# Patient Record
Sex: MALE | Race: WHITE | NOT HISPANIC OR LATINO | Employment: OTHER | URBAN - METROPOLITAN AREA
[De-identification: names, ages, dates, MRNs, and addresses within clinical notes are randomized per-mention and may not be internally consistent; named-entity substitution may affect disease eponyms.]

---

## 2017-02-20 ENCOUNTER — COMMUNICATION - HEALTHEAST (OUTPATIENT)
Dept: INTERNAL MEDICINE | Facility: CLINIC | Age: 67
End: 2017-02-20

## 2017-02-20 DIAGNOSIS — I10 HTN (HYPERTENSION): ICD-10-CM

## 2017-02-22 ENCOUNTER — COMMUNICATION - HEALTHEAST (OUTPATIENT)
Dept: INTERNAL MEDICINE | Facility: CLINIC | Age: 67
End: 2017-02-22

## 2017-05-15 ENCOUNTER — COMMUNICATION - HEALTHEAST (OUTPATIENT)
Dept: INTERNAL MEDICINE | Facility: CLINIC | Age: 67
End: 2017-05-15

## 2017-05-18 ENCOUNTER — OFFICE VISIT - HEALTHEAST (OUTPATIENT)
Dept: INTERNAL MEDICINE | Facility: CLINIC | Age: 67
End: 2017-05-18

## 2017-05-18 DIAGNOSIS — Z78.9 FULL CODE STATUS: ICD-10-CM

## 2017-05-18 DIAGNOSIS — M25.572 LEFT ANKLE PAIN: ICD-10-CM

## 2017-05-18 DIAGNOSIS — K21.9 GERD (GASTROESOPHAGEAL REFLUX DISEASE): ICD-10-CM

## 2017-05-18 DIAGNOSIS — J32.9 RECURRENT RHINOSINUSITIS: ICD-10-CM

## 2017-06-23 ENCOUNTER — RECORDS - HEALTHEAST (OUTPATIENT)
Dept: ADMINISTRATIVE | Facility: OTHER | Age: 67
End: 2017-06-23

## 2017-06-23 ENCOUNTER — COMMUNICATION - HEALTHEAST (OUTPATIENT)
Dept: INTERNAL MEDICINE | Facility: CLINIC | Age: 67
End: 2017-06-23

## 2017-06-25 ENCOUNTER — COMMUNICATION - HEALTHEAST (OUTPATIENT)
Dept: INTERNAL MEDICINE | Facility: CLINIC | Age: 67
End: 2017-06-25

## 2017-06-27 ENCOUNTER — OFFICE VISIT - HEALTHEAST (OUTPATIENT)
Dept: INTERNAL MEDICINE | Facility: CLINIC | Age: 67
End: 2017-06-27

## 2017-06-27 ENCOUNTER — COMMUNICATION - HEALTHEAST (OUTPATIENT)
Dept: INTERNAL MEDICINE | Facility: CLINIC | Age: 67
End: 2017-06-27

## 2017-06-29 ENCOUNTER — OFFICE VISIT - HEALTHEAST (OUTPATIENT)
Dept: INTERNAL MEDICINE | Facility: CLINIC | Age: 67
End: 2017-06-29

## 2017-06-29 DIAGNOSIS — H34.8322 BRANCH RETINAL VEIN OCCLUSION OF LEFT EYE (H): ICD-10-CM

## 2017-06-29 DIAGNOSIS — I10 HTN (HYPERTENSION): ICD-10-CM

## 2017-06-29 DIAGNOSIS — Z12.5 SCREENING FOR PROSTATE CANCER: ICD-10-CM

## 2017-06-29 LAB
CHOLEST SERPL-MCNC: 164 MG/DL
FASTING STATUS PATIENT QL REPORTED: YES
HBA1C MFR BLD: 5.3 % (ref 3.5–6)
HDLC SERPL-MCNC: 41 MG/DL
LDLC SERPL CALC-MCNC: 103 MG/DL
PSA SERPL-MCNC: 3.4 NG/ML (ref 0–4.5)
TRIGL SERPL-MCNC: 102 MG/DL

## 2017-09-29 ENCOUNTER — AMBULATORY - HEALTHEAST (OUTPATIENT)
Dept: NURSING | Facility: CLINIC | Age: 67
End: 2017-09-29

## 2017-09-29 DIAGNOSIS — Z23 NEED FOR IMMUNIZATION AGAINST INFLUENZA: ICD-10-CM

## 2017-10-10 ENCOUNTER — COMMUNICATION - HEALTHEAST (OUTPATIENT)
Dept: INTERNAL MEDICINE | Facility: CLINIC | Age: 67
End: 2017-10-10

## 2017-10-10 DIAGNOSIS — I10 HTN (HYPERTENSION): ICD-10-CM

## 2018-01-26 ENCOUNTER — COMMUNICATION - HEALTHEAST (OUTPATIENT)
Dept: INTERNAL MEDICINE | Facility: CLINIC | Age: 68
End: 2018-01-26

## 2018-02-16 ENCOUNTER — COMMUNICATION - HEALTHEAST (OUTPATIENT)
Dept: INTERNAL MEDICINE | Facility: CLINIC | Age: 68
End: 2018-02-16

## 2018-02-20 ENCOUNTER — OFFICE VISIT - HEALTHEAST (OUTPATIENT)
Dept: INTERNAL MEDICINE | Facility: CLINIC | Age: 68
End: 2018-02-20

## 2018-02-20 DIAGNOSIS — J98.8 RESPIRATORY INFECTION: ICD-10-CM

## 2018-02-20 DIAGNOSIS — L82.1 SK (SEBORRHEIC KERATOSIS): ICD-10-CM

## 2018-02-20 DIAGNOSIS — I83.813 VARICOSE VEINS OF LEG WITH PAIN, BILATERAL: ICD-10-CM

## 2018-02-20 DIAGNOSIS — L57.0 AK (ACTINIC KERATOSIS): ICD-10-CM

## 2018-02-20 DIAGNOSIS — I10 HTN (HYPERTENSION): ICD-10-CM

## 2018-05-31 ENCOUNTER — RECORDS - HEALTHEAST (OUTPATIENT)
Dept: ADMINISTRATIVE | Facility: OTHER | Age: 68
End: 2018-05-31

## 2018-06-04 ENCOUNTER — COMMUNICATION - HEALTHEAST (OUTPATIENT)
Dept: INTERNAL MEDICINE | Facility: CLINIC | Age: 68
End: 2018-06-04

## 2018-06-04 ENCOUNTER — OFFICE VISIT - HEALTHEAST (OUTPATIENT)
Dept: INTERNAL MEDICINE | Facility: CLINIC | Age: 68
End: 2018-06-04

## 2018-06-04 DIAGNOSIS — M25.551 RIGHT HIP PAIN: ICD-10-CM

## 2018-06-04 DIAGNOSIS — M25.50 JOINT PAIN: ICD-10-CM

## 2018-06-04 DIAGNOSIS — M54.2 NECK PAIN: ICD-10-CM
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
## 2018-06-04 LAB
ALBUMIN SERPL-MCNC: 4.1 G/DL (ref 3.5–5)
ALP SERPL-CCNC: 94 U/L (ref 45–120)
ALT SERPL W P-5'-P-CCNC: 18 U/L (ref 0–45)
ANION GAP SERPL CALCULATED.3IONS-SCNC: 13 MMOL/L (ref 5–18)
AST SERPL W P-5'-P-CCNC: 13 U/L (ref 0–40)
BASOPHILS # BLD AUTO: 0.1 THOU/UL (ref 0–0.2)
BASOPHILS NFR BLD AUTO: 1 % (ref 0–2)
BILIRUB SERPL-MCNC: 0.5 MG/DL (ref 0–1)
BUN SERPL-MCNC: 29 MG/DL (ref 8–22)
C REACTIVE PROTEIN LHE: <0.1 MG/DL (ref 0–0.8)
CALCIUM SERPL-MCNC: 9.6 MG/DL (ref 8.5–10.5)
CHLORIDE BLD-SCNC: 104 MMOL/L (ref 98–107)
CO2 SERPL-SCNC: 25 MMOL/L (ref 22–31)
CREAT SERPL-MCNC: 1 MG/DL (ref 0.7–1.3)
EOSINOPHIL # BLD AUTO: 0.1 THOU/UL (ref 0–0.4)
EOSINOPHIL NFR BLD AUTO: 1 % (ref 0–6)
ERYTHROCYTE [DISTWIDTH] IN BLOOD BY AUTOMATED COUNT: 11.3 % (ref 11–14.5)
ERYTHROCYTE [SEDIMENTATION RATE] IN BLOOD BY WESTERGREN METHOD: 3 MM/HR (ref 0–15)
GFR SERPL CREATININE-BSD FRML MDRD: >60 ML/MIN/1.73M2
GLUCOSE BLD-MCNC: 84 MG/DL (ref 70–125)
HCT VFR BLD AUTO: 48.5 % (ref 40–54)
HGB BLD-MCNC: 16.2 G/DL (ref 14–18)
LYMPHOCYTES # BLD AUTO: 2.1 THOU/UL (ref 0.8–4.4)
LYMPHOCYTES NFR BLD AUTO: 18 % (ref 20–40)
MCH RBC QN AUTO: 31.8 PG (ref 27–34)
MCHC RBC AUTO-ENTMCNC: 33.5 G/DL (ref 32–36)
MCV RBC AUTO: 95 FL (ref 80–100)
MONOCYTES # BLD AUTO: 1.2 THOU/UL (ref 0–0.9)
MONOCYTES NFR BLD AUTO: 11 % (ref 2–10)
NEUTROPHILS # BLD AUTO: 8.1 THOU/UL (ref 2–7.7)
NEUTROPHILS NFR BLD AUTO: 70 % (ref 50–70)
PLATELET # BLD AUTO: 298 THOU/UL (ref 140–440)
PMV BLD AUTO: 7.4 FL (ref 7–10)
POTASSIUM BLD-SCNC: 4.3 MMOL/L (ref 3.5–5)
PROT SERPL-MCNC: 6.7 G/DL (ref 6–8)
RBC # BLD AUTO: 5.1 MILL/UL (ref 4.4–6.2)
RHEUMATOID FACT SERPL-ACNC: <15 IU/ML (ref 0–30)
SODIUM SERPL-SCNC: 142 MMOL/L (ref 136–145)
WBC: 11.7 THOU/UL (ref 4–11)

## 2018-06-05 LAB
B BURGDOR IGG+IGM SER QL: <0.01 INDEX VALUE
CCP AB SER IA-ACNC: <0.5 U/ML

## 2018-06-06 ENCOUNTER — COMMUNICATION - HEALTHEAST (OUTPATIENT)
Dept: INTERNAL MEDICINE | Facility: CLINIC | Age: 68
End: 2018-06-06

## 2018-06-06 LAB
A PHAGOCYTOPH DNA BLD QL NAA+PROBE: NEGATIVE
B MICROTI DNA BLD QL NAA+PROBE: NOT DETECTED
BABESIA DNA BLD QL NAA+PROBE: NOT DETECTED
E CHAFFEENSIS DNA BLD QL NAA+PROBE: NEGATIVE
E EWINGII DNA SPEC QL NAA+PROBE: NEGATIVE
EHRLICHIA DNA SPEC QL NAA+PROBE: NEGATIVE

## 2018-06-07 ENCOUNTER — COMMUNICATION - HEALTHEAST (OUTPATIENT)
Dept: INTERNAL MEDICINE | Facility: CLINIC | Age: 68
End: 2018-06-07

## 2018-06-07 ENCOUNTER — COMMUNICATION - HEALTHEAST (OUTPATIENT)
Dept: SCHEDULING | Facility: CLINIC | Age: 68
End: 2018-06-07

## 2018-06-07 DIAGNOSIS — R14.0 ABDOMINAL BLOATING: ICD-10-CM

## 2018-06-08 ENCOUNTER — COMMUNICATION - HEALTHEAST (OUTPATIENT)
Dept: INTERNAL MEDICINE | Facility: CLINIC | Age: 68
End: 2018-06-08

## 2018-06-25 ENCOUNTER — OFFICE VISIT - HEALTHEAST (OUTPATIENT)
Dept: INTERNAL MEDICINE | Facility: CLINIC | Age: 68
End: 2018-06-25

## 2018-06-25 DIAGNOSIS — M79.604 LOW BACK PAIN RADIATING TO RIGHT LEG: ICD-10-CM

## 2018-06-25 DIAGNOSIS — R10.9 ABDOMINAL DISCOMFORT: ICD-10-CM

## 2018-06-25 DIAGNOSIS — M54.50 LOW BACK PAIN RADIATING TO RIGHT LEG: ICD-10-CM

## 2018-06-25 DIAGNOSIS — R10.31 RLQ DISCOMFORT: ICD-10-CM

## 2018-06-25 DIAGNOSIS — R14.0 ABDOMINAL BLOATING: ICD-10-CM

## 2018-06-25 DIAGNOSIS — M25.551 RIGHT HIP PAIN: ICD-10-CM

## 2018-06-25 DIAGNOSIS — R20.0 RIGHT LEG NUMBNESS: ICD-10-CM

## 2018-06-26 ENCOUNTER — AMBULATORY - HEALTHEAST (OUTPATIENT)
Dept: SCHEDULING | Facility: CLINIC | Age: 68
End: 2018-06-26

## 2018-06-26 DIAGNOSIS — M79.604 LOW BACK PAIN RADIATING TO RIGHT LEG: ICD-10-CM

## 2018-06-26 DIAGNOSIS — R10.9 ABDOMINAL DISCOMFORT: ICD-10-CM

## 2018-06-26 DIAGNOSIS — M54.50 LOW BACK PAIN RADIATING TO RIGHT LEG: ICD-10-CM

## 2018-06-26 DIAGNOSIS — R14.0 ABDOMINAL BLOATING: ICD-10-CM

## 2018-06-26 DIAGNOSIS — R20.0 RIGHT LEG NUMBNESS: ICD-10-CM

## 2018-06-26 DIAGNOSIS — R10.31 RLQ DISCOMFORT: ICD-10-CM

## 2018-07-04 ENCOUNTER — COMMUNICATION - HEALTHEAST (OUTPATIENT)
Dept: INTERNAL MEDICINE | Facility: CLINIC | Age: 68
End: 2018-07-04

## 2018-07-08 ENCOUNTER — COMMUNICATION - HEALTHEAST (OUTPATIENT)
Dept: INTERNAL MEDICINE | Facility: CLINIC | Age: 68
End: 2018-07-08

## 2018-10-09 ENCOUNTER — AMBULATORY - HEALTHEAST (OUTPATIENT)
Dept: NURSING | Facility: CLINIC | Age: 68
End: 2018-10-09

## 2018-10-09 DIAGNOSIS — Z23 FLU VACCINE NEED: ICD-10-CM

## 2018-12-20 ENCOUNTER — RECORDS - HEALTHEAST (OUTPATIENT)
Dept: ADMINISTRATIVE | Facility: OTHER | Age: 68
End: 2018-12-20

## 2019-01-15 ENCOUNTER — OFFICE VISIT - HEALTHEAST (OUTPATIENT)
Dept: INTERNAL MEDICINE | Facility: CLINIC | Age: 69
End: 2019-01-15

## 2019-01-15 DIAGNOSIS — S09.90XA TRAUMATIC INJURY OF HEAD, INITIAL ENCOUNTER: ICD-10-CM
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
## 2019-01-15 DIAGNOSIS — I10 ESSENTIAL HYPERTENSION: ICD-10-CM

## 2019-01-15 DIAGNOSIS — M25.512 ACUTE PAIN OF LEFT SHOULDER: ICD-10-CM

## 2019-02-04 ENCOUNTER — COMMUNICATION - HEALTHEAST (OUTPATIENT)
Dept: INTERNAL MEDICINE | Facility: CLINIC | Age: 69
End: 2019-02-04

## 2019-02-05 ENCOUNTER — OFFICE VISIT - HEALTHEAST (OUTPATIENT)
Dept: INTERNAL MEDICINE | Facility: CLINIC | Age: 69
End: 2019-02-05

## 2019-02-05 DIAGNOSIS — S06.0X0D CONCUSSION WITHOUT LOSS OF CONSCIOUSNESS, SUBSEQUENT ENCOUNTER: ICD-10-CM

## 2019-02-05 DIAGNOSIS — S00.03XD HEMATOMA OF SCALP, SUBSEQUENT ENCOUNTER: ICD-10-CM

## 2019-02-05 DIAGNOSIS — M25.612 DECREASED RANGE OF MOTION OF LEFT SHOULDER: ICD-10-CM
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
## 2019-02-05 DIAGNOSIS — M25.512 ACUTE PAIN OF LEFT SHOULDER: ICD-10-CM

## 2019-02-08 ENCOUNTER — RECORDS - HEALTHEAST (OUTPATIENT)
Dept: ADMINISTRATIVE | Facility: OTHER | Age: 69
End: 2019-02-08

## 2019-02-12 ENCOUNTER — RECORDS - HEALTHEAST (OUTPATIENT)
Dept: ADMINISTRATIVE | Facility: OTHER | Age: 69
End: 2019-02-12

## 2019-02-12 ENCOUNTER — COMMUNICATION - HEALTHEAST (OUTPATIENT)
Dept: INTERNAL MEDICINE | Facility: CLINIC | Age: 69
End: 2019-02-12

## 2019-02-12 ENCOUNTER — AMBULATORY - HEALTHEAST (OUTPATIENT)
Dept: INTERNAL MEDICINE | Facility: CLINIC | Age: 69
End: 2019-02-12

## 2019-02-12 DIAGNOSIS — M75.122 COMPLETE TEAR OF LEFT ROTATOR CUFF: ICD-10-CM

## 2019-02-14 ENCOUNTER — COMMUNICATION - HEALTHEAST (OUTPATIENT)
Dept: INTERNAL MEDICINE | Facility: CLINIC | Age: 69
End: 2019-02-14

## 2019-02-19 ENCOUNTER — COMMUNICATION - HEALTHEAST (OUTPATIENT)
Dept: INTERNAL MEDICINE | Facility: CLINIC | Age: 69
End: 2019-02-19

## 2019-02-22 ENCOUNTER — COMMUNICATION - HEALTHEAST (OUTPATIENT)
Dept: INTERNAL MEDICINE | Facility: CLINIC | Age: 69
End: 2019-02-22

## 2019-03-30 ENCOUNTER — RECORDS - HEALTHEAST (OUTPATIENT)
Dept: ADMINISTRATIVE | Facility: OTHER | Age: 69
End: 2019-03-30

## 2019-03-31 ENCOUNTER — COMMUNICATION - HEALTHEAST (OUTPATIENT)
Dept: INTERNAL MEDICINE | Facility: CLINIC | Age: 69
End: 2019-03-31

## 2019-04-05 ENCOUNTER — COMMUNICATION - HEALTHEAST (OUTPATIENT)
Dept: INTERNAL MEDICINE | Facility: CLINIC | Age: 69
End: 2019-04-05

## 2019-04-15 ENCOUNTER — COMMUNICATION - HEALTHEAST (OUTPATIENT)
Dept: INTERNAL MEDICINE | Facility: CLINIC | Age: 69
End: 2019-04-15

## 2019-04-15 ENCOUNTER — OFFICE VISIT - HEALTHEAST (OUTPATIENT)
Dept: INTERNAL MEDICINE | Facility: CLINIC | Age: 69
End: 2019-04-15

## 2019-04-15 DIAGNOSIS — E66.01 MORBID OBESITY (H): ICD-10-CM

## 2019-04-15 DIAGNOSIS — M25.512 CHRONIC LEFT SHOULDER PAIN: ICD-10-CM

## 2019-04-15 DIAGNOSIS — S06.0X0D CONCUSSION WITHOUT LOSS OF CONSCIOUSNESS, SUBSEQUENT ENCOUNTER: ICD-10-CM

## 2019-04-15 DIAGNOSIS — M75.122 COMPLETE TEAR OF LEFT ROTATOR CUFF: ICD-10-CM

## 2019-04-15 DIAGNOSIS — G89.29 CHRONIC LEFT SHOULDER PAIN: ICD-10-CM

## 2019-04-15 DIAGNOSIS — G89.29 CHRONIC NECK PAIN: ICD-10-CM

## 2019-04-15 DIAGNOSIS — Z01.810 PREOPERATIVE CARDIOVASCULAR EXAMINATION: ICD-10-CM

## 2019-04-15 DIAGNOSIS — M54.2 CHRONIC NECK PAIN: ICD-10-CM

## 2019-04-15 DIAGNOSIS — I10 ESSENTIAL HYPERTENSION: ICD-10-CM

## 2019-04-15 DIAGNOSIS — Z98.890 S/P COLONOSCOPY: ICD-10-CM

## 2019-04-15 DIAGNOSIS — I48.92 ATRIAL FLUTTER, UNSPECIFIED TYPE (H): ICD-10-CM

## 2019-04-15 LAB
ANION GAP SERPL CALCULATED.3IONS-SCNC: 8 MMOL/L (ref 5–18)
BUN SERPL-MCNC: 20 MG/DL (ref 8–22)
CALCIUM SERPL-MCNC: 9.7 MG/DL (ref 8.5–10.5)
CHLORIDE BLD-SCNC: 104 MMOL/L (ref 98–107)
CO2 SERPL-SCNC: 30 MMOL/L (ref 22–31)
CREAT SERPL-MCNC: 0.85 MG/DL (ref 0.7–1.3)
ERYTHROCYTE [DISTWIDTH] IN BLOOD BY AUTOMATED COUNT: 11.9 % (ref 11–14.5)
GFR SERPL CREATININE-BSD FRML MDRD: >60 ML/MIN/1.73M2
GLUCOSE BLD-MCNC: 91 MG/DL (ref 70–125)
HCT VFR BLD AUTO: 47.3 % (ref 40–54)
HGB BLD-MCNC: 16.4 G/DL (ref 14–18)
MAGNESIUM SERPL-MCNC: 2.1 MG/DL (ref 1.8–2.6)
MCH RBC QN AUTO: 32.2 PG (ref 27–34)
MCHC RBC AUTO-ENTMCNC: 34.8 G/DL (ref 32–36)
MCV RBC AUTO: 93 FL (ref 80–100)
PLATELET # BLD AUTO: 275 THOU/UL (ref 140–440)
PMV BLD AUTO: 7.8 FL (ref 7–10)
POTASSIUM BLD-SCNC: 4.6 MMOL/L (ref 3.5–5)
RBC # BLD AUTO: 5.1 MILL/UL (ref 4.4–6.2)
SODIUM SERPL-SCNC: 142 MMOL/L (ref 136–145)
WBC: 7.7 THOU/UL (ref 4–11)

## 2019-04-15 ASSESSMENT — MIFFLIN-ST. JEOR: SCORE: 1956.48

## 2019-04-16 LAB
ATRIAL RATE - MUSE: 83 BPM
DIASTOLIC BLOOD PRESSURE - MUSE: NORMAL MMHG
INTERPRETATION ECG - MUSE: NORMAL
P AXIS - MUSE: NORMAL DEGREES
PR INTERVAL - MUSE: NORMAL MS
QRS DURATION - MUSE: 108 MS
QT - MUSE: 394 MS
QTC - MUSE: 451 MS
R AXIS - MUSE: -86 DEGREES
SYSTOLIC BLOOD PRESSURE - MUSE: NORMAL MMHG
T AXIS - MUSE: 49 DEGREES
VENTRICULAR RATE- MUSE: 79 BPM

## 2019-05-02 ENCOUNTER — RECORDS - HEALTHEAST (OUTPATIENT)
Dept: ADMINISTRATIVE | Facility: OTHER | Age: 69
End: 2019-05-02

## 2019-05-07 ENCOUNTER — COMMUNICATION - HEALTHEAST (OUTPATIENT)
Dept: INTERNAL MEDICINE | Facility: CLINIC | Age: 69
End: 2019-05-07

## 2019-06-03 ENCOUNTER — OFFICE VISIT - HEALTHEAST (OUTPATIENT)
Dept: INTERNAL MEDICINE | Facility: CLINIC | Age: 69
End: 2019-06-03

## 2019-06-03 DIAGNOSIS — J31.0 RHINITIS, UNSPECIFIED TYPE: ICD-10-CM

## 2019-06-03 DIAGNOSIS — S06.0X0D CONCUSSION WITHOUT LOSS OF CONSCIOUSNESS, SUBSEQUENT ENCOUNTER: ICD-10-CM

## 2019-06-03 DIAGNOSIS — I10 ESSENTIAL HYPERTENSION: ICD-10-CM

## 2019-06-03 DIAGNOSIS — Z53.20 ANTICOAGULANT DRUG DECLINED: ICD-10-CM

## 2019-06-03 DIAGNOSIS — Z96.612 S/P REVERSE TOTAL SHOULDER ARTHROPLASTY, LEFT: ICD-10-CM

## 2019-06-03 DIAGNOSIS — I48.92 ATRIAL FLUTTER, CHRONIC (H): ICD-10-CM

## 2019-09-27 ENCOUNTER — COMMUNICATION - HEALTHEAST (OUTPATIENT)
Dept: INTERNAL MEDICINE | Facility: CLINIC | Age: 69
End: 2019-09-27

## 2019-09-27 DIAGNOSIS — J31.0 CHRONIC RHINITIS: ICD-10-CM

## 2019-10-29 ENCOUNTER — AMBULATORY - HEALTHEAST (OUTPATIENT)
Dept: NURSING | Facility: CLINIC | Age: 69
End: 2019-10-29

## 2019-10-29 DIAGNOSIS — Z23 FLU VACCINE NEED: ICD-10-CM

## 2019-11-12 ENCOUNTER — COMMUNICATION - HEALTHEAST (OUTPATIENT)
Dept: INTERNAL MEDICINE | Facility: CLINIC | Age: 69
End: 2019-11-12

## 2019-11-12 DIAGNOSIS — Z96.612 S/P REVERSE TOTAL SHOULDER ARTHROPLASTY, LEFT: ICD-10-CM

## 2019-11-12 DIAGNOSIS — M89.9 DISORDER OF BONE, UNSPECIFIED: ICD-10-CM

## 2019-11-13 ENCOUNTER — AMBULATORY - HEALTHEAST (OUTPATIENT)
Dept: LAB | Facility: CLINIC | Age: 69
End: 2019-11-13

## 2019-11-13 DIAGNOSIS — M89.9 DISORDER OF BONE, UNSPECIFIED: ICD-10-CM

## 2019-11-13 DIAGNOSIS — Z96.612 S/P REVERSE TOTAL SHOULDER ARTHROPLASTY, LEFT: ICD-10-CM

## 2019-11-14 LAB
25(OH)D3 SERPL-MCNC: 34.3 NG/ML (ref 30–80)
25(OH)D3 SERPL-MCNC: 34.3 NG/ML (ref 30–80)

## 2020-01-16 ENCOUNTER — RECORDS - HEALTHEAST (OUTPATIENT)
Dept: ADMINISTRATIVE | Facility: OTHER | Age: 70
End: 2020-01-16

## 2020-01-24 ENCOUNTER — RECORDS - HEALTHEAST (OUTPATIENT)
Dept: ADMINISTRATIVE | Facility: OTHER | Age: 70
End: 2020-01-24

## 2020-02-25 ENCOUNTER — COMMUNICATION - HEALTHEAST (OUTPATIENT)
Dept: INTERNAL MEDICINE | Facility: CLINIC | Age: 70
End: 2020-02-25

## 2020-05-01 ENCOUNTER — COMMUNICATION - HEALTHEAST (OUTPATIENT)
Dept: INTERNAL MEDICINE | Facility: CLINIC | Age: 70
End: 2020-05-01

## 2020-05-04 ENCOUNTER — OFFICE VISIT - HEALTHEAST (OUTPATIENT)
Dept: INTERNAL MEDICINE | Facility: CLINIC | Age: 70
End: 2020-05-04

## 2020-05-04 DIAGNOSIS — G89.29 CHRONIC LEFT SHOULDER PAIN: ICD-10-CM

## 2020-05-04 DIAGNOSIS — I48.92 ATRIAL FLUTTER, CHRONIC (H): ICD-10-CM

## 2020-05-04 DIAGNOSIS — J31.0 CHRONIC RHINITIS: ICD-10-CM

## 2020-05-04 DIAGNOSIS — M25.552 HIP PAIN, LEFT: ICD-10-CM

## 2020-05-04 DIAGNOSIS — E66.01 MORBID OBESITY (H): ICD-10-CM

## 2020-05-04 DIAGNOSIS — C44.311 BASAL CELL CARCINOMA OF SKIN OF NOSE: ICD-10-CM

## 2020-05-04 DIAGNOSIS — M25.512 CHRONIC LEFT SHOULDER PAIN: ICD-10-CM

## 2020-05-04 DIAGNOSIS — M54.50 ACUTE LEFT-SIDED LOW BACK PAIN WITHOUT SCIATICA: ICD-10-CM

## 2020-05-04 DIAGNOSIS — I10 ESSENTIAL HYPERTENSION: ICD-10-CM

## 2020-05-05 ENCOUNTER — COMMUNICATION - HEALTHEAST (OUTPATIENT)
Dept: INTERNAL MEDICINE | Facility: CLINIC | Age: 70
End: 2020-05-05

## 2020-06-16 ENCOUNTER — COMMUNICATION - HEALTHEAST (OUTPATIENT)
Dept: INTERNAL MEDICINE | Facility: CLINIC | Age: 70
End: 2020-06-16

## 2020-06-18 ENCOUNTER — OFFICE VISIT - HEALTHEAST (OUTPATIENT)
Dept: INTERNAL MEDICINE | Facility: CLINIC | Age: 70
End: 2020-06-18

## 2020-06-18 DIAGNOSIS — R06.02 SOB (SHORTNESS OF BREATH): ICD-10-CM

## 2020-06-18 DIAGNOSIS — L03.032 CELLULITIS OF LEFT TOE: ICD-10-CM

## 2020-06-18 DIAGNOSIS — M25.512 CHRONIC LEFT SHOULDER PAIN: ICD-10-CM

## 2020-06-18 DIAGNOSIS — G89.29 CHRONIC LEFT SHOULDER PAIN: ICD-10-CM

## 2020-06-18 DIAGNOSIS — M25.551 BILATERAL HIP PAIN: ICD-10-CM

## 2020-06-18 DIAGNOSIS — M25.552 BILATERAL HIP PAIN: ICD-10-CM

## 2020-06-18 LAB
ALBUMIN SERPL-MCNC: 4.2 G/DL (ref 3.5–5)
ALP SERPL-CCNC: 90 U/L (ref 45–120)
ALT SERPL W P-5'-P-CCNC: 15 U/L (ref 0–45)
ANION GAP SERPL CALCULATED.3IONS-SCNC: 10 MMOL/L (ref 5–18)
AST SERPL W P-5'-P-CCNC: 16 U/L (ref 0–40)
BASOPHILS # BLD AUTO: 0.1 THOU/UL (ref 0–0.2)
BASOPHILS NFR BLD AUTO: 1 % (ref 0–2)
BILIRUB SERPL-MCNC: 0.7 MG/DL (ref 0–1)
BNP SERPL-MCNC: 133 PG/ML (ref 0–67)
BUN SERPL-MCNC: 15 MG/DL (ref 8–28)
C REACTIVE PROTEIN LHE: 0.4 MG/DL (ref 0–0.8)
CALCIUM SERPL-MCNC: 9.6 MG/DL (ref 8.5–10.5)
CHLORIDE BLD-SCNC: 104 MMOL/L (ref 98–107)
CO2 SERPL-SCNC: 27 MMOL/L (ref 22–31)
CREAT SERPL-MCNC: 0.88 MG/DL (ref 0.7–1.3)
EOSINOPHIL # BLD AUTO: 0.2 THOU/UL (ref 0–0.4)
EOSINOPHIL NFR BLD AUTO: 3 % (ref 0–6)
ERYTHROCYTE [DISTWIDTH] IN BLOOD BY AUTOMATED COUNT: 11.5 % (ref 11–14.5)
ERYTHROCYTE [SEDIMENTATION RATE] IN BLOOD BY WESTERGREN METHOD: 7 MM/HR (ref 0–15)
GFR SERPL CREATININE-BSD FRML MDRD: >60 ML/MIN/1.73M2
GLUCOSE BLD-MCNC: 81 MG/DL (ref 70–125)
HCT VFR BLD AUTO: 45.9 % (ref 40–54)
HGB BLD-MCNC: 15.8 G/DL (ref 14–18)
LYMPHOCYTES # BLD AUTO: 2.1 THOU/UL (ref 0.8–4.4)
LYMPHOCYTES NFR BLD AUTO: 28 % (ref 20–40)
MCH RBC QN AUTO: 31.5 PG (ref 27–34)
MCHC RBC AUTO-ENTMCNC: 34.4 G/DL (ref 32–36)
MCV RBC AUTO: 92 FL (ref 80–100)
MONOCYTES # BLD AUTO: 0.6 THOU/UL (ref 0–0.9)
MONOCYTES NFR BLD AUTO: 9 % (ref 2–10)
NEUTROPHILS # BLD AUTO: 4.5 THOU/UL (ref 2–7.7)
NEUTROPHILS NFR BLD AUTO: 61 % (ref 50–70)
PLATELET # BLD AUTO: 265 THOU/UL (ref 140–440)
PMV BLD AUTO: 8.5 FL (ref 7–10)
POTASSIUM BLD-SCNC: 4.4 MMOL/L (ref 3.5–5)
PROT SERPL-MCNC: 7 G/DL (ref 6–8)
RBC # BLD AUTO: 5 MILL/UL (ref 4.4–6.2)
SODIUM SERPL-SCNC: 141 MMOL/L (ref 136–145)
TROPONIN I SERPL-MCNC: 0.02 NG/ML (ref 0–0.29)
WBC: 7.5 THOU/UL (ref 4–11)

## 2020-08-06 ENCOUNTER — COMMUNICATION - HEALTHEAST (OUTPATIENT)
Dept: INTERNAL MEDICINE | Facility: CLINIC | Age: 70
End: 2020-08-06

## 2020-08-06 DIAGNOSIS — J31.0 CHRONIC RHINITIS: ICD-10-CM

## 2020-09-12 ENCOUNTER — AMBULATORY - HEALTHEAST (OUTPATIENT)
Dept: NURSING | Facility: CLINIC | Age: 70
End: 2020-09-12

## 2020-11-30 ENCOUNTER — AMBULATORY - HEALTHEAST (OUTPATIENT)
Dept: LAB | Facility: CLINIC | Age: 70
End: 2020-11-30

## 2020-11-30 ENCOUNTER — OFFICE VISIT - HEALTHEAST (OUTPATIENT)
Dept: INTERNAL MEDICINE | Facility: CLINIC | Age: 70
End: 2020-11-30

## 2020-11-30 DIAGNOSIS — R06.02 SOB (SHORTNESS OF BREATH): ICD-10-CM

## 2020-11-30 DIAGNOSIS — M25.552 LEFT HIP PAIN: ICD-10-CM

## 2020-11-30 DIAGNOSIS — J31.0 CHRONIC RHINITIS: ICD-10-CM

## 2020-11-30 DIAGNOSIS — I10 ESSENTIAL HYPERTENSION: ICD-10-CM

## 2020-11-30 DIAGNOSIS — Z12.5 SCREENING FOR PROSTATE CANCER: ICD-10-CM

## 2020-11-30 DIAGNOSIS — S43.005A SHOULDER DISLOCATION, LEFT, INITIAL ENCOUNTER: ICD-10-CM

## 2020-11-30 DIAGNOSIS — Z00.00 MEDICARE ANNUAL WELLNESS VISIT, SUBSEQUENT: ICD-10-CM

## 2020-11-30 DIAGNOSIS — I48.92 ATRIAL FLUTTER, CHRONIC (H): ICD-10-CM

## 2020-11-30 LAB
ANION GAP SERPL CALCULATED.3IONS-SCNC: 10 MMOL/L (ref 5–18)
BNP SERPL-MCNC: 178 PG/ML (ref 0–69)
BUN SERPL-MCNC: 18 MG/DL (ref 8–28)
CALCIUM SERPL-MCNC: 9.1 MG/DL (ref 8.5–10.5)
CHLORIDE BLD-SCNC: 106 MMOL/L (ref 98–107)
CO2 SERPL-SCNC: 27 MMOL/L (ref 22–31)
CREAT SERPL-MCNC: 0.99 MG/DL (ref 0.7–1.3)
ERYTHROCYTE [DISTWIDTH] IN BLOOD BY AUTOMATED COUNT: 11.5 % (ref 11–14.5)
GFR SERPL CREATININE-BSD FRML MDRD: >60 ML/MIN/1.73M2
GLUCOSE BLD-MCNC: 108 MG/DL (ref 70–125)
HCT VFR BLD AUTO: 46.2 % (ref 40–54)
HGB BLD-MCNC: 15.6 G/DL (ref 14–18)
MCH RBC QN AUTO: 31.4 PG (ref 27–34)
MCHC RBC AUTO-ENTMCNC: 33.8 G/DL (ref 32–36)
MCV RBC AUTO: 93 FL (ref 80–100)
PLATELET # BLD AUTO: 265 THOU/UL (ref 140–440)
PMV BLD AUTO: 8.2 FL (ref 7–10)
POTASSIUM BLD-SCNC: 4.4 MMOL/L (ref 3.5–5)
PSA SERPL-MCNC: 2 NG/ML (ref 0–6.5)
RBC # BLD AUTO: 4.97 MILL/UL (ref 4.4–6.2)
SODIUM SERPL-SCNC: 143 MMOL/L (ref 136–145)
WBC: 7.5 THOU/UL (ref 4–11)

## 2020-12-04 ENCOUNTER — AMBULATORY - HEALTHEAST (OUTPATIENT)
Dept: INTERNAL MEDICINE | Facility: CLINIC | Age: 70
End: 2020-12-04

## 2020-12-04 DIAGNOSIS — I10 ESSENTIAL HYPERTENSION: ICD-10-CM

## 2020-12-04 DIAGNOSIS — R06.02 SOB (SHORTNESS OF BREATH): ICD-10-CM

## 2020-12-04 DIAGNOSIS — I48.92 ATRIAL FLUTTER, CHRONIC (H): ICD-10-CM

## 2020-12-30 ENCOUNTER — HOSPITAL ENCOUNTER (OUTPATIENT)
Dept: CARDIOLOGY | Facility: HOSPITAL | Age: 70
Discharge: HOME | End: 2020-12-30
Attending: INTERNAL MEDICINE

## 2020-12-30 DIAGNOSIS — I48.92 ATRIAL FLUTTER, CHRONIC (H): ICD-10-CM

## 2020-12-30 DIAGNOSIS — I10 ESSENTIAL HYPERTENSION: ICD-10-CM

## 2020-12-30 DIAGNOSIS — R06.02 SOB (SHORTNESS OF BREATH): ICD-10-CM

## 2020-12-30 LAB
AORTIC ROOT: 3.1 CM
AORTIC VALVE MEAN VELOCITY: 79.9 CM/S
ASCENDING AORTA: 3.4 CM
AV DIMENSIONLESS INDEX VTI: 0.6
AV MEAN GRADIENT: 3 MMHG
AV PEAK GRADIENT: 5.1 MMHG
AV VALVE AREA: 2.2 CM2
BSA FOR ECHO PROCEDURE: 2.4 M2
CV BLOOD PRESSURE: ABNORMAL MMHG
CV ECHO HEIGHT: 71 IN
CV ECHO WEIGHT: 253 LBS
DOP CALC AO PEAK VEL: 113 CM/S
DOP CALC AO VTI: 19.7 CM
DOP CALC LVOT AREA: 3.46 CM2
DOP CALC LVOT DIAMETER: 2.1 CM
DOP CALC LVOT STROKE VOLUME: 42.6 CM3
DOP CALCLVOT PEAK VEL VTI: 12.3 CM
EJECTION FRACTION: 42 % (ref 55–75)
FRACTIONAL SHORTENING: 17.5 % (ref 28–44)
INTERVENTRICULAR SEPTUM IN END DIASTOLE: 1.4 CM (ref 0.6–1)
IVS/PW RATIO: 1
LA AREA 1: 37.2 CM2
LA AREA 2: 34.7 CM2
LEFT ATRIUM LENGTH: 7.3 CM
LEFT ATRIUM SIZE: 5.1 CM
LEFT ATRIUM TO AORTIC ROOT RATIO: 1.5 NO UNITS
LEFT ATRIUM VOLUME INDEX: 62.6 ML/M2
LEFT ATRIUM VOLUME: 150.3 ML
LEFT VENTRICLE CARDIAC INDEX: 1.5 L/MIN/M2
LEFT VENTRICLE CARDIAC OUTPUT: 3.6 L/MIN
LEFT VENTRICLE DIASTOLIC VOLUME INDEX: 71.7 CM3/M2 (ref 34–74)
LEFT VENTRICLE DIASTOLIC VOLUME: 172 CM3 (ref 62–150)
LEFT VENTRICLE HEART RATE: 85 BPM
LEFT VENTRICLE MASS INDEX: 148.9 G/M2
LEFT VENTRICLE SYSTOLIC VOLUME INDEX: 41.3 CM3/M2 (ref 11–31)
LEFT VENTRICLE SYSTOLIC VOLUME: 99 CM3 (ref 21–61)
LEFT VENTRICULAR INTERNAL DIMENSION IN DIASTOLE: 5.7 CM (ref 4.2–5.8)
LEFT VENTRICULAR INTERNAL DIMENSION IN SYSTOLE: 4.7 CM (ref 2.5–4)
LEFT VENTRICULAR MASS: 357.5 G
LEFT VENTRICULAR OUTFLOW TRACT MEAN GRADIENT: 1 MMHG
LEFT VENTRICULAR OUTFLOW TRACT MEAN VELOCITY: 44.6 CM/S
LEFT VENTRICULAR POSTERIOR WALL IN END DIASTOLE: 1.4 CM (ref 0.6–1)
LV STROKE VOLUME INDEX: 17.7 ML/M2
MV AVERAGE E/E' RATIO: 12.3 CM/S
MV DECELERATION TIME: 229 MS
MV E'TISSUE VEL-LAT: 12.8 CM/S
MV E'TISSUE VEL-MED: 6.82 CM/S
MV LATERAL E/E' RATIO: 9.5
MV MEDIAL E/E' RATIO: 17.7
MV PEAK E VELOCITY: 121 CM/S
NUC REST DIASTOLIC VOLUME INDEX: 4048 LBS
NUC REST SYSTOLIC VOLUME INDEX: 71 IN
TRICUSPID VALVE ANULAR PLANE SYSTOLIC EXCURSION: 1.4 CM

## 2020-12-30 ASSESSMENT — MIFFLIN-ST. JEOR: SCORE: 1924.73

## 2020-12-31 ENCOUNTER — COMMUNICATION - HEALTHEAST (OUTPATIENT)
Dept: INTERNAL MEDICINE | Facility: CLINIC | Age: 70
End: 2020-12-31

## 2021-01-02 ENCOUNTER — COMMUNICATION - HEALTHEAST (OUTPATIENT)
Dept: INTERNAL MEDICINE | Facility: CLINIC | Age: 71
End: 2021-01-02

## 2021-01-03 ENCOUNTER — AMBULATORY - HEALTHEAST (OUTPATIENT)
Dept: INTERNAL MEDICINE | Facility: CLINIC | Age: 71
End: 2021-01-03

## 2021-01-03 DIAGNOSIS — R93.1 DECREASED CARDIAC EJECTION FRACTION: ICD-10-CM

## 2021-01-03 DIAGNOSIS — I10 ESSENTIAL HYPERTENSION: ICD-10-CM

## 2021-01-03 DIAGNOSIS — R06.02 SOB (SHORTNESS OF BREATH): ICD-10-CM

## 2021-01-03 DIAGNOSIS — M25.552 HIP PAIN, LEFT: ICD-10-CM

## 2021-01-03 DIAGNOSIS — I48.92 ATRIAL FLUTTER, CHRONIC (H): ICD-10-CM

## 2021-01-04 ENCOUNTER — COMMUNICATION - HEALTHEAST (OUTPATIENT)
Dept: INTERNAL MEDICINE | Facility: CLINIC | Age: 71
End: 2021-01-04

## 2021-01-04 DIAGNOSIS — R93.1 DECREASED CARDIAC EJECTION FRACTION: ICD-10-CM

## 2021-01-04 DIAGNOSIS — I48.92 ATRIAL FLUTTER, CHRONIC (H): ICD-10-CM

## 2021-01-04 DIAGNOSIS — R06.02 SOB (SHORTNESS OF BREATH): ICD-10-CM

## 2021-01-04 DIAGNOSIS — I10 ESSENTIAL HYPERTENSION: ICD-10-CM

## 2021-01-04 RX ORDER — METOPROLOL SUCCINATE 25 MG/1
25 TABLET, EXTENDED RELEASE ORAL DAILY
Qty: 90 TABLET | Refills: 3 | Status: SHIPPED | OUTPATIENT
Start: 2021-01-04 | End: 2021-12-27

## 2021-01-05 ENCOUNTER — HOSPITAL ENCOUNTER (OUTPATIENT)
Dept: NUCLEAR MEDICINE | Facility: CLINIC | Age: 71
Discharge: HOME | End: 2021-01-05
Attending: INTERNAL MEDICINE

## 2021-01-05 ENCOUNTER — HOSPITAL ENCOUNTER (OUTPATIENT)
Dept: CARDIOLOGY | Facility: CLINIC | Age: 71
Discharge: HOME | End: 2021-01-05
Attending: INTERNAL MEDICINE

## 2021-01-05 DIAGNOSIS — R06.02 SOB (SHORTNESS OF BREATH): ICD-10-CM

## 2021-01-05 DIAGNOSIS — R93.1 DECREASED CARDIAC EJECTION FRACTION: ICD-10-CM

## 2021-01-05 LAB
CV STRESS CURRENT BP HE: NORMAL
CV STRESS CURRENT HR HE: 103
CV STRESS CURRENT HR HE: 104
CV STRESS CURRENT HR HE: 73
CV STRESS CURRENT HR HE: 79
CV STRESS CURRENT HR HE: 79
CV STRESS CURRENT HR HE: 82
CV STRESS CURRENT HR HE: 82
CV STRESS CURRENT HR HE: 83
CV STRESS CURRENT HR HE: 84
CV STRESS CURRENT HR HE: 87
CV STRESS CURRENT HR HE: 88
CV STRESS CURRENT HR HE: 89
CV STRESS CURRENT HR HE: 93
CV STRESS CURRENT HR HE: 97
CV STRESS CURRENT HR HE: 98
CV STRESS CURRENT HR HE: 98
CV STRESS DEVIATION TIME HE: NORMAL
CV STRESS ECHO PERCENT HR HE: NORMAL
CV STRESS EXERCISE STAGE HE: NORMAL
CV STRESS FINAL RESTING BP HE: NORMAL
CV STRESS FINAL RESTING HR HE: 82
CV STRESS MAX HR HE: 113
CV STRESS MAX TREADMILL GRADE HE: 0
CV STRESS MAX TREADMILL SPEED HE: 0
CV STRESS PEAK DIA BP HE: NORMAL
CV STRESS PEAK SYS BP HE: NORMAL
CV STRESS PHASE HE: NORMAL
CV STRESS PROTOCOL HE: NORMAL
CV STRESS RESTING PT POSITION HE: NORMAL
CV STRESS ST DEVIATION AMOUNT HE: NORMAL
CV STRESS ST DEVIATION ELEVATION HE: NORMAL
CV STRESS ST EVELATION AMOUNT HE: NORMAL
CV STRESS TEST TYPE HE: NORMAL
CV STRESS TOTAL STAGE TIME MIN 1 HE: NORMAL
NUC STRESS EJECTION FRACTION: 44 %
RATE PRESSURE PRODUCT: NORMAL
STRESS ECHO BASELINE DIASTOLIC HE: 122
STRESS ECHO BASELINE HR: 78
STRESS ECHO BASELINE SYSTOLIC BP: 163
STRESS ECHO CALCULATED PERCENT HR: 76 %
STRESS ECHO LAST STRESS DIASTOLIC BP: 91
STRESS ECHO LAST STRESS HR: 103
STRESS ECHO LAST STRESS SYSTOLIC BP: 158
STRESS ECHO TARGET HR: 149

## 2021-01-07 ENCOUNTER — AMBULATORY - HEALTHEAST (OUTPATIENT)
Dept: CARDIOLOGY | Facility: CLINIC | Age: 71
End: 2021-01-07

## 2021-01-07 ENCOUNTER — AMBULATORY - HEALTHEAST (OUTPATIENT)
Dept: CARDIOLOGY | Facility: HOSPITAL | Age: 71
End: 2021-01-07

## 2021-01-07 ENCOUNTER — OFFICE VISIT - HEALTHEAST (OUTPATIENT)
Dept: CARDIOLOGY | Facility: CLINIC | Age: 71
End: 2021-01-07

## 2021-01-07 DIAGNOSIS — Z11.59 ENCOUNTER FOR SCREENING FOR OTHER VIRAL DISEASES: ICD-10-CM

## 2021-01-07 DIAGNOSIS — I25.5 ISCHEMIC CARDIOMYOPATHY: ICD-10-CM

## 2021-01-07 DIAGNOSIS — I10 ESSENTIAL HYPERTENSION: ICD-10-CM

## 2021-01-07 DIAGNOSIS — I48.92 ATRIAL FLUTTER, CHRONIC (H): ICD-10-CM

## 2021-01-07 ASSESSMENT — MIFFLIN-ST. JEOR: SCORE: 1937.62

## 2021-01-08 ENCOUNTER — COMMUNICATION - HEALTHEAST (OUTPATIENT)
Dept: INTERNAL MEDICINE | Facility: CLINIC | Age: 71
End: 2021-01-08

## 2021-01-09 ENCOUNTER — HOSPITAL ENCOUNTER (OUTPATIENT)
Dept: MRI IMAGING | Facility: CLINIC | Age: 71
Discharge: HOME | End: 2021-01-09
Attending: INTERNAL MEDICINE

## 2021-01-09 DIAGNOSIS — M25.552 HIP PAIN, LEFT: ICD-10-CM

## 2021-01-12 ENCOUNTER — COMMUNICATION - HEALTHEAST (OUTPATIENT)
Dept: CARDIOLOGY | Facility: CLINIC | Age: 71
End: 2021-01-12

## 2021-01-16 ENCOUNTER — AMBULATORY - HEALTHEAST (OUTPATIENT)
Dept: FAMILY MEDICINE | Facility: CLINIC | Age: 71
End: 2021-01-16

## 2021-01-16 DIAGNOSIS — Z11.59 ENCOUNTER FOR SCREENING FOR OTHER VIRAL DISEASES: ICD-10-CM

## 2021-01-17 LAB
SARS-COV-2 PCR COMMENT: NORMAL
SARS-COV-2 RNA SPEC QL NAA+PROBE: NEGATIVE
SARS-COV-2 VIRUS SPECIMEN SOURCE: NORMAL

## 2021-01-18 ENCOUNTER — RECORDS - HEALTHEAST (OUTPATIENT)
Dept: ADMINISTRATIVE | Facility: OTHER | Age: 71
End: 2021-01-18

## 2021-01-18 ENCOUNTER — COMMUNICATION - HEALTHEAST (OUTPATIENT)
Dept: CARDIOLOGY | Facility: CLINIC | Age: 71
End: 2021-01-18

## 2021-01-18 ENCOUNTER — COMMUNICATION - HEALTHEAST (OUTPATIENT)
Dept: SCHEDULING | Facility: CLINIC | Age: 71
End: 2021-01-18

## 2021-01-19 ENCOUNTER — SURGERY - HEALTHEAST (OUTPATIENT)
Dept: CARDIOLOGY | Facility: CLINIC | Age: 71
End: 2021-01-19

## 2021-01-19 DIAGNOSIS — I25.5 ISCHEMIC CARDIOMYOPATHY: ICD-10-CM

## 2021-01-20 ENCOUNTER — COMMUNICATION - HEALTHEAST (OUTPATIENT)
Dept: INTERNAL MEDICINE | Facility: CLINIC | Age: 71
End: 2021-01-20

## 2021-01-20 ENCOUNTER — SURGERY - HEALTHEAST (OUTPATIENT)
Dept: CARDIOLOGY | Facility: HOSPITAL | Age: 71
End: 2021-01-20

## 2021-01-22 ENCOUNTER — RECORDS - HEALTHEAST (OUTPATIENT)
Dept: ADMINISTRATIVE | Facility: OTHER | Age: 71
End: 2021-01-22

## 2021-01-28 ENCOUNTER — RECORDS - HEALTHEAST (OUTPATIENT)
Dept: ADMINISTRATIVE | Facility: OTHER | Age: 71
End: 2021-01-28

## 2021-02-01 ENCOUNTER — OFFICE VISIT - HEALTHEAST (OUTPATIENT)
Dept: INTERNAL MEDICINE | Facility: CLINIC | Age: 71
End: 2021-02-01

## 2021-02-01 DIAGNOSIS — L27.0: ICD-10-CM

## 2021-02-01 DIAGNOSIS — R93.1 DECREASED CARDIAC EJECTION FRACTION: ICD-10-CM

## 2021-02-01 DIAGNOSIS — I48.92 ATRIAL FLUTTER, CHRONIC (H): ICD-10-CM

## 2021-02-01 DIAGNOSIS — M25.552 HIP PAIN, LEFT: ICD-10-CM

## 2021-02-01 DIAGNOSIS — T39.95XA: ICD-10-CM

## 2021-02-01 DIAGNOSIS — M25.561 CHRONIC PAIN OF RIGHT KNEE: ICD-10-CM

## 2021-02-01 DIAGNOSIS — E66.01 MORBID OBESITY (H): ICD-10-CM

## 2021-02-01 DIAGNOSIS — S43.005A SHOULDER DISLOCATION, LEFT, INITIAL ENCOUNTER: ICD-10-CM

## 2021-02-01 DIAGNOSIS — G89.29 CHRONIC PAIN OF RIGHT KNEE: ICD-10-CM

## 2021-02-01 DIAGNOSIS — R10.13 EPIGASTRIC PAIN: ICD-10-CM

## 2021-02-03 ENCOUNTER — COMMUNICATION - HEALTHEAST (OUTPATIENT)
Dept: CARDIOLOGY | Facility: CLINIC | Age: 71
End: 2021-02-03

## 2021-02-04 ENCOUNTER — OFFICE VISIT - HEALTHEAST (OUTPATIENT)
Dept: CARDIOLOGY | Facility: CLINIC | Age: 71
End: 2021-02-04

## 2021-02-04 ENCOUNTER — COMMUNICATION - HEALTHEAST (OUTPATIENT)
Dept: CARDIOLOGY | Facility: CLINIC | Age: 71
End: 2021-02-04

## 2021-02-04 DIAGNOSIS — I48.91 ATRIAL FIBRILLATION, UNSPECIFIED TYPE (H): ICD-10-CM

## 2021-02-04 ASSESSMENT — MIFFLIN-ST. JEOR: SCORE: 1898.82

## 2021-02-05 LAB
ATRIAL RATE - MUSE: NORMAL
DIASTOLIC BLOOD PRESSURE - MUSE: NORMAL
INTERPRETATION ECG - MUSE: NORMAL
P AXIS - MUSE: NORMAL
PR INTERVAL - MUSE: NORMAL
QRS DURATION - MUSE: 118 MS
QT - MUSE: 408 MS
QTC - MUSE: 433 MS
R AXIS - MUSE: -83 DEGREES
SYSTOLIC BLOOD PRESSURE - MUSE: NORMAL
T AXIS - MUSE: 54 DEGREES
VENTRICULAR RATE- MUSE: 68 BPM

## 2021-02-08 ENCOUNTER — COMMUNICATION - HEALTHEAST (OUTPATIENT)
Dept: INTERNAL MEDICINE | Facility: CLINIC | Age: 71
End: 2021-02-08

## 2021-02-18 ENCOUNTER — OFFICE VISIT - HEALTHEAST (OUTPATIENT)
Dept: CARDIOLOGY | Facility: CLINIC | Age: 71
End: 2021-02-18

## 2021-02-18 DIAGNOSIS — I42.0 DILATED CARDIOMYOPATHY (H): ICD-10-CM

## 2021-02-18 DIAGNOSIS — I48.92 ATRIAL FLUTTER, CHRONIC (H): ICD-10-CM

## 2021-02-18 RX ORDER — CHLORAL HYDRATE 500 MG
2 CAPSULE ORAL DAILY
Status: SHIPPED | COMMUNITY
Start: 2021-02-18

## 2021-02-18 RX ORDER — OXYMETAZOLINE HYDROCHLORIDE 0.05 G/100ML
1 SPRAY NASAL 2 TIMES DAILY
Status: SHIPPED | COMMUNITY
Start: 2021-02-18 | End: 2023-05-08

## 2021-02-26 ENCOUNTER — COMMUNICATION - HEALTHEAST (OUTPATIENT)
Dept: INTERNAL MEDICINE | Facility: CLINIC | Age: 71
End: 2021-02-26

## 2021-03-01 ENCOUNTER — COMMUNICATION - HEALTHEAST (OUTPATIENT)
Dept: INTERNAL MEDICINE | Facility: CLINIC | Age: 71
End: 2021-03-01

## 2021-03-01 ENCOUNTER — COMMUNICATION - HEALTHEAST (OUTPATIENT)
Dept: CARDIOLOGY | Facility: CLINIC | Age: 71
End: 2021-03-01

## 2021-03-04 ENCOUNTER — HOSPITAL ENCOUNTER (OUTPATIENT)
Dept: CARDIOLOGY | Facility: CLINIC | Age: 71
Discharge: HOME | End: 2021-03-04
Attending: INTERNAL MEDICINE

## 2021-03-04 DIAGNOSIS — I48.92 ATRIAL FLUTTER, CHRONIC (H): ICD-10-CM

## 2021-03-04 DIAGNOSIS — I42.0 DILATED CARDIOMYOPATHY (H): ICD-10-CM

## 2021-03-11 ENCOUNTER — COMMUNICATION - HEALTHEAST (OUTPATIENT)
Dept: CARDIOLOGY | Facility: CLINIC | Age: 71
End: 2021-03-11

## 2021-03-30 ENCOUNTER — COMMUNICATION - HEALTHEAST (OUTPATIENT)
Dept: INTERNAL MEDICINE | Facility: CLINIC | Age: 71
End: 2021-03-30

## 2021-03-30 ENCOUNTER — OFFICE VISIT - HEALTHEAST (OUTPATIENT)
Dept: INTERNAL MEDICINE | Facility: CLINIC | Age: 71
End: 2021-03-30

## 2021-03-30 ENCOUNTER — COMMUNICATION - HEALTHEAST (OUTPATIENT)
Dept: ADMINISTRATIVE | Facility: CLINIC | Age: 71
End: 2021-03-30

## 2021-03-30 DIAGNOSIS — M54.16 LUMBAR BACK PAIN WITH RADICULOPATHY AFFECTING LEFT LOWER EXTREMITY: ICD-10-CM

## 2021-03-30 DIAGNOSIS — M54.16 LEFT LUMBAR RADICULOPATHY: ICD-10-CM

## 2021-03-30 DIAGNOSIS — I25.10 CORONARY ARTERY DISEASE INVOLVING NATIVE CORONARY ARTERY OF NATIVE HEART WITHOUT ANGINA PECTORIS: ICD-10-CM

## 2021-04-03 ENCOUNTER — COMMUNICATION - HEALTHEAST (OUTPATIENT)
Dept: INTERNAL MEDICINE | Facility: CLINIC | Age: 71
End: 2021-04-03

## 2021-04-03 DIAGNOSIS — I25.5 ISCHEMIC CARDIOMYOPATHY: ICD-10-CM

## 2021-04-04 ENCOUNTER — AMBULATORY - HEALTHEAST (OUTPATIENT)
Dept: INTERNAL MEDICINE | Facility: CLINIC | Age: 71
End: 2021-04-04

## 2021-04-04 DIAGNOSIS — I25.5 ISCHEMIC CARDIOMYOPATHY: ICD-10-CM

## 2021-04-05 RX ORDER — ATORVASTATIN CALCIUM 40 MG/1
40 TABLET, FILM COATED ORAL AT BEDTIME
Qty: 90 TABLET | Refills: 3 | Status: SHIPPED | OUTPATIENT
Start: 2021-04-05 | End: 2021-07-12

## 2021-04-05 RX ORDER — LISINOPRIL 5 MG/1
5 TABLET ORAL DAILY
Qty: 90 TABLET | Refills: 3 | Status: SHIPPED | OUTPATIENT
Start: 2021-04-05 | End: 2022-01-03

## 2021-05-04 ENCOUNTER — OFFICE VISIT - HEALTHEAST (OUTPATIENT)
Dept: CARDIOLOGY | Facility: CLINIC | Age: 71
End: 2021-05-04

## 2021-05-04 DIAGNOSIS — I50.22 CHRONIC SYSTOLIC HEART FAILURE (H): ICD-10-CM

## 2021-05-04 DIAGNOSIS — I48.91 ATRIAL FIBRILLATION, UNSPECIFIED TYPE (H): ICD-10-CM

## 2021-05-04 ASSESSMENT — MIFFLIN-ST. JEOR: SCORE: 1898.75

## 2021-05-23 ENCOUNTER — HEALTH MAINTENANCE LETTER (OUTPATIENT)
Age: 71
End: 2021-05-23

## 2021-05-27 VITALS
BODY MASS INDEX: 33.18 KG/M2 | DIASTOLIC BLOOD PRESSURE: 62 MMHG | RESPIRATION RATE: 14 BRPM | SYSTOLIC BLOOD PRESSURE: 112 MMHG | WEIGHT: 245 LBS | HEIGHT: 72 IN | HEART RATE: 69 BPM

## 2021-05-27 VITALS — DIASTOLIC BLOOD PRESSURE: 86 MMHG | SYSTOLIC BLOOD PRESSURE: 132 MMHG

## 2021-05-27 NOTE — PATIENT INSTRUCTIONS - HE
The new shingles shot (Shingrix) is 2 separate shots  by 2-6 months.    The cost out of pocket is around $390 for the 2 shots, so you might want to see if your insurance covers it or a portion of it prior to having it done.  Often by having it done at a pharmacy rather than a clinic, it can be cheaper for you.    It is estimated that any person's risk of shingles over their lifetime is around 10-20%.    The old shingles vaccine (Zostavax) is about 50% effective, reducing your risk of shingles to about 5-10% over your lifetime.    The new shot is 90% effective, reducing your risk of shingles to about 1-2% over your lifetime.    Because the shot is strong, it is very common to have flu like symptoms for 2-3 days after the shot.  25-50% of patients will have fever, muscle aches or headache.    I believe that whether or not you have this vaccine is your own decision depending on your values and preferences.  The information above I give you to make an informed decision.    Onur Rod MD  [facility]

## 2021-05-27 NOTE — TELEPHONE ENCOUNTER
I did not call.  I am unsure if someone else did or otherwise.  He is also going to orthopedics.    Onur Rod MD  [facility]  4/5/2019, 11:48 AM

## 2021-05-27 NOTE — TELEPHONE ENCOUNTER
Patient Returning Call  Reason for call:  Patient calling back  Information relayed to patient:  NONE- no message to relay- Pt received VM to call clinic.  Patient has additional questions:  Yes  If YES, what are your questions/concerns:    Okay to leave a detailed message?:  Yes

## 2021-05-27 NOTE — TELEPHONE ENCOUNTER
Please schedule this patient for an appointment with me on:    ______________________________________________________________________     Friday, May 31st  Time of appointment:  1:00 pm    Reason for appointment:  Follow up shoulder surgery    ______________________________________________________________________     Patient already notified.  No need to notify the patient.    Thank you.    Onur Rod MD  [facility]  4/15/2019, 10:13 PM

## 2021-05-28 NOTE — TELEPHONE ENCOUNTER
Please call patient -    ______________________________________________________________________     Home phone:  [phone] (home)     Cell phone:   Telephone Information:   Mobile [phone]       Other contacts:  Name Home Phone Work Phone Mobile Phone Relationship Lgl Grd   KYLER GONZALEZ   [phone] Spouse    MATILDE SILVER   [phone] Child      ______________________________________________________________________     Please help the patient reschedule his appointment to a reserved slot somewhere near this same time frame.  Thank you.    Onur Rod MD  [facility]  5/7/2019, 7:27 AM        ______________________________________________________________________     EMAIL related to this:    I will have someone call to reschedule.    Jose Rod      From: Keyanna Gonzalez [[email or web address]]   Sent: Monday, May 06, 2019 4:27 PM  To: Onur Rod  Subject: Scheduled Appointment    Dr Viola Lorenzo has a follow up appointment with you on Friday May 31 at 1pm.  Do you have another opening that would work for him to come in?   Our grandson has a graduation ceremony from grade school that our daughter asked if we could attend that afternoon.      He had his shoulder surgery Wed May 1st and is doing well so far.  With an ice pump and Alieve the discomfort is staying under control.  He will start PT on May 30th.    Thank you.    Bj/Melita Gonzalez

## 2021-05-29 NOTE — PATIENT INSTRUCTIONS - HE
Please follow up if you have any further issues.    You may contact me by phone or [employer] if you are worsening or if things are not improving.    Thank you for coming in today.

## 2021-05-31 VITALS — WEIGHT: 249 LBS | BODY MASS INDEX: 33.77 KG/M2

## 2021-05-31 VITALS — BODY MASS INDEX: 34.88 KG/M2 | WEIGHT: 257.2 LBS

## 2021-06-01 VITALS — WEIGHT: 260 LBS | BODY MASS INDEX: 35.26 KG/M2

## 2021-06-01 VITALS — BODY MASS INDEX: 35.13 KG/M2 | WEIGHT: 259 LBS

## 2021-06-01 VITALS — WEIGHT: 258 LBS | BODY MASS INDEX: 34.99 KG/M2

## 2021-06-01 NOTE — TELEPHONE ENCOUNTER
Refill Request  Did you contact pharmacy: Yes [facility] is calling.  Medication name:   Requested Prescriptions     Pending Prescriptions Disp Refills     fluticasone propionate (FLONASE) 50 mcg/actuation nasal spray 52 g 2     Si sprays into each nostril daily.     Who prescribed the medication: Dr. Rod  Pharmacy Name and Location: [facility]  Is patient out of medication: Yes  Patient notified refills processed in 72 hours:  yes  Okay to leave a detailed message: yes, [phone]

## 2021-06-01 NOTE — TELEPHONE ENCOUNTER
RN cannot approve Refill Request    RN can NOT refill this medication there is no associated diagnosis. Last office visit: 6/3/2019 Onur Rod MD Last Physical: Visit date not found Last MTM visit: Visit date not found Last visit same specialty: 6/3/2019 Onur Rod MD.  Next visit within 3 mo: Visit date not found  Next physical within 3 mo: Visit date not found  Last refill 1/15/2019 for 52 g/2 refills  Last OV 6/3/2019    Zeo Herrera, [facility] Triage/Med Refill 2019    Requested Prescriptions   Pending Prescriptions Disp Refills     fluticasone propionate (FLONASE) 50 mcg/actuation nasal spray 52 g 2     Si sprays into each nostril daily.       Nasal Steroid Refill Protocol Passed - 2019 10:43 AM        Passed - Patient has had office visit/physical in last 2 years     Last office visit with prescriber/PCP: 6/3/2019 OR same dept: 6/3/2019 Onur Rod MD OR same specialty: 6/3/2019 Onur Rod MD Last physical: Visit date not found Last MTM visit: Visit date not found    Next appt within 3 mo: Visit date not found  Next physical within 3 mo: Visit date not found  Prescriber OR PCP: Onur Rod MD  Last diagnosis associated with med order: There are no diagnoses linked to this encounter.   If protocol passes may refill for 12 months if within 3 months of last provider visit (or a total of 15 months).

## 2021-06-02 VITALS — HEIGHT: 71 IN | WEIGHT: 260 LBS | BODY MASS INDEX: 36.4 KG/M2

## 2021-06-02 VITALS — WEIGHT: 257 LBS | BODY MASS INDEX: 34.86 KG/M2

## 2021-06-03 ENCOUNTER — OFFICE VISIT - HEALTHEAST (OUTPATIENT)
Dept: CARDIOLOGY | Facility: CLINIC | Age: 71
End: 2021-06-03

## 2021-06-03 VITALS — BODY MASS INDEX: 36.26 KG/M2 | WEIGHT: 260 LBS

## 2021-06-03 DIAGNOSIS — I48.92 ATRIAL FLUTTER, CHRONIC (H): ICD-10-CM

## 2021-06-03 DIAGNOSIS — I10 ESSENTIAL HYPERTENSION: ICD-10-CM

## 2021-06-03 DIAGNOSIS — I42.0 DILATED CARDIOMYOPATHY (H): ICD-10-CM

## 2021-06-03 DIAGNOSIS — I48.21 PERMANENT ATRIAL FIBRILLATION (H): ICD-10-CM

## 2021-06-03 DIAGNOSIS — I25.10 CORONARY ARTERY DISEASE INVOLVING NATIVE CORONARY ARTERY OF NATIVE HEART WITHOUT ANGINA PECTORIS: ICD-10-CM

## 2021-06-03 ASSESSMENT — MIFFLIN-ST. JEOR: SCORE: 1898.75

## 2021-06-03 NOTE — TELEPHONE ENCOUNTER
Called and spoke with patient.    Patient stated he just got off the phone with [facility] and they said they don't do the type of bone density test there that was ordered. Needs new order with correct test.

## 2021-06-03 NOTE — TELEPHONE ENCOUNTER
Dr. Jackson will address this as there is a message out to him as well and he is the ordering physician.    Onur Rod MD  General Internal Medicine  [facility]  11/13/2019, 4:32 PM

## 2021-06-03 NOTE — TELEPHONE ENCOUNTER
Please call patient -    ______________________________________________________________________     Home phone:  [phone] (home)     Cell phone:   Telephone Information:   Mobile [phone]       Other contacts:  Name Home Phone Work Phone Mobile Phone Relationship Lgl Grd   KYLER CISNEROS   [phone] Spouse    MATILDE SILVER   [phone] Child      ______________________________________________________________________     I got a message related to his visit with Dr. Jackson.    I did put in a order for a vitamin D level and he could be scheduled at the [facility] lab ([facility]) for this.  He does not need any further labs.    He could follow up with me if he has signs of osteoporosis (OP) on his bone density if he wishes.  Please have [facility] send me a copy of this as well when he has it done.    Thank you,    Onur Rod MD  [facility]  11/12/2019, 10:30 PM

## 2021-06-04 VITALS
OXYGEN SATURATION: 97 % | HEART RATE: 82 BPM | DIASTOLIC BLOOD PRESSURE: 86 MMHG | BODY MASS INDEX: 36.12 KG/M2 | WEIGHT: 259 LBS | SYSTOLIC BLOOD PRESSURE: 134 MMHG

## 2021-06-05 VITALS
BODY MASS INDEX: 35.84 KG/M2 | OXYGEN SATURATION: 98 % | WEIGHT: 257 LBS | HEART RATE: 69 BPM | SYSTOLIC BLOOD PRESSURE: 130 MMHG | DIASTOLIC BLOOD PRESSURE: 70 MMHG

## 2021-06-05 VITALS — WEIGHT: 248 LBS | HEIGHT: 71 IN | BODY MASS INDEX: 34.72 KG/M2

## 2021-06-05 VITALS
HEIGHT: 71 IN | SYSTOLIC BLOOD PRESSURE: 138 MMHG | WEIGHT: 255.9 LBS | DIASTOLIC BLOOD PRESSURE: 90 MMHG | HEART RATE: 68 BPM | RESPIRATION RATE: 16 BRPM | BODY MASS INDEX: 35.82 KG/M2

## 2021-06-05 VITALS — HEART RATE: 76 BPM | RESPIRATION RATE: 16 BRPM | DIASTOLIC BLOOD PRESSURE: 66 MMHG | SYSTOLIC BLOOD PRESSURE: 102 MMHG

## 2021-06-05 VITALS — BODY MASS INDEX: 35.42 KG/M2 | HEIGHT: 71 IN | WEIGHT: 253 LBS

## 2021-06-05 VITALS
RESPIRATION RATE: 16 BRPM | BODY MASS INDEX: 33.18 KG/M2 | HEART RATE: 80 BPM | DIASTOLIC BLOOD PRESSURE: 66 MMHG | WEIGHT: 245 LBS | HEIGHT: 72 IN | SYSTOLIC BLOOD PRESSURE: 104 MMHG

## 2021-06-05 VITALS
HEART RATE: 89 BPM | WEIGHT: 253.5 LBS | BODY MASS INDEX: 35.36 KG/M2 | SYSTOLIC BLOOD PRESSURE: 136 MMHG | DIASTOLIC BLOOD PRESSURE: 84 MMHG | OXYGEN SATURATION: 98 %

## 2021-06-07 NOTE — TELEPHONE ENCOUNTER
Central PA team  [phone]  Pool: HE PA MED ([id number])          PA has been initiated.       PA form completed and faxed insurance via Cover My Meds     Key:  ZAIDA CISNEROS (Barrientos: [id number])      Medication:  cyclobenzaprine (FLEXERIL) 10 MG tablet    Insurance:  SILVERSCRIPT        Response will be received via fax and may take up to 5-10 business days depending on plan

## 2021-06-07 NOTE — TELEPHONE ENCOUNTER
Patient Returning Call  Reason for call:  Patient returning phone call from care team to set up video visit.  Information relayed to patient:  Relayed below message to in detail, patient replied he will use face time.  Patient has additional questions:  No  If YES, what are your questions/concerns:  na  Okay to leave a detailed message?: Yes

## 2021-06-07 NOTE — TELEPHONE ENCOUNTER
Informed pts wife per Dr. Rod pt is due to be seen in June.  Asked to have pt to call back to schedule appointment.       If the patient has a smartphone with a camera, we can schedule a video visit using DOXIMITY or FACETIME without having the patient signing up for AMWELL.      If the patient wants to use and IPAD, tablet or computer, then the patient should sign up for AMWELL (AW TOUCHPOINT) prior to the visit.  We can also use FACETIME if they wish.     If patient has no device with a webcam, then please schedule a telephone visit for their follow up.     Please note DOXIMITY, AMWELL, or FACETIME in the reason for visit when scheduling the appointment.   Also note contact information (cell phone or email be sure email is in the chart).

## 2021-06-07 NOTE — TELEPHONE ENCOUNTER
Prior Authorization Request  Who s requesting:  Pharmacy  Pharmacy Name and Location: [facility]  Medication Name: cyclobenzaprine (FLEXERIL) 10 MG tablet  Insurance Plan: n/a  Insurance Member ID Number:  n/a  CoverMyMeds Key: [id number]  Informed patient that prior authorizations can take up to 10 business days for response:   NA  Okay to leave a detailed message: No

## 2021-06-08 ENCOUNTER — COMMUNICATION - HEALTHEAST (OUTPATIENT)
Dept: INTERNAL MEDICINE | Facility: CLINIC | Age: 71
End: 2021-06-08

## 2021-06-08 ENCOUNTER — COMMUNICATION - HEALTHEAST (OUTPATIENT)
Dept: CARDIOLOGY | Facility: CLINIC | Age: 71
End: 2021-06-08

## 2021-06-08 DIAGNOSIS — M54.16 LEFT LUMBAR RADICULOPATHY: ICD-10-CM

## 2021-06-08 DIAGNOSIS — I48.91 UNSPECIFIED ATRIAL FIBRILLATION (H): ICD-10-CM

## 2021-06-08 DIAGNOSIS — I48.21 PERMANENT ATRIAL FIBRILLATION (H): ICD-10-CM

## 2021-06-08 RX ORDER — GABAPENTIN 100 MG/1
200-400 CAPSULE ORAL SEE ADMIN INSTRUCTIONS
Qty: 100 CAPSULE | Refills: 12 | Status: SHIPPED | OUTPATIENT
Start: 2021-06-08 | End: 2021-11-08

## 2021-06-08 NOTE — PATIENT INSTRUCTIONS - HE
Please follow up if you have any further issues.    You may contact me by phone or [employer] if you are worsening or if things are not improving.    Thank you for coming in today.

## 2021-06-08 NOTE — PROGRESS NOTES
BP Readings from Last 20 Encounters:   06/03/19 132/86   04/15/19 138/82   02/05/19 134/72   01/15/19 136/84   06/25/18 138/86   06/04/18 130/78   02/20/18 128/84   06/29/17 136/74   06/27/17 132/88   05/18/17 108/64   09/22/16 110/72   06/09/16 122/68   03/18/16 134/84   02/22/16 122/80   04/07/15 128/70

## 2021-06-09 NOTE — PATIENT INSTRUCTIONS - HE
Please follow up if you have any further issues.    You may contact me by phone or [employer] if you are worsening or if things are not improving.    Thank you for coming in today.

## 2021-06-10 ENCOUNTER — COMMUNICATION - HEALTHEAST (OUTPATIENT)
Dept: INTERNAL MEDICINE | Facility: CLINIC | Age: 71
End: 2021-06-10

## 2021-06-10 NOTE — PROGRESS NOTES
BP Readings from Last 20 Encounters:   05/18/17 108/64   09/22/16 110/72   06/09/16 122/68   03/18/16 134/84   02/22/16 122/80   04/07/15 128/70

## 2021-06-11 NOTE — PROGRESS NOTES
Patient left as I was very behind with patient care this day.  Follow up appointment scheduled.    Onur Rod MD  [facility]  6/28/2017, 8:37 AM

## 2021-06-14 NOTE — TELEPHONE ENCOUNTER
Mike Gonzalez  [address] [address]  [phone] (home)     Procedure cardiologist:  Dr. Wellington or Dr. Ni  PCP:  Onur Rod MD  H&P completed by:  1/7/21, Dr. Joel  Admit date 1/20/21  Arrival time:  0630a  Anticoagulation: None  CPAP: No  Previous PCI: No  Bypass Grafts: No  Renal Issues: No  Diabetic?: No  Device?: No  Type:  n/a    Angiogram Teaching    Reason for Visit:  Telephone call to discuss pre-procedure education in preparation for: Cors poss PCI    Procedure Prep:  Primary Cardiologist note dated: 1/7/21, Dr. Joel  EKG results obtained, dated: am of procedure  Hemogram results obtained: am of procedure  Basic Metabolic Panel results obtained: am of procedure  Lipid Profile results obtained: am of procedure  COVID-19 test results obtained: 1/16/21, NEGATIVE    Patient Education  Patient states understanding of procedure  and agrees to proceed.    Pre-procedure instructions  Patient instructed to be NPO after midnight.  Patient instructed to shower the evening before or the morning of the procedure.  Patient instructed to arrange for transportation home following procedure from a responsible family member of friend. No driving for at least 24 hours.  Patient instructed to have a responsible adult with them for 24 hours post-procedure.  Post-procedure follow up process.  Conscious sedation discussed.    Pre-procedure medication instructions  Patient instructed on antiplatelet medication.  Continue medications as scheduled, with a small amount of water on the day of the procedure unless indicated.  Patient instructed to take 324 mg of Aspirin am of procedure: Yes  Other medication: instructed to only take regularly scheduled prescription medications a.m. of the procedure.    *PATIENTS RECORDS AVAILABLE IN [facility] UNLESS OTHERWISE INDICATED*      Patient Active Problem List   Diagnosis     HTN (hypertension)     GERD (gastroesophageal reflux disease)     Chronic neck pain      Nonsmoker     Basal cell carcinoma     Full code status     Branch retinal vein occlusion of left eye     Normal colonoscopy - 2016 - Average risk     Complete tear of left rotator cuff     Obesity (BMI 35.0-39.9) with comorbidity (H)     Atrial flutter, chronic (H)     Anticoagulant drug declined     Ischemic cardiomyopathy       Current Outpatient Medications   Medication Sig Dispense Refill     aspirin 81 MG EC tablet Take 81 mg by mouth.       cyclobenzaprine (FLEXERIL) 10 MG tablet Take 1 tablet (10 mg total) by mouth 3 (three) times a day as needed for muscle spasms (back pain). 40 tablet 3     fluticasone propionate (FLONASE) 50 mcg/actuation nasal spray USE 2 SPRAYS IN EACH       NOSTRIL DAILY 48 g 2     metoprolol succinate (TOPROL XL) 25 MG Take 1 tablet (25 mg total) by mouth daily. 90 tablet 3     naproxen sodium (ALEVE) 220 MG tablet Take 220 mg by mouth 2 (two) times a day with meals.       No current facility-administered medications for this visit.        Allergies   Allergen Reactions     Hydrochlorothiazide      Hydrocodone      Oxycontin [Oxycodone]      Sulfa (Sulfonamide Antibiotics)      Perfume      Other reaction(s): Runny Nose         Mariela Foss, RN

## 2021-06-14 NOTE — TELEPHONE ENCOUNTER

## 2021-06-14 NOTE — TELEPHONE ENCOUNTER
----- Message from Candelaria Barker sent at 1/7/2021  1:19 PM CST -----  Regarding: [facility] ORDERING  CA POSS PCI W/EFRAIN/RUBINA  630AM ADMIT ON 1/20 (patient declined 1/18 date)  H&P-1/7    Thank you! Candelaria

## 2021-06-14 NOTE — PATIENT INSTRUCTIONS - HE
Please follow up if you have any further issues.    You may contact me by phone or MyChart if you are worsening or if things are not improving.    ______________________________________________________________________     Please remember that you can call [phone] to schedule an appointment.     You can schedule appointments 24 hours a day, 7 days a week.  Sometimes the best time to schedule an appointment is after clinic hours when less people are calling in.  Weekends are another option for calling in to schedule appointments.

## 2021-06-15 PROBLEM — Z78.9 FULL CODE STATUS: Chronic | Status: ACTIVE | Noted: 2017-05-22

## 2021-06-15 NOTE — TELEPHONE ENCOUNTER
Mike called back after reviewing with Dr. Joel about the Afib and anticoagulation coverage. He has decided to go with Eliquis. A free 30 day coupon was called into his pharmacy for the Eliquis.  Sk/RN    EFFIEI To Dr. Ibrahim

## 2021-06-15 NOTE — TELEPHONE ENCOUNTER
----- Message from Jaci Ibrahim MD sent at 2/4/2021  4:18 PM CST -----  Regarding: Follow-up  Siddhartha Danielle;  Can you give Mr. Gonzalez a call next week to follow-up? We discussed afib anticoagulation today, and he requested more time to consider his options.    Thank you;  ~ Jaci

## 2021-06-15 NOTE — TELEPHONE ENCOUNTER
Mike was contacted today to discuss his options regarding the anticoagulants and he has not had time to look into what his insurance covers best. He will consider insurance information and let me know his desired decision going forward. He was advised that with his current age, history that anticoagulation would be the best option given the PAF he carries as his diagnosis.  It was explained the risks of not being on an anticoagulant including clot formation /stroke. sk/RN

## 2021-06-15 NOTE — PROGRESS NOTES
Patient seen in clinic for HF education    Reviewed HF Binder that includes the  HF Sx Awareness & Action plan  handout and  A Stronger Pump  booklet and Weight log booklet highlighting :  __X_patient s type of heart failure _X__Na management in diet  __X_importance of daily wts  _X__Fluid Guidelines, if applicable  __X_medication review and importance of compliance   Mike and his wife were seen today in clinic and offered education on systolic HF including home management of 2000cc fluid restriction and low sodium 2000mg restriction per day or less. Discussion of processed foods to avoid, how to keep his eating clean including fresh and frozen items.  Instructed patient in signs and sx of heart failure, reiterated when to call clinic - reviewed HF hotline # [phone] and after hours call # [phone].  Majority of time was spent reviewing: Patient verbalized understanding of HF discussion.  Will continue to reinforce HF management education as needed.   Lolis Pineda RN BSN

## 2021-06-16 ENCOUNTER — AMBULATORY - HEALTHEAST (OUTPATIENT)
Dept: INTERNAL MEDICINE | Facility: CLINIC | Age: 71
End: 2021-06-16

## 2021-06-16 DIAGNOSIS — M54.16 LUMBAR BACK PAIN WITH RADICULOPATHY AFFECTING LEFT LOWER EXTREMITY: ICD-10-CM

## 2021-06-16 DIAGNOSIS — M54.16 LEFT LUMBAR RADICULOPATHY: ICD-10-CM

## 2021-06-16 DIAGNOSIS — M25.552 HIP PAIN, LEFT: ICD-10-CM

## 2021-06-16 PROBLEM — H34.8322 BRANCH RETINAL VEIN OCCLUSION OF LEFT EYE (H): Status: ACTIVE | Noted: 2017-07-01

## 2021-06-16 PROBLEM — I48.21 PERMANENT ATRIAL FIBRILLATION (H): Status: ACTIVE | Noted: 2021-06-03

## 2021-06-16 PROBLEM — Z98.890 S/P COLONOSCOPY: Status: ACTIVE | Noted: 2019-04-15

## 2021-06-16 PROBLEM — M75.122 COMPLETE TEAR OF LEFT ROTATOR CUFF: Status: ACTIVE | Noted: 2019-02-12

## 2021-06-16 PROBLEM — E66.01 MORBID OBESITY (H): Status: ACTIVE | Noted: 2019-04-15

## 2021-06-16 PROBLEM — I42.0 DILATED CARDIOMYOPATHY (H): Status: ACTIVE | Noted: 2021-02-18

## 2021-06-16 PROBLEM — I48.92 ATRIAL FLUTTER, CHRONIC (H): Status: ACTIVE | Noted: 2019-04-17

## 2021-06-16 NOTE — TELEPHONE ENCOUNTER
"Telephone Encounter by Onur oRd MD at 2/19/2019 10:59 PM     Author: Onur Rod MD Service: -- Author Type: Physician    Filed: 2/19/2019 11:00 PM Encounter Date: 2/19/2019 Status: Signed    : Onur Rod MD (Physician)       EMAIL THREAD WITH THE PATIENT:    Dr. Jackson has not yet sent me any records from your visit.    I cannot necessarily look into the computer further without your verbal consent to look into [facility] records on the computer.    I think he wanted you to see the sports medicine person in relationship to the concussion and further evaluation of this.  [facility] has their own sports medicine person for this if you want or [facility] has a concussion clinic at [facility] in [place].    The community standard of care is for people to see a concussion specialist but we discussed that these clinics usually do not change the overall treatment.    I am not pushing you in this direction, but it is definitely an option if you wish.    I could always see you back when you get back from [place] if you would like and before you see Dr. Jackson.    Please let me know if you want me to look into [facility] records more or not.  Please let me know your thoughts.    Jose Rod    From: Keyanna Gonzalez <[email or web address]>   Sent: Tuesday, February 19, 2019 4:03 PM  To: Onur Rod <[email or web address]>  Subject: Re: Mri    Better, but as Melita puts it, I'm still not as sharp as normal.  Fluctuates in and out.    Saw that singing last Sunday.  I'm not memorizing a line ahead, just working on each measure.  Played a game last night with friends.  Quick memory of names from [employer] movie characters were pretty winnie.        Black eye is finally a faint ian.      Bj          From: \"Onur Rod\" <[email or web address]>  To: Keyanna Gonzalez <[email or web address]>   Sent: Tuesday, February 19, 2019 3:54 PM  Subject: RE: Mri    How are " "things with your cognitive issues at this time?     JCA     From: Keyanna Gonzalez [[email or web address]   Sent: Tuesday, February 19, 2019 2:55 PM  To: Onur Rod  Subject: Re: Mri     Any word back from Dr. Jackson, as to what he wanted us to complete prior to my next visit with him on April 2nd?     I will be leaving for [place] on March 6th.  Hopefully the soreness in the bones on my head will be gone by then.  Thanks for sending me the report on the MRI.     Mike Gonzalez       From: \"Onur Rod\" [email or web address]>  To: Keyanna Gonzalez <[email or web address]>   Sent: Thursday, February 14, 2019 7:34 AM  Subject: RE: Mri     I have not received the report from Dr. Jackson yet, so I will wait on this at this time.     We will mail you a copy of the report.     JCA     From: Keyanna Gonzalez [[email or web address]   Sent: Wednesday, February 13, 2019 9:25 AM  To: Onur Rod  Subject: Re: Mri     Dr. Rod,     Dr. Jackson was worried about the positioning of the head during surgery as it may cause more pressure on the brain.  He said he would use a semi-vertical chair for the surgery and he was concerned about it.  I think what he was asking for was an evaluation to make sure that the pressures from the fall were gone and that the anesthesia will not cause an additional problem.  He was concerned after looking at the photo of the back of my head after my fall, and saw that it is still slightly swollen along with the front of my head.     I will see him after we get back from [place].  He did not feel the delay would cause a problem and was hoping I could strengthen the shoulder muscles in the meanwhile to help in recovery.  He ordered a couple PT sessions at [facility] to evaluate ways to strengthen and extend my motion, prior to traveling, that I could do daily in [place].   My guess is that they will say the same thing that was on the sheet you gave me.  I'm slowly starting " "to be able to move the arm.  Perhaps 3\" in the forward motion.  Just no strength in that direction.       Also, could I get a copy of the written report of the MRI.  I saw the MRI image.  (I was hoping for only a simple tear.)  Dr. Jackson said there was still 3 options:  No surgery with limited motion, surgery, and shoulder replacement.  He said the odds of the first one weren't too good.  He said it was a large problem.       Let me know what you think, or if you need to, perhaps you can talk to Dr. Jackson to see what he is requiring.     Thanks,  Mike Gonzalez       "

## 2021-06-16 NOTE — TELEPHONE ENCOUNTER
Please contact the patient and see if he would like a telephone visit or FACE TO FACE visit to review this.    Onur Rod MD  General Internal Medicine  [facility]  3/30/2021, 9:15 AM

## 2021-06-16 NOTE — PATIENT INSTRUCTIONS - HE
Please follow up if you have any further issues.    You may contact me by phone or MyChart if you are worsening or if things are not improving.    ______________________________________________________________________     Please remember that you can call [phone] to schedule an appointment.     You can schedule appointments 24 hours a day, 7 days a week.  Sometimes the best time to schedule an appointment is after clinic hours when less people are calling in.  Weekends are another option for calling in to schedule appointments.

## 2021-06-16 NOTE — TELEPHONE ENCOUNTER
Telephone Encounter by Onur Rod MD at 3/31/2019  7:56 PM     Author: Onur Rod MD Service: -- Author Type: Physician    Filed: 3/31/2019  7:58 PM Encounter Date: 3/31/2019 Status: Signed    : Onur Rod MD (Physician)       EMAIL THREAD TO PATIENT:      Here is a copy of the report from [facility]:        There was one minimally abnormal area on the opposite side (right) of your left head trauma, so I doubt that this is related to your fall.  The radiologist thinks it is due to a normal blood vessel in this area rather than old blood from trauma.    This should have no affect on surgery and you may share this with Dr. Jackson.    Jose Rod MD      From: Keyanna Gonzalez <[email or web address]>   Sent: Saturday, March 30, 2019 1:34 PM  To: Onur Rod <[email or web address]>  Subject: Re: Mri    Had MRI at [facility] Saturday on my head.    Can you pull it up and let me know if you see anything?  (OK - I left myself open for that one.  Anything abnormal.)    Appointment with Dr. Jackson set for Tuesday afternoon.    Thanks,  Bj Gonzalez

## 2021-06-16 NOTE — TELEPHONE ENCOUNTER
[facility]  Jayant Kennedy   See pt today anti-coagulants therapy    Lombar [facility]    Requesting gabapentin RX for left leg pain to help pt withsleeping at night

## 2021-06-16 NOTE — PROGRESS NOTES
Wt Readings from Last 20 Encounters:   02/20/18 (!) 260 lb (117.9 kg)   06/27/17 (!) 249 lb (112.9 kg)   05/18/17 (!) 257 lb 3.2 oz (116.7 kg)   09/22/16 (!) 254 lb 12.8 oz (115.6 kg)   06/09/16 (!) 254 lb (115.2 kg)   03/18/16 (!) 253 lb (114.8 kg)   02/22/16 (!) 251 lb 9.6 oz (114.1 kg)   04/07/15 (!) 253 lb (114.8 kg)

## 2021-06-17 NOTE — TELEPHONE ENCOUNTER
Telephone Encounter by Ashley Conley at 5/5/2020  4:06 PM     Author: Ashley Conley Service: -- Author Type: --    Filed: 5/5/2020  4:08 PM Encounter Date: 5/5/2020 Status: Signed    : Ashley Conley APPROVED:    Approval start date: 02/05/2020  Approval end date:  08/03/2020    Pharmacy was notified of the approval and per them, the patient has picked up the medication already and it did process through at that time.

## 2021-06-17 NOTE — TELEPHONE ENCOUNTER
Telephone Encounter by Onur Rod MD at 6/16/2020 10:05 AM     Author: Onur Rod MD Service: -- Author Type: Physician    Filed: 6/16/2020 10:08 AM Encounter Date: 6/16/2020 Status: Signed    : Onur Rod MD (Physician)       Please schedule this patient for an appointment with me on:    ______________________________________________________________________     Thursday, June 21st  Time of appointment:  11:10 am    Reason for appointment:  Toe pain.    ______________________________________________________________________     Patient already notified.  No need to notify the patient.    Thank you.    Onur Rod MD  [facility]  6/16/2020, 10:05 AM     ______________________________________________________________________     EMAIL THREAD with the patient:    Thanks!    See you then.  [place] location??    Bj Gonzalez    On Monday, Erika 15, 2020, 8:59:17 PM CDT, Onur Rod ([facility]) <[email or web address]> wrote:       Could you come in to be seen at 11:10 am on Thursday?  Please let me know.     Jose Rod     From: Keyanna Gonzalez <[email or web address]>   Sent: Monday, Erika 15, 2020 8:06 AM  To: Onur Rod <[email or web address]>  Subject: Re: Sore toe     Thanks for getting back while you were gone.  I have been soaking it the past 4 days.     Not sure what can be done.  Nail pushes against toe and makes it sore.  If you think you have an idea, I can run in Wed afternoon or Thurs if you have time.     Meet with Dr. Renetta Meneses afternoon.  Shoulder is much better after PRP treatment from Dr. Gonzales.  Wish Dr. Jackson had allowed it earlier.  Night and day difference.     Hope you had a good week off.     Bj Gonzalez     On Sunday, June 14, 2020, 10:01:52 PM GIFTY, Onur Rod ([facility]) <[email or web address]> wrote:         You could soak it daily for 10 minutes in warm water if you have not done this yet.     We could schedule in  clinic next week if you wish.     You could see a podiatrist if not improving as well.     JCA     From: Keyanna Gonzalez <[email or web address]>   Sent: Saturday, June 13, 2020 1:06 PM  To: Onur Rod <[email or web address]>  Subject: Sore toe         Dr. Miller, I hit my left big toe on a sidewalk edging May 15th, pushing the nail into the toe.  Any suggestions to try to get it to heal?  Still sore.  Thanks,  Bj Gonzalez  Sent from [employer] on [employer]

## 2021-06-18 NOTE — PATIENT INSTRUCTIONS - HE
Patient Instructions by Onur Rod MD at 11/30/2020  9:05 AM     Author: Onur Rod MD Service: -- Author Type: Physician    Filed: 12/4/2020  8:17 AM Encounter Date: 11/30/2020 Status: Signed    : Onur Rod MD (Physician)         Patient Education     Your Health Risk Assessment indicates you feel you are not in good physical health.    A healthy lifestyle helps keep the body fit and the mind alert. It helps protect you from disease, helps you fight disease, and helps prevent chronic disease (disease that doesn't go away) from getting worse. This is important as you get older and begin to notice twinges in muscles and joints and a decline in the strength and stamina you once took for granted. A healthy lifestyle includes good healthcare, good nutrition, weight control, recreation, and regular exercise. Avoid harmful substances and do what you can to keep safe. Another part of a healthy lifestyle is stay mentally active and socially involved.    Good healthcare     Have a wellness visit every year.     If you have new symptoms, let us know right away. Don't wait until the next checkup.     Take medicines exactly as prescribed and keep your medicines in a safe place. Tell us if your medicine causes problems.   Healthy diet and weight control     Eat 3 or 4 small, nutritious, low-fat, high-fiber meals a day. Include a variety of fruits, vegetables, and whole-grain foods.     Make sure you get enough calcium in your diet. Calcium, vitamin D, and exercise help prevent osteoporosis (bone thinning).     If you live alone, try eating with others when you can. That way you get a good meal and have company while you eat it.     Try to keep a healthy weight. If you eat more calories than your body uses for energy, it will be stored as fat and you will gain weight.     Recreation   Recreation is not limited to sports and team events. It includes any activity that provides relaxation, interest,  enjoyment, and exercise. Recreation provides an outlet for physical, mental, and social energy. It can give a sense of worth and achievement. It can help you stay healthy.       Patient Education   Understanding USDA MyPlate  The USDA (US Department of Agriculture) has guidelines to help you make healthy food choices. These are called MyPlate. MyPlate shows the food groups that make up healthy meals using the image of a place setting. Before you eat, think about the healthiest choices for what to put onto your plate or into your cup or bowl. To learn more about building a healthy plate, visit www.choosemyplate.gov.       The Food Groups    Fruits: Any fruit or 100% fruit juice counts as part of the Fruit Group. Fruits may be fresh, canned, frozen, or dried, and may be whole, cut-up, or pureed. Make half your plate fruits and vegetables.    Vegetables: Any vegetable or 100% vegetable juice counts as a member of the Vegetable Group. Vegetables may be fresh, frozen, canned, or dried. They can be served raw or cooked and may be whole, cut-up, or mashed. Make half your plate fruits and vegetables.     Grains: All foods made from grains are part of the Grains Group. These include wheat, rice, oats, cornmeal, and barley such as bread, pasta, oatmeal, cereal, tortillas, and grits. Grains should be no more than a quarter of your plate. At least half of your grains should be whole grains.    Protein: This group includes meat, poultry, seafood, beans and peas, eggs, processed soy products (like tofu), nuts (including nut butters), and seeds. Make protein choices no more than a quarter of your plate. Meat and poultry choices should be lean or low fat.    Dairy: All fluid milk products and foods made from milk that contain calcium, like yogurt and cheese are part of the Dairy Group. (Foods that have little calcium, such as cream, butter, and cream cheese, are not part of the group.) Most dairy choices should be low-fat or  fat-free.    Oils: These are fats that are liquid at room temperature. They include canola, corn, olive, soybean, and sunflower oil. Foods that are mainly oil include mayonnaise, certain salad dressings, and soft margarines. You should have only 5 to 7 teaspoons of oils a day. You probably already get this much from the food you eat.  Use [employer] to Help Build Your Meals  The SuperTracker can help you plan and track your meals and activity. You can look up individual foods to see or compare their nutritional value. You can get guidelines for what and how much you should eat. You can compare your food choices. And you can assess personal physical activities and see ways you can improve. Go to www.[employer].gov/LK FREEMANcker/.    1694-5866 The [employer]. [address] [place], [place] [address]. All rights reserved. This information is not intended as a substitute for professional medical care. Always follow your healthcare professional's instructions.           Patient Education   Activities of Daily Living  Your Health Risk Assessment indicates you have difficulties with activities of daily living such as eating, getting dressed, grooming, bathing, walking, or using the toilet. Please make a follow up appointment for us to address this issue in more detail.     Patient Education   Preventing Falls in the Home  As you get older, falls are more likely. Thats because your reaction time slows. Your muscles and joints may also get stiffer, making them less flexible. Illness, medications, and vision changes can also affect your balance. A fall could leave you unable to live on your own. To make your home safer, follow these tips:    Floors    Put nonskid pads under area rugs.    Remove throw rugs.    Replace worn floor coverings.    Tack carpets firmly to each step on carpeted stairs. Put nonskid strips on the edges of uncarpeted stairs.    Keep floors and stairs free of clutter and cords.    Arrange  furniture so there are clear pathways.    Clean up any spills right away.    Bathrooms    Install grab bars in the tub or shower.    Apply nonskid strips or put a nonskid rubber mat in the tub or shower.    Sit on a bath chair to bathe.    Use bathmats with nonskid backing.    Lighting    Keep a flashlight in each room.    Put a nightlight along the pathway between the bedroom and the bathroom.    1356-0190 The [employer]. [address] [place] [address]. All rights reserved. This information is not intended as a substitute for professional medical care. Always follow your healthcare professional's instructions.           Advance Directive  Patients advance directive was discussed and I am comfortable with the patients wishes.  Patient Education   Personalized Prevention Plan  You are due for the preventive services outlined below.  Your care team is available to assist you in scheduling these services.  If you have already completed any of these items, please share that information with your care team to update in your medical record.  Health Maintenance   Topic Date Due   ? ZOSTER VACCINES (3 of 3) 11/24/2020   ? TD 18+ HE  11/01/2021   ? MEDICARE ANNUAL WELLNESS VISIT  11/30/2021   ? FALL RISK ASSESSMENT  11/30/2021   ? LIPID  06/29/2022   ? ADVANCE CARE PLANNING  11/30/2025   ? COLORECTAL CANCER SCREENING  07/14/2026   ? Pneumococcal Vaccine: 65+ Years  Completed   ? INFLUENZA VACCINE RULE BASED  Completed   ? Pneumococcal Vaccine: Pediatrics (0 to 5 Years) and At-Risk Patients (6 to 64 Years)  Addressed   ? HEPATITIS B VACCINES  Aged Out   ? HEPATITIS C SCREENING  Discontinued

## 2021-06-18 NOTE — PATIENT INSTRUCTIONS - HE
Patient Instructions by Campos Joel MD at 1/7/2021  9:10 AM     Author: Campos Joel MD Service: -- Author Type: Physician    Filed: 1/7/2021  9:43 AM Encounter Date: 1/7/2021 Status: Signed    : Campos Joel MD (Physician)                     Mike Gonzalez,    It was a pleasure to see you today at the [facility].     My recommendations after this visit include:    1.  We will call you to schedule a coronary angiogram with the possibility of stent placement if appropriate.    2.  We are recommending the angiogram because of reduced left ventricular ejection fraction and the suspicion of a scar which would be consistent with prior myocardial infarction/heart attack.    3.  After the angiogram, I would recommend anticoagulation with a medication such as Eliquis or Xarelto.  We can discuss that after your angiogram.    4.  Please call Mariela Foss RN for questions or concerns.  Her direct line is [phone].        Campos Joel

## 2021-06-20 NOTE — LETTER
Letter by Onur Rod MD at      Author: Onur Rod MD Service: -- Author Type: --    Filed:  Encounter Date: 2/25/2020 Status: (Other)         Mike Gonzalez   [address] [facility] [place] [address]         Dear Mike,    I am sending you this letter to remind you that you are due for a follow up visit in June.    Please call to schedule this visit as soon as possible as our schedule fills up quickly.    If you already have an appointment scheduled with me for around this time, please ignore this notification.    I look forward to seeing you.      If you have any questions regarding the above, please feel free to contact me at [phone].        Sincerely,    Onur Rod MD  General Internal Medicine  [facility]

## 2021-06-23 NOTE — TELEPHONE ENCOUNTER
EMAIL THREAD WITH PATIENT    I think it would be good to see you.  I will have someone call you to schedule.    DOUGLAS    From: Keyanna Gonzalez [[email or web address]]   Sent: Sunday, February 03, 2019 9:34 PM  To: Onur Rod  Subject: 3 week update    Dr. Pemberton,    Just wondering if you want me to come in to see you for a followup visit or just let nature work its ways.    Head swelling if finally coming down.  Still rather sore at back of head, left temple area and nose.  If I move around or look down, I get a headache.  Memory is not great and concentration is not great.  Probably had a concussion.  I have been resting as much as possible to let things settle down.    Left shoulder is starting to get motion.  Still unable to raise the upper arm more than an inch or two towards the front, but I am able to move in a [place].  Managed to sleep a full night in bed last 2 nights.  Requires several pillows to sleep keeping away from the left side of my head and supporting my shoulder.    My black eye is starting to disappear.    Let me know your thoughts.    Thanks,  Mike Gonzalez  [phone]

## 2021-06-23 NOTE — PATIENT INSTRUCTIONS - HE
Please follow up if you have any further issues.    You may contact me by phone or [place] if you are worsening or if things are not improving.    Thank you for coming in today.

## 2021-06-23 NOTE — TELEPHONE ENCOUNTER
Please call patient -    ______________________________________________________________________     Home phone:  [phone] (home)     Cell phone:   No relevant phone numbers on file.       Other contacts:  Name Home Phone Work Phone Mobile Phone Relationship Lgl Grd   KYLER CISNEROS   [phone] Spouse    MATILDE SILVER   [phone] Child      ______________________________________________________________________     I think it would be good to see him back and reexamine how he is doing, especially his shoulder and concussion.    May use reserved slot.  Also still have a Tuesday morning appointment at the time of this note.    Could make a Thursday am slot if needed between 9 am and 11 am.    Thank you,    Onur Rod MD  [facility]  2/4/2019, 8:03 AM

## 2021-06-23 NOTE — PATIENT INSTRUCTIONS - HE
The new shingles shot (Shingrix) is 2 separate shots  by 2-6 months.    The cost out of pocket is around $390 for the 2 shots, so you might want to see if your insurance covers it or a portion of it prior to having it done.  Often by having it done at a pharmacy rather than a clinic, it can be cheaper for you.    It is estimated that any person's risk of shingles over their lifetime is around 10-20%.    The old shingles vaccine (Zostavax) is about 50% effective, reducing your risk of shingles to about 5-10% over your lifetime.    The new shot is 90% effective, reducing your risk of shingles to about 1-2% over your lifetime.    Because the shot is strong, it is very common to have flu like symptoms for 2-3 days after the shot.  25-50% of patients will have fever, muscle aches or headache.    I believe that whether or not you have this vaccine is your own decision depending on your values and preferences.  The information above I give you to make an informed decision.    Onur Rod MD  [facility]

## 2021-06-24 NOTE — TELEPHONE ENCOUNTER
Informed pt of Dr. Rod's message.  His MRI scan of his shoulder shows 2 complete tears 2 muscles - the supraspinatus and the infraspinatus -of the rotator cuff on the left.  There is not a lot of arthritis in the joint.     I would strongly recommend considering seeing Dr. Stuart Jackson through [facility].  Dr. Florencio Benoit is also another good shoulder specialist through this group.     I placed a referral.  Call [phone] to schedule.    He states an understanding and thanked for the call.

## 2021-06-24 NOTE — TELEPHONE ENCOUNTER
"EMAIL THREAD     I have not received the report from Dr. Jackson yet, so I will wait on this at this time.    We will mail you a copy of the report.    LINDAA    From: Keyanna Gonzalez [[email or web address]]   Sent: Wednesday, February 13, 2019 9:25 AM  To: Onur Rod  Subject: Re: Mri    Dr. Rod,    Dr. Jackson was worried about the positioning of the head during surgery as it may cause more pressure on the brain.  He said he would use a semi-vertical chair for the surgery and he was concerned about it.  I think what he was asking for was an evaluation to make sure that the pressures from the fall were gone and that the anesthesia will not cause an additional problem.  He was concerned after looking at the photo of the back of my head after my fall, and saw that it is still slightly swollen along with the front of my head.    I will see him after we get back from [place].  He did not feel the delay would cause a problem and was hoping I could strengthen the shoulder muscles in the meanwhile to help in recovery.  He ordered a couple PT sessions at [facility] to evaluate ways to strengthen and extend my motion, prior to traveling, that I could do daily in [place].   My guess is that they will say the same thing that was on the sheet you gave me.  I'm slowly starting to be able to move the arm.  Perhaps 3\" in the forward motion.  Just no strength in that direction.    Also, could I get a copy of the written report of the MRI.  I saw the MRI image.  (I was hoping for only a simple tear.)  Dr. Jackson said there was still 3 options:  No surgery with limited motion, surgery, and shoulder replacement.  He said the odds of the first one weren't too good.  He said it was a large problem.    Let me know what you think, or if you need to, perhaps you can talk to Dr. Jackson to see what he is requiring.    Thanks,  Mike Gonzalez  "

## 2021-06-24 NOTE — TELEPHONE ENCOUNTER
Please call patient -    ______________________________________________________________________     Home phone:  [phone] (home)     Cell phone:   No relevant phone numbers on file.       Other contacts:  Name Home Phone Work Phone Mobile Phone Relationship Lgl Grd   KYLER CISNEROS   [phone] Spouse    MATILDE SILVER   [phone] Child      ______________________________________________________________________     His MRI scan of his shoulder shows 2 complete tears 2 muscles - the supraspinatus and the infraspinatus -of the rotator cuff on the left.  There is not a lot of arthritis in the joint.    I would strongly recommend considering seeing Dr. Stuart Jackson through [facility].  Dr. Florencio Benoit is also another good shoulder specialist through this group.    I placed a referral.  Call [phone] to schedule.    Onur Rod MD  [facility] [[email or web address]]  2/12/2019, 9:05 AM

## 2021-06-24 NOTE — TELEPHONE ENCOUNTER
"Who is calling:  Patient  Reason for Call:  Patient is calling for an update on if Onur Rod MD and Dr. Jackson have been able to touch base. Patient would like to be updated with \"the game plan\"  Date of last appointment with primary care: 2/5  Has the patient been recently seen:  Yes  Okay to leave a detailed message: Yes      "

## 2021-06-24 NOTE — TELEPHONE ENCOUNTER
Who is calling:  Christelle from [facility] (Dr. Jackson's office)  Reason for Call:  Christelle states that she has been in contact with patient recently and that patient is really upset with them.  Christelle states that patient is scheduled to have surgery with them to repair his left rotator cuff, however, prior to that, they will like to see if Dr. Rod is able to evaluate patient for a concussion due to symptoms that Dr. Jackson's office has been noticing.  If Dr. Rod is able to evaluate, will pcp also be able to manage it?  If not, patient will need to be seen by a specialist prior to the surgery.    Christelle states that she will be faxing over office notes over to Dr. Rod and team today, please be on the look out for them.  Date of last appointment with primary care: 2/5/19  Has the patient been recently seen:  Yes  Okay to leave a detailed message: Yes

## 2021-06-24 NOTE — TELEPHONE ENCOUNTER
EMAIL THREAD WITH THE patient    Did he plan on surgery at this time?    A lot of anesthesiologists do get concerned about concussions and surgical recovery and concussion recovery.    There is not a whole lot of evidence supporting this, it is more anecdotal to my knowledge.    You could follow up with me to review this or you could see someone on this side of town for this.    A lot of times as we discussed at your visit, patients are sent for MRI of the brain and occupational therapy for this.  We can order this if you wish, but there is no strong evidence that it helps with recovery in most patients.    The only thing to usually help with concussion is time and rest and avoiding getting any more head trauma.    Please let me know your thoughts.    JCA    From: Keyanna Gonzalez <[email or web address]>   Sent: Tuesday, February 12, 2019 2:25 PM  To: Onur Rod <[email or web address]>  Subject: Mri    Saw Dr. Jackson.   Rotator cuff displaced.  Asked for a concussion review and clearance before surgery to make sure there would be no problems with anesthesia.  Can you do that, or do you have a suggestion.   Bj

## 2021-06-24 NOTE — TELEPHONE ENCOUNTER
I called the patient regarding the potential game plan related to this.    Apparently Dr. Jackson had sent him on to a sports medicine provider who does not do concussion evaluations.  The doctor who does do concussion evaluations to the clinic works up in [place] which is too far out of the way for the patient.    We reviewed other workup that could be done.  He does want to go through with an MRI and this will be ordered.    We reviewed local people who could do concussion evaluation.    We reviewed the main concern which is potentially some cognitive dysfunction after surgery if he has it with this.  He understands that there is a risk for this and is comfortable with this.  I will continue to follow him at this time after his concussion and will continue to reassess.    He will follow-up with Dr. Jackson again on April 2.    Onur Rod MD  [facility]  2/25/2019, 3:45 PM

## 2021-06-25 NOTE — TELEPHONE ENCOUNTER
Last  Visit  3/30/2021 Onur Rod MD  Notes:     1. Lumbar back pain with radiculopathy affecting left lower extremity    2. Left lumbar radiculopathy    3. Coronary artery disease involving native coronary artery of native heart without angina pectoris        Last Filled:  gabapentin (NEURONTIN) 100 MG capsule 90 capsule 1 3/30/2021  --   Sig - Route: Take 100-300 mg by mouth at bedtime as needed. - Oral   Patient taking differently: Take 200-400 mg by mouth see administration instructions. Patient currently takes 200 MG in the morning and 400 MG at bedtime.        Sent to pharmacy as: gabapentin 100 mg capsule (NEURONTIN)   E-Prescribing Status: Receipt confirmed by pharmacy (3/30/2021  1:53 PM CDT)       Next OV:  Visit date not found        Medication teed up for provider signature

## 2021-06-25 NOTE — PROGRESS NOTES
Progress Notes by Onur Rod MD at 6/29/2017 10:00 AM     Author: Onur Rod MD Service: -- Author Type: Physician    Filed: 7/1/2017 11:12 PM Encounter Date: 6/29/2017 Status: Signed    : Onur Rod MD (Physician)       Please fax to [facility] at [phone]         [facility]    Comprehensive and complex medical care - Chronic disease management - Shared decision making - Care coordination - Compassionate care    Patient advocacy - Rational deprescribing - Minimally disruptive medicine - Ethical focus - Customized care    Date of Service: 6/29/2017  Primary Provider: Onur Rod MD    Patient Care Team:  Onur Rod MD as PCP - General (Internal Medicine)  Liborio ANGELES MD as Physician (Orthopedic Surgery)     ______________________________________________________________________     Patient's Pharmacy:    [facility] [address] [facility]  [address] [facility] [address] [address]  Phone: [phone] Fax: [phone]    [facility] [address] [place], [place] [address] [address] [address]  Phone: [phone] Fax: [phone]     Patient's Insurance:    Payor: BLUE CROSS / Plan: BLUE CROSS PLATINUM / Product Type: COST PLAN /     ______________________________________________________________________     Mike MCALLISTER Carlos is 66 y.o. male who comes in today for:    Chief Complaint   Patient presents with   ? Eye Problem       Patient Active Problem List   Diagnosis   ? HTN (hypertension)   ? GERD (gastroesophageal reflux disease)   ? Hx of atrial flutter, single episode   ? Chronic neck pain   ? Nonsmoker   ? Basal cell carcinoma   ? Full code status   ? Branch retinal vein occlusion of left eye      Current Outpatient Prescriptions   Medication Sig Note   ? aspirin 81 MG EC tablet Take 81 mg by mouth. 2/22/2016: Received from: [facility]   ? fluticasone  (FLONASE) 50 mcg/actuation nasal spray 2 sprays into each nostril daily.    ? verapamil (CALAN-SR) 180 MG CR tablet TAKE 1 TABLET EVERY DAY      Social History     Social History Narrative    Patient of Dr. Rod since 2001.        .  Wife is a former RN.      ______________________________________________________________________     History of present illness:    The patient comes in today for follow-up after seeing ophthalmology.  He was having problems with his vision in his right eye.  He had a sensation of 2 snakes crossing over his eye and had visual changes in the visual field for him.  He wonders whether all the sinus symptoms he had this year may have contributed to this.  He did see ophthalmology and they found that he had a branch retinal vein occlusion.  His vision is doing better at this time.  He denies any other visual symptoms with this.  Nothing seemed to make it better or worse other than time.  It's been going on since earlier this week.  Ophthalmology/optometry notes were reviewed today.  They would like to have some follow-up blood work in relationship to this.    Reviewed his high blood pressure and his blood pressure has been doing okay.  The eye doctor recommended stopping the verapamil and aspirin at this time, but I encouraged him to restart it when he felt it was okay to restart.    We reviewed his blood work that he is doing today and he would like to have his PSA done as well.  He understands that there are some problems with this test.    On review of systems, the patient denies any chest pain or shortness of breath.    ______________________________________________________________________    Exam:    Wt Readings from Last 3 Encounters:   06/27/17 (!) 249 lb (112.9 kg)   05/18/17 (!) 257 lb 3.2 oz (116.7 kg)   09/22/16 (!) 254 lb 12.8 oz (115.6 kg)     BP Readings from Last 3 Encounters:   06/29/17 136/74   06/27/17 132/88   05/18/17 108/64     /74  Pulse 72  Resp 14     The patient is comfortable, no acute distress.  Mood good.  Insight good.  Eyes are nonicteric.  Neck is supple without mass.  No cervical adenopathy.  No thyromegaly. Heart regular rate and rhythm.  Lungs clear to auscultation bilaterally.  Respiratory effort is good.  Extremities no edema.      ______________________________________________________________________    Diagnostics:    Results for orders placed or performed in visit on 06/29/17   Basic Metabolic Panel   Result Value Ref Range    Sodium 142 136 - 145 mmol/L    Potassium 4.5 3.5 - 5.0 mmol/L    Chloride 106 98 - 107 mmol/L    CO2 27 22 - 31 mmol/L    Anion Gap, Calculation 9 5 - 18 mmol/L    Glucose 102 70 - 125 mg/dL    Calcium 9.3 8.5 - 10.5 mg/dL    BUN 17 8 - 22 mg/dL    Creatinine 0.88 0.70 - 1.30 mg/dL    GFR MDRD Af Amer >60 >60 mL/min/1.73m2    GFR MDRD Non Af Amer >60 >60 mL/min/1.73m2   Glycosylated Hemoglobin A1c   Result Value Ref Range    Hemoglobin A1c 5.3 3.5 - 6.0 %   HM2(CBC w/o Differential)   Result Value Ref Range    WBC 8.0 4.0 - 11.0 thou/uL    RBC 4.93 4.40 - 6.20 mill/uL    Hemoglobin 15.7 14.0 - 18.0 g/dL    Hematocrit 45.7 40.0 - 54.0 %    MCV 93 80 - 100 fL    MCH 31.9 27.0 - 34.0 pg    MCHC 34.4 32.0 - 36.0 g/dL    RDW 11.6 11.0 - 14.5 %    Platelets 263 140 - 440 thou/uL    MPV 7.4 7.0 - 10.0 fL   INR   Result Value Ref Range    INR 0.95 0.90 - 1.10   APTT(PTT)   Result Value Ref Range    PTT 33 24 - 37 seconds   Sedimentation Rate   Result Value Ref Range    Sed Rate 7 0 - 15 mm/hr   Lipid Cascade   Result Value Ref Range    Cholesterol 164 <=199 mg/dL    Triglycerides 102 <=149 mg/dL    HDL Cholesterol 41 >=40 mg/dL    LDL Calculated 103 <=129 mg/dL    Patient Fasting > 8hrs? Yes    PSA (Prostatic-Specific Antigen), Annual Screen   Result Value Ref Range    PSA 3.4 0.0 - 4.5 ng/mL      ______________________________________________________________________    Assessment:    1. Branch retinal vein occlusion of left eye     2. Screening for prostate cancer    3. HTN (hypertension)       ______________________________________________________________________     Lab Results   Component Value Date    LDLCALC 103 06/29/2017       PHQ-2 Total Score: 0 (6/27/2017  4:00 PM)  No Data Recorded      Plan:    Blood work as noted above.  Records from optometry reviewed.  Follow-up with optometry as scheduled.  Do not suspect any significant underlying disease causing this.       Onur Rod MD  General Internal Medicine  [facility]     Personal office fax - [phone]  Voice mail - [phone]  E-mail - [email or web address]    Return if symptoms worsen or fail to improve.

## 2021-06-25 NOTE — TELEPHONE ENCOUNTER
Called and spoke to pts wife who states pt is interested in moving forward with LAAC workup. Message sent to Nydia to see if pt is ok for CT to screen SP[facility]. If able, will place CT order and send to Delaney to schedule. Pt will also require a SDM visit. Patient's wife verbalizes understanding and agrees with plan. No further questions or concerns at this time.

## 2021-06-25 NOTE — TELEPHONE ENCOUNTER
From: Nydia Gardner CNP  Sent: 6/3/2021   3:03 PM CDT  To: Ava Denis RN    LAAC consult done today, good candidate.  Info given.  Please call to follow up in 1 week.  Thanks,  Nydia

## 2021-06-25 NOTE — TELEPHONE ENCOUNTER
Pt called to discuss LAAC workup. Pt states Dr. Rod will be ordering a CT scan to look at his spine for a recent back injury. Pt is inquiring if they can do both CT scans at the same time.     Informed pt I am not sure as it would possibly depend on multiple factors such as contrast use and location of scan needed. Also informed pt that the LAAC CT (CT pulmonary vein with contrast) has a special protocol and is very specific.     I recommend pt or provider calling the CT department to see if they can do both at the same time. Patient verbalizes understanding and agrees with plan. No further questions or concerns at this time.

## 2021-06-25 NOTE — PROGRESS NOTES
Progress Notes by Onur Rod MD at 5/18/2017 10:35 AM     Author: Onur Rod MD Service: -- Author Type: Physician    Filed: 5/22/2017  9:37 PM Encounter Date: 5/18/2017 Status: Signed    : Onur Rod MD (Physician)              [facility]    Comprehensive and complex medical care - Chronic disease management - Shared decision making - Care coordination - Compassionate care    Patient advocacy - Rational deprescribing - Minimally disruptive medicine - Ethical focus - Customized care    Date of Service: 5/18/2017  Primary Provider: Onur Rod MD    Patient Care Team:  Onur Rod MD as PCP - General (Internal Medicine)  Liborio ANGELES MD as Physician (Orthopedic Surgery)     ______________________________________________________________________     Patient's Pharmacy:    [facility] - [place], [place] - [address] [facility]  [address] [facility] [address]  Phone: [phone] Fax: [phone]    [facility] [address] [place] [address] [address] [address]  Phone: [phone] Fax: [phone]     Patient's Insurance:    Payor: BLUE CROSS / Plan: BLUE CROSS PLATINUM / Product Type: COST PLAN /     ______________________________________________________________________     Mike MCALLISTER Carlos is 66 y.o. male who comes in today for:  Chief Complaint   Patient presents with   ? Follow-up     sinus infection has returned      ______________________________________________________________________     Patient Active Problem List   Diagnosis   ? HTN (hypertension)   ? GERD (gastroesophageal reflux disease)   ? Hx of atrial flutter, single episode   ? Chronic neck pain   ? Nonsmoker   ? Basal cell carcinoma   ? Full code status      Current Outpatient Prescriptions   Medication Sig Note   ? aspirin 81 MG EC tablet Take 81 mg by mouth. 2/22/2016: Received from: [facility]   ?  "cefuroxime (CEFTIN) 500 MG tablet Take 1 tablet (500 mg total) by mouth 2 (two) times a day for 14 days.    ? fluticasone (FLONASE) 50 mcg/actuation nasal spray 2 sprays into each nostril daily.    ? predniSONE (DELTASONE) 20 MG tablet Take 2 tablets (40 mg total) by mouth daily for 5 days.    ? verapamil (CALAN-SR) 180 MG CR tablet TAKE 1 TABLET EVERY DAY       ______________________________________________________________________       History of present illness:    The patient comes in today mainly for recurrent sinus issues.    He has been having sinus issues off and on for the winter.  He has had 3 discrete sinus \"infections\".  Each episode lasts at least 3 weeks and possibly longer.  We did treat him once in the fall for this.  He is frustrated by the recurrent nature of these.  The most current episode has been going on for at least one month.  He's been no simple a lot of congestion.  He has had issues with rhinitis and is sensitive to smoke and perfumes.  He has had greenish discharge which has been a little bit more clear over lately.  He does cough up a lot of in the morning up to 5-10 minutes at a time.  He denies any fevers or chills.  His ears and sinuses feel plugged.  He denies any Itchy or watery eyes.    We reviewed his left ankle pain.  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  This is through [facility] orth[facility]ics.  He saw Dr. Sherman there.  He is considering going back for possible injection of stem cells.  It does bother him to be on his feet for any period of time.  He has been increasingly problematic for him.  He notes it mainly over the medial ankle.  He notes more pain on the regular surfaces.  He has not been taking ibuprofen for this.  He denies any back pain or numbness down the leg.    We reviewed his high blood pressure and his blood pressure seems to be doing well.  He would like to reduce medications able.    We reviewed his reflux disease and please try off of omeprazole " and this seems to be going well for him at this time.  Like to stay off of this hospital.    Reviewed CODE Status is full code.  Encouraged him to give us a advanced directive which he has had completed already.    We reviewed the number needed to treat for Prevnar and he declines this vaccine.    On review of systems, the patient denies any chest pain or shortness of breath.  ______________________________________________________________________     Exam:    /64  Pulse 76  Wt (!) 257 lb 3.2 oz (116.7 kg)  BMI 34.88 kg/m2  Patient is comfortable, no apparent distress.  Mood good.  Insight good.  Ears -   Clear.  Nose exam shows moderate rhinitis.  Throat -  clear.  Neck is supple, there is no cervical adenopathy.  No thyromegaly.  Heart regular rate and rhythm.  Lungs are clear to auscultation bilaterally.  Respiratory effort is good.  There is no lower extremity edema.    ______________________________________________________________________     Assessment:    1. Recurrent rhinosinusitis    2. Left ankle pain    3. Full code status    4. GERD (gastroesophageal reflux disease)       ______________________________________________________________________    No results found for: LDLCALC    PHQ-2 Total Score: 0 (9/22/2016 11:00 AM)  No Data Recorded      Plan:    1. Prednisone 40 mg a day for 5 days.  Ceftin 500 mg twice a day for 14 days.  2. Stop lisinopril as blood pressure is low and could be contributing to sinus issues.  3. Patient wishes to be full code.  4. Okay to stay off of omeprazole at this time.  5. Follow-up if not improving.  6. Recommended to follow-up with Dr. Davidson in relationship to his left ankle arthritis.    Onur Rod MD  General Internal Medicine  [facility]    Return if symptoms worsen or fail to improve.

## 2021-06-26 NOTE — PATIENT INSTRUCTIONS - HE
Mike Gonzalez,    It was a pleasure to see you today at the [facility].     My recommendations after this visit include:    Continue current medications    Consider left atrial appendage closure with the Watchman device as an alternative to Eliquis      Nydia Gardner, CNP  [facility], Electrophysiology  [phone]  EP nurses [phone]

## 2021-06-26 NOTE — PROGRESS NOTES
Progress Notes by Onur Rod MD at 6/4/2018  2:30 PM     Author: Onur Rod MD Service: -- Author Type: Physician    Filed: 6/5/2018  7:49 AM Encounter Date: 6/4/2018 Status: Signed    : Onur Rod MD (Physician)              [facility]    Comprehensive and complex medical care - Chronic disease management - Shared decision making - Care coordination - Compassionate care    Patient advocacy - Rational deprescribing - Minimally disruptive medicine - Ethical focus - Customized care    ______________________________________________________________________     Date of Service: 6/4/2018  Primary Provider: Onur Rod MD    Patient Care Team:  Onur Rod MD as PCP - General (Internal Medicine)  Liborio Londono MD as Physician (Orthopedic Surgery)     ______________________________________________________________________     Patient's Pharmacy:    [facility] [place], [place] - [address] [facility]  [address] [facility] [address]  Phone: [phone] Fax: [phone]    [facility] [address] [place] [address] [address] [facility]  [address] [facility] [address]  Phone: [phone] Fax: [phone]     Patient's Contacts:  Name Home Phone Work Phone Mobile Phone Relationship Lgl KYLER Wilson   [phone] Spouse    MATILDE SILVER   [phone] Child      Patient's Insurance:    Payor: BLUE CROSS / Plan: BLUE CROSS PLATINUM / Product Type: COST PLAN /     ______________________________________________________________________     Mike ESVIN Carlos is 67 y.o. male who comes in today for:    Chief Complaint   Patient presents with   ? Hip Pain     RIGHT SIDE X 3 WEEKS, GOT WORSE OVER 2 WEEKS AND THE PAST WEEK HAS STAYED THE SAME       Patient Active Problem List   Diagnosis   ? HTN (hypertension)   ? GERD (gastroesophageal reflux disease)   ? Hx of atrial flutter, single episode   ? Chronic neck pain   ?  Nonsmoker   ? Basal cell carcinoma   ? Full code status   ? Branch retinal vein occlusion of left eye     Current Outpatient Prescriptions   Medication Sig Note   ? aspirin 81 MG EC tablet Take 81 mg by mouth. 2/22/2016: Received from: [facility]   ? fluticasone (FLONASE) 50 mcg/actuation nasal spray 2 sprays into each nostril daily.    ? verapamil (CALAN-SR) 180 MG CR tablet Take 1 tablet (180 mg total) by mouth daily.      Social History     Social History Narrative    Patient of Dr. Rod since 2001.        .  Wife is a former RN.      ______________________________________________________________________     History of present illness:    Patient comes in today mainly for right sided hip/pelvic pain.  This is been going on for about 3 weeks.  It occurred around Mother's Day.  He spent Saturday clearing [place] land.  [place]  He noted the pain particularly bad the next Monday.  It is at the right side top of the pelvic brim that he notes it.  It is a twinge at times but also a constant pain.  His best time a day related to this is between 12 and 4:00.  It is worse when he rests for a while and it takes a while for him to loosen up.  He denies any major right lateral pain in the hip.  He denies any pain radiating down his leg associated with this.  He had does note that his neck has been tight since he had problems with this.  This is worse over the last few weeks for him.    He did go to [facility] orthopedics for this and they did a regular x-ray which was negative.  They gave him a Medrol Dosepak which did not seem to help with his symptoms.  He denies any fevers, chills, night sweats.    He did have a tick bite around this time in his back to.  It was definitely a deer tick.  He has had no rash associated with this.    He has had some scattered joint pains in the past but nothing like this.    On review of systems, the patient denies any chest pain  or fevers or chills.    ______________________________________________________________________    Exam:    Wt Readings from Last 3 Encounters:   06/04/18 (!) 259 lb (117.5 kg)   02/20/18 (!) 260 lb (117.9 kg)   06/27/17 (!) 249 lb (112.9 kg)     BP Readings from Last 3 Encounters:   06/04/18 130/78   02/20/18 128/84   06/29/17 136/74     /78  Pulse 72  Temp 98.6  F (37  C) (Oral)   Wt (!) 259 lb (117.5 kg)  SpO2 95%  BMI 35.13 kg/m2   The patient is comfortable, no acute distress.  Mood good.  Insight good.  Eyes are nonicteric.  Neck is supple without mass.  No cervical adenopathy.  No thyromegaly. Heart regular rate and rhythm.  Lungs clear to auscultation bilaterally.  Respiratory effort is good.  Abdomen soft and nontender.  No hepatosplenomegaly.  Extremities no edema.  He walks stiffly.  There is no trochanteric bursa pain.  His Sam's maneuver is negative on both hips.  There is no synovitis noted in his hands, knees, feet.  His back is nontender.  There is no rash consistent with Lyme disease.      ______________________________________________________________________    Diagnostics:    Results for orders placed or performed in visit on 06/04/18   Sedimentation Rate   Result Value Ref Range    Sed Rate 3 0 - 15 mm/hr   C-Reactive Protein   Result Value Ref Range    CRP <0.1 0.0 - 0.8 mg/dL   Comprehensive Metabolic Panel   Result Value Ref Range    Sodium 142 136 - 145 mmol/L    Potassium 4.3 3.5 - 5.0 mmol/L    Chloride 104 98 - 107 mmol/L    CO2 25 22 - 31 mmol/L    Anion Gap, Calculation 13 5 - 18 mmol/L    Glucose 84 70 - 125 mg/dL    BUN 29 (H) 8 - 22 mg/dL    Creatinine 1.00 0.70 - 1.30 mg/dL    GFR MDRD Af Amer >60 >60 mL/min/1.73m2    GFR MDRD Non Af Amer >60 >60 mL/min/1.73m2    Bilirubin, Total 0.5 0.0 - 1.0 mg/dL    Calcium 9.6 8.5 - 10.5 mg/dL    Protein, Total 6.7 6.0 - 8.0 g/dL    Albumin 4.1 3.5 - 5.0 g/dL    Alkaline Phosphatase 94 45 - 120 U/L    AST 13 0 - 40 U/L    ALT 18 0 -  45 U/L   Rheumatoid Factor Quant   Result Value Ref Range    Rheumatoid Factor Quantitative <15.0 0 - 30 IU/mL   HM1 (CBC with Diff)   Result Value Ref Range    WBC 11.7 (H) 4.0 - 11.0 thou/uL    RBC 5.10 4.40 - 6.20 mill/uL    Hemoglobin 16.2 14.0 - 18.0 g/dL    Hematocrit 48.5 40.0 - 54.0 %    MCV 95 80 - 100 fL    MCH 31.8 27.0 - 34.0 pg    MCHC 33.5 32.0 - 36.0 g/dL    RDW 11.3 11.0 - 14.5 %    Platelets 298 140 - 440 thou/uL    MPV 7.4 7.0 - 10.0 fL    Neutrophils % 70 50 - 70 %    Lymphocytes % 18 (L) 20 - 40 %    Monocytes % 11 (H) 2 - 10 %    Eosinophils % 1 0 - 6 %    Basophils % 1 0 - 2 %    Neutrophils Absolute 8.1 (H) 2.0 - 7.7 thou/uL    Lymphocytes Absolute 2.1 0.8 - 4.4 thou/uL    Monocytes Absolute 1.2 (H) 0.0 - 0.9 thou/uL    Eosinophils Absolute 0.1 0.0 - 0.4 thou/uL    Basophils Absolute 0.1 0.0 - 0.2 thou/uL      ______________________________________________________________________    Assessment:    1. Right hip pain    2. Joint pain    3. Tick bite    4. Neck pain      ______________________________________________________________________      PHQ-2 Total Score: 0 (6/4/2018  2:00 PM)  No Data Recorded  ______________________________________________________________________    Plan:    1. Check blood work today.  See relevant orders and diagnosis associations at the bottom of this note.  2. Request old records from [facility] orth[facility].  3. Consider MRI if blood work unrevealing.  4. No new treatments until we have a better handle on what is going on.  Medrol may confuse the results of some testing, but doubt.    Onur Rod MD  General Internal Medicine  [facility]     Return if symptoms worsen or fail to improve.     No future appointments.      ______________________________________________________________________     Relevant ICD-10 codes and order associations:      ICD-10-CM    1. Right hip pain M25.551 Sedimentation Rate     C-Reactive Protein     HM1(CBC and  Differential)     Comprehensive Metabolic Panel     Lyme Antibody Cascade     Ehrlichia/Anaplasma, Molecular Detection, PCR, Whole Blood     Babesia Species by PCR     Rheumatoid Factor Quant     CCP Antibodies     HM1 (CBC with Diff)   2. Joint pain M25.50 Sedimentation Rate     C-Reactive Protein     HM1(CBC and Differential)     Comprehensive Metabolic Panel     Lyme Antibody Cascade     Ehrlichia/Anaplasma, Molecular Detection, PCR, Whole Blood     Babesia Species by PCR     Rheumatoid Factor Quant     CCP Antibodies     HM1 (CBC with Diff)   3. Tick bite W57.XXXA Sedimentation Rate     C-Reactive Protein     HM1(CBC and Differential)     Comprehensive Metabolic Panel     Lyme Antibody Cascade     Ehrlichia/Anaplasma, Molecular Detection, PCR, Whole Blood     Babesia Species by PCR     Rheumatoid Factor Quant     CCP Antibodies     HM1 (CBC with Diff)   4. Neck pain M54.2

## 2021-06-26 NOTE — PROGRESS NOTES
Progress Notes by Onur Rod MD at 6/25/2018  9:15 AM     Author: Onur Rod MD Service: -- Author Type: Physician    Filed: 6/25/2018  2:45 PM Encounter Date: 6/25/2018 Status: Signed    : Onur Rod MD (Physician)              [facility]    Comprehensive and complex medical care - Chronic disease management - Shared decision making - Care coordination - Compassionate care    Patient advocacy - Rational deprescribing - Minimally disruptive medicine - Ethical focus - Customized care    ______________________________________________________________________     Date of Service: 6/25/2018  Primary Provider: Onur Rod MD    Patient Care Team:  Onur Rod MD as PCP - General (Internal Medicine)  Liborio Londono MD as Physician (Orthopedic Surgery)     ______________________________________________________________________     Patient's Pharmacy:    [facility] - [place], [place] - [address] at the [facility]  [address] [facility] [address]  Phone: [phone] Fax: [phone]    [facility] [address] [place] [address] [address] [facility]  [address] [facility] [address]  Phone: [phone] Fax: [phone]     Patient's Contacts:  Name Home Phone Work Phone Mobile Phone Relationship Lgl KYLER Wilson   [phone] Spouse    MATILDE SILVER   [phone] Child      Patient's Insurance:    Payor: BLUE CROSS / Plan: BLUE CROSS PLATINUM / Product Type: COST PLAN /     ______________________________________________________________________     Mike MCALLISTER Carlos is 67 y.o. male who comes in today for:    Chief Complaint   Patient presents with   ? Follow-up     still has right side pain, it does move around       Patient Active Problem List   Diagnosis   ? HTN (hypertension)   ? GERD (gastroesophageal reflux disease)   ? Hx of atrial flutter, single episode   ? Chronic neck pain   ? Nonsmoker   ? Basal cell  "carcinoma   ? Full code status   ? Branch retinal vein occlusion of left eye     Current Outpatient Prescriptions   Medication Sig Note   ? aspirin 81 MG EC tablet Take 81 mg by mouth. 2/22/2016: Received from: [facility]   ? fluticasone (FLONASE) 50 mcg/actuation nasal spray 2 sprays into each nostril daily.    ? verapamil (CALAN-SR) 180 MG CR tablet Take 1 tablet (180 mg total) by mouth daily.      Social History     Social History Narrative    Patient of Dr. Rod since 2001.        .  Wife is a former RN.      ______________________________________________________________________     History of present illness:    Patient comes in today for follow-up of his pain.    We first reviewed his right hip pain.  He had an MRI of his right hip and this was reviewed with him.  There were no major findings considering his symptoms.  We reviewed this with him in detail today.    We reviewed his back pain and right leg pain.  This has become more prominent in the last 3 weeks.  It is worse for him to stand.  He feels a little bit of numbness in his right leg as well.  He says there is a \"weird sensation\" here.  He does get pain into the hip and into the like a little bit but really not significantly further down the leg.  He can lift things without issue.  He denies any issues with the left leg.  His neck has been doing well.  He has had a history of neck issues.  His most recent MRI of the hip did lean us toward increasing back issues.    We reviewed some abdominal discomfort he has been having.  He had an abdominal x-ray at the last x-ray check.  This was normal.  He has had a CT in 2016 which was normal.  He has been having increasing abdominal bloating.  He does have a history of colonoscopy within the last couple years that was normal.  He ate a burger over the weekend and had gassiness and bloating throughout his abdomen.  He noticed it hurts more in his back with this.  He denies any nausea or vomiting " with this.  He denies any fevers or chills.    On review of systems, the patient denies any chest pain or shortness of breath.    ______________________________________________________________________    Exam:    Wt Readings from Last 3 Encounters:   06/25/18 (!) 258 lb (117 kg)   06/04/18 (!) 259 lb (117.5 kg)   02/20/18 (!) 260 lb (117.9 kg)     BP Readings from Last 3 Encounters:   06/25/18 138/86   06/04/18 130/78   02/20/18 128/84     /86  Pulse 72  Wt (!) 258 lb (117 kg)  SpO2 97%  BMI 34.99 kg/m2   The patient is comfortable, no acute distress.  Mood good.  Insight good.  Eyes are nonicteric.  Neck is supple without mass.  No cervical adenopathy.  No thyromegaly. Heart regular rate and rhythm.  Lungs clear to auscultation bilaterally.  Respiratory effort is good.  Abdomen soft and minimally tender in the right lower quadrant.  No rebound.  No hepatosplenomegaly.  Extremities no edema.  Back reveals some mild tenderness over the right side.  Sam's maneuver is negative on the right side.  There is no trochanteric bursitis noted.      ______________________________________________________________________    Diagnostics:    Results for orders placed or performed in visit on 06/04/18   Sedimentation Rate   Result Value Ref Range    Sed Rate 3 0 - 15 mm/hr   C-Reactive Protein   Result Value Ref Range    CRP <0.1 0.0 - 0.8 mg/dL   Comprehensive Metabolic Panel   Result Value Ref Range    Sodium 142 136 - 145 mmol/L    Potassium 4.3 3.5 - 5.0 mmol/L    Chloride 104 98 - 107 mmol/L    CO2 25 22 - 31 mmol/L    Anion Gap, Calculation 13 5 - 18 mmol/L    Glucose 84 70 - 125 mg/dL    BUN 29 (H) 8 - 22 mg/dL    Creatinine 1.00 0.70 - 1.30 mg/dL    GFR MDRD Af Amer >60 >60 mL/min/1.73m2    GFR MDRD Non Af Amer >60 >60 mL/min/1.73m2    Bilirubin, Total 0.5 0.0 - 1.0 mg/dL    Calcium 9.6 8.5 - 10.5 mg/dL    Protein, Total 6.7 6.0 - 8.0 g/dL    Albumin 4.1 3.5 - 5.0 g/dL    Alkaline Phosphatase 94 45 - 120 U/L     AST 13 0 - 40 U/L    ALT 18 0 - 45 U/L   Lyme Antibody Cascade   Result Value Ref Range    Lyme Antibody Cascade <0.01 <0.90 Index Value   Ehrlichia/Anaplasma, Molecular Detection, PCR, Whole Blood   Result Value Ref Range    Anaplasma phagocytophilum by PCR Negative Negative    Ehrlichia chaffeensis by PCR Negative Negative    Ehrlichia ewingii/canis by PCR Negative Negative    Ehrlichia muris-like Negative Negative   Babesia Species by PCR   Result Value Ref Range    Babesia species by PCR Not Detected     Babesia microti by PCR Not Detected    Rheumatoid Factor Quant   Result Value Ref Range    Rheumatoid Factor Quantitative <15.0 0 - 30 IU/mL   CCP Antibodies   Result Value Ref Range    CCP IgG Antibodies <0.5 <=4.9 U/mL   HM1 (CBC with Diff)   Result Value Ref Range    WBC 11.7 (H) 4.0 - 11.0 thou/uL    RBC 5.10 4.40 - 6.20 mill/uL    Hemoglobin 16.2 14.0 - 18.0 g/dL    Hematocrit 48.5 40.0 - 54.0 %    MCV 95 80 - 100 fL    MCH 31.8 27.0 - 34.0 pg    MCHC 33.5 32.0 - 36.0 g/dL    RDW 11.3 11.0 - 14.5 %    Platelets 298 140 - 440 thou/uL    MPV 7.4 7.0 - 10.0 fL    Neutrophils % 70 50 - 70 %    Lymphocytes % 18 (L) 20 - 40 %    Monocytes % 11 (H) 2 - 10 %    Eosinophils % 1 0 - 6 %    Basophils % 1 0 - 2 %    Neutrophils Absolute 8.1 (H) 2.0 - 7.7 thou/uL    Lymphocytes Absolute 2.1 0.8 - 4.4 thou/uL    Monocytes Absolute 1.2 (H) 0.0 - 0.9 thou/uL    Eosinophils Absolute 0.1 0.0 - 0.4 thou/uL    Basophils Absolute 0.1 0.0 - 0.2 thou/uL      ______________________________________________________________________    Pertinent radiology for this visit includes the following:    Xr Abdomen Flat And Upright    Result Date: 6/7/2018  EXAM DATE:         06/07/2018 [facility] DOWNTOWN X-RAY ABDOMEN, AP 6/7/2018 6:15 PM INDICATION: Abdominal pain and distention COMPARISON: 3/18/2016 FINDINGS: No change. Bowel gas pattern is normal. Nothing for obstruction or free air. Degenerative changes and scoliosis  involving lumbar spine.      Mr Hip Without Contrast Right    Result Date: 6/8/2018  EXAM DATE:         06/07/2018 [facility] DOWNTOWN MRI HIP RIGHT W/O CONTRAST 6/7/2018 6:30 PM INDICATION: Pain in right hip; abdominal distention (gaseous). TECHNIQUE: Unenhanced. COMPARISON: None. FINDINGS: RIGHT HIP: The right hip is negative for occult fracture or avascular necrosis. There is mild degenerative change without evidence for discrete cartilage tearing. There is degenerative abnormality and irregularity of the superolateral labrum. LEFT HIP: The left hip is negative for occult fracture or avascular necrosis. Mild degenerative change at the left hip joint. Findings are fairly symmetric. TENDONS: Mild bilateral gluteal tendon tendinopathy, likely greater on the left. No tearing or trochanteric bursitis. Proximal hamstring tendons intact and negative. No iliopsoas tendon abnormality or tearing. BONES AND SOFT TISSUES: The bony pelvis is otherwise negative. No focal bone lesion or marrow signal abnormality. Degenerative change within the visualized portion of the lower lumbar spine with convex left lumbar curvature. INTRA-PELVIC CONTENTS: Negative. CONCLUSION: 1.  Both hips are negative for occult fracture or avascular necrosis. 2.  Mild degenerative change both hip joints with superior joint space tearing. Findings are fairly symmetric, although there is slightly more reactive effusion on the right. 3.  Mild bilateral gluteal tendon tendinopathy, likely greater on the left. 4.  Degenerative change within the visualized portion of the lower lumbar spine, with convex left lumbar curvature. 5.  Exam otherwise negative.        ______________________________________________________________________    ______________________________________________________________________    Assessment:    1. Low back pain radiating to right leg    2. Abdominal discomfort    3. Abdominal bloating    4. Right leg numbness    5. RLQ  discomfort    6. Right hip pain      ______________________________________________________________________      PHQ-2 Total Score: 0 (6/4/2018  2:00 PM)  No Data Recorded  ______________________________________________________________________    Plan:    1. I suspect that his current symptoms related to his back.  He will have MRI of the back.  2. Given he has right lower quadrant discomfort and bloating, we will proceed with CT scan of the abdomen and pelvis.  He has a little bit of increase in his white count.  See orders for further details.  3. No evidence of hip pathology at this time.  4. No changes in therapy at this time.    Onur Rod MD  General Internal Medicine  [facility]     25 minutes or greater were spent with the patient today with greater than 50% of the times spent in counseling and coordination of care.     Return in about 6 weeks (around 8/6/2018) for follow up visit.     No future appointments.      ______________________________________________________________________     Relevant ICD-10 codes and order associations:      ICD-10-CM    1. Low back pain radiating to right leg M54.5 MR Lumbar Spine Without Contrast   2. Abdominal discomfort R10.9 CT Abdomen Pelvis With Oral With IV Contrast   3. Abdominal bloating R14.0 CT Abdomen Pelvis With Oral With IV Contrast   4. Right leg numbness R20.0 MR Lumbar Spine Without Contrast   5. RLQ discomfort R10.31 CT Abdomen Pelvis With Oral With IV Contrast   6. Right hip pain M25.551

## 2021-06-27 ENCOUNTER — HOSPITAL ENCOUNTER (OUTPATIENT)
Dept: MRI IMAGING | Facility: CLINIC | Age: 71
Discharge: HOME | End: 2021-06-27
Attending: INTERNAL MEDICINE
Payer: MEDICARE

## 2021-06-27 ENCOUNTER — HOSPITAL ENCOUNTER (OUTPATIENT)
Dept: CT IMAGING | Facility: CLINIC | Age: 71
Discharge: HOME | End: 2021-06-27
Attending: NURSE PRACTITIONER
Payer: MEDICARE

## 2021-06-27 DIAGNOSIS — M54.16 LUMBAR BACK PAIN WITH RADICULOPATHY AFFECTING LEFT LOWER EXTREMITY: ICD-10-CM

## 2021-06-27 DIAGNOSIS — M54.16 LEFT LUMBAR RADICULOPATHY: ICD-10-CM

## 2021-06-27 DIAGNOSIS — I48.21 PERMANENT ATRIAL FIBRILLATION (H): ICD-10-CM

## 2021-06-27 DIAGNOSIS — I48.91 UNSPECIFIED ATRIAL FIBRILLATION (H): ICD-10-CM

## 2021-06-27 DIAGNOSIS — M25.552 HIP PAIN, LEFT: ICD-10-CM

## 2021-06-27 LAB
CREAT BLD-MCNC: 1 MG/DL (ref 0.7–1.3)
GFR SERPL CREATININE-BSD FRML MDRD: >60 ML/MIN/1.73M2
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
## 2021-06-27 NOTE — PROGRESS NOTES
Progress Notes by Onur Rod MD at 2/5/2019 11:20 AM     Author: Onur Rod MD Service: -- Author Type: Physician    Filed: 2/7/2019  2:54 PM Encounter Date: 2/5/2019 Status: Signed    : Onur Rod MD (Physician)              [facility]    Comprehensive and complex medical care - Chronic disease management - Shared decision making - Care coordination - Compassionate care    Patient advocacy - Rational deprescribing - Minimally disruptive medicine - Ethical focus - Customized care    ______________________________________________________________________     Date of Service: 2/5/2019  Primary Provider: Onur Rod MD    Patient Care Team:  Onur Rod MD as PCP - General (Internal Medicine)  Liborio Londono MD as Physician (Orthopedic Surgery)     ______________________________________________________________________     Patient's Pharmacy:    [facility] - [place], [place] - [address]  [address] [address]  Phone: [phone] Fax: [phone]    [facility] [address] [facility] [employer] - [place], [place] - [address]  [address] [facility] [address]  Phone: [phone] Fax: [phone]     Patient's Contacts:  Name Home Phone Work Phone Mobile Phone Relationship Lgl KYLER Wilson   [phone] Spouse    MATILDE SILVER   [phone] Child      Patient's Insurance:    Payor: MEDICARE / Plan: MEDICARE A AND B / Product Type: Medicare /     ______________________________________________________________________     Mike Gonzalez is 68 y.o. male who comes in today for:    Chief Complaint   Patient presents with   ? Follow-up     CONCUSSION, LEFT SHOULDER PAIN       Active Problem List:  Problem List as of 2/5/2019 Reviewed: 1/20/2019 10:22 PM by Onur Rod MD       High    Full code status    Nonsmoker       Medium    HTN (hypertension)    Chronic neck pain       Low    Basal cell carcinoma     GERD (gastroesophageal reflux disease)    Hx of atrial flutter, single episode       Unprioritized    Branch retinal vein occlusion of left eye           Current Outpatient Medications   Medication Sig Note   ? aspirin 81 MG EC tablet Take 81 mg by mouth. 2/22/2016: Received from: [facility]   ? fluticasone (FLONASE) 50 mcg/actuation nasal spray 2 sprays into each nostril daily.      Social History     Social History Narrative    Patient of Dr. Rod since 2001.        .  Wife is a former RN.      ______________________________________________________________________     History of present illness:    Patient comes in today with his wife.    We reviewed his concussion and fall from ladder.  He is still a bit foggy at times with his thinking.  He had difficulty playing his guitar the other day because he could not remember a familiar chord pattern.  He is not been reading or writing during this time and not driving.  His wife feels that he is improving but still has deficits.  He denies any major headaches.  He denies any focal weakness or numbness.  We did discussed further workup and they are okay with following him at this point.    We reviewed his scalp hematomas over the posterior and the anterior scalp and these are stable at this time.  He wanted them rechecked.    We reviewed his left shoulder pain.  This is not improving significantly.  He has noticed improvement but he has decreased function.  He can only flex it to about 40 degrees.  He can abduct it about 50 degrees.  He has pain over the biceps tendon and the supraspinatus tendon.  The mechanism of injury was unclear.    They will be driving to [place] on March 7 and will be gone for 2-1/2 weeks.    We reviewed his other issues noted in the assessment but not specifically addressed in the HPI above.     On review of systems, the patient denies any pain or  numbness.    ______________________________________________________________________    Exam:    Wt Readings from Last 3 Encounters:   01/15/19 (!) 257 lb (116.6 kg)   06/25/18 (!) 258 lb (117 kg)   06/04/18 (!) 259 lb (117.5 kg)     BP Readings from Last 3 Encounters:   02/05/19 134/72   01/15/19 136/84   06/25/18 138/86     /72   Pulse 76   SpO2 98%    The patient is comfortable, no acute distress.  Mood good.  Insight good.  There are 2 hematomas of the posterior scalp and over the orbital ridge superiorly.  Eyes are nonicteric.  Neck is supple without mass.  No cervical adenopathy.  No thyromegaly. Heart regular rate and rhythm.  Lungs clear to auscultation bilaterally.  Respiratory effort is good.  Abdomen soft and nontender.  No hepatosplenomegaly.  Extremities no edema.  His shoulder has better passive range of motion then active range of motion.  He has no obvious rotator cuff weakness but his exam is limited.  He does have positive impingement signs.  He has mild tenderness over the AC joint as well.  No swelling of the AC joint is noted.    ______________________________________________________________________    Diagnostics:    Results for orders placed or performed in visit on 06/04/18   Sedimentation Rate   Result Value Ref Range    Sed Rate 3 0 - 15 mm/hr   C-Reactive Protein   Result Value Ref Range    CRP <0.1 0.0 - 0.8 mg/dL   Comprehensive Metabolic Panel   Result Value Ref Range    Sodium 142 136 - 145 mmol/L    Potassium 4.3 3.5 - 5.0 mmol/L    Chloride 104 98 - 107 mmol/L    CO2 25 22 - 31 mmol/L    Anion Gap, Calculation 13 5 - 18 mmol/L    Glucose 84 70 - 125 mg/dL    BUN 29 (H) 8 - 22 mg/dL    Creatinine 1.00 0.70 - 1.30 mg/dL    GFR MDRD Af Amer >60 >60 mL/min/1.73m2    GFR MDRD Non Af Amer >60 >60 mL/min/1.73m2    Bilirubin, Total 0.5 0.0 - 1.0 mg/dL    Calcium 9.6 8.5 - 10.5 mg/dL    Protein, Total 6.7 6.0 - 8.0 g/dL    Albumin 4.1 3.5 - 5.0 g/dL    Alkaline Phosphatase 94 45 - 120  U/L    AST 13 0 - 40 U/L    ALT 18 0 - 45 U/L   Lyme Antibody Cascade   Result Value Ref Range    Lyme Antibody Cascade <0.01 <0.90 Index Value   Ehrlichia/Anaplasma, Molecular Detection, PCR, Whole Blood   Result Value Ref Range    Anaplasma phagocytophilum by PCR Negative Negative    Ehrlichia chaffeensis by PCR Negative Negative    Ehrlichia ewingii/canis by PCR Negative Negative    Ehrlichia muris-like by PCR Negative Negative   Babesia Species by PCR   Result Value Ref Range    Babesia species by PCR Not Detected     Babesia microti by PCR Not Detected    Rheumatoid Factor Quant   Result Value Ref Range    Rheumatoid Factor Quantitative <15.0 0 - 30 IU/mL   CCP Antibodies   Result Value Ref Range    CCP IgG Antibodies <0.5 <=4.9 U/mL   HM1 (CBC with Diff)   Result Value Ref Range    WBC 11.7 (H) 4.0 - 11.0 thou/uL    RBC 5.10 4.40 - 6.20 mill/uL    Hemoglobin 16.2 14.0 - 18.0 g/dL    Hematocrit 48.5 40.0 - 54.0 %    MCV 95 80 - 100 fL    MCH 31.8 27.0 - 34.0 pg    MCHC 33.5 32.0 - 36.0 g/dL    RDW 11.3 11.0 - 14.5 %    Platelets 298 140 - 440 thou/uL    MPV 7.4 7.0 - 10.0 fL    Neutrophils % 70 50 - 70 %    Lymphocytes % 18 (L) 20 - 40 %    Monocytes % 11 (H) 2 - 10 %    Eosinophils % 1 0 - 6 %    Basophils % 1 0 - 2 %    Neutrophils Absolute 8.1 (H) 2.0 - 7.7 thou/uL    Lymphocytes Absolute 2.1 0.8 - 4.4 thou/uL    Monocytes Absolute 1.2 (H) 0.0 - 0.9 thou/uL    Eosinophils Absolute 0.1 0.0 - 0.4 thou/uL    Basophils Absolute 0.1 0.0 - 0.2 thou/uL     ______________________________________________________________________    Assessment:    1. Fall from ladder, subsequent encounter    2. Concussion without loss of consciousness, subsequent encounter    3. Hematoma of scalp, subsequent encounter    4. Acute pain of left shoulder    5. Decreased range of motion of left shoulder      ______________________________________________________________________     PHQ-2 Total Score: 0 (1/15/2019  4:00 PM)    No Data  Recorded  ______________________________________________________________________    Plan:    1. We will go ahead and do an MRI of his left shoulder.  This was ordered.  2. We will contact him when we have the results back.  3. We could consider having him see Dr. Jackson for this if needed.  4. He could go to occupational therapy for his concussion if needed in the future.  We could consider MRI if needed.    25 minutes or greater were spent with the patient today with greater than 50% of the times spent in counseling and coordination of care.     Onur Rod MD  General Internal Medicine  [facility]     Return in about 3 weeks (around 2/26/2019) for if you are not improving.     No future appointments.      ______________________________________________________________________     Relevant ICD-10 codes and order associations:      ICD-10-CM    1. Fall from ladder, subsequent encounter W11.XXXD    2. Concussion without loss of consciousness, subsequent encounter S06.0X0D    3. Hematoma of scalp, subsequent encounter S00.03XD    4. Acute pain of left shoulder M25.512    5. Decreased range of motion of left shoulder M25.612

## 2021-06-27 NOTE — PROGRESS NOTES
Progress Notes by Onur Rod MD at 1/15/2019  3:55 PM     Author: Onur Rod MD Service: -- Author Type: Physician    Filed: 1/20/2019 10:29 PM Encounter Date: 1/15/2019 Status: Signed    : Onur Rod MD (Physician)              [facility]    Comprehensive and complex medical care - Chronic disease management - Shared decision making - Care coordination - Compassionate care    Patient advocacy - Rational deprescribing - Minimally disruptive medicine - Ethical focus - Customized care    ______________________________________________________________________     Date of Service: 1/15/2019  Primary Provider: Onur Rod MD    Patient Care Team:  Onur Rod MD as PCP - General (Internal Medicine)  Liborio Londono MD as Physician (Orthopedic Surgery)     ______________________________________________________________________     Patient's Pharmacy:    [facility] - [place], [place] - [address]  [address] [address]  Phone: [phone] Fax: [phone]    [facility] - [place], [place] - [address]  [address] [address]  Phone: [phone] Fax: [phone]    [id number] - [place], [place] - [address]  [address] [facility] [address]  Phone: [phone] Fax: [phone]     Patient's Contacts:  Name Home Phone Work Phone Mobile Phone Relationship Lgl Danield   KYLER CISNEROS   [phone] Spouse    MATILDE SILVER   [phone] Child      Patient's Insurance:    Payor: MEDICARE / Plan: MEDICARE A AND B / Product Type: Medicare /     ______________________________________________________________________     Mike MCALLISTER Carlos is 68 y.o. male who comes in today for:    Chief Complaint   Patient presents with   ? Hospital Visit Follow Up     FALL, HIT HEAD AND LEFT SHOULDER STILL SORE       Active Problem List:  Problem List as of 1/15/2019  Reviewed: 6/25/2018  2:39 PM by Onur Rod MD       High    Full code status    Nonsmoker       Medium    HTN (hypertension)    Chronic neck pain       Low    Basal cell carcinoma    GERD (gastroesophageal reflux disease)    Hx of atrial flutter, single episode       Unprioritized    Branch retinal vein occlusion of left eye           Current Outpatient Medications   Medication Sig Note   ? aspirin 81 MG EC tablet Take 81 mg by mouth. 2/22/2016: Received from: [facility]   ? fluticasone (FLONASE) 50 mcg/actuation nasal spray 2 sprays into each nostril daily.      Social History     Social History Narrative    Patient of Dr. Rod since 2001.        .  Wife is a former RN.      ______________________________________________________________________     History of present illness:    Patient comes in today with his wife.    He was putting up some [place] decorations in his attic yesterday at 9 AM.  It was a 40 pound box in the sliding.  He tried to hold it and then fell off the ladder and likely directly onto the lateral left shoulder and he also hit his head.  He was not knocked out.    They brought him into the emergency room at [facility].  He had a fair amount of pain over the shoulder.    They did x-rays of the shoulder which were negative.  They put him in a sling.  He has a lot of lateral shoulder pain.  He has difficulty with abducting.  He has a scalp on his elbow as well.  He notes that lateral movement hurts at the most.  He does have a little bit of swelling and bruising over this area.    We reviewed his head trauma.  His wife says he might of been a little bit difficult with his focusing yesterday but is doing better at this time.  His CT of his head neck his neck were good.  He has had a previous lower cervical upper thoracic spine surgery.  He really does not notice any neck pain at this point.    His blood pressure has been doing okay.  He has not had a refill of his  verapamil for quite a while.  He remains on a baby aspirin.    We reviewed his other issues noted in the assessment but not specifically addressed in the HPI above.     On review of systems, the patient denies any chest pain or shortness of breath.    ______________________________________________________________________    Exam:    Wt Readings from Last 3 Encounters:   01/15/19 (!) 257 lb (116.6 kg)   06/25/18 (!) 258 lb (117 kg)   06/04/18 (!) 259 lb (117.5 kg)     BP Readings from Last 3 Encounters:   01/15/19 136/84   06/25/18 138/86   06/04/18 130/78     /84   Pulse 71   Wt (!) 257 lb (116.6 kg)   SpO2 97%   BMI 34.86 kg/m     The patient is comfortable, no acute distress.  Mood good.  Insight good.  Eyes are nonicteric.  Neck is supple without mass.  No cervical adenopathy.  No thyromegaly. Heart regular rate and rhythm.  Lungs clear to auscultation bilaterally.  Respiratory effort is good.   Extremities no edema.  Passive range of motion of the shoulder is good, but active range of motion is limited.  He has no obvious weakness of the rotator cuff.  No obvious disturbing of the shoulder bones or musculature.  There is a small hematoma on the left frontal area of the scalp.    ______________________________________________________________________    Diagnostics:    Results for orders placed or performed in visit on 06/04/18   Sedimentation Rate   Result Value Ref Range    Sed Rate 3 0 - 15 mm/hr   C-Reactive Protein   Result Value Ref Range    CRP <0.1 0.0 - 0.8 mg/dL   Comprehensive Metabolic Panel   Result Value Ref Range    Sodium 142 136 - 145 mmol/L    Potassium 4.3 3.5 - 5.0 mmol/L    Chloride 104 98 - 107 mmol/L    CO2 25 22 - 31 mmol/L    Anion Gap, Calculation 13 5 - 18 mmol/L    Glucose 84 70 - 125 mg/dL    BUN 29 (H) 8 - 22 mg/dL    Creatinine 1.00 0.70 - 1.30 mg/dL    GFR MDRD Af Amer >60 >60 mL/min/1.73m2    GFR MDRD Non Af Amer >60 >60 mL/min/1.73m2    Bilirubin, Total 0.5 0.0 - 1.0  mg/dL    Calcium 9.6 8.5 - 10.5 mg/dL    Protein, Total 6.7 6.0 - 8.0 g/dL    Albumin 4.1 3.5 - 5.0 g/dL    Alkaline Phosphatase 94 45 - 120 U/L    AST 13 0 - 40 U/L    ALT 18 0 - 45 U/L   Lyme Antibody Cascade   Result Value Ref Range    Lyme Antibody Cascade <0.01 <0.90 Index Value   Ehrlichia/Anaplasma, Molecular Detection, PCR, Whole Blood   Result Value Ref Range    Anaplasma phagocytophilum by PCR Negative Negative    Ehrlichia chaffeensis by PCR Negative Negative    Ehrlichia ewingii/canis by PCR Negative Negative    Ehrlichia muris-like by PCR Negative Negative   Babesia Species by PCR   Result Value Ref Range    Babesia species by PCR Not Detected     Babesia microti by PCR Not Detected    Rheumatoid Factor Quant   Result Value Ref Range    Rheumatoid Factor Quantitative <15.0 0 - 30 IU/mL   CCP Antibodies   Result Value Ref Range    CCP IgG Antibodies <0.5 <=4.9 U/mL   HM1 (CBC with Diff)   Result Value Ref Range    WBC 11.7 (H) 4.0 - 11.0 thou/uL    RBC 5.10 4.40 - 6.20 mill/uL    Hemoglobin 16.2 14.0 - 18.0 g/dL    Hematocrit 48.5 40.0 - 54.0 %    MCV 95 80 - 100 fL    MCH 31.8 27.0 - 34.0 pg    MCHC 33.5 32.0 - 36.0 g/dL    RDW 11.3 11.0 - 14.5 %    Platelets 298 140 - 440 thou/uL    MPV 7.4 7.0 - 10.0 fL    Neutrophils % 70 50 - 70 %    Lymphocytes % 18 (L) 20 - 40 %    Monocytes % 11 (H) 2 - 10 %    Eosinophils % 1 0 - 6 %    Basophils % 1 0 - 2 %    Neutrophils Absolute 8.1 (H) 2.0 - 7.7 thou/uL    Lymphocytes Absolute 2.1 0.8 - 4.4 thou/uL    Monocytes Absolute 1.2 (H) 0.0 - 0.9 thou/uL    Eosinophils Absolute 0.1 0.0 - 0.4 thou/uL    Basophils Absolute 0.1 0.0 - 0.2 thou/uL       ______________________________________________________________________     Interface, In Rad Results - 01/14/2019 12:42 PM [facility]    EXAM: CT HEAD WO IV CONT, CT CERVICAL SPINE WO IV CONT  LOCATION: [facility]  DATE/TIME: 1/14/2019 11:56 AM    INDICATION: fall, head trauma, contusions to the  occiput  COMPARISON: None.  TECHNIQUE:   HEAD CT: Without IV contrast. Multiplanar reformats. Dose reduction techniques were used.  CERVICAL SPINE CT: Routine without IV contrast. Multiplanar reformats. Dose reduction techniques were used.    FINDINGS:   HEAD CT:   INTRACRANIAL CONTENTS: No intracranial hemorrhage. Mild diffuse cerebral parenchymal volume loss. No midline shift. The basilar cisterns are patent. Mild periventricular and scattered foci of deep white matter hypodensities involving both cerebral hemispheres, likely due to chronic hypertensive/microvascular ischemic white matter changes. No CT evidence for an acute infarct.    VISUALIZED ORBITS/SINUSES/MASTOIDS: No significant orbital abnormality. No significant paranasal sinus mucosal disease. No significant middle ear or mastoid effusion.    OSSEOUS STRUCTURES/SOFT TISSUES: Moderate soft tissue swelling over the left frontoparietal scalp. No underlying skull fractures.    CERVICAL SPINE CT:   VERTEBRA: There is straightening of the normal cervical lordosis. Craniocervical junction is within normal limits. Vertebral body heights are maintained. No prevertebral soft tissue edema. No fractures.    CANAL/FORAMINA: Moderate intervertebral disc height loss from C5-C6 to C7-T1. No high-grade spinal canal narrowing. Moderate left and mild to moderate right neuroforaminal narrowing at C3-C4. Moderate bilateral neuroforaminal narrowing at C4-C5. Moderate left and mild to moderate right neuroforaminal narrowing at C5-C6.    PARASPINAL: No extraspinal abnormality. Visualized lung fields are clear.    CONCLUSION:  HEAD CT:  1.  Moderate soft tissue swelling over the left frontoparietal scalp. No underlying skull fractures.  2.  No intracranial hemorrhage, mass lesions, hydrocephalus or CT evidence for an acute infarct.    CERVICAL SPINE CT:  1.  No CT evidence for acute fracture or post traumatic  subluxation.      ______________________________________________________________________     Interface, In Rad Results - 01/14/2019 11:50 AM CST  EXAM: XR SHOULDER LT AP/Y/AXILLARY  LOCATION: [facility]  DATE/TIME: 1/14/2019 11:44 AM    INDICATION: Left shoulder pain after fall.  COMPARISON: None.    FINDINGS: No fracture or dislocation. Mild glenohumeral and acromioclavicular joint degenerative change. There is mild subacromial narrowing with a lateral downsloping acromion. Normal soft tissue.   ______________________________________________________________________    Assessment:    1. Acute pain of left shoulder    2. Fall from ladder, initial encounter    3. Essential hypertension    4. Traumatic injury of head, initial encounter      ______________________________________________________________________     PHQ-2 Total Score: 0 (1/15/2019  4:00 PM)    No Data Recorded  ______________________________________________________________________    Plan:    1. Given exercises for range of motion in the shoulder.  2. He is to notify me if he is not improving.  We can get him back into be seen.  3. We can consider MRI if he is not making appropriate improvement.  4. His blood pressure is doing okay at this time and he has had no recent symptoms of A. fib.  5. Head trauma shows hematoma on the front of the scalp but no cognitive changes.  His wife is an RN and would know if things are changing on him.  She says he is doing well.    Onur Rod MD  General Internal Medicine  [facility]     Return if symptoms worsen or fail to improve.     No future appointments.      ______________________________________________________________________     Relevant ICD-10 codes and order associations:      ICD-10-CM    1. Acute pain of left shoulder M25.512    2. Fall from ladder, initial encounter W11.XXXA    3. Essential hypertension I10    4. Traumatic injury of head, initial encounter S09.90XA

## 2021-06-27 NOTE — PROGRESS NOTES
Progress Notes by Onur Rod MD at 4/15/2019 10:05 AM     Author: Onur Rod MD Service: -- Author Type: Physician    Filed: 4/17/2019 11:02 AM Encounter Date: 4/15/2019 Status: Signed    : Onur Rod MD (Physician)            [facility]    Comprehensive and complex medical care - Chronic disease management - Shared decision making - Care coordination - Compassionate care    Patient advocacy - Rational deprescribing - Minimally disruptive medicine - Ethical focus - Customized care    ______________________________________________________________________     Date of Service: 4/15/2019  Primary Provider: Onur Rod MD    Patient Care Team:  Onur Rod MD as PCP - General (Internal Medicine)  Liborio Londono MD as Physician (Orthopedic Surgery)  Stuart Jackson MD as Physician (Orthopedic Surgery)     ______________________________________________________________________     Patient's Pharmacy:    [facility] [id number] [facility] - [place], [place] - [address] [facility]  [address] [facility] [address]  Phone: [phone] Fax: [phone]    [facility] - [place], [place] - [address] AT Portal to Registered [facility] Sites  [address]  [facility] [address]  Phone: [phone] Fax: [phone]     Patient's Contacts:  Name Home Phone Work Phone Mobile Phone Relationship Lgl KYLER Wilson   [phone] Spouse    MATILDE SILVER   [phone] Child      Patient's Insurance:    Payor: MEDICARE / Plan: MEDICARE A AND B / Product Type: Medicare /     ______________________________________________________________________     Preoperative examination    Mike Gonzalez is a 68 y.o. male (1950) who I am asked to see by his surgeon regarding his fitness for upcoming surgery.      Chief Complaint   Patient presents with   ? Pre-op Exam     5/1/19, Dr. Fair, left shoulder replacement, [facility]      ______________________________________________________________________     History of present illness:    Patient comes in for preop evaluation prior to surgery as noted above.    Initial fall and injury to the left shoulder was in 1/14/19.  MRI scan showed multiple findings.  Will be having a reverse shoulder replacement in relationship to this.  Still has limited function in the shoulder.    Reviewed his hypertension today.  Blood pressure has been in the goal range.  Denies any excessive dizziness from the medication with this.  Not on medication for this.    Has had aflutter in the past (2012) and this seems to be present again on electrocardiogram (EKG).  No symptoms related to this.  No exertional shortness of breath.    His concussion is doing well and headaches and confusion have improved markedly.  MRI scan done showed no concerning findings as well as initial CT scan.    We reviewed his other issues noted in the assessment but not specifically addressed in the HPI above.   ______________________________________________________________________     Patient Active Problem List   Diagnosis   ? HTN (hypertension)   ? GERD (gastroesophageal reflux disease)   ? Chronic neck pain   ? Nonsmoker   ? Basal cell carcinoma   ? Full code status   ? Branch retinal vein occlusion of left eye   ? Normal colonoscopy - 2016 - Average risk   ? Complete tear of left rotator cuff   ? Obesity (BMI 35.0-39.9) with comorbidity (H)   ? Atrial flutter, unspecified type (H)     Past Surgical History:   Procedure Laterality Date   ? CERVICAL SPINE SURGERY      C8, T1, foraminotomy   ? UMBILICAL HERNIA REPAIR       ×2      Social History     Socioeconomic History   ? Marital status:      Spouse name: None   ? Number of children: None   ? Years of education: None   ? Highest education level: None   Occupational History     Employer: RETIRED     Comment: 3M   Social Needs   ? Financial resource strain: None   ? Food  insecurity:     Worry: None     Inability: None   ? Transportation needs:     Medical: None     Non-medical: None   Tobacco Use   ? Smoking status: Never Smoker   ? Smokeless tobacco: Never Used   Substance and Sexual Activity   ? Alcohol use: Yes     Comment: About once a year.   ? Drug use: No   ? Sexual activity: None   Lifestyle   ? Physical activity:     Days per week: None     Minutes per session: None   ? Stress: None   Relationships   ? Social connections:     Talks on phone: None     Gets together: None     Attends Sikh service: None     Active member of club or organization: None     Attends meetings of clubs or organizations: None     Relationship status: None   ? Intimate partner violence:     Fear of current or ex partner: None     Emotionally abused: None     Physically abused: None     Forced sexual activity: None   Other Topics Concern   ? None   Social History Narrative    Patient of Dr. Rod since 2001.        .  Wife is a former RN.         Wife's cousin is Dr. Jean Pierre Champagne, ID specialist.      Family History   Problem Relation Age of Onset   ? Arthritis Mother    ? Other Mother         psychiatry/pvd   ? Other Father         blood reaction      Family history is noncontributory otherwise.    Allergies: Hydrochlorothiazide; Hydrocodone; Oxycontin [oxycodone]; and Sulfa (sulfonamide antibiotics)     Current medications:  Current Outpatient Medications   Medication Sig Note   ? aspirin 81 MG EC tablet Take 81 mg by mouth. 2/22/2016: Received from: [facility]   ? fluticasone (FLONASE) 50 mcg/actuation nasal spray 2 sprays into each nostril daily.        Surgery specific questions:    Anesthesia/family history of complications: No  History of abnormal bleeding: No  Can patient walk a flight of stairs without stopping: Yes    Review of systems:    On ROS, the patient denies any chest pain or pressure.  No shortness of breath.  "  ______________________________________________________________________     Exam:    Wt Readings from Last 3 Encounters:   04/15/19 (!) 260 lb (117.9 kg)   01/15/19 (!) 257 lb (116.6 kg)   06/25/18 (!) 258 lb (117 kg)     BP Readings from Last 3 Encounters:   04/15/19 138/82   02/05/19 134/72   01/15/19 136/84     /82   Pulse 81   Temp 97.8  F (36.6  C) (Oral)   Ht 5' 11\" (1.803 m)   Wt (!) 260 lb (117.9 kg)   SpO2 98%   BMI 36.26 kg/m     Patient is in no acute distress.  Mood good.  Insight good.  Eyes nonicteric.  Pupils equal.  Ears clear.  Nose clear.  Throat clear.  Neck is supple.  No cervical adenopathy.  No thyromegaly.  Heart is regular rate and rhythm.  Lungs clear to auscultation bilaterally.  Respiratory effort is good.  Abdomen is soft, nontender, no hepatosplenomegaly.  Extremities no edema. Left shoulder with pain with active range of motion (AROM).    ______________________________________________________________________    Diagnostics:    Results for orders placed or performed in visit on 04/15/19   HM2(CBC w/o Differential)   Result Value Ref Range    WBC 7.7 4.0 - 11.0 thou/uL    RBC 5.10 4.40 - 6.20 mill/uL    Hemoglobin 16.4 14.0 - 18.0 g/dL    Hematocrit 47.3 40.0 - 54.0 %    MCV 93 80 - 100 fL    MCH 32.2 27.0 - 34.0 pg    MCHC 34.8 32.0 - 36.0 g/dL    RDW 11.9 11.0 - 14.5 %    Platelets 275 140 - 440 thou/uL    MPV 7.8 7.0 - 10.0 fL   Basic Metabolic Panel   Result Value Ref Range    Sodium 142 136 - 145 mmol/L    Potassium 4.6 3.5 - 5.0 mmol/L    Chloride 104 98 - 107 mmol/L    CO2 30 22 - 31 mmol/L    Anion Gap, Calculation 8 5 - 18 mmol/L    Glucose 91 70 - 125 mg/dL    Calcium 9.7 8.5 - 10.5 mg/dL    BUN 20 8 - 22 mg/dL    Creatinine 0.85 0.70 - 1.30 mg/dL    GFR MDRD Af Amer >60 >60 mL/min/1.73m2    GFR MDRD Non Af Amer >60 >60 mL/min/1.73m2   Magnesium   Result Value Ref Range    Magnesium 2.1 1.8 - 2.6 mg/dL   Electrocardiogram Perform and Read   Result Value Ref Range    " SYSTOLIC BLOOD PRESSURE  mmHg    DIASTOLIC BLOOD PRESSURE  mmHg    VENTRICULAR RATE 79 BPM    ATRIAL RATE 83 BPM    P-R INTERVAL  ms    QRS DURATION 108 ms    Q-T INTERVAL 394 ms    QTC CALCULATION (BEZET) 451 ms    P Axis  degrees    R AXIS -86 degrees    T AXIS 49 degrees    MUSE DIAGNOSIS       Atrial fibrillation  Left axis deviation  Low voltage QRS  Inferior infarct , age undetermined  Possible Anterolateral infarct , age undetermined  Abnormal ECG  No previous ECGs available  Confirmed by YUMIKO DE SANTIAGO MD LOC: ([id number]) on 4/16/2019 9:47:06 AM       Previous EKG in 2012 reviewed and no major new findings.        Previous MRI of the head and shoulder reviewed.  No intracranial defects of concern.  Available to review on [facility] ([facility]) website.    ______________________________________________________________________    CARDIAC ROUTINE [id number]/11/2011  [facility]  Result Narrative    Contrast:                    Contrast Allergy:    Clinical Indications:Atrial Flutter         CONCLUSION:    Atrial flutter during exam.      1. Low normal LV size and systolic function. LV EF 50%.     2. Normal RV size and function.     3. Mild to moderate LA dilatation.     4. No significant valve disease.      No previous echo for comparison.      Left Ventricular Ejection Fraction: 50 %      ______________________________________________________________________     Impression:    1. Preoperative cardiovascular examination    2. Chronic left shoulder pain    3. Normal colonoscopy - 2016 - Average risk    4. Morbid obesity (H)    5. Atrial flutter, unspecified type (H)    6. Complete tear of left rotator cuff    7. Essential hypertension    8. Chronic neck pain    9. Concussion without loss of consciousness, subsequent encounter      ______________________________________________________________________     PHQ-2 Total Score: 0 (4/15/2019 10:00 AM)    No data  recorded  ______________________________________________________________________     Recommendations:    1. Patient is okay to proceed with surgery as planned.  2. Check blood work today.  See relevant orders and diagnosis associations at the bottom of this note.   3. We discussed future anticoagulation with the patient after surgery, and his chadsvasc is 2 which confers a 2.2% yearly risk of stroke.  We will review this more at his upcoming postop visit.  4. Has recovered well from concussion.         Onur Rod MD  General Internal Medicine  [facility]     Personal office fax - [phone]   Voice mail - [phone]  E-mail - [email or web address]      Return in about 7 weeks (around 5/31/2019).     Future Appointments   Date Time Provider Department Center   5/31/2019  1:00 PM Onur Rod MD [facility]        ______________________________________________________________________     Relevant ICD-10 codes and order associations:      ICD-10-CM    1. Preoperative cardiovascular examination Z01.810 Electrocardiogram Perform and Read   2. Chronic left shoulder pain M25.512     G89.29    3. Normal colonoscopy - 2016 - Average risk Z98.890    4. Morbid obesity (H) E66.01    5. Atrial flutter, unspecified type (H) I48.92 HM2(CBC w/o Differential)     Basic Metabolic Panel     Magnesium   6. Complete tear of left rotator cuff M75.122    7. Essential hypertension I10    8. Chronic neck pain M54.2     G89.29    9. Concussion without loss of consciousness, subsequent encounter S06.0X0D

## 2021-06-28 LAB
BSA FOR ECHO PROCEDURE: 0 M2
CCTA ASCENDING AORTA: 3.4 CM
CCTA SINUS: 3.4 CM

## 2021-06-29 ENCOUNTER — COMMUNICATION - HEALTHEAST (OUTPATIENT)
Dept: INTERNAL MEDICINE | Facility: CLINIC | Age: 71
End: 2021-06-29

## 2021-06-29 NOTE — PROGRESS NOTES
Progress Notes by Onur Rod MD at 6/18/2020 11:10 AM     Author: Onur Rod MD Service: -- Author Type: Physician    Filed: 6/27/2020 10:28 PM Encounter Date: 6/18/2020 Status: Signed    : Onur Rod MD (Physician)          [facility]    Care coordination - Customized care -  Comprehensive and complex medical care            Date of Service: 6/18/2020  Primary Provider: Onur Rod MD    Patient Care Team:  Onur Rod MD as PCP - General (Internal Medicine)  Liborio Londono MD as Physician (Orthopedic Surgery)  Stuart Jackosn MD as Physician (Orthopedic Surgery)  Liborio Champagne MD (Dermatology)  [facility]  Onur Rod MD as Assigned PCP     ______________________________________________________________________     Patient's Pharmacy:      [facility] - [place], [place] - [address] AT Portal to [facility]  [address]  [facility] [address]  Phone: [phone] Fax: [phone]    [facility] [address] [place] [address] [address] [facility]  [address] [facility] [address]  Phone: [phone] Fax: [phone]     Patient's Contacts:  Name Home Phone Work Phone Mobile Phone Relationship Lgl KYLER Wilson   [phone] Spouse    MATILDE SILVER   [phone] Child        Patient's Insurance:    Payor: MEDICARE / Plan: MEDICARE A AND B / Product Type: Medicare /            Mike Gonzalez is a 69 y.o. male who comes in today for:    Chief Complaint   Patient presents with   ? Toe Pain     left big toe x 4 weeks after kicking stone   ? Follow-up     shoulder, breathing issues at night after had surgery on nose       Active Problem List:  Problem List as of 6/18/2020 Reviewed: 5/10/2020  9:27 PM by Onur Rod MD       High    Full code status    Nonsmoker    Atrial flutter, chronic (H)       Medium    HTN (hypertension)    Chronic neck pain        Low    Basal cell carcinoma    GERD (gastroesophageal reflux disease)       Unprioritized    Anticoagulant drug declined    Branch retinal vein occlusion of left eye    Complete tear of left rotator cuff    Normal colonoscopy - 2016 - Average risk    Obesity (BMI 35.0-39.9) with comorbidity (H)           Current Outpatient Medications   Medication Sig Note   ? amoxicillin (AMOXIL) 500 MG capsule Take 4 capsules by mouth once as needed. Dental work    ? aspirin 81 MG EC tablet Take 81 mg by mouth. 2/22/2016: Received from: [facility]   ? cyclobenzaprine (FLEXERIL) 10 MG tablet Take 1 tablet (10 mg total) by mouth 3 (three) times a day as needed for muscle spasms (back pain).    ? fluticasone propionate (FLONASE) 50 mcg/actuation nasal spray 2 sprays into each nostril daily.    ? cephalexin (KEFLEX) 500 MG capsule Take 1 capsule (500 mg total) by mouth 3 (three) times a day for 10 days.        Social History     Social History Narrative    Patient of Dr. Rod since 2001.        .  Wife is a former RN.         Wife's cousin is Dr. Jean Pierre Champagne, ID specialist.       Subjective:     Patient comes in today for a number of issues.    We first want to review with a toe problem.  This is in his left great toe.  He had his toe rubbing against a tennis shoe.  Started getting sore.  It started getting red.  Is been going on for about 4 weeks.  It does not seem to be getting better.  He would like to have this evaluated and treated if possible.  He has not noticed any pustular drainage from the nailbed.  The nail that has been inflamed.    We reviewed his left shoulder pain.  He has had issues since a shoulder surgery with Dr. Perla Staley.  He recently had protein which injected into the seem to help it quite a bit.  He is much happier with how it is doing.  He is continuing to work with physical therapy at [facility] related to this.    He has had some lateral hip pain.  This is in both hips.  It is worse when going  up hills.  He has had issues with his low back and this is gone out on him as well.  Her left hip is worse than the right.  It takes about 15 to 30 minutes in the morning to loosen up.  He does have stiffness in the hips in the morning.  He did have an injection for this possibly as well.    We reviewed his right nostril and some breathing issues.  He has had some breathing issues ever since he had a basal cell removed from the nose and reconstruction by plastic surgery.  He is waiting to get back to the plastic surgeon to review this.  He is noticing some issues with breathing at night at 2:58 AM in the morning.  He feels a little short of breath and some tightness in the chest.  Mucinex seems to help it.  He does get mucus in the nose.  He has not had any coughing.    He has had some chronic atrial flutter and we reviewed this as well today.    We reviewed his other issues noted in the assessment but not specifically addressed in the HPI above.     On review of systems, the patient denies any chest pain or shortness of breath.    Objective:     Wt Readings from Last 3 Encounters:   06/18/20 (!) 259 lb (117.5 kg)   06/03/19 (!) 260 lb (117.9 kg)   04/15/19 (!) 260 lb (117.9 kg)       BP Readings from Last 3 Encounters:   06/18/20 134/86   05/10/20 132/86   06/03/19 132/86       /86   Pulse 82   Wt (!) 259 lb (117.5 kg)   SpO2 97%   BMI 36.12 kg/m     The patient is comfortable, no acute distress.  Mood good.  Insight good.  Eyes are nonicteric.  His nose shows no severe restriction in airflow.  Neck is supple without mass.  No cervical adenopathy.  No thyromegaly. Heart irregular rate and rhythm.  Lungs clear to auscultation bilaterally.  Respiratory effort is good.  Abdomen soft and nontender.  No hepatosplenomegaly.  Extremities no edema.  No jugulovenous distention.  The great toe shows mild cellulitis likely present in the toe.    Diagnostics:     Results for orders placed or performed in visit on  06/18/20   Comprehensive Metabolic Panel   Result Value Ref Range    Sodium 141 136 - 145 mmol/L    Potassium 4.4 3.5 - 5.0 mmol/L    Chloride 104 98 - 107 mmol/L    CO2 27 22 - 31 mmol/L    Anion Gap, Calculation 10 5 - 18 mmol/L    Glucose 81 70 - 125 mg/dL    BUN 15 8 - 28 mg/dL    Creatinine 0.88 0.70 - 1.30 mg/dL    GFR MDRD Af Amer >60 >60 mL/min/1.73m2    GFR MDRD Non Af Amer >60 >60 mL/min/1.73m2    Bilirubin, Total 0.7 0.0 - 1.0 mg/dL    Calcium 9.6 8.5 - 10.5 mg/dL    Protein, Total 7.0 6.0 - 8.0 g/dL    Albumin 4.2 3.5 - 5.0 g/dL    Alkaline Phosphatase 90 45 - 120 U/L    AST 16 0 - 40 U/L    ALT 15 0 - 45 U/L   Sedimentation Rate   Result Value Ref Range    Sed Rate 7 0 - 15 mm/hr   C-Reactive Protein(CRP)   Result Value Ref Range    CRP 0.4 0.0 - 0.8 mg/dL   BNP(B-type Natriuretic Peptide)   Result Value Ref Range     (H) 0 - 67 pg/mL   Troponin I   Result Value Ref Range    Troponin I 0.02 0.00 - 0.29 ng/mL   HM1 (CBC with Diff)   Result Value Ref Range    WBC 7.5 4.0 - 11.0 thou/uL    RBC 5.00 4.40 - 6.20 mill/uL    Hemoglobin 15.8 14.0 - 18.0 g/dL    Hematocrit 45.9 40.0 - 54.0 %    MCV 92 80 - 100 fL    MCH 31.5 27.0 - 34.0 pg    MCHC 34.4 32.0 - 36.0 g/dL    RDW 11.5 11.0 - 14.5 %    Platelets 265 140 - 440 thou/uL    MPV 8.5 7.0 - 10.0 fL    Neutrophils % 61 50 - 70 %    Lymphocytes % 28 20 - 40 %    Monocytes % 9 2 - 10 %    Eosinophils % 3 0 - 6 %    Basophils % 1 0 - 2 %    Neutrophils Absolute 4.5 2.0 - 7.7 thou/uL    Lymphocytes Absolute 2.1 0.8 - 4.4 thou/uL    Monocytes Absolute 0.6 0.0 - 0.9 thou/uL    Eosinophils Absolute 0.2 0.0 - 0.4 thou/uL    Basophils Absolute 0.1 0.0 - 0.2 thou/uL     ______________________________________________________________________    Pertinent radiology for this visit includes the following:    XR Chest 2 Views  EXAM DATE:         06/18/2020    EXAM: X-RAY CHEST, 2 VIEWS, FRONTAL AND LATERAL  LOCATION: [facility]  DATE/TIME: 6/18/2020  12:30 PM    INDICATION: Shortness of breath  COMPARISON: 10/11/2012.    IMPRESSION:  No acute abnormality.    No focal pulmonary infiltrate, pleural effusion or pneumothorax. Interval  postoperative changes involving the left shoulder. Hypertrophic degenerative  change involving the spine. Chest otherwise negative.      ______________________________________________________________________      Assessment:     1. Cellulitis of left toe    2. SOB (shortness of breath)    3. Chronic left shoulder pain    4. Bilateral hip pain        Quality review:     PHQ-2 Total Score: 0 (5/4/2020  8:00 AM)      No data recorded  ______________________________________________________________________     BMI Readings from Last 1 Encounters:   06/18/20 36.12 kg/m        Plan:     1. Treat infection with Keflex.  2. Blood work done today.  3. The toenail may be part of the issue and he should continue to soak the toe.  He could see Dr. Sims at [facility] for this if needed.  4. I advised potentially further work-up for any shortness of breath.  He wants to pursue the nostril issue at this point.  5. Cannot rule out underlying heart or lung issue.  6. Chest x-ray (CXR) done today.       Onur Rod MD  General Internal Medicine  [facility]       Return in about 6 months (around 12/18/2020), or if symptoms worsen or fail to improve, for follow up visit.     No future appointments.      ______________________________________________________________________     Relevant ICD-10 codes and order associations:      ICD-10-CM    1. Cellulitis of left toe  L03.032 cephalexin (KEFLEX) 500 MG capsule   2. SOB (shortness of breath)  R06.02 HM1(CBC and Differential)     Comprehensive Metabolic Panel     Sedimentation Rate     C-Reactive Protein(CRP)     BNP(B-type Natriuretic Peptide)     Troponin I     XR Chest 2 Views     HM1 (CBC with Diff)   3. Chronic left shoulder pain  M25.512     G89.29    4. Bilateral  hip pain  M25.551     M25.550

## 2021-06-29 NOTE — PROGRESS NOTES
"Progress Notes by Onur Rod MD at 5/4/2020  8:30 AM     Author: Onur Rod MD Service: -- Author Type: Physician    Filed: 5/10/2020  9:33 PM Encounter Date: 5/4/2020 Status: Signed    : Onur Rod MD (Physician)              [facility]     VIDEO VISIT     Comprehensive and complex medical care - Chronic disease management - Shared decision making - Care coordination - Compassionate care    Patient advocacy - Rational deprescribing - Minimally disruptive medicine - Ethical focus - Customized care         Mike Gonzalez is a 69 y.o. male who is being evaluated via a billable video visit.      The patient has been notified of following:     \"This video visit will be conducted via a call between you and your physician/provider. We have found that certain health care needs can be provided without the need for an in-person physical exam.  This service lets us provide the care you need with a video conversation.  If a prescription is necessary we can send it directly to your pharmacy.  If lab work is needed we can place an order for that and you can then stop by our lab to have the test done at a later time.    Video visits are billed at different rates depending on your insurance coverage. Please reach out to your insurance provider with any questions.    If during the course of the call the physician/provider feels a video visit is not appropriate, you will not be charged for this service.\"         Active Problem List:  Problem List as of 5/4/2020 Reviewed: 6/4/2019 10:50 PM by Onur Rod MD       High    Full code status    Nonsmoker    Atrial flutter, chronic (H)       Medium    HTN (hypertension)    Chronic neck pain       Low    Basal cell carcinoma    GERD (gastroesophageal reflux disease)       Unprioritized    Anticoagulant drug declined    Branch retinal vein occlusion of left eye    Complete tear of left rotator cuff    Normal " colonoscopy - 2016 - Average risk    Obesity (BMI 35.0-39.9) with comorbidity (H)           Current Outpatient Medications   Medication Sig Note   ? amoxicillin (AMOXIL) 500 MG capsule Take 4 capsules by mouth once as needed. Dental work    ? aspirin 81 MG EC tablet Take 81 mg by mouth. 2/22/2016: Received from: [facility]   ? cyclobenzaprine (FLEXERIL) 10 MG tablet Take 1 tablet (10 mg total) by mouth 3 (three) times a day as needed for muscle spasms (back pain).    ? fluticasone propionate (FLONASE) 50 mcg/actuation nasal spray 2 sprays into each nostril daily.        Subjective:     Mike Gonzalez presents with:      Chief Complaint   Patient presents with   ? Follow-up     aflutter   ? back went out     a few weeks ago still hurts can get flexril       The patient has a video visit today which is done in light of the current coronavirus outbreak.  Patient has given consent to perform a video visit.    Patient again had a video chat today.  His wife was in the background.    We reviewed his left shoulder.  He continues to have pain with this.  It is not getting worse but not getting better overall.  He has followed up with Dr. Butterfield related to this.  He has seen Dr. Tommy Gonzales for this.  He has had some injections of PRP in this area.  He has had some pain over the acromion as well.  He is able to use the shoulder but has some pain with abduction.    Reviewed his basal cell carcinoma of the left nasal polyp.  He had resection of this with reconstructive surgery on this.  It is doing better.  He is happy overall with the outcome.  He saw Dr. Champagne in dermatology for this.    We reviewed his back pain.  He has had some back pain in the last week.  It is mainly in the center and slightly goes to the left.  It does not radiate down his legs.  It was worse with sitting down yesterday.  He denies any numbness down his legs.  He would like some Flexeril if possible to manage this as it is helped him in the  past when he has had neck or back issues.    He is also had some pain in the left lateral hip.  This has been worse as of late.  He is going through physical therapy at [facility] with Jayant Kennedy.    We reviewed his history of heart issues and he does not want to change anything related to this at this point.    We reviewed his other issues noted in the assessment but not specifically addressed in the HPI above.     On ROS, the patient denies any chest pain or pressure.  No shortness of breath.     Objective:     Exam reveals:  Patient in no distress.  Mood is good.  Insight is good.  No obvious shortness of breath.    Diagnostics:     Results for orders placed or performed in visit on 11/13/19   Vitamin D, Total (25-Hydroxy)   Result Value Ref Range    Vitamin D, Total (25-Hydroxy) 34.3 30.0 - 80.0 ng/mL       Quality review:     PHQ-2 Total Score: 0 (5/4/2020  8:00 AM)      No data recorded  ______________________________________________________________________     BMI Readings from Last 1 Encounters:   06/03/19 36.26 kg/m        Assessment and Plan:     1. Chronic rhinitis  Refilled to concern.    - fluticasone propionate (FLONASE) 50 mcg/actuation nasal spray; 2 sprays into each nostril daily.  Dispense: 52 g; Refill: 2    2. Acute left-sided low back pain without sciatica  Use of Flexeril and follow-up sooner if issues.    - cyclobenzaprine (FLEXERIL) 10 MG tablet; Take 1 tablet (10 mg total) by mouth 3 (three) times a day as needed for muscle spasms (back pain).  Dispense: 40 tablet; Refill: 3    3. Essential hypertension  Continue to monitor off of medication.  This has been doing well.    4. Chronic left shoulder pain  Follow-up with orthopedics.    5. Obesity (BMI 35.0-39.9) with comorbidity (H)  Work on weight loss.    6. Atrial flutter, chronic (H)  Has declined anticoagulation in the past.  Consider follow-up evaluation in the future.    7. Basal cell carcinoma of skin of nose  Follow-up with  dermatology.    8. Hip pain, left  Suspect trochanteric bursitis.  Follow-up sooner if issues.       Video visit duration: 14 minutes    Return in about 1 year (around 5/4/2021), or if symptoms worsen or fail to improve, for annual wellness visit.     No future appointments.         Onur Rod MD  General Internal Medicine  [facility]    Personal office fax - [phone]   Voice mail - [phone]  E-mail - [email or web address]     HCC issues resolved at this visit.  ______________________________________________________________________     Video visit details    Video invitation sent by: Send to e-mail at: [email or web address]    Patient location:  Home    Provider location: [facility]    Mode of Communication:  FACETIME

## 2021-06-30 NOTE — PROGRESS NOTES
Progress Notes by Onur Rod MD at 2021  9:55 AM     Author: Onur Rod MD Service: -- Author Type: Physician    Filed: 2021 11:03 PM Encounter Date: 2021 Status: Signed    : Onur Rod MD (Physician)              [facility]    Comprehensive and complex medical care - Chronic disease management - Shared decision making - Care coordination - Compassionate care    Patient advocacy - Rational deprescribing - Minimally disruptive medicine - Ethical focus - Customized care          Date of Service: 2021  Primary Provider: Onur Rod MD    Patient Care Team:  Onur Rod MD as PCP - General (Internal Medicine)  Liborio Londono MD as Physician (Orthopedic Surgery)  Stuart Jackson MD as Physician (Orthopedic Surgery)  Liborio Champagne MD (Dermatology)  [facility]  Onur Rod MD as Assigned PCP  Campos Joel MD as Assigned Heart and Vascular Provider     ______________________________________________________________________     Patient's Pharmacy:    [facility] - [place], [place] - [address] AT [address] [facility]  [address]  [facility] [address]  Phone: [phone] Fax: [phone]    [facility] [address] [place] [address] [address] [facility]  [address] [place] [address]  Phone: [phone] Fax: [phone]     Patient's Contacts:  Name Home Phone Work Phone Mobile Phone Relationship Lgl KYLER Wilson [phone] Spouse    MATILDE SILVER   [phone] Child      Patient's Insurance:    Payor/Plan Subscr  Sex Relation Sub. Ins. ID Effective Group Num   1. BLUE CROSS - * ZAIDA CISNEROS 1950 Male  [id number]* Not Eff -[id number]                                   [address] [address]   2. [place]* ZAIDA CISNEROS 1950 Male Self [id number]                                    [place], [address]            Mike Gonzalez is a 70 y.o. male who comes in today for:    Chief Complaint   Patient presents with   ? Follow-up     HEART ISSUES   ? Test Results     ANGIOGRAM       Active Problem List:  Problem List as of 2/1/2021 Reviewed: 12/4/2020  8:17 AM by Onur Rod MD       High    Full code status    Nonsmoker    Atrial flutter, chronic (H)       Medium    Chronic neck pain    Severe hypertension       Low    Basal cell carcinoma    GERD (gastroesophageal reflux disease)       Unprioritized    Anticoagulant drug declined    Branch retinal vein occlusion of left eye    Complete tear of left rotator cuff    Ischemic cardiomyopathy    Normal colonoscopy - 2016 - Average risk    Obesity (BMI 35.0-39.9) with comorbidity (H)           Current Outpatient Medications   Medication Sig Note   ? aspirin 81 MG EC tablet Take 81 mg by mouth. 2/22/2016: Received from: [facility]   ? atorvastatin (LIPITOR) 40 MG tablet Take 1 tablet (40 mg total) by mouth at bedtime.    ? fluticasone propionate (FLONASE) 50 mcg/actuation nasal spray USE 2 SPRAYS IN EACH       NOSTRIL DAILY    ? lisinopriL (PRINIVIL,ZESTRIL) 5 MG tablet Take 1 tablet (5 mg total) by mouth daily.    ? metoprolol succinate (TOPROL XL) 25 MG Take 1 tablet (25 mg total) by mouth daily.    ? naproxen sodium (ALEVE) 220 MG tablet Take 220 mg by mouth 2 (two) times a day with meals.        Social History     Social History Narrative    Patient of Dr. Rod since 2001.        .  Wife is a former RN.         Wife's cousin is Dr. Jean Pierre Champagne, ID specialist.       Subjective:     Reviewed a number of issues today.    Left hip pain is bothering him constantly.  Worse with laying.  MRI scan reviewed.  Did see Dr. Liborio Londono and really did not get good feedback on this.  I suspect that this is due to the trochanteric bursa area and he might see his sports medicine person for this.    Reviewed his atrial flutter and cardiomyopathy.  His angiogram was  very good overall.  No major blockage.  Has follow up with Dr. Ibrahim and we discussed follow up echocardiogram in the future on the medications.    Has had some rash in the recent past on the naproxen (Aleve) like he used to have with the ibuprofen (Advil).    Right knee pain has also been bothering more lately.  More with direct pressure on the kneecap.  Did have a bone chip in his knee which was never addressed in the remote past.    Still has some upper abdomen discomfort off and on.  Again since November.  Upper endoscopy (EGD) in 2016 normal and CT scan in 2018 normal.    Following up with orthopedics for continued issues with the left shoulder.    We reviewed his other issues noted in the assessment but not specifically addressed in the HPI above.     Objective:     Wt Readings from Last 3 Encounters:   02/01/21 (!) 257 lb (116.6 kg)   01/20/21 (!) 248 lb (112.5 kg)   01/07/21 (!) 255 lb 14.4 oz (116.1 kg)     BP Readings from Last 3 Encounters:   02/01/21 130/70   01/20/21 128/65   01/07/21 138/90     /70   Pulse 69   Wt (!) 257 lb (116.6 kg)   SpO2 98%   BMI 35.84 kg/m     The patient is comfortable, no acute distress.  Mood good.  Insight good.  Eyes are nonicteric.  Neck is supple without mass.  No cervical adenopathy.  No thyromegaly. Heart regular rate and rhythm.  Lungs clear to auscultation bilaterally.  Respiratory effort is good.   Extremities no edema.      Diagnostics:     Results for orders placed or performed during the hospital encounter of 01/20/21   Basic Metabolic Panel   Result Value Ref Range    Sodium 141 136 - 145 mmol/L    Potassium 4.2 3.5 - 5.0 mmol/L    Chloride 105 98 - 107 mmol/L    CO2 26 22 - 31 mmol/L    Anion Gap, Calculation 10 5 - 18 mmol/L    Glucose 110 70 - 125 mg/dL    Calcium 8.6 8.5 - 10.5 mg/dL    BUN 16 8 - 28 mg/dL    Creatinine 0.99 0.70 - 1.30 mg/dL    GFR MDRD Af Amer >60 >60 mL/min/1.73m2    GFR MDRD Non Af Amer >60 >60 mL/min/1.73m2   Lipid Profile    Result Value Ref Range    Triglycerides 65 <=149 mg/dL    Cholesterol 165 <=199 mg/dL    LDL Calculated 112 <=129 mg/dL    HDL Cholesterol 40 >=40 mg/dL    Patient Fasting > 8hrs? Yes    HM1 (CBC with Diff)   Result Value Ref Range    WBC 7.7 4.0 - 11.0 thou/uL    RBC 4.98 4.40 - 6.20 mill/uL    Hemoglobin 15.3 14.0 - 18.0 g/dL    Hematocrit 44.9 40.0 - 54.0 %    MCV 90 80 - 100 fL    MCH 30.7 27.0 - 34.0 pg    MCHC 34.1 32.0 - 36.0 g/dL    RDW 13.4 11.0 - 14.5 %    Platelets 291 140 - 440 thou/uL    MPV 9.7 8.5 - 12.5 fL    Neutrophils % 65 50 - 70 %    Lymphocytes % 23 20 - 40 %    Monocytes % 9 2 - 10 %    Eosinophils % 3 0 - 6 %    Basophils % 1 0 - 2 %    Immature Granulocyte % 0 <=0 %    Neutrophils Absolute 5.0 2.0 - 7.7 thou/uL    Lymphocytes Absolute 1.8 0.8 - 4.4 thou/uL    Monocytes Absolute 0.7 0.0 - 0.9 thou/uL    Eosinophils Absolute 0.2 0.0 - 0.4 thou/uL    Basophils Absolute 0.1 0.0 - 0.2 thou/uL    Immature Granulocyte Absolute 0.0 <=0.0 thou/uL   Immunofixation Electrophoresis, Serum   Result Value Ref Range    Immunofixation Electrophoresis, Serum Unremarkable immunofixation electrophoresis.       Path ICD: I25.5     Interpreted By: Jennifer Trujillo MD    Electrocardiogram, 12-lead   Result Value Ref Range    SYSTOLIC BLOOD PRESSURE      DIASTOLIC BLOOD PRESSURE      VENTRICULAR RATE 66 BPM    ATRIAL RATE 78 BPM    P-R INTERVAL      QRS DURATION 118 ms    Q-T INTERVAL 420 ms    QTC CALCULATION (BEZET) 440 ms    P Axis      R AXIS -80 degrees    T AXIS 60 degrees    MUSE DIAGNOSIS       Accelerated Junctional rhythm with occasional Premature ventricular complexes  Left anterior fascicular block  Anterolateral infarct (cited on or before 15-APR-2019)  Abnormal ECG  When compared with ECG of 15-APR-2019 10:04,  Junctional rhythm has replaced Atrial fibrillation  Premature ventricular complexes are now Present    Confirmed by NELY SOFIA MD LOC:Z[facility]AR ([id number]) on 1/20/2021 1:34:25 PM       EXAM  DATE:         2018     [facility]  CT ABDOMEN, PELVIS WITH CONTRAST  2018 5:45 PM     INDICATION: RIGHT LEG RADICULOPATHY.  BLOATING AND GAS PAIN AFTER MEALS  TECHNIQUE: CT abdomen and pelvis. Multiplanar reformation images (MPR). Dose  reduction techniques were used.  IV CONTRAST: 100ml IV Isovue 370 administered. SPR I-Stat Cr=0.9mg/dl, eGFR=84.  COMPARISON: CT abdomen and pelvis 06/10/2016     FINDINGS:  LUNG BASES: Negative.     ABDOMEN: Diffuse fatty infiltration of the liver. The spleen, pancreas, adrenal  glands, gallbladder, and the left kidney are normal. 2.7 cm right renal cyst. No  adenopathy.     PELVIS: No pelvic adenopathy or ascites. Small wall and colon unremarkable.     MUSCULOSKELETAL: Advanced degenerative disc and facet disease L1-L5 levels.  Bridging osteophyte anterior right SI joint.     CONCLUSION:  1.  Advanced degenerative disc and facet disease lumbar spine likely accounts  for the patient's right leg radiculopathy. Please refer to lumbar spine MRI  2018 for further discussion.  2.  Right renal cyst.  3.  Fatty liver.  Coronary Findings    Diagnostic  Dominance: Right    Cardiac Catheterization  Order# [id number]  Reading physician: Willow Wellington MD Ordering physician: Campos Joel MD Study date: 21   Patient Information    Patient Name   Mike Gonzalez MRN   [id number] Sex   Male  1   1950 (70 y.o.)   Physicians    Panel Physicians Referring Physician Case Authorizing Physician   Willow Wellington MD (Primary) Campos Joel MD Johnson, Thomas H, MD    PCP    Primary Care Provider   Onur Rod MD   Phone: [phone]      Procedures    Coronary Angiogram   Left Heart Catheterization Without Left Ventriculogram   Panel Information    Panel 1    Provider Role   Willow Wellington MD Primary    Procedure Laterality Anesthesia   Coronary Angiogram N/A Conscious Sedation (RN)   630AM ADMIT, H&P-, TEACH-JOHANNA     Left Heart  Catheterization Without Left Ventriculogram Left Conscious Sedation (RN)         Indications    Ischemic cardiomyopathy [I25.5 (ICD-10-CM)]   Severe hypertension [I10 (ICD-10-CM)]    Procedure Status    Procedure scheduled as Elective (Outpatient).         Conclusion      New diagnosis of biventricular failure with LVH and severe biatrial enlargment and a stress test suggesting apical non-transmural infarct. Patient is morbidly obese with severe hypertension.    Mild coronary atherosclerosis.    LV EDP 20 mmHg.    Estimated blood loss was <20 ml.      Recommendations    ? Arterial sheath removed from radial artery with TR Band placement.  ? Recommended follow up with Dr. Joel in 6 weeks.  ? Continue high dose statin therapy indefinitely.  ? Risk factor management for atherosclerosis.  ? Recommend ace inhibitor and diuretic therapy for CHF.  ? Start labs to rule out amyloidosis given the LVH and severe bi-atrial enlargement.  ? Follow up visit with Nurse Practitioner in 1-2 weeks.     NM MPI with Lexiscan  Order# [id number]  Reading physician: Kiran Tyler MD Ordering physician: Onur Rod MD Study date: 21   Patient Information    Patient Name   Mike Gonzalez MRN   [id number] Sex   Male  1   1950 (70 y.o.)   EKG Scan   Stress Test Data - Scan on 21 1:33 PM by   Indications    SOB (shortness of breath)   Decreased cardiac ejection fraction   Conclusion       ?  The nuclear stress test is abnormal. There is no ischemia but there is a medium sized area of a moderate degree of nontransmural infarction in the distal anterior segment(s) of the left ventricle. This appears to be in the left anterior descending artery distribution.  ?  The stress electrocardiogram is negative for inducible ischemic EKG changes. Occasional PVCs were noted during stress and recovery.  ?  The left ventricle is dilated and the left ventricular ejection fraction at stress is mildly decreased at 44%.  ?   Low-to-intermediate risk of future ischemic events.  ?  There is no prior study for comparison.     EXAM: MR HIP WITHOUT CONTRAST LEFT  LOCATION: [facility]  DATE/TIME: 01/09/2021, 1:12 PM     INDICATION: Left lateral hip pain, worsening.  COMPARISON: MR pelvis and right hip 06/07/2018.  TECHNIQUE: Unenhanced.     FINDINGS:     LEFT HIP: Mild-moderate diffuse left hip chondrosis. No significant juxta-articular marrow edema or subchondral cyst formation. Small anterior acetabular labral tear suspected. No osteonecrosis or fracture. No effusion. No femoral neck stress fracture.   Ligamentum teres intact. No acetabular retroversion.     RIGHT HIP: Large field-of-view exam. No osteonecrosis or fracture. No effusion.     TENDONS:   Gluteal medius and minimus tendons intact. No tendinopathy. No trochanteric bursitis. Minor gluteal peritendinitis near the greater trochanters.  Mild bilateral hamstring origin tendinopathy without high-grade hamstring origin tear or distal tendon retraction.   No iliopsoas tendon tear or tendinopathy.     Rectus femoris and sartorius origins are intact. Adductor tendon origins are intact.     BONES: No occult lower lumbar spine, sacral, or pelvic fracture. No superior or inferior pubic ramus fracture. No acetabular fracture. Symmetric degenerative SI joints. Multilevel lumbar spondylosis with levorotatory scoliosis lower lumbar spine.     SOFT TISSUES: Intrinsic pelvic muscle bulk symmetric. Piriformis muscles normal and symmetric. No acute pelvic muscle edema. No inguinal adenopathy.     INTRA-PELVIC CONTENTS: No free fluid. No space-occupying mass. No adenopathy. Postop ventral abdominal wall surgical material.     IMPRESSION:   1.  Mild-moderate left hip chondrosis. Small anterior left acetabular labral tear.  2.  No evidence for occult lower lumbar spine, sacral, pelvic or proximal femoral fracture or femoral neck stress fracture. No femoral head AVN.  3.   Minimal gluteal peritendinitis bilaterally with mild gluteal insertional tendinopathy.  4.  Mild bilateral hamstring origin tendinopathy without significant tear.  5.  Levorotatory scoliosis lower lumbar spine with multilevel lumbar spondylosis.     EXAM DATE:         07/02/2018     [facility]  MRI LUMBAR SPINE W/O CONTRAST  7/2/2018 4:00 PM     INDICATION: Back pain with radiation into the right leg. Right leg numbness.  TECHNIQUE: Without IV contrast.  CONTRAST: None.  COMPARISON: None.     FINDINGS: Nomenclature is based on 5 lumbar-type vertebral bodies. Normal  vertebral body heights and marrow signal. Moderate levoscoliotic curve with apex  at the level of the L3-L4 interspace. Proximally 6 mm right lateral listhesis of  L2 on L3. No evidence of anterolisthesis or retrolisthesis at any lumbar  interspace. No pars defect. The conus tip is identified at L1. Moderate-sized  simple cyst in the interpolar region of the right kidney. No abdominal aortic  aneurysm. The visualized portions of the bony pelvis are normal for age.     T12-L1: Normal disc height and signal. No herniation. Mild facet arthropathy. No  spinal canal stenosis. No right neural foraminal stenosis. No left neural  foraminal stenosis.     L1-L2: Moderate loss of T2 disc signal and moderate loss of interspace height  with moderate adjacent mixed Modic type I and type II endplate degenerative  changes. There is moderate left-sided posterolateral and lateral osteophytic  interbody spurring resulting in mild narrowing of the left neural foramen. The  central spinal canal and right neural foramen are widely patent.     L2-L3: Moderate loss of T2 disc signal and moderate loss of interspace height  with moderate adjacent Modic type I and type II endplate changes. There is mild  dorsal osteophytic spurring eccentric to the left and mild facet arthropathy.  These changes cause mild left lateral recess stenosis and mild left-sided  neural  foraminal stenosis. The right neural foramen is widely patent.     L3-L4: Moderate loss of T2 disc signal and moderate to advanced loss of  interspace height with moderate adjacent Modic type II endplate changes. There  is moderate right lateral and posterolateral osteophytic interbody spurring. No  central spinal canal or neural foraminal stenosis.     L4-L5: Moderate loss of T2 disc signal and marked loss of interspace height with  moderate adjacent Modic type II endplate degenerative changes. Mild broad-based  dorsal osteophytic spurring slightly eccentric to the right and moderate facet  arthropathy. These changes cause mild central spinal canal stenosis and mild  right-sided neural foraminal stenosis.     L5-S1: Mild loss of T2 disc signal and moderate loss of interspace height most  notably along the left lateral margin of the interspace. There is left-sided  lateral and posterolateral disc osteophyte complex. Moderate facet arthropathy.  No central spinal canal stenosis. There is moderate left-sided neural foraminal  stenosis.     CONCLUSION:  1.  Moderate to advanced multilevel degenerative disc disease changes in the  lumbar spine.     2.  At L5-S1, there is moderate left-sided neural foraminal stenosis.     3.  At L4-L5, there is mild central spinal canal and mild right-sided neural  foraminal stenosis.     4.  At L2-L3, there is mild left lateral recess stenosis and mild left-sided  neural foraminal stenosis.     5.  At L1-L2, there is mild left-sided neural foraminal stenosis.    Quality review:     PHQ-2 Total Score: 1 (11/30/2020  9:00 AM)    No data recorded  ______________________________________________________________________     BMI Readings from Last 1 Encounters:   02/01/21 35.84 kg/m        Assessment and Plan:     1. Morbid obesity (H)  Work on weight loss.    2. Atrial flutter, chronic (H)  I suspect his reduced ejection fraction (EF)  Is likely related to this.  Lisinopril and  metoprolol should  Help with this.  Angiogram showed minimal disease.   Currently on atorvastatin (Lipitor) and aspirin as well.    3. Decreased cardiac ejection fraction  Follow up echocardiogram needed in the future.    4. Hip pain, left  Suspect due to trochanteric bursitis.  Offered corticosteroid injection.  Will follow up with Dr. Gonzales related to PRP injections.    5. Shoulder dislocation, left, initial encounter  Follow up Dr. Jackson.    6. Chronic pain of right knee  Consider x-ray or MRI scan in the future.    7. Allergic drug rash due to non-narcotic analgesic  Likely related to naproxen (Aleve).  Could consider nabumetone (Relafen) in the future if needed.    8. Epigastric pain  Chronic and intermittent - work-up negative in the past.    40 minutes or greater was spent today on the patient's care on the day of service.      This includes time for chart preparation, reviewing medical tests done before or during the visit, talking with the patient, review of quality indicators, required documentation, and other elements of care.         Onur Rod MD  General Internal Medicine  [facility]      No follow-ups on file.     Future Appointments   Date Time Provider Department Center   2/4/2021 10:30 AM Jorge Falcon NP [facility]   2/4/2021  2:30 PM Jaci Ibrahim MD [facility]   2/18/2021  1:00 PM Campos Joel MD [facility] issues resolved at this visit.

## 2021-06-30 NOTE — PROGRESS NOTES
Progress Notes by Onur Rod MD at 3/30/2021  1:25 PM     Author: Onur Rod MD Service: -- Author Type: Physician    Filed: 4/1/2021  9:36 AM Encounter Date: 3/30/2021 Status: Signed    : Onur Rod MD (Physician)            [facility]     TELEPHONE VISIT     Comprehensive and complex medical care - Chronic disease management - Shared decision making - Care coordination - Compassionate care    Patient advocacy - Rational deprescribing - Minimally disruptive medicine - Ethical focus - Customized care         Mike Gonzalez is a 70 y.o. male who is being evaluated via a billable telephone visit.           Active Problem List:  Problem List as of 3/30/2021 Reviewed: 3/30/2021  1:48 PM by Onur Rod MD       High    Full code status    Nonsmoker    Atrial flutter, chronic (H)       Medium    Chronic neck pain    Essential hypertension       Low    Basal cell carcinoma    GERD (gastroesophageal reflux disease)       Unprioritized    Anticoagulant drug declined    Branch retinal vein occlusion of left eye    Complete tear of left rotator cuff    Dilated cardiomyopathy (H)    Normal colonoscopy - 2016 - Average risk    Obesity (BMI 35.0-39.9) with comorbidity (H)           Current Outpatient Medications   Medication Sig Note   ? apixaban ANTICOAGULANT (ELIQUIS) 5 mg Tab tablet Take 1 tablet (5 mg total) by mouth 2 (two) times a day.    ? aspirin 81 MG EC tablet Take 81 mg by mouth. 2/22/2016: Received from: [facility]   ? atorvastatin (LIPITOR) 40 MG tablet Take 1 tablet (40 mg total) by mouth at bedtime.    ? calcium citrate-vitamin D (CITRACAL+D) 315 mg-5 mcg (200 unit) per tablet Take 1 tablet by mouth 2 (two) times a day.    ? CELECOXIB ORAL Take by mouth. 10mg/ml twice per day    ? fish oil-omega-3 fatty acids 300-1,000 mg capsule Take 2 g by mouth daily.    ? fluticasone propionate (FLONASE) 50 mcg/actuation nasal spray USE 2 SPRAYS IN  EACH       NOSTRIL DAILY    ? lisinopriL (PRINIVIL,ZESTRIL) 5 MG tablet Take 1 tablet (5 mg total) by mouth daily.    ? magnesium hydroxide (MAGNESIUM HYDROXIDE) 400 mg/5 mL Susp suspension Take 30 mL by mouth daily as needed.    ? metoprolol succinate (TOPROL XL) 25 MG Take 1 tablet (25 mg total) by mouth daily.    ? MULTIVITAMIN ORAL Take by mouth.    ? naltrexone HCl (NALTREXONE ORAL) Take by mouth. 1mg once per day    ? naproxen sodium (ALEVE) 220 MG tablet Take 220 mg by mouth 2 (two) times a day with meals.    ? oxymetazoline HCl (AFRIN, OXYMETAZOLINE, NASL) into each nostril.    ? OXYTOCIN, BULK, MISC Use As Directed. 50IU twice per day    ? phytonadione, vit K1, (PHYTONADIONE, VITAMIN K1,) 100 mcg tablet Take 100 mcg by mouth daily.    ? vits A,C,E/lutein/zeax/zn/paris (EYE HEALTH FORMULA ORAL) Take by mouth.    ? gabapentin (NEURONTIN) 100 MG capsule Take 100-300 mg by mouth at bedtime as needed.        Subjective:     Mike Gonzalez presents with:      Chief Complaint   Patient presents with   ? Medication Management     would like Gabapentin for left leg pain to help pt with sleeping at night        The patient has a phone visit today which is done in light of the current coronavirus outbreak.    Patient comes in today for a number of issues.    We first reviewed his back and leg pain.  He has already had a hip MRI in part related to this.  He has also had problems more lately with pain going from his buttock down the back of his leg.  He had an MRI back in 2018 which showed some disease on this side which could be causing this problem.  The right side was the problem at that time, but there was foraminal stenosis at the L5-S1 level at that particular time.  He has been working with Jayant Kennedy at [facility] for physical therapy and Jayant has recommended gabapentin for use at night.  The patient does have significant symptoms at night and has to sleep in a recliner which helps with some of the pain.  He does  noticed it at the bottom of his heel even with the pain going down the back of the leg.  He does still have the lateral hip pain as well.    He has had PRP and stem cell injections by Dr. Gonzales to his hip, low back, and neck.  It seems to have irritated his neck, which has been good in the recent past.    He is still dealing with issues with his left shoulder.    Reviewed a scab on his shin which has been very slow to heal.  Its about a fourth of an inch in size.    We reviewed his extensive cardiac evaluation.  He does not like the fact that there is so many visits related to this and there is not a single doctor who could help him manage these things alone.  There are a fair amount of visits and these were reviewed.  We discussed this with him.  He does not really want to go to A. fib clinic at this point.    We reviewed his other issues noted in the assessment but not specifically addressed in the HPI above.     Objective:     Limited phone exam reveals:  Patient in no distress.  Mood is good.  Insight is good.  Breathing is good.    Diagnostics:     Results for orders placed or performed in visit on 02/04/21   ECG Clinic - Today   Result Value Ref Range    SYSTOLIC BLOOD PRESSURE      DIASTOLIC BLOOD PRESSURE      VENTRICULAR RATE 68 BPM    ATRIAL RATE      P-R INTERVAL      QRS DURATION 118 ms    Q-T INTERVAL 408 ms    QTC CALCULATION (BEZET) 433 ms    P Axis      R AXIS -83 degrees    T AXIS 54 degrees    MUSE DIAGNOSIS       Atrial fibrillation  Left anterior fascicular block  Possible Anterolateral infarct (cited on or before 15-APR-2019)  Non-specific intra-ventricular conduction delay  Abnormal ECG  When compared with ECG of 20-JAN-2021 07:26,  Atrial fibrillation has replaced Junctional rhythm  Confirmed by DAGOBERTO DELONG MD LOC:[facility] ([id number]) on 2/5/2021 1:35:29 PM         Quality review:     PHQ-2 Total Score: 1 (11/30/2020  9:00 AM)    No data  recorded  ______________________________________________________________________     BMI Readings from Last 1 Encounters:   02/04/21 33.55 kg/m        Assessment:     1. Lumbar back pain with radiculopathy affecting left lower extremity    2. Left lumbar radiculopathy    3. Coronary artery disease involving native coronary artery of native heart without angina pectoris        Plan:     1. Patient was given a prescription for gabapentin.  He will try this at night.  We can escalate as needed.  2. Continue physical therapy.  3. Follow-up with me if we need to coordinate his care better.  4. I think he can take a break from his cardiac visits at this point.  He is feeling well from the standpoint at this time.  He can always go back if things are needed.  5. Consider MRI of the lumbar spine if not improving.  Consider epidural injection if not improving.      Phone call duration:  22 minutes    32 minutes total was spent today on the patient's care on the day of service.      This includes time for chart preparation, reviewing medical tests done before or during the visit, talking with the patient, review of quality indicators, required documentation, and other elements of care.        Onur Rod MD  General Internal Medicine  [facility]    Return in about 6 months (around 9/30/2021), or if symptoms worsen or fail to improve, for follow up visit.     No future appointments.

## 2021-06-30 NOTE — PROGRESS NOTES
Progress Notes by Jaci Ibrahim MD at 2/4/2021  2:30 PM     Author: Jaci Ibrahim MD Service: -- Author Type: Physician    Filed: 2/4/2021  4:18 PM Encounter Date: 2/4/2021 Status: Signed    : Jaci Ibrahim MD (Physician)         HEART CARE NOTE        Thank you, Dr. Rod, for asking the [facility] team to see Mike Gonzalez to evaluate HFrEF.    Assessment/Recommendations     1. HFrEF  Assessment / Plan    Euvolemic on physical exam and denies orthopnea, PND or fluid retention/edema    GDMT as detailed below    Current Pharmacotherapy AHA Guideline-Directed Medical Therapy   Lisinopril 5 mg daily Lisinopril 20 mg twice daily   Metoprolol succinate 25 mg daily Carvedilol 25 mg twice daily   Spironolactone NA  Spironolactone 25 mg once daily   Hydralazine NA Hydralazine 100 mg three times daily   Isosorbide dinitrate NA Isosorbide dinitrate 40 mg three times daily     2. Atrial fibrillation/flutter  Assessment / Plan    [id number] = 2; discussed AC options today, and Mr. Gonzalez would like to consider his options some more before making a decision    Will get ECG in referral placed to atrial fibrillation clinic at next visit      History of Present Illness/Subjective    Mr. Mike Gonzalez is a 70 y.o. male with a PMHx significant for afib/flutter and new diagnosis of HFrEF (NICM) who presents to [facility] to establish care.    Today, Mr. Gonzalez denies any acute events or complaints. He denies HF symptoms of orthopnea, PND, fluid retention and denies any past or present diuretic use as well as HF admissions. He reports good functional capacity without any difficulty with ADLs. We discussed HF diet and lifestyle modifications including the 2 g Na and 2L fluid restrictions. He does not currently adhere, but will do so moving forward. Patient was provided with the HF educational binder, book to log his daily weights and HF education bu our [facility] RN.       ECG:  Personally reviewed. Junctional rhythm, occasional PVC noted, unifocal.    Coronary angiogram:    New diagnosis of biventricular failure with LVH and severe biatrial enlargment and a stress test suggesting apical non-transmural infarct. Patient is morbidly obese with severe hypertension.    Mild coronary atherosclerosis.    LV EDP 20 mmHg.    Estimated blood loss was <20 ml.    ECHO (personnaly Reviewed):    Severe biatrial enlargement    Moderate concentric left ventricular hypertrophy    Left ventricle ejection fraction is moderately decreased. The calculated left ventricular ejection fraction is 42% with segmental wall motion abnormalities as noted below.    Normal right ventricular size with decreased systolic function.    No significant valve disease.    No previous study for comparison.     NM scan   ?  The nuclear stress test is abnormal. There is no ischemia but there is a medium sized area of a moderate degree of nontransmural infarction in the distal anterior segment(s) of the left ventricle. This appears to be in the left anterior descending artery distribution.  ?  The stress electrocardiogram is negative for inducible ischemic EKG changes. Occasional PVCs were noted during stress and recovery.  ?  The left ventricle is dilated and the left ventricular ejection fraction at stress is mildly decreased at 44%.  ?  Low-to-intermediate risk of future ischemic events.  ?  There is no prior study for comparison.        Physical Examination Review of Systems   Vitals:    02/04/21 1415   BP: 104/66   Pulse: 80   Resp: 16     Body mass index is 33.55 kg/m .  Wt Readings from Last 3 Encounters:   02/04/21 (!) 245 lb (111.1 kg)   02/01/21 (!) 257 lb (116.6 kg)   01/20/21 (!) 248 lb (112.5 kg)     General Appearance:   no distress, normal body habitus   ENT/Mouth: membranes moist, no oral lesions or bleeding gums.      EYES:  no scleral icterus, normal conjunctivae   Neck: no carotid bruits or thyromegaly    Chest/Lungs:   lungs are clear to auscultation, no rales or wheezing, equal chest wall expansion    Cardiovascular:   Regular. Normal first and second heart sounds with no murmurs, rubs, or gallops; the carotid, radial and posterior tibial pulses are intact, no JVD or LE edema bilaterally    Abdomen:  no organomegaly, masses, bruits, or tenderness; bowel sounds are present   Extremities: no cyanosis or clubbing   Skin: no xanthelasma, warm.    Neurologic: alert and oriented x3, calm      Psychiatric: alert and oriented x3, calm     A complete 10 systems ROS was reviewed  And is negative except what is listed in the HPI.          Medical History  Surgical History Family History Social History   Past Medical History:   Diagnosis Date   ? Angioedema    ? Atrial fibrillation and flutter (H)    ? Basal cell carcinoma    ? Branch retinal vein occlusion of left eye 7/1/2017   ? Chronic neck pain    ? GERD (gastroesophageal reflux disease)    ? HTN (hypertension)    ? Hx of atrial flutter    ? Nonsmoker    ? Normal colonoscopy - 2016 - Average risk 4/15/2019   ? Radiculopathy     cervical   ? Retinal hemorrhage     Vessel rupture in left retina   ? Rhinitis, allergic    ? Urticaria     Past Surgical History:   Procedure Laterality Date   ? BASAL CELL CARCINOMA EXCISION  01/2020    Left nasal reconstruction   ? CERVICAL SPINE SURGERY      C8, T1, foraminotomy   ? CV CORONARY ANGIOGRAM N/A 1/20/2021    Procedure: Coronary Angiogram;  Surgeon: Willow Wellington MD;  Location: [facility] Cardiac Cath Lab;  Service: Cardiology   ? CV LEFT HEART CATHETERIZATION WO LEFT VETRICULOGRAM Left 1/20/2021    Procedure: Left Heart Catheterization Without Left Ventriculogram;  Surgeon: Willow Wellington MD;  Location: [facility] Cardiac Cath Lab;  Service: Cardiology   ? REVERSE TOTAL SHOULDER ARTHROPLASTY Left 05/2019   ? UMBILICAL HERNIA REPAIR       ×2    no family history of premature coronary artery disease Social History      Socioeconomic History   ? Marital status:      Spouse name: Not on file   ? Number of children: Not on file   ? Years of education: Not on file   ? Highest education level: Not on file   Occupational History     Employer: RETIRED     Comment: 3[employer]   Social Needs   ? Financial resource strain: Not on file   ? Food insecurity     Worry: Not on file     Inability: Not on file   ? Transportation needs     Medical: Not on file     Non-medical: Not on file   Tobacco Use   ? Smoking status: Never Smoker   ? Smokeless tobacco: Never Used   Substance and Sexual Activity   ? Alcohol use: Never     Frequency: Never     Comment: About once a year.   ? Drug use: No   ? Sexual activity: Not on file   Lifestyle   ? Physical activity     Days per week: Not on file     Minutes per session: Not on file   ? Stress: Not on file   Relationships   ? Social connections     Talks on phone: Not on file     Gets together: Not on file     Attends Jainism service: Not on file     Active member of club or organization: Not on file     Attends meetings of clubs or organizations: Not on file     Relationship status: Not on file   ? Intimate partner violence     Fear of current or ex partner: Not on file     Emotionally abused: Not on file     Physically abused: Not on file     Forced sexual activity: Not on file   Other Topics Concern   ? Not on file   Social History Narrative    Patient of Dr. Rod since 2001.        .  Wife is a former RN.         Wife's cousin is Dr. Jean Pierre Champagne, ID specialist.           Lab Results    Chemistry/lipid CBC Cardiac Enzymes/BNP/TSH/INR   Lab Results   Component Value Date    CHOL 165 01/20/2021    HDL 40 01/20/2021    LDLCALC 112 01/20/2021    TRIG 65 01/20/2021    CREATININE 0.99 01/20/2021    BUN 16 01/20/2021    K 4.2 01/20/2021     01/20/2021     01/20/2021    CO2 26 01/20/2021    Lab Results   Component Value Date    WBC 7.7 01/20/2021    HGB 15.3 01/20/2021    HCT 44.9  01/20/2021    MCV 90 01/20/2021     01/20/2021    Lab Results   Component Value Date    TROPONINI 0.02 06/18/2020     (H) 11/30/2020    INR 0.95 06/29/2017     Lab Results   Component Value Date    TROPONINI 0.02 06/18/2020          Weight:    Wt Readings from Last 3 Encounters:   02/01/21 (!) 257 lb (116.6 kg)   01/20/21 (!) 248 lb (112.5 kg)   01/07/21 (!) 255 lb 14.4 oz (116.1 kg)       Allergies  Allergies   Allergen Reactions   ? Hydrochlorothiazide Unknown   ? Hydrocodone Swelling     Sinus     ? Oxycontin [Oxycodone] Swelling     lip   ? Sulfa (Sulfonamide Antibiotics) Swelling   ? Perfume Swelling     Other reaction(s): Runny Nose         Surgical History  Past Surgical History:   Procedure Laterality Date   ? BASAL CELL CARCINOMA EXCISION  01/2020    Left nasal reconstruction   ? CERVICAL SPINE SURGERY      C8, T1, foraminotomy   ? CV CORONARY ANGIOGRAM N/A 1/20/2021    Procedure: Coronary Angiogram;  Surgeon: Willow Wellington MD;  Location: [facility] Cardiac Cath Lab;  Service: Cardiology   ? CV LEFT HEART CATHETERIZATION WO LEFT VETRICULOGRAM Left 1/20/2021    Procedure: Left Heart Catheterization Without Left Ventriculogram;  Surgeon: Willow Wellington MD;  Location: [facility] Cardiac Cath Lab;  Service: Cardiology   ? REVERSE TOTAL SHOULDER ARTHROPLASTY Left 05/2019   ? UMBILICAL HERNIA REPAIR       ×2       Social History  Tobacco:   Social History     Tobacco Use   Smoking Status Never Smoker   Smokeless Tobacco Never Used    Alcohol:   Social History     Substance and Sexual Activity   Alcohol Use Never   ? Frequency: Never    Comment: About once a year.    Illicit Drugs:   Social History     Substance and Sexual Activity   Drug Use No       Family History  Family History   Problem Relation Age of Onset   ? Arthritis Mother    ? Other Mother         psychiatry/pvd   ? Other Father         blood reaction          Marylou Ibrahim on 2/4/2021      cc: Onur Rod MD,

## 2021-06-30 NOTE — PROGRESS NOTES
Progress Notes by Campos Joel MD at 2/18/2021  1:00 PM     Author: Campos Joel MD Service: -- Author Type: Physician    Filed: 2/18/2021  1:39 PM Encounter Date: 2/18/2021 Status: Signed    : Campos Joel MD (Physician)               Assessment/Recommendations   Patient with known history of atrial flutter and atrial fibrillation, mild reduction left ventricular systolic function with abnormal nuclear test but mild coronary artery disease.  He has relative bradycardia on his ECGs and there will be benefit to titrating up his beta-blocker so I would like to get a Holter monitor to see what his rhythm does over 24 hours.  We will get that in the near future.    With mild coronary artery disease he does also have a chads 2 vascular score of 3 and others have also recommended anticoagulation.  I will prescribe Eliquis 5 mg twice daily and he can get some coupons through the heart failure clinic and he has that number.  I sent it to a local pharmacy but this can be changed if he decides to send it to mail order.    We will get back to him with the results of the Holter monitor and any recommendations.    We will coordinate his care through the heart failure clinic and happy to see him more regularly if they have completed their evaluation and recommendations as it may be more convenient for him to see me in [place].    Thank you for allowing us to participate in his care.       History of Present Illness/Subjective    Mr. Mike Gonzalez is a 70 y.o. male with shortness of breath with activity, reduction in left ventricular systolic function, abnormal nuclear test with mild coronary artery disease on angiography.  He also has atrial flutter and atrial fibrillation and some junctional rhythm on EKG.    His major complaints today are hip pain and shoulder pain.  These are both orthopedic issues which she is working on.  His breathing is comfortable as he cannot do much.  He denies orthopnea,  paroxysmal nocturnal dyspnea peripheral edema, syncope or near syncopal episodes.  He did have some significant bradycardia during his evaluation for angiography in 1 ECG showed wide complex QRS complex with junctional rhythm.    He has a chads 2 vascular score of 3.     Physical Examination Review of Systems   Vitals:    02/18/21 1249   BP: 102/66   Pulse: 76   Resp: 16     There is no height or weight on file to calculate BMI.  Wt Readings from Last 3 Encounters:   02/04/21 (!) 245 lb (111.1 kg)   02/01/21 (!) 257 lb (116.6 kg)   01/20/21 (!) 248 lb (112.5 kg)     General Appearance:   Alert, cooperative and in no acute distress.   ENT/Mouth: Patient wearing a mask.      EYES:  no scleral icterus, normal conjunctivae   Neck: JVP . No Hepatojugular reflux. Thyroid not visualized.   Chest/Lungs:   Lungs are clear to auscultation, equal chest wall expansion.   Cardiovascular:   S1, S2 without murmur ,clicks or rubs. Brachial, radial  pulses are intact and symetric. No carotid bruits noted   Abdomen:  Nontender. BS+.  Rounded   Extremities: No cyanosis, clubbing or edema   Skin: no xanthelasma, warm.    Neurologic: normal arm movement bilateral, no tremors     Psychiatric: Appropriate affect.      General: WNL  Eyes: WNL  Ears/Nose/Throat: WNL  Lungs: Shortness of Breath  Heart: WNL  Stomach: Constipation  Bladder: WNL  Muscle/Joints: Joint Pain, Muscle Weakness, Muscle Pain  Skin: Rash  Nervous System: WNL  Mental Health: WNL     Blood: WNL       Medical History  Surgical History Family History Social History   Past Medical History:   Diagnosis Date   ? Angioedema    ? Atrial fibrillation and flutter (H)    ? Basal cell carcinoma    ? Branch retinal vein occlusion of left eye 7/1/2017   ? Chronic neck pain    ? GERD (gastroesophageal reflux disease)    ? HTN (hypertension)    ? Hx of atrial flutter    ? Nonsmoker    ? Normal colonoscopy - 2016 - Average risk 4/15/2019   ? Radiculopathy     cervical   ? Retinal  hemorrhage     Vessel rupture in left retina   ? Rhinitis, allergic    ? Urticaria     Past Surgical History:   Procedure Laterality Date   ? BASAL CELL CARCINOMA EXCISION  01/2020    Left nasal reconstruction   ? CERVICAL SPINE SURGERY      C8, T1, foraminotomy   ? CV CORONARY ANGIOGRAM N/A 1/20/2021    Procedure: Coronary Angiogram;  Surgeon: Willow Wellington MD;  Location: [facility];  Service: Cardiology   ? CV LEFT HEART CATHETERIZATION WO LEFT VETRICULOGRAM Left 1/20/2021    Procedure: Left Heart Catheterization Without Left Ventriculogram;  Surgeon: Willow Wellington MD;  Location: [facility];  Service: Cardiology   ? REVERSE TOTAL SHOULDER ARTHROPLASTY Left 05/2019   ? UMBILICAL HERNIA REPAIR       ×2    Family History   Problem Relation Age of Onset   ? Arthritis Mother    ? Other Mother         psychiatry/pvd   ? Other Father         blood reaction    Social History     Socioeconomic History   ? Marital status:      Spouse name: Not on file   ? Number of children: Not on file   ? Years of education: Not on file   ? Highest education level: Not on file   Occupational History     Employer: RETIRED     Comment: 3M   Social Needs   ? Financial resource strain: Not on file   ? Food insecurity     Worry: Not on file     Inability: Not on file   ? Transportation needs     Medical: Not on file     Non-medical: Not on file   Tobacco Use   ? Smoking status: Never Smoker   ? Smokeless tobacco: Never Used   Substance and Sexual Activity   ? Alcohol use: Never     Frequency: Never     Comment: About once a year.   ? Drug use: No   ? Sexual activity: Not on file   Lifestyle   ? Physical activity     Days per week: Not on file     Minutes per session: Not on file   ? Stress: Not on file   Relationships   ? Social connections     Talks on phone: Not on file     Gets together: Not on file     Attends Restorationist service: Not on file     Active member of club or organization: Not on file      Attends meetings of clubs or organizations: Not on file     Relationship status: Not on file   ? Intimate partner violence     Fear of current or ex partner: Not on file     Emotionally abused: Not on file     Physically abused: Not on file     Forced sexual activity: Not on file   Other Topics Concern   ? Not on file   Social History Narrative    Patient of Dr. Rod since 2001.        .  Wife is a former RN.         Wife's cousin is Dr. Jean Pierre Champagne, ID specialist.          Medications  Allergies   Current Outpatient Medications   Medication Sig Dispense Refill   ? aspirin 81 MG EC tablet Take 81 mg by mouth.     ? atorvastatin (LIPITOR) 40 MG tablet Take 1 tablet (40 mg total) by mouth at bedtime. 30 tablet 11   ? calcium citrate-vitamin D (CITRACAL+D) 315 mg-5 mcg (200 unit) per tablet Take 1 tablet by mouth 2 (two) times a day.     ? CELECOXIB ORAL Take by mouth. 10mg/ml twice per day     ? fish oil-omega-3 fatty acids 300-1,000 mg capsule Take 2 g by mouth daily.     ? fluticasone propionate (FLONASE) 50 mcg/actuation nasal spray USE 2 SPRAYS IN EACH       NOSTRIL DAILY 48 g 2   ? lisinopriL (PRINIVIL,ZESTRIL) 5 MG tablet Take 1 tablet (5 mg total) by mouth daily. 30 tablet 11   ? magnesium hydroxide (MAGNESIUM HYDROXIDE) 400 mg/5 mL Susp suspension Take 30 mL by mouth daily as needed.     ? metoprolol succinate (TOPROL XL) 25 MG Take 1 tablet (25 mg total) by mouth daily. 90 tablet 3   ? MULTIVITAMIN ORAL Take by mouth.     ? naltrexone HCl (NALTREXONE ORAL) Take by mouth. 1mg once per day     ? naproxen sodium (ALEVE) 220 MG tablet Take 220 mg by mouth 2 (two) times a day with meals.     ? oxymetazoline HCl (AFRIN, OXYMETAZOLINE, NASL) into each nostril.     ? OXYTOCIN, BULK, MISC Use As Directed. 50IU twice per day     ? phytonadione, vit K1, (PHYTONADIONE, VITAMIN K1,) 100 mcg tablet Take 100 mcg by mouth daily.     ? UNABLE TO FIND Med Name: Ketofit twice per day     ? vits  A,C,E/lutein/zeax/zn/paris (EYE HEALTH FORMULA ORAL) Take by mouth.     ? apixaban ANTICOAGULANT (ELIQUIS) 5 mg Tab tablet Take 1 tablet (5 mg total) by mouth 2 (two) times a day. 60 tablet 5     No current facility-administered medications for this visit.     Allergies   Allergen Reactions   ? Hydrochlorothiazide Unknown   ? Hydrocodone Swelling     Sinus     ? Oxycontin [Oxycodone] Swelling     lip   ? Sulfa (Sulfonamide Antibiotics) Swelling   ? Perfume Swelling     Other reaction(s): Runny Nose         Lab Results    Chemistry/lipid CBC Cardiac Enzymes/BNP/TSH/INR   Lab Results   Component Value Date    CHOL 165 01/20/2021    HDL 40 01/20/2021    LDLCALC 112 01/20/2021    TRIG 65 01/20/2021    CREATININE 0.99 01/20/2021    BUN 16 01/20/2021    K 4.2 01/20/2021     01/20/2021     01/20/2021    CO2 26 01/20/2021    Lab Results   Component Value Date    WBC 7.7 01/20/2021    HGB 15.3 01/20/2021    HCT 44.9 01/20/2021    MCV 90 01/20/2021     01/20/2021    Lab Results   Component Value Date    TROPONINI 0.02 06/18/2020     (H) 11/30/2020    INR 0.95 06/29/2017

## 2021-06-30 NOTE — PROGRESS NOTES
Progress Notes by Onur Rod MD at 2020  9:05 AM     Author: Onur Rod MD Service: -- Author Type: Physician    Filed: 2020  8:23 AM Encounter Date: 2020 Status: Signed    : Onur Rod MD (Physician)              [facility]    Comprehensive and complex medical care - Chronic disease management - Shared decision making - Care coordination - Compassionate care    Patient advocacy - Rational deprescribing - Minimally disruptive medicine - Ethical focus - Customized care          Date of Service: 2020  Primary Provider: Onur Rod MD    Patient Care Team:  Onur Rod MD as PCP - General (Internal Medicine)  Liborio Londono MD as Physician (Orthopedic Surgery)  Stuart Jackson MD as Physician (Orthopedic Surgery)  Liborio Champagne MD (Dermatology)  [facility]  Onur Rod MD as Assigned PCP     ______________________________________________________________________     Patient's Pharmacy:      [facility] - [place], [place] - [address] AT [address] [facility]  [address]  [facility] [address]  Phone: [phone] Fax: [phone]    [facility] [address] [place] [address]  Phone: [phone] Fax: [phone]     Patient's Contacts:  Name Home Phone Work Phone Mobile Phone Relationship Lgl Grd   KYLER CISNEROS   [phone] Spouse    MATILDE SILVER   [phone] Child        Patient's Insurance:    Payor/Plan Subscr  Sex Relation Sub. Ins. ID Effective Group Num   1. BLUE CROSS - * ZAIDA CISNEROS 1950 Male  [id number]* Not Eff -D2                                   [address] [address]   2. MEDICARE - ME* ZAIDA CISNEROS 1950 Male Self [id number]                                    [facility], [address]       Subjective:     History of present illness:    Zaida MCALLISTER  Carlos is an 70 y.o. male here for an annual wellness visit.    The issues he would like to address at today's visit include the following:    Chief Complaint   Patient presents with   ? Follow-up     still has sinus issues, digestion issues, big toe toe nail is growing back,    ? Annual Wellness Visit   ? Hip Pain     both sides left worse x a few weeks   ? shoulder     shoulder slipped out of place 2 times left shoulder        Patient comes in today for a number of issues.    We first reviewed his left shoulder issues.  He has had his shoulder dislocate partially.  He has followed up with his shoulder specialist related to this.    He has had a few incidents with wasp and other injuries in the last 6 months.  His toenail is growing back.    He has had some hip pain, particularly in the left hip.  It hurts a bit more when he sleeps on the right side.  He has had an injection with PRP on this side with some help.  He also had this on the left shoulder.    He has ongoing issues with nose and sinus issues.  He particularly has problems with burning leaves and fall problems.  Since his nasal surgery, he continues to have some restriction in his breathing on the left side.  He uses Afrin on an ongoing basis to help manage this.  He also uses Flonase for this.    We reviewed his atrial flutter.  He is not interested in anticoagulation at this time.  His stroke risk is estimated at 2.2 %/year.  He has a had a little bit of shortness of breath and will check up on this likely this year.  He denies any chest pain or chest pressure.    We reviewed his other issues noted in the assessment but not specifically addressed in the HPI above.     The 14-system review of systems form for [facility] was completed by patient, reviewed by me, and pertinent problems are reviewed above.     ______________________________________________________________________     Active Problem List:  Problem  List as of 11/30/2020 Reviewed: 5/10/2020  9:27 PM by Onur Rod MD       High    Full code status    Nonsmoker    Atrial flutter, chronic (H)       Medium    HTN (hypertension)    Chronic neck pain       Low    Basal cell carcinoma    GERD (gastroesophageal reflux disease)       Unprioritized    Anticoagulant drug declined    Branch retinal vein occlusion of left eye    Complete tear of left rotator cuff    Normal colonoscopy - 2016 - Average risk    Obesity (BMI 35.0-39.9) with comorbidity (H)           Past Medical History:   Diagnosis Date   ? Angioedema    ? Basal cell carcinoma    ? Branch retinal vein occlusion of left eye 7/1/2017   ? Chronic neck pain    ? GERD (gastroesophageal reflux disease)    ? HTN (hypertension)    ? Hx of atrial flutter    ? Nonsmoker    ? Normal colonoscopy - 2016 - Average risk 4/15/2019   ? Radiculopathy     cervical   ? Retinal hemorrhage     Vessel rupture in left retina   ? Rhinitis, allergic    ? Urticaria       Past Surgical History:   Procedure Laterality Date   ? BASAL CELL CARCINOMA EXCISION  01/2020    Left nasal reconstruction   ? CERVICAL SPINE SURGERY      C8, T1, foraminotomy   ? REVERSE TOTAL SHOULDER ARTHROPLASTY Left 05/2019   ? UMBILICAL HERNIA REPAIR       ×2      Family History   Problem Relation Age of Onset   ? Arthritis Mother    ? Other Mother         psychiatry/pvd   ? Other Father         blood reaction     Family history is otherwise noncontributory.     Social History     Occupational History     Employer: RETIRED     Comment: 3M   Tobacco Use   ? Smoking status: Never Smoker   ? Smokeless tobacco: Never Used   Substance and Sexual Activity   ? Alcohol use: Yes     Comment: About once a year.   ? Drug use: No   ? Sexual activity: Not on file      Social History     Social History Narrative    Patient of Dr. Rod since 2001.        .  Wife is a former RN.         Wife's cousin is Dr. Jean Pierre Champagne, ID specialist.      Current Outpatient  Medications   Medication Sig Note   ? amoxicillin (AMOXIL) 500 MG capsule Take 4 capsules by mouth once as needed. Dental work    ? aspirin 81 MG EC tablet Take 81 mg by mouth. 2/22/2016: Received from: [facility]   ? cyclobenzaprine (FLEXERIL) 10 MG tablet Take 1 tablet (10 mg total) by mouth 3 (three) times a day as needed for muscle spasms (back pain).    ? fluticasone propionate (FLONASE) 50 mcg/actuation nasal spray USE 2 SPRAYS IN EACH       NOSTRIL DAILY       Allergies: Hydrochlorothiazide, Hydrocodone, Oxycontin [oxycodone], Sulfa (sulfonamide antibiotics), and Perfume     Immunization History   Administered Date(s) Administered   ? INFLUENZA,SEASONAL QUAD, PF, =/> 6months 10/10/2013   ? Influenza I1x5-35, 01/08/2010   ? Influenza high dose,seasonal,PF, 65+ yrs 09/30/2015, 10/24/2016, 09/29/2017, 10/09/2018, 10/29/2019   ? Influenza, inj, historic,unspecified 11/27/2000, 10/25/2003, 10/13/2010, 10/10/2013, 10/02/2014   ? Influenza,quad,high Dose,PF, 65yr + 09/12/2020   ? Influenza,seasonal, Inj IIV3 11/27/2000, 10/25/2003, 09/29/2009, 10/13/2010   ? Pneumo Polysac 23-V 03/18/2016   ? Td, Adult, Absorbed 07/05/1989, 11/27/2000   ? Td,adult,historic,unspecified 11/27/2000   ? Tdap 11/06/2007, 11/01/2011   ? ZOSTER, LIVE 11/17/2009   ? ZOSTER, RECOMBINANT, IM 09/29/2020      Objective:     Visit Vitals  /84   Pulse 89   Wt (!) 253 lb 8 oz (115 kg)   SpO2 98%   BMI 35.36 kg/m       Wt Readings from Last 3 Encounters:   11/30/20 (!) 253 lb 8 oz (115 kg)   06/18/20 (!) 259 lb (117.5 kg)   06/03/19 (!) 260 lb (117.9 kg)     BP Readings from Last 3 Encounters:   11/30/20 136/84   06/18/20 134/86   05/10/20 132/86     Vision Screening:  No exam data present     PHYSICAL EXAM  The patient is comfortable, no acute distress.  Mood good.  Insight is good.  No skin lesions or nodules of concern.  Ears clear.  Eyes are nonicteric.  Pupils equal and reactive.  Throat is clear.  Neck is supple without mass, no  thyromegaly. No cervical or epitrochlear adenopathy.  Heart irregular rate and rhythm.  Lungs clear to auscultation bilaterally.  Respiratory effort good.  Abdomen soft and nontender.  No hepatosplenomegaly.  Extremities show no edema.  His hip range of motion on the left is pretty good.  He has tenderness over the lateral trochanteric area.    Assessment Results 11/30/2020   Activities of Daily Living 1 - Full function   Instrumental Activities of Daily Living No help needed   Get Up and Go Score Less than 12 seconds   Mini Cog Total Score 5   Some recent data might be hidden     A Mini-Cog score of 0-2 suggests the possibility of dementia, score of 3-5 suggests no dementia    Diagnostics:     Recent Results (from the past 240 hour(s))   BNP(B-type Natriuretic Peptide)   Result Value Ref Range     (H) 0 - 69 pg/mL   Basic Metabolic Panel   Result Value Ref Range    Sodium 143 136 - 145 mmol/L    Potassium 4.4 3.5 - 5.0 mmol/L    Chloride 106 98 - 107 mmol/L    CO2 27 22 - 31 mmol/L    Anion Gap, Calculation 10 5 - 18 mmol/L    Glucose 108 70 - 125 mg/dL    Calcium 9.1 8.5 - 10.5 mg/dL    BUN 18 8 - 28 mg/dL    Creatinine 0.99 0.70 - 1.30 mg/dL    GFR MDRD Af Amer >60 >60 mL/min/1.73m2    GFR MDRD Non Af Amer >60 >60 mL/min/1.73m2   HM2(CBC w/o Differential)   Result Value Ref Range    WBC 7.5 4.0 - 11.0 thou/uL    RBC 4.97 4.40 - 6.20 mill/uL    Hemoglobin 15.6 14.0 - 18.0 g/dL    Hematocrit 46.2 40.0 - 54.0 %    MCV 93 80 - 100 fL    MCH 31.4 27.0 - 34.0 pg    MCHC 33.8 32.0 - 36.0 g/dL    RDW 11.5 11.0 - 14.5 %    Platelets 265 140 - 440 thou/uL    MPV 8.2 7.0 - 10.0 fL   PSA, Annual Screen (Prostatic-Specific Antigen)   Result Value Ref Range    PSA 2.0 0.0 - 6.5 ng/mL        Quality review:     PHQ-2 Total Score: 1 (11/30/2020  9:00 AM)      No data recorded  ______________________________________________________________________     BMI Readings from Last 1 Encounters:   11/30/20 35.36 kg/m         Assessment:     1. Medicare annual wellness visit, subsequent    2. SOB (shortness of breath)    3. Essential hypertension    4. Screening for prostate cancer    5. Shoulder dislocation, left, initial encounter    6. Chronic rhinitis    7. Left hip pain    8. Atrial flutter, chronic (H)         Plan:     1. Blood work done today.  2. Likely will check an echocardiogram in relationship to his atrial flutter.  3. He should follow-up with orthopedics as needed.  4. He will continue on the current plan as is.  5. I will check into ENT providers who might be able to help him related to this nose issue.  6. Follow-up at least yearly in relationship to his health issues.         Onur Rod MD  General Internal Medicine  [facility]    Return in about 1 year (around 11/30/2021), or if symptoms worsen or fail to improve, for annual wellness visit.     No future appointments.      ______________________________________________________________________     Relevant ICD-10 codes and order associations:      ICD-10-CM    1. Medicare annual wellness visit, subsequent  Z00.00    2. SOB (shortness of breath)  R06.02 BNP(B-type Natriuretic Peptide)     HM2(CBC w/o Differential)   3. Essential hypertension  I10 Basic Metabolic Panel   4. Screening for prostate cancer  Z12.5 PSA, Annual Screen (Prostatic-Specific Antigen)   5. Shoulder dislocation, left, initial encounter  S43.005A    6. Chronic rhinitis  J31.0    7. Left hip pain  M25.552    8. Atrial flutter, chronic (H)  I48.92         Identified Health Risks:     A personalized health plan based on the identified health risks was provided to the patient on the AVS.    ______________________________________________________________________     The following health maintenance schedule was reviewed with the patient and provided in printed form in the after visit summary:     Health Maintenance   Topic Date Due   ? ZOSTER VACCINES (3 of 3) 11/24/2020    ? TD 18+ HE  11/01/2021   ? MEDICARE ANNUAL WELLNESS VISIT  11/30/2021   ? FALL RISK ASSESSMENT  11/30/2021   ? LIPID  06/29/2022   ? ADVANCE CARE PLANNING  11/30/2025   ? COLORECTAL CANCER SCREENING  07/14/2026   ? Pneumococcal Vaccine: 65+ Years  Completed   ? INFLUENZA VACCINE RULE BASED  Completed   ? Pneumococcal Vaccine: Pediatrics (0 to 5 Years) and At-Risk Patients (6 to 64 Years)  Addressed   ? HEPATITIS B VACCINES  Aged Out   ? HEPATITIS C SCREENING  Discontinued        ______________________________________________________________________   The patient was provided with suggestions to help him develop a healthy physical lifestyle.   The patient was counseled and encouraged to consider modifying their diet and eating habits. He was provided with information on recommended healthy diet options.  The patient reports that he has difficulty with activities of daily living. I have asked that the patient make a follow up appointment in 52 weeks where this issue will be further evaluated and addressed.  He is at risk for falling and has been provided with information to reduce the risk of falling at home.  Patient's advanced directive was discussed and I am comfortable with the patient's wishes.              Initiate Treatment: Desonide only when flared Modify Regimen: Recommended OTC Moisture barrier ointment when not flared Detail Level: Zone Render In Strict Bullet Format?: No

## 2021-06-30 NOTE — PROGRESS NOTES
Progress Notes by Jaci Ibrahim MD at 5/4/2021 12:50 PM     Author: Jaci Ibrahim MD Service: -- Author Type: Physician    Filed: 5/4/2021  1:43 PM Encounter Date: 5/4/2021 Status: Signed    : Jaci Ibrahim MD (Physician)         HEART CARE NOTE          Assessment/Recommendations     1. HFrEF  Assessment / Plan    Euvolemic on physical exam and denies orthopnea, PND or fluid retention/edema    GDMT as detailed below     Current Pharmacotherapy AHA Guideline-Directed Medical Therapy   Lisinopril 5 mg daily Lisinopril 20 mg twice daily   Metoprolol succinate 25 mg daily Carvedilol 25 mg twice daily   Spironolactone NA  Spironolactone 25 mg once daily   Hydralazine NA Hydralazine 100 mg three times daily   Isosorbide dinitrate NA Isosorbide dinitrate 40 mg three times daily      2. Atrial fibrillation/flutter  Assessment / Plan    [id number] = 2; continue apixaban    Will place referral to afib clinic -> patient is amenable      History of Present Illness/Subjective      Mr. Mike Gonzalez is a 70 y.o. male with a PMHx significant for afib/flutter and new diagnosis of HFrEF (NICM) who presents to [facility] for follow-up.     Today, Mr. Gonzalez denies HF symptoms of orthopnea, PND, fluid retention. He does endorse left hip pain that makes laying flat/sleeping difficult. Unfortunately, evaluation and management of this has proved challenging.      ECG: Personally reviewed. Junctional rhythm, occasional PVC noted, unifocal.     Coronary angiogram:    New diagnosis of biventricular failure with LVH and severe biatrial enlargment and a stress test suggesting apical non-transmural infarct. Patient is morbidly obese with severe hypertension.    Mild coronary atherosclerosis.    LV EDP 20 mmHg.    Estimated blood loss was <20 ml.     ECHO (personnaly Reviewed):    Severe biatrial enlargement    Moderate concentric left ventricular hypertrophy    Left ventricle ejection fraction  is moderately decreased. The calculated left ventricular ejection fraction is 42% with segmental wall motion abnormalities as noted below.    Normal right ventricular size with decreased systolic function.    No significant valve disease.    No previous study for comparison.     NM scan   ?  The nuclear stress test is abnormal. There is no ischemia but there is a medium sized area of a moderate degree of nontransmural infarction in the distal anterior segment(s) of the left ventricle. This appears to be in the left anterior descending artery distribution.  ?  The stress electrocardiogram is negative for inducible ischemic EKG changes. Occasional PVCs were noted during stress and recovery.  ?  The left ventricle is dilated and the left ventricular ejection fraction at stress is mildly decreased at 44%.  ?  Low-to-intermediate risk of future ischemic events.  ?  There is no prior study for comparison.          Physical Examination Review of Systems   Vitals:    05/04/21 1305   BP: 112/62   Pulse: 69   Resp: 14     Body mass index is 33.55 kg/m .  Wt Readings from Last 3 Encounters:   05/04/21 (!) 245 lb (111.1 kg)   02/04/21 (!) 245 lb (111.1 kg)   02/01/21 (!) 257 lb (116.6 kg)     General Appearance:   no distress, normal body habitus   ENT/Mouth: membranes moist, no oral lesions or bleeding gums.      EYES:  no scleral icterus, normal conjunctivae   Neck: no carotid bruits or thyromegaly   Chest/Lungs:   lungs are clear to auscultation, no rales or wheezing, equal chest wall expansion    Cardiovascular:   Regular. Normal first and second heart sounds with no murmurs, rubs, or gallops; the carotid, radial and posterior tibial pulses are intact, no JVD or LE edema bilaterally    Abdomen:  no organomegaly, masses, bruits, or tenderness; bowel sounds are present   Extremities: no cyanosis or clubbing   Skin: no xanthelasma, warm.    Neurologic: normal gait, normal  bilateral, no tremors     Psychiatric: alert and  oriented x3, calm     A complete 10 systems ROS was reviewed  And is negative except what is listed in the HPI.          Medical History  Surgical History Family History Social History   Past Medical History:   Diagnosis Date   ? Angioedema    ? Atrial fibrillation and flutter (H)    ? Basal cell carcinoma    ? Branch retinal vein occlusion of left eye 7/1/2017   ? CAD (coronary artery disease), minimal disease on angiogram in 2021     ANGIOGRAM 2021 Left Main The vessel was visualized by angiography, is moderate in size and is angiographically normal. Left Anterior Descending Mid LAD lesion is 25% stenosed. Ramus Intermedius Ramus lesion is 10% stenosed. Left Circumflex Dist Cx lesion is 30% stenosed. Right Coronary Artery The vessel was visualized by angiography, is moderate in size and is angiographically normal.     ? Chronic neck pain    ? GERD (gastroesophageal reflux disease)    ? HTN (hypertension)    ? Hx of atrial flutter    ? Nonsmoker    ? Normal colonoscopy - 2016 - Average risk 4/15/2019   ? Radiculopathy     cervical   ? Retinal hemorrhage     Vessel rupture in left retina   ? Rhinitis, allergic    ? Urticaria     Past Surgical History:   Procedure Laterality Date   ? BASAL CELL CARCINOMA EXCISION  01/2020    Left nasal reconstruction   ? CERVICAL SPINE SURGERY      C8, T1, foraminotomy   ? CV CORONARY ANGIOGRAM N/A 1/20/2021    Procedure: Coronary Angiogram;  Surgeon: Willow Wellington MD;  Location: [facility] Cardiac Cath Lab;  Service: Cardiology   ? CV LEFT HEART CATHETERIZATION WO LEFT VETRICULOGRAM Left 1/20/2021    Procedure: Left Heart Catheterization Without Left Ventriculogram;  Surgeon: Willow Wellington MD;  Location: [facility] Cardiac Cath Lab;  Service: Cardiology   ? REVERSE TOTAL SHOULDER ARTHROPLASTY Left 05/2019   ? UMBILICAL HERNIA REPAIR       ×2    no family history of premature coronary artery disease Social History     Socioeconomic History   ? Marital status:      Spouse  name: Not on file   ? Number of children: Not on file   ? Years of education: Not on file   ? Highest education level: Not on file   Occupational History     Employer: RETIRED     Comment: 3[employer]   Social Needs   ? Financial resource strain: Not on file   ? Food insecurity     Worry: Not on file     Inability: Not on file   ? Transportation needs     Medical: Not on file     Non-medical: Not on file   Tobacco Use   ? Smoking status: Never Smoker   ? Smokeless tobacco: Never Used   Substance and Sexual Activity   ? Alcohol use: Never     Frequency: Never     Comment: About once a year.   ? Drug use: No   ? Sexual activity: Not on file   Lifestyle   ? Physical activity     Days per week: Not on file     Minutes per session: Not on file   ? Stress: Not on file   Relationships   ? Social connections     Talks on phone: Not on file     Gets together: Not on file     Attends Rastafari service: Not on file     Active member of club or organization: Not on file     Attends meetings of clubs or organizations: Not on file     Relationship status: Not on file   ? Intimate partner violence     Fear of current or ex partner: Not on file     Emotionally abused: Not on file     Physically abused: Not on file     Forced sexual activity: Not on file   Other Topics Concern   ? Not on file   Social History Narrative    Patient of Dr. Rod since 2001.        .  Wife is a former RN.         Wife's cousin is Dr. Jean Pierre Champagne, ID specialist.           Lab Results    Chemistry/lipid CBC Cardiac Enzymes/BNP/TSH/INR   Lab Results   Component Value Date    CHOL 165 01/20/2021    HDL 40 01/20/2021    LDLCALC 112 01/20/2021    TRIG 65 01/20/2021    CREATININE 0.99 01/20/2021    BUN 16 01/20/2021    K 4.2 01/20/2021     01/20/2021     01/20/2021    CO2 26 01/20/2021    Lab Results   Component Value Date    WBC 7.7 01/20/2021    HGB 15.3 01/20/2021    HCT 44.9 01/20/2021    MCV 90 01/20/2021     01/20/2021    Lab Results    Component Value Date    TROPONINI 0.02 06/18/2020     (H) 11/30/2020    INR 0.95 06/29/2017     Lab Results   Component Value Date    TROPONINI 0.02 06/18/2020          Weight:    Wt Readings from Last 3 Encounters:   02/04/21 (!) 245 lb (111.1 kg)   02/01/21 (!) 257 lb (116.6 kg)   01/20/21 (!) 248 lb (112.5 kg)       Allergies  Allergies   Allergen Reactions   ? Hydrochlorothiazide Unknown   ? Hydrocodone Swelling     Sinus     ? Oxycontin [Oxycodone] Swelling     lip   ? Sulfa (Sulfonamide Antibiotics) Swelling   ? Perfume Swelling     Other reaction(s): Runny Nose         Surgical History  Past Surgical History:   Procedure Laterality Date   ? BASAL CELL CARCINOMA EXCISION  01/2020    Left nasal reconstruction   ? CERVICAL SPINE SURGERY      C8, T1, foraminotomy   ? CV CORONARY ANGIOGRAM N/A 1/20/2021    Procedure: Coronary Angiogram;  Surgeon: Willow Wellington MD;  Location: [facility] Cardiac Cath Lab;  Service: Cardiology   ? CV LEFT HEART CATHETERIZATION WO LEFT VETRICULOGRAM Left 1/20/2021    Procedure: Left Heart Catheterization Without Left Ventriculogram;  Surgeon: Willow Wellintgon MD;  Location: [facility] Cardiac Cath Lab;  Service: Cardiology   ? REVERSE TOTAL SHOULDER ARTHROPLASTY Left 05/2019   ? UMBILICAL HERNIA REPAIR       ×2       Social History  Tobacco:   Social History     Tobacco Use   Smoking Status Never Smoker   Smokeless Tobacco Never Used    Alcohol:   Social History     Substance and Sexual Activity   Alcohol Use Never   ? Frequency: Never    Comment: About once a year.    Illicit Drugs:   Social History     Substance and Sexual Activity   Drug Use No       Family History  Family History   Problem Relation Age of Onset   ? Arthritis Mother    ? Other Mother         psychiatry/pvd   ? Other Father         blood reaction          Marylou Ibrahim on 5/4/2021      cc: Onur Rod MD,

## 2021-06-30 NOTE — PROGRESS NOTES
Progress Notes by Campos Joel MD at 1/7/2021  9:10 AM     Author: Campos Joel MD Service: -- Author Type: Physician    Filed: 1/7/2021  9:56 AM Encounter Date: 1/7/2021 Status: Signed    : Campos Joel MD (Physician)         Thank you, Dr. Rod, for asking the [facility] team to see Mr. Mike Gonzalez to evaluate reduced left ventricular ejection fraction, myocardial scar, and atrial flutter..      Assessment/Recommendations   Patient with longstanding atrial dysrhythmias, primarily atrial flutter who now has documented reduction in left ventricular systolic function with a scar and echocardiogram and pharmacologic nuclear study.  The echo shows akinetic apical anterior apical septal apical inferior and apical lateral areas of the myocardium.  The left ventricular ejection fraction was estimated at 42%.  Nuclear study showed a similar wall motion abnormality with scar but no myocardial ischemia and an ejection fraction of 44% with dilation of the left ventricle.    Given the lack of historical myocardial infarction, atrial flutter, some shortness of breath with activity and atypical precordial pain, I believe coronary angiography would be warranted.  We will need to document the presence or absence and pattern of epicardial coronary artery disease as an initial step.  He certainly may be a candidate for percutaneous intervention or other revascularization procedures and we will get the angiogram in the near future.    We talked about his atrial fibrillation and flutter.  He has a chads 2 vascular score of 2 but if he has documented vascular disease this would increase his chads 2 vascular score to 3.  Either in 2 or 3 would be a candidate for long-term anticoagulation to reduce the incidence of thromboembolic events.  We will discussed this following his angiogram.  He has not been very interested in anticoagulation in the past but I think he is more than willing to start  anticoagulation after reviewing this data with him today.    I suspect we will need to titrate up on his metoprolol and at some point in time a Holter monitor to see what his ventricular response to atrial flutter and atrial fibrillation is during the 24.  Will be important.    We will try to see him in follow-up visits at our [place] office.    Thank you for allowing us to participate in his care.         History of Present Illness/Subjective    Mr. Mike Gonzalez is a 70 y.o. male with a longstanding history of abnormal heart rhythm.  He states that during his adult life he has had an irregular heart rhythm.  I can see notes back to 2011 indicating that he had atrial flutter.  He has not been on much in the way of medications but has been treated for hypertension.  He is relatively sedentary because of hip pain but within the last 2 weeks he was able to walk 3 miles on a level surface without significant shortness of breath.  If he starts to climb a hill or hurries walking he will notice shortness of breath with activity and he relates this more to his sinus issues after trauma to his sinus area about 2 years ago.  He denies current orthopnea or paroxysmal nocturnal dyspnea but in October of this year he had several nights when he woke up short of breath and had to sit up to breathe comfortably.  This completely passed.  He denies chest discomfort with physical activity but will get a bit of a tightness in his chest about every 2 or 3 weeks that lasts several minutes and goes away on its own if he just tries to relax.  This is not consistently brought on with physical activity, does not radiate, and is not associated with shortness of breath or diaphoresis.    The patient has never smoked cigarettes, is not diabetic but has been treated for hypertension.  He has not been treated for hyperlipidemia and does not have a family history of premature coronary artery disease.        ECG: Personally reviewed.  [facility] chart  reviewed and shows probable atrial flutter but possibly course of fib waves and no acute ST-T wave changes.  This EKG from April 2019 also showed very poor R wave progression with the possibility of old anterior lateral myocardial infarction.       Physical Examination Review of Systems   Vitals:    01/07/21 0851   BP: 138/90   Pulse: 68   Resp: 16     Body mass index is 35.71 kg/m .  Wt Readings from Last 3 Encounters:   01/07/21 (!) 255 lb 14.4 oz (116.1 kg)   12/30/20 (!) 253 lb (114.8 kg)   11/30/20 (!) 253 lb 8 oz (115 kg)     General Appearance:   Alert, cooperative and in no acute distress.   ENT/Mouth: Patient wearing a mask.      EYES:  no scleral icterus, normal conjunctivae   Neck: JVP normal.  Minimally positive hepatojugular reflux. Thyroid not visualized.   Chest/Lungs:   Lungs are clear to auscultation, equal chest wall expansion.   Cardiovascular:   S1, S2 with 1/6 systolic murmur , no clicks or rubs. Brachial, radial and posterior tibial pulses are intact and symetric.  The exception is that the right posterior tibial is slightly diminished when compared to the left.  No carotid bruits noted   Abdomen:  Nontender. BS+. No bruits.  Rounded   Extremities: No cyanosis, clubbing and trace pretibial edema   Skin: no xanthelasma, warm.    Neurologic: normal arm movement bilateral, no tremors     Psychiatric: Appropriate affect.      General: WNL  Eyes: WNL  Ears/Nose/Throat: WNL  Lungs: Shortness of Breath  Heart: Irregular Heartbeat  Stomach: Constipation  Bladder: WNL  Muscle/Joints: Joint Pain  Skin: WNL  Nervous System: WNL  Mental Health: WNL     Blood: WNL       Medical History  Surgical History Family History Social History   Past Medical History:   Diagnosis Date   ? Angioedema    ? Basal cell carcinoma    ? Branch retinal vein occlusion of left eye 7/1/2017   ? Chronic neck pain    ? GERD (gastroesophageal reflux disease)    ? HTN (hypertension)    ? Hx of atrial flutter    ? Nonsmoker    ?  Normal colonoscopy - 2016 - Average risk 4/15/2019   ? Radiculopathy     cervical   ? Retinal hemorrhage     Vessel rupture in left retina   ? Rhinitis, allergic    ? Urticaria     Past Surgical History:   Procedure Laterality Date   ? BASAL CELL CARCINOMA EXCISION  01/2020    Left nasal reconstruction   ? CERVICAL SPINE SURGERY      C8, T1, foraminotomy   ? REVERSE TOTAL SHOULDER ARTHROPLASTY Left 05/2019   ? UMBILICAL HERNIA REPAIR       ×2    Family History   Problem Relation Age of Onset   ? Arthritis Mother    ? Other Mother         psychiatry/pvd   ? Other Father         blood reaction    Social History     Socioeconomic History   ? Marital status:      Spouse name: Not on file   ? Number of children: Not on file   ? Years of education: Not on file   ? Highest education level: Not on file   Occupational History     Employer: RETIRED     Comment: 3M   Social Needs   ? Financial resource strain: Not on file   ? Food insecurity     Worry: Not on file     Inability: Not on file   ? Transportation needs     Medical: Not on file     Non-medical: Not on file   Tobacco Use   ? Smoking status: Never Smoker   ? Smokeless tobacco: Never Used   Substance and Sexual Activity   ? Alcohol use: Yes     Comment: About once a year.   ? Drug use: No   ? Sexual activity: Not on file   Lifestyle   ? Physical activity     Days per week: Not on file     Minutes per session: Not on file   ? Stress: Not on file   Relationships   ? Social connections     Talks on phone: Not on file     Gets together: Not on file     Attends Restorationism service: Not on file     Active member of club or organization: Not on file     Attends meetings of clubs or organizations: Not on file     Relationship status: Not on file   ? Intimate partner violence     Fear of current or ex partner: Not on file     Emotionally abused: Not on file     Physically abused: Not on file     Forced sexual activity: Not on file   Other Topics Concern   ? Not on file    Social History Narrative    Patient of Dr. Rod since 2001.        .  Wife is a former RN.         Wife's cousin is Dr. Jean Pierre Champagne, ID specialist.          Medications  Allergies   Current Outpatient Medications   Medication Sig Dispense Refill   ? aspirin 81 MG EC tablet Take 81 mg by mouth.     ? fluticasone propionate (FLONASE) 50 mcg/actuation nasal spray USE 2 SPRAYS IN EACH       NOSTRIL DAILY 48 g 2   ? metoprolol succinate (TOPROL XL) 25 MG Take 1 tablet (25 mg total) by mouth daily. 90 tablet 3   ? naproxen sodium (ALEVE) 220 MG tablet Take 220 mg by mouth 2 (two) times a day with meals.     ? cyclobenzaprine (FLEXERIL) 10 MG tablet Take 1 tablet (10 mg total) by mouth 3 (three) times a day as needed for muscle spasms (back pain). 40 tablet 3     No current facility-administered medications for this visit.     Allergies   Allergen Reactions   ? Hydrochlorothiazide    ? Hydrocodone    ? Oxycontin [Oxycodone]    ? Sulfa (Sulfonamide Antibiotics)    ? Perfume      Other reaction(s): Runny Nose         Lab Results    Chemistry/lipid CBC Cardiac Enzymes/BNP/TSH/INR   Lab Results   Component Value Date    CHOL 164 06/29/2017    HDL 41 06/29/2017    LDLCALC 103 06/29/2017    TRIG 102 06/29/2017    CREATININE 0.99 11/30/2020    BUN 18 11/30/2020    K 4.4 11/30/2020     11/30/2020     11/30/2020    CO2 27 11/30/2020    Lab Results   Component Value Date    WBC 7.5 11/30/2020    HGB 15.6 11/30/2020    HCT 46.2 11/30/2020    MCV 93 11/30/2020     11/30/2020    Lab Results   Component Value Date    TROPONINI 0.02 06/18/2020     (H) 11/30/2020    INR 0.95 06/29/2017

## 2021-07-04 NOTE — PROGRESS NOTES
Progress Notes by Nydia Gardner CNP at 6/3/2021  1:30 PM     Author: Nydia Gardner CNP Service: -- Author Type: Nurse Practitioner    Filed: 6/3/2021  3:11 PM Encounter Date: 6/3/2021 Status: Signed    : Nydia Gardner CNP (Nurse Practitioner)         Thank you, Dr. Ibrahim, for asking the [facility] Electrophysiology team to see . Mike Gonzalez to evaluate atrial fibrillation.      Assessment/Recommendations   Assessment/Plan:    1. Longstanding persistent Atrial Fibrillation: Asymptomatic. No definitive documentation of sinus rhythm in many years.  Echo shows severe left atrial enlargement consistent with longstanding atrial fibrillation. At this point, his A. fib appears to be permanent. Ventricular rates are well controlled.  We had a lengthy discussion of the physiology and natural progression of atrial fibrillation and treatment options including rate control versus rhythm control depending upon the presence of symptoms.  He is not a candidate for rhythm control.  Continue with rate control.  Continue metoprolol succinate 25 mg daily.    He was reassured that atrial fibrillation is not life-threatening, but carries an increased risk for stroke.  He has a [id number] score of 3-4 for age 65-74, hypertension, CAD, and HF; per current guidelines anticoagulation is recommended, unless risks outweighed the benefits.   We discussed the risks and benefits of oral anticoagulation.  He has a HAS-BLED of 3 for age >65, bleeding predisposition, and concomitant need for NSAIDs.  Therefore, we discussed the alternative of left atrial appendage closure with the watchman device.  He has been doing some research on this and is interested in the procedure.  We discussed the procedure testing, the implant procedure, <2% risk for complication, post procedure JEAN CLAUDE, and medication transition from Eliquis to aspirin/Plavix to low-dose daily aspirin.  He was given information to review at home.  I will  have our coordinator reach out to him in about a week for follow-up.  In the interim, continue Eliquis 5 mg twice daily for stroke prophylaxis.    2. Hypertension: Blood pressure at target.  Continue metoprolol succinate and lisinopril.    3. Nonischemic cardiomyopathy, LVEF 44%: He appears well compensated with no sign of fluid overload on exam or heart failure symptoms.  Continue metoprolol succinate and lisinopril.    4.  Nonobstructive coronary artery disease: Denies anginal symptoms.  Continue statin.       History of Present Illness/Subjective    Mr. Mike Gonzalez is a 70 y.o. male who comes in today for EP consultation of atrial fibrillation and flutter.  He has a history of atrial ablation and flutter, hypertension, nonobstructive coronary artery disease, nonischemic cardiomyopathy, LVEF 44%.  Echo and nuclear stress test both show evidence of scar, but no ischemia and only mild coronary artery disease on angiography.      There is mention of atrial flutter dating back to at least 2011.  No documentation of sinus rhythm in any EKG or cardiac testing since at least 2012.  He believes he has had A. fib for even longer than that.  He states that he is occasionally aware of his heartbeat, but generally has no awareness of arrhythmia.  His primary complaint is that of shoulder and hip pain.  He states that he has a shoulder replacement that needs redoing.  He states that his hip pain is slowly getting better and he has been able to sleep well for the last 5 nights.  He denies chest discomfort, palpitations, abdominal fullness/bloating or peripheral edema, shortness of breath, paroxysmal nocturnal dyspnea, orthopnea, lightheadedness, dizziness, pre-syncope, or syncope.    Cardiographics (EKGs personally reviewed):  EKG done 2/4/2021 shows atrial fibrillation with controlled ventricular response at 68 bpm  EKG done 1/20/2021 does not show any distinct P waves, but may truly be atrial dysrhythmia as opposed to  accelerated junctional  EKG done 4/15/2019 shows atrial fibrillation with controlled ventricular response at 79 bpm  EKG done 10/12/2012 shows atrial fibrillation with controlled ventricular response at 66 bpm    24-hour Holter monitor worn 3/4/2021 is persistent atrial fibrillation with controlled ventricular response.  Average ventricular rate of 65 bpm with a range of 39 to 122 bpm.  Modest nocturnal bradycardia but no significant bradycardia or pauses.  Incomplete left bundle branch block.  Occasional PVCs, but no significant ventricular ectopy.    ECHO done 12/30/2020:    Severe biatrial enlargement, LA volume index 62.6 mL/m     Moderate concentric left ventricular hypertrophy    Left ventricle ejection fraction is moderately decreased. The calculated left ventricular ejection fraction is 42% with segmental wall motion abnormalities as noted below.  The following segments are akinetic: apical anterior, apical septal, apical inferior, apical lateral and apex.  The following segments are hypokinetic: basal anterior, basal anteroseptal, basal inferoseptal, basal inferior, basal inferolateral, basal anterolateral, mid anterior, mid anteroseptal, mid inferoseptal, mid inferior, mid inferolateral and mid anterolateral.    Normal right ventricular size with decreased systolic function.    No significant valve disease.    No previous study for comparison.    In A. fib during study, heart rate 85 bpm    NM stress test done 1/5/2021:  ?  The nuclear stress test is abnormal. There is no ischemia but there is a medium sized area of a moderate degree of nontransmural infarction in the distal anterior segment(s) of the left ventricle. This appears to be in the left anterior descending artery distribution.  ?  The stress electrocardiogram is negative for inducible ischemic EKG changes. Occasional PVCs were noted during stress and recovery.  ?  The left ventricle is dilated and the left ventricular ejection fraction at stress  is mildly decreased at 44%.  ?  Low-to-intermediate risk of future ischemic events.  ?  There is no prior study for comparison.    Coronary angiogram done 1/20/2021:    New diagnosis of biventricular failure with LVH and severe biatrial enlargment and a stress test suggesting apical non-transmural infarct. Patient is morbidly obese with severe hypertension.    Mild coronary atherosclerosis.    LV EDP 20 mmHg.    I have reviewed and updated the patient's Past Medical History, Social History, Family History and Medication List.     Physical Examination Review of Systems   Vitals:    06/03/21 1341   BP: 116/70   Pulse: 77   Resp: 16     Body mass index is 33.55 kg/m .  Wt Readings from Last 3 Encounters:   06/03/21 (!) 245 lb (111.1 kg)   05/04/21 (!) 245 lb (111.1 kg)   02/04/21 (!) 245 lb (111.1 kg)     General Appearance:   Alert, well-appearing and in no acute distress.   HEENT: Atraumatic, normocephalic.  No scleral icterus, normal conjunctivae, EOMs intact, PERRL.  Wearing a mask.   Chest/Lungs:   Chest symmetric, spine straight.  Respirations unlabored.  Lungs are clear to auscultation.   Cardiovascular:    Irregularly irregular rate and rhythm.  Normal first and second heart sounds with no murmurs, rubs, or gallops; radial and posterior tibial pulses are intact, No edema.   Abdomen:  Soft, obese, nondistended, bowel sounds present.   Extremities: No cyanosis or clubbing.   Musculoskeletal: Moves all extremities.    Skin: Warm, dry, intact.    Neurologic: Mood and affect are appropriate.  Alert and oriented to person, place, time, and situation.    General: WNL  Eyes: WNL  Ears/Nose/Throat: WNL  Lungs: WNL  Heart: WNL  Stomach: WNL  Bladder: WNL  Muscle/Joints: Joint Pain, Muscle Weakness  Skin: Poor Wound Healing  Nervous System: WNL  Mental Health: WNL     Blood: WNL       Medical History  Surgical History Family History Social History   Past Medical History:   Diagnosis Date   ? Angioedema    ? Atrial  fibrillation and flutter (H)    ? Basal cell carcinoma    ? Branch retinal vein occlusion of left eye 7/1/2017   ? CAD (coronary artery disease), minimal disease on angiogram in 2021     ANGIOGRAM 2021 Left Main The vessel was visualized by angiography, is moderate in size and is angiographically normal. Left Anterior Descending Mid LAD lesion is 25% stenosed. Ramus Intermedius Ramus lesion is 10% stenosed. Left Circumflex Dist Cx lesion is 30% stenosed. Right Coronary Artery The vessel was visualized by angiography, is moderate in size and is angiographically normal.     ? Chronic neck pain    ? GERD (gastroesophageal reflux disease)    ? HTN (hypertension)    ? Hx of atrial flutter    ? Nonsmoker    ? Normal colonoscopy - 2016 - Average risk 4/15/2019   ? Radiculopathy     cervical   ? Retinal hemorrhage     Vessel rupture in left retina   ? Rhinitis, allergic    ? Urticaria     Past Surgical History:   Procedure Laterality Date   ? BASAL CELL CARCINOMA EXCISION  01/2020    Left nasal reconstruction   ? CERVICAL SPINE SURGERY      C8, T1, foraminotomy   ? CV CORONARY ANGIOGRAM N/A 1/20/2021    Procedure: Coronary Angiogram;  Surgeon: Willow Wellington MD;  Location: [facility] Cardiac Cath Lab;  Service: Cardiology   ? CV LEFT HEART CATHETERIZATION WO LEFT VETRICULOGRAM Left 1/20/2021    Procedure: Left Heart Catheterization Without Left Ventriculogram;  Surgeon: Willow Wellington MD;  Location: [facility] Cardiac Cath Lab;  Service: Cardiology   ? REVERSE TOTAL SHOULDER ARTHROPLASTY Left 05/2019   ? UMBILICAL HERNIA REPAIR       ×2    Family History   Problem Relation Age of Onset   ? Arthritis Mother    ? Other Mother         psychiatry/pvd   ? Other Father         blood reaction    Social History     Tobacco Use   ? Smoking status: Never Smoker   ? Smokeless tobacco: Never Used   Substance Use Topics   ? Alcohol use: Never     Frequency: Never     Comment: About once a year.   ? Drug use: No          Medications   Allergies   Current Outpatient Medications   Medication Sig Dispense Refill   ? apixaban ANTICOAGULANT (ELIQUIS) 5 mg Tab tablet Take 1 tablet (5 mg total) by mouth 2 (two) times a day. 60 tablet 5   ? calcium citrate-vitamin D (CITRACAL+D) 315 mg-5 mcg (200 unit) per tablet Take 1 tablet by mouth 2 (two) times a day.     ? fish oil-omega-3 fatty acids 300-1,000 mg capsule Take 2 g by mouth daily.     ? fluticasone propionate (FLONASE) 50 mcg/actuation nasal spray USE 2 SPRAYS IN EACH       NOSTRIL DAILY 48 g 2   ? gabapentin (NEURONTIN) 100 MG capsule Take 100-300 mg by mouth at bedtime as needed. (Patient taking differently: Take 200-400 mg by mouth see administration instructions. Patient currently takes 200 MG in the morning and 400 MG at bedtime.) 90 capsule 1   ? lisinopriL (PRINIVIL,ZESTRIL) 5 MG tablet Take 1 tablet (5 mg total) by mouth daily. 90 tablet 3   ? metoprolol succinate (TOPROL XL) 25 MG Take 1 tablet (25 mg total) by mouth daily. 90 tablet 3   ? MULTIVITAMIN ORAL Take by mouth.     ? naltrexone HCl (NALTREXONE ORAL) Take by mouth. 1mg once per day     ? oxymetazoline HCl (AFRIN, OXYMETAZOLINE, NASL) into each nostril.     ? OXYTOCIN, BULK, MISC Use As Directed. 50IU twice per day     ? phytonadione, vit K1, (PHYTONADIONE, VITAMIN K1,) 100 mcg tablet Take 100 mcg by mouth daily.     ? vits A,C,E/lutein/zeax/zn/paris (EYE HEALTH FORMULA ORAL) Take by mouth.     ? atorvastatin (LIPITOR) 40 MG tablet Take 1 tablet (40 mg total) by mouth at bedtime. 90 tablet 3   ? magnesium hydroxide (MAGNESIUM HYDROXIDE) 400 mg/5 mL Susp suspension Take 30 mL by mouth daily as needed.       No current facility-administered medications for this visit.     Allergies   Allergen Reactions   ? Hydrochlorothiazide Unknown   ? Hydrocodone Swelling     Sinus     ? Oxycontin [Oxycodone] Swelling     lip   ? Sulfa (Sulfonamide Antibiotics) Swelling   ? Perfume Swelling     Other reaction(s): Runny Nose         Lab Results     Chemistry/lipid CBC Cardiac Enzymes/BNP/TSH/INR   Lab Results   Component Value Date    CREATININE 0.99 01/20/2021    BUN 16 01/20/2021     01/20/2021    K 4.2 01/20/2021     01/20/2021    CO2 26 01/20/2021     Creatinine (mg/dL)   Date Value   01/20/2021 0.99   11/30/2020 0.99   06/18/2020 0.88   04/15/2019 0.85       Lab Results   Component Value Date    CHOL 165 01/20/2021    HDL 40 01/20/2021     Lab Results   Component Value Date    LDLCALC 112 01/20/2021      Lab Results   Component Value Date    WBC 7.7 01/20/2021    HGB 15.3 01/20/2021    HCT 44.9 01/20/2021    MCV 90 01/20/2021     01/20/2021    Lab Results   Component Value Date    TROPONINI 0.02 06/18/2020     (H) 11/30/2020    INR 0.95 06/29/2017      63 minutes were spent on the date of encounter performing chart review, history and exam, documentation, and further activities as noted above.    This note has been dictated using voice recognition software. Any grammatical, typographical, or context distortions are unintentional and inherent to the software.

## 2021-07-04 NOTE — ADDENDUM NOTE
Addendum Note by Selene Guerrero LPN at 6/8/2021 11:02 AM     Author: Selene Guerrero LPN Service: -- Author Type: Licensed Nurse    Filed: 6/8/2021 11:02 AM Encounter Date: 6/8/2021 Status: Signed    : Selene Guerrero LPN (Licensed Nurse)    Addended by: SELENE GUERRERO on: 6/8/2021 11:02 AM        Modules accepted: Orders

## 2021-07-05 ENCOUNTER — COMMUNICATION - HEALTHEAST (OUTPATIENT)
Dept: CARDIOLOGY | Facility: CLINIC | Age: 71
End: 2021-07-05

## 2021-07-05 DIAGNOSIS — I48.11 LONGSTANDING PERSISTENT ATRIAL FIBRILLATION (H): Primary | ICD-10-CM

## 2021-07-05 NOTE — TELEPHONE ENCOUNTER
Telephone Encounter by Ava Denis RN at 7/5/2021 11:35 AM     Author: Ava Denis RN Service: -- Author Type: Registered Nurse    Filed: 7/5/2021 11:35 AM Encounter Date: 7/5/2021 Status: Signed    : Ava Denis RN (Registered Nurse)       ----- Message from Abraham Valdez MD sent at 7/1/2021  9:19 AM CDT -----  Reviewed results of the CT angiogram, which was performed as part of the preprocedural evaluation for left atrial appendage closure (LAAC) procedure.  The study demonstrates no thrombus in the appendage.  Review of the study demonstrates the left atrial appendage to be of suitable orifice dimension and depth to permit endocardial left atrial appendage occlusion.  Appendage is on the large size (orifice 29 mm, depth 39 mm).  Okay to move forward with scheduling LAAC procedure.

## 2021-07-05 NOTE — TELEPHONE ENCOUNTER
Telephone Encounter by Ava Denis RN at 7/5/2021 11:35 AM     Author: Ava Denis RN Service: -- Author Type: Registered Nurse    Filed: 7/5/2021 12:01 PM Encounter Date: 7/5/2021 Status: Signed    : Ava Denis RN (Registered Nurse)       Called pt and discussed results. Pt would like to proceed with LAAC on 7/14. Discussed pre and post procedure expectations. Pt understand they will need a SDM. Will have Delaney call the pt to setup. Patient verbalizes understanding and agrees with plan. No further questions or concerns at this time.

## 2021-07-06 VITALS
DIASTOLIC BLOOD PRESSURE: 70 MMHG | HEIGHT: 72 IN | RESPIRATION RATE: 16 BRPM | SYSTOLIC BLOOD PRESSURE: 116 MMHG | BODY MASS INDEX: 33.18 KG/M2 | HEART RATE: 77 BPM | WEIGHT: 245 LBS

## 2021-07-06 DIAGNOSIS — Z11.59 ENCOUNTER FOR SCREENING FOR OTHER VIRAL DISEASES: ICD-10-CM

## 2021-07-07 NOTE — TELEPHONE ENCOUNTER
Telephone Encounter by Ava Denis RN at 7/7/2021 11:24 AM     Author: Ava Denis RN Service: -- Author Type: Registered Nurse    Filed: 7/7/2021 11:24 AM Encounter Date: 7/5/2021 Status: Signed    : Ava Denis RN (Registered Nurse)       From: Delaney Valadez  Sent: 7/6/2021   9:46 AM CDT  To: Ava Denis RN    SDM scheduled 7/9 with Nayeli in [facility]

## 2021-07-11 ENCOUNTER — PREP FOR PROCEDURE (OUTPATIENT)
Dept: CARDIOLOGY | Facility: CLINIC | Age: 71
End: 2021-07-11

## 2021-07-11 DIAGNOSIS — I48.11 LONGSTANDING PERSISTENT ATRIAL FIBRILLATION (H): Primary | ICD-10-CM

## 2021-07-12 ENCOUNTER — OFFICE VISIT (OUTPATIENT)
Dept: CARDIOLOGY | Facility: CLINIC | Age: 71
End: 2021-07-12
Payer: MEDICARE

## 2021-07-12 ENCOUNTER — DOCUMENTATION ONLY (OUTPATIENT)
Dept: CARDIOLOGY | Facility: CLINIC | Age: 71
End: 2021-07-12

## 2021-07-12 ENCOUNTER — LAB (OUTPATIENT)
Dept: CARDIOLOGY | Facility: CLINIC | Age: 71
End: 2021-07-12
Payer: MEDICARE

## 2021-07-12 ENCOUNTER — PREP FOR PROCEDURE (OUTPATIENT)
Dept: CARDIOLOGY | Facility: CLINIC | Age: 71
End: 2021-07-12

## 2021-07-12 ENCOUNTER — ALLIED HEALTH/NURSE VISIT (OUTPATIENT)
Dept: CARDIOLOGY | Facility: CLINIC | Age: 71
End: 2021-07-12

## 2021-07-12 VITALS
BODY MASS INDEX: 33.18 KG/M2 | HEIGHT: 72 IN | HEART RATE: 68 BPM | SYSTOLIC BLOOD PRESSURE: 120 MMHG | RESPIRATION RATE: 16 BRPM | DIASTOLIC BLOOD PRESSURE: 76 MMHG | WEIGHT: 245 LBS

## 2021-07-12 DIAGNOSIS — I48.11 LONGSTANDING PERSISTENT ATRIAL FIBRILLATION (H): Primary | ICD-10-CM

## 2021-07-12 DIAGNOSIS — I10 ESSENTIAL HYPERTENSION: ICD-10-CM

## 2021-07-12 DIAGNOSIS — I48.11 LONGSTANDING PERSISTENT ATRIAL FIBRILLATION (H): ICD-10-CM

## 2021-07-12 DIAGNOSIS — I25.10 CORONARY ARTERY DISEASE INVOLVING NATIVE CORONARY ARTERY OF NATIVE HEART WITHOUT ANGINA PECTORIS: Chronic | ICD-10-CM

## 2021-07-12 DIAGNOSIS — I50.22 CHRONIC SYSTOLIC HEART FAILURE (H): ICD-10-CM

## 2021-07-12 DIAGNOSIS — Z11.59 ENCOUNTER FOR SCREENING FOR OTHER VIRAL DISEASES: ICD-10-CM

## 2021-07-12 PROBLEM — I48.21 PERMANENT ATRIAL FIBRILLATION (H): Status: RESOLVED | Noted: 2021-06-03 | Resolved: 2021-07-12

## 2021-07-12 LAB
ANION GAP SERPL CALCULATED.3IONS-SCNC: 8 MMOL/L (ref 5–18)
BUN SERPL-MCNC: 13 MG/DL (ref 8–28)
CALCIUM SERPL-MCNC: 9 MG/DL (ref 8.5–10.5)
CHLORIDE BLD-SCNC: 105 MMOL/L (ref 98–107)
CO2 SERPL-SCNC: 25 MMOL/L (ref 22–31)
CREAT SERPL-MCNC: 0.84 MG/DL (ref 0.7–1.3)
ERYTHROCYTE [DISTWIDTH] IN BLOOD BY AUTOMATED COUNT: 12.7 % (ref 10–15)
GFR SERPL CREATININE-BSD FRML MDRD: 88 ML/MIN/1.73M2
GLUCOSE BLD-MCNC: 102 MG/DL (ref 70–125)
HCT VFR BLD AUTO: 44.4 % (ref 40–53)
HGB BLD-MCNC: 14.9 G/DL (ref 13.3–17.7)
MCH RBC QN AUTO: 31.4 PG (ref 26.5–33)
MCHC RBC AUTO-ENTMCNC: 33.6 G/DL (ref 31.5–36.5)
MCV RBC AUTO: 94 FL (ref 78–100)
PLATELET # BLD AUTO: 270 10E3/UL (ref 150–450)
POTASSIUM BLD-SCNC: 4.3 MMOL/L (ref 3.5–5)
RBC # BLD AUTO: 4.74 10E6/UL (ref 4.4–5.9)
SODIUM SERPL-SCNC: 138 MMOL/L (ref 136–145)
WBC # BLD AUTO: 8.3 10E3/UL (ref 4–11)

## 2021-07-12 PROCEDURE — U0003 INFECTIOUS AGENT DETECTION BY NUCLEIC ACID (DNA OR RNA); SEVERE ACUTE RESPIRATORY SYNDROME CORONAVIRUS 2 (SARS-COV-2) (CORONAVIRUS DISEASE [COVID-19]), AMPLIFIED PROBE TECHNIQUE, MAKING USE OF HIGH THROUGHPUT TECHNOLOGIES AS DESCRIBED BY CMS-2020-01-R: HCPCS

## 2021-07-12 PROCEDURE — 99207 PR NO CHARGE LOS: CPT

## 2021-07-12 PROCEDURE — 99203 OFFICE O/P NEW LOW 30 MIN: CPT | Performed by: INTERNAL MEDICINE

## 2021-07-12 PROCEDURE — 85027 COMPLETE CBC AUTOMATED: CPT

## 2021-07-12 PROCEDURE — U0005 INFEC AGEN DETEC AMPLI PROBE: HCPCS

## 2021-07-12 PROCEDURE — 93000 ELECTROCARDIOGRAM COMPLETE: CPT | Performed by: GENERAL ACUTE CARE HOSPITAL

## 2021-07-12 PROCEDURE — 36415 COLL VENOUS BLD VENIPUNCTURE: CPT

## 2021-07-12 PROCEDURE — 93000 ELECTROCARDIOGRAM COMPLETE: CPT

## 2021-07-12 PROCEDURE — 80048 BASIC METABOLIC PNL TOTAL CA: CPT

## 2021-07-12 RX ORDER — LIDOCAINE 40 MG/G
CREAM TOPICAL
Status: CANCELLED | OUTPATIENT
Start: 2021-07-12

## 2021-07-12 RX ORDER — SENNOSIDES 8.6 MG
650 CAPSULE ORAL EVERY 8 HOURS PRN
COMMUNITY

## 2021-07-12 RX ORDER — SODIUM CHLORIDE 9 MG/ML
1000 INJECTION, SOLUTION INTRAVENOUS CONTINUOUS
Status: CANCELLED | OUTPATIENT
Start: 2021-07-12

## 2021-07-12 RX ORDER — UBIDECARENONE 100 MG
200 CAPSULE ORAL DAILY
COMMUNITY

## 2021-07-12 RX ORDER — ASPIRIN 81 MG/1
81 TABLET ORAL ONCE
Status: CANCELLED | OUTPATIENT
Start: 2021-07-12 | End: 2021-07-12

## 2021-07-12 ASSESSMENT — MIFFLIN-ST. JEOR: SCORE: 1904.31

## 2021-07-12 NOTE — NURSING NOTE
Mike Gonzalez  [address] [facility] [place] [address]  [phone] (home) [phone] (work)    Patient in to see RN for Pre-LAAC visit on 7/12/21    All pre-procedure labs drawn: In process   EKG obtained: Yes   Labs reviewed: In process  Renal Issues: No  Diabetic?: No  Device?: No   Covid-19 Test Date: 7/12/21 Location: [place]  Patient understands after they get their test, they are to self-isolate at home until their procedure. They will only be called back if there results are positive or if there is an issue with not receiving their results back by 4:30pm the day before their scheduled procedure.    Pre-procedure instructions  Patient instructed to be NPO after midnight the evening prior to procedure.  Patient instructed to shower the evening before or the morning of the procedure.  Leave all valuables at home (jewlery, rings, watches, large amounts of money).  Patient understands there is one visitor allowed during patients stay. Visitor will need to wear a mask their entire stay and remain in the restricted area per guidelines.   Patient instructed to arrange for transportation home following procedure from a responsible family member of friend. No driving for at least 24 hours post-procedure.  Patient instructed to have a responsible adult with them for 24 hours post-procedure.  Post-procedure follow up process.    Patient instructed on medications:   Take Eliquis, Gabapentin, Metoprolol, Lisinopril    Aspirin 81mg morning of procedure: To be given pre-procedure    Education was given to patient regarding what to expect pre-procedure.     Patient was informed procedure will be done at [facility]: Main Entrance at [address] [place] [address]; [place] [address] and their arrival time is at 5:30 am    LAAC procedure, JEAN CLAUDE  and blood consent signed at the time of the appt: Yes    All questions were answered to family and patient by RN.    Wife Melita present at the time  of appointment.    Melvina Ch RN BSN  Structural Heart Coordinator   [facility]  [phone]    Patient Active Problem List   Diagnosis     Essential hypertension     GERD (gastroesophageal reflux disease)     Chronic neck pain     Nonsmoker     Basal cell carcinoma     Full code status     Branch retinal vein occlusion of left eye     Normal colonoscopy - 2016 - Average risk     Complete tear of left rotator cuff     Obesity (BMI 35.0-39.9) with comorbidity (H)     Atrial flutter, chronic (H)     Dilated cardiomyopathy (H)     CAD (coronary artery disease), minimal disease on angiogram in 2021     Longstanding persistent atrial fibrillation (H)     Chronic systolic heart failure (H)     Current Outpatient Medications   Medication Sig     acetaminophen (TYLENOL) 650 MG CR tablet Take 650 mg by mouth daily as needed     apixaban ANTICOAGULANT (ELIQUIS) 5 mg Tab tablet [APIXABAN ANTICOAGULANT (ELIQUIS) 5 MG TAB TABLET] Take 1 tablet (5 mg total) by mouth 2 (two) times a day.     co-enzyme Q-10 100 MG CAPS capsule Take 200 mg by mouth daily     fish oil-omega-3 fatty acids 300-1,000 mg capsule [FISH OIL-OMEGA-3 FATTY ACIDS 300-1,000 MG CAPSULE] Take 2 g by mouth daily.     fluticasone propionate (FLONASE) 50 mcg/actuation nasal spray [FLUTICASONE PROPIONATE (FLONASE) 50 MCG/ACTUATION NASAL SPRAY] USE 2 SPRAYS IN EACH       NOSTRIL DAILY     gabapentin (NEURONTIN) 100 MG capsule [GABAPENTIN (NEURONTIN) 100 MG CAPSULE] Take 200-400 mg by mouth see administration instructions. Patient currently takes 200 MG in the morning and 400 MG at bedtime. (Patient taking differently: Take 600 mg by mouth daily At bedtime)     lisinopriL (PRINIVIL,ZESTRIL) 5 MG tablet [LISINOPRIL (PRINIVIL,ZESTRIL) 5 MG TABLET] Take 1 tablet (5 mg total) by mouth daily.     metoprolol succinate (TOPROL XL) 25 MG [METOPROLOL SUCCINATE (TOPROL XL) 25 MG] Take 1 tablet (25 mg total) by mouth daily.      MULTIVITAMIN ORAL [MULTIVITAMIN ORAL] Take by mouth.     oxymetazoline HCl (AFRIN, OXYMETAZOLINE, NASL) [OXYMETAZOLINE HCL (AFRIN, OXYMETAZOLINE, NASL)] into each nostril.     phytonadione, vit K1, (PHYTONADIONE, VITAMIN K1,) 100 mcg tablet [PHYTONADIONE, VIT K1, (PHYTONADIONE, VITAMIN K1,) 100 MCG TABLET] Take 100 mcg by mouth daily.     vits A,C,E/lutein/zeax/zn/marly (EYE HEALTH FORMULA ORAL) [VITS A,C,E/LUTEIN/ZEAX/ZN/MARLY (EYE HEALTH FORMULA ORAL)] Take by mouth.     No current facility-administered medications for this visit.      Allergies   Allergen Reactions     Hydrochlorothiazide Unknown     Hydrocodone Swelling     Sinus     Oxycontin [Oxycodone] Swelling     lip     Sulfa (Sulfonamide Antibiotics) [Sulfa Drugs] Swelling     Perfume Swelling     Other reaction(s): Runny Nose

## 2021-07-12 NOTE — PROGRESS NOTES
MODIFIED ENA SCALE   Timepoint: Pre-LAAC    Previous score: initial ENA    Score Description   0 No symptoms at all   1 No significant disability despite symptoms; able to carry out all usual duties and activities   2 Slight disability; unable to carry out all previous activities, but able to look after own affairs without assistance   3 Moderate disability; requiring some help, but able to walk without assistance   4 Moderately severe disability; unable to walk without assistance and unable to attend to own bodily needs without assistance   5 Severe disability; bedridden, incontinent and requiring constant nursing care and attention   6 Dead    Total score (0 - 6):  0    Change in score if s/p LAAC? N/A  If yes, notify implanting cardiologist.    Melvina Ch RN BSN  Structural Heart Coordinator   [facility]  [phone]

## 2021-07-12 NOTE — LETTER
7/12/2021    Onur Rod MD  [address]  [facility] [address]    RE: Mike Gonzalez       Dear Colleague,    I had the pleasure of seeing Mike Gonzalez in the [facility].        Assessment/Recommendations   1.  Longstanding persistent atrial fibrillation: I have personally reviewed this patient's chart and have spoken with the patient about the treatment options, including SPARKLE device.  He has a [id number] score of 4 for age 65-74, hypertension, coronary artery disease and heart failure.  He has a HAS-BLED score of 3 for age, bleeding disposition and current use of NSAIDs.   He is not a candidate for long-term anticoagulation due to chronic pain with need to use NSAIDs, which he currently can use.  He also has easy bruising/bleeding.  He had a CT pulmonary vein study which showed that his anatomy is amenable for implant.    He is scheduled for implant on Wednesday.  He will continue Eliquis up until and through implant.  After implant he will be started on ASA 81 mg daily along with the Eliquis.  Approximately 45 days after implant, he will have a post procedure JEAN CLAUDE. If the post JEAN CLAUDE is negative for leaks and no thrombus is seen on the surface of the device, he would be instructed to stop the Eliquis.  At that time he would continue the aspirin 81 mg and add Plavix 75 mg by mouth daily for an additional 4 months.  After being on DAPT for approximately 4 months, he will stop the Plavix and continue on just aspirin 81 mg daily indefinitely.  He understands that the risks of the procedure are <2% and include, but are not limited to device embolization, air embolism, myocardial perforation, device thrombosis, ASD, stroke, or death.  We discussed expected recovery and follow-up.       The patient is a good candidate for proceeding with left atrial appendage screening and implant.  His questions were answered to his satisfaction.  His consents have  been signed and witnessed by me.  His EKG has been reviewed.  Labs are pending and will be reviewed prior to his arrival on Wednesday morning    2.  Hypertension -blood pressure today is controlled.  He should continue taking lisinopril 5 mg daily and metoprolol succinate 25 mg daily    3.  Nonischemic cardiomyopathy, heart failure with reduced ejection fraction, NYHA class II -LVEF is 42%.  He is currently euvolemic and denies orthopnea, PND or shortness of breath.  He has no lower extremity edema.  Continue beta-blocker and ACE inhibitor therapy.  He does follow with heart failure [facility]    4.  Nonobstructive coronary artery disease -patient complains of no angina.  He is on aspirin and beta-blocker.  Patient recently was given prescription for atorvastatin, but has not started it because it interferes with his stem cell infusion that he just got.       History of Present Illness/Subjective    Mike Gonzalez is a 70 year old male who comes in today for history and physical prior to insertion of left atrial appendage occulsion device.    Mike Gonzalez has a past history of atrial fibrillation and atrial flutter, hypertension, nonobstructive coronary artery disease, nonischemic cardiomyopathy with LVEF 44% and obesity.  He has degenerative joint disease and has a lot of shoulder and hip pain.  He would like to be able to take NSAIDs without worry of bleeding risk since he needs protection against stroke risk from his atrial fibrillation.  He also tells me that he bruises very easily.  He had a garage door drop on his left foot last week and all 5 of his toes are quite bruised and painful.  The patient has left shoulder pain, hip and back pain.  He actually gets stem cell infusions for his hip and back pain.      Mike Gonzalez denies chest discomfort, palpitations, shortness of breath, paroxysmal nocturnal dyspnea, orthopnea, lightheadedness, dizziness, pre-syncope, or syncope.  Mike Gonzalez also denies any  weight loss, changes in appetite, nausea or vomiting.     Medical, surgical, family, social history, and medications were reviewed and updated as necessary.    CT pulm vein results (from June 27):  1. The left atrial appendage is large and has a chicken wing morphology. The following measurements of the left atrial appendage were made, at 35% cardiac phase at the level of the bifurcation of the left circumflex artery:   Orifice diameter: 33 x 27 mm   Orifice circumference: 96 mm   Orifice area: 7.08 cm2   Useful depth: 30 mm   Maximum depth: 56 mm     2. Severe left atrial enlargement.   3. No thrombus noted in the left atrial appendage.   4. This study was not optimized to evaluate the coronary arteries, but there appears to be no significant coronary artery disease.     Physical Examination Review of Systems   /76 (BP Location: Right arm, Patient Position: Semi-Art's, Cuff Size: Adult Regular)   Pulse 68   Resp 16   Ht 1.829 m (6')   Wt 111.1 kg (245 lb)   BMI 33.23 kg/m    Body mass index is 33.23 kg/m .  Wt Readings from Last 3 Encounters:   07/12/21 111.1 kg (245 lb)   07/09/21 111.1 kg (245 lb)   06/03/21 111.1 kg (245 lb)       General Appearance:   Alert, cooperative and in no acute distress   ENT/Mouth: membranes moist, no oral lesions or bleeding gums.      EYES:  no scleral icterus, normal conjunctivae   Neck: Thyroid not visualized   Chest/Lungs:   lungs are clear to auscultation, no rales or wheezing   Cardiovascular:   Irregularly irregular . Normal first and second heart sounds with no murmurs, rubs or gallops; the carotid, radial and posterior tibial pulses are intact, no edema bilaterally    Abdomen:  Soft and nontender. Bowel sounds are present in all quadrants   Extremities: no cyanosis or clubbing   Skin: no xanthelasma, warm.    Neurologic: normal gait, normal  bilateral, no tremors   Psychiatric: Normal mood and affect                                              Medical  History  Surgical History Family History Social History   Past Medical History:   Diagnosis Date     Angioedema      Atrial fibrillation and flutter (H)      Basal cell carcinoma      Branch retinal vein occlusion of left eye 7/1/2017     CAD (coronary artery disease)     ANGIOGRAM 2021 Left Main The vessel was visualized by angiography, is moderate in size and is angiographically normal. Left Anterior Descending Mid LAD lesion is 25% stenosed. Ramus Intermedius Ramus lesion is 10% stenosed. Left Circumflex Dist Cx lesion is 30% stenosed. Right Coronary Artery The vessel was visualized by angiography, is moderate in size and is angiographically normal.       Chronic neck pain      GERD (gastroesophageal reflux disease)      HTN (hypertension)      Hx of atrial flutter      Nonsmoker      Radiculopathy     cervical     Retinal hemorrhage     Vessel rupture in left retina     Rhinitis, allergic      S/P colonoscopy 4/15/2019     Urticaria     Past Surgical History:   Procedure Laterality Date     BASAL CELL CARCINOMA EXCISION  01/2020    Left nasal reconstruction     CERVICAL SPINE SURGERY      C8, T1, foraminotomy     CV CORONARY ANGIOGRAM N/A 1/20/2021    Procedure: Coronary Angiogram;  Surgeon: Willow Wellington MD;  Location: [facility] Cardiac [facility] Lab;  Service: Cardiology     CV LEFT HEART CATHETERIZATION WITHOUT LEFT VENTRICULOGRAM Left 1/20/2021    Procedure: Left Heart Catheterization Without Left Ventriculogram;  Surgeon: Willow Wellington MD;  Location: [facility] Cardiac Cath Lab;  Service: Cardiology     HERNIA REPAIR, UMBILICAL       ×2     REVERSE TOTAL SHOULDER ARTHROPLASTY Left 05/2019    Family History   Problem Relation Age of Onset     Arthritis Mother      Other - See Comments Mother         psychiatry/pvd     Other - See Comments Father         blood reaction    Social History     Socioeconomic History     Marital status:      Spouse name: Not on file     Number of children: Not on file      Years of education: Not on file     Highest education level: Not on file   Occupational History     Not on file   Tobacco Use     Smoking status: Never Smoker     Smokeless tobacco: Never Used   Substance and Sexual Activity     Alcohol use: Never     Comment: Alcoholic Drinks/day: About once a year.     Drug use: No     Sexual activity: Not on file   Other Topics Concern     Not on file   Social History Narrative    Patient of Dr. Rod since 2001.    .  Wife is a former RN.     Wife's cousin is Dr. Jean Pierre Champagne, ID specialist.     Social Determinants of Health     Financial Resource Strain:      Difficulty of Paying Living Expenses:    Food Insecurity:      Worried About Running Out of Food in the Last Year:      Ran Out of Food in the Last Year:    Transportation Needs:      Lack of Transportation (Medical):      Lack of Transportation (Non-Medical):    Physical Activity:      Days of Exercise per Week:      Minutes of Exercise per Session:    Stress:      Feeling of Stress :    Social Connections:      Frequency of Communication with Friends and Family:      Frequency of Social Gatherings with Friends and Family:      Attends [employer] Services:      Active Member of Clubs or Organizations:      Attends Club or Organization Meetings:      Marital Status:    Intimate Partner Violence:      Fear of Current or Ex-Partner:      Emotionally Abused:      Physically Abused:      Sexually Abused:           Medications  Allergies   Current Outpatient Medications   Medication Sig Dispense Refill     acetaminophen (TYLENOL) 650 MG CR tablet Take 650 mg by mouth daily as needed       apixaban ANTICOAGULANT (ELIQUIS) 5 mg Tab tablet [APIXABAN ANTICOAGULANT (ELIQUIS) 5 MG TAB TABLET] Take 1 tablet (5 mg total) by mouth 2 (two) times a day. 60 tablet 5     co-enzyme Q-10 100 MG CAPS capsule Take 200 mg by mouth daily       fish oil-omega-3 fatty acids 300-1,000 mg capsule [FISH OIL-OMEGA-3 FATTY ACIDS 300-1,000 MG  CAPSULE] Take 2 g by mouth daily.       fluticasone propionate (FLONASE) 50 mcg/actuation nasal spray [FLUTICASONE PROPIONATE (FLONASE) 50 MCG/ACTUATION NASAL SPRAY] USE 2 SPRAYS IN EACH       NOSTRIL DAILY 48 g 2     gabapentin (NEURONTIN) 100 MG capsule [GABAPENTIN (NEURONTIN) 100 MG CAPSULE] Take 200-400 mg by mouth see administration instructions. Patient currently takes 200 MG in the morning and 400 MG at bedtime. (Patient taking differently: Take 600 mg by mouth daily At bedtime) 100 capsule 12     lisinopriL (PRINIVIL,ZESTRIL) 5 MG tablet [LISINOPRIL (PRINIVIL,ZESTRIL) 5 MG TABLET] Take 1 tablet (5 mg total) by mouth daily. 90 tablet 3     metoprolol succinate (TOPROL XL) 25 MG [METOPROLOL SUCCINATE (TOPROL XL) 25 MG] Take 1 tablet (25 mg total) by mouth daily. 90 tablet 3     MULTIVITAMIN ORAL [MULTIVITAMIN ORAL] Take by mouth.       oxymetazoline HCl (AFRIN, OXYMETAZOLINE, NASL) [OXYMETAZOLINE HCL (AFRIN, OXYMETAZOLINE, NASL)] into each nostril.       phytonadione, vit K1, (PHYTONADIONE, VITAMIN K1,) 100 mcg tablet [PHYTONADIONE, VIT K1, (PHYTONADIONE, VITAMIN K1,) 100 MCG TABLET] Take 100 mcg by mouth daily.       vits A,C,E/lutein/zeax/zn/marly (EYE HEALTH FORMULA ORAL) [VITS A,C,E/LUTEIN/ZEAX/ZN/MARLY (EYE HEALTH FORMULA ORAL)] Take by mouth.      Allergies   Allergen Reactions     Hydrochlorothiazide Unknown     Hydrocodone Swelling     Sinus     Oxycontin [Oxycodone] Swelling     lip     Sulfa (Sulfonamide Antibiotics) [Sulfa Drugs] Swelling     Perfume Swelling     Other reaction(s): Runny Nose         Lab Results    Chemistry/lipid CBC Cardiac Enzymes/BNP/TSH/INR   Lab Results   Component Value Date    CHOL 165 01/20/2021    HDL 40 01/20/2021    TRIG 65 01/20/2021    BUN 16 01/20/2021     01/20/2021    CO2 26 01/20/2021    Lab Results   Component Value Date    WBC 7.7 01/20/2021    HGB 15.3 01/20/2021    HCT 44.9 01/20/2021    MCV 90 01/20/2021     01/20/2021    Lab Results   Component  Value Date    TROPONINI 0.02 06/18/2020     (H) 11/30/2020        36 minutes spent on the date of encounter doing education, consent signing, chart prep/review, review of test results, interpretation with above tests, patient visit, documentation and discussion with family.      This note has been dictated using voice recognition software. Any grammatical or context distortions are unintentional and inherent to the software.            Thank you for allowing me to participate in the care of your patient.      Sincerely,     Gina Blount PA-C     [facility]  cc:   No referring provider defined for this encounter.

## 2021-07-12 NOTE — H&P (VIEW-ONLY)
Assessment/Recommendations   1.  Longstanding persistent atrial fibrillation: I have personally reviewed this patient's chart and have spoken with the patient about the treatment options, including SPARKLE device.  He has a [id number] score of 4 for age 65-74, hypertension, coronary artery disease and heart failure.  He has a HAS-BLED score of 3 for age, bleeding disposition and current use of NSAIDs.   He is not a candidate for long-term anticoagulation due to chronic pain with need to use NSAIDs, which he currently can use.  He also has easy bruising/bleeding.  He had a CT pulmonary vein study which showed that his anatomy is amenable for implant.    He is scheduled for implant on Wednesday.  He will continue Eliquis and aspirin up until and through implant.  Approximately 45 days after implant, he will have a post procedure JEAN CLAUDE. If the post JEAN CLAUDE is negative for leaks and no thrombus is seen on the surface of the device, he would be instructed to stop the Eliquis.  At that time he would continue the aspirin 81 mg and add Plavix 75 mg by mouth daily for an additional 4 months.  After being on DAPT for approximately 4 months, he will stop the Plavix and continue on just aspirin 81 mg daily indefinitely.  He understands that the risks of the procedure are <2% and include, but are not limited to device embolization, air embolism, myocardial perforation, device thrombosis, ASD, stroke, or death.  We discussed expected recovery and follow-up.       The patient is a good candidate for proceeding with left atrial appendage screening and implant.  His questions were answered to his satisfaction.  His consents have been signed and witnessed by me.  His EKG has been reviewed.  Labs are pending and will be reviewed prior to his arrival on Wednesday morning    2.  Hypertension -blood pressure today is controlled.  He should continue taking lisinopril 5 mg daily and metoprolol succinate 25 mg daily    3.  Nonischemic  cardiomyopathy, heart failure with reduced ejection fraction, NYHA class II -LVEF is 42%.  He is currently euvolemic and denies orthopnea, PND or shortness of breath.  He has no lower extremity edema.  Continue beta-blocker and ACE inhibitor therapy.  He does follow with [facility] failure [facility]    4.  Nonobstructive coronary artery disease -patient complains of no angina.  He is on aspirin and beta-blocker.  Patient recently was given prescription for atorvastatin, but has not started it because it interferes with his stem cell infusion that he just got.       History of Present Illness/Subjective    Mike Gonzalez is a 70 year old male who comes in today for history and physical prior to insertion of left atrial appendage occulsion device.    Mike Gonzalez has a past history of atrial fibrillation and atrial flutter, hypertension, nonobstructive coronary artery disease, nonischemic cardiomyopathy with LVEF 44% and obesity.  He has degenerative joint disease and has a lot of shoulder and hip pain.  He would like to be able to take NSAIDs without worry of bleeding risk since he needs protection against stroke risk from his atrial fibrillation.  He also tells me that he bruises very easily.  He had a garage door drop on his left foot last week and all 5 of his toes are quite bruised and painful.  The patient has left shoulder pain, hip and back pain.  He actually gets stem cell infusions for his hip and back pain.      Mike Gonzalez denies chest discomfort, palpitations, shortness of breath, paroxysmal nocturnal dyspnea, orthopnea, lightheadedness, dizziness, pre-syncope, or syncope.  Mike Gonzalez also denies any weight loss, changes in appetite, nausea or vomiting.     Medical, surgical, family, social history, and medications were reviewed and updated as necessary.    CT pulm vein results (from June 27):  1. The left atrial appendage is large and has a chicken wing morphology. The following measurements of the  left atrial appendage were made, at 35% cardiac phase at the level of the bifurcation of the left circumflex artery:   Orifice diameter: 33 x 27 mm   Orifice circumference: 96 mm   Orifice area: 7.08 cm2   Useful depth: 30 mm   Maximum depth: 56 mm     2. Severe left atrial enlargement.   3. No thrombus noted in the left atrial appendage.   4. This study was not optimized to evaluate the coronary arteries, but there appears to be no significant coronary artery disease.     Physical Examination Review of Systems   /76 (BP Location: Right arm, Patient Position: Semi-Art's, Cuff Size: Adult Regular)   Pulse 68   Resp 16   Ht 1.829 m (6')   Wt 111.1 kg (245 lb)   BMI 33.23 kg/m    Body mass index is 33.23 kg/m .  Wt Readings from Last 3 Encounters:   07/12/21 111.1 kg (245 lb)   07/09/21 111.1 kg (245 lb)   06/03/21 111.1 kg (245 lb)       General Appearance:   Alert, cooperative and in no acute distress   ENT/Mouth: membranes moist, no oral lesions or bleeding gums.      EYES:  no scleral icterus, normal conjunctivae   Neck: Thyroid not visualized   Chest/Lungs:   lungs are clear to auscultation, no rales or wheezing   Cardiovascular:   Irregularly irregular . Normal first and second heart sounds with no murmurs, rubs or gallops; the carotid, radial and posterior tibial pulses are intact, no edema bilaterally    Abdomen:  Soft and nontender. Bowel sounds are present in all quadrants   Extremities: no cyanosis or clubbing   Skin: no xanthelasma, warm.    Neurologic: normal gait, normal  bilateral, no tremors   Psychiatric: Normal mood and affect                                              Medical History  Surgical History Family History Social History   Past Medical History:   Diagnosis Date     Angioedema      Atrial fibrillation and flutter (H)      Basal cell carcinoma      Branch retinal vein occlusion of left eye 7/1/2017     CAD (coronary artery disease)     ANGIOGRAM 2021 Left Main The vessel  was visualized by angiography, is moderate in size and is angiographically normal. Left Anterior Descending Mid LAD lesion is 25% stenosed. Ramus Intermedius Ramus lesion is 10% stenosed. Left Circumflex Dist Cx lesion is 30% stenosed. Right Coronary Artery The vessel was visualized by angiography, is moderate in size and is angiographically normal.       Chronic neck pain      GERD (gastroesophageal reflux disease)      HTN (hypertension)      Hx of atrial flutter      Nonsmoker      Radiculopathy     cervical     Retinal hemorrhage     Vessel rupture in left retina     Rhinitis, allergic      S/P colonoscopy 4/15/2019     Urticaria     Past Surgical History:   Procedure Laterality Date     BASAL CELL CARCINOMA EXCISION  01/2020    Left nasal reconstruction     CERVICAL SPINE SURGERY      C8, T1, foraminotomy     CV CORONARY ANGIOGRAM N/A 1/20/2021    Procedure: Coronary Angiogram;  Surgeon: Willow Wellington MD;  Location: [facility];  Service: Cardiology     CV LEFT HEART CATHETERIZATION WITHOUT LEFT VENTRICULOGRAM Left 1/20/2021    Procedure: Left Heart Catheterization Without Left Ventriculogram;  Surgeon: Willow Wellington MD;  Location: [facility] Cardiac [facility] Lab;  Service: Cardiology     HERNIA REPAIR, UMBILICAL       ×2     REVERSE TOTAL SHOULDER ARTHROPLASTY Left 05/2019    Family History   Problem Relation Age of Onset     Arthritis Mother      Other - See Comments Mother         psychiatry/pvd     Other - See Comments Father         blood reaction    Social History     Socioeconomic History     Marital status:      Spouse name: Not on file     Number of children: Not on file     Years of education: Not on file     Highest education level: Not on file   Occupational History     Not on file   Tobacco Use     Smoking status: Never Smoker     Smokeless tobacco: Never Used   Substance and Sexual Activity     Alcohol use: Never     Comment: Alcoholic Drinks/day: About once a year.     Drug  use: No     Sexual activity: Not on file   Other Topics Concern     Not on file   Social History Narrative    Patient of Dr. Rod since 2001.    .  Wife is a former RN.     Wife's cousin is Dr. Jean Pierre Champagne, ID specialist.     Social Determinants of Health     Financial Resource Strain:      Difficulty of Paying Living Expenses:    Food Insecurity:      Worried About Running Out of Food in the Last Year:      Ran Out of Food in the Last Year:    Transportation Needs:      Lack of Transportation (Medical):      Lack of Transportation (Non-Medical):    Physical Activity:      Days of Exercise per Week:      Minutes of Exercise per Session:    Stress:      Feeling of Stress :    Social Connections:      Frequency of Communication with Friends and Family:      Frequency of Social Gatherings with Friends and Family:      Attends [employer] Services:      Active Member of Clubs or Organizations:      Attends Club or Organization Meetings:      Marital Status:    Intimate Partner Violence:      Fear of Current or Ex-Partner:      Emotionally Abused:      Physically Abused:      Sexually Abused:           Medications  Allergies   Current Outpatient Medications   Medication Sig Dispense Refill     acetaminophen (TYLENOL) 650 MG CR tablet Take 650 mg by mouth daily as needed       apixaban ANTICOAGULANT (ELIQUIS) 5 mg Tab tablet [APIXABAN ANTICOAGULANT (ELIQUIS) 5 MG TAB TABLET] Take 1 tablet (5 mg total) by mouth 2 (two) times a day. 60 tablet 5     co-enzyme Q-10 100 MG CAPS capsule Take 200 mg by mouth daily       fish oil-omega-3 fatty acids 300-1,000 mg capsule [FISH OIL-OMEGA-3 FATTY ACIDS 300-1,000 MG CAPSULE] Take 2 g by mouth daily.       fluticasone propionate (FLONASE) 50 mcg/actuation nasal spray [FLUTICASONE PROPIONATE (FLONASE) 50 MCG/ACTUATION NASAL SPRAY] USE 2 SPRAYS IN EACH       NOSTRIL DAILY 48 g 2     gabapentin (NEURONTIN) 100 MG capsule [GABAPENTIN (NEURONTIN) 100 MG CAPSULE] Take 200-400 mg by  mouth see administration instructions. Patient currently takes 200 MG in the morning and 400 MG at bedtime. (Patient taking differently: Take 600 mg by mouth daily At bedtime) 100 capsule 12     lisinopriL (PRINIVIL,ZESTRIL) 5 MG tablet [LISINOPRIL (PRINIVIL,ZESTRIL) 5 MG TABLET] Take 1 tablet (5 mg total) by mouth daily. 90 tablet 3     metoprolol succinate (TOPROL XL) 25 MG [METOPROLOL SUCCINATE (TOPROL XL) 25 MG] Take 1 tablet (25 mg total) by mouth daily. 90 tablet 3     MULTIVITAMIN ORAL [MULTIVITAMIN ORAL] Take by mouth.       oxymetazoline HCl (AFRIN, OXYMETAZOLINE, NASL) [OXYMETAZOLINE HCL (AFRIN, OXYMETAZOLINE, NASL)] into each nostril.       phytonadione, vit K1, (PHYTONADIONE, VITAMIN K1,) 100 mcg tablet [PHYTONADIONE, VIT K1, (PHYTONADIONE, VITAMIN K1,) 100 MCG TABLET] Take 100 mcg by mouth daily.       vits A,C,E/lutein/zeax/zn/marly (EYE HEALTH FORMULA ORAL) [VITS A,C,E/LUTEIN/ZEAX/ZN/MARLY (EYE HEALTH FORMULA ORAL)] Take by mouth.      Allergies   Allergen Reactions     Hydrochlorothiazide Unknown     Hydrocodone Swelling     Sinus     Oxycontin [Oxycodone] Swelling     lip     Sulfa (Sulfonamide Antibiotics) [Sulfa Drugs] Swelling     Perfume Swelling     Other reaction(s): Runny Nose         Lab Results    Chemistry/lipid CBC Cardiac Enzymes/BNP/TSH/INR   Lab Results   Component Value Date    CHOL 165 01/20/2021    HDL 40 01/20/2021    TRIG 65 01/20/2021    BUN 16 01/20/2021     01/20/2021    CO2 26 01/20/2021    Lab Results   Component Value Date    WBC 7.7 01/20/2021    HGB 15.3 01/20/2021    HCT 44.9 01/20/2021    MCV 90 01/20/2021     01/20/2021    Lab Results   Component Value Date    TROPONINI 0.02 06/18/2020     (H) 11/30/2020        36 minutes spent on the date of encounter doing education, consent signing, chart prep/review, review of test results, interpretation with above tests, patient visit, documentation and discussion with family.      This note has been dictated  using voice recognition software. Any grammatical or context distortions are unintentional and inherent to the software.

## 2021-07-12 NOTE — PROGRESS NOTES
Assessment/Recommendations   1.  Longstanding persistent atrial fibrillation: I have personally reviewed this patient's chart and have spoken with the patient about the treatment options, including SPARKLE device.  He has a [id number] score of 4 for age 65-74, hypertension, coronary artery disease and heart failure.  He has a HAS-BLED score of 3 for age, bleeding disposition and current use of NSAIDs.   He is not a candidate for long-term anticoagulation due to chronic pain with need to use NSAIDs, which he currently can use.  He also has easy bruising/bleeding.  He had a CT pulmonary vein study which showed that his anatomy is amenable for implant.    He is scheduled for implant on Wednesday.  He will continue Eliquis up until and through implant.  After implant he will be started on ASA 81 mg daily along with the Eliquis.  Approximately 45 days after implant, he will have a post procedure JEAN CLAUDE. If the post JEAN CLAUDE is negative for leaks and no thrombus is seen on the surface of the device, he would be instructed to stop the Eliquis.  At that time he would continue the aspirin 81 mg and add Plavix 75 mg by mouth daily for an additional 4 months.  After being on DAPT for approximately 4 months, he will stop the Plavix and continue on just aspirin 81 mg daily indefinitely.  He understands that the risks of the procedure are <2% and include, but are not limited to device embolization, air embolism, myocardial perforation, device thrombosis, ASD, stroke, or death.  We discussed expected recovery and follow-up.       The patient is a good candidate for proceeding with left atrial appendage screening and implant.  His questions were answered to his satisfaction.  His consents have been signed and witnessed by me.  His EKG has been reviewed.  Labs are pending and will be reviewed prior to his arrival on Wednesday morning    2.  Hypertension -blood pressure today is controlled.  He should continue taking lisinopril 5 mg daily  and metoprolol succinate 25 mg daily    3.  Nonischemic cardiomyopathy, heart failure with reduced ejection fraction, NYHA class II -LVEF is 42%.  He is currently euvolemic and denies orthopnea, PND or shortness of breath.  He has no lower extremity edema.  Continue beta-blocker and ACE inhibitor therapy.  He does follow with [facility]    4.  Nonobstructive coronary artery disease -patient complains of no angina.  He is on aspirin and beta-blocker.  Patient recently was given prescription for atorvastatin, but has not started it because it interferes with his stem cell infusion that he just got.       History of Present Illness/Subjective    Mike Gonzalez is a 70 year old male who comes in today for history and physical prior to insertion of left atrial appendage occulsion device.    Mike Gonzalez has a past history of atrial fibrillation and atrial flutter, hypertension, nonobstructive coronary artery disease, nonischemic cardiomyopathy with LVEF 44% and obesity.  He has degenerative joint disease and has a lot of shoulder and hip pain.  He would like to be able to take NSAIDs without worry of bleeding risk since he needs protection against stroke risk from his atrial fibrillation.  He also tells me that he bruises very easily.  He had a garage door drop on his left foot last week and all 5 of his toes are quite bruised and painful.  The patient has left shoulder pain, hip and back pain.  He actually gets stem cell infusions for his hip and back pain.      Mike Gonzalez denies chest discomfort, palpitations, shortness of breath, paroxysmal nocturnal dyspnea, orthopnea, lightheadedness, dizziness, pre-syncope, or syncope.  Mike Gonzalez also denies any weight loss, changes in appetite, nausea or vomiting.     Medical, surgical, family, social history, and medications were reviewed and updated as necessary.    CT pulm vein results (from June 27):  1. The left atrial appendage is large and has a  chicken wing morphology. The following measurements of the left atrial appendage were made, at 35% cardiac phase at the level of the bifurcation of the left circumflex artery:   Orifice diameter: 33 x 27 mm   Orifice circumference: 96 mm   Orifice area: 7.08 cm2   Useful depth: 30 mm   Maximum depth: 56 mm     2. Severe left atrial enlargement.   3. No thrombus noted in the left atrial appendage.   4. This study was not optimized to evaluate the coronary arteries, but there appears to be no significant coronary artery disease.     Physical Examination Review of Systems   /76 (BP Location: Right arm, Patient Position: Semi-Art's, Cuff Size: Adult Regular)   Pulse 68   Resp 16   Ht 1.829 m (6')   Wt 111.1 kg (245 lb)   BMI 33.23 kg/m    Body mass index is 33.23 kg/m .  Wt Readings from Last 3 Encounters:   07/12/21 111.1 kg (245 lb)   07/09/21 111.1 kg (245 lb)   06/03/21 111.1 kg (245 lb)       General Appearance:   Alert, cooperative and in no acute distress   ENT/Mouth: membranes moist, no oral lesions or bleeding gums.      EYES:  no scleral icterus, normal conjunctivae   Neck: Thyroid not visualized   Chest/Lungs:   lungs are clear to auscultation, no rales or wheezing   Cardiovascular:   Irregularly irregular . Normal first and second heart sounds with no murmurs, rubs or gallops; the carotid, radial and posterior tibial pulses are intact, no edema bilaterally    Abdomen:  Soft and nontender. Bowel sounds are present in all quadrants   Extremities: no cyanosis or clubbing   Skin: no xanthelasma, warm.    Neurologic: normal gait, normal  bilateral, no tremors   Psychiatric: Normal mood and affect                                              Medical History  Surgical History Family History Social History   Past Medical History:   Diagnosis Date     Angioedema      Atrial fibrillation and flutter (H)      Basal cell carcinoma      Branch retinal vein occlusion of left eye 7/1/2017     CAD  (coronary artery disease)     ANGIOGRAM 2021 Left Main The vessel was visualized by angiography, is moderate in size and is angiographically normal. Left Anterior Descending Mid LAD lesion is 25% stenosed. Ramus Intermedius Ramus lesion is 10% stenosed. Left Circumflex Dist Cx lesion is 30% stenosed. Right Coronary Artery The vessel was visualized by angiography, is moderate in size and is angiographically normal.       Chronic neck pain      GERD (gastroesophageal reflux disease)      HTN (hypertension)      Hx of atrial flutter      Nonsmoker      Radiculopathy     cervical     Retinal hemorrhage     Vessel rupture in left retina     Rhinitis, allergic      S/P colonoscopy 4/15/2019     Urticaria     Past Surgical History:   Procedure Laterality Date     BASAL CELL CARCINOMA EXCISION  01/2020    Left nasal reconstruction     CERVICAL SPINE SURGERY      C8, T1, foraminotomy     CV CORONARY ANGIOGRAM N/A 1/20/2021    Procedure: Coronary Angiogram;  Surgeon: Willow Wellington MD;  Location: [facility] Card[facility];  Service: Cardiology     CV LEFT HEART CATHETERIZATION WITHOUT LEFT VENTRICULOGRAM Left 1/20/2021    Procedure: Left Heart Catheterization Without Left Ventriculogram;  Surgeon: Willow Wellington MD;  Location: [facility] Cardiac Cath Lab;  Service: Cardiology     HERNIA REPAIR, UMBILICAL       ×2     REVERSE TOTAL SHOULDER ARTHROPLASTY Left 05/2019    Family History   Problem Relation Age of Onset     Arthritis Mother      Other - See Comments Mother         psychiatry/pvd     Other - See Comments Father         blood reaction    Social History     Socioeconomic History     Marital status:      Spouse name: Not on file     Number of children: Not on file     Years of education: Not on file     Highest education level: Not on file   Occupational History     Not on file   Tobacco Use     Smoking status: Never Smoker     Smokeless tobacco: Never Used   Substance and Sexual Activity     Alcohol use:  Never     Comment: Alcoholic Drinks/day: About once a year.     Drug use: No     Sexual activity: Not on file   Other Topics Concern     Not on file   Social History Narrative    Patient of Dr. Rod since 2001.    .  Wife is a former RN.     Wife's cousin is Dr. Jean Pierre Champagne, ID specialist.     Social Determinants of Health     Financial Resource Strain:      Difficulty of Paying Living Expenses:    Food Insecurity:      Worried About Running Out of Food in the Last Year:      Ran Out of Food in the Last Year:    Transportation Needs:      Lack of Transportation (Medical):      Lack of Transportation (Non-Medical):    Physical Activity:      Days of Exercise per Week:      Minutes of Exercise per Session:    Stress:      Feeling of Stress :    Social Connections:      Frequency of Communication with Friends and Family:      Frequency of Social Gatherings with Friends and Family:      Attends [employer] Services:      Active Member of Clubs or Organizations:      Attends Club or Organization Meetings:      Marital Status:    Intimate Partner Violence:      Fear of Current or Ex-Partner:      Emotionally Abused:      Physically Abused:      Sexually Abused:           Medications  Allergies   Current Outpatient Medications   Medication Sig Dispense Refill     acetaminophen (TYLENOL) 650 MG CR tablet Take 650 mg by mouth daily as needed       apixaban ANTICOAGULANT (ELIQUIS) 5 mg Tab tablet [APIXABAN ANTICOAGULANT (ELIQUIS) 5 MG TAB TABLET] Take 1 tablet (5 mg total) by mouth 2 (two) times a day. 60 tablet 5     co-enzyme Q-10 100 MG CAPS capsule Take 200 mg by mouth daily       fish oil-omega-3 fatty acids 300-1,000 mg capsule [FISH OIL-OMEGA-3 FATTY ACIDS 300-1,000 MG CAPSULE] Take 2 g by mouth daily.       fluticasone propionate (FLONASE) 50 mcg/actuation nasal spray [FLUTICASONE PROPIONATE (FLONASE) 50 MCG/ACTUATION NASAL SPRAY] USE 2 SPRAYS IN EACH       NOSTRIL DAILY 48 g 2     gabapentin (NEURONTIN) 100  MG capsule [GABAPENTIN (NEURONTIN) 100 MG CAPSULE] Take 200-400 mg by mouth see administration instructions. Patient currently takes 200 MG in the morning and 400 MG at bedtime. (Patient taking differently: Take 600 mg by mouth daily At bedtime) 100 capsule 12     lisinopriL (PRINIVIL,ZESTRIL) 5 MG tablet [LISINOPRIL (PRINIVIL,ZESTRIL) 5 MG TABLET] Take 1 tablet (5 mg total) by mouth daily. 90 tablet 3     metoprolol succinate (TOPROL XL) 25 MG [METOPROLOL SUCCINATE (TOPROL XL) 25 MG] Take 1 tablet (25 mg total) by mouth daily. 90 tablet 3     MULTIVITAMIN ORAL [MULTIVITAMIN ORAL] Take by mouth.       oxymetazoline HCl (AFRIN, OXYMETAZOLINE, NASL) [OXYMETAZOLINE HCL (AFRIN, OXYMETAZOLINE, NASL)] into each nostril.       phytonadione, vit K1, (PHYTONADIONE, VITAMIN K1,) 100 mcg tablet [PHYTONADIONE, VIT K1, (PHYTONADIONE, VITAMIN K1,) 100 MCG TABLET] Take 100 mcg by mouth daily.       vits A,C,E/lutein/zeax/zn/marly (EYE HEALTH FORMULA ORAL) [VITS A,C,E/LUTEIN/ZEAX/ZN/MARLY (EYE HEALTH FORMULA ORAL)] Take by mouth.      Allergies   Allergen Reactions     Hydrochlorothiazide Unknown     Hydrocodone Swelling     Sinus     Oxycontin [Oxycodone] Swelling     lip     Sulfa (Sulfonamide Antibiotics) [Sulfa Drugs] Swelling     Perfume Swelling     Other reaction(s): Runny Nose         Lab Results    Chemistry/lipid CBC Cardiac Enzymes/BNP/TSH/INR   Lab Results   Component Value Date    CHOL 165 01/20/2021    HDL 40 01/20/2021    TRIG 65 01/20/2021    BUN 16 01/20/2021     01/20/2021    CO2 26 01/20/2021    Lab Results   Component Value Date    WBC 7.7 01/20/2021    HGB 15.3 01/20/2021    HCT 44.9 01/20/2021    MCV 90 01/20/2021     01/20/2021    Lab Results   Component Value Date    TROPONINI 0.02 06/18/2020     (H) 11/30/2020        36 minutes spent on the date of encounter doing education, consent signing, chart prep/review, review of test results, interpretation with above tests, patient visit,  documentation and discussion with family.      This note has been dictated using voice recognition software. Any grammatical or context distortions are unintentional and inherent to the software.

## 2021-07-12 NOTE — PATIENT INSTRUCTIONS
Mike Gonzalez,    It was a pleasure to see you today in the clinic regarding your upcoming Watchman implant.     My recommendations after this visit include:     - report to the [facility] [place] on Wednesday morning at the instructed time      If you have questions or concerns, please call using the numbers below:    MARIA G Antonio   [phone]    Melvina Ch RN  [phone]

## 2021-07-13 ENCOUNTER — OFFICE VISIT (OUTPATIENT)
Dept: CARDIOLOGY | Facility: CLINIC | Age: 71
End: 2021-07-13

## 2021-07-13 VITALS
BODY MASS INDEX: 33.18 KG/M2 | SYSTOLIC BLOOD PRESSURE: 143 MMHG | RESPIRATION RATE: 14 BRPM | HEART RATE: 68 BPM | HEIGHT: 72 IN | DIASTOLIC BLOOD PRESSURE: 70 MMHG | OXYGEN SATURATION: 96 % | WEIGHT: 245 LBS | TEMPERATURE: 97.9 F

## 2021-07-13 DIAGNOSIS — Z00.6 EXAMINATION OF PARTICIPANT OR CONTROL IN CLINICAL RESEARCH: Primary | ICD-10-CM

## 2021-07-13 LAB — SARS-COV-2 RNA RESP QL NAA+PROBE: NEGATIVE

## 2021-07-13 PROCEDURE — 99207 PR NO CHARGE NURSE ONLY: CPT

## 2021-07-13 ASSESSMENT — PAIN SCALES - GENERAL: PAINLEVEL: NO PAIN (0)

## 2021-07-13 ASSESSMENT — MIFFLIN-ST. JEOR: SCORE: 1904.31

## 2021-07-13 NOTE — PROGRESS NOTES
Aneta Study    Physical Examination    For abnormal findings, please evaluate if the finding is Clinically Significant (by 'CS') or Not Clinically Significant (by 'NCS')    General Appearance   Abnormal; obese: NCS  Head and Neck   Normal- wears glasses; NCS  Lungs     Normal   Cardiovascular   Abnormal: irregular rhythm; NCS  Abdomen    Abnormal: obese; NCS  Musculoskeletal/Extremities  Normal   Lymph Nodes    Normal  Skin     Normal  Neurological    Normal    Tremor     Present: very mild hand tremor R> L         If present, document.       COVID: No symptoms, chills, shortness of breath, or difficulty breathing, muscle or body aches, headache, loss of taste or smell, sore throat, runny nose, congestion, nausea, vomiting or diarrhea according to the [place] Department of Health and Human Services based on the CARES Act.       Katie Saleem PA-C

## 2021-07-13 NOTE — PROGRESS NOTES
[employer] Screening Note      Purpose: Electrocardiogram Clinical Validation Study        Mike Gonzalez, was seen at [facility] today to discuss participation in the [employer] study.   The consent form was reviewed on 07/13/2021     The review of the study included:    Study purpose     Conflict of interest    Device description      Study visit(s)    Risks of participation    Benefits (if any)    Alternatives    Voluntary participation    Confidentiality     Compensation/costs of participation    Study stipends    Injury and legal rights     The subject was provided time to review the consent form and consider participation. her questions were answered to her satisfaction.   The patient has voluntarily agreed to participate in the above noted study.      The consent form and HIPPA was IRB approved on 23APR21 and was signed 07/01/21 at 1005     The subject was provided with a copy of the consent form and HIPAA. A copy of the signed forms was forwarded to medical records.     No study procedures were done prior to his  providing informed consent.         Note time seated: 0955     BP (!) 143/70 (BP Location: Left arm, Patient Position: Sitting, Cuff Size: Adult Large)   Pulse 68   Temp 97.9  F (36.6  C) (Oral)   Resp 14   Ht 1.829 m (6')   Wt 111.1 kg (245 lb)   SpO2 96%   BMI 33.23 kg/m       Past Medical History:   Diagnosis Date     Angioedema      Atrial fibrillation (H) 2019     Atrial fibrillation and flutter (H)      Atrial flutter (H) 2019     Basal cell carcinoma      Branch retinal vein occlusion of left eye 7/1/2017     CAD (coronary artery disease)     ANGIOGRAM 2021 Left Main The vessel was visualized by angiography, is moderate in size and is angiographically normal. Left Anterior Descending Mid LAD lesion is 25% stenosed. Ramus Intermedius Ramus lesion is 10% stenosed. Left Circumflex Dist Cx lesion is 30% stenosed. Right Coronary Artery The vessel was visualized by angiography, is  moderate in size and is angiographically normal.       Chronic neck pain      Congestive heart failure (H) 2021     GERD (gastroesophageal reflux disease)      HTN (hypertension) 2015     Hx of atrial flutter      Nonsmoker      Radiculopathy     cervical     Retinal hemorrhage     Vessel rupture in left retina     Rhinitis, allergic      S/P colonoscopy 4/15/2019     Urticaria            Current Outpatient Medications:      acetaminophen (TYLENOL) 650 MG CR tablet, Take 650 mg by mouth daily as needed, Disp: , Rfl:      apixaban ANTICOAGULANT (ELIQUIS) 5 mg Tab tablet, [APIXABAN ANTICOAGULANT (ELIQUIS) 5 MG TAB TABLET] Take 1 tablet (5 mg total) by mouth 2 (two) times a day., Disp: 60 tablet, Rfl: 5     co-enzyme Q-10 100 MG CAPS capsule, Take 200 mg by mouth daily, Disp: , Rfl:      fish oil-omega-3 fatty acids 300-1,000 mg capsule, [FISH OIL-OMEGA-3 FATTY ACIDS 300-1,000 MG CAPSULE] Take 2 g by mouth daily., Disp: , Rfl:      fluticasone propionate (FLONASE) 50 mcg/actuation nasal spray, [FLUTICASONE PROPIONATE (FLONASE) 50 MCG/ACTUATION NASAL SPRAY] USE 2 SPRAYS IN EACH       NOSTRIL DAILY, Disp: 48 g, Rfl: 2     gabapentin (NEURONTIN) 100 MG capsule, [GABAPENTIN (NEURONTIN) 100 MG CAPSULE] Take 200-400 mg by mouth see administration instructions. Patient currently takes 200 MG in the morning and 400 MG at bedtime. (Patient taking differently: Take 600 mg by mouth daily At bedtime), Disp: 100 capsule, Rfl: 12     lisinopriL (PRINIVIL,ZESTRIL) 5 MG tablet, [LISINOPRIL (PRINIVIL,ZESTRIL) 5 MG TABLET] Take 1 tablet (5 mg total) by mouth daily., Disp: 90 tablet, Rfl: 3     metoprolol succinate (TOPROL XL) 25 MG, [METOPROLOL SUCCINATE (TOPROL XL) 25 MG] Take 1 tablet (25 mg total) by mouth daily., Disp: 90 tablet, Rfl: 3     MULTIVITAMIN ORAL, [MULTIVITAMIN ORAL] Take by mouth., Disp: , Rfl:      oxymetazoline HCl (AFRIN, OXYMETAZOLINE, NASL), [OXYMETAZOLINE HCL (AFRIN, OXYMETAZOLINE, NASL)] into each nostril., Disp:  , Rfl:      phytonadione, vit K1, (PHYTONADIONE, VITAMIN K1,) 100 mcg tablet, [PHYTONADIONE, VIT K1, (PHYTONADIONE, VITAMIN K1,) 100 MCG TABLET] Take 100 mcg by mouth daily., Disp: , Rfl:      vits A,C,E/lutein/zeax/zn/marly (EYE HEALTH FORMULA ORAL), [VITS A,C,E/LUTEIN/ZEAX/ZN/MARLY (EYE HEALTH FORMULA ORAL)] Take by mouth., Disp: , Rfl:         Allergies   Allergen Reactions     Hydrochlorothiazide Unknown     Hydrocodone Swelling     Sinus     Oxycontin [Oxycodone] Swelling     lip     Sulfa (Sulfonamide Antibiotics) [Sulfa Drugs] Swelling     Perfume Swelling     Other reaction(s): Runny Nose           Women of Child Bearing Potential:   []Pregnant   []Not Pregnant  [x]N/A     General Questions:                                           Yes     No   or ?                                           []       [x]     Race: [] or               []             []Black or               [] or               [] or Other               [x]White      Gender:[]Female                [x]Male      Lifestyle Habits?      Smoking/ Tobacco History   [x]Do not smoke or use tobacco products   []Smoke or use tobacco products 0-5 times per week  []Smoke or use tobacco products 6-12 times per week  []Smoke or use tobacco products 13 or greater times per week      Alcohol Use Average   [x]Do not consume alcohol  []Consume 0-1 beverages per day   []Consume 2 beverages per day   []Consume 3 beverages per day   []Consume 4 or more beverages per day      Recreational Drug Use   [x]Have not used recreational drug(s) in the last year   []Used recreational drug(s) in the last week   []Used recreational drug(s) in the last month   []Used recreational drug(s) in the last year      Caffeine Intake:  []Consume ? 1 cups per week of drinks with caffeine   []Consume 2-3 cups per week of drinks with caffeine   []Consume 5-6 cups per week of drinks  with caffeine   []Consumes 1 cup per day of drinks with caffeine   [x]Consumes 2-3 cups per days of drinks with caffeine   []Consumes 4 or more cups per day of drinks with caffeine      Exercise Habits:   [x]Exercise 0-29 minutes per week   []Exercise 30-59 minutes per week   []Exercise 1-2 hours per week  []Exercise 2-4 hours per week   []Exercise more than 4 hours per week      Wrist Hairness:  []No hair on wrist   [x]Sparse hair on wrist   []Entire wrist covered with light growth   []Extensive hair growth on wrist   []Not measured         Preferred Wrist to wear DCD on: [x]  left   []  right  Wrist circumference:      185 mm  Device wearing wrist skin fold thickness:       6.1  mm       Device Information:  Device Unique identifier ([id number]):   Number of practice trials? 1  Date ECG Obtained? 07/13/2021      30-second samples must be taken simultaneously. 1 minute rest between samples.     Samples taken simultaneously?                     Yes[x]  No[]     Sample 1: 30-second ECG Recording from     DCD, sample provided to intrinsic                  Yes[x]  No[]         Sample 1: 30-second ECG recording from  12-Lead ECG, sample provided to intrinsic    Yes[x] No[]     30-second samples must be taken simultaneously. 1 minute rest between samples.     Samples taken simultaneously?                     Yes[x]  No[]     Sample 2: 30-second ECG Recording from     DCD, sample provided to intrinsic                  Yes[x]  No[]         Sample 2: 30-second ECG recording from  12-Lead ECG, sample provided to intrinsic    Yes[x] No[]     30-second samples must be taken simultaneously. 1 minute rest between samples.     Samples taken simultaneously?                     Yes[x]  No[]     Sample 3: 30-second ECG Recording from     DCD, sample provided to intrinsic                  Yes[x]  No[]         Sample 3: 30-second ECG recording from  12-Lead ECG, sample provided to intrinsic    Yes[x] No[]          Subject has now  completed their participation in the [facility] study.

## 2021-07-13 NOTE — PROGRESS NOTES
Aneta Inclusion/Exclusion Criteria:      Study Name: Aneta   : Abraham Valdez MD     Protocol version: 1.2, 08 Mar 2021      Criteria # Inclusion Criteria (ALL MUST BE YES)  YES/NO   1 Able to read, understand, and provide written informed consent Yes   2 Willing and able to participate in the study procedures as described in the consent form  Yes   3 Individuals who are 22 years of age and older Yes   4 Able to communicate effectively with and follow instructions from the study staff Yes   5 A wrist circumference between 125-190 mm (subject must be able to wear the DCD)  Yes   6 For subjects enrolled into the AF population, subjects must have a known diagnosis of AF and be in AF at the time of screening. Subjects may have any type of AF including paroxysmal, persistent, or permanent AF, though persistent and permanent AF are preferred as subjects with persistent and permanent AF are more likely to be in AF at the time of screening.  Yes         Criteria #  Exclusion Criteria (ALL MUST BE NO)  YES/NO    1 Physical disability that precludes safe an adequate testing  No   2 Mental impairment resulting in limited ability to cooperate  No   3 Subjects with a pacemaker or implantable cardioverter-defibrillator (ICD)  No   4 Acute myocardial infarction within 90 days of screening or other cardiovascular disease that, in the opinion of the Investigator, increases the risk to the subject or renders data uninterruptable (e.g. recent or ongoing unstable angina, significant valvular heart disease or heart failure, myocarditis, or pericarditis)  No   5 Acute pulmonary embolism, pulmonary infarction, or deep vein thrombosis within 90 days of screening  No   6 Stroke or transient ischemic attack within 90 days of screening No   7 Subjects taking rhythm control drugs (e.g., disopyramide, quinidine, flecainide, propafenone, amiodarone, dofetilide, dronedarone, sotalol, procainamide, ibutilide, moricizine, or  procainamide.) For clarity and in contrast, rate control, anticoagulants, and anti-platelet medications are permitted (e.g., metoprolol, atenolol, diltiazem, coumadin, clopidogrel, or aspirin)     Exception: subjects who are undergoing scheduled cardioversion for known AF within 30 days after study participation are allowed to participate in the study even when on rhythm control drugs  No   8 Symptomatic (or active) allergic skin reaction such as eczema, rosacea, impetigo, dermatomyositis, or allergic contact dermatitis on both wrists or over electrode attachment sites, including known allergy or sensitivity to silicone bands primarily used in wrist-worn fitness devices No   9 Known sensitivity to medical adhesives, isopropyl alcohol, watch bands, or ECG electrodes No   10 A history or abnormal life-threatening rhythms as determined by the Investigator (e.g., ventricular tachycardia, ventricular fibrillation, 3rd-degree heart block, resting heart rate < 50 bpm, or resting heart rate > 120 bpm)  No   11 Significant tremor that prevents subject from being able to hold still  No   12 Women who are pregnant at the time of study participation  No   13 Subjects enrolled into the SR population must not have any diagnosis of AF. For example, subjects cannot be enrolled into the SR population if they have a diagnosis of paroxysmal AF but during screening are in SR No      Patient fulfills study inclusion criteria and no exclusion criteria are found.      Abraham Valdez MD

## 2021-07-14 ENCOUNTER — HOSPITAL ENCOUNTER (OUTPATIENT)
Dept: CARDIOLOGY | Facility: CLINIC | Age: 71
DRG: 274 | End: 2021-07-14
Attending: INTERNAL MEDICINE | Admitting: INTERNAL MEDICINE
Payer: MEDICARE

## 2021-07-14 ENCOUNTER — ANESTHESIA (OUTPATIENT)
Dept: CARDIOLOGY | Facility: CLINIC | Age: 71
DRG: 274 | End: 2021-07-14
Payer: MEDICARE

## 2021-07-14 ENCOUNTER — ANESTHESIA EVENT (OUTPATIENT)
Dept: CARDIOLOGY | Facility: CLINIC | Age: 71
DRG: 274 | End: 2021-07-14
Payer: MEDICARE

## 2021-07-14 ENCOUNTER — HOSPITAL ENCOUNTER (INPATIENT)
Facility: CLINIC | Age: 71
LOS: 1 days | Discharge: HOME | DRG: 274 | End: 2021-07-14
Attending: INTERNAL MEDICINE | Admitting: INTERNAL MEDICINE
Payer: MEDICARE

## 2021-07-14 VITALS
BODY MASS INDEX: 33.83 KG/M2 | WEIGHT: 249.78 LBS | RESPIRATION RATE: 18 BRPM | TEMPERATURE: 98 F | HEART RATE: 78 BPM | SYSTOLIC BLOOD PRESSURE: 143 MMHG | OXYGEN SATURATION: 98 % | HEIGHT: 72 IN | DIASTOLIC BLOOD PRESSURE: 72 MMHG

## 2021-07-14 DIAGNOSIS — I48.11 LONGSTANDING PERSISTENT ATRIAL FIBRILLATION (H): ICD-10-CM

## 2021-07-14 DIAGNOSIS — Z98.890 S/P COLONOSCOPY: Primary | ICD-10-CM

## 2021-07-14 DIAGNOSIS — Z95.818 PRESENCE OF LEFT ATRIAL APPENDAGE CLOSURE DEVICE COMPOSED OF NICKEL-TITANIUM ALLOY WITH POLYETHYLENE TEREPHTHALATE MEMBRANE: ICD-10-CM

## 2021-07-14 PROBLEM — I25.5 ISCHEMIC CARDIOMYOPATHY: Status: RESOLVED | Noted: 2021-01-07 | Resolved: 2021-02-18

## 2021-07-14 LAB
ABO/RH(D): NORMAL
ACT BLD: 259 SECONDS (ref 74–150)
ACT BLD: 292 SECONDS (ref 74–150)
ANTIBODY SCREEN: NEGATIVE
CREAT SERPL-MCNC: 1 MG/DL (ref 0.66–1.25)
GFR SERPL CREATININE-BSD FRML MDRD: 75 ML/MIN/1.73M2
HGB BLD-MCNC: 14.4 G/DL (ref 13.3–17.7)
LVEF ECHO: NORMAL
SPECIMEN EXPIRATION DATE: NORMAL

## 2021-07-14 PROCEDURE — 258N000003 HC RX IP 258 OP 636: Performed by: NURSE ANESTHETIST, CERTIFIED REGISTERED

## 2021-07-14 PROCEDURE — 250N000011 HC RX IP 250 OP 636: Performed by: INTERNAL MEDICINE

## 2021-07-14 PROCEDURE — C1887 CATHETER, GUIDING: HCPCS | Performed by: INTERNAL MEDICINE

## 2021-07-14 PROCEDURE — 36415 COLL VENOUS BLD VENIPUNCTURE: CPT | Performed by: INTERNAL MEDICINE

## 2021-07-14 PROCEDURE — 93325 DOPPLER ECHO COLOR FLOW MAPG: CPT

## 2021-07-14 PROCEDURE — 272N000002 HC OR SUPPLY OTHER OPNP: Performed by: INTERNAL MEDICINE

## 2021-07-14 PROCEDURE — 85018 HEMOGLOBIN: CPT | Performed by: INTERNAL MEDICINE

## 2021-07-14 PROCEDURE — 250N000011 HC RX IP 250 OP 636: Performed by: NURSE ANESTHETIST, CERTIFIED REGISTERED

## 2021-07-14 PROCEDURE — 370N000017 HC ANESTHESIA TECHNICAL FEE, PER MIN: Performed by: INTERNAL MEDICINE

## 2021-07-14 PROCEDURE — C1760 CLOSURE DEV, VASC: HCPCS | Performed by: INTERNAL MEDICINE

## 2021-07-14 PROCEDURE — 214N000001 HC R&B CCU UMMC

## 2021-07-14 PROCEDURE — C1894 INTRO/SHEATH, NON-LASER: HCPCS | Performed by: INTERNAL MEDICINE

## 2021-07-14 PROCEDURE — 33340 PERQ CLSR TCAT L ATR APNDGE: CPT | Mod: Q0 | Performed by: INTERNAL MEDICINE

## 2021-07-14 PROCEDURE — 272N000001 HC OR GENERAL SUPPLY STERILE: Performed by: INTERNAL MEDICINE

## 2021-07-14 PROCEDURE — 250N000013 HC RX MED GY IP 250 OP 250 PS 637: Performed by: INTERNAL MEDICINE

## 2021-07-14 PROCEDURE — 02L73DK OCCLUSION OF LEFT ATRIAL APPENDAGE WITH INTRALUMINAL DEVICE, PERCUTANEOUS APPROACH: ICD-10-PCS | Performed by: INTERNAL MEDICINE

## 2021-07-14 PROCEDURE — 82565 ASSAY OF CREATININE: CPT | Performed by: INTERNAL MEDICINE

## 2021-07-14 PROCEDURE — 250N000009 HC RX 250: Performed by: NURSE ANESTHETIST, CERTIFIED REGISTERED

## 2021-07-14 PROCEDURE — 86900 BLOOD TYPING SEROLOGIC ABO: CPT | Performed by: INTERNAL MEDICINE

## 2021-07-14 PROCEDURE — 85347 COAGULATION TIME ACTIVATED: CPT

## 2021-07-14 PROCEDURE — 82962 GLUCOSE BLOOD TEST: CPT

## 2021-07-14 PROCEDURE — C1769 GUIDE WIRE: HCPCS | Performed by: INTERNAL MEDICINE

## 2021-07-14 PROCEDURE — 93355 ECHO TRANSESOPHAGEAL (TEE): CPT | Mod: 26 | Performed by: GENERAL ACUTE CARE HOSPITAL

## 2021-07-14 DEVICE — OCCLUDER CV WATCHMAN FLX OD31 MM M635WU50310: Type: IMPLANTABLE DEVICE | Site: HEART | Status: FUNCTIONAL

## 2021-07-14 RX ORDER — ONDANSETRON 2 MG/ML
INJECTION INTRAMUSCULAR; INTRAVENOUS PRN
Status: DISCONTINUED | OUTPATIENT
Start: 2021-07-14 | End: 2021-07-14

## 2021-07-14 RX ORDER — PROPOFOL 10 MG/ML
INJECTION, EMULSION INTRAVENOUS PRN
Status: DISCONTINUED | OUTPATIENT
Start: 2021-07-14 | End: 2021-07-14

## 2021-07-14 RX ORDER — LIDOCAINE HYDROCHLORIDE 20 MG/ML
INJECTION, SOLUTION INFILTRATION; PERINEURAL PRN
Status: DISCONTINUED | OUTPATIENT
Start: 2021-07-14 | End: 2021-07-14

## 2021-07-14 RX ORDER — ONDANSETRON 4 MG/1
4 TABLET, ORALLY DISINTEGRATING ORAL EVERY 6 HOURS PRN
Status: DISCONTINUED | OUTPATIENT
Start: 2021-07-14 | End: 2021-07-14 | Stop reason: HOSPADM

## 2021-07-14 RX ORDER — CEFAZOLIN SODIUM 2 G/100ML
2 INJECTION, SOLUTION INTRAVENOUS
Status: COMPLETED | OUTPATIENT
Start: 2021-07-14 | End: 2021-07-14

## 2021-07-14 RX ORDER — HEPARIN SODIUM 1000 [USP'U]/ML
INJECTION, SOLUTION INTRAVENOUS; SUBCUTANEOUS PRN
Status: DISCONTINUED | OUTPATIENT
Start: 2021-07-14 | End: 2021-07-14

## 2021-07-14 RX ORDER — ACETAMINOPHEN 325 MG/1
650 TABLET ORAL EVERY 4 HOURS PRN
Status: DISCONTINUED | OUTPATIENT
Start: 2021-07-14 | End: 2021-07-14 | Stop reason: HOSPADM

## 2021-07-14 RX ORDER — SODIUM CHLORIDE 9 MG/ML
1000 INJECTION, SOLUTION INTRAVENOUS CONTINUOUS
Status: DISCONTINUED | OUTPATIENT
Start: 2021-07-14 | End: 2021-07-14 | Stop reason: HOSPADM

## 2021-07-14 RX ORDER — LIDOCAINE 40 MG/G
CREAM TOPICAL
Status: DISCONTINUED | OUTPATIENT
Start: 2021-07-14 | End: 2021-07-14 | Stop reason: HOSPADM

## 2021-07-14 RX ORDER — IOPAMIDOL 755 MG/ML
INJECTION, SOLUTION INTRAVASCULAR
Status: DISCONTINUED | OUTPATIENT
Start: 2021-07-14 | End: 2021-07-14 | Stop reason: HOSPADM

## 2021-07-14 RX ORDER — FENTANYL CITRATE 50 UG/ML
INJECTION, SOLUTION INTRAMUSCULAR; INTRAVENOUS PRN
Status: DISCONTINUED | OUTPATIENT
Start: 2021-07-14 | End: 2021-07-14

## 2021-07-14 RX ORDER — ASPIRIN 81 MG/1
81 TABLET ORAL DAILY
Status: DISCONTINUED | OUTPATIENT
Start: 2021-07-15 | End: 2021-07-14 | Stop reason: HOSPADM

## 2021-07-14 RX ORDER — SODIUM CHLORIDE 9 MG/ML
INJECTION, SOLUTION INTRAVENOUS CONTINUOUS PRN
Status: DISCONTINUED | OUTPATIENT
Start: 2021-07-14 | End: 2021-07-14

## 2021-07-14 RX ORDER — ONDANSETRON 2 MG/ML
4 INJECTION INTRAMUSCULAR; INTRAVENOUS EVERY 6 HOURS PRN
Status: DISCONTINUED | OUTPATIENT
Start: 2021-07-14 | End: 2021-07-14 | Stop reason: HOSPADM

## 2021-07-14 RX ORDER — SODIUM CHLORIDE 9 MG/ML
INJECTION, SOLUTION INTRAVENOUS CONTINUOUS
Status: ACTIVE | OUTPATIENT
Start: 2021-07-14 | End: 2021-07-14

## 2021-07-14 RX ORDER — ASPIRIN 81 MG/1
81 TABLET ORAL ONCE
Status: COMPLETED | OUTPATIENT
Start: 2021-07-14 | End: 2021-07-14

## 2021-07-14 RX ADMIN — PROPOFOL 40 MG: 10 INJECTION, EMULSION INTRAVENOUS at 07:59

## 2021-07-14 RX ADMIN — ROCURONIUM BROMIDE 50 MG: 10 INJECTION INTRAVENOUS at 07:59

## 2021-07-14 RX ADMIN — PHENYLEPHRINE HYDROCHLORIDE 100 MCG: 10 INJECTION INTRAVENOUS at 08:10

## 2021-07-14 RX ADMIN — SODIUM CHLORIDE: 9 INJECTION, SOLUTION INTRAVENOUS at 07:59

## 2021-07-14 RX ADMIN — LIDOCAINE HYDROCHLORIDE 100 MG: 20 INJECTION, SOLUTION INFILTRATION; PERINEURAL at 07:59

## 2021-07-14 RX ADMIN — Medication 11000 UNITS: at 08:31

## 2021-07-14 RX ADMIN — Medication 1000 UNITS: at 08:45

## 2021-07-14 RX ADMIN — PHENYLEPHRINE HYDROCHLORIDE 100 MCG: 10 INJECTION INTRAVENOUS at 08:14

## 2021-07-14 RX ADMIN — CEFAZOLIN 2 G: 10 INJECTION, POWDER, FOR SOLUTION INTRAVENOUS at 08:03

## 2021-07-14 RX ADMIN — PHENYLEPHRINE HYDROCHLORIDE 200 MCG: 10 INJECTION INTRAVENOUS at 07:59

## 2021-07-14 RX ADMIN — PHENYLEPHRINE HYDROCHLORIDE 100 MCG: 10 INJECTION INTRAVENOUS at 08:47

## 2021-07-14 RX ADMIN — FENTANYL CITRATE 100 MCG: 50 INJECTION, SOLUTION INTRAMUSCULAR; INTRAVENOUS at 07:59

## 2021-07-14 RX ADMIN — SUGAMMADEX 200 MG: 100 INJECTION, SOLUTION INTRAVENOUS at 09:08

## 2021-07-14 RX ADMIN — ONDANSETRON 4 MG: 2 INJECTION INTRAMUSCULAR; INTRAVENOUS at 09:00

## 2021-07-14 RX ADMIN — ASPIRIN 81 MG: 81 TABLET, CHEWABLE ORAL at 06:25

## 2021-07-14 ASSESSMENT — VISUAL ACUITY
OU: NORMAL ACUITY

## 2021-07-14 ASSESSMENT — MIFFLIN-ST. JEOR: SCORE: 1926

## 2021-07-14 ASSESSMENT — ACTIVITIES OF DAILY LIVING (ADL): ADLS_ACUITY_SCORE: 17

## 2021-07-14 NOTE — Clinical Note
Hemodynamic equipment used: 5 lead ECG, LIKEPAK Hands Off Patches, LIFEPAK With 3 Leads, Machine BP Cuff and pulse oximeter probe.

## 2021-07-14 NOTE — PROGRESS NOTES
D/I/A:  Patient is tolerating liquids and foods, ambulating, urinating, puncture sites are stable ( no bleeding and no hematoma) and patient has a .  A+O x4 and making needs known.  CCL access sites C/D/I; no bleeding or hematoma; CMS intact.  VSSA.  AFIB on monitor.  IV access removed.  Education completed and outlined in AVS or handout: medications reviewed with patient.  Questions answered prior to discharge.  Belongings returned to patient at discharge.    P: Discharged to self care.  Patient to follow up with appts as per discharge instruction.

## 2021-07-14 NOTE — INTERVAL H&P NOTE
I have reviewed the surgical (or preoperative) H&P that is linked to this encounter, and examined the patient. There are no significant changes

## 2021-07-14 NOTE — ANESTHESIA POSTPROCEDURE EVALUATION
Patient: Mike Gonzalez    Procedure(s):  CV LEFT ATRIAL APPENDAGE CLOSURE    Diagnosis:other  Diagnosis Additional Information: No value filed.    Anesthesia Type:  General    Note:  Disposition: Outpatient   Postop Pain Control: Uneventful            Sign Out: Well controlled pain   PONV:    Neuro/Psych: Uneventful            Sign Out: Acceptable/Baseline neuro status   Airway/Respiratory: Uneventful            Sign Out: Acceptable/Baseline resp. status   CV/Hemodynamics: Uneventful            Sign Out: Acceptable CV status; No obvious hypovolemia; No obvious fluid overload   Other NRE:    DID A NON-ROUTINE EVENT OCCUR?            Last vitals:  Vitals:    07/14/21 0534 07/14/21 0925   BP: (!) 145/85 133/87   Pulse: 70 68   Resp: 15    Temp: 36.8  C (98.2  F)    SpO2: 95% 98%       Last vitals prior to Anesthesia Care Transfer:  CRNA VITALS  7/14/2021 0859 - 7/14/2021 0959      7/14/2021             Resp Rate (observed):  (!) 2          Electronically Signed By: Alejandra Olivas MD  July 14, 2021  10:00 AM

## 2021-07-14 NOTE — ANESTHESIA PREPROCEDURE EVALUATION
Anesthesia Pre-Procedure Evaluation    Patient: Mike Gonzalez   MRN: [id number] : 1950        Preoperative Diagnosis: other   Procedure : Procedure(s):  CV LEFT ATRIAL APPENDAGE CLOSURE     Past Medical History:   Diagnosis Date     Angioedema      Atrial fibrillation and flutter (H)      Basal cell carcinoma      Branch retinal vein occlusion of left eye 2017     CAD (coronary artery disease)     ANGIOGRAM  Left Main The vessel was visualized by angiography, is moderate in size and is angiographically normal. Left Anterior Descending Mid LAD lesion is 25% stenosed. Ramus Intermedius Ramus lesion is 10% stenosed. Left Circumflex Dist Cx lesion is 30% stenosed. Right Coronary Artery The vessel was visualized by angiography, is moderate in size and is angiographically normal.       Chronic neck pain      Congestive heart failure (H)      GERD (gastroesophageal reflux disease)      HTN (hypertension)      Hx of atrial flutter      Nonsmoker      Radiculopathy     cervical     Retinal hemorrhage     Vessel rupture in left retina     Rhinitis, allergic      S/P colonoscopy 4/15/2019     Urticaria       Past Surgical History:   Procedure Laterality Date     BASAL CELL CARCINOMA EXCISION  2020    Left nasal reconstruction     CERVICAL SPINE SURGERY      C8, T1, foraminotomy     CV CORONARY ANGIOGRAM N/A 2021    Procedure: Coronary Angiogram;  Surgeon: Willow Wellington MD;  Location: [facility] Cardiac [facility] Lab;  Service: Cardiology     CV LEFT HEART CATHETERIZATION WITHOUT LEFT VENTRICULOGRAM Left 2021    Procedure: Left Heart Catheterization Without Left Ventriculogram;  Surgeon: Willow Wellington MD;  Location: [facility] Cardiac Cath Lab;  Service: Cardiology     HERNIA REPAIR, UMBILICAL       ×2     REVERSE TOTAL SHOULDER ARTHROPLASTY Left 2019      Allergies   Allergen Reactions     Hydrochlorothiazide Unknown     Hydrocodone Swelling     Sinus     Oxycontin [Oxycodone] Swelling      lip     Sulfa (Sulfonamide Antibiotics) [Sulfa Drugs] Swelling     Perfume Swelling     Other reaction(s): Runny Nose      Social History     Tobacco Use     Smoking status: Never Smoker     Smokeless tobacco: Never Used   Substance Use Topics     Alcohol use: Never     Comment: Alcoholic Drinks/day: About once a year.      Wt Readings from Last 1 Encounters:   07/14/21 113.3 kg (249 lb 12.5 oz)              OUTSIDE LABS:  CBC:   Lab Results   Component Value Date    WBC 8.3 07/12/2021    WBC 7.7 01/20/2021    HGB 14.9 07/12/2021    HGB 15.3 01/20/2021    HCT 44.4 07/12/2021    HCT 44.9 01/20/2021     07/12/2021     01/20/2021     BMP:   Lab Results   Component Value Date     07/12/2021     01/20/2021    POTASSIUM 4.3 07/12/2021    POTASSIUM 4.2 01/20/2021    CHLORIDE 105 07/12/2021    CHLORIDE 105 01/20/2021    CO2 25 07/12/2021    CO2 26 01/20/2021    BUN 13 07/12/2021    BUN 16 01/20/2021    CR 0.84 07/12/2021    CR 0.99 01/20/2021     (H) 07/14/2021     07/12/2021     COAGS: No results found for: PTT, INR, FIBR  POC: No results found for: BGM, HCG, HCGS  HEPATIC:   Lab Results   Component Value Date    ALBUMIN 4.2 06/18/2020    PROTTOTAL 7.0 06/18/2020    ALT 15 06/18/2020    AST 16 06/18/2020    ALKPHOS 90 06/18/2020    BILITOTAL 0.7 06/18/2020     OTHER:   Lab Results   Component Value Date    A1C 5.3 06/29/2017    JOVAN 9.0 07/12/2021    MAG 2.1 04/15/2019    CRP 0.4 06/18/2020    SED 7 06/18/2020       Anesthesia Plan    ASA Status:  3      Anesthesia Type: General.     - Airway: ETT   Induction: Intravenous.   Maintenance: Inhalation.   Techniques and Equipment:     - Lines/Monitors: 2nd IV     Consents    Anesthesia Plan(s) and associated risks, benefits, and realistic alternatives discussed. Questions answered and patient/representative(s) expressed understanding.     - Discussed with:  Patient         Postoperative Care    Pain management: IV analgesics.   PONV  prophylaxis: Ondansetron (or other 5HT-3), Dexamethasone or Solumedrol     Comments:                Alejandra Olivas MD

## 2021-07-14 NOTE — DISCHARGE SUMMARY
Structural Discharge Summary    Primary Care Physician:  Onur Rod    Discharge Provider: Gina Blount PA-C     Admission Date: 7/14/2021. Admission Diagnoses: Longstanding persistent atrial fibrillation (H) [I48.11]   Discharge Date: July 14, 2021   Disposition: Home   Condition at Discharge: Stable  Code Status: Full Code     Principal Diagnosis:  Longstanding persistent atrial fibrillation (H)    Discharge Diagnoses:  Principal Problem:    Longstanding persistent atrial fibrillation (H)  Active Problems:    s/p left atrial appendage closure      Consult/s: none  Significant Diagnostic Studies:   Interpretation Summary  Structural JEAN CLAUDE for Left Atrial Occlusion Device     Pre-Device:  1. Normal left ventricular size. Moderate-to-severely reduced systolic  function. LVEF:30-35%  2. No left atrial thrombus or spontaneous contrast. Severe left atrial  enlargement.  3. PFO present by color flow.  4. No pericardial effusion.     Post Device:  1. Well seated 31 mm Watchman FLX Device in left atrial appendage by 2D and 3D  imaging. No color doppler evidence of flow around device at ostium insertion  before or after device release. No change in mitral valve function. No  thrombus noted on device, LA or SPARKLE.     SPARKLE device measurements:  Device compression diameter:     Post deployment  0 degrees: 27.6 mm  45 degrees: 27.0 mm  90 degrees: 27.2 mm  135 degrees: 25 mm     2. Normal left ventricular size. Moderate-to-severely reduced systolic  function. LVEF:30-35%  3. Small ASD with unidirectional flow (left to right) by color flow doppler.  4. No post procedural pericardial effusion.    Treatments: procedures: LAAO implant (Watchman FLX)    Discharge Medications:   Current Discharge Medication List      START taking these medications    Details   aspirin (ASA) 81 MG EC tablet Take 1 tablet (81 mg) by mouth  daily    Associated Diagnoses: S/P colonoscopy; Presence of left atrial appendage closure device composed of nickel-titanium alloy with polyethylene terephthalate membrane         CONTINUE these medications which have NOT CHANGED    Details   acetaminophen (TYLENOL) 650 MG CR tablet Take 650 mg by mouth daily as needed      apixaban ANTICOAGULANT (ELIQUIS) 5 mg Tab tablet [APIXABAN ANTICOAGULANT (ELIQUIS) 5 MG TAB TABLET] Take 1 tablet (5 mg total) by mouth 2 (two) times a day.  Qty: 60 tablet, Refills: 5    Associated Diagnoses: Atrial flutter, chronic (H); Dilated cardiomyopathy (H)      co-enzyme Q-10 100 MG CAPS capsule Take 200 mg by mouth daily      fish oil-omega-3 fatty acids 300-1,000 mg capsule [FISH OIL-OMEGA-3 FATTY ACIDS 300-1,000 MG CAPSULE] Take 2 g by mouth daily.      fluticasone propionate (FLONASE) 50 mcg/actuation nasal spray [FLUTICASONE PROPIONATE (FLONASE) 50 MCG/ACTUATION NASAL SPRAY] USE 2 SPRAYS IN EACH       NOSTRIL DAILY  Qty: 48 g, Refills: 2    Associated Diagnoses: Chronic rhinitis      gabapentin (NEURONTIN) 100 MG capsule [GABAPENTIN (NEURONTIN) 100 MG CAPSULE] Take 200-400 mg by mouth see administration instructions. Patient currently takes 200 MG in the morning and 400 MG at bedtime.  Qty: 100 capsule, Refills: 12    Associated Diagnoses: Left lumbar radiculopathy      lisinopriL (PRINIVIL,ZESTRIL) 5 MG tablet [LISINOPRIL (PRINIVIL,ZESTRIL) 5 MG TABLET] Take 1 tablet (5 mg total) by mouth daily.  Qty: 90 tablet, Refills: 3    Associated Diagnoses: Ischemic cardiomyopathy      metoprolol succinate (TOPROL XL) 25 MG [METOPROLOL SUCCINATE (TOPROL XL) 25 MG] Take 1 tablet (25 mg total) by mouth daily.  Qty: 90 tablet, Refills: 3    Associated Diagnoses: Atrial flutter, chronic (H); Essential hypertension; SOB (shortness of breath); Decreased cardiac ejection fraction      MULTIVITAMIN ORAL [MULTIVITAMIN ORAL] Take by mouth.      oxymetazoline HCl (AFRIN, OXYMETAZOLINE, NASL)  [OXYMETAZOLINE HCL (AFRIN, OXYMETAZOLINE, NASL)] into each nostril.      vits A,C,E/lutein/zeax/zn/marly (EYE HEALTH FORMULA ORAL) [VITS A,C,E/LUTEIN/ZEAX/ZN/MARLY (EYE HEALTH FORMULA ORAL)] Take by mouth.      phytonadione, vit K1, (PHYTONADIONE, VITAMIN K1,) 100 mcg tablet [PHYTONADIONE, VIT K1, (PHYTONADIONE, VITAMIN K1,) 100 MCG TABLET] Take 100 mcg by mouth daily.             Discharge Instructions:  Follow up appointment with TESSA RN: Friday, July 16  Follow up appointment with Garrick in 45 days   Follow up Echocardiogram: in 45 days    Diet: Regular diet. Increase fluids over the next 2 days.   Activity: Activity as tolerated   Restrictions: No lifting >10 lbs or vigorous exercise for 5 days. No driving for 3 days. May return to work in 5 days  Wound / drain care: OK to shower next day. Keep wound clean and dry. Ok to use Ice packs PRN for discomfort. No baths, hot tubs or swimming pools for 5 days.    Hospital Summary:   Mr. Mike Gonzalez is a 70 year old male who underwent successful LAAO implant with a 31 mm Watchman FLX device. The patient was recovered in PACU and then transferred to  where they remained on bedrest for the remainder of 4 hours.  The patient dangled at the edge of bed and ambulated; He voided without difficulty.  Vascular access site remains stable prior to discharge.  Post procedure the patient denies chest pain/pressure, palpitations, or shortness of breath.      Repeat labs were reviewed and are stable.  Activity restrictions and reportable signs and symptoms were discussed with the patient who verbalizes understanding. He will continue on Eliquis for CVA prevention at discharge.   Tomorrow he will begin taking ASA 81 mg daily along with his Eliquis.     Follow up is arranged as above.        Physical Examination   /68   Pulse 66   Temp 98  F (36.7  C) (Oral)   Resp 16   Ht 1.829 m (6')   Wt 113.3 kg (249 lb 12.5 oz)   SpO2 98%   BMI 33.88 kg/m    Body mass index is  33.88 kg/m .  Wt Readings from Last 3 Encounters:   07/14/21 113.3 kg (249 lb 12.5 oz)   07/13/21 111.1 kg (245 lb)   07/12/21 111.1 kg (245 lb)       Intake/Output Summary (Last 24 hours) at 7/14/2021 1251  Last data filed at 7/14/2021 0928  Gross per 24 hour   Intake 250 ml   Output --   Net 250 ml     General Appearance:   no distress, normal body habitus   Chest/Lungs:   Normal respiratory effort. Respirations are even and unlabored. Lungs are clear to auscultation, no rales or wheezing. No chest wall tenderness.    Cardiovascular:   Regular. Normal S1, S2 with no murmurs, rubs, or gallops; the carotid, radial, dorsalis pedis and posterior tibial pulses are intact   Abdomen:  obese, soft, non-tender to palpation, non-distended. + bowel sounds.   Extremities: No edema    Skin: Right groin site WNL; Stitch in place   Neurologic: Alert and oriented x3, calm and able to move all 4 extremities appropriately            Lab Results    Chemistry/lipid CBC Cardiac Enzymes/BNP/TSH/INR   Creat 1.00 - 7/14/2021 Hgb 14.4 - 7/14/2021 Lab Results   Component Value Date    TROPONINI 0.02 06/18/2020     (H) 11/30/2020

## 2021-07-14 NOTE — Clinical Note
0 : 32. 45 : 30. 90 : 27. 135 : 25. 0 : 30. 45 : 28. 90 : 36. 135 : 30. 0 : 27.6 . 45 : 27 . 90 : 27.2 . 135 : 25 . SPARKLE type used: Chicken WingPosition: Passed. Duarte: Passed. Size: Accepted. Seal: Complete. Jet: 0.WATBACILIO FLX 33mm: GTIN: [id number], LOT: [id number], expiration date 2024-04-05.

## 2021-07-14 NOTE — DISCHARGE INSTRUCTIONS
Going home after Left Atrial Appendage Occlusion Device Implant    Once Home:    You should arrange for someone to stay with you for the first 24 hours after discharge    Make sure you are taking all of your medications as directed in your discharge summary.  Do not stop taking these medications (especially your blood thinner and baby aspirin) without speaking to your provider.     Increase fluid intake for two days    Continue to use Incentive Spirometer 4-5 times every two hours while awake x 2 days      No lifting more than 10 pounds for 5 days    No aggressive exercise for 5 days    No driving x 3 day    You may shower tomorrow; no bath x 5 days    Return to work in 3 days if you have a sedentary job or 5 days if you do manual labor      Care of groin site  It is normal to have a small bruise or lump at the site.    Do not scrub the site.    For the first 2 days: Do not stoop or squat. When you cough, sneeze or move your bowels, hold your hand over the puncture site and press gently.    Do not use lotion or powder near the puncture site for 3 days.    Ok to use ice packs at groin sites 20 minutes at a time for groin discomfort    If you start bleeding from the site in your groin, lie down flat and press firmly on the site. Call your doctor as soon as you can.    Call your doctor if:    You have a large or growing hard lump around the site.    The site is red, swollen, hot or tender.    Blood or fluid is draining from the site.    You have chills or a fever greater than 101 F (38 C).    Your leg or arm feels numb or cool.    You have hives, a rash or unusual itching.    To reduce the risk of infection, avoid dental procedures (including cleanings) for the first 6 weeks.  Contact your cardiology clinic for an antibiotic should you need to see the dentist in the first 6 weeks post left atrial appendage implant.    Dial 911 if you have bleeding that is heavy or does not stop OR for any NEW signs/symptoms of a  stroke:    Visual disturbance    Problems with talking    Smile only occurs on half your face    Numbness on one side of your face or body    Sudden headache    Confusion    Problems with walking or balance    Follow up appointments:     Post-Op Appointment Date: July 16 at 7:50 am with RN Coordinator     At [address], [address]    [facility] [id number]    6 Week Post Implant Visit Date: August 27 at 1:30 pm with Gina Blount PA-C    At [address], [address] [address]    COVID-19 Test for 6 Week Follow up Transesophageal Echocardiogram (JEAN CLAUDE) Date: August 30 at 11 am       6 Week Transesophageal Echocardiogram: Please arrange for a responsible adult to drive you to and from this appointment. There will be nothing to eat or drink for at least 6 hours prior to your arrival time for the JEAN CLAUDE.    Date: September 2 at noon -  Location: [facility]      Your Procedural Physician was: Dr. Gutierres     To reach the [facility] nurse coordinator (Ava) please call [phone]        If you have issues tonight when you get home, please page [phone] and enter your phone number.  Gina, kira BLAIR will call you back.        If after 9pm, then call the [facility] at [phone] and you will be connected to the on call doctor                                                                  If you are calling after hours, please listen to the entire voicemail, a live  will answer at the end of the message

## 2021-07-14 NOTE — PROGRESS NOTES
D/I/A: Pt roomed on 3C in bay 34.  Arrived via litter and accompanied by PACU RN On/Off: On monitor.  VSSA.  Rhythm upon arrival A-fib on monitor.  Denies pain or sob.  Reviewed activity restrictions and when to notify RN, ie-changes to breathing or increased chest pressure or chest pain.  CCL access:  Right groin, CDI. No bleeding or hematoma. Bedrest until 1:00 PM   P: Continue to monitor status.  Discharge to home once meeting criteria.

## 2021-07-14 NOTE — ANESTHESIA PROCEDURE NOTES
Airway       Patient location during procedure: OR       Procedure Start/Stop Times: 7/14/2021 8:02 AM  Staff -        Performed By: CRNA  Consent for Airway        Urgency: elective  Indications and Patient Condition       Indications for airway management: avinash-procedural       Induction type:intravenous       Mask difficulty assessment: 1 - vent by mask    Final Airway Details       Final airway type: endotracheal airway       Successful airway: ETT - single  Endotracheal Airway Details        ETT size (mm): 8.0       Cuffed: yes       Successful intubation technique: direct laryngoscopy       DL Blade Type: Gar 2       Grade View of Cords: 1       Adjucts: stylet       Position: Right       Measured from: gums/teeth       Secured at (cm): 24       Bite block used: None    Post intubation assessment        Placement verified by: capnometry, equal breath sounds and chest rise        Number of attempts at approach: 1       Secured with: silk tape       Ease of procedure: easy       Dentition: Intact and Unchanged

## 2021-07-14 NOTE — ANESTHESIA CARE TRANSFER NOTE
Patient: Mike Gonzalez    Procedure(s):  CV LEFT ATRIAL APPENDAGE CLOSURE    Diagnosis: other  Diagnosis Additional Information: No value filed.    Anesthesia Type:   No value filed.     Note:    Oropharynx: oropharynx clear of all foreign objects  Level of Consciousness: drowsy  Oxygen Supplementation: nasal cannula  Level of Supplemental Oxygen (L/min / FiO2): 4  Independent Airway: airway patency satisfactory and stable  Dentition: dentition unchanged  Vital Signs Stable: post-procedure vital signs reviewed and stable  Report to RN Given: handoff report given  Patient transferred to: PACU    Handoff Report: Identifed the Patient, Identified the Reponsible Provider, Reviewed the pertinent medical history, Discussed the surgical course, Reviewed Intra-OP anesthesia mangement and issues during anesthesia, Set expectations for post-procedure period and Allowed opportunity for questions and acknowledgement of understanding      Vitals: (Last set prior to Anesthesia Care Transfer)  CRNA VITALS  7/14/2021 0857 - 7/14/2021 0927      7/14/2021             Resp Rate (observed):  (!) 2        Electronically Signed By: RAHUL Roger CRNA  July 14, 2021  9:27 AM

## 2021-07-16 ENCOUNTER — PREP FOR PROCEDURE (OUTPATIENT)
Dept: CARDIOLOGY | Facility: CLINIC | Age: 71
End: 2021-07-16

## 2021-07-16 ENCOUNTER — ALLIED HEALTH/NURSE VISIT (OUTPATIENT)
Dept: CARDIOLOGY | Facility: CLINIC | Age: 71
End: 2021-07-16
Payer: MEDICARE

## 2021-07-16 VITALS
RESPIRATION RATE: 14 BRPM | SYSTOLIC BLOOD PRESSURE: 118 MMHG | DIASTOLIC BLOOD PRESSURE: 70 MMHG | HEIGHT: 72 IN | HEART RATE: 71 BPM | BODY MASS INDEX: 33.86 KG/M2 | WEIGHT: 250 LBS

## 2021-07-16 DIAGNOSIS — Z95.818 PRESENCE OF LEFT ATRIAL APPENDAGE CLOSURE DEVICE COMPOSED OF NICKEL-TITANIUM ALLOY WITH POLYETHYLENE TEREPHTHALATE MEMBRANE: Primary | ICD-10-CM

## 2021-07-16 PROCEDURE — 99207 PR NO CHARGE NURSE ONLY: CPT

## 2021-07-16 RX ORDER — SODIUM CHLORIDE 9 MG/ML
INJECTION, SOLUTION INTRAVENOUS CONTINUOUS
Status: CANCELLED | OUTPATIENT
Start: 2021-07-16

## 2021-07-16 RX ORDER — LIDOCAINE 40 MG/G
CREAM TOPICAL
Status: CANCELLED | OUTPATIENT
Start: 2021-07-16

## 2021-07-16 ASSESSMENT — MIFFLIN-ST. JEOR: SCORE: 1926.99

## 2021-07-16 NOTE — NURSING NOTE
Pt was seen in clinic for post op 31mm Watchman Flx device placement with Dr. Gutierres on 7/14/21.    Pt VSS, LSC, denies SOB, and palpable pedal pulses.    No peripheral edema noted, no abdominal bloating or discomfort.     Pt denies any neurological changes at this time.    Pt denies generalized or localized pain at this time.   Pt is having bowel movements and is voiding without difficulty.      Pt right groin has 1 puncture site, is C/D/I, no drainage, no bruising noted around puncture site, 1 suture intact, groin is soft, and pt denies pain at the site.    Suture was removed from the right groin site without complication and bandaid was applied.    Pt continues anticoagulation: Eliquis and daily 81 mg Aspirin.    Reviewed post op LAAC healing process, f/u apts, physical restrictions, nutritional requirements, when to contact the heart clinic, and contact information was given to the pt for further concerns or questions.  Pt verbalized understanding.     Melvina Ch RN BSN  Structural Heart Coordinator   [facility]  [phone]

## 2021-07-16 NOTE — PATIENT INSTRUCTIONS
Your anticoagulation medication (Eliquis):      It is important to remain on your anticoagulation medication uninterrupted after your left atrial occlusion device implant to reduce your risk of a stroke or heart attack, DO NOT STOP THIS MEDICATION.    Healing from your left atrial closure device implant:      Stay well hydrated, it is important to increase your fluid intake during your recovery period.    Eat foods high in protein to help the healing process.    Gradually increase your activity over the several days, back to baseline;  No aggressive exercise for 5 days post-procedure.    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    No lifting more that 10 lb for 5 days after procedure.    No driving for 3 days post-procedure.     Keep your incision site clean, dry and open to air.    Ok to use ice packs at groin sites for 20 minutes at a time for groin discomfort.     Please delay any dental procedures until 6 weeks post implant. You will not require anti-biotic prophylaxis treatment after this time frame.    If you need urgent dental care prior to the 6 week post-procedure restriction, please contact your cardiologist for prophylactic antibiotic.     Please call me if any of the following occur:      Shortness of breath, dizziness, or chest pain.    Changes in your groin sites including:  Swelling, hardening, drainage, increase in bruising or an increase in pain.          Dial 911 for signs/symptoms of a stroke:      New onset visual disturbance.    New problems with talking/speech.    Smile only occurs on half your face.    Numbness on one side of your body or face.    Sudden headache.    New onset of confusion.    New problems with walking or balance.     Important information regarding your future follow up appointments:      45 Day post-implant follow-up with our Advance Practice Practitioner (DEBI)    45 Day post-implant JEAN CLAUDE to assess your left atrial appendage  closure device.    You will be contacted after your JEAN CLAUDE is complete to discuss anticoagulation instructions. Please continue your (Eliquis) until notified with your post-implant JEAN CLAUDE results.    Thank you,    Melvina Ch, Structural Heart Coordinator -[phone]    After hours please contact the on call service at [phone]

## 2021-07-19 LAB
ATRIAL RATE - MUSE: 60 BPM
DIASTOLIC BLOOD PRESSURE - MUSE: NORMAL MMHG
INTERPRETATION ECG - MUSE: NORMAL
P AXIS - MUSE: NORMAL DEGREES
PR INTERVAL - MUSE: NORMAL MS
QRS DURATION - MUSE: 102 MS
QT - MUSE: 422 MS
QTC - MUSE: 438 MS
R AXIS - MUSE: -81 DEGREES
SYSTOLIC BLOOD PRESSURE - MUSE: NORMAL MMHG
T AXIS - MUSE: 59 DEGREES
VENTRICULAR RATE- MUSE: 65 BPM

## 2021-07-21 ENCOUNTER — TRANSFERRED RECORDS (OUTPATIENT)
Dept: HEALTH INFORMATION MANAGEMENT | Facility: CLINIC | Age: 71
End: 2021-07-21

## 2021-08-10 ENCOUNTER — TRANSFERRED RECORDS (OUTPATIENT)
Dept: HEALTH INFORMATION MANAGEMENT | Facility: CLINIC | Age: 71
End: 2021-08-10

## 2021-08-10 ENCOUNTER — TELEPHONE (OUTPATIENT)
Dept: CARDIOLOGY | Facility: CLINIC | Age: 71
End: 2021-08-10

## 2021-08-10 NOTE — TELEPHONE ENCOUNTER
Return call to patient, he is s/p LAAO implant on 7-14-21 with Dr. Gutierres.  He is currently on Eliquis.  He states he has had back pain since January and really would like to have an injection but understands that it may not be recommended at this time due to his recent procedure.    He states the MD doing the spinal injection would like his anticoagulant to be held prior but the patient is unsure of the amount of time he would need to hold    Will review with the LAAO team and call the patient with recommendations.

## 2021-08-10 NOTE — TELEPHONE ENCOUNTER
----- Message from Unique Rodriguez sent at 8/10/2021  1:07 PM CDT -----  Regarding: FRANCIS SIKNNER  General phone call:    Caller: FREDDY  Primary cardiologist: SHAYE  Detailed reason for call: He is on Eliqius for the 3-4 weeks and he has to have a shot in a nerve in his spine.  Can he go off the blood thinner?  Best phone number: [phone]  Best time to contact: ANY  Ok to leave a detailed message? YES  Device? no  Additional Info:

## 2021-08-10 NOTE — TELEPHONE ENCOUNTER
Phone call to patient, informed him that at this time he may not hold or discontinue anticoagulation for spinal injection. Patient has 45 Day post-LAAC JEAN CLAUDE scheduled for 9/2/21. Explained to patient that if at that time the results have been reviewed by a non-invasive cardiologist and confirm that there is no clot on the device or leak around the device, he will stop taking his Eliquis and begin taking plavix in addition to his current daily 81 mg aspirin. Informed patient that provider performing spinal injection may also request plavix hold before performing. Patient verbalized understanding, no further questions at this time. LYLY

## 2021-08-13 DIAGNOSIS — I48.92 ATRIAL FLUTTER, CHRONIC (H): ICD-10-CM

## 2021-08-13 DIAGNOSIS — I42.0 DILATED CARDIOMYOPATHY (H): ICD-10-CM

## 2021-08-13 NOTE — TELEPHONE ENCOUNTER
Pharmacy requesting refill of Eliquis.  Pt last seen 3/30/21  Due to be seen around 9/30/21  Plan:      1. Patient was given a prescription for gabapentin.  He will try this at night.  We can escalate as needed.  2. Continue physical therapy.  3. Follow-up with me if we need to coordinate his care better.  4. I think he can take a break from his cardiac visits at this point.  He is feeling well from the standpoint at this time.  He can always go back if things are needed.  5. Consider MRI of the lumbar spine if not improving.  Consider epidural injection if not improving.        Phone call duration:  22 minutes     32 minutes total was spent today on the patient's care on the day of service.       This includes time for chart preparation, reviewing medical tests done before or during the visit, talking with the patient, review of quality indicators, required documentation, and other elements of care.         Onur Rod MD  General Internal Medicine  [facility]     Return in about 6 months (around 9/30/2021), or if symptoms worsen or fail to improve, for follow up visit.

## 2021-08-13 NOTE — TELEPHONE ENCOUNTER
Medication already refilled by PCP. -ejb    ----- Message -----  From: Rosas Ragsdale  Sent: 8/13/2021  11:16 AM CDT  To: Mary Deluca RN    Patient has already contacted their pharmacy. The medication or refill issue is below:    Primary Cardiologist: Wisam  Medication: Eloquis  Preferred Pharmacy/City: [facility] Phone Number for Patient: [phone]    Additional Info:

## 2021-08-27 ENCOUNTER — TELEPHONE (OUTPATIENT)
Dept: LAB | Facility: CLINIC | Age: 71
End: 2021-08-27
Payer: MEDICARE

## 2021-08-27 DIAGNOSIS — Z20.822 COVID-19 RULED OUT: Primary | ICD-10-CM

## 2021-08-30 ENCOUNTER — OFFICE VISIT (OUTPATIENT)
Dept: CARDIOLOGY | Facility: CLINIC | Age: 71
End: 2021-08-30
Payer: MEDICARE

## 2021-08-30 ENCOUNTER — LAB (OUTPATIENT)
Dept: LAB | Facility: CLINIC | Age: 71
End: 2021-08-30
Payer: MEDICARE

## 2021-08-30 VITALS
SYSTOLIC BLOOD PRESSURE: 130 MMHG | WEIGHT: 241 LBS | HEART RATE: 64 BPM | RESPIRATION RATE: 16 BRPM | DIASTOLIC BLOOD PRESSURE: 80 MMHG | BODY MASS INDEX: 32.64 KG/M2 | HEIGHT: 72 IN

## 2021-08-30 DIAGNOSIS — G89.29 CHRONIC LEFT-SIDED LOW BACK PAIN WITH LEFT-SIDED SCIATICA: ICD-10-CM

## 2021-08-30 DIAGNOSIS — Z20.822 COVID-19 RULED OUT: ICD-10-CM

## 2021-08-30 DIAGNOSIS — I10 ESSENTIAL HYPERTENSION: ICD-10-CM

## 2021-08-30 DIAGNOSIS — E66.01 MORBID OBESITY (H): ICD-10-CM

## 2021-08-30 DIAGNOSIS — Z95.818 PRESENCE OF LEFT ATRIAL APPENDAGE CLOSURE DEVICE COMPOSED OF NICKEL-TITANIUM ALLOY WITH POLYETHYLENE TEREPHTHALATE MEMBRANE: ICD-10-CM

## 2021-08-30 DIAGNOSIS — I48.11 LONGSTANDING PERSISTENT ATRIAL FIBRILLATION (H): Primary | ICD-10-CM

## 2021-08-30 DIAGNOSIS — M54.42 CHRONIC LEFT-SIDED LOW BACK PAIN WITH LEFT-SIDED SCIATICA: ICD-10-CM

## 2021-08-30 PROBLEM — Z47.89 AFTERCARE FOLLOWING SURGERY OF THE MUSCULOSKELETAL SYSTEM: Status: ACTIVE | Noted: 2019-05-17

## 2021-08-30 PROBLEM — F07.81 CONCUSSION SYNDROME: Status: ACTIVE | Noted: 2019-02-12

## 2021-08-30 PROBLEM — M54.12 RADICULOPATHY, CERVICAL: Status: ACTIVE | Noted: 2021-08-30

## 2021-08-30 PROBLEM — M19.012 OSTEOARTHRITIS OF LEFT GLENOHUMERAL JOINT: Status: ACTIVE | Noted: 2021-08-30

## 2021-08-30 PROCEDURE — U0003 INFECTIOUS AGENT DETECTION BY NUCLEIC ACID (DNA OR RNA); SEVERE ACUTE RESPIRATORY SYNDROME CORONAVIRUS 2 (SARS-COV-2) (CORONAVIRUS DISEASE [COVID-19]), AMPLIFIED PROBE TECHNIQUE, MAKING USE OF HIGH THROUGHPUT TECHNOLOGIES AS DESCRIBED BY CMS-2020-01-R: HCPCS

## 2021-08-30 PROCEDURE — 99214 OFFICE O/P EST MOD 30 MIN: CPT | Performed by: INTERNAL MEDICINE

## 2021-08-30 PROCEDURE — U0005 INFEC AGEN DETEC AMPLI PROBE: HCPCS

## 2021-08-30 ASSESSMENT — MIFFLIN-ST. JEOR: SCORE: 1886.17

## 2021-08-30 NOTE — H&P (VIEW-ONLY)
Assessment/Recommendations   1.  Longstanding persistent atrial fibrillation: He is status post watchman implant on July 14.  He is doing well postprocedure and has had no neurologic events or symptoms.  At this time he is on aspirin and Eliquis.  He is scheduled to have his postprocedure JEAN CLAUDE on September 2.  If the JEAN CLAUDE shows that there is no leakage around the watchman device, he will be called and instructed to stop the Eliquis. He will then continue aspirin 81 mg daily and start Plavix 75 mg daily.  He will stay on the Plavix until January, at which time he will see us for his 6-month follow-up visit.    He understands all the instructions and his questions have been answered.  He is ready to proceed with JEAN CLAUDE.    MODIFIED ENA SCALE   Timepoint: 4-6 wk Post-LAAC    Previous score: 0    Score Description   0 No symptoms at all   1 No significant disability despite symptoms; able to carry out all usual duties and activities   2 Slight disability; unable to carry out all previous activities, but able to look after own affairs without assistance   3 Moderate disability; requiring some help, but able to walk without assistance   4 Moderately severe disability; unable to walk without assistance and unable to attend to own bodily needs without assistance   5 Severe disability; bedridden, incontinent and requiring constant nursing care and attention   6 Dead    Total score (0 - 6):  0    Change in score if s/p LAAC? No     2.  Hypertension -blood pressure today is controlled.  He should continue taking lisinopril 5 mg daily and metoprolol succinate 25 mg daily     3.  Nonischemic cardiomyopathy, heart failure with reduced ejection fraction, NYHA class II -LVEF is 42%.  He is currently euvolemic.   Continue beta-blocker and ACE inhibitor therapy.      4.  Nonobstructive coronary artery disease -patient complains of no angina.  He is on aspirin and beta-blocker.  Patient recently was given prescription for  atorvastatin, but has still not started it     5.  Back pain -patient received back injection last week and then abruptly stopped his gabapentin.  He says back pain symptoms resolved for 3 days and are now back.  He is seeing the orthopedic surgeon in the upcoming weeks to maybe discuss surgery.  Wants to know if he can be off of his Plavix for the surgery and I have recommended against this, however if the pain is something he cannot tolerate through January, then could think about taking him off Plavix 1 to 2 months early.  Patient is now back on gabapentin.       History of Present Illness/Subjective    Miek Gonzalez is a 70 year old male who comes in today for history and physical prior to 45-day JEAN CLAUDE after watchman implant.  His wife accompanies him to the visit today.    Mike Gonzalez has a past history of atrial fibrillation and atrial flutter, hypertension, nonobstructive coronary artery disease, nonischemic cardiomyopathy with LVEF 44% and obesity.  He has degenerative joint disease and has a lot of shoulder and hip pain.  He would like to be able to take NSAIDs, but currently can't.  He underwent watchman implant on July 14 using a 31 mm FLX device.  Procedurally he is doing well and has had no strokelike symptoms.  However he has a lot of complaints about his back pain today and is concerned about timing of getting off of the blood thinner so that he may be able to have surgery.  He had a back injection last week which helped for about 3 days and now is giving him no more relief.    Mike Gonzalez does have palpitations, but this is chronic and hasn't changed since implant.  He denies chest discomfort, shortness of breath, paroxysmal nocturnal dyspnea, orthopnea, lightheadedness, dizziness, pre-syncope, or syncope.  Mike Gonzalez also denies any weight loss, changes in appetite, nausea or vomiting.     Medical, surgical, family, social history, and medications were reviewed and updated as  necessary.    Procedural JEAN CLAUDE results (from July 14):  Interpretation Summary  Structural JEAN CLAUDE for Left Atrial Occlusion Device     Pre-Device:  1. Normal left ventricular size. Moderate-to-severely reduced systolic  function. LVEF:30-35%  2. No left atrial thrombus or spontaneous contrast. Severe left atrial  enlargement.  3. PFO present by color flow.  4. No pericardial effusion.     Post Device:  1. Well seated 31 mm Watchman FLX Device in left atrial appendage by 2D and 3D  imaging. No color doppler evidence of flow around device at ostium insertion  before or after device release. No change in mitral valve function. No  thrombus noted on device, LA or SPARKLE.     SPARKLE device measurements:  Device compression diameter:     Post deployment  0 degrees: 27.6 mm  45 degrees: 27.0 mm  90 degrees: 27.2 mm  135 degrees: 25 mm     2. Normal left ventricular size. Moderate-to-severely reduced systolic  function. LVEF:30-35%  3. Small ASD with unidirectional flow (left to right) by color flow doppler.  4. No post procedural pericardial effusion.     Physical Examination Review of Systems   Vitals: /80 (BP Location: Left arm, Patient Position: Sitting, Cuff Size: Adult Large)   Pulse 64   Resp 16   Ht 1.829 m (6')   Wt 109.3 kg (241 lb)   BMI 32.69 kg/m    BMI= Body mass index is 32.69 kg/m .  Wt Readings from Last 3 Encounters:   08/30/21 109.3 kg (241 lb)   07/16/21 113.4 kg (250 lb)   07/14/21 113.3 kg (249 lb 12.5 oz)       General Appearance:   Alert, cooperative and in no acute distress   ENT/Mouth: membranes moist, no oral lesions or bleeding gums.      EYES:  no scleral icterus, normal conjunctivae   Neck: Thyroid not visualized   Chest/Lungs:   lungs are clear to auscultation, no rales or wheezing   Cardiovascular:   Irregularly irregular . Normal first and second heart sounds with no murmurs, rubs or gallops; the carotid, radial and posterior tibial pulses are intact, no edema bilaterally    Abdomen:  Soft  and nontender. Bowel sounds are present in all quadrants   Extremities: no cyanosis or clubbing   Skin: no xanthelasma, warm.    Neurologic: normal gait, normal  bilateral, no tremors   Psychiatric: Normal mood and affect       Please refer above for cardiac ROS details.      Medical History  Surgical History Family History Social History   Past Medical History:   Diagnosis Date     Angioedema      Atrial fibrillation and flutter (H)      Basal cell carcinoma      Branch retinal vein occlusion of left eye 7/1/2017     CAD (coronary artery disease)     ANGIOGRAM 2021 Left Main The vessel was visualized by angiography, is moderate in size and is angiographically normal. Left Anterior Descending Mid LAD lesion is 25% stenosed. Ramus Intermedius Ramus lesion is 10% stenosed. Left Circumflex Dist Cx lesion is 30% stenosed. Right Coronary Artery The vessel was visualized by angiography, is moderate in size and is angiographically normal.       Chronic neck pain      Congestive heart failure (H) 2021     GERD (gastroesophageal reflux disease)      HTN (hypertension) 2015     Hx of atrial flutter      Nonsmoker      Radiculopathy     cervical     Retinal hemorrhage     Vessel rupture in left retina     Rhinitis, allergic      S/P colonoscopy 4/15/2019     Urticaria      Past Surgical History:   Procedure Laterality Date     BASAL CELL CARCINOMA EXCISION  01/2020    Left nasal reconstruction     CERVICAL SPINE SURGERY      C8, T1, foraminotomy     CV CORONARY ANGIOGRAM N/A 1/20/2021    Procedure: Coronary Angiogram;  Surgeon: Willow Wellington MD;  Location: [facility];  Service: Cardiology     CV LEFT ATRIAL APPENDAGE CLOSURE N/A 7/14/2021    Procedure: CV LEFT ATRIAL APPENDAGE CLOSURE;  Surgeon: Noah Gutierres MD;  Location:  [facility]     CV LEFT HEART CATHETERIZATION WITHOUT LEFT VENTRICULOGRAM Left 1/20/2021    Procedure: Left Heart Catheterization Without Left Ventriculogram;   Surgeon: Willow Wellington MD;  Location: [facility];  Service: Cardiology     HERNIA REPAIR, UMBILICAL       ×2     REVERSE TOTAL SHOULDER ARTHROPLASTY Left 05/2019     Family History   Problem Relation Age of Onset     Arthritis Mother      Other - See Comments Mother         psychiatry/pvd     Other - See Comments Father         blood reaction    Social History     Socioeconomic History     Marital status:      Spouse name: Not on file     Number of children: Not on file     Years of education: Not on file     Highest education level: Not on file   Occupational History     Not on file   Tobacco Use     Smoking status: Never Smoker     Smokeless tobacco: Never Used   Substance and Sexual Activity     Alcohol use: Never     Comment: Alcoholic Drinks/day: About once a year.     Drug use: No     Sexual activity: Not on file   Other Topics Concern     Parent/sibling w/ CABG, MI or angioplasty before 65F 55M? Not Asked   Social History Narrative    Patient of Dr. Rod since 2001.    .  Wife is a former RN.     Wife's cousin is Dr. Jean Pierre Champagne, ID specialist.     Social Determinants of Health     Financial Resource Strain:      Difficulty of Paying Living Expenses:    Food Insecurity:      Worried About Running Out of Food in the Last Year:      Ran Out of Food in the Last Year:    Transportation Needs:      Lack of Transportation (Medical):      Lack of Transportation (Non-Medical):    Physical Activity:      Days of Exercise per Week:      Minutes of Exercise per Session:    Stress:      Feeling of Stress :    Social Connections:      Frequency of Communication with Friends and Family:      Frequency of Social Gatherings with Friends and Family:      Attends Gnosticism Services:      Active Member of Clubs or Organizations:      Attends Club or Organization Meetings:      Marital Status:    Intimate Partner Violence:      Fear of Current or Ex-Partner:      Emotionally Abused:       Physically Abused:      Sexually Abused:           Medications  Allergies   Current Outpatient Medications   Medication Sig Dispense Refill     acetaminophen (TYLENOL) 650 MG CR tablet Take 650 mg by mouth daily as needed       apixaban ANTICOAGULANT (ELIQUIS) 5 MG tablet Take 1 tablet (5 mg) by mouth 2 times daily 60 tablet 3     aspirin (ASA) 81 MG EC tablet Take 1 tablet (81 mg) by mouth daily       co-enzyme Q-10 100 MG CAPS capsule Take 200 mg by mouth daily       fish oil-omega-3 fatty acids 300-1,000 mg capsule [FISH OIL-OMEGA-3 FATTY ACIDS 300-1,000 MG CAPSULE] Take 2 g by mouth daily.       fluticasone propionate (FLONASE) 50 mcg/actuation nasal spray [FLUTICASONE PROPIONATE (FLONASE) 50 MCG/ACTUATION NASAL SPRAY] USE 2 SPRAYS IN EACH       NOSTRIL DAILY 48 g 2     gabapentin (NEURONTIN) 100 MG capsule [GABAPENTIN (NEURONTIN) 100 MG CAPSULE] Take 200-400 mg by mouth see administration instructions. Patient currently takes 200 MG in the morning and 400 MG at bedtime. (Patient taking differently: Take 600 mg by mouth daily At bedtime) 100 capsule 12     lisinopriL (PRINIVIL,ZESTRIL) 5 MG tablet [LISINOPRIL (PRINIVIL,ZESTRIL) 5 MG TABLET] Take 1 tablet (5 mg total) by mouth daily. 90 tablet 3     metoprolol succinate (TOPROL XL) 25 MG [METOPROLOL SUCCINATE (TOPROL XL) 25 MG] Take 1 tablet (25 mg total) by mouth daily. 90 tablet 3     MULTIVITAMIN ORAL [MULTIVITAMIN ORAL] Take by mouth.       oxymetazoline HCl (AFRIN, OXYMETAZOLINE, NASL) [OXYMETAZOLINE HCL (AFRIN, OXYMETAZOLINE, NASL)] into each nostril.       vits A,C,E/lutein/zeax/zn/marly (EYE HEALTH FORMULA ORAL) [VITS A,C,E/LUTEIN/ZEAX/ZN/MARLY (EYE HEALTH FORMULA ORAL)] Take by mouth.      Allergies   Allergen Reactions     Hydrochlorothiazide Unknown     Hydrocodone Swelling     Sinus     Oxycontin [Oxycodone] Swelling     lip     Sulfa (Sulfonamide Antibiotics) [Sulfa Drugs] Swelling     Perfume Swelling     Other reaction(s): Runny Nose         Lab  Results    Chemistry/lipid CBC Cardiac Enzymes/BNP/TSH/INR   Recent Labs   Lab Test 01/20/21  0700   CHOL 165   HDL 40      TRIG 65     Recent Labs   Lab Test 01/20/21  0700 06/29/17  1035    103     Recent Labs   Lab Test 07/14/21  1228 07/14/21  0535 07/12/21  1133   NA  --   --  138   POTASSIUM  --   --  4.3   CHLORIDE  --   --  105   CO2  --   --  25   GLC  --  117* 102   BUN  --   --  13   CR 1.00  --  0.84   GFRESTIMATED 75  --  88   JOVAN  --   --  9.0     Recent Labs   Lab Test 07/14/21  1228 07/12/21  1133 06/27/21  1142   CR 1.00 0.84 1.0     Recent Labs   Lab Test 06/29/17  1035   A1C 5.3    Recent Labs   Lab Test 07/14/21  1228 07/12/21  1133   WBC  --  8.3   HGB 14.4 14.9   HCT  --  44.4   MCV  --  94   PLT  --  270     Recent Labs   Lab Test 07/14/21  1228 07/12/21  1133 01/20/21  0700   HGB 14.4 14.9 15.3    Recent Labs   Lab Test 06/18/20  1217   TROPONINI 0.02     Recent Labs   Lab Test 11/30/20  1018 06/18/20  1217   * 133*     No results for input(s): TSH in the last 84250 hours.  No results for input(s): INR in the last 72416 hours.     30 minutes spent on the date of encounter doing education, chart prep/review, review of test results, patient visit, documentation and discussion with family.      This note has been dictated using voice recognition software. Any grammatical or context distortions are unintentional and inherent to the software.

## 2021-08-30 NOTE — LETTER
8/30/2021    Onur Rod MD  [address] [facility] [address]  [facility] [address]    RE: Mike Gonzalez       Dear Colleague,    I had the pleasure of seeing Mike Gonzalez in the [facility].          Assessment/Recommendations   1.  Longstanding persistent atrial fibrillation: He is status post watchman implant on July 14.  He is doing well postprocedure and has had no neurologic events or symptoms.  At this time he is on aspirin and Eliquis.  He is scheduled to have his postprocedure JEAN CLAUDE on September 2.  If the JEAN CLAUDE shows that there is no leakage around the watchman device, he will be called and instructed to stop the Eliquis. He will then continue aspirin 81 mg daily and start Plavix 75 mg daily.  He will stay on the Plavix until January, at which time he will see us for his 6-month follow-up visit.    He understands all the instructions and his questions have been answered.  He is ready to proceed with JEAN CLAUDE.    MODIFIED ENA SCALE   Timepoint: 4-6 wk Post-LAAC    Previous score: 0    Score Description   0 No symptoms at all   1 No significant disability despite symptoms; able to carry out all usual duties and activities   2 Slight disability; unable to carry out all previous activities, but able to look after own affairs without assistance   3 Moderate disability; requiring some help, but able to walk without assistance   4 Moderately severe disability; unable to walk without assistance and unable to attend to own bodily needs without assistance   5 Severe disability; bedridden, incontinent and requiring constant nursing care and attention   6 Dead    Total score (0 - 6):  0    Change in score if s/p LAAC? No     2.  Hypertension -blood pressure today is controlled.  He should continue taking lisinopril 5 mg daily and metoprolol succinate 25 mg daily     3.  Nonischemic cardiomyopathy, heart failure with reduced ejection fraction, NYHA class II -LVEF is 42%.   He is currently euvolemic.   Continue beta-blocker and ACE inhibitor therapy.      4.  Nonobstructive coronary artery disease -patient complains of no angina.  He is on aspirin and beta-blocker.  Patient recently was given prescription for atorvastatin, but has still not started it     5.  Back pain -patient received back injection last week and then abruptly stopped his gabapentin.  He says back pain symptoms resolved for 3 days and are now back.  He is seeing the orthopedic surgeon in the upcoming weeks to maybe discuss surgery.  Wants to know if he can be off of his Plavix for the surgery and I have recommended against this, however if the pain is something he cannot tolerate through January, then could think about taking him off Plavix 1 to 2 months early.  Patient is now back on gabapentin.       History of Present Illness/Subjective    Mike Gonzalez is a 70 year old male who comes in today for history and physical prior to 45-day JEAN CLAUDE after watchman implant.  His wife accompanies him to the visit today.    iMke Gonzalez has a past history of atrial fibrillation and atrial flutter, hypertension, nonobstructive coronary artery disease, nonischemic cardiomyopathy with LVEF 44% and obesity.  He has degenerative joint disease and has a lot of shoulder and hip pain.  He would like to be able to take NSAIDs, but currently can't.  He underwent watchman implant on July 14 using a 31 mm FLX device.  Procedurally he is doing well and has had no strokelike symptoms.  However he has a lot of complaints about his back pain today and is concerned about timing of getting off of the blood thinner so that he may be able to have surgery.  He had a back injection last week which helped for about 3 days and now is giving him no more relief.    Mike Gonzalez does have palpitations, but this is chronic and hasn't changed since implant.  He denies chest discomfort, shortness of breath, paroxysmal nocturnal dyspnea, orthopnea,  lightheadedness, dizziness, pre-syncope, or syncope.  Mike Gonzalez also denies any weight loss, changes in appetite, nausea or vomiting.     Medical, surgical, family, social history, and medications were reviewed and updated as necessary.    Procedural JEAN CLAUDE results (from July 14):  Interpretation Summary  Structural JEAN CLAUDE for Left Atrial Occlusion Device     Pre-Device:  1. Normal left ventricular size. Moderate-to-severely reduced systolic  function. LVEF:30-35%  2. No left atrial thrombus or spontaneous contrast. Severe left atrial  enlargement.  3. PFO present by color flow.  4. No pericardial effusion.     Post Device:  1. Well seated 31 mm Watchman FLX Device in left atrial appendage by 2D and 3D  imaging. No color doppler evidence of flow around device at ostium insertion  before or after device release. No change in mitral valve function. No  thrombus noted on device, LA or SPARKLE.     SPARKLE device measurements:  Device compression diameter:     Post deployment  0 degrees: 27.6 mm  45 degrees: 27.0 mm  90 degrees: 27.2 mm  135 degrees: 25 mm     2. Normal left ventricular size. Moderate-to-severely reduced systolic  function. LVEF:30-35%  3. Small ASD with unidirectional flow (left to right) by color flow doppler.  4. No post procedural pericardial effusion.     Physical Examination Review of Systems   Vitals: /80 (BP Location: Left arm, Patient Position: Sitting, Cuff Size: Adult Large)   Pulse 64   Resp 16   Ht 1.829 m (6')   Wt 109.3 kg (241 lb)   BMI 32.69 kg/m    BMI= Body mass index is 32.69 kg/m .  Wt Readings from Last 3 Encounters:   08/30/21 109.3 kg (241 lb)   07/16/21 113.4 kg (250 lb)   07/14/21 113.3 kg (249 lb 12.5 oz)       General Appearance:   Alert, cooperative and in no acute distress   ENT/Mouth: membranes moist, no oral lesions or bleeding gums.      EYES:  no scleral icterus, normal conjunctivae   Neck: Thyroid not visualized   Chest/Lungs:   lungs are clear to auscultation, no  rales or wheezing   Cardiovascular:   Irregularly irregular . Normal first and second heart sounds with no murmurs, rubs or gallops; the carotid, radial and posterior tibial pulses are intact, no edema bilaterally    Abdomen:  Soft and nontender. Bowel sounds are present in all quadrants   Extremities: no cyanosis or clubbing   Skin: no xanthelasma, warm.    Neurologic: normal gait, normal  bilateral, no tremors   Psychiatric: Normal mood and affect       Please refer above for cardiac ROS details.      Medical History  Surgical History Family History Social History   Past Medical History:   Diagnosis Date     Angioedema      Atrial fibrillation and flutter (H)      Basal cell carcinoma      Branch retinal vein occlusion of left eye 7/1/2017     CAD (coronary artery disease)     ANGIOGRAM 2021 Left Main The vessel was visualized by angiography, is moderate in size and is angiographically normal. Left Anterior Descending Mid LAD lesion is 25% stenosed. Ramus Intermedius Ramus lesion is 10% stenosed. Left Circumflex Dist Cx lesion is 30% stenosed. Right Coronary Artery The vessel was visualized by angiography, is moderate in size and is angiographically normal.       Chronic neck pain      Congestive heart failure (H) 2021     GERD (gastroesophageal reflux disease)      HTN (hypertension) 2015     Hx of atrial flutter      Nonsmoker      Radiculopathy     cervical     Retinal hemorrhage     Vessel rupture in left retina     Rhinitis, allergic      S/P colonoscopy 4/15/2019     Urticaria      Past Surgical History:   Procedure Laterality Date     BASAL CELL CARCINOMA EXCISION  01/2020    Left nasal reconstruction     CERVICAL SPINE SURGERY      C8, T1, foraminotomy     CV CORONARY ANGIOGRAM N/A 1/20/2021    Procedure: Coronary Angiogram;  Surgeon: Willow Wellington MD;  Location: [facility] Cardiac Cath Lab;  Service: Cardiology     CV LEFT ATRIAL APPENDAGE CLOSURE N/A 7/14/2021    Procedure: CV LEFT ATRIAL APPENDAGE  CLOSURE;  Surgeon: Noah Gutierres MD;  Location: [facility] CARDIAC CATH LAB     CV LEFT HEART CATHETERIZATION WITHOUT LEFT VENTRICULOGRAM Left 1/20/2021    Procedure: Left Heart Catheterization Without Left Ventriculogram;  Surgeon: Willow Wellington MD;  Location: [facility] Cardiac Cath Lab;  Service: Cardiology     HERNIA REPAIR, UMBILICAL       ×2     REVERSE TOTAL SHOULDER ARTHROPLASTY Left 05/2019     Family History   Problem Relation Age of Onset     Arthritis Mother      Other - See Comments Mother         psychiatry/pvd     Other - See Comments Father         blood reaction    Social History     Socioeconomic History     Marital status:      Spouse name: Not on file     Number of children: Not on file     Years of education: Not on file     Highest education level: Not on file   Occupational History     Not on file   Tobacco Use     Smoking status: Never Smoker     Smokeless tobacco: Never Used   Substance and Sexual Activity     Alcohol use: Never     Comment: Alcoholic Drinks/day: About once a year.     Drug use: No     Sexual activity: Not on file   Other Topics Concern     Parent/sibling w/ CABG, MI or angioplasty before 65F 55M? Not Asked   Social History Narrative    Patient of Dr. Rod since 2001.    .  Wife is a former RN.     Wife's cousin is Dr. Jean Pierre Champagne, ID specialist.     Social Determinants of Health     Financial Resource Strain:      Difficulty of Paying Living Expenses:    Food Insecurity:      Worried About Running Out of Food in the Last Year:      Ran Out of Food in the Last Year:    Transportation Needs:      Lack of Transportation (Medical):      Lack of Transportation (Non-Medical):    Physical Activity:      Days of Exercise per Week:      Minutes of Exercise per Session:    Stress:      Feeling of Stress :    Social Connections:      Frequency of Communication with Friends and Family:      Frequency of Social Gatherings with Friends and Family:      Attends  B[employer] Services:      Active Member of Clubs or Organizations:      Attends Club or Organization Meetings:      Marital Status:    Intimate Partner Violence:      Fear of Current or Ex-Partner:      Emotionally Abused:      Physically Abused:      Sexually Abused:           Medications  Allergies   Current Outpatient Medications   Medication Sig Dispense Refill     acetaminophen (TYLENOL) 650 MG CR tablet Take 650 mg by mouth daily as needed       apixaban ANTICOAGULANT (ELIQUIS) 5 MG tablet Take 1 tablet (5 mg) by mouth 2 times daily 60 tablet 3     aspirin (ASA) 81 MG EC tablet Take 1 tablet (81 mg) by mouth daily       co-enzyme Q-10 100 MG CAPS capsule Take 200 mg by mouth daily       fish oil-omega-3 fatty acids 300-1,000 mg capsule [FISH OIL-OMEGA-3 FATTY ACIDS 300-1,000 MG CAPSULE] Take 2 g by mouth daily.       fluticasone propionate (FLONASE) 50 mcg/actuation nasal spray [FLUTICASONE PROPIONATE (FLONASE) 50 MCG/ACTUATION NASAL SPRAY] USE 2 SPRAYS IN EACH       NOSTRIL DAILY 48 g 2     gabapentin (NEURONTIN) 100 MG capsule [GABAPENTIN (NEURONTIN) 100 MG CAPSULE] Take 200-400 mg by mouth see administration instructions. Patient currently takes 200 MG in the morning and 400 MG at bedtime. (Patient taking differently: Take 600 mg by mouth daily At bedtime) 100 capsule 12     lisinopriL (PRINIVIL,ZESTRIL) 5 MG tablet [LISINOPRIL (PRINIVIL,ZESTRIL) 5 MG TABLET] Take 1 tablet (5 mg total) by mouth daily. 90 tablet 3     metoprolol succinate (TOPROL XL) 25 MG [METOPROLOL SUCCINATE (TOPROL XL) 25 MG] Take 1 tablet (25 mg total) by mouth daily. 90 tablet 3     MULTIVITAMIN ORAL [MULTIVITAMIN ORAL] Take by mouth.       oxymetazoline HCl (AFRIN, OXYMETAZOLINE, NASL) [OXYMETAZOLINE HCL (AFRIN, OXYMETAZOLINE, NASL)] into each nostril.       vits A,C,E/lutein/zeax/zn/marly (EYE HEALTH FORMULA ORAL) [VITS A,C,E/LUTEIN/ZEAX/ZN/MARLY (EYE HEALTH FORMULA ORAL)] Take by mouth.      Allergies   Allergen Reactions      Hydrochlorothiazide Unknown     Hydrocodone Swelling     Sinus     Oxycontin [Oxycodone] Swelling     lip     Sulfa (Sulfonamide Antibiotics) [Sulfa Drugs] Swelling     Perfume Swelling     Other reaction(s): Runny Nose         Lab Results    Chemistry/lipid CBC Cardiac Enzymes/BNP/TSH/INR   Recent Labs   Lab Test 01/20/21  0700   CHOL 165   HDL 40      TRIG 65     Recent Labs   Lab Test 01/20/21  0700 06/29/17  1035    103     Recent Labs   Lab Test 07/14/21  1228 07/14/21  0535 07/12/21  1133   NA  --   --  138   POTASSIUM  --   --  4.3   CHLORIDE  --   --  105   CO2  --   --  25   GLC  --  117* 102   BUN  --   --  13   CR 1.00  --  0.84   GFRESTIMATED 75  --  88   JOVAN  --   --  9.0     Recent Labs   Lab Test 07/14/21  1228 07/12/21  1133 06/27/21  1142   CR 1.00 0.84 1.0     Recent Labs   Lab Test 06/29/17  1035   A1C 5.3    Recent Labs   Lab Test 07/14/21  1228 07/12/21  1133   WBC  --  8.3   HGB 14.4 14.9   HCT  --  44.4   MCV  --  94   PLT  --  270     Recent Labs   Lab Test 07/14/21  1228 07/12/21  1133 01/20/21  0700   HGB 14.4 14.9 15.3    Recent Labs   Lab Test 06/18/20  1217   TROPONINI 0.02     Recent Labs   Lab Test 11/30/20  1018 06/18/20  1217   * 133*     No results for input(s): TSH in the last 57309 hours.  No results for input(s): INR in the last 33818 hours.     30 minutes spent on the date of encounter doing education, chart prep/review, review of test results, patient visit, documentation and discussion with family.      This note has been dictated using voice recognition software. Any grammatical or context distortions are unintentional and inherent to the software.            Thank you for allowing me to participate in the care of your patient.      Sincerely,     Gina Blount PA-C     [facility]  cc:   No referring provider defined for this encounter.

## 2021-08-30 NOTE — PATIENT INSTRUCTIONS
Mike Gonzalez,    It was a pleasure speaking with you today in the clinic regarding your upcoming JEAN CLAUDE.     IMPORTANT INFORMATION REGARDING YOUR      Transesophageal Echocardiogram (JEAN CLAUDE) and Left Atrial Appendage Occlusion Device Implant    Covid Test: today at 3 pm      Transesophageal Echocardiogram (JEAN CLAUDE) : Thursday , September 2      Please arrive at noon for registration and preparation for your procedure.     Have nothing to eat or drink after midnight the night prior to your procedure.     Please take all of your medications with a small sip of water prior to coming in for you procedure, with the exception of any vitamins/minerals, diabetic agents or diuretics.     You will require a responsible  to bring you home following this procedure.    It is important to remain on your anticoagulation medication Eliquis uninterrupted before and after your procedure.    You will be contacted with results, following review by one of our implanting doctors    Once we ensure that the device is stable, there is no blood flow into the pouch and no clots on the surface of the device, you will be instructed to stop your Eliquis    Once you stop the Eliquis, you will continue your baby aspirin (81 mg) daily and start Plavix 75 mg daily.  The plavix Rx will be sent into your pharmacy    Parking information:       Please arrive at [facility], Located at: [facility] [place] [address]    Please park in Lot A if open (Lot B is overflow for patients).    Please check in at the main hospital desk at [facility]     From there you will be directed downstairs to the radiology deck where you will check in with heart care for your procedure.     - if you have questions, please call:  Sun Antonio coordinator RN @ [phone]    You should followup with us for your 6 month visit - the scheduling team will call you closer to that date to schedule        After  hours/scheduling line  [phone]

## 2021-08-30 NOTE — PROGRESS NOTES
Assessment/Recommendations   1.  Longstanding persistent atrial fibrillation: He is status post watchman implant on July 14.  He is doing well postprocedure and has had no neurologic events or symptoms.  At this time he is on aspirin and Eliquis.  He is scheduled to have his postprocedure JEAN CLAUDE on September 2.  If the JEAN CLAUDE shows that there is no leakage around the watchman device, he will be called and instructed to stop the Eliquis. He will then continue aspirin 81 mg daily and start Plavix 75 mg daily.  He will stay on the Plavix until January, at which time he will see us for his 6-month follow-up visit.    He understands all the instructions and his questions have been answered.  He is ready to proceed with JEAN CLAUDE.    MODIFIED ENA SCALE   Timepoint: 4-6 wk Post-LAAC    Previous score: 0    Score Description   0 No symptoms at all   1 No significant disability despite symptoms; able to carry out all usual duties and activities   2 Slight disability; unable to carry out all previous activities, but able to look after own affairs without assistance   3 Moderate disability; requiring some help, but able to walk without assistance   4 Moderately severe disability; unable to walk without assistance and unable to attend to own bodily needs without assistance   5 Severe disability; bedridden, incontinent and requiring constant nursing care and attention   6 Dead    Total score (0 - 6):  0    Change in score if s/p LAAC? No     2.  Hypertension -blood pressure today is controlled.  He should continue taking lisinopril 5 mg daily and metoprolol succinate 25 mg daily     3.  Nonischemic cardiomyopathy, heart failure with reduced ejection fraction, NYHA class II -LVEF is 42%.  He is currently euvolemic.   Continue beta-blocker and ACE inhibitor therapy.      4.  Nonobstructive coronary artery disease -patient complains of no angina.  He is on aspirin and beta-blocker.  Patient recently was given prescription for  atorvastatin, but has still not started it     5.  Back pain -patient received back injection last week and then abruptly stopped his gabapentin.  He says back pain symptoms resolved for 3 days and are now back.  He is seeing the orthopedic surgeon in the upcoming weeks to maybe discuss surgery.  Wants to know if he can be off of his Plavix for the surgery and I have recommended against this, however if the pain is something he cannot tolerate through January, then could think about taking him off Plavix 1 to 2 months early.  Patient is now back on gabapentin.       History of Present Illness/Subjective    Mike Gonzalez is a 70 year old male who comes in today for history and physical prior to 45-day JEAN CLAUDE after watchman implant.  His wife accompanies him to the visit today.    Mike Gonzalez has a past history of atrial fibrillation and atrial flutter, hypertension, nonobstructive coronary artery disease, nonischemic cardiomyopathy with LVEF 44% and obesity.  He has degenerative joint disease and has a lot of shoulder and hip pain.  He would like to be able to take NSAIDs, but currently can't.  He underwent watchman implant on July 14 using a 31 mm FLX device.  Procedurally he is doing well and has had no strokelike symptoms.  However he has a lot of complaints about his back pain today and is concerned about timing of getting off of the blood thinner so that he may be able to have surgery.  He had a back injection last week which helped for about 3 days and now is giving him no more relief.    Mike Gonzalez does have palpitations, but this is chronic and hasn't changed since implant.  He denies chest discomfort, shortness of breath, paroxysmal nocturnal dyspnea, orthopnea, lightheadedness, dizziness, pre-syncope, or syncope.  Mike Gonzalez also denies any weight loss, changes in appetite, nausea or vomiting.     Medical, surgical, family, social history, and medications were reviewed and updated as  necessary.    Procedural JEAN CLAUDE results (from July 14):  Interpretation Summary  Structural JEAN CLAUDE for Left Atrial Occlusion Device     Pre-Device:  1. Normal left ventricular size. Moderate-to-severely reduced systolic  function. LVEF:30-35%  2. No left atrial thrombus or spontaneous contrast. Severe left atrial  enlargement.  3. PFO present by color flow.  4. No pericardial effusion.     Post Device:  1. Well seated 31 mm Watchman FLX Device in left atrial appendage by 2D and 3D  imaging. No color doppler evidence of flow around device at ostium insertion  before or after device release. No change in mitral valve function. No  thrombus noted on device, LA or SPARKLE.     SPARKLE device measurements:  Device compression diameter:     Post deployment  0 degrees: 27.6 mm  45 degrees: 27.0 mm  90 degrees: 27.2 mm  135 degrees: 25 mm     2. Normal left ventricular size. Moderate-to-severely reduced systolic  function. LVEF:30-35%  3. Small ASD with unidirectional flow (left to right) by color flow doppler.  4. No post procedural pericardial effusion.     Physical Examination Review of Systems   Vitals: /80 (BP Location: Left arm, Patient Position: Sitting, Cuff Size: Adult Large)   Pulse 64   Resp 16   Ht 1.829 m (6')   Wt 109.3 kg (241 lb)   BMI 32.69 kg/m    BMI= Body mass index is 32.69 kg/m .  Wt Readings from Last 3 Encounters:   08/30/21 109.3 kg (241 lb)   07/16/21 113.4 kg (250 lb)   07/14/21 113.3 kg (249 lb 12.5 oz)       General Appearance:   Alert, cooperative and in no acute distress   ENT/Mouth: membranes moist, no oral lesions or bleeding gums.      EYES:  no scleral icterus, normal conjunctivae   Neck: Thyroid not visualized   Chest/Lungs:   lungs are clear to auscultation, no rales or wheezing   Cardiovascular:   Irregularly irregular . Normal first and second heart sounds with no murmurs, rubs or gallops; the carotid, radial and posterior tibial pulses are intact, no edema bilaterally    Abdomen:  Soft  and nontender. Bowel sounds are present in all quadrants   Extremities: no cyanosis or clubbing   Skin: no xanthelasma, warm.    Neurologic: normal gait, normal  bilateral, no tremors   Psychiatric: Normal mood and affect       Please refer above for cardiac ROS details.      Medical History  Surgical History Family History Social History   Past Medical History:   Diagnosis Date     Angioedema      Atrial fibrillation and flutter (H)      Basal cell carcinoma      Branch retinal vein occlusion of left eye 7/1/2017     CAD (coronary artery disease)     ANGIOGRAM 2021 Left Main The vessel was visualized by angiography, is moderate in size and is angiographically normal. Left Anterior Descending Mid LAD lesion is 25% stenosed. Ramus Intermedius Ramus lesion is 10% stenosed. Left Circumflex Dist Cx lesion is 30% stenosed. Right Coronary Artery The vessel was visualized by angiography, is moderate in size and is angiographically normal.       Chronic neck pain      Congestive heart failure (H) 2021     GERD (gastroesophageal reflux disease)      HTN (hypertension) 2015     Hx of atrial flutter      Nonsmoker      Radiculopathy     cervical     Retinal hemorrhage     Vessel rupture in left retina     Rhinitis, allergic      S/P colonoscopy 4/15/2019     Urticaria      Past Surgical History:   Procedure Laterality Date     BASAL CELL CARCINOMA EXCISION  01/2020    Left nasal reconstruction     CERVICAL SPINE SURGERY      C8, T1, foraminotomy     CV CORONARY ANGIOGRAM N/A 1/20/2021    Procedure: Coronary Angiogram;  Surgeon: Willow Wellington MD;  Location: [facility] Cardiac Cath Lab;  Service: Cardiology     CV LEFT ATRIAL APPENDAGE CLOSURE N/A 7/14/2021    Procedure: CV LEFT ATRIAL APPENDAGE CLOSURE;  Surgeon: Noah Gutierres MD;  Location:  HEART CARDIAC CATH LAB     CV LEFT HEART CATHETERIZATION WITHOUT LEFT VENTRICULOGRAM Left 1/20/2021    Procedure: Left Heart Catheterization Without Left Ventriculogram;   Surgeon: Willow Wellington MD;  Location: [facility] Cardiac Cath Lab;  Service: Cardiology     HERNIA REPAIR, UMBILICAL       ×2     REVERSE TOTAL SHOULDER ARTHROPLASTY Left 05/2019     Family History   Problem Relation Age of Onset     Arthritis Mother      Other - See Comments Mother         psychiatry/pvd     Other - See Comments Father         blood reaction    Social History     Socioeconomic History     Marital status:      Spouse name: Not on file     Number of children: Not on file     Years of education: Not on file     Highest education level: Not on file   Occupational History     Not on file   Tobacco Use     Smoking status: Never Smoker     Smokeless tobacco: Never Used   Substance and Sexual Activity     Alcohol use: Never     Comment: Alcoholic Drinks/day: About once a year.     Drug use: No     Sexual activity: Not on file   Other Topics Concern     Parent/sibling w/ CABG, MI or angioplasty before 65F 55M? Not Asked   Social History Narrative    Patient of Dr. Rod since 2001.    .  Wife is a former RN.     Wife's cousin is Dr. Jean Pierre Champagne, ID specialist.     Social Determinants of Health     Financial Resource Strain:      Difficulty of Paying Living Expenses:    Food Insecurity:      Worried About Running Out of Food in the Last Year:      Ran Out of Food in the Last Year:    Transportation Needs:      Lack of Transportation (Medical):      Lack of Transportation (Non-Medical):    Physical Activity:      Days of Exercise per Week:      Minutes of Exercise per Session:    Stress:      Feeling of Stress :    Social Connections:      Frequency of Communication with Friends and Family:      Frequency of Social Gatherings with Friends and Family:      Attends Anab[employer]ist Services:      Active Member of Clubs or Organizations:      Attends Club or Organization Meetings:      Marital Status:    Intimate Partner Violence:      Fear of Current or Ex-Partner:      Emotionally Abused:       Physically Abused:      Sexually Abused:           Medications  Allergies   Current Outpatient Medications   Medication Sig Dispense Refill     acetaminophen (TYLENOL) 650 MG CR tablet Take 650 mg by mouth daily as needed       apixaban ANTICOAGULANT (ELIQUIS) 5 MG tablet Take 1 tablet (5 mg) by mouth 2 times daily 60 tablet 3     aspirin (ASA) 81 MG EC tablet Take 1 tablet (81 mg) by mouth daily       co-enzyme Q-10 100 MG CAPS capsule Take 200 mg by mouth daily       fish oil-omega-3 fatty acids 300-1,000 mg capsule [FISH OIL-OMEGA-3 FATTY ACIDS 300-1,000 MG CAPSULE] Take 2 g by mouth daily.       fluticasone propionate (FLONASE) 50 mcg/actuation nasal spray [FLUTICASONE PROPIONATE (FLONASE) 50 MCG/ACTUATION NASAL SPRAY] USE 2 SPRAYS IN EACH       NOSTRIL DAILY 48 g 2     gabapentin (NEURONTIN) 100 MG capsule [GABAPENTIN (NEURONTIN) 100 MG CAPSULE] Take 200-400 mg by mouth see administration instructions. Patient currently takes 200 MG in the morning and 400 MG at bedtime. (Patient taking differently: Take 600 mg by mouth daily At bedtime) 100 capsule 12     lisinopriL (PRINIVIL,ZESTRIL) 5 MG tablet [LISINOPRIL (PRINIVIL,ZESTRIL) 5 MG TABLET] Take 1 tablet (5 mg total) by mouth daily. 90 tablet 3     metoprolol succinate (TOPROL XL) 25 MG [METOPROLOL SUCCINATE (TOPROL XL) 25 MG] Take 1 tablet (25 mg total) by mouth daily. 90 tablet 3     MULTIVITAMIN ORAL [MULTIVITAMIN ORAL] Take by mouth.       oxymetazoline HCl (AFRIN, OXYMETAZOLINE, NASL) [OXYMETAZOLINE HCL (AFRIN, OXYMETAZOLINE, NASL)] into each nostril.       vits A,C,E/lutein/zeax/zn/marly (EYE HEALTH FORMULA ORAL) [VITS A,C,E/LUTEIN/ZEAX/ZN/MARLY (EYE HEALTH FORMULA ORAL)] Take by mouth.      Allergies   Allergen Reactions     Hydrochlorothiazide Unknown     Hydrocodone Swelling     Sinus     Oxycontin [Oxycodone] Swelling     lip     Sulfa (Sulfonamide Antibiotics) [Sulfa Drugs] Swelling     Perfume Swelling     Other reaction(s): Runny Nose         Lab  Results    Chemistry/lipid CBC Cardiac Enzymes/BNP/TSH/INR   Recent Labs   Lab Test 01/20/21  0700   CHOL 165   HDL 40      TRIG 65     Recent Labs   Lab Test 01/20/21  0700 06/29/17  1035    103     Recent Labs   Lab Test 07/14/21  1228 07/14/21  0535 07/12/21  1133   NA  --   --  138   POTASSIUM  --   --  4.3   CHLORIDE  --   --  105   CO2  --   --  25   GLC  --  117* 102   BUN  --   --  13   CR 1.00  --  0.84   GFRESTIMATED 75  --  88   JOVAN  --   --  9.0     Recent Labs   Lab Test 07/14/21  1228 07/12/21  1133 06/27/21  1142   CR 1.00 0.84 1.0     Recent Labs   Lab Test 06/29/17  1035   A1C 5.3    Recent Labs   Lab Test 07/14/21  1228 07/12/21  1133   WBC  --  8.3   HGB 14.4 14.9   HCT  --  44.4   MCV  --  94   PLT  --  270     Recent Labs   Lab Test 07/14/21  1228 07/12/21  1133 01/20/21  0700   HGB 14.4 14.9 15.3    Recent Labs   Lab Test 06/18/20  1217   TROPONINI 0.02     Recent Labs   Lab Test 11/30/20  1018 06/18/20  1217   * 133*     No results for input(s): TSH in the last 84260 hours.  No results for input(s): INR in the last 69988 hours.     30 minutes spent on the date of encounter doing education, chart prep/review, review of test results, patient visit, documentation and discussion with family.      This note has been dictated using voice recognition software. Any grammatical or context distortions are unintentional and inherent to the software.

## 2021-08-31 LAB — SARS-COV-2 RNA RESP QL NAA+PROBE: NEGATIVE

## 2021-09-02 ENCOUNTER — HOSPITAL ENCOUNTER (OUTPATIENT)
Dept: CARDIOLOGY | Facility: HOSPITAL | Age: 71
Discharge: HOME | End: 2021-09-02
Attending: INTERNAL MEDICINE | Admitting: INTERNAL MEDICINE
Payer: MEDICARE

## 2021-09-02 VITALS
DIASTOLIC BLOOD PRESSURE: 75 MMHG | SYSTOLIC BLOOD PRESSURE: 161 MMHG | HEART RATE: 63 BPM | TEMPERATURE: 97.6 F | OXYGEN SATURATION: 97 % | RESPIRATION RATE: 23 BRPM

## 2021-09-02 DIAGNOSIS — I48.11 LONGSTANDING PERSISTENT ATRIAL FIBRILLATION (H): ICD-10-CM

## 2021-09-02 DIAGNOSIS — Z95.818 PRESENCE OF LEFT ATRIAL APPENDAGE CLOSURE DEVICE COMPOSED OF NICKEL-TITANIUM ALLOY WITH POLYETHYLENE TEREPHTHALATE MEMBRANE: ICD-10-CM

## 2021-09-02 LAB — LVEF ECHO: NORMAL

## 2021-09-02 PROCEDURE — 93325 DOPPLER ECHO COLOR FLOW MAPG: CPT | Mod: 26 | Performed by: INTERNAL MEDICINE

## 2021-09-02 PROCEDURE — 250N000011 HC RX IP 250 OP 636: Performed by: INTERNAL MEDICINE

## 2021-09-02 PROCEDURE — 99152 MOD SED SAME PHYS/QHP 5/>YRS: CPT | Performed by: INTERNAL MEDICINE

## 2021-09-02 PROCEDURE — 250N000009 HC RX 250: Performed by: INTERNAL MEDICINE

## 2021-09-02 PROCEDURE — 93320 DOPPLER ECHO COMPLETE: CPT | Mod: 26 | Performed by: INTERNAL MEDICINE

## 2021-09-02 PROCEDURE — 93325 DOPPLER ECHO COLOR FLOW MAPG: CPT

## 2021-09-02 PROCEDURE — 258N000003 HC RX IP 258 OP 636: Performed by: INTERNAL MEDICINE

## 2021-09-02 PROCEDURE — 93320 DOPPLER ECHO COMPLETE: CPT

## 2021-09-02 PROCEDURE — 93312 ECHO TRANSESOPHAGEAL: CPT | Mod: 26 | Performed by: INTERNAL MEDICINE

## 2021-09-02 RX ORDER — SODIUM CHLORIDE 9 MG/ML
INJECTION, SOLUTION INTRAVENOUS CONTINUOUS
Status: DISCONTINUED | OUTPATIENT
Start: 2021-09-02 | End: 2021-09-02 | Stop reason: HOSPADM

## 2021-09-02 RX ORDER — LIDOCAINE HYDROCHLORIDE 20 MG/ML
SOLUTION OROPHARYNGEAL
Status: COMPLETED | OUTPATIENT
Start: 2021-09-02 | End: 2021-09-02

## 2021-09-02 RX ORDER — FENTANYL CITRATE 50 UG/ML
INJECTION, SOLUTION INTRAMUSCULAR; INTRAVENOUS
Status: COMPLETED | OUTPATIENT
Start: 2021-09-02 | End: 2021-09-02

## 2021-09-02 RX ORDER — LIDOCAINE 40 MG/G
CREAM TOPICAL
Status: DISCONTINUED | OUTPATIENT
Start: 2021-09-02 | End: 2021-09-02 | Stop reason: HOSPADM

## 2021-09-02 RX ADMIN — MIDAZOLAM 1 MG: 1 INJECTION INTRAMUSCULAR; INTRAVENOUS at 13:02

## 2021-09-02 RX ADMIN — BENZOCAINE 1 SPRAY: 220 SPRAY, METERED PERIODONTAL at 12:57

## 2021-09-02 RX ADMIN — FENTANYL CITRATE 25 MCG: 50 INJECTION, SOLUTION INTRAMUSCULAR; INTRAVENOUS at 13:02

## 2021-09-02 RX ADMIN — FENTANYL CITRATE 25 MCG: 50 INJECTION, SOLUTION INTRAMUSCULAR; INTRAVENOUS at 13:08

## 2021-09-02 RX ADMIN — MIDAZOLAM 1 MG: 1 INJECTION INTRAMUSCULAR; INTRAVENOUS at 12:57

## 2021-09-02 RX ADMIN — LIDOCAINE HYDROCHLORIDE 15 ML: 20 SOLUTION ORAL; TOPICAL at 12:53

## 2021-09-02 RX ADMIN — SODIUM CHLORIDE: 9 INJECTION, SOLUTION INTRAVENOUS at 12:10

## 2021-09-02 RX ADMIN — FENTANYL CITRATE 50 MCG: 50 INJECTION, SOLUTION INTRAMUSCULAR; INTRAVENOUS at 12:58

## 2021-09-02 NOTE — PRE-PROCEDURE
GENERAL PRE-PROCEDURE:   Procedure:  JEAN CLAUDE  Date/Time:  9/2/2021 12:53 PM    Verbal consent obtained?: Yes    Written consent obtained?: Yes    Risks and benefits: Risks, benefits and alternatives were discussed    Consent given by:  Patient  Patient states understanding of procedure being performed: Yes    Patient's understanding of procedure matches consent: Yes    Procedure consent matches procedure scheduled: Yes    Expected level of sedation:  Moderate  Appropriately NPO:  Yes  ASA Class:  2  Mallampati  :  Grade 1- soft palate, uvula, tonsillar pillars, and posterior pharyngeal wall visible  Lungs:  Lungs clear with good breath sounds bilaterally  Heart:  Normal heart sounds and rate  History & Physical reviewed:  History and physical reviewed and no updates needed  Statement of review:  I have reviewed the lab findings, diagnostic data, medications, and the plan for sedation

## 2021-09-02 NOTE — DISCHARGE INSTRUCTIONS
1. You are required to have someone accompany you home.    2. Rest today. Do not drive or operate machinery today. Over-activity may produce nausea and dizziness.    3. You should follow your normal diet. Drink plenty of fluids. Do not drink any alcoholic beverages for 24 hours. *(Alcohol may interact with the medications you received today).    4. NO HOT FOODS or LIQUIDS FOR 6 HOURS after the procedure.  You may have hot foods or liquids after 730pm tonight.    5. You may have a sore throat or cough. This is normal. These symptoms should resolve in 24 hours.     6. If you have further questions call your doctor:

## 2021-09-07 ENCOUNTER — TELEPHONE (OUTPATIENT)
Dept: CARDIOLOGY | Facility: CLINIC | Age: 71
End: 2021-09-07

## 2021-09-07 DIAGNOSIS — I48.92 ATRIAL FLUTTER, CHRONIC (H): ICD-10-CM

## 2021-09-07 DIAGNOSIS — I42.0 DILATED CARDIOMYOPATHY (H): ICD-10-CM

## 2021-09-07 DIAGNOSIS — I48.11 LONGSTANDING PERSISTENT ATRIAL FIBRILLATION (H): ICD-10-CM

## 2021-09-07 DIAGNOSIS — Z95.818 PRESENCE OF LEFT ATRIAL APPENDAGE CLOSURE DEVICE COMPOSED OF NICKEL-TITANIUM ALLOY WITH POLYETHYLENE TEREPHTHALATE MEMBRANE: Primary | ICD-10-CM

## 2021-09-07 NOTE — TELEPHONE ENCOUNTER
Phone call to patient with recommendations to stay on current regiment of Eliquis and ASA. Informed patient JEAN CLAUDE to be reviewed by implanting providers to determine next steps. Patient verbalized they will continue daily ASA and Eliquis. No further questions at this time. [facility]    ----- Message from Mj Miller DO sent at 9/7/2021 11:57 AM CDT -----  Patient has significant color flow around device.  Would continue anticoagulation at this time until further review by LAAO team.    ----- Message -----  From: Melvina Ch RN  Sent: 9/7/2021  10:12 AM CDT  To: Melvina Ch RN, Mendoza Arteaga MD, #    Please review 45 day Post-Watchman JEAN CLAUDE.  Patient is currently taking Eliquis and Aspirin.  Is patient ok to discontinue Eliquis, and start Plavix, and continue ASA 81mg daily per protocol? Or does this need to be reviewed by the Watchman team?    Thanks,  Melvina BELL RN

## 2021-09-10 ENCOUNTER — TELEPHONE (OUTPATIENT)
Dept: CARDIOLOGY | Facility: CLINIC | Age: 71
End: 2021-09-10

## 2021-09-10 NOTE — TELEPHONE ENCOUNTER
----- Message from Unique Rodriguez sent at 9/10/2021  8:35 AM CDT -----  Regarding: Gina  General phone call:    Caller: Mike   Primary cardiologist: Gina COLLINS   Detailed reason for call: He spoke with Gina this week and she was going to get back to him whether he should stop his Eliquis and start something else or not.  He is almost out of Eliquis so would like to have an answer.    Best phone number: [phone]  Best time to contact: any  Ok to leave a detailedmessage? yes  Device? no    Additional Info:

## 2021-09-10 NOTE — TELEPHONE ENCOUNTER
Phone call to patient regarding questions about eliquis discontinuation. Reiterated to patient the importance of continuing eliquis until implanting providers have had a chance to review 45 day post-LAAC JEAN CLAUDE. Informed Mike we would return call to him as soon as implanting providers have reviewed JEAN CLAUDE results and made recommendations regarding significant color flow around device. Patient verbalized understanding, no further questions at this time. [facility]

## 2021-09-12 ENCOUNTER — HEALTH MAINTENANCE LETTER (OUTPATIENT)
Age: 71
End: 2021-09-12

## 2021-09-15 ENCOUNTER — TRANSFERRED RECORDS (OUTPATIENT)
Dept: HEALTH INFORMATION MANAGEMENT | Facility: CLINIC | Age: 71
End: 2021-09-15

## 2021-09-15 ENCOUNTER — TELEPHONE (OUTPATIENT)
Dept: CARDIOLOGY | Facility: CLINIC | Age: 71
End: 2021-09-15

## 2021-09-15 NOTE — TELEPHONE ENCOUNTER
Phone call to patient regarding questions below. Informed patient JEAN CLAUDE still to be reviewed by implanting cardiologists, but ideally we will be able to call him by the end of the week with more information. Apologized to patient for delay in response, patient is appreciative of call. Verbalized understanding, no further questions at this time. [facility]

## 2021-09-16 ENCOUNTER — PREP FOR PROCEDURE (OUTPATIENT)
Dept: CARDIOLOGY | Facility: CLINIC | Age: 71
End: 2021-09-16

## 2021-09-16 DIAGNOSIS — Z95.818 PRESENCE OF LEFT ATRIAL APPENDAGE CLOSURE DEVICE COMPOSED OF NICKEL-TITANIUM ALLOY WITH POLYETHYLENE TEREPHTHALATE MEMBRANE: Primary | ICD-10-CM

## 2021-09-16 RX ORDER — LIDOCAINE 40 MG/G
CREAM TOPICAL
Status: CANCELLED | OUTPATIENT
Start: 2021-09-16

## 2021-09-16 RX ORDER — SODIUM CHLORIDE 9 MG/ML
INJECTION, SOLUTION INTRAVENOUS CONTINUOUS
Status: CANCELLED | OUTPATIENT
Start: 2021-09-16

## 2021-09-16 NOTE — TELEPHONE ENCOUNTER
Spoke with pt and he is have short episodes of chest pain. No SOB or diaphoresis. He had one dizzy spell last night but feel good this am. Encouraged to stay hydrated.  Will start to monitor BP daily and if chest pain get worse or any other changes encouraged to go to ED. Advise to see PCP office about shoulder pain but pt feels that will not change due a shoulder replacement that did not turn out well.   Offered RAC appointment but pt feels things are good and will stay with Patrice appointment on 10/8. Again encouraged to go to ED if chest pain gets worse.

## 2021-09-16 NOTE — TELEPHONE ENCOUNTER
Phone call to patient with recommendations listed below. Patient verbalized he will continue taking eliquis until repeat JEAN CLAUDE in approximately 4 weeks. After JEAN CLAUDE, LAAC team will reach out with results and recommendations in 1-3 days. Patient verbalized understanding. JEAN CLAUDE order placed and  notified.    Separately, patient reports over the last 3-4 weeks he has had intermittent chest pain over left/middle of chest that comes and goes and is worsened by activity. Of note, patient has had left shoulder replaced and he is questioning if this is cause of pain. He also reported some lightheadedness last night. Discussed with patient visiting ED if chest pain were to become continuous, increase in severity, or experiencing shortness of breath. He reports feeling well while on the phone. He does not check his BP at home and reports no other symptoms. Informed patient this information would be passed to provider for review. Verbalized understanding, no further questions at this time. [facility]     ----- Message -----  From: Noah Gutierres MD  Sent: 9/16/2021   8:32 AM CDT  To: Melvina Ch RN, Mendoza Arteaga MD, *    I agree.    How about a re-check JEAN CLAUDE in 4 wks?    Thx!  Noah

## 2021-09-20 NOTE — TELEPHONE ENCOUNTER
Phone call to patient to follow-up on previously reported chest pain with dizziness. Patient reports dizziness only occurred for one day and that it hasn't recurred since we spoke previously. He says chest pain has resolved and he has been feeling well. He states usually weather worsens chest/shoulder pain but he has been feeling ok. He did report that he noted the one day of dizziness he had brief episode of bradycardia at 46. He has not felt this since then and his heart rate is usually in the 50s or 60s without symptoms. Encouraged patient to return call if low heart rate with dizziness occurs again, as well as any chest pain, and patient could be seen in [facility], or alternatively visit ED for recurrent chest pain. Patient verbalized understanding. He reports BP has been good, generally between 117/83 and 132/80. Once again reiterated to patient to visit ED for symptomatic bradycardia or chest pain. Patient has writer's direct number to call for further questions or concerns. Writer and patient confirmed upcoming appointment with Dr. Ibrahim 10/8/21. Verbalized understanding, no further questions at this time. [facility]

## 2021-09-20 NOTE — TELEPHONE ENCOUNTER
Campos Joel MD Decker, Susan M, RN 3 days ago     RENATO Hernandez,    Sounds good.  He could be seen in rapid access next week if he continues to have symptoms.    Thanks,    Campos    Message text      Mary Deluca, Campos Muñoz MD; Mariela Foss RN 4 days ago     DOROTHY Ibrahim is out with week and next but you have seen pt in past 2/18/21.   Any concerns or recommendation? I will be out of office after 9/16.

## 2021-09-28 LAB
GLUCOSE BLDC GLUCOMTR-MCNC: 117 MG/DL (ref 70–99)
GLUCOSE BLDC GLUCOMTR-MCNC: 117 MG/DL (ref 70–99)

## 2021-10-08 ENCOUNTER — OFFICE VISIT (OUTPATIENT)
Dept: CARDIOLOGY | Facility: CLINIC | Age: 71
End: 2021-10-08
Payer: MEDICARE

## 2021-10-08 VITALS
HEART RATE: 64 BPM | RESPIRATION RATE: 20 BRPM | BODY MASS INDEX: 32.69 KG/M2 | HEIGHT: 72 IN | DIASTOLIC BLOOD PRESSURE: 70 MMHG | SYSTOLIC BLOOD PRESSURE: 118 MMHG

## 2021-10-08 DIAGNOSIS — I50.42 CHRONIC COMBINED SYSTOLIC AND DIASTOLIC CONGESTIVE HEART FAILURE (H): Primary | ICD-10-CM

## 2021-10-08 PROCEDURE — 99213 OFFICE O/P EST LOW 20 MIN: CPT | Performed by: INTERNAL MEDICINE

## 2021-10-08 NOTE — LETTER
10/8/2021    Onur Rod MD  [address]  [place] [address]    RE: Mike Gonzalez       Dear Colleague,    I had the pleasure of seeing Mike Gonzalez in the [facility].      HEART CARE NOTE          Assessment/Recommendations     1. HFrEF  Assessment / Plan    Euvolemic on physical exam and denies orthopnea, PND or fluid retention/edema    GDMT as detailed below     Current Pharmacotherapy AHA Guideline-Directed Medical Therapy   Lisinopril 5 mg daily Lisinopril 20 mg twice daily   Metoprolol succinate 25 mg daily Carvedilol 25 mg twice daily   Spironolactone NA  Spironolactone 25 mg once daily   Hydralazine NA Hydralazine 100 mg three times daily   Isosorbide dinitrate NA Isosorbide dinitrate 40 mg three times daily      2. Atrial fibrillation/flutter  Assessment / Plan    [id number] = 2; continue apixaban    S/p Watchman; follows with afib clinic    History of Present Illness/Subjective      Mr. Mike Gonzalez is a 70 y.o. male with a PMHx significant for afib/flutter and new diagnosis of HFrEF (NICM) who presents to [facility] for follow-up.     Today, Mr. Gonzalez denies HF symptoms of orthopnea, PND, fluid retention.      ECG: Personally reviewed. Junctional rhythm, occasional PVC noted, unifocal.     Coronary angiogram:    New diagnosis of biventricular failure with LVH and severe biatrial enlargment and a stress test suggesting apical non-transmural infarct. Patient is morbidly obese with severe hypertension.    Mild coronary atherosclerosis.    LV EDP 20 mmHg.    Estimated blood loss was <20 ml.     ECHO (personnaly Reviewed):    Severe biatrial enlargement    Moderate concentric left ventricular hypertrophy    Left ventricle ejection fraction is moderately decreased. The calculated left ventricular ejection fraction is 42% with segmental wall motion abnormalities as noted below.    Normal right ventricular size with decreased systolic  function.    No significant valve disease.    No previous study for comparison.     NM scan      The nuclear stress test is abnormal. There is no ischemia but there is a medium sized area of a moderate degree of nontransmural infarction in the distal anterior segment(s) of the left ventricle. This appears to be in the left anterior descending artery distribution.     The stress electrocardiogram is negative for inducible ischemic EKG changes. Occasional PVCs were noted during stress and recovery.     The left ventricle is dilated and the left ventricular ejection fraction at stress is mildly decreased at 44%.     Low-to-intermediate risk of future ischemic events.     There is no prior study for comparison.             Physical Examination Review of Systems   /70 (BP Location: Right arm, Patient Position: Sitting, Cuff Size: Adult Large)   Pulse 64   Resp 20   Ht 1.829 m (6')   BMI 32.69 kg/m    Body mass index is 32.69 kg/m .  Wt Readings from Last 3 Encounters:   08/30/21 109.3 kg (241 lb)   07/16/21 113.4 kg (250 lb)   07/14/21 113.3 kg (249 lb 12.5 oz)     General Appearance:   no distress, normal body habitus   ENT/Mouth: membranes moist, no oral lesions or bleeding gums.      EYES:  no scleral icterus, normal conjunctivae   Neck: no carotid bruits or thyromegaly   Chest/Lungs:   lungs are clear to auscultation, no rales or wheezing, equal chest wall expansion    Cardiovascular:   Irregular. Normal first and second heart sounds with no murmurs, rubs, or gallops; the carotid, radial and posterior tibial pulses are intact, no JVD or LE edema bilaterally    Abdomen:  no organomegaly, masses, bruits, or tenderness; bowel sounds are present   Extremities: no cyanosis or clubbing   Skin: no xanthelasma, warm.    Neurologic: normal gait, normal  bilateral, no tremors     Psychiatric: alert and oriented x3, calm     A complete 10 systems ROS was reviewed  And is negative except what is listed in the HPI.           Medical History  Surgical History Family History Social History   Past Medical History:   Diagnosis Date     Angioedema      Atrial fibrillation and flutter (H)      Basal cell carcinoma      Branch retinal vein occlusion of left eye 7/1/2017     CAD (coronary artery disease)     ANGIOGRAM 2021 Left Main The vessel was visualized by angiography, is moderate in size and is angiographically normal. Left Anterior Descending Mid LAD lesion is 25% stenosed. Ramus Intermedius Ramus lesion is 10% stenosed. Left Circumflex Dist Cx lesion is 30% stenosed. Right Coronary Artery The vessel was visualized by angiography, is moderate in size and is angiographically normal.       Chronic neck pain      Congestive heart failure (H) 2021     GERD (gastroesophageal reflux disease)      HTN (hypertension) 2015     Hx of atrial flutter      Nonsmoker      Radiculopathy     cervical     Retinal hemorrhage     Vessel rupture in left retina     Rhinitis, allergic      S/P colonoscopy 4/15/2019     Urticaria     Past Surgical History:   Procedure Laterality Date     BASAL CELL CARCINOMA EXCISION  01/2020    Left nasal reconstruction     CERVICAL SPINE SURGERY      C8, T1, foraminotomy     CV CORONARY ANGIOGRAM N/A 1/20/2021    Procedure: Coronary Angiogram;  Surgeon: Willow Wellington MD;  Location: [facility] Cardiac [facility] Lab;  Service: Cardiology     CV LEFT ATRIAL APPENDAGE CLOSURE N/A 7/14/2021    Procedure: CV LEFT ATRIAL APPENDAGE CLOSURE;  Surgeon: Noah Gutierres MD;  Location:  HEART CARDIAC CATH LAB     CV LEFT HEART CATHETERIZATION WITHOUT LEFT VENTRICULOGRAM Left 1/20/2021    Procedure: Left Heart Catheterization Without Left Ventriculogram;  Surgeon: Willow Wellington MD;  Location: [facility] Card[facility];  Service: Cardiology     HERNIA REPAIR, UMBILICAL       ×2     REVERSE TOTAL SHOULDER ARTHROPLASTY Left 05/2019    no family history of premature coronary artery disease Social History     Socioeconomic  History     Marital status:      Spouse name: Not on file     Number of children: Not on file     Years of education: Not on file     Highest education level: Not on file   Occupational History     Not on file   Tobacco Use     Smoking status: Never Smoker     Smokeless tobacco: Never Used   Substance and Sexual Activity     Alcohol use: Never     Comment: Alcoholic Drinks/day: About once a year.     Drug use: No     Sexual activity: Not on file   Other Topics Concern     Parent/sibling w/ CABG, MI or angioplasty before 65F 55M? Not Asked   Social History Narrative    Patient of Dr. Rod since 2001.    .  Wife is a former RN.     Wife's cousin is Dr. Jean Pierre Champagne, ID specialist.     Social Determinants of Health     Financial Resource Strain:      Difficulty of Paying Living Expenses:    Food Insecurity:      Worried About Running Out of Food in the Last Year:      Ran Out of Food in the Last Year:    Transportation Needs:      Lack of Transportation (Medical):      Lack of Transportation (Non-Medical):    Physical Activity:      Days of Exercise per Week:      Minutes of Exercise per Session:    Stress:      Feeling of Stress :    Social Connections:      Frequency of Communication with Friends and Family:      Frequency of Social Gatherings with Friends and Family:      Attends [employer] Services:      Active Member of Clubs or Organizations:      Attends Club or Organization Meetings:      Marital Status:    Intimate Partner Violence:      Fear of Current or Ex-Partner:      Emotionally Abused:      Physically Abused:      Sexually Abused:            Lab Results    Chemistry/lipid CBC Cardiac Enzymes/BNP/TSH/INR   Lab Results   Component Value Date    CHOL 165 01/20/2021    HDL 40 01/20/2021    TRIG 65 01/20/2021    BUN 13 07/12/2021     07/12/2021    CO2 25 07/12/2021    Lab Results   Component Value Date    WBC 8.3 07/12/2021    HGB 14.4 07/14/2021    HCT 44.4 07/12/2021    MCV 94  07/12/2021     07/12/2021    Lab Results   Component Value Date    TROPONINI 0.02 06/18/2020     (H) 11/30/2020     Lab Results   Component Value Date    TROPONINI 0.02 06/18/2020          Weight:    Wt Readings from Last 3 Encounters:   08/30/21 109.3 kg (241 lb)   07/16/21 113.4 kg (250 lb)   07/14/21 113.3 kg (249 lb 12.5 oz)       Allergies  Allergies   Allergen Reactions     Hydrochlorothiazide Unknown     Hydrocodone Swelling     Sinus     Oxycontin [Oxycodone] Swelling     lip     Sulfa (Sulfonamide Antibiotics) [Sulfa Drugs] Swelling     Perfume Swelling     Other reaction(s): Runny Nose         Surgical History  Past Surgical History:   Procedure Laterality Date     BASAL CELL CARCINOMA EXCISION  01/2020    Left nasal reconstruction     CERVICAL SPINE SURGERY      C8, T1, foraminotomy     CV CORONARY ANGIOGRAM N/A 1/20/2021    Procedure: Coronary Angiogram;  Surgeon: Willow Wellington MD;  Location: [facility] Cardiac Cath Lab;  Service: Cardiology     CV LEFT ATRIAL APPENDAGE CLOSURE N/A 7/14/2021    Procedure: CV LEFT ATRIAL APPENDAGE CLOSURE;  Surgeon: Noah Gutierres MD;  Location: [facility] CARDIAC CATH LAB     CV LEFT HEART CATHETERIZATION WITHOUT LEFT VENTRICULOGRAM Left 1/20/2021    Procedure: Left Heart Catheterization Without Left Ventriculogram;  Surgeon: Willow Wellington MD;  Location: [facility] Cardiac Cath Lab;  Service: Cardiology     HERNIA REPAIR, UMBILICAL       ×2     REVERSE TOTAL SHOULDER ARTHROPLASTY Left 05/2019       Social History  Tobacco:   History   Smoking Status     Never Smoker   Smokeless Tobacco     Never Used    Alcohol:   Social History    Substance and Sexual Activity      Alcohol use: Never        Comment: Alcoholic Drinks/day: About once a year.   Illicit Drugs:   History   Drug Use No       Family History  Family History   Problem Relation Age of Onset     Arthritis Mother      Other - See Comments Mother         psychiatry/pvd     Other - See Comments  Father         blood reaction          Marylou Ibrahim MD on 10/8/2021      cc: Onur Rod        Thank you for allowing me to participate in the care of your patient.      Sincerely,     Marylou Ibrahim MD     [facility]  cc:   No referring provider defined for this encounter.

## 2021-10-08 NOTE — PROGRESS NOTES
HEART CARE NOTE          Assessment/Recommendations     1. HFrEF  Assessment / Plan    Euvolemic on physical exam and denies orthopnea, PND or fluid retention/edema    GDMT as detailed below     Current Pharmacotherapy AHA Guideline-Directed Medical Therapy   Lisinopril 5 mg daily Lisinopril 20 mg twice daily   Metoprolol succinate 25 mg daily Carvedilol 25 mg twice daily   Spironolactone NA  Spironolactone 25 mg once daily   Hydralazine NA Hydralazine 100 mg three times daily   Isosorbide dinitrate NA Isosorbide dinitrate 40 mg three times daily      2. Atrial fibrillation/flutter  Assessment / Plan    [id number] = 2; continue apixaban    S/p Watchman; follows with afib clinic    History of Present Illness/Subjective      Mr. Mike Gonzalez is a 70 y.o. male with a PMHx significant for afib/flutter and new diagnosis of HFrEF (NICM) who presents to [facility] for follow-up.     Today, Mr. Gonzalez denies HF symptoms of orthopnea, PND, fluid retention.      ECG: Personally reviewed. Junctional rhythm, occasional PVC noted, unifocal.     Coronary angiogram:    New diagnosis of biventricular failure with LVH and severe biatrial enlargment and a stress test suggesting apical non-transmural infarct. Patient is morbidly obese with severe hypertension.    Mild coronary atherosclerosis.    LV EDP 20 mmHg.    Estimated blood loss was <20 ml.     ECHO (personnaly Reviewed):    Severe biatrial enlargement    Moderate concentric left ventricular hypertrophy    Left ventricle ejection fraction is moderately decreased. The calculated left ventricular ejection fraction is 42% with segmental wall motion abnormalities as noted below.    Normal right ventricular size with decreased systolic function.    No significant valve disease.    No previous study for comparison.     NM scan      The nuclear stress test is abnormal. There is no ischemia but there is a medium sized area of a moderate degree of nontransmural infarction in the  distal anterior segment(s) of the left ventricle. This appears to be in the left anterior descending artery distribution.     The stress electrocardiogram is negative for inducible ischemic EKG changes. Occasional PVCs were noted during stress and recovery.     The left ventricle is dilated and the left ventricular ejection fraction at stress is mildly decreased at 44%.     Low-to-intermediate risk of future ischemic events.     There is no prior study for comparison.             Physical Examination Review of Systems   /70 (BP Location: Right arm, Patient Position: Sitting, Cuff Size: Adult Large)   Pulse 64   Resp 20   Ht 1.829 m (6')   BMI 32.69 kg/m    Body mass index is 32.69 kg/m .  Wt Readings from Last 3 Encounters:   08/30/21 109.3 kg (241 lb)   07/16/21 113.4 kg (250 lb)   07/14/21 113.3 kg (249 lb 12.5 oz)     General Appearance:   no distress, normal body habitus   ENT/Mouth: membranes moist, no oral lesions or bleeding gums.      EYES:  no scleral icterus, normal conjunctivae   Neck: no carotid bruits or thyromegaly   Chest/Lungs:   lungs are clear to auscultation, no rales or wheezing, equal chest wall expansion    Cardiovascular:   Irregular. Normal first and second heart sounds with no murmurs, rubs, or gallops; the carotid, radial and posterior tibial pulses are intact, no JVD or LE edema bilaterally    Abdomen:  no organomegaly, masses, bruits, or tenderness; bowel sounds are present   Extremities: no cyanosis or clubbing   Skin: no xanthelasma, warm.    Neurologic: normal gait, normal  bilateral, no tremors     Psychiatric: alert and oriented x3, calm     A complete 10 systems ROS was reviewed  And is negative except what is listed in the HPI.          Medical History  Surgical History Family History Social History   Past Medical History:   Diagnosis Date     Angioedema      Atrial fibrillation and flutter (H)      Basal cell carcinoma      Branch retinal vein occlusion of left eye  7/1/2017     CAD (coronary artery disease)     ANGIOGRAM 2021 Left Main The vessel was visualized by angiography, is moderate in size and is angiographically normal. Left Anterior Descending Mid LAD lesion is 25% stenosed. Ramus Intermedius Ramus lesion is 10% stenosed. Left Circumflex Dist Cx lesion is 30% stenosed. Right Coronary Artery The vessel was visualized by angiography, is moderate in size and is angiographically normal.       Chronic neck pain      Congestive heart failure (H) 2021     GERD (gastroesophageal reflux disease)      HTN (hypertension) 2015     Hx of atrial flutter      Nonsmoker      Radiculopathy     cervical     Retinal hemorrhage     Vessel rupture in left retina     Rhinitis, allergic      S/P colonoscopy 4/15/2019     Urticaria     Past Surgical History:   Procedure Laterality Date     BASAL CELL CARCINOMA EXCISION  01/2020    Left nasal reconstruction     CERVICAL SPINE SURGERY      C8, T1, foraminotomy     CV CORONARY ANGIOGRAM N/A 1/20/2021    Procedure: Coronary Angiogram;  Surgeon: Willow Wellington MD;  Location: [facility] Cardiac Cath Lab;  Service: Cardiology     CV LEFT ATRIAL APPENDAGE CLOSURE N/A 7/14/2021    Procedure: CV LEFT ATRIAL APPENDAGE CLOSURE;  Surgeon: Noah Gutierres MD;  Location: [facility] CARDIAC CATH LAB     CV LEFT HEART CATHETERIZATION WITHOUT LEFT VENTRICULOGRAM Left 1/20/2021    Procedure: Left Heart Catheterization Without Left Ventriculogram;  Surgeon: Willow Wellington MD;  Location: [facility] Cardiac Cath Lab;  Service: Cardiology     HERNIA REPAIR, UMBILICAL       ×2     REVERSE TOTAL SHOULDER ARTHROPLASTY Left 05/2019    no family history of premature coronary artery disease Social History     Socioeconomic History     Marital status:      Spouse name: Not on file     Number of children: Not on file     Years of education: Not on file     Highest education level: Not on file   Occupational History     Not on file   Tobacco Use     Smoking  status: Never Smoker     Smokeless tobacco: Never Used   Substance and Sexual Activity     Alcohol use: Never     Comment: Alcoholic Drinks/day: About once a year.     Drug use: No     Sexual activity: Not on file   Other Topics Concern     Parent/sibling w/ CABG, MI or angioplasty before 65F 55M? Not Asked   Social History Narrative    Patient of Dr. Rod since 2001.    .  Wife is a former RN.     Wife's cousin is Dr. Jean Pierre Champagne, ID specialist.     Social Determinants of Health     Financial Resource Strain:      Difficulty of Paying Living Expenses:    Food Insecurity:      Worried About Running Out of Food in the Last Year:      Ran Out of Food in the Last Year:    Transportation Needs:      Lack of Transportation (Medical):      Lack of Transportation (Non-Medical):    Physical Activity:      Days of Exercise per Week:      Minutes of Exercise per Session:    Stress:      Feeling of Stress :    Social Connections:      Frequency of Communication with Friends and Family:      Frequency of Social Gatherings with Friends and Family:      Attends [employer] Services:      Active Member of Clubs or Organizations:      Attends Club or Organization Meetings:      Marital Status:    Intimate Partner Violence:      Fear of Current or Ex-Partner:      Emotionally Abused:      Physically Abused:      Sexually Abused:            Lab Results    Chemistry/lipid CBC Cardiac Enzymes/BNP/TSH/INR   Lab Results   Component Value Date    CHOL 165 01/20/2021    HDL 40 01/20/2021    TRIG 65 01/20/2021    BUN 13 07/12/2021     07/12/2021    CO2 25 07/12/2021    Lab Results   Component Value Date    WBC 8.3 07/12/2021    HGB 14.4 07/14/2021    HCT 44.4 07/12/2021    MCV 94 07/12/2021     07/12/2021    Lab Results   Component Value Date    TROPONINI 0.02 06/18/2020     (H) 11/30/2020     Lab Results   Component Value Date    TROPONINI 0.02 06/18/2020          Weight:    Wt Readings from Last 3 Encounters:    08/30/21 109.3 kg (241 lb)   07/16/21 113.4 kg (250 lb)   07/14/21 113.3 kg (249 lb 12.5 oz)       Allergies  Allergies   Allergen Reactions     Hydrochlorothiazide Unknown     Hydrocodone Swelling     Sinus     Oxycontin [Oxycodone] Swelling     lip     Sulfa (Sulfonamide Antibiotics) [Sulfa Drugs] Swelling     Perfume Swelling     Other reaction(s): Runny Nose         Surgical History  Past Surgical History:   Procedure Laterality Date     BASAL CELL CARCINOMA EXCISION  01/2020    Left nasal reconstruction     CERVICAL SPINE SURGERY      C8, T1, foraminotomy     CV CORONARY ANGIOGRAM N/A 1/20/2021    Procedure: Coronary Angiogram;  Surgeon: Willow Wellington MD;  Location: [facility] Cardiac Cath Lab;  Service: Cardiology     CV LEFT ATRIAL APPENDAGE CLOSURE N/A 7/14/2021    Procedure: CV LEFT ATRIAL APPENDAGE CLOSURE;  Surgeon: Noah Gutierres MD;  Location: [facility] CARDIAC CATH LAB     CV LEFT HEART CATHETERIZATION WITHOUT LEFT VENTRICULOGRAM Left 1/20/2021    Procedure: Left Heart Catheterization Without Left Ventriculogram;  Surgeon: Willow Wellington MD;  Location: [facility] Cardiac Cath Lab;  Service: Cardiology     HERNIA REPAIR, UMBILICAL       ×2     REVERSE TOTAL SHOULDER ARTHROPLASTY Left 05/2019       Social History  Tobacco:   History   Smoking Status     Never Smoker   Smokeless Tobacco     Never Used    Alcohol:   Social History    Substance and Sexual Activity      Alcohol use: Never        Comment: Alcoholic Drinks/day: About once a year.   Illicit Drugs:   History   Drug Use No       Family History  Family History   Problem Relation Age of Onset     Arthritis Mother      Other - See Comments Mother         psychiatry/pvd     Other - See Comments Father         blood reaction          Marylou Ibrahim MD on 10/8/2021      cc: Onur Rod

## 2021-10-08 NOTE — H&P (VIEW-ONLY)
HEART CARE NOTE          Assessment/Recommendations     1. HFrEF  Assessment / Plan    Euvolemic on physical exam and denies orthopnea, PND or fluid retention/edema    GDMT as detailed below     Current Pharmacotherapy AHA Guideline-Directed Medical Therapy   Lisinopril 5 mg daily Lisinopril 20 mg twice daily   Metoprolol succinate 25 mg daily Carvedilol 25 mg twice daily   Spironolactone NA  Spironolactone 25 mg once daily   Hydralazine NA Hydralazine 100 mg three times daily   Isosorbide dinitrate NA Isosorbide dinitrate 40 mg three times daily      2. Atrial fibrillation/flutter  Assessment / Plan    [id number] = 2; continue apixaban    S/p Watchman; follows with afib clinic    History of Present Illness/Subjective      Mr. Mike Gonzalez is a 70 y.o. male with a PMHx significant for afib/flutter and new diagnosis of HFrEF (NICM) who presents to [facility] for follow-up.     Today, Mr. Gonzalez denies HF symptoms of orthopnea, PND, fluid retention.      ECG: Personally reviewed. Junctional rhythm, occasional PVC noted, unifocal.     Coronary angiogram:    New diagnosis of biventricular failure with LVH and severe biatrial enlargment and a stress test suggesting apical non-transmural infarct. Patient is morbidly obese with severe hypertension.    Mild coronary atherosclerosis.    LV EDP 20 mmHg.    Estimated blood loss was <20 ml.     ECHO (personnaly Reviewed):    Severe biatrial enlargement    Moderate concentric left ventricular hypertrophy    Left ventricle ejection fraction is moderately decreased. The calculated left ventricular ejection fraction is 42% with segmental wall motion abnormalities as noted below.    Normal right ventricular size with decreased systolic function.    No significant valve disease.    No previous study for comparison.     NM scan      The nuclear stress test is abnormal. There is no ischemia but there is a medium sized area of a moderate degree of nontransmural infarction in the  distal anterior segment(s) of the left ventricle. This appears to be in the left anterior descending artery distribution.     The stress electrocardiogram is negative for inducible ischemic EKG changes. Occasional PVCs were noted during stress and recovery.     The left ventricle is dilated and the left ventricular ejection fraction at stress is mildly decreased at 44%.     Low-to-intermediate risk of future ischemic events.     There is no prior study for comparison.             Physical Examination Review of Systems   /70 (BP Location: Right arm, Patient Position: Sitting, Cuff Size: Adult Large)   Pulse 64   Resp 20   Ht 1.829 m (6')   BMI 32.69 kg/m    Body mass index is 32.69 kg/m .  Wt Readings from Last 3 Encounters:   08/30/21 109.3 kg (241 lb)   07/16/21 113.4 kg (250 lb)   07/14/21 113.3 kg (249 lb 12.5 oz)     General Appearance:   no distress, normal body habitus   ENT/Mouth: membranes moist, no oral lesions or bleeding gums.      EYES:  no scleral icterus, normal conjunctivae   Neck: no carotid bruits or thyromegaly   Chest/Lungs:   lungs are clear to auscultation, no rales or wheezing, equal chest wall expansion    Cardiovascular:   Irregular. Normal first and second heart sounds with no murmurs, rubs, or gallops; the carotid, radial and posterior tibial pulses are intact, no JVD or LE edema bilaterally    Abdomen:  no organomegaly, masses, bruits, or tenderness; bowel sounds are present   Extremities: no cyanosis or clubbing   Skin: no xanthelasma, warm.    Neurologic: normal gait, normal  bilateral, no tremors     Psychiatric: alert and oriented x3, calm     A complete 10 systems ROS was reviewed  And is negative except what is listed in the HPI.          Medical History  Surgical History Family History Social History   Past Medical History:   Diagnosis Date     Angioedema      Atrial fibrillation and flutter (H)      Basal cell carcinoma      Branch retinal vein occlusion of left eye  7/1/2017     CAD (coronary artery disease)     ANGIOGRAM 2021 Left Main The vessel was visualized by angiography, is moderate in size and is angiographically normal. Left Anterior Descending Mid LAD lesion is 25% stenosed. Ramus Intermedius Ramus lesion is 10% stenosed. Left Circumflex Dist Cx lesion is 30% stenosed. Right Coronary Artery The vessel was visualized by angiography, is moderate in size and is angiographically normal.       Chronic neck pain      Congestive heart failure (H) 2021     GERD (gastroesophageal reflux disease)      HTN (hypertension) 2015     Hx of atrial flutter      Nonsmoker      Radiculopathy     cervical     Retinal hemorrhage     Vessel rupture in left retina     Rhinitis, allergic      S/P colonoscopy 4/15/2019     Urticaria     Past Surgical History:   Procedure Laterality Date     BASAL CELL CARCINOMA EXCISION  01/2020    Left nasal reconstruction     CERVICAL SPINE SURGERY      C8, T1, foraminotomy     CV CORONARY ANGIOGRAM N/A 1/20/2021    Procedure: Coronary Angiogram;  Surgeon: Willow Wellington MD;  Location: [facility] Cardiac Cath Lab;  Service: Cardiology     CV LEFT ATRIAL APPENDAGE CLOSURE N/A 7/14/2021    Procedure: CV LEFT ATRIAL APPENDAGE CLOSURE;  Surgeon: Noah Gutierres MD;  Location: [facility] CARDIAC CATH LAB     CV LEFT HEART CATHETERIZATION WITHOUT LEFT VENTRICULOGRAM Left 1/20/2021    Procedure: Left Heart Catheterization Without Left Ventriculogram;  Surgeon: Willow Wellington MD;  Location: [facility] Cardiac Cath Lab;  Service: Cardiology     HERNIA REPAIR, UMBILICAL       ×2     REVERSE TOTAL SHOULDER ARTHROPLASTY Left 05/2019    no family history of premature coronary artery disease Social History     Socioeconomic History     Marital status:      Spouse name: Not on file     Number of children: Not on file     Years of education: Not on file     Highest education level: Not on file   Occupational History     Not on file   Tobacco Use     Smoking  status: Never Smoker     Smokeless tobacco: Never Used   Substance and Sexual Activity     Alcohol use: Never     Comment: Alcoholic Drinks/day: About once a year.     Drug use: No     Sexual activity: Not on file   Other Topics Concern     Parent/sibling w/ CABG, MI or angioplasty before 65F 55M? Not Asked   Social History Narrative    Patient of Dr. Rod since 2001.    .  Wife is a former RN.     Wife's cousin is Dr. Jean Pierre Champagne, ID specialist.     Social Determinants of Health     Financial Resource Strain:      Difficulty of Paying Living Expenses:    Food Insecurity:      Worried About Running Out of Food in the Last Year:      Ran Out of Food in the Last Year:    Transportation Needs:      Lack of Transportation (Medical):      Lack of Transportation (Non-Medical):    Physical Activity:      Days of Exercise per Week:      Minutes of Exercise per Session:    Stress:      Feeling of Stress :    Social Connections:      Frequency of Communication with Friends and Family:      Frequency of Social Gatherings with Friends and Family:      Attends Taoist Services:      Active Member of Clubs or Organizations:      Attends Club or Organization Meetings:      Marital Status:    Intimate Partner Violence:      Fear of Current or Ex-Partner:      Emotionally Abused:      Physically Abused:      Sexually Abused:            Lab Results    Chemistry/lipid CBC Cardiac Enzymes/BNP/TSH/INR   Lab Results   Component Value Date    CHOL 165 01/20/2021    HDL 40 01/20/2021    TRIG 65 01/20/2021    BUN 13 07/12/2021     07/12/2021    CO2 25 07/12/2021    Lab Results   Component Value Date    WBC 8.3 07/12/2021    HGB 14.4 07/14/2021    HCT 44.4 07/12/2021    MCV 94 07/12/2021     07/12/2021    Lab Results   Component Value Date    TROPONINI 0.02 06/18/2020     (H) 11/30/2020     Lab Results   Component Value Date    TROPONINI 0.02 06/18/2020          Weight:    Wt Readings from Last 3 Encounters:    08/30/21 109.3 kg (241 lb)   07/16/21 113.4 kg (250 lb)   07/14/21 113.3 kg (249 lb 12.5 oz)       Allergies  Allergies   Allergen Reactions     Hydrochlorothiazide Unknown     Hydrocodone Swelling     Sinus     Oxycontin [Oxycodone] Swelling     lip     Sulfa (Sulfonamide Antibiotics) [Sulfa Drugs] Swelling     Perfume Swelling     Other reaction(s): Runny Nose         Surgical History  Past Surgical History:   Procedure Laterality Date     BASAL CELL CARCINOMA EXCISION  01/2020    Left nasal reconstruction     CERVICAL SPINE SURGERY      C8, T1, foraminotomy     CV CORONARY ANGIOGRAM N/A 1/20/2021    Procedure: Coronary Angiogram;  Surgeon: Willow Wellington MD;  Location: [facility] Cardiac Cath Lab;  Service: Cardiology     CV LEFT ATRIAL APPENDAGE CLOSURE N/A 7/14/2021    Procedure: CV LEFT ATRIAL APPENDAGE CLOSURE;  Surgeon: Noah Gutierres MD;  Location: [facility] CARDIAC CATH LAB     CV LEFT HEART CATHETERIZATION WITHOUT LEFT VENTRICULOGRAM Left 1/20/2021    Procedure: Left Heart Catheterization Without Left Ventriculogram;  Surgeon: Willow Wellington MD;  Location: [facility] Cardiac Cath Lab;  Service: Cardiology     HERNIA REPAIR, UMBILICAL       ×2     REVERSE TOTAL SHOULDER ARTHROPLASTY Left 05/2019       Social History  Tobacco:   History   Smoking Status     Never Smoker   Smokeless Tobacco     Never Used    Alcohol:   Social History    Substance and Sexual Activity      Alcohol use: Never        Comment: Alcoholic Drinks/day: About once a year.   Illicit Drugs:   History   Drug Use No       Family History  Family History   Problem Relation Age of Onset     Arthritis Mother      Other - See Comments Mother         psychiatry/pvd     Other - See Comments Father         blood reaction          Marylou Ibrahim MD on 10/8/2021      cc: Onur Rod

## 2021-10-10 ENCOUNTER — TELEPHONE (OUTPATIENT)
Dept: LAB | Facility: CLINIC | Age: 71
End: 2021-10-10

## 2021-10-15 ENCOUNTER — LAB (OUTPATIENT)
Dept: LAB | Facility: CLINIC | Age: 71
End: 2021-10-15
Payer: MEDICARE

## 2021-10-15 DIAGNOSIS — Z20.822 ENCOUNTER FOR LABORATORY TESTING FOR COVID-19 VIRUS: Primary | ICD-10-CM

## 2021-10-15 PROCEDURE — U0005 INFEC AGEN DETEC AMPLI PROBE: HCPCS

## 2021-10-15 PROCEDURE — U0003 INFECTIOUS AGENT DETECTION BY NUCLEIC ACID (DNA OR RNA); SEVERE ACUTE RESPIRATORY SYNDROME CORONAVIRUS 2 (SARS-COV-2) (CORONAVIRUS DISEASE [COVID-19]), AMPLIFIED PROBE TECHNIQUE, MAKING USE OF HIGH THROUGHPUT TECHNOLOGIES AS DESCRIBED BY CMS-2020-01-R: HCPCS

## 2021-10-16 LAB — SARS-COV-2 RNA RESP QL NAA+PROBE: NEGATIVE

## 2021-10-18 ENCOUNTER — HOSPITAL ENCOUNTER (OUTPATIENT)
Dept: CARDIOLOGY | Facility: HOSPITAL | Age: 71
Discharge: HOME | End: 2021-10-18
Attending: INTERNAL MEDICINE | Admitting: INTERNAL MEDICINE
Payer: MEDICARE

## 2021-10-18 VITALS
HEIGHT: 72 IN | BODY MASS INDEX: 32.51 KG/M2 | WEIGHT: 240 LBS | RESPIRATION RATE: 20 BRPM | TEMPERATURE: 97.6 F | SYSTOLIC BLOOD PRESSURE: 149 MMHG | DIASTOLIC BLOOD PRESSURE: 87 MMHG | HEART RATE: 63 BPM | OXYGEN SATURATION: 98 %

## 2021-10-18 DIAGNOSIS — Z95.818 PRESENCE OF LEFT ATRIAL APPENDAGE CLOSURE DEVICE COMPOSED OF NICKEL-TITANIUM ALLOY WITH POLYETHYLENE TEREPHTHALATE MEMBRANE: ICD-10-CM

## 2021-10-18 DIAGNOSIS — I48.11 LONGSTANDING PERSISTENT ATRIAL FIBRILLATION (H): ICD-10-CM

## 2021-10-18 PROCEDURE — 99152 MOD SED SAME PHYS/QHP 5/>YRS: CPT | Performed by: INTERNAL MEDICINE

## 2021-10-18 PROCEDURE — 250N000011 HC RX IP 250 OP 636: Performed by: INTERNAL MEDICINE

## 2021-10-18 PROCEDURE — 93312 ECHO TRANSESOPHAGEAL: CPT

## 2021-10-18 PROCEDURE — 258N000003 HC RX IP 258 OP 636: Performed by: INTERNAL MEDICINE

## 2021-10-18 PROCEDURE — 93312 ECHO TRANSESOPHAGEAL: CPT | Mod: 26 | Performed by: INTERNAL MEDICINE

## 2021-10-18 PROCEDURE — 250N000009 HC RX 250: Performed by: INTERNAL MEDICINE

## 2021-10-18 RX ORDER — LIDOCAINE 40 MG/G
CREAM TOPICAL
Status: DISCONTINUED | OUTPATIENT
Start: 2021-10-18 | End: 2021-10-18 | Stop reason: HOSPADM

## 2021-10-18 RX ORDER — SODIUM CHLORIDE 9 MG/ML
INJECTION, SOLUTION INTRAVENOUS CONTINUOUS
Status: CANCELLED | OUTPATIENT
Start: 2021-10-18

## 2021-10-18 RX ORDER — LIDOCAINE HYDROCHLORIDE 20 MG/ML
SOLUTION OROPHARYNGEAL
Status: COMPLETED | OUTPATIENT
Start: 2021-10-18 | End: 2021-10-18

## 2021-10-18 RX ORDER — SODIUM CHLORIDE 9 MG/ML
INJECTION, SOLUTION INTRAVENOUS CONTINUOUS PRN
Status: COMPLETED | OUTPATIENT
Start: 2021-10-18 | End: 2021-10-18

## 2021-10-18 RX ORDER — LIDOCAINE 40 MG/G
CREAM TOPICAL
Status: CANCELLED | OUTPATIENT
Start: 2021-10-18

## 2021-10-18 RX ORDER — SODIUM CHLORIDE 9 MG/ML
INJECTION, SOLUTION INTRAVENOUS CONTINUOUS
Status: DISCONTINUED | OUTPATIENT
Start: 2021-10-18 | End: 2021-10-18 | Stop reason: HOSPADM

## 2021-10-18 RX ORDER — FENTANYL CITRATE 50 UG/ML
INJECTION, SOLUTION INTRAMUSCULAR; INTRAVENOUS
Status: COMPLETED | OUTPATIENT
Start: 2021-10-18 | End: 2021-10-18

## 2021-10-18 RX ADMIN — MIDAZOLAM 1 MG: 1 INJECTION INTRAMUSCULAR; INTRAVENOUS at 13:10

## 2021-10-18 RX ADMIN — SODIUM CHLORIDE 250 ML: 0.9 INJECTION, SOLUTION INTRAVENOUS at 12:56

## 2021-10-18 RX ADMIN — FENTANYL CITRATE 100 MCG: 50 INJECTION, SOLUTION INTRAMUSCULAR; INTRAVENOUS at 13:10

## 2021-10-18 RX ADMIN — LIDOCAINE HYDROCHLORIDE 10 ML: 20 SOLUTION ORAL; TOPICAL at 13:08

## 2021-10-18 RX ADMIN — BENZOCAINE 9 SPRAY: 220 SPRAY, METERED PERIODONTAL at 13:09

## 2021-10-18 RX ADMIN — MIDAZOLAM 1 MG: 1 INJECTION INTRAMUSCULAR; INTRAVENOUS at 13:14

## 2021-10-18 ASSESSMENT — MIFFLIN-ST. JEOR: SCORE: 1881.63

## 2021-10-18 NOTE — DISCHARGE INSTRUCTIONS
1. You are required to have someone accompany you home.    2. Rest today. Do not drive or operate machinery today. Over-activity may produce nausea and dizziness.    3. You should follow your normal diet. Drink plenty of fluids. Do not drink any alcoholic beverages for 24 hours. *(Alcohol may interact with the medications you received today).    4. NO HOT FOODS or LIQUIDS FOR 6 HOURS after the procedure.  Nothing hot to eat until after 7:30 PM tonight.    5. You may have a sore throat or cough. This is normal. These symptoms should resolve in 24 hours.     6. If you have further questions call your doctor:  Dr. Gutierres

## 2021-10-18 NOTE — SEDATION DOCUMENTATION
JEAN CLAUDE:  Pt tolerated well.  Received: 9 sprays of topex, 2 mg IV versed, and 100 mcg IV fentanyl.  NPO till 2:30 pm.  Nothing hot to eat or drink until after 7:30 pm.  VSS:  HR:  61-63 afib.  SPO2:  On 2L NC %.  Now on room air with sats 96-98%.  CO2:  11-40  RR:  11-24.  BP:  123-131/57-77.  Pt resting comfortably.

## 2021-10-18 NOTE — PRE-PROCEDURE
GENERAL PRE-PROCEDURE:   Procedure:  Transesophageal echocardiogram  Date/Time:  10/18/2021 1:02 PM    Written consent obtained?: Yes    Risks and benefits: Risks, benefits and alternatives were discussed    Consent given by:  Patient  Patient states understanding of procedure being performed: Yes    Patient's understanding of procedure matches consent: Yes    Procedure consent matches procedure scheduled: Yes    Expected level of sedation:  Moderate  Appropriately NPO:  Yes  Mallampati  :  Grade 1- soft palate, uvula, tonsillar pillars, and posterior pharyngeal wall visible  Lungs:  Lungs clear with good breath sounds bilaterally  Heart:  Normal heart sounds and rate  History & Physical reviewed:  History and physical reviewed and no updates needed  Statement of review:  I have reviewed the lab findings, diagnostic data, medications, and the plan for sedation

## 2021-10-19 ENCOUNTER — TELEPHONE (OUTPATIENT)
Dept: CARDIOLOGY | Facility: CLINIC | Age: 71
End: 2021-10-19

## 2021-10-19 DIAGNOSIS — Z95.818 PRESENCE OF LEFT ATRIAL APPENDAGE CLOSURE DEVICE COMPOSED OF NICKEL-TITANIUM ALLOY WITH POLYETHYLENE TEREPHTHALATE MEMBRANE: Primary | ICD-10-CM

## 2021-10-19 RX ORDER — CLOPIDOGREL BISULFATE 75 MG/1
75 TABLET ORAL DAILY
Qty: 90 TABLET | Refills: 1 | Status: SHIPPED | OUTPATIENT
Start: 2021-10-19 | End: 2021-11-09 | Stop reason: SINTOL

## 2021-10-19 NOTE — TELEPHONE ENCOUNTER
----- Message from Noah Gutierres MD sent at 10/18/2021  3:13 PM CDT -----  Ava    Reviewed the images w/ . One jet is .35mm, the 0.5 one does not go behind the device. Should be reasonable to get off anticoagulation, when appropriate.    Thx!  Noah

## 2021-10-19 NOTE — TELEPHONE ENCOUNTER
Called pt and discuss results and recommendations of Dr. Gutierres. Pt will stop Eliquis and start plavix. Pt will continue to monitor how they are doing and if any concerns, they will call back. Pt knows to take plavix until they are 6 months post Watchman procedure which would be Feb 14, 2022.    Pt states they would like possible intervention on their back. They will see if they can wait until after they are off their plavix. Pt will call if there is any changes in needing surgery. Patient verbalizes understanding and agrees with plan. No further questions or concerns at this time.

## 2021-11-08 ENCOUNTER — OFFICE VISIT (OUTPATIENT)
Dept: FAMILY MEDICINE | Facility: CLINIC | Age: 71
End: 2021-11-08
Payer: MEDICARE

## 2021-11-08 ENCOUNTER — MYC MEDICAL ADVICE (OUTPATIENT)
Dept: CARDIOLOGY | Facility: CLINIC | Age: 71
End: 2021-11-08

## 2021-11-08 VITALS — OXYGEN SATURATION: 98 % | DIASTOLIC BLOOD PRESSURE: 80 MMHG | HEART RATE: 88 BPM | SYSTOLIC BLOOD PRESSURE: 126 MMHG

## 2021-11-08 DIAGNOSIS — L50.9 HIVES: Primary | ICD-10-CM

## 2021-11-08 LAB
ALBUMIN SERPL-MCNC: 3.7 G/DL (ref 3.5–5)
ALP SERPL-CCNC: 80 U/L (ref 45–120)
ALT SERPL W P-5'-P-CCNC: 17 U/L (ref 0–45)
ANION GAP SERPL CALCULATED.3IONS-SCNC: 10 MMOL/L (ref 5–18)
AST SERPL W P-5'-P-CCNC: 17 U/L (ref 0–40)
BASOPHILS # BLD AUTO: 0 10E3/UL (ref 0–0.2)
BASOPHILS NFR BLD AUTO: 0 %
BILIRUB SERPL-MCNC: 0.6 MG/DL (ref 0–1)
BUN SERPL-MCNC: 13 MG/DL (ref 8–28)
C REACTIVE PROTEIN LHE: 0.7 MG/DL (ref 0–0.8)
CALCIUM SERPL-MCNC: 9.5 MG/DL (ref 8.5–10.5)
CHLORIDE BLD-SCNC: 103 MMOL/L (ref 98–107)
CO2 SERPL-SCNC: 27 MMOL/L (ref 22–31)
CREAT SERPL-MCNC: 0.98 MG/DL (ref 0.7–1.3)
EOSINOPHIL # BLD AUTO: 0.1 10E3/UL (ref 0–0.7)
EOSINOPHIL NFR BLD AUTO: 1 %
ERYTHROCYTE [DISTWIDTH] IN BLOOD BY AUTOMATED COUNT: 12.7 % (ref 10–15)
ERYTHROCYTE [SEDIMENTATION RATE] IN BLOOD BY WESTERGREN METHOD: 12 MM/HR (ref 0–15)
GFR SERPL CREATININE-BSD FRML MDRD: 77 ML/MIN/1.73M2
GLUCOSE BLD-MCNC: 113 MG/DL (ref 70–125)
HCT VFR BLD AUTO: 43.6 % (ref 40–53)
HGB BLD-MCNC: 15 G/DL (ref 13.3–17.7)
IMM GRANULOCYTES # BLD: 0 10E3/UL
IMM GRANULOCYTES NFR BLD: 0 %
LYMPHOCYTES # BLD AUTO: 1.6 10E3/UL (ref 0.8–5.3)
LYMPHOCYTES NFR BLD AUTO: 16 %
MCH RBC QN AUTO: 32.1 PG (ref 26.5–33)
MCHC RBC AUTO-ENTMCNC: 34.4 G/DL (ref 31.5–36.5)
MCV RBC AUTO: 93 FL (ref 78–100)
MONOCYTES # BLD AUTO: 0.9 10E3/UL (ref 0–1.3)
MONOCYTES NFR BLD AUTO: 9 %
NEUTROPHILS # BLD AUTO: 7.4 10E3/UL (ref 1.6–8.3)
NEUTROPHILS NFR BLD AUTO: 74 %
PLATELET # BLD AUTO: 265 10E3/UL (ref 150–450)
POTASSIUM BLD-SCNC: 4.3 MMOL/L (ref 3.5–5)
PROT SERPL-MCNC: 7.1 G/DL (ref 6–8)
RBC # BLD AUTO: 4.67 10E6/UL (ref 4.4–5.9)
SODIUM SERPL-SCNC: 140 MMOL/L (ref 136–145)
WBC # BLD AUTO: 10 10E3/UL (ref 4–11)

## 2021-11-08 PROCEDURE — 99213 OFFICE O/P EST LOW 20 MIN: CPT | Performed by: FAMILY MEDICINE

## 2021-11-08 PROCEDURE — 36415 COLL VENOUS BLD VENIPUNCTURE: CPT | Performed by: FAMILY MEDICINE

## 2021-11-08 PROCEDURE — 85652 RBC SED RATE AUTOMATED: CPT | Performed by: FAMILY MEDICINE

## 2021-11-08 PROCEDURE — 80053 COMPREHEN METABOLIC PANEL: CPT | Performed by: FAMILY MEDICINE

## 2021-11-08 PROCEDURE — 86141 C-REACTIVE PROTEIN HS: CPT | Performed by: FAMILY MEDICINE

## 2021-11-08 PROCEDURE — 85025 COMPLETE CBC W/AUTO DIFF WBC: CPT | Performed by: FAMILY MEDICINE

## 2021-11-08 RX ORDER — GABAPENTIN 600 MG/1
300 TABLET ORAL PRN
COMMUNITY
Start: 2021-10-07 | End: 2023-02-24

## 2021-11-08 NOTE — PROGRESS NOTES
Mike was seen today for derm problem.    Diagnoses and all orders for this visit:    Hives - Etiology unclear.  I think Plavix unlikely cause.  Will treat with H1 and H2 blockers and check labs.  If not improving, refer to allergist.  -     CBC with platelets and differential  -     Comprehensive metabolic panel (BMP + Alb, Alk Phos, ALT, AST, Total. Bili, TP)  -     ESR: Erythrocyte sedimentation rate  -     CRP, inflammation  -     Adult Allergy/Asthma Referral; Future      Patient Instructions   Cetirizine 10 mg ( Zyrtec ) - Start taking one daily and after a couple of days increase to twice daily.    Famotidine 20 mg daily     Stop Benadryl    Make appointment with allergist       SUBJECTIVE: Mike Gonzalez is a 71 year old male who presents with the following:  Patient presents with:  Derm Problem: Rash/Hives all over since yesterday morning    Started 3 nights ago. Woke up and top of foot was itchy.  Put on some OTC 1% HC cream and helped a little.  Then developed rash over rest of body.  Rash is itchy / welts over arms/ groin.  Taking Benadryl but not helping a lot.    No new medicines.  Got Moderna booster 2 weeks ago.  Started Plavix on October 19.  No change in diet / personal care products.  Worked outdoors a little this weekend. Denies any tick bite.    No fevers/ chills / URI / diarrhea / vomiting / headaches/ myalgias.  Has some constipation.    Patient Active Problem List   Diagnosis     Essential hypertension     GERD (gastroesophageal reflux disease)     Chronic neck pain     Nonsmoker     Basal cell carcinoma     Full code status     Branch retinal vein occlusion of left eye     Normal colonoscopy - 2016 - Average risk     Complete tear of left rotator cuff     Obesity (BMI 35.0-39.9) with comorbidity (H)     Atrial flutter, chronic (H)     Dilated cardiomyopathy (H)     CAD (coronary artery disease), minimal disease on angiogram in 2021     Longstanding persistent atrial fibrillation (H)      Chronic systolic heart failure (H)     s/p left atrial appendage closure     Aftercare following surgery of the musculoskeletal system     Radiculopathy, cervical     Osteoarthritis of left glenohumeral joint     Concussion syndrome     Chronic left-sided low back pain with left-sided sciatica        OBJECTIVE: /80 (BP Location: Left arm, Patient Position: Sitting, Cuff Size: Adult Regular)   Pulse 88   SpO2 98%  no distress.  Lungs: Clear to auscultation.  No retractions or tachypnea.  CV: RRR. S1 and S2 normal.  No murmurs, rubs or gallops.  Skin: erythematous welts over arms/ legs/ groin/ buttocks        Megan Sanders MD

## 2021-11-09 ENCOUNTER — MYC MEDICAL ADVICE (OUTPATIENT)
Dept: CARDIOLOGY | Facility: CLINIC | Age: 71
End: 2021-11-09
Payer: MEDICARE

## 2021-11-09 DIAGNOSIS — Z95.818 PRESENCE OF LEFT ATRIAL APPENDAGE CLOSURE DEVICE COMPOSED OF NICKEL-TITANIUM ALLOY WITH POLYETHYLENE TEREPHTHALATE MEMBRANE: Primary | ICD-10-CM

## 2021-11-09 RX ORDER — PRASUGREL 10 MG/1
10 TABLET, FILM COATED ORAL DAILY
Qty: 30 TABLET | Refills: 2 | Status: SHIPPED | OUTPATIENT
Start: 2021-11-09 | End: 2021-11-10

## 2021-11-09 NOTE — PROGRESS NOTES
Got MyChart message from patient who has had persistent, spreading hives since starting Plavix after JEAN CLAUDE on Oct 18.      Will stop Plavix and start Effient.  Effient 10 mg daily Rx sent to his pharmacy.     Last day of Effient will be January 14, which is 6 months s/p Watchman implant.      Patient will update me whether rash gets better or worse.     Gina Blount PA-C, MPAS  [facility]ural [facility]  [facility]

## 2021-11-10 ENCOUNTER — TELEPHONE (OUTPATIENT)
Dept: CARDIOLOGY | Facility: CLINIC | Age: 71
End: 2021-11-10
Payer: MEDICARE

## 2021-11-10 DIAGNOSIS — Z95.818 PRESENCE OF LEFT ATRIAL APPENDAGE CLOSURE DEVICE COMPOSED OF NICKEL-TITANIUM ALLOY WITH POLYETHYLENE TEREPHTHALATE MEMBRANE: Primary | ICD-10-CM

## 2021-11-10 RX ORDER — PRASUGREL 10 MG/1
10 TABLET, FILM COATED ORAL DAILY
Qty: 30 TABLET | Refills: 2 | Status: SHIPPED | OUTPATIENT
Start: 2021-11-10 | End: 2021-11-21

## 2021-11-10 NOTE — TELEPHONE ENCOUNTER
Phone call to patient to follow-up on reported allergic reaction. He reports the swelling is now primary from his shoulders to his face. He feels at times like his throat is tight and difficult to swallow. Writer encouraged patient to visit emergency room if he is concerned for airway swelling/obstruction. Patient stated he would prefer to wait and see, his wife is an RN. Encouraged patient once again the importance of protecting his airway if he feels throat is swelling. He states he believes his allergic reaction is caused by his plavix. He was switched recently to effient but was called and told the prescription would not be available to him for another day or two. He asks if it is okay for him to take prednisone. Per previous communication with ROSSY Blount, informed patient he should not be taking prednisone for more than 5 days consecutively. He is wondering if he should continue taking plavix until he is able to obtain effient prescription or can he hold plavix until effient available. Writer discussed with patient the importance of maintaining antiplatelet regiment in order to prevent formation of clot on LAAC device. Patient does not want to take plavix and wonders if he can temporarily take eliquis instead. Informed patient writer would contact ROSSY Blount directly to address.     Spoke directly with ROSSY Blount, she advised attempt to fill prescription of effient at alternate pharmacy and patient is to hold plavix until he is able to fill effient prescription.     Return call to patient with information above. Effient refilled at alternate pharmacy of patient's choice. Informed patient he should be able to  medication tonight or tomorrow and he will begin taking effient tomorrow. He verbalized he will hold plavix until effient obtained. Again reiterated the importance of visiting emergency room if patient were experiencing throat tightness, difficulty breathing, shortness of  breath, difficulty swallowing. Patient verbalized understanding, no further questions or concerns at this time. [facility]

## 2021-11-10 NOTE — TELEPHONE ENCOUNTER
----- Message from Kenya Bullard sent at 11/10/2021  3:42 PM CST -----  Regarding: ROBBY TENA  General phone call:    Caller: PATIENT    Primary cardiologist: KARINA    Detailed reason for call: PATIENT WANTING TO TALK TO ROBBY  RE: HIS ALLERGIC REACTION     Best phone number: [phone]    Best time to contact: ANY    Ok to leave a detailedmessage? YES    Device? YES    Additional Info:

## 2021-11-15 ENCOUNTER — TELEPHONE (OUTPATIENT)
Dept: INTERNAL MEDICINE | Facility: CLINIC | Age: 71
End: 2021-11-15
Payer: MEDICARE

## 2021-11-16 ENCOUNTER — OFFICE VISIT (OUTPATIENT)
Dept: INTERNAL MEDICINE | Facility: CLINIC | Age: 71
End: 2021-11-16
Payer: MEDICARE

## 2021-11-16 VITALS
HEIGHT: 72 IN | HEART RATE: 64 BPM | BODY MASS INDEX: 32.37 KG/M2 | WEIGHT: 239 LBS | SYSTOLIC BLOOD PRESSURE: 124 MMHG | OXYGEN SATURATION: 99 % | DIASTOLIC BLOOD PRESSURE: 66 MMHG

## 2021-11-16 DIAGNOSIS — H91.90 HEARING LOSS, UNSPECIFIED HEARING LOSS TYPE, UNSPECIFIED LATERALITY: ICD-10-CM

## 2021-11-16 DIAGNOSIS — M25.512 CHRONIC LEFT SHOULDER PAIN: ICD-10-CM

## 2021-11-16 DIAGNOSIS — Z51.81 ENCOUNTER FOR MONITORING ANTIPLATELET THERAPY: ICD-10-CM

## 2021-11-16 DIAGNOSIS — M54.16 LEFT LUMBAR RADICULOPATHY: ICD-10-CM

## 2021-11-16 DIAGNOSIS — G89.29 CHRONIC LEFT SHOULDER PAIN: ICD-10-CM

## 2021-11-16 DIAGNOSIS — J31.0 CHRONIC RHINITIS: ICD-10-CM

## 2021-11-16 DIAGNOSIS — M54.16 LUMBAR BACK PAIN WITH RADICULOPATHY AFFECTING LEFT LOWER EXTREMITY: ICD-10-CM

## 2021-11-16 DIAGNOSIS — Z79.02 ENCOUNTER FOR MONITORING ANTIPLATELET THERAPY: ICD-10-CM

## 2021-11-16 DIAGNOSIS — L50.9 URTICARIA: Primary | ICD-10-CM

## 2021-11-16 PROBLEM — I10 HTN (HYPERTENSION): Status: ACTIVE | Noted: 2021-11-16

## 2021-11-16 PROCEDURE — 99215 OFFICE O/P EST HI 40 MIN: CPT | Performed by: INTERNAL MEDICINE

## 2021-11-16 ASSESSMENT — MIFFLIN-ST. JEOR: SCORE: 1872.1

## 2021-11-16 NOTE — PATIENT INSTRUCTIONS
You can schedule an appointment with [facility] audiology.    Call [phone] to schedule an appointment.

## 2021-11-16 NOTE — TELEPHONE ENCOUNTER
Please schedule this patient for an appointment with me on:    ______________________________________________________________________     Tuesday, November 16th  Time of appointment:  4:15 pm    Reason for appointment:  Follow up hives and other issues.    ______________________________________________________________________     Patient already notified.  No need to notify the patient.    Thank you.    Onur Rod MD  General Internal Medicine  [facility]   11/15/2021 10:43 PM

## 2021-11-18 PROBLEM — Z95.818 PRESENCE OF LEFT ATRIAL APPENDAGE CLOSURE DEVICE COMPOSED OF NICKEL-TITANIUM ALLOY WITH POLYETHYLENE TEREPHTHALATE MEMBRANE: Chronic | Status: ACTIVE | Noted: 2021-07-14

## 2021-11-18 PROBLEM — I48.11 LONGSTANDING PERSISTENT ATRIAL FIBRILLATION (H): Status: RESOLVED | Noted: 2021-07-05 | Resolved: 2021-11-18

## 2021-11-18 PROBLEM — Z47.89 AFTERCARE FOLLOWING SURGERY OF THE MUSCULOSKELETAL SYSTEM: Status: RESOLVED | Noted: 2019-05-17 | Resolved: 2021-11-18

## 2021-11-18 PROBLEM — M54.12 RADICULOPATHY, CERVICAL: Status: RESOLVED | Noted: 2021-08-30 | Resolved: 2021-11-18

## 2021-11-18 PROBLEM — M19.012 OSTEOARTHRITIS OF LEFT GLENOHUMERAL JOINT: Status: RESOLVED | Noted: 2021-08-30 | Resolved: 2021-11-18

## 2021-11-18 NOTE — PROGRESS NOTES
[facility]    Comprehensive and complex medical care - Chronic disease management - Shared decision making - Care coordination - Compassionate care    Patient advocacy - Rational deprescribing - Minimally disruptive medicine - Ethical focus - Customized care         Date of Service: 11/16/2021  Primary Provider: Onur Rod    Patient Care Team:  Onur Rod MD as PCP - General  Onur Rod MD as Assigned PCP  Marylou Ibrahim MD as Assigned Heart and Vascular Provider          Patient's Pharmacy:    [facility] - [place], [place] - [address]  [address] [address]  Phone: [phone] Fax: [phone]    [facility] [address] [facility] [employer] - [place], [place] - [address]  [address] [facility] [address]  Phone: [phone] Fax: [phone]     Patient's Contacts:  Name Home Phone Work Phone Mobile Phone Relationship Lgl Grd   BLAYNEKYLER [phone] Spouse    MATILDE SILVER   [phone] Other      Patient's Insurance:    Payor: MEDICARE / Plan: MEDICARE / Product Type: Medicare /            Active Problem List:  Problem List as of 11/16/2021 Reviewed: 8/30/2021  4:24 PM by Gina Blount PA-C       Medium    HTN (hypertension)       Other    Nonsmoker    Full code status    CAD (coronary artery disease), minimal disease on angiogram in 2021    Essential hypertension    GERD (gastroesophageal reflux disease)    Basal cell carcinoma    Branch retinal vein occlusion of left eye    S/P colonoscopy    Complete tear of left rotator cuff    Morbid obesity (H)    Atrial flutter, chronic (H)    Dilated cardiomyopathy (H)    Longstanding persistent atrial fibrillation (H)    s/p left atrial appendage closure       Other    Chronic neck pain    Chronic systolic heart failure (H)    Aftercare following surgery of the musculoskeletal system    Radiculopathy, cervical    Osteoarthritis of left glenohumeral  joint    Concussion syndrome    Chronic left-sided low back pain with left-sided sciatica           Current Outpatient Medications   Medication Instructions     acetaminophen (TYLENOL) 650 mg, Oral, DAILY PRN     aspirin (ASA) 81 mg, Oral, DAILY     co-enzyme Q-10 200 mg, Oral, DAILY     fish oil-omega-3 fatty acids 2 g, DAILY     fluticasone propionate (FLONASE) 50 mcg/actuation nasal spray [FLUTICASONE PROPIONATE (FLONASE) 50 MCG/ACTUATION NASAL SPRAY] USE 2 SPRAYS IN EACH       NOSTRIL DAILY     gabapentin (NEURONTIN) 600 MG tablet Take between 0.5-1 tablet daily     lisinopril (ZESTRIL) 5 mg, Oral, DAILY     metoprolol succinate ER (TOPROL-XL) 25 mg, Oral, DAILY     MULTIVITAMIN ORAL [MULTIVITAMIN ORAL] Take by mouth.     oxymetazoline HCl (AFRIN, OXYMETAZOLINE, NASL) [OXYMETAZOLINE HCL (AFRIN, OXYMETAZOLINE, NASL)] into each nostril.     prasugrel (EFFIENT) 10 mg, Oral, DAILY     vits A,C,E/lutein/zeax/zn/marly (EYE HEALTH FORMULA ORAL) [VITS A,C,E/LUTEIN/ZEAX/ZN/MARLY (EYE HEALTH FORMULA ORAL)] Take by mouth.      Social History     Social History Narrative    Patient of Dr. Rod since 2001.    .  Wife is a former RN.     Wife's cousin is Dr. Jean Pierre Champagne, ID specialist.       Subjective:     Mike Gonzalez is a 71 year old male who comes in today for:    Chief Complaint   Patient presents with     RECHECK     fu on hives/ other issues      Patient comes in today for a number of medical issues.    We first reviewed his hives.  He was in earlier for this.  This was likely due to clopidogrel.  He is off of clopidogrel and on Effient at this time.  He continues on famotidine and Zyrtec at this time.  This was reviewed with him.  He has been on this combination for a number of days now.  He will likely start to taper this as able.  He was also on prednisone during this time but is no longer on this over the last 4 days.    We reviewed his heart issues.  He does have some mild LV dysfunction.  He is on  medications for this.  We reviewed this with him.  He has some issues with coordination of care and we reviewed this with him.  He does see a number of cardiac providers which can lead to some issues with mixed messaging.  We reviewed his planned stop for his Effient and this is January 14 for him from a 6-month perspective.    We reviewed his back pain and left leg pain.  He has seen Dr. Degroot related to this.  There is a plan for surgery.  He needs to be off the blood thinner first.  He it is more manageable than in the past but still limits him.  He is on gabapentin.  The prednisone did help somewhat with this as well.    We reviewed his chronic rhinitis.  He would like to use the Zyrtec for this as well.  We talked about this.  He does use the Afrin still at nighttime and we talked about this.  We reviewed other options related to this.    We reviewed his left chronic shoulder pain.  He had surgery on this in the past.  This was reviewed with him.  He did see Dr. Jackson and these notes were reviewed.    His watchman device is doing relatively well.    We reviewed his other issues noted in the assessment but not specifically addressed in the HPI above.     Objective:     Wt Readings from Last 3 Encounters:   11/16/21 108.4 kg (239 lb)   10/18/21 108.9 kg (240 lb)   08/30/21 109.3 kg (241 lb)     BP Readings from Last 3 Encounters:   11/16/21 124/66   11/08/21 126/80   10/18/21 (!) 149/87     /66 (BP Location: Right arm, Patient Position: Sitting, Cuff Size: Adult Regular)   Pulse 64   Ht 1.829 m (6')   Wt 108.4 kg (239 lb)   SpO2 99%   BMI 32.41 kg/m     The patient is comfortable, no acute distress.  Mood good.  Insight good.  Eyes are nonicteric.  Neck is supple without mass.  No cervical adenopathy.  No thyromegaly. Heart regular rate and rhythm.  Lungs clear to auscultation bilaterally.  Respiratory effort is good.  Abdomen soft and nontender.  No hepatosplenomegaly.  Extremities no  edema.      Diagnostics:     Office Visit on 11/08/2021   Component Date Value Ref Range Status     Sodium 11/08/2021 140  136 - 145 mmol/L Final     Potassium 11/08/2021 4.3  3.5 - 5.0 mmol/L Final     Chloride 11/08/2021 103  98 - 107 mmol/L Final     Carbon Dioxide (CO2) 11/08/2021 27  22 - 31 mmol/L Final     Anion Gap 11/08/2021 10  5 - 18 mmol/L Final     Urea Nitrogen 11/08/2021 13  8 - 28 mg/dL Final     Creatinine 11/08/2021 0.98  0.70 - 1.30 mg/dL Final     Calcium 11/08/2021 9.5  8.5 - 10.5 mg/dL Final     Glucose 11/08/2021 113  70 - 125 mg/dL Final     Alkaline Phosphatase 11/08/2021 80  45 - 120 U/L Final     AST 11/08/2021 17  0 - 40 U/L Final     ALT 11/08/2021 17  0 - 45 U/L Final     Protein Total 11/08/2021 7.1  6.0 - 8.0 g/dL Final     Albumin 11/08/2021 3.7  3.5 - 5.0 g/dL Final     Bilirubin Total 11/08/2021 0.6  0.0 - 1.0 mg/dL Final     GFR Estimate 11/08/2021 77  >60 mL/min/1.73m2 Final    As of July 11, 2021, eGFR is calculated by the CKD-EPI creatinine equation, without race adjustment. eGFR can be influenced by muscle mass, exercise, and diet. The reported eGFR is an estimation only and is only applicable if the renal function is stable.     Erythrocyte Sedimentation Rate 11/08/2021 12  0 - 15 mm/hr Final     CRP 11/08/2021 0.7  0.0-<0.8 mg/dL Final     WBC Count 11/08/2021 10.0  4.0 - 11.0 10e3/uL Final     RBC Count 11/08/2021 4.67  4.40 - 5.90 10e6/uL Final     Hemoglobin 11/08/2021 15.0  13.3 - 17.7 g/dL Final     Hematocrit 11/08/2021 43.6  40.0 - 53.0 % Final     MCV 11/08/2021 93  78 - 100 fL Final     MCH 11/08/2021 32.1  26.5 - 33.0 pg Final     MCHC 11/08/2021 34.4  31.5 - 36.5 g/dL Final     RDW 11/08/2021 12.7  10.0 - 15.0 % Final     Platelet Count 11/08/2021 265  150 - 450 10e3/uL Final     % Neutrophils 11/08/2021 74  % Final     % Lymphocytes 11/08/2021 16  % Final     % Monocytes 11/08/2021 9  % Final     % Eosinophils 11/08/2021 1  % Final     % Basophils 11/08/2021 0  %  Final     % Immature Granulocytes 11/08/2021 0  % Final     Absolute Neutrophils 11/08/2021 7.4  1.6 - 8.3 10e3/uL Final     Absolute Lymphocytes 11/08/2021 1.6  0.8 - 5.3 10e3/uL Final     Absolute Monocytes 11/08/2021 0.9  0.0 - 1.3 10e3/uL Final     Absolute Eosinophils 11/08/2021 0.1  0.0 - 0.7 10e3/uL Final     Absolute Basophils 11/08/2021 0.0  0.0 - 0.2 10e3/uL Final     Absolute Immature Granulocytes 11/08/2021 0.0  <=0.0 10e3/uL Final       No results found for any visits on 11/16/21.     Assessment:     1. Urticaria    2. Hearing loss, unspecified hearing loss type, unspecified laterality    3. Left lumbar radiculopathy    4. Lumbar back pain with radiculopathy affecting left lower extremity    5. Chronic rhinitis    6. Chronic left shoulder pain    7. Encounter for monitoring antiplatelet therapy        Plan:     1. Continue Effient and off of clopidogrel.  2. Gradually taper famotidine and can continue on Zyrtec for nose.  3. He has been having some hearing issues and a referral to audiology was placed.  4. He can proceed with further work-up and treatment for his back in January.  5. Offered second opinion related to the left shoulder.  6. Follow-up sooner if issues.  7. Continue current medications otherwise.  8. Follow up sooner if issues.    40 minutes or greater was spent today on the patient's care on the day of service.      This includes time for chart preparation, reviewing medical tests done before or during the visit, talking with the patient, review of quality indicators, required documentation, and other elements of care.        Onur Rod MD  General Internal Medicine  [facility]    Return in about 6 months (around 5/16/2022), or if symptoms worsen or fail to improve, for follow up visit.     No future appointments.

## 2021-11-20 ENCOUNTER — MYC MEDICAL ADVICE (OUTPATIENT)
Dept: INTERNAL MEDICINE | Facility: CLINIC | Age: 71
End: 2021-11-20
Payer: MEDICARE

## 2021-11-20 DIAGNOSIS — Z95.818 PRESENCE OF LEFT ATRIAL APPENDAGE CLOSURE DEVICE COMPOSED OF NICKEL-TITANIUM ALLOY WITH POLYETHYLENE TEREPHTHALATE MEMBRANE: ICD-10-CM

## 2021-11-21 RX ORDER — PRASUGREL 10 MG/1
10 TABLET, FILM COATED ORAL DAILY
Qty: 40 TABLET | Refills: 0 | Status: SHIPPED | OUTPATIENT
Start: 2021-11-21 | End: 2021-11-26 | Stop reason: SINTOL

## 2021-11-26 ENCOUNTER — TELEPHONE (OUTPATIENT)
Dept: CARDIOLOGY | Facility: CLINIC | Age: 71
End: 2021-11-26
Payer: MEDICARE

## 2021-11-26 NOTE — TELEPHONE ENCOUNTER
Notified patient. Stay off effient. Continue asa 81 indefinitely. Med list updated.      Dedrick Lewis RN, Structural Heart Coordinator  [facility]  Valve Clinic [phone]  Fax: [phone]  11/26/21  4:06 PM

## 2021-11-26 NOTE — TELEPHONE ENCOUNTER
He stayed on Eliquis for longer than protocol b/c of possible flow around device.  Repeat JEAN CLAUDE in Oct showed no flow > 3.5 and he was switched Plavix.  Since he has no failed 2 antiplatelet medications with full body rash, would just stay on ASA 81 mg daily at this time.  He would have been due to come off Plavix/effient on Jan 14.     Gina Blount PA-C, MPAS  Structural Heart Program  [facility]

## 2021-11-26 NOTE — TELEPHONE ENCOUNTER
S/P LAAO on 7/14/21    Pt switched from eliquis to plavix on 10/19/21 after JEAN CLAUDE result was reviewed by Dr Gutierres and Dr Saucedo in favor of discontinuing anticoagulation.   Pt switched from plavix to effient 11/10/21 due to rash    Pt is now calling to report that he is developed a rash starting Monday. He thinks it is a delayed allergic reaction to effient, which he also had a delayed reaction to plavix. The rash is red, raised and itchy. It is on his ankles, left leg and now it is on his back. His pharmacist advised him to stop the medication and take benadryl. His last dose of effient was yesterday morning. He remains on asa 81 mg.     Gina what is your recommendation?      Dedrick Lewis RN, Structural Heart Coordinator  [facility]  Valve Clinic [phone]  Fax: [phone]  11/26/21  3:06 PM

## 2021-12-27 ENCOUNTER — TELEPHONE (OUTPATIENT)
Dept: CARDIOLOGY | Facility: CLINIC | Age: 71
End: 2021-12-27
Payer: MEDICARE

## 2021-12-27 DIAGNOSIS — R06.02 SOB (SHORTNESS OF BREATH): ICD-10-CM

## 2021-12-27 DIAGNOSIS — I10 ESSENTIAL HYPERTENSION: ICD-10-CM

## 2021-12-27 DIAGNOSIS — I48.92 ATRIAL FLUTTER, CHRONIC (H): ICD-10-CM

## 2021-12-27 DIAGNOSIS — R93.1 DECREASED CARDIAC EJECTION FRACTION: ICD-10-CM

## 2021-12-27 RX ORDER — METOPROLOL SUCCINATE 25 MG/1
12.5 TABLET, EXTENDED RELEASE ORAL DAILY
Qty: 90 TABLET | Refills: 3 | Status: SHIPPED | OUTPATIENT
Start: 2021-12-27 | End: 2022-07-18

## 2021-12-27 NOTE — TELEPHONE ENCOUNTER
Mike was contacted with the recommendations as per Dr. Tyler to reduce Metoprolol Succinate dose to 12.5mg daily beginning tomorrow.   [facility] RN to connect with patient later this week to see if this change is effective in reducing his symptoms of lightheadedness.    Lolis Pineda RN BSN, CHFN

## 2021-12-27 NOTE — TELEPHONE ENCOUNTER
Kiran Tyler MD Ke[place], Shelly A RN  Caller: Unspecified (Today, 10:40 AM)  Can we try cutting the metoprolol in half to 12.5 mg daily to see if that makes a difference in how he feels?

## 2021-12-27 NOTE — TELEPHONE ENCOUNTER
Mr. Gonzalez was contacted to discuss his current concerns with low HR. He has been experiencing some lightheadedness, over the past week. He was noted to have a low HR of 40 yesterday. He is checking his HR via pulse oximetry in addition to his nurse wife also checking his pulse. It has been noted on multiple occasions to be in the low to mid 40's which responds up to 60 with heavy activities such as shoveling snow this AM.    His current regimen includes Metoprolol Succinate 25mg daily which has been on since January of this year without dosage changes.  No other symptoms noted.   Weight stable, no signs of fluid retention noted. Denies orthopnea or increased dyspnea on exertion.    Dr. Tyler, in Dr. Ibrahim's absence, do you have any recommendations for dose changes in the Metoprolol Succinate? He doses in the AM and has taken it for today.      Thank you,  Lolis Pineda RN BSN, CHFN

## 2021-12-27 NOTE — TELEPHONE ENCOUNTER
----- Message from Unique Rodriguez sent at 12/27/2021 10:07 AM CST -----  Regarding: [facility] PT  General phone call:    Caller: FREDDY  Primary cardiologist:ZITA   Detailed reason for call: YESTERDAY HIS PULSE WENT TO 48 AND TODAY IT IS 40.  NO SYMPTOMS EXCEPT HE FEELS IN A DAZE.    Best phone number: [phone]  Best time to contact: ANY  Ok to leave a detailedmessage? YES  Device? NO    Additional Info:

## 2022-01-02 ENCOUNTER — HEALTH MAINTENANCE LETTER (OUTPATIENT)
Age: 72
End: 2022-01-02

## 2022-01-03 ENCOUNTER — MYC MEDICAL ADVICE (OUTPATIENT)
Dept: INTERNAL MEDICINE | Facility: CLINIC | Age: 72
End: 2022-01-03
Payer: MEDICARE

## 2022-01-03 DIAGNOSIS — I25.5 ISCHEMIC CARDIOMYOPATHY: ICD-10-CM

## 2022-01-03 RX ORDER — LISINOPRIL 5 MG/1
5 TABLET ORAL DAILY
Qty: 90 TABLET | Refills: 3 | Status: SHIPPED | OUTPATIENT
Start: 2022-01-03 | End: 2023-03-16

## 2022-01-03 NOTE — TELEPHONE ENCOUNTER
Last appointment 11/16/21  Next appointment 1/10/22  Plan:      1. Continue Effient and off of clopidogrel.  2. Gradually taper famotidine and can continue on Zyrtec for nose.  3. He has been having some hearing issues and a referral to audiology was placed.  4. He can proceed with further work-up and treatment for his back in January.  5. Offered second opinion related to the left shoulder.  6. Follow-up sooner if issues.  7. Continue current medications otherwise.  8. Follow up sooner if issues.     40 minutes or greater was spent today on the patient's care on the day of service.       This includes time for chart preparation, reviewing medical tests done before or during the visit, talking with the patient, review of quality indicators, required documentation, and other elements of care.         Onur Rod MD  General Internal Medicine  [facility]     Return in about 6 months (around 5/16/2022), or if symptoms worsen or fail to improve, for follow up visit.

## 2022-01-11 ENCOUNTER — MYC MEDICAL ADVICE (OUTPATIENT)
Dept: FAMILY MEDICINE | Facility: CLINIC | Age: 72
End: 2022-01-11

## 2022-01-11 ENCOUNTER — TELEPHONE (OUTPATIENT)
Dept: FAMILY MEDICINE | Facility: CLINIC | Age: 72
End: 2022-01-11

## 2022-01-11 ENCOUNTER — LAB (OUTPATIENT)
Dept: FAMILY MEDICINE | Facility: CLINIC | Age: 72
End: 2022-01-11
Attending: SPECIALIST

## 2022-01-11 ENCOUNTER — OFFICE VISIT (OUTPATIENT)
Dept: FAMILY MEDICINE | Facility: CLINIC | Age: 72
End: 2022-01-11
Payer: MEDICARE

## 2022-01-11 VITALS
DIASTOLIC BLOOD PRESSURE: 74 MMHG | TEMPERATURE: 98 F | OXYGEN SATURATION: 96 % | BODY MASS INDEX: 33.05 KG/M2 | WEIGHT: 244 LBS | SYSTOLIC BLOOD PRESSURE: 126 MMHG | HEART RATE: 73 BPM | HEIGHT: 72 IN

## 2022-01-11 DIAGNOSIS — I25.10 CORONARY ARTERY DISEASE INVOLVING NATIVE CORONARY ARTERY OF NATIVE HEART WITHOUT ANGINA PECTORIS: Chronic | ICD-10-CM

## 2022-01-11 DIAGNOSIS — M54.42 CHRONIC LEFT-SIDED LOW BACK PAIN WITH LEFT-SIDED SCIATICA: ICD-10-CM

## 2022-01-11 DIAGNOSIS — Z95.818 PRESENCE OF LEFT ATRIAL APPENDAGE CLOSURE DEVICE COMPOSED OF NICKEL-TITANIUM ALLOY WITH POLYETHYLENE TEREPHTHALATE MEMBRANE: Chronic | ICD-10-CM

## 2022-01-11 DIAGNOSIS — I42.0 DILATED CARDIOMYOPATHY (H): ICD-10-CM

## 2022-01-11 DIAGNOSIS — R79.1 ABNORMAL COAGULATION PROFILE: ICD-10-CM

## 2022-01-11 DIAGNOSIS — Z20.822 ENCOUNTER FOR LABORATORY TESTING FOR COVID-19 VIRUS: ICD-10-CM

## 2022-01-11 DIAGNOSIS — E66.01 MORBID OBESITY (H): ICD-10-CM

## 2022-01-11 DIAGNOSIS — Z20.822 ENCOUNTER FOR LABORATORY TESTING FOR COVID-19 VIRUS: Primary | ICD-10-CM

## 2022-01-11 DIAGNOSIS — G89.29 CHRONIC LEFT-SIDED LOW BACK PAIN WITH LEFT-SIDED SCIATICA: ICD-10-CM

## 2022-01-11 DIAGNOSIS — I50.22 CHRONIC SYSTOLIC HEART FAILURE (H): ICD-10-CM

## 2022-01-11 DIAGNOSIS — I10 PRIMARY HYPERTENSION: Chronic | ICD-10-CM

## 2022-01-11 DIAGNOSIS — Z01.818 PREOP GENERAL PHYSICAL EXAM: Primary | ICD-10-CM

## 2022-01-11 LAB
ANION GAP SERPL CALCULATED.3IONS-SCNC: 12 MMOL/L (ref 5–18)
ATRIAL RATE - MUSE: 64 BPM
BUN SERPL-MCNC: 14 MG/DL (ref 8–28)
CALCIUM SERPL-MCNC: 10 MG/DL (ref 8.5–10.5)
CHLORIDE BLD-SCNC: 101 MMOL/L (ref 98–107)
CO2 SERPL-SCNC: 27 MMOL/L (ref 22–31)
CREAT SERPL-MCNC: 0.9 MG/DL (ref 0.7–1.3)
DIASTOLIC BLOOD PRESSURE - MUSE: NORMAL MMHG
ERYTHROCYTE [DISTWIDTH] IN BLOOD BY AUTOMATED COUNT: 13 % (ref 10–15)
GFR SERPL CREATININE-BSD FRML MDRD: >90 ML/MIN/1.73M2
GLUCOSE BLD-MCNC: 88 MG/DL (ref 70–125)
HCT VFR BLD AUTO: 45.1 % (ref 40–53)
HGB BLD-MCNC: 15.2 G/DL (ref 13.3–17.7)
INR PPP: 1.01 (ref 0.85–1.15)
INTERPRETATION ECG - MUSE: NORMAL
MCH RBC QN AUTO: 32.1 PG (ref 26.5–33)
MCHC RBC AUTO-ENTMCNC: 33.7 G/DL (ref 31.5–36.5)
MCV RBC AUTO: 95 FL (ref 78–100)
P AXIS - MUSE: NORMAL DEGREES
PLATELET # BLD AUTO: 282 10E3/UL (ref 150–450)
POTASSIUM BLD-SCNC: 4.3 MMOL/L (ref 3.5–5)
PR INTERVAL - MUSE: NORMAL MS
QRS DURATION - MUSE: 116 MS
QT - MUSE: 430 MS
QTC - MUSE: 432 MS
R AXIS - MUSE: -77 DEGREES
RBC # BLD AUTO: 4.74 10E6/UL (ref 4.4–5.9)
SARS-COV-2 RNA RESP QL NAA+PROBE: NEGATIVE
SODIUM SERPL-SCNC: 140 MMOL/L (ref 136–145)
SYSTOLIC BLOOD PRESSURE - MUSE: NORMAL MMHG
T AXIS - MUSE: 60 DEGREES
VENTRICULAR RATE- MUSE: 61 BPM
WBC # BLD AUTO: 8.5 10E3/UL (ref 4–11)

## 2022-01-11 PROCEDURE — 80048 BASIC METABOLIC PNL TOTAL CA: CPT | Performed by: STUDENT IN AN ORGANIZED HEALTH CARE EDUCATION/TRAINING PROGRAM

## 2022-01-11 PROCEDURE — 85027 COMPLETE CBC AUTOMATED: CPT | Performed by: STUDENT IN AN ORGANIZED HEALTH CARE EDUCATION/TRAINING PROGRAM

## 2022-01-11 PROCEDURE — U0005 INFEC AGEN DETEC AMPLI PROBE: HCPCS

## 2022-01-11 PROCEDURE — 99215 OFFICE O/P EST HI 40 MIN: CPT | Performed by: STUDENT IN AN ORGANIZED HEALTH CARE EDUCATION/TRAINING PROGRAM

## 2022-01-11 PROCEDURE — 93005 ELECTROCARDIOGRAM TRACING: CPT | Performed by: STUDENT IN AN ORGANIZED HEALTH CARE EDUCATION/TRAINING PROGRAM

## 2022-01-11 PROCEDURE — U0003 INFECTIOUS AGENT DETECTION BY NUCLEIC ACID (DNA OR RNA); SEVERE ACUTE RESPIRATORY SYNDROME CORONAVIRUS 2 (SARS-COV-2) (CORONAVIRUS DISEASE [COVID-19]), AMPLIFIED PROBE TECHNIQUE, MAKING USE OF HIGH THROUGHPUT TECHNOLOGIES AS DESCRIBED BY CMS-2020-01-R: HCPCS

## 2022-01-11 PROCEDURE — 85610 PROTHROMBIN TIME: CPT | Performed by: STUDENT IN AN ORGANIZED HEALTH CARE EDUCATION/TRAINING PROGRAM

## 2022-01-11 PROCEDURE — 99417 PROLNG OP E/M EACH 15 MIN: CPT | Performed by: STUDENT IN AN ORGANIZED HEALTH CARE EDUCATION/TRAINING PROGRAM

## 2022-01-11 PROCEDURE — 36415 COLL VENOUS BLD VENIPUNCTURE: CPT | Performed by: STUDENT IN AN ORGANIZED HEALTH CARE EDUCATION/TRAINING PROGRAM

## 2022-01-11 PROCEDURE — 93010 ELECTROCARDIOGRAM REPORT: CPT | Mod: OFF | Performed by: INTERNAL MEDICINE

## 2022-01-11 ASSESSMENT — MIFFLIN-ST. JEOR: SCORE: 1894.78

## 2022-01-11 NOTE — PROGRESS NOTES
[address] [address]  Phone: [phone]  Fax: [phone]  Primary Provider: Onur Rod  Pre-op Performing Provider: MYRIAM DASILVA    PREOPERATIVE EVALUATION:  Today's date: 1/11/2022    Mike Gonzalez is a 71 year old male who presents for a preoperative evaluation.    Surgical Information:  Surgery/Procedure: back surgery - opening the lower disc and back for nerve pressure  Surgery Location: [facility]  Surgeon: Dr. Degroot  Surgery Date: 01/13/2022  Time of Surgery: 10 AM  Where patient plans to recover: At home with family  Fax number for surgical facility: **    Type of Anesthesia Anticipated: General    Assessment & Plan     The proposed surgical procedure is considered INTERMEDIATE risk.      ICD-10-CM    1. Preop general physical exam  Z01.818 Rapid Access Clinic Referral     CBC with platelets     Basic metabolic panel  (Ca, Cl, CO2, Creat, Gluc, K, Na, BUN)     EKG 12-lead, tracing only     INR   2. Chronic left-sided low back pain with left-sided sciatica  M54.42     G89.29    3. Abnormal coagulation profile   R79.1 INR     INR   4. Obesity (BMI 35.0-39.9) with comorbidity (H)  E66.01    5. S/P left atrial appendage closure - WATCHMAN  Z95.818    6. Primary hypertension  I10    7. Dilated cardiomyopathy (H)  I42.0    8. Coronary artery disease involving native coronary artery of native heart without angina pectoris  I25.10    9. Chronic systolic heart failure (H)  I50.22        Overall, patient is relatively stable from chronic disease standpoint.  Has extensive cardiac history as discussed below.  Is normotensive, euvolemic and does not report any dyspnea on exertion. EKG relatively unchanged.  However, given his extensive cardiac history and recent watchman procedure, would like patient to be seen by cardiology for cardiac clearance prior to the procedure.  Rapid access referral placed-has an appointment  tomorrow with a cardiologist.  He is currently off all anticoagulation since December 14.  PCP in the past noticed that he was supposed to be on anticoagulation until January 14?  We will get some baseline blood work on him today including CBC, BMP and INR. CovID test done earlier today    As far as medication management-at this time, I would like him to stop his aspirin, all supplements.  Would hold his gabapentin day of surgery.    We don't have the fax number for the surgical facility at this time, will have nursing call to obtain it.     Risks and Recommendations:  The patient has the following additional risks and recommendations for perioperative complications:   - No identified additional risk factors other than previously addressed    Medication Instructions:  as discussed above.     RECOMMENDATION:  APPROVAL GIVEN to proceed with proposed procedure pending review of diagnostic evaluation. Patient referred to cardiology.  for evaluation before surgery. Surgery approval pending completion of consultation.    80 minutes spent on the date of the encounter doing chart review, history and exam, documentation and further activities per the note    Subjective     HPI related to upcoming procedure:     Patient is a 71-year-old male with past medical history of atrial fibrillation s/p watchman procedure, HFrEF ( JEAN CLAUDE 10/21 showed EF 40%), CAD, ischemic cardiomyopathy and dilated cardiomyopathy ( severe bilateral atrial enlargement), LVH,  GERD, essential hypertension and chronic left-sided low back pain with left-sided sciatica who presents today for a preop.    Patient has longstanding history of low back pain.  Has chronic left-sided sciatica.  Was going to get this surgery done about a year ago but then had several heart issues,, mainly heart failure and A. fib/a flutter.  Because of that, his surgery was delayed and he got watchman procedure done.  They are waiting for him to be finished with blood thinners s/p  watchman's before getting his back surgery done.  Has been off of all blood thinners since December 14.    Has not had any falls, urinary incontinence, fecal incontinence or saddle anesthesias related to his lower extremity pain.  Patient denies any chest pain [place] or orthopnea.  Is any lower extremity edema.  Exercise tolerance is decreased 2/2 pain in his lower back and not necessarily because he is unable to walk as fast.  No known history of MI. Gabapentin is mainstay of pain control.     Has undergone several orthopedic surgeries in the past.  Has not had any issues with anesthesia, prolonged bleeding, DVT, PE, postop delirium or respiratory complications.      Preop Questions 1/11/2022   1. Have you ever had a heart attack or stroke? No   2. Have you ever had surgery on your heart or blood vessels, such as a stent placement, a coronary artery bypass, or surgery on an artery in your head, neck, heart, or legs? YES - Watchman procedure    3. Do you have chest pain with activity? No   4. Do you have a history of  heart failure? YES    5. Do you currently have a cold, bronchitis or symptoms of other infection? No   6. Do you have a cough, shortness of breath, or wheezing? No   7. Do you or anyone in your family have previous history of blood clots? No   8. Do you or does anyone in your family have a serious bleeding problem such as prolonged bleeding following surgeries or cuts? No   9. Have you ever had problems with anemia or been told to take iron pills? No   10. Have you had any abnormal blood loss such as black, tarry or bloody stools? No   11. Have you ever had a blood transfusion? No   12. Are you willing to have a blood transfusion if it is medically needed before, during, or after your surgery? Yes   13. Have you or any of your relatives ever had problems with anesthesia? No   14. Do you have sleep apnea, excessive snoring or daytime drowsiness? No   15. Do you have any artifical heart valves or other  implanted medical devices like a pacemaker, defibrillator, or continuous glucose monitor? No   16. Do you have artificial joints? No   17. Are you allergic to latex? No       Health Care Directive:  Patient does not have a Health Care Directive or Living Will: Discussed advance care planning with patient; information given to patient to review.  Full code   JANKI Gonzalez   [phone]    Preoperative Review of :   reviewed - no record of controlled substances prescribed.    Review of Systems  CONSTITUTIONAL: NEGATIVE for fever, chills, change in weight  INTEGUMENTARY/SKIN: NEGATIVE for worrisome rashes, moles or lesions  EYES: NEGATIVE for vision changes or irritation  ENT/MOUTH: NEGATIVE for ear, mouth and throat problems  RESP: NEGATIVE for significant cough or SOB  CV: NEGATIVE for chest pain, palpitations or peripheral edema  GI: NEGATIVE for nausea, abdominal pain, heartburn, or change in bowel habits  : NEGATIVE for frequency, dysuria, or hematuria  MUSCULOSKELETAL: NEGATIVE for significant arthralgias or myalgia  NEURO: NEGATIVE for weakness, dizziness or paresthesias  ENDOCRINE: NEGATIVE for temperature intolerance, skin/hair changes  HEME: NEGATIVE for bleeding problems  PSYCHIATRIC: NEGATIVE for changes in mood or affect    Patient Active Problem List    Diagnosis Date Noted     HTN (hypertension) 11/16/2021     Priority: Medium     Chronic left-sided low back pain with left-sided sciatica 08/30/2021     Priority: Medium     S/P left atrial appendage closure - WATCHMAN 07/14/2021     Priority: Medium     July 14, 2021 - 31 mm Watchman FLX device       Chronic systolic heart failure (H) 07/12/2021     Priority: Medium     CAD (coronary artery disease), minimal disease on angiogram in 2021      Priority: Medium     ANGIOGRAM 2021  Left Main   The vessel was visualized by angiography, is moderate in size and is   angiographically normal.   Left Anterior Descending   Mid LAD lesion is 25%  stenosed.   Ramus Intermedius   Ramus lesion is 10% stenosed.   Left Circumflex   Dist Cx lesion is 30% stenosed.   Right Coronary Artery   The vessel was visualized by angiography, is moderate in size and is   angiographically normal.          Dilated cardiomyopathy (H) 02/18/2021     Priority: Medium     Atrial flutter, chronic (H) 04/17/2019     Priority: Medium     Normal colonoscopy - 2016 - Average risk 04/15/2019     Priority: Medium     Obesity (BMI 35.0-39.9) with comorbidity (H) 04/15/2019     Priority: Medium     Complete tear of left rotator cuff 02/12/2019     Priority: Medium     Concussion syndrome 02/12/2019     Priority: Medium     Branch retinal vein occlusion of left eye 07/01/2017     Priority: Medium     Full code status 05/22/2017     Priority: Medium     Chronic neck pain 03/18/2016     Priority: Medium     Nonsmoker      Priority: Medium     GERD (gastroesophageal reflux disease) 04/07/2015     Priority: Medium      Past Medical History:   Diagnosis Date     Angioedema      Atrial fibrillation (H) 2019     Atrial fibrillation and flutter (H)      Atrial flutter (H) 2019     Basal cell carcinoma      Branch retinal vein occlusion of left eye 7/1/2017     CAD (coronary artery disease)     ANGIOGRAM 2021 Left Main The vessel was visualized by angiography, is moderate in size and is angiographically normal. Left Anterior Descending Mid LAD lesion is 25% stenosed. Ramus Intermedius Ramus lesion is 10% stenosed. Left Circumflex Dist Cx lesion is 30% stenosed. Right Coronary Artery The vessel was visualized by angiography, is moderate in size and is angiographically normal.       Chronic neck pain      Congestive heart failure (H) 2021     GERD (gastroesophageal reflux disease)      HTN (hypertension) 2015     Hx of atrial flutter      Nonsmoker      Radiculopathy     cervical     Retinal hemorrhage     Vessel rupture in left retina     Rhinitis, allergic      S/P colonoscopy 4/15/2019     Urticaria       Past Surgical History:   Procedure Laterality Date     BASAL CELL CARCINOMA EXCISION  01/2020    Left nasal reconstruction     CERVICAL SPINE SURGERY      C8, T1, foraminotomy     CV CORONARY ANGIOGRAM N/A 1/20/2021    Procedure: Coronary Angiogram;  Surgeon: Willow Wellington MD;  Location: [facility] Cardiac Cath Lab;  Service: Cardiology     CV LEFT ATRIAL APPENDAGE CLOSURE N/A 7/14/2021    Procedure: CV LEFT ATRIAL APPENDAGE CLOSURE;  Surgeon: Noah Gutierres MD;  Location: [facility] CARDIAC CATH LAB     CV LEFT HEART CATHETERIZATION WITHOUT LEFT VENTRICULOGRAM Left 1/20/2021    Procedure: Left Heart Catheterization Without Left Ventriculogram;  Surgeon: Willow Wellington MD;  Location: [facility] Cardiac Cath Lab;  Service: Cardiology     HERNIA REPAIR, UMBILICAL       ×2     REVERSE TOTAL SHOULDER ARTHROPLASTY Left 05/2019     Current Outpatient Medications   Medication Sig Dispense Refill     acetaminophen (TYLENOL) 650 MG CR tablet Take 650 mg by mouth daily as needed       aspirin (ASA) 81 MG EC tablet Take 1 tablet (81 mg) by mouth daily       co-enzyme Q-10 100 MG CAPS capsule Take 200 mg by mouth daily       fish oil-omega-3 fatty acids 300-1,000 mg capsule [FISH OIL-OMEGA-3 FATTY ACIDS 300-1,000 MG CAPSULE] Take 2 g by mouth daily.       fluticasone propionate (FLONASE) 50 mcg/actuation nasal spray [FLUTICASONE PROPIONATE (FLONASE) 50 MCG/ACTUATION NASAL SPRAY] USE 2 SPRAYS IN EACH       NOSTRIL DAILY 48 g 2     gabapentin (NEURONTIN) 600 MG tablet Take between 0.5-1 tablet daily       lisinopril (ZESTRIL) 5 MG tablet Take 1 tablet (5 mg) by mouth daily 90 tablet 3     metoprolol succinate ER (TOPROL-XL) 25 MG 24 hr tablet Take 0.5 tablets (12.5 mg) by mouth daily 90 tablet 3     MULTIVITAMIN ORAL [MULTIVITAMIN ORAL] Take by mouth.       oxymetazoline HCl (AFRIN, OXYMETAZOLINE, NASL) [OXYMETAZOLINE HCL (AFRIN, OXYMETAZOLINE, NASL)] into each nostril.       vits A,C,E/lutein/zeax/zn/paris (EYE HEALTH  FORMULA ORAL) [VITS A,C,E/LUTEIN/ZEAX/ZN/MARLY (EYE HEALTH FORMULA ORAL)] Take by mouth.         Allergies   Allergen Reactions     Effient [Prasugrel Hcl] Hives     Hydrochlorothiazide Unknown     Hydrocodone Swelling     Sinus     Oxycontin [Oxycodone] Swelling     lip     Plavix [Clopidogrel] Hives     Sulfa (Sulfonamide Antibiotics) [Sulfa Drugs] Swelling     Perfume Swelling     Other reaction(s): Runny Nose        Social History     Tobacco Use     Smoking status: Never Smoker     Smokeless tobacco: Never Used   Substance Use Topics     Alcohol use: Never     Comment: Alcoholic Drinks/day: About once a year.     Family History   Problem Relation Age of Onset     Arthritis Mother      Other - See Comments Mother         psychiatry/pvd     Other - See Comments Father         blood reaction     History   Drug Use No         Objective     /74 (BP Location: Right arm, Patient Position: Sitting, Cuff Size: Adult Large)   Pulse 73   Temp 98  F (36.7  C) (Oral)   Ht 1.829 m (6')   Wt 110.7 kg (244 lb)   SpO2 96%   BMI 33.09 kg/m      Physical Exam    GENERAL APPEARANCE: healthy, alert and no distress     EYES: EOMI,  PERRL     HENT: ear canals and TM's normal and nose and mouth without ulcers or lesions     NECK: no adenopathy, no asymmetry, masses, or scars and thyroid normal to palpation     RESP: lungs clear to auscultation - no rales, rhonchi or wheezes     CV: irregularly irregular heart rhythm, normal S1 S2, no S3 or S4 and no murmur, click or rub     ABDOMEN:  soft, nontender, no HSM or masses and bowel sounds normal     MS: slightly limping, (+) tenderness with palpiation of the low back muscular, LE muscle strength 5/5 bilaterally.  extremities normal- no gross deformities noted, no evidence of inflammation in joints, FROM in all extremities.     SKIN: no suspicious lesions or rashes     NEURO: Normal strength and tone, sensory exam grossly normal, mentation intact and speech normal     PSYCH:  mentation appears normal. and affect normal/bright     LYMPHATICS: No cervical adenopathy    Recent Labs   Lab Test 01/11/22  1324 11/08/21  0948 07/14/21  1228 07/12/21  1133   HGB 15.2 15.0 14.4 14.9    265  --  270   INR 1.01  --   --   --    NA  --  140  --  138   POTASSIUM  --  4.3  --  4.3   CR  --  0.98 1.00 0.84        Diagnostics:  Labs pending at this time.  Results will be reviewed when available.   EKG: A. Fib with left anterior fascicular block. No new acute ischemic changes and largely unchanged from prior EKG.     Revised Cardiac Risk Index (RCRI):  The patient has the following serious cardiovascular risks for perioperative complications:3   - Coronary Artery Disease (MI, positive stress test, angina, Qs on EKG) = 1 point   - Congestive Heart Failure (pulmonary edema, PND, s3 jose juan, CXR with pulmonary congestion, basilar rales) = 1 point     RCRI Interpretation: 2 points: Class III (moderate risk - 6.6% complication rate)       Signed Electronically by: Karyn Maurer DO  Copy of this evaluation report is provided to requesting physician.

## 2022-01-12 ENCOUNTER — OFFICE VISIT (OUTPATIENT)
Dept: CARDIOLOGY | Facility: CLINIC | Age: 72
End: 2022-01-12
Payer: MEDICARE

## 2022-01-12 VITALS
RESPIRATION RATE: 16 BRPM | WEIGHT: 243 LBS | SYSTOLIC BLOOD PRESSURE: 108 MMHG | DIASTOLIC BLOOD PRESSURE: 58 MMHG | HEIGHT: 72 IN | HEART RATE: 62 BPM | BODY MASS INDEX: 32.91 KG/M2

## 2022-01-12 DIAGNOSIS — I48.19 PERSISTENT ATRIAL FIBRILLATION (H): Primary | ICD-10-CM

## 2022-01-12 DIAGNOSIS — I10 BENIGN ESSENTIAL HYPERTENSION: ICD-10-CM

## 2022-01-12 DIAGNOSIS — Z01.810 PREOP CARDIOVASCULAR EXAM: ICD-10-CM

## 2022-01-12 DIAGNOSIS — I25.10 NONOBSTRUCTIVE ATHEROSCLEROSIS OF CORONARY ARTERY: ICD-10-CM

## 2022-01-12 DIAGNOSIS — I50.22 CHRONIC SYSTOLIC HEART FAILURE (H): ICD-10-CM

## 2022-01-12 PROCEDURE — 99214 OFFICE O/P EST MOD 30 MIN: CPT | Performed by: GENERAL ACUTE CARE HOSPITAL

## 2022-01-12 ASSESSMENT — MIFFLIN-ST. JEOR: SCORE: 1890.24

## 2022-01-12 NOTE — PROGRESS NOTES
HEART CARE ENCOUNTER NOTE          Assessment/Recommendations   Assessment:    Preoperative cardiovascular examination prior to elective spinal surgery scheduled for 1/13/2022. He has no cardiac symptoms and is well-compensated from a cardiac standpoint. He is at a low risk of perioperative major adverse cardiac events.  Persistent atrial fibrillation status post 31 mm Watchman FLX left atrial appendage closure device placement on 7/14/2021. Stable small peridevice leak measuring less than 5 mm.  Chronic congestive heart failure with reduced left ventricular ejection fraction of 42% due to nonischemic cardiomyopathy. NYHA class I, appears euvolemic.  Mild nonobstructive coronary artery disease.  Benign essential hypertension.   Hyperlipidemia. Last .    Plan:  Okay to proceed with his planned surgery from a cardiac standpoint.  Continue aspirin 81 mg daily perioperatively if able, but okay to temporarily hold aspirin if needed.  Can continue metoprolol succinate at reduced dose of 12.5 mg daily.  Continue his other cardiac medications.  Follow-up with his primary cardiologist, Dr. Ibrahim, in 6 months.         History of Present Illness   Mr. Mike Gonzalez is a 71 year old male with a significant past history of persistent AF s/p 31 mm Watchman FLX device placement on 7/14/2021, nonobstructive CAD, and HFrEF LVEF 42% presenting for urgent preoperative cardiovascular surgery prior to elective spinal surgery scheduled for tomorrow morning.    He did have a small leak around his Watchman device and was on prolonged anticoagulation, but repeat JEAN CLAUDE was stable and he is now only taking aspirin 81 mg daily. He is mostly limited by pain in his back and hips, but can walk and go up the stairs without getting chest pain or shortness of breath. He did note some dizziness and low pulse in the 30s at home recently and had his metoprolol dose cut to 12.5 mg daily. He feels better.    He denies any pre-syncope, syncope,  lower extremity swelling, palpitations, paroxysmal nocturnal dyspnea (PND), or orthopnea.     Cardiac Problems and Cardiac Diagnostics     Most Recent Cardiac testing:  ECG dated 1/11/2022 (personaly reviewed and interpreted): atrial fibrillation, ventricular rate 61 bpm, possible anterior infarct, left anterior fascicular block    Holter monitor 3/4/2021 (report reviewed):  HOLTER MONITOR     TEST INDICATION:  Evaluate for atrial fibrillation.     IMPRESSION:  1.  A 24-hour Holter monitor was applied 03/04/2021 with date of interpretation  03/08/2021.  2.  Baseline tracing showed atrial fibrillation, which is present throughout the  recording.  There is an IVCD, incomplete left bundle branch block.  3.  Average heart rate of 65 beats per minute and ranges between 39 and 122 beats  per minute.  4.  Modest nocturnal bradycardia is seen.  There is no major bradycardia during  waking hours.  5.  Ventricular ectopy is infrequent with 1362 PVCs which is 1.4% of total  heartbeats.  No salvos, no nonsustained ventricular tachycardia.  6.  Patient activity diary was reviewed, but no symptoms noted.     DISCUSSION:  Persistent atrial fibrillation with controlled ventricular response.    ECHO 10/18/2021 (report reviewed):  1. The left ventricle is mildly enlarged. Left ventricular systolic  performance is moderately reduced. The ejection fraction is estimated to be  40%.  2. There is moderate global reduction in left ventricular systolic  performance.  3. There is mild concentric increase in left ventricular wall thickness.  4. No significant valvular heart disease is identified on this study.  5. Borderline right ventricular enlargement with normal right ventricular  systolic performance.  6. There is severe left atrial enlargement. There is moderate right atrial  enlargement.  7. There is a left atrial appendage occlusion device.  Â  No thrombus is detected upon the surface of the device.  Â  There is a localized region of  flow around the device between the device  itself and the left lateral ridge. The gap measures 0.35-0.5 cm and appears  similar to the findings on the 2 September 2021 transesophageal  echocardiogram.  8. There is mild spontaneous echo contrast in the left atrium though no  thrombus detected.  9. Color Doppler interrogation of the atrial septum reveals the presence of a  patent foramen ovale (PFO). No right to left shunting, however, was elicited  with echo contrast injection either with spontaneous respiration or Valsalva.    Stress test 1/5/2021 (report reviewed):     The nuclear stress test is abnormal. There is no ischemia but there is a medium sized area of a moderate degree of nontransmural infarction in the distal anterior segment(s) of the left ventricle. This appears to be in the left anterior descending   artery distribution.     The stress electrocardiogram is negative for inducible ischemic EKG changes. Occasional PVCs were noted during stress and recovery.     The left ventricle is dilated and the left ventricular ejection fraction at stress is mildly decreased at 44%.     Low-to-intermediate risk of future ischemic events.     There is no prior study for comparison.    Cardiac cath 1/20/2021 (report reviewed):     Mild coronary atherosclerosis.    LV EDP 20 mmHg.       Medications  Allergies   Current Outpatient Medications   Medication Sig Dispense Refill     acetaminophen (TYLENOL) 650 MG CR tablet Take 650 mg by mouth daily as needed       aspirin (ASA) 81 MG EC tablet Take 1 tablet (81 mg) by mouth daily       co-enzyme Q-10 100 MG CAPS capsule Take 200 mg by mouth daily       fish oil-omega-3 fatty acids 300-1,000 mg capsule [FISH OIL-OMEGA-3 FATTY ACIDS 300-1,000 MG CAPSULE] Take 2 g by mouth daily.       fluticasone propionate (FLONASE) 50 mcg/actuation nasal spray [FLUTICASONE PROPIONATE (FLONASE) 50 MCG/ACTUATION NASAL SPRAY] USE 2 SPRAYS IN EACH       NOSTRIL DAILY 48 g 2     gabapentin  (NEURONTIN) 600 MG tablet Take between 0.5-1 tablet daily       lisinopril (ZESTRIL) 5 MG tablet Take 1 tablet (5 mg) by mouth daily 90 tablet 3     metoprolol succinate ER (TOPROL-XL) 25 MG 24 hr tablet Take 0.5 tablets (12.5 mg) by mouth daily 90 tablet 3     MULTIVITAMIN ORAL [MULTIVITAMIN ORAL] Take by mouth.       oxymetazoline HCl (AFRIN, OXYMETAZOLINE, NASL) [OXYMETAZOLINE HCL (AFRIN, OXYMETAZOLINE, NASL)] into each nostril.       vits A,C,E/lutein/zeax/zn/marly (EYE HEALTH FORMULA ORAL) [VITS A,C,E/LUTEIN/ZEAX/ZN/MARLY (EYE HEALTH FORMULA ORAL)] Take by mouth.        Allergies   Allergen Reactions     Effient [Prasugrel Hcl] Hives     Hydrochlorothiazide Unknown     Hydrocodone Swelling     Sinus     Oxycontin [Oxycodone] Swelling     lip     Plavix [Clopidogrel] Hives     Sulfa (Sulfonamide Antibiotics) [Sulfa Drugs] Swelling     Perfume Swelling     Other reaction(s): Runny Nose        Physical Examination Review of Systems   /58 (BP Location: Right arm, Patient Position: Sitting, Cuff Size: Adult Large)   Pulse 62   Resp 16   Ht 1.829 m (6')   Wt 110.2 kg (243 lb)   BMI 32.96 kg/m    Body mass index is 32.96 kg/m .  Wt Readings from Last 3 Encounters:   01/12/22 110.2 kg (243 lb)   01/11/22 110.7 kg (244 lb)   11/16/21 108.4 kg (239 lb)       General Appearance:   Pleasant  male, appears  stated age. no acute distress, obese body habitus   ENT/Mouth: Facemask.    EYES:  no scleral icterus, normal conjunctivae   Neck: no carotid bruits. No anterior cervical lymphadenopaty   Respiratory:   lungs are clear to auscultation, no rales or wheezing, equal chest wall expansion    Cardiovascular:   Irregularly irregular. Normal first and second heart sounds with no murmurs, rubs, or gallops; the carotid, radial and posterior tibial pulses are intact, Jugular venous pressure normal, no edema bilaterally    Abdomen/GI:  no organomegaly, masses, bruits, or tenderness; bowel sounds are present   Extremities:  no cyanosis or clubbing   Skin: no xanthelasma, warm.    Heme/lymph/ Immunology No apparent bleeding noted.   Neurologic: Alert and oriented. normal gait, no tremors     Psychiatric: Pleasant, calm, appropriate affect.    A complete 10 system review of systems was performed and is negative except as mentioned in the HPI/subjective.         Past History   Past Medical History:   Past Medical History:   Diagnosis Date     Angioedema      Atrial fibrillation (H) 2019     Atrial fibrillation and flutter (H)      Atrial flutter (H) 2019     Basal cell carcinoma      Branch retinal vein occlusion of left eye 7/1/2017     CAD (coronary artery disease)     ANGIOGRAM 2021 Left Main The vessel was visualized by angiography, is moderate in size and is angiographically normal. Left Anterior Descending Mid LAD lesion is 25% stenosed. Ramus Intermedius Ramus lesion is 10% stenosed. Left Circumflex Dist Cx lesion is 30% stenosed. Right Coronary Artery The vessel was visualized by angiography, is moderate in size and is angiographically normal.       Chronic neck pain      Congestive heart failure (H) 2021     GERD (gastroesophageal reflux disease)      HTN (hypertension) 2015     Hx of atrial flutter      Nonsmoker      Radiculopathy     cervical     Retinal hemorrhage     Vessel rupture in left retina     Rhinitis, allergic      S/P colonoscopy 4/15/2019     Urticaria        Past Surgical History:   Past Surgical History:   Procedure Laterality Date     BASAL CELL CARCINOMA EXCISION  01/2020    Left nasal reconstruction     CERVICAL SPINE SURGERY      C8, T1, foraminotomy     CV CORONARY ANGIOGRAM N/A 1/20/2021    Procedure: Coronary Angiogram;  Surgeon: Willow Wellington MD;  Location: [facility] Cardiac Cath Lab;  Service: Cardiology     CV LEFT ATRIAL APPENDAGE CLOSURE N/A 7/14/2021    Procedure: CV LEFT ATRIAL APPENDAGE CLOSURE;  Surgeon: Noah Gutierres MD;  Location:  HEART CARDIAC CATH LAB     CV LEFT HEART  CATHETERIZATION WITHOUT LEFT VENTRICULOGRAM Left 1/20/2021    Procedure: Left Heart Catheterization Without Left Ventriculogram;  Surgeon: Willow Wellington MD;  Location: [facility] Cardiac Cath Lab;  Service: Cardiology     HERNIA REPAIR, UMBILICAL       ×2     REVERSE TOTAL SHOULDER ARTHROPLASTY Left 05/2019       Family History:   Family History   Problem Relation Age of Onset     Arthritis Mother      Other - See Comments Mother         psychiatry/pvd     Other - See Comments Father         blood reaction       Social History:   Social History     Socioeconomic History     Marital status:      Spouse name: Not on file     Number of children: Not on file     Years of education: Not on file     Highest education level: Not on file   Occupational History     Not on file   Tobacco Use     Smoking status: Never Smoker     Smokeless tobacco: Never Used   Substance and Sexual Activity     Alcohol use: Never     Comment: Alcoholic Drinks/day: About once a year.     Drug use: No     Sexual activity: Not on file   Other Topics Concern     Parent/sibling w/ CABG, MI or angioplasty before 65F 55M? Not Asked   Social History Narrative    Patient of Dr. Rod since 2001.    .  Wife is a former RN.     Wife's cousin is Dr. Jean Pierre Champagne, ID specialist.     Social Determinants of Health     Financial Resource Strain: Not on file   Food Insecurity: Not on file   Transportation Needs: Not on file   Physical Activity: Not on file   Stress: Not on file   Social Connections: Not on file   Intimate Partner Violence: Not on file   Housing Stability: Not on file              Lab Results    Chemistry/lipid CBC Cardiac Enzymes/BNP/TSH/INR   Lab Results   Component Value Date    CHOL 165 01/20/2021    HDL 40 01/20/2021     01/20/2021    TRIG 65 01/20/2021    CR 0.90 01/11/2022    BUN 14 01/11/2022    POTASSIUM 4.3 01/11/2022     01/11/2022    CO2 27 01/11/2022      Lab Results   Component Value Date    WBC 8.5  01/11/2022    HGB 15.2 01/11/2022    HCT 45.1 01/11/2022    MCV 95 01/11/2022     01/11/2022    A1C 5.3 06/29/2017     Lab Results   Component Value Date    A1C 5.3 06/29/2017    Lab Results   Component Value Date    TROPONINI 0.02 06/18/2020     (H) 11/30/2020    INR 1.01 01/11/2022          Kiran Tyler MD [facility]  Non-Invasive Cardiologist  [facility]  Pager [phone]

## 2022-01-12 NOTE — LETTER
1/12/2022    Onur Rod MD  [address]  [facility] [address]    RE: Mike Gonzalez       Dear Colleague,     I had the pleasure of seeing Mike Gonzalez in the [facility].  HEART CARE ENCOUNTER NOTE          Assessment/Recommendations   Assessment:    1. Preoperative cardiovascular examination prior to elective spinal surgery scheduled for 1/13/2022. He has no cardiac symptoms and is well-compensated from a cardiac standpoint. He is at a low risk of perioperative major adverse cardiac events.  2. Persistent atrial fibrillation status post 31 mm Watchman FLX left atrial appendage closure device placement on 7/14/2021. Stable small peridevice leak measuring less than 5 mm.  3. Chronic congestive heart failure with reduced left ventricular ejection fraction of 42% due to nonischemic cardiomyopathy. NYHA class I, appears euvolemic.  4. Mild nonobstructive coronary artery disease.  5. Benign essential hypertension.   6. Hyperlipidemia. Last .    Plan:  1. Okay to proceed with his planned surgery from a cardiac standpoint.  2. Continue aspirin 81 mg daily perioperatively if able, but okay to temporarily hold aspirin if needed.  3. Can continue metoprolol succinate at reduced dose of 12.5 mg daily.  4. Continue his other cardiac medications.  5. Follow-up with his primary cardiologist, Dr. Ibrahim, in 6 months.         History of Present Illness   Mr. Mike Gonzalez is a 71 year old male with a significant past history of persistent AF s/p 31 mm Watchman FLX device placement on 7/14/2021, nonobstructive CAD, and HFrEF LVEF 42% presenting for urgent preoperative cardiovascular surgery prior to elective spinal surgery scheduled for tomorrow morning.    He did have a small leak around his Watchman device and was on prolonged anticoagulation, but repeat JEAN CLAUDE was stable and he is now only taking aspirin 81 mg daily. He is mostly limited by pain in his back and hips, but can walk and go  up the stairs without getting chest pain or shortness of breath. He did note some dizziness and low pulse in the 30s at home recently and had his metoprolol dose cut to 12.5 mg daily. He feels better.    He denies any pre-syncope, syncope, lower extremity swelling, palpitations, paroxysmal nocturnal dyspnea (PND), or orthopnea.     Cardiac Problems and Cardiac Diagnostics     Most Recent Cardiac testing:  ECG dated 1/11/2022 (personaly reviewed and interpreted): atrial fibrillation, ventricular rate 61 bpm, possible anterior infarct, left anterior fascicular block    Holter monitor 3/4/2021 (report reviewed):  HOLTER MONITOR     TEST INDICATION:  Evaluate for atrial fibrillation.     IMPRESSION:  1.  A 24-hour Holter monitor was applied 03/04/2021 with date of interpretation  03/08/2021.  2.  Baseline tracing showed atrial fibrillation, which is present throughout the  recording.  There is an IVCD, incomplete left bundle branch block.  3.  Average heart rate of 65 beats per minute and ranges between 39 and 122 beats  per minute.  4.  Modest nocturnal bradycardia is seen.  There is no major bradycardia during  waking hours.  5.  Ventricular ectopy is infrequent with 1362 PVCs which is 1.4% of total  heartbeats.  No salvos, no nonsustained ventricular tachycardia.  6.  Patient activity diary was reviewed, but no symptoms noted.     DISCUSSION:  Persistent atrial fibrillation with controlled ventricular response.    ECHO 10/18/2021 (report reviewed):  1. The left ventricle is mildly enlarged. Left ventricular systolic  performance is moderately reduced. The ejection fraction is estimated to be  40%.  2. There is moderate global reduction in left ventricular systolic  performance.  3. There is mild concentric increase in left ventricular wall thickness.  4. No significant valvular heart disease is identified on this study.  5. Borderline right ventricular enlargement with normal right ventricular  systolic  performance.  6. There is severe left atrial enlargement. There is moderate right atrial  enlargement.  7. There is a left atrial appendage occlusion device.  Â  No thrombus is detected upon the surface of the device.  Â  There is a localized region of flow around the device between the device  itself and the left lateral ridge. The gap measures 0.35-0.5 cm and appears  similar to the findings on the 2 September 2021 transesophageal  echocardiogram.  8. There is mild spontaneous echo contrast in the left atrium though no  thrombus detected.  9. Color Doppler interrogation of the atrial septum reveals the presence of a  patent foramen ovale (PFO). No right to left shunting, however, was elicited  with echo contrast injection either with spontaneous respiration or Valsalva.    Stress test 1/5/2021 (report reviewed):     The nuclear stress test is abnormal. There is no ischemia but there is a medium sized area of a moderate degree of nontransmural infarction in the distal anterior segment(s) of the left ventricle. This appears to be in the left anterior descending   artery distribution.     The stress electrocardiogram is negative for inducible ischemic EKG changes. Occasional PVCs were noted during stress and recovery.     The left ventricle is dilated and the left ventricular ejection fraction at stress is mildly decreased at 44%.     Low-to-intermediate risk of future ischemic events.     There is no prior study for comparison.    Cardiac cath 1/20/2021 (report reviewed):     Mild coronary atherosclerosis.    LV EDP 20 mmHg.       Medications  Allergies   Current Outpatient Medications   Medication Sig Dispense Refill     acetaminophen (TYLENOL) 650 MG CR tablet Take 650 mg by mouth daily as needed       aspirin (ASA) 81 MG EC tablet Take 1 tablet (81 mg) by mouth daily       co-enzyme Q-10 100 MG CAPS capsule Take 200 mg by mouth daily       fish oil-omega-3 fatty acids 300-1,000 mg capsule [FISH OIL-OMEGA-3 FATTY  ACIDS 300-1,000 MG CAPSULE] Take 2 g by mouth daily.       fluticasone propionate (FLONASE) 50 mcg/actuation nasal spray [FLUTICASONE PROPIONATE (FLONASE) 50 MCG/ACTUATION NASAL SPRAY] USE 2 SPRAYS IN EACH       NOSTRIL DAILY 48 g 2     gabapentin (NEURONTIN) 600 MG tablet Take between 0.5-1 tablet daily       lisinopril (ZESTRIL) 5 MG tablet Take 1 tablet (5 mg) by mouth daily 90 tablet 3     metoprolol succinate ER (TOPROL-XL) 25 MG 24 hr tablet Take 0.5 tablets (12.5 mg) by mouth daily 90 tablet 3     MULTIVITAMIN ORAL [MULTIVITAMIN ORAL] Take by mouth.       oxymetazoline HCl (AFRIN, OXYMETAZOLINE, NASL) [OXYMETAZOLINE HCL (AFRIN, OXYMETAZOLINE, NASL)] into each nostril.       vits A,C,E/lutein/zeax/zn/marly (EYE HEALTH FORMULA ORAL) [VITS A,C,E/LUTEIN/ZEAX/ZN/MARLY (EYE HEALTH FORMULA ORAL)] Take by mouth.        Allergies   Allergen Reactions     Effient [Prasugrel Hcl] Hives     Hydrochlorothiazide Unknown     Hydrocodone Swelling     Sinus     Oxycontin [Oxycodone] Swelling     lip     Plavix [Clopidogrel] Hives     Sulfa (Sulfonamide Antibiotics) [Sulfa Drugs] Swelling     Perfume Swelling     Other reaction(s): Runny Nose        Physical Examination Review of Systems   /58 (BP Location: Right arm, Patient Position: Sitting, Cuff Size: Adult Large)   Pulse 62   Resp 16   Ht 1.829 m (6')   Wt 110.2 kg (243 lb)   BMI 32.96 kg/m    Body mass index is 32.96 kg/m .  Wt Readings from Last 3 Encounters:   01/12/22 110.2 kg (243 lb)   01/11/22 110.7 kg (244 lb)   11/16/21 108.4 kg (239 lb)       General Appearance:   Pleasant  male, appears  stated age. no acute distress, obese body habitus   ENT/Mouth: Facemask.    EYES:  no scleral icterus, normal conjunctivae   Neck: no carotid bruits. No anterior cervical lymphadenopaty   Respiratory:   lungs are clear to auscultation, no rales or wheezing, equal chest wall expansion    Cardiovascular:   Irregularly irregular. Normal first and second heart sounds with  no murmurs, rubs, or gallops; the carotid, radial and posterior tibial pulses are intact, Jugular venous pressure normal, no edema bilaterally    Abdomen/GI:  no organomegaly, masses, bruits, or tenderness; bowel sounds are present   Extremities: no cyanosis or clubbing   Skin: no xanthelasma, warm.    Heme/lymph/ Immunology No apparent bleeding noted.   Neurologic: Alert and oriented. normal gait, no tremors     Psychiatric: Pleasant, calm, appropriate affect.    A complete 10 system review of systems was performed and is negative except as mentioned in the HPI/subjective.         Past History   Past Medical History:   Past Medical History:   Diagnosis Date     Angioedema      Atrial fibrillation (H) 2019     Atrial fibrillation and flutter (H)      Atrial flutter (H) 2019     Basal cell carcinoma      Branch retinal vein occlusion of left eye 7/1/2017     CAD (coronary artery disease)     ANGIOGRAM 2021 Left Main The vessel was visualized by angiography, is moderate in size and is angiographically normal. Left Anterior Descending Mid LAD lesion is 25% stenosed. Ramus Intermedius Ramus lesion is 10% stenosed. Left Circumflex Dist Cx lesion is 30% stenosed. Right Coronary Artery The vessel was visualized by angiography, is moderate in size and is angiographically normal.       Chronic neck pain      Congestive heart failure (H) 2021     GERD (gastroesophageal reflux disease)      HTN (hypertension) 2015     Hx of atrial flutter      Nonsmoker      Radiculopathy     cervical     Retinal hemorrhage     Vessel rupture in left retina     Rhinitis, allergic      S/P colonoscopy 4/15/2019     Urticaria        Past Surgical History:   Past Surgical History:   Procedure Laterality Date     BASAL CELL CARCINOMA EXCISION  01/2020    Left nasal reconstruction     CERVICAL SPINE SURGERY      C8, T1, foraminotomy     CV CORONARY ANGIOGRAM N/A 1/20/2021    Procedure: Coronary Angiogram;  Surgeon: Willow Wellington MD;  Location:  [facility] Cardiac Cath Lab;  Service: Cardiology     CV LEFT ATRIAL APPENDAGE CLOSURE N/A 7/14/2021    Procedure: CV LEFT ATRIAL APPENDAGE CLOSURE;  Surgeon: Noah Gutierres MD;  Location: [facility] CARDIAC CATH LAB     CV LEFT HEART CATHETERIZATION WITHOUT LEFT VENTRICULOGRAM Left 1/20/2021    Procedure: Left Heart Catheterization Without Left Ventriculogram;  Surgeon: Willow Wellington MD;  Location: [facility] Cardiac Cath Lab;  Service: Cardiology     HERNIA REPAIR, UMBILICAL       ×2     REVERSE TOTAL SHOULDER ARTHROPLASTY Left 05/2019       Family History:   Family History   Problem Relation Age of Onset     Arthritis Mother      Other - See Comments Mother         psychiatry/pvd     Other - See Comments Father         blood reaction       Social History:   Social History     Socioeconomic History     Marital status:      Spouse name: Not on file     Number of children: Not on file     Years of education: Not on file     Highest education level: Not on file   Occupational History     Not on file   Tobacco Use     Smoking status: Never Smoker     Smokeless tobacco: Never Used   Substance and Sexual Activity     Alcohol use: Never     Comment: Alcoholic Drinks/day: About once a year.     Drug use: No     Sexual activity: Not on file   Other Topics Concern     Parent/sibling w/ CABG, MI or angioplasty before 65F 55M? Not Asked   Social History Narrative    Patient of Dr. Rod since 2001.    .  Wife is a former RN.     Wife's cousin is Dr. Jean Pierre Champagne, ID specialist.     Social Determinants of Health     Financial Resource Strain: Not on file   Food Insecurity: Not on file   Transportation Needs: Not on file   Physical Activity: Not on file   Stress: Not on file   Social Connections: Not on file   Intimate Partner Violence: Not on file   Housing Stability: Not on file              Lab Results    Chemistry/lipid CBC Cardiac Enzymes/BNP/TSH/INR   Lab Results   Component Value Date    CHOL 165  01/20/2021    HDL 40 01/20/2021     01/20/2021    TRIG 65 01/20/2021    CR 0.90 01/11/2022    BUN 14 01/11/2022    POTASSIUM 4.3 01/11/2022     01/11/2022    CO2 27 01/11/2022      Lab Results   Component Value Date    WBC 8.5 01/11/2022    HGB 15.2 01/11/2022    HCT 45.1 01/11/2022    MCV 95 01/11/2022     01/11/2022    A1C 5.3 06/29/2017     Lab Results   Component Value Date    A1C 5.3 06/29/2017    Lab Results   Component Value Date    TROPONINI 0.02 06/18/2020     (H) 11/30/2020    INR 1.01 01/11/2022          Kiran Tyler MD [facility]  Non-Invasive Cardiologist  [facility]  Pager [phone]    Thank you for allowing me to participate in the care of your patient.      Sincerely,     Kiran Tyler MD     [facility]  cc:   Karyn Maurer, DO  [address] [place] [address]

## 2022-01-12 NOTE — PATIENT INSTRUCTIONS
1. You can proceed with your surgery.  2. Continue your aspirin, but it is okay to hold this temporarily if the surgeons need you to.  3. Follow-up with Dr. Ibrahim in 6 months.

## 2022-01-12 NOTE — TELEPHONE ENCOUNTER
Please call [facility] in [place] and get their fax number to fax over preop note to. Surgery is 1/13

## 2022-01-13 ENCOUNTER — MYC MEDICAL ADVICE (OUTPATIENT)
Dept: INTERNAL MEDICINE | Facility: CLINIC | Age: 72
End: 2022-01-13
Payer: MEDICARE

## 2022-01-13 DIAGNOSIS — Z01.812 ENCOUNTER FOR PREOPERATIVE SCREENING LABORATORY TESTING FOR COVID-19 VIRUS: Primary | ICD-10-CM

## 2022-01-13 DIAGNOSIS — Z11.52 ENCOUNTER FOR PREOPERATIVE SCREENING LABORATORY TESTING FOR COVID-19 VIRUS: Primary | ICD-10-CM

## 2022-01-18 ENCOUNTER — OFFICE VISIT (OUTPATIENT)
Dept: AUDIOLOGY | Facility: CLINIC | Age: 72
End: 2022-01-18
Attending: INTERNAL MEDICINE
Payer: MEDICARE

## 2022-01-18 DIAGNOSIS — H93.12 TINNITUS OF LEFT EAR: ICD-10-CM

## 2022-01-18 DIAGNOSIS — H90.A22 SENSORINEURAL HEARING LOSS (SNHL) OF LEFT EAR WITH RESTRICTED HEARING OF RIGHT EAR: Primary | ICD-10-CM

## 2022-01-18 DIAGNOSIS — H91.90 HEARING LOSS, UNSPECIFIED HEARING LOSS TYPE, UNSPECIFIED LATERALITY: ICD-10-CM

## 2022-01-18 PROCEDURE — 99207 PR NO CHARGE LOS: CPT | Performed by: AUDIOLOGIST

## 2022-01-18 PROCEDURE — 92557 COMPREHENSIVE HEARING TEST: CPT | Performed by: AUDIOLOGIST

## 2022-01-18 PROCEDURE — 92550 TYMPANOMETRY & REFLEX THRESH: CPT | Mod: 52 | Performed by: AUDIOLOGIST
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
## 2022-01-24 ENCOUNTER — TRANSFERRED RECORDS (OUTPATIENT)
Dept: HEALTH INFORMATION MANAGEMENT | Facility: CLINIC | Age: 72
End: 2022-01-24

## 2022-02-08 ENCOUNTER — TRANSFERRED RECORDS (OUTPATIENT)
Dept: HEALTH INFORMATION MANAGEMENT | Facility: CLINIC | Age: 72
End: 2022-02-08
Payer: MEDICARE

## 2022-02-09 ENCOUNTER — TELEPHONE (OUTPATIENT)
Dept: CARDIOLOGY | Facility: CLINIC | Age: 72
End: 2022-02-09
Payer: MEDICARE

## 2022-02-09 NOTE — CONFIDENTIAL NOTE
MODIFIED ENA SCALE   Timepoint: 6mo Post-LAAC    Previous score: 0    Score Description   0 No symptoms at all   1 No significant disability despite symptoms; able to carry out all usual duties and activities   2 Slight disability; unable to carry out all previous activities, but able to look after own affairs without assistance   3 Moderate disability; requiring some help, but able to walk without assistance   4 Moderately severe disability; unable to walk without assistance and unable to attend to own bodily needs without assistance   5 Severe disability; bedridden, incontinent and requiring constant nursing care and attention   6 Dead    Total score (0 - 6):  0    Change in score if s/p LAAC? No  If yes, notify implanting cardiologist.    Ava Denis RN BSN  Structural Heart Coordinator   [facility]  [phone]

## 2022-03-01 ENCOUNTER — TRANSFERRED RECORDS (OUTPATIENT)
Dept: HEALTH INFORMATION MANAGEMENT | Facility: CLINIC | Age: 72
End: 2022-03-01
Payer: MEDICARE

## 2022-03-05 ENCOUNTER — MYC MEDICAL ADVICE (OUTPATIENT)
Dept: INTERNAL MEDICINE | Facility: CLINIC | Age: 72
End: 2022-03-05
Payer: MEDICARE

## 2022-03-16 ENCOUNTER — TRANSFERRED RECORDS (OUTPATIENT)
Dept: HEALTH INFORMATION MANAGEMENT | Facility: CLINIC | Age: 72
End: 2022-03-16
Payer: MEDICARE

## 2022-03-31 ENCOUNTER — TRANSFERRED RECORDS (OUTPATIENT)
Dept: HEALTH INFORMATION MANAGEMENT | Facility: CLINIC | Age: 72
End: 2022-03-31

## 2022-03-31 ENCOUNTER — OFFICE VISIT (OUTPATIENT)
Dept: INTERNAL MEDICINE | Facility: CLINIC | Age: 72
End: 2022-03-31
Payer: MEDICARE

## 2022-03-31 VITALS
DIASTOLIC BLOOD PRESSURE: 70 MMHG | TEMPERATURE: 97.9 F | WEIGHT: 243 LBS | BODY MASS INDEX: 32.91 KG/M2 | OXYGEN SATURATION: 98 % | HEART RATE: 68 BPM | HEIGHT: 72 IN | SYSTOLIC BLOOD PRESSURE: 136 MMHG

## 2022-03-31 DIAGNOSIS — M25.552 BILATERAL HIP PAIN: ICD-10-CM

## 2022-03-31 DIAGNOSIS — I48.92 ATRIAL FLUTTER, CHRONIC (H): ICD-10-CM

## 2022-03-31 DIAGNOSIS — Z95.818 PRESENCE OF LEFT ATRIAL APPENDAGE CLOSURE DEVICE COMPOSED OF NICKEL-TITANIUM ALLOY WITH POLYETHYLENE TEREPHTHALATE MEMBRANE: Chronic | ICD-10-CM

## 2022-03-31 DIAGNOSIS — M25.551 BILATERAL HIP PAIN: ICD-10-CM

## 2022-03-31 DIAGNOSIS — G89.29 CHRONIC PAIN OF LEFT KNEE: ICD-10-CM

## 2022-03-31 DIAGNOSIS — R31.0 GROSS HEMATURIA: ICD-10-CM

## 2022-03-31 DIAGNOSIS — H25.13 AGE-RELATED NUCLEAR CATARACT OF BOTH EYES: ICD-10-CM

## 2022-03-31 DIAGNOSIS — I25.10 CORONARY ARTERY DISEASE INVOLVING NATIVE CORONARY ARTERY OF NATIVE HEART WITHOUT ANGINA PECTORIS: Chronic | ICD-10-CM

## 2022-03-31 DIAGNOSIS — Z01.818 PREOPERATIVE EXAMINATION: Primary | ICD-10-CM

## 2022-03-31 DIAGNOSIS — I10 PRIMARY HYPERTENSION: Chronic | ICD-10-CM

## 2022-03-31 DIAGNOSIS — M25.562 CHRONIC PAIN OF LEFT KNEE: ICD-10-CM

## 2022-03-31 LAB
ALBUMIN UR-MCNC: NEGATIVE MG/DL
APPEARANCE UR: CLEAR
BACTERIA #/AREA URNS HPF: ABNORMAL /HPF
BILIRUB UR QL STRIP: NEGATIVE
COLOR UR AUTO: YELLOW
GLUCOSE UR STRIP-MCNC: NEGATIVE MG/DL
HGB UR QL STRIP: NEGATIVE
KETONES UR STRIP-MCNC: NEGATIVE MG/DL
LEUKOCYTE ESTERASE UR QL STRIP: NEGATIVE
NITRATE UR QL: NEGATIVE
PH UR STRIP: 5.5 [PH] (ref 5–8)
RBC #/AREA URNS AUTO: ABNORMAL /HPF
SP GR UR STRIP: 1.02 (ref 1–1.03)
SQUAMOUS #/AREA URNS AUTO: ABNORMAL /LPF
UROBILINOGEN UR STRIP-ACNC: 0.2 E.U./DL
WBC #/AREA URNS AUTO: ABNORMAL /HPF

## 2022-03-31 PROCEDURE — 81001 URINALYSIS AUTO W/SCOPE: CPT | Performed by: INTERNAL MEDICINE

## 2022-03-31 PROCEDURE — 99215 OFFICE O/P EST HI 40 MIN: CPT | Performed by: INTERNAL MEDICINE

## 2022-04-01 NOTE — PATIENT INSTRUCTIONS
May take metoprolol on the am of surgery but no need to take lisinopril.    Consider again using a low dose cholesterol medication given some blockage in the arteries found at the time of your angiogram.  Please let me know if you want to start this.    Follow up for annual wellness visit in 6 months, sooner if issues.

## 2022-04-01 NOTE — PROGRESS NOTES
[facility]  [facility] Specialists    Care coordination - Customized care -  Comprehensive and complex medical care            Date of Service: 3/31/2022  Primary Provider: Onur Rod    Patient Care Team:  Onur Rod MD as PCP - General  Onur Rod MD as Assigned PCP  Marylou Ibrahim MD as Assigned Heart and Vascular Provider  Ambrose Degroot MD as MD (Orthopaedic Surgery)  Stuart Jackson MD as MD (Orthopaedic Surgery)  Liborio Champagne MD as MD  [facility]           Patient's Pharmacy:    [id number] - [place], [place] - [address]  [address]  [facility] [address]  Phone: [phone] Fax: [phone]    [facility] - [place], [place] - [address] AT Portal to Registered [facility] Sites  [address]  [facility] [address]  Phone: [phone] Fax: [phone]     Patient's Contacts:  Name Home Phone Work Phone Mobile Phone Relationship Lgl KYLER Wilson [phone] Spouse    MATILDE SILVER   [phone] Other      Patient's Insurance:    Payor: MEDICARE / Plan: MEDICARE / Product Type: Medicare /           Preoperative examination    Mike Gonzalez is a 71 year old male (1950) who I am asked to see by his surgeon regarding his fitness for upcoming surgery.      Chief Complaint   Patient presents with     Pre-Op Exam     4/6/22 right eye, left eye 4/13/22, cataract, [facility], Dr. Avila     Kidney Problem     had a few drops on tuesday but none since      Subjective:     In for preop.  Bilateral cataracts.  Dr. Avila.  Worsening vision with light scatter.    Has had gross hematuria recently.  While in [place].  Last week and somewhat this week.  No dysuria or frequency or other urinary symptoms.  Discussed potential etiology and work-up.  Will likely go onto a CT scan of the abdomen as a next step.  Previous in 2018 showed no concerning issues.    Heart issues seem to be  going okay at this time.  No issues with shortness of breath.  Feels well.    Orthopedic issues persist.  Back and leg pain on the left still there, but improved previous.  Both hips bother and left knee an issue.  Did have corticosteroid injection through Dr. Curtis.    Sees Dr. Jackson for shoulder issues.  Informed him of his practice change.    ______________________________________________________________________     Pre-op Questionnaire 3/31/2022   1. Have you ever had a heart attack or stroke? No   2. Have you ever had surgery on your heart or blood vessels, such as a stent placement, a coronary artery bypass, or surgery on an artery in your head, neck, heart, or legs? YES - Watchman device placement.   3. Do you have chest pain with activity? No   4. Do you have a history of  heart failure? Has dilated cardiomyopathy.   5. Do you currently have a cold, bronchitis or symptoms of other infection? No   6. Do you have a cough, shortness of breath, or wheezing? No   7. Do you or anyone in your family have previous history of blood clots? No   8. Do you or does anyone in your family have a serious bleeding problem such as prolonged bleeding following surgeries or cuts? No   9. Have you ever had problems with anemia or been told to take iron pills? No   10. Have you had any abnormal blood loss such as black, tarry or bloody stools? No   11. Have you ever had a blood transfusion? No   12. Are you willing to have a blood transfusion if it is medically needed before, during, or after your surgery? Yes   13. Have you or any of your relatives ever had problems with anesthesia? No   14. Do you have sleep apnea, excessive snoring or daytime drowsiness? No   15. Do you have any artifical heart valves or other implanted medical devices like a pacemaker, defibrillator, or continuous glucose monitor? YES - Watchman device   15a. What type of device do you have? Watchman   15b. Name of the clinic that manages your device:   [facility]   16. Do you have artificial joints? YES - left shoulder    17. Are you allergic to latex? No     ______________________________________________________________________     We reviewed his other issues noted in the assessment but not specifically addressed in the HPI above.           Patient Active Problem List   Diagnosis     GERD (gastroesophageal reflux disease)     Chronic neck pain     Nonsmoker     Full code status     Branch retinal vein occlusion of left eye     Normal colonoscopy - 2016 - Average risk     Complete tear of left rotator cuff     Obesity (BMI 35.0-39.9) with comorbidity (H)     Atrial flutter, chronic (H)     Dilated cardiomyopathy (H)     CAD (coronary artery disease), minimal disease on angiogram in 2021     Chronic systolic heart failure (H)     S/P left atrial appendage closure - WATCHMAN     Concussion syndrome     Chronic left-sided low back pain with left-sided sciatica     HTN (hypertension)       Past Surgical History:   Procedure Laterality Date     BASAL CELL CARCINOMA EXCISION  01/2020    Left nasal reconstruction     CERVICAL SPINE SURGERY      C8, T1, foraminotomy     CV CORONARY ANGIOGRAM N/A 1/20/2021    Procedure: Coronary Angiogram;  Surgeon: Willow Wellington MD;  Location: [facility] Cardiac Cath Lab;  Service: Cardiology     CV LEFT ATRIAL APPENDAGE CLOSURE N/A 7/14/2021    Procedure: CV LEFT ATRIAL APPENDAGE CLOSURE;  Surgeon: Noah Gutierres MD;  Location: [facility] CARDIAC CATH LAB     CV LEFT HEART CATHETERIZATION WITHOUT LEFT VENTRICULOGRAM Left 1/20/2021    Procedure: Left Heart Catheterization Without Left Ventriculogram;  Surgeon: Willow Wellington MD;  Location: [facility] Cardiac Cath Lab;  Service: Cardiology     HERNIA REPAIR, UMBILICAL       ×2     REVERSE TOTAL SHOULDER ARTHROPLASTY Left 05/2019      Social History     Occupational History     Not on file   Tobacco Use     Smoking status: Never Smoker     Smokeless tobacco: Never Used    Substance and Sexual Activity     Alcohol use: Never     Comment: Alcoholic Drinks/day: About once a year.     Drug use: No     Sexual activity: Not on file      Social History     Social History Narrative    Patient of Dr. Rod since 2001.    .  Wife is a former RN.     Wife's cousin is Dr. Jean Pierre Champagne, ID specialist.      Family History   Problem Relation Age of Onset     Arthritis Mother      Other - See Comments Mother         psychiatry/pvd     Other - See Comments Father         blood reaction     Family history is noncontributory otherwise.    Allergies: Effient [prasugrel hcl], Hydrochlorothiazide, Hydrocodone, Oxycontin [oxycodone], Plavix [clopidogrel], Sulfa (sulfonamide antibiotics) [sulfa drugs], and Perfume     Current medications:  Current Outpatient Medications   Medication Instructions     acetaminophen (TYLENOL) 650 mg, Oral, DAILY PRN     aspirin (ASA) 81 mg, Oral, DAILY     co-enzyme Q-10 200 mg, Oral, DAILY     fish oil-omega-3 fatty acids 2 g, DAILY     fluticasone propionate (FLONASE) 50 mcg/actuation nasal spray [FLUTICASONE PROPIONATE (FLONASE) 50 MCG/ACTUATION NASAL SPRAY] USE 2 SPRAYS IN EACH       NOSTRIL DAILY     gabapentin (NEURONTIN) 300 mg     lisinopril (ZESTRIL) 5 mg, Oral, DAILY     metoprolol succinate ER (TOPROL-XL) 12.5 mg, Oral, DAILY     MULTIVITAMIN ORAL [MULTIVITAMIN ORAL] Take by mouth.     oxymetazoline HCl (AFRIN, OXYMETAZOLINE, NASL) [OXYMETAZOLINE HCL (AFRIN, OXYMETAZOLINE, NASL)] into each nostril.     vits A,C,E/lutein/zeax/zn/paris (EYE HEALTH FORMULA ORAL) [VITS A,C,E/LUTEIN/ZEAX/ZN/PARIS (EYE HEALTH FORMULA ORAL)] Take by mouth.        Objective:     Wt Readings from Last 3 Encounters:   03/31/22 110.2 kg (243 lb)   01/12/22 110.2 kg (243 lb)   01/11/22 110.7 kg (244 lb)     BP Readings from Last 3 Encounters:   03/31/22 136/70   01/12/22 108/58   01/11/22 126/74     /70   Pulse 68   Temp 97.9  F (36.6  C) (Oral)   Ht 1.829 m (6')   Wt 110.2 kg  (243 lb)   SpO2 98%   BMI 32.96 kg/m     Patient is in no acute distress.  Mood good.  Insight good.  Eyes nonicteric.  Pupils equal.  Ears clear.  Nose clear.  Throat clear.  Neck is supple.  No cervical adenopathy.  No thyromegaly.  Heart is regular rate and rhythm.  Lungs clear to auscultation bilaterally.  Respiratory effort is good.  Extremities no edema.       Diagnostics:     Office Visit on 01/11/2022   Component Date Value Ref Range Status     Ventricular Rate 01/11/2022 61  BPM Final     Atrial Rate 01/11/2022 64  BPM Final     QRS Duration 01/11/2022 116  ms Final     QT 01/11/2022 430  ms Final     QTc 01/11/2022 432  ms Final     R AXIS 01/11/2022 -77  degrees Final     T Axis 01/11/2022 60  degrees Final     Interpretation ECG 01/11/2022    Final                    Value:Atrial fibrillation  Left anterior fascicular block  Possible Anterolateral infarct (cited on or before 15-APR-2019)  Abnormal ECG  When compared with ECG of 12-JUL-2021 09:58,  No significant change was found  Confirmed by KEATON ALVAREZ MD LOC: ([id number]) on 1/11/2022 3:25:34 PM       WBC Count 01/11/2022 8.5  4.0 - 11.0 10e3/uL Final     RBC Count 01/11/2022 4.74  4.40 - 5.90 10e6/uL Final     Hemoglobin 01/11/2022 15.2  13.3 - 17.7 g/dL Final     Hematocrit 01/11/2022 45.1  40.0 - 53.0 % Final     MCV 01/11/2022 95  78 - 100 fL Final     MCH 01/11/2022 32.1  26.5 - 33.0 pg Final     MCHC 01/11/2022 33.7  31.5 - 36.5 g/dL Final     RDW 01/11/2022 13.0  10.0 - 15.0 % Final     Platelet Count 01/11/2022 282  150 - 450 10e3/uL Final     Sodium 01/11/2022 140  136 - 145 mmol/L Final     Potassium 01/11/2022 4.3  3.5 - 5.0 mmol/L Final     Chloride 01/11/2022 101  98 - 107 mmol/L Final     Carbon Dioxide (CO2) 01/11/2022 27  22 - 31 mmol/L Final     Anion Gap 01/11/2022 12  5 - 18 mmol/L Final     Urea Nitrogen 01/11/2022 14  8 - 28 mg/dL Final     Creatinine 01/11/2022 0.90  0.70 - 1.30 mg/dL Final     Calcium 01/11/2022 10.0  8.5 -  10.5 mg/dL Final     Glucose 01/11/2022 88  70 - 125 mg/dL Final     GFR Estimate 01/11/2022 >90  >60 mL/min/1.73m2 Final    Effective December 21, 2021 eGFRcr in adults is calculated using the 2021 CKD-EPI creatinine equation which includes age and gender (Yany aparicio al., [facility], DOI: 10.1056/AZLWjj9527116)     INR 01/11/2022 1.01  0.85 - 1.15 Final       Results for orders placed or performed in visit on 03/31/22   UA with Microscopic     Status: Normal   Result Value Ref Range    Color Urine Yellow Colorless, Straw, Light Yellow, Yellow    Appearance Urine Clear Clear    Glucose Urine Negative Negative mg/dL    Bilirubin Urine Negative Negative    Ketones Urine Negative Negative mg/dL    Specific Gravity Urine 1.020 1.005 - 1.030    Blood Urine Negative Negative    pH Urine 5.5 5.0 - 8.0    Protein Albumin Urine Negative Negative mg/dL    Urobilinogen Urine 0.2 0.2, 1.0 E.U./dL    Nitrite Urine Negative Negative    Leukocyte Esterase Urine Negative Negative   Urine Microscopic Exam     Status: Abnormal   Result Value Ref Range    Bacteria Urine None Seen None Seen /HPF    RBC Urine None Seen 0-2 /HPF /HPF    WBC Urine None Seen 0-5 /HPF /HPF    Squamous Epithelials Urine Few (A) None Seen /LPF       Impression:     1. Preoperative examination    2. Age-related nuclear cataract of both eyes    3. Gross hematuria    4. Coronary artery disease involving native coronary artery of native heart without angina pectoris    5. Atrial flutter, chronic (H)    6. S/P left atrial appendage closure - WATCHMAN    7. Primary hypertension    8. Bilateral hip pain    9. Chronic pain of left knee        Plan:     1. Okay to proceed with surgery as planned.  2. Take metoprolol on am of surgery but not lisinopril.  3. Urinalysis done today.  4. CT scan of the urinary system for gross hematuria.  Will likely need urology consultation.  5. Need to review need for cholesterol medication again in the future.  6. Follow up [facility]  Orthopedics for the hips, back and knees.      40 minutes or greater was spent today on the patient's care on the day of service.      This includes time for chart preparation, reviewing medical tests done before or during the visit, talking with the patient, review of quality indicators, required documentation, and other elements of care.        Onur Rod MD  General Internal Medicine  [facility]    Return in about 6 months (around 9/30/2022), or if symptoms worsen or fail to improve, for annual wellness visit.     No future appointments.

## 2022-04-04 ENCOUNTER — TRANSFERRED RECORDS (OUTPATIENT)
Dept: HEALTH INFORMATION MANAGEMENT | Facility: CLINIC | Age: 72
End: 2022-04-04
Payer: MEDICARE

## 2022-04-06 ENCOUNTER — TRANSFERRED RECORDS (OUTPATIENT)
Dept: HEALTH INFORMATION MANAGEMENT | Facility: CLINIC | Age: 72
End: 2022-04-06
Payer: MEDICARE

## 2022-04-18 ENCOUNTER — TRANSFERRED RECORDS (OUTPATIENT)
Dept: HEALTH INFORMATION MANAGEMENT | Facility: CLINIC | Age: 72
End: 2022-04-18
Payer: MEDICARE

## 2022-05-06 ENCOUNTER — MYC MEDICAL ADVICE (OUTPATIENT)
Dept: INTERNAL MEDICINE | Facility: CLINIC | Age: 72
End: 2022-05-06
Payer: MEDICARE

## 2022-05-09 ENCOUNTER — TELEPHONE (OUTPATIENT)
Dept: INTERNAL MEDICINE | Facility: CLINIC | Age: 72
End: 2022-05-09
Payer: MEDICARE

## 2022-05-09 ENCOUNTER — OFFICE VISIT (OUTPATIENT)
Dept: INTERNAL MEDICINE | Facility: CLINIC | Age: 72
End: 2022-05-09
Payer: MEDICARE

## 2022-05-09 VITALS
SYSTOLIC BLOOD PRESSURE: 128 MMHG | OXYGEN SATURATION: 97 % | BODY MASS INDEX: 32.96 KG/M2 | DIASTOLIC BLOOD PRESSURE: 78 MMHG | HEART RATE: 99 BPM | WEIGHT: 243 LBS | RESPIRATION RATE: 16 BRPM | TEMPERATURE: 98.4 F

## 2022-05-09 DIAGNOSIS — I25.10 CORONARY ARTERY DISEASE INVOLVING NATIVE CORONARY ARTERY OF NATIVE HEART WITHOUT ANGINA PECTORIS: ICD-10-CM

## 2022-05-09 DIAGNOSIS — M20.012 MALLET DEFORMITY OF LEFT RING FINGER: ICD-10-CM

## 2022-05-09 DIAGNOSIS — R31.0 GROSS HEMATURIA: ICD-10-CM

## 2022-05-09 DIAGNOSIS — K57.32 DIVERTICULITIS OF COLON: Primary | ICD-10-CM

## 2022-05-09 PROCEDURE — 99215 OFFICE O/P EST HI 40 MIN: CPT | Performed by: INTERNAL MEDICINE

## 2022-05-09 NOTE — TELEPHONE ENCOUNTER
Informed pt of Dr. Rod's message.  Scheduled pt for 5/9/2022 at 10:45 AM.  He thanked for the call.

## 2022-05-09 NOTE — TELEPHONE ENCOUNTER
Reason for Call:  Other call back    Detailed comments: Patient is experiencing severe blockage in colon, they have an appointment scheduled for 5/10/22- however, would like to speak with care team to see if there is anything that can be done prior to appointment.     Phone Number Patient can be reached at: Home number on file [phone] (home)    Best Time: Any Time    Can we leave a detailed message on this number? YES    Call taken on 5/9/2022 at 7:35 AM by Esha Hogue

## 2022-05-09 NOTE — TELEPHONE ENCOUNTER
Could see at 10:45 am today or 2 pm today as he should probably be seen earlier.    Onur Rod MD  General Internal Medicine  [facility]  5/9/2022, 7:54 AM

## 2022-05-10 NOTE — PROGRESS NOTES
[facility] - Primary Care Specialists    Comprehensive and complex medical care - Chronic disease management - Shared decision making - Care coordination - Compassionate care    Patient advocacy - Rational deprescribing - Minimally disruptive medicine - Ethical focus - Customized care         Date of Service: 5/9/2022  Primary Provider: Onur Rod    Patient Care Team:  Onur Rod MD as PCP - General  Onur Rod MD as Assigned PCP  Marylou Ibrahim MD as Assigned Heart and Vascular Provider  Ambrose Degroot MD as MD (Orthopaedic Surgery)  Stuart Jackson MD as MD (Orthopaedic Surgery)  Liborio Champagne MD as MD  [facility]  Gregg Curtis as Resident (Orthopaedic Surgery)          Patient's Pharmacy:    [id number] - [place], [place] - [address]  [address] [facility] [address]  Phone: [phone] Fax: [phone]    [facility] [place], [place] - [address] AT Portal to [facility] Sites  [address]  [facility] [address]  Phone: [phone] Fax: [phone]     Patient's Contacts:  Name Home Phone Work Phone Mobile Phone Relationship Lgl Grd   KYLER CISNEROS [phone] Spouse    MATILDE SILVER   [phone] Other      Patient's Insurance:    Payor: MEDICARE / Plan: MEDICARE / Product Type: Medicare /            Active Problem List:  Problem List as of 5/9/2022 Reviewed: 5/9/2022  9:46 PM by Onur Rod MD       High    Nonsmoker    Full code status    CAD (coronary artery disease), minimal disease on angiogram in 2021    Atrial flutter, chronic (H)       Medium    S/P left atrial appendage closure - WATCHMAN    HTN (hypertension)    Chronic neck pain    Dilated cardiomyopathy (H)    Chronic systolic heart failure (H)    Chronic left-sided low back pain with left-sided sciatica       Low    GERD (gastroesophageal reflux disease)    Branch retinal vein occlusion of  left eye    Normal colonoscopy - 2016 - Average risk    Complete tear of left rotator cuff    Obesity (BMI 35.0-39.9) with comorbidity (H)    Concussion syndrome           Current Outpatient Medications   Medication Instructions     acetaminophen (TYLENOL) 650 mg, Oral, DAILY PRN     amoxicillin-clavulanate (AUGMENTIN) 875-125 MG tablet 1 tablet, Oral, 2 TIMES DAILY     aspirin (ASA) 81 mg, Oral, DAILY     co-enzyme Q-10 200 mg, Oral, DAILY     fish oil-omega-3 fatty acids 2 g, DAILY     fluticasone propionate (FLONASE) 50 mcg/actuation nasal spray [FLUTICASONE PROPIONATE (FLONASE) 50 MCG/ACTUATION NASAL SPRAY] USE 2 SPRAYS IN EACH       NOSTRIL DAILY     gabapentin (NEURONTIN) 300 mg     lisinopril (ZESTRIL) 5 mg, Oral, DAILY     metoprolol succinate ER (TOPROL XL) 12.5 mg, Oral, DAILY     MULTIVITAMIN ORAL [MULTIVITAMIN ORAL] Take by mouth.     oxymetazoline HCl (AFRIN, OXYMETAZOLINE, NASL) [OXYMETAZOLINE HCL (AFRIN, OXYMETAZOLINE, NASL)] into each nostril.     vits A,C,E/lutein/zeax/zn/marly (EYE HEALTH FORMULA ORAL) [VITS A,C,E/LUTEIN/ZEAX/ZN/MARLY (EYE HEALTH FORMULA ORAL)] Take by mouth.      Social History     Social History Narrative    Patient of Dr. Rod since 2001.    .  Wife is a former RN.     Wife's cousin is Dr. Jean Pierre Champagne, ID specialist.       Subjective:     Mike Gonzalez is a 71 year old male who comes in today for:    Chief Complaint   Patient presents with     Abdominal Pain     severe left sided abdominal pain since 5/6/22, diarrhea on Sat and Sun - pain persists.     Advice Only     Should take aspirin     Finger     Left ring finger       Comes in today for follow up multiple issues.    Mainly in today for left lower quadrant and left side pain.  Has been going on since Thursday.  He does baseline have some constipation and felt he might be backed up/blocked.  He has not had pain like this before.  Still present today but worse over the weekend.  No nausea or vomiting, no fever or  chills.  Loose stools but this is because he is taking MOM to try to loosen his stools.  He took this Thursday, Friday and Saturday.  No blood in the stool.    They are planning to start an [place] cruise on Friday.    Continues to have intermittent hematuria.  Last about 2 weeks ago.  CT scan for this as below.  Having some issues with care coordination with [facility].  Would be open to scheduling with Dr. Campos Gaspar instead.  No new symptoms.    Cataract surgery doing okay.    Left 4th ring finger injured today.  Wind pulled car door out of hand and immediately had flexion deformity at the distal interphalangeal (DIP) of the left 4th finger.  No pain at this time.    We reviewed his other issues noted in the assessment but not specifically addressed in the HPI above.     Objective:     Wt Readings from Last 3 Encounters:   05/09/22 110.2 kg (243 lb)   03/31/22 110.2 kg (243 lb)   01/12/22 110.2 kg (243 lb)     BP Readings from Last 3 Encounters:   05/09/22 128/78   03/31/22 136/70   01/12/22 108/58     /78   Pulse 99   Temp 98.4  F (36.9  C) (Oral)   Resp 16   Wt 110.2 kg (243 lb)   SpO2 97%   BMI 32.96 kg/m     The patient is comfortable, no acute distress.  Mood good.  Insight good.  Eyes are nonicteric.  Neck is supple without mass.  No cervical adenopathy.  No thyromegaly. Heart regular rate and rhythm.  Lungs clear to auscultation bilaterally.  Respiratory effort is good.  Abdomen soft.  There is moderate tenderness in the left lower quadrant without rebound.  Mild distension of the abdomen.   No hepatosplenomegaly.  Extremities no edema.  He has a mallet deformity of the distal distal interphalangeal (DIP) joint.  No swelling.      Diagnostics:     Office Visit on 03/31/2022   Component Date Value Ref Range Status     Color Urine 03/31/2022 Yellow  Colorless, Straw, Light Yellow, Yellow Final     Appearance Urine 03/31/2022 Clear  Clear Final     Glucose Urine 03/31/2022 Negative  Negative  mg/dL Final     Bilirubin Urine 03/31/2022 Negative  Negative Final     Ketones Urine 03/31/2022 Negative  Negative mg/dL Final     Specific Gravity Urine 03/31/2022 1.020  1.005 - 1.030 Final     Blood Urine 03/31/2022 Negative  Negative Final     pH Urine 03/31/2022 5.5  5.0 - 8.0 Final     Protein Albumin Urine 03/31/2022 Negative  Negative mg/dL Final     Urobilinogen Urine 03/31/2022 0.2  0.2, 1.0 E.U./dL Final     Nitrite Urine 03/31/2022 Negative  Negative Final     Leukocyte Esterase Urine 03/31/2022 Negative  Negative Final     Bacteria Urine 03/31/2022 None Seen  None Seen /HPF Final     RBC Urine 03/31/2022 None Seen  0-2 /HPF /HPF Final     WBC Urine 03/31/2022 None Seen  0-5 /HPF /HPF Final     Squamous Epithelials Urine 03/31/2022 Few (A) None Seen /LPF Final     CBC RESULTS: Recent Labs   Lab Test 01/11/22  1324   WBC 8.5   RBC 4.74   HGB 15.2   HCT 45.1   MCV 95   MCH 32.1   MCHC 33.7   RDW 13.0        Recent Labs   Lab Test 01/11/22  1324 11/08/21  0948    140   POTASSIUM 4.3 4.3   CHLORIDE 101 103   CO2 27 27   ANIONGAP 12 10   GLC 88 113   BUN 14 13   CR 0.90 0.98   JOVAN 10.0 9.5     Prostate Specific Antigen Screen   Date Value Ref Range Status   11/30/2020 2.0 0.0 - 6.5 ng/mL Final        ______________________________________________________________________     EXAM DATE:         03/31/2022     EXAM: CT ABDOMEN, PELVIS W/O and WITH FOR HEMATURIA  LOCATION: [facility]  DATE/TIME: 3/31/2022 2:00 PM     INDICATION: Gross Hematuria.  COMPARISON: CT abdomen pelvis, 7/2/2018.  TECHNIQUE: CT scan of the abdomen and pelvis using urogram technique with pre contrast, post contrast, and delayed images. Multiplanar reformats were obtained. Dose reduction techniques were used.  CONTRAST: 100 mL Isovue 370 was administered via IV with 0 mL discarded. ISTAT CR=1.0 mg/dL, eGFR=73.     FINDINGS:  LOWER CHEST: Normal.     HEPATOBILIARY: Hepatic steatosis.     PANCREAS:  Normal.     SPLEEN: Normal.     ADRENAL GLANDS: Normal.     RIGHT KIDNEY/URETER: No urinary tract stones or urothelial lesions. There is a 3.3 cm simple right renal cyst.     LEFT KIDNEY/URETER: No urinary tract stones or urothelial lesions.     BLADDER: No bladder stones or bladder lesions.     BOWEL: No bowel obstruction or bowel inflammation. The appendix is normal.     LYMPH NODES: Normal.     VASCULATURE: Unremarkable.     PELVIC ORGANS: Normal.     MUSCULOSKELETAL: Advanced degenerative changes of the lumbar spine.     IMPRESSION:  1.  No urothelial lesions or urinary tract stones identified to explain patient's hematuria.  2.  3.3 cm simple right renal cyst. This is benign and does not require any further follow-up.  3.  Hepatic steatosis.       Assessment:     1. Diverticulitis of colon    2. Gross hematuria    3. Coronary artery disease involving native coronary artery of native heart without angina pectoris    4. Mallet deformity of left ring finger        Plan:     1. Amoxicillin-clavulanate for diverticulitis and close follow up if not improving.  2. Having a run around with [place] [facility] in relationship to gross hematuria.  Schedule consultation with Dr. Campos Gaspar in [place].  3. Hold off on aspirin at this time while still having episodic hematuria.  4. Splint given today for mallet finger and keep in extension at the distal interphalangeal (DIP) joint.  5. Continue current medications otherwise.  6. Follow up sooner if issues.    40 minutes or greater was spent today on the patient's care on the day of service.      This includes time for chart preparation, reviewing medical tests done before or during the visit, talking with the patient, review of quality indicators, required documentation, and other elements of care.        Onur Rod MD  General Internal Medicine  [facility]    Return in about 6 months (around 11/9/2022), or if symptoms worsen or fail  to improve, for follow up visit.     Future Appointments   Date Time Provider Department Center   5/11/2022 10:00 AM [facility] COVID LAB [facility]       Answers for HPI/ROS submitted by the patient on 5/9/2022  How many servings of fruits and vegetables do you eat daily?: 0-1  On average, how many sweetened beverages do you drink each day (Examples: soda, juice, sweet tea, etc.  Do NOT count diet or artificially sweetened beverages)?: 1  How many minutes a day do you exercise enough to make your heart beat faster?: 10 to 19  How many days a week do you exercise enough to make your heart beat faster?: 3 or less  How many days per week do you miss taking your medication?: 0  What is the reason for your visit today?: Intestine blockage  When did your symptoms begin?: 3-7 days ago

## 2022-05-10 NOTE — PATIENT INSTRUCTIONS
Follow up with Dr. Campos Gaspar in relationship to your hematuria.  Keep finger in splint for 6-8 weeks.  Go to [facility] if not improving.  Okay to keep off aspirin at this time until we get to the bottom of your blood in the urine.  Take augmentin for diverticulitis and let me know Thursday am early if you do not feel you are making progress.    ______________________________________________________________________     Future Appointments   Date Time Provider Department Center   5/11/2022 10:00 AM [facility] COVID LAB [facility]

## 2022-05-11 ENCOUNTER — LAB (OUTPATIENT)
Dept: LAB | Facility: CLINIC | Age: 72
End: 2022-05-11
Payer: MEDICARE

## 2022-05-11 DIAGNOSIS — Z20.822 ENCOUNTER FOR LABORATORY TESTING FOR COVID-19 VIRUS: ICD-10-CM

## 2022-05-11 PROCEDURE — U0005 INFEC AGEN DETEC AMPLI PROBE: HCPCS

## 2022-05-11 PROCEDURE — U0003 INFECTIOUS AGENT DETECTION BY NUCLEIC ACID (DNA OR RNA); SEVERE ACUTE RESPIRATORY SYNDROME CORONAVIRUS 2 (SARS-COV-2) (CORONAVIRUS DISEASE [COVID-19]), AMPLIFIED PROBE TECHNIQUE, MAKING USE OF HIGH THROUGHPUT TECHNOLOGIES AS DESCRIBED BY CMS-2020-01-R: HCPCS

## 2022-05-12 LAB — SARS-COV-2 RNA RESP QL NAA+PROBE: NEGATIVE

## 2022-06-19 ENCOUNTER — MYC MEDICAL ADVICE (OUTPATIENT)
Dept: INTERNAL MEDICINE | Facility: CLINIC | Age: 72
End: 2022-06-19
Payer: MEDICARE

## 2022-06-21 ENCOUNTER — TELEPHONE (OUTPATIENT)
Dept: ADMINISTRATIVE | Facility: CLINIC | Age: 72
End: 2022-06-21
Payer: MEDICARE

## 2022-06-22 ENCOUNTER — TELEPHONE (OUTPATIENT)
Dept: CARDIOLOGY | Facility: CLINIC | Age: 72
End: 2022-06-22

## 2022-06-22 NOTE — TELEPHONE ENCOUNTER
Patient s/p LAAC 7/14/21. Due for follow-up with Dr. Tyler in July. Patient would like call back in three weeks to schedule follow-up.    MODIFIED ENA SCALE   Timepoint: 1yr Post-LAAC    Previous score: 0    Score Description   0 No symptoms at all   1 No significant disability despite symptoms; able to carry out all usual duties and activities   2 Slight disability; unable to carry out all previous activities, but able to look after own affairs without assistance   3 Moderate disability; requiring some help, but able to walk without assistance   4 Moderately severe disability; unable to walk without assistance and unable to attend to own bodily needs without assistance   5 Severe disability; bedridden, incontinent and requiring constant nursing care and attention   6 Dead    Total score (0 - 6):  0    Change in score if s/p LAAC? No  If yes, notify implanting cardiologist.    Melvina Ch RN BSN  Structural Heart Coordinator   [facility]  [phone]

## 2022-07-12 ENCOUNTER — TRANSFERRED RECORDS (OUTPATIENT)
Dept: HEALTH INFORMATION MANAGEMENT | Facility: CLINIC | Age: 72
End: 2022-07-12

## 2022-07-18 ENCOUNTER — MYC MEDICAL ADVICE (OUTPATIENT)
Dept: INTERNAL MEDICINE | Facility: CLINIC | Age: 72
End: 2022-07-18

## 2022-07-18 DIAGNOSIS — I48.92 ATRIAL FLUTTER, CHRONIC (H): ICD-10-CM

## 2022-07-18 DIAGNOSIS — R06.02 SOB (SHORTNESS OF BREATH): ICD-10-CM

## 2022-07-18 DIAGNOSIS — R93.1 DECREASED CARDIAC EJECTION FRACTION: ICD-10-CM

## 2022-07-18 DIAGNOSIS — I10 ESSENTIAL HYPERTENSION: ICD-10-CM

## 2022-07-18 RX ORDER — METOPROLOL SUCCINATE 25 MG/1
12.5 TABLET, EXTENDED RELEASE ORAL DAILY
Qty: 45 TABLET | Refills: 3 | Status: ON HOLD | OUTPATIENT
Start: 2022-07-18 | End: 2023-04-02

## 2022-07-18 NOTE — TELEPHONE ENCOUNTER
Placido georgeg:   Did you get a refill message from [employer]?     Thank you.     Bj Ochoa is pamela'd up for verification and approval, if appropriate.       Thank you,   Mike Dennis Jr., CMA on 7/18/2022 at 3:53 PM

## 2022-07-19 ENCOUNTER — TRANSFERRED RECORDS (OUTPATIENT)
Dept: HEALTH INFORMATION MANAGEMENT | Facility: CLINIC | Age: 72
End: 2022-07-19

## 2022-08-03 ENCOUNTER — TRANSFERRED RECORDS (OUTPATIENT)
Dept: HEALTH INFORMATION MANAGEMENT | Facility: CLINIC | Age: 72
End: 2022-08-03

## 2022-08-12 DIAGNOSIS — T63.481A LOCAL REACTION TO INSECT STING, ACCIDENTAL OR UNINTENTIONAL, INITIAL ENCOUNTER: Primary | ICD-10-CM

## 2022-08-12 RX ORDER — PREDNISONE 20 MG/1
TABLET ORAL
Qty: 12 TABLET | Refills: 0 | Status: SHIPPED | OUTPATIENT
Start: 2022-08-12 | End: 2022-08-17

## 2022-08-24 ENCOUNTER — TRANSFERRED RECORDS (OUTPATIENT)
Dept: HEALTH INFORMATION MANAGEMENT | Facility: CLINIC | Age: 72
End: 2022-08-24

## 2022-09-02 ENCOUNTER — MYC MEDICAL ADVICE (OUTPATIENT)
Dept: INTERNAL MEDICINE | Facility: CLINIC | Age: 72
End: 2022-09-02

## 2022-09-06 ENCOUNTER — TRANSFERRED RECORDS (OUTPATIENT)
Dept: HEALTH INFORMATION MANAGEMENT | Facility: CLINIC | Age: 72
End: 2022-09-06

## 2022-09-08 ENCOUNTER — OFFICE VISIT (OUTPATIENT)
Dept: INTERNAL MEDICINE | Facility: CLINIC | Age: 72
End: 2022-09-08
Payer: MEDICARE

## 2022-09-08 DIAGNOSIS — R25.2 LEG CRAMPS: Primary | ICD-10-CM

## 2022-09-08 DIAGNOSIS — M25.50 MULTIPLE JOINT PAIN: ICD-10-CM

## 2022-09-08 DIAGNOSIS — I48.92 ATRIAL FLUTTER, CHRONIC (H): ICD-10-CM

## 2022-09-08 DIAGNOSIS — Z13.220 SCREENING FOR HYPERLIPIDEMIA: ICD-10-CM

## 2022-09-08 DIAGNOSIS — G43.109 OPHTHALMIC MIGRAINE: ICD-10-CM

## 2022-09-08 DIAGNOSIS — R31.0 GROSS HEMATURIA: ICD-10-CM

## 2022-09-08 DIAGNOSIS — J31.0 CHRONIC RHINITIS: ICD-10-CM

## 2022-09-08 DIAGNOSIS — M20.012 MALLET DEFORMITY OF LEFT RING FINGER: ICD-10-CM

## 2022-09-08 DIAGNOSIS — R23.3 EASY BRUISING: ICD-10-CM

## 2022-09-08 DIAGNOSIS — D69.2 SENILE PURPURA (H): ICD-10-CM

## 2022-09-08 DIAGNOSIS — I10 ESSENTIAL HYPERTENSION: ICD-10-CM

## 2022-09-08 LAB
ANION GAP SERPL CALCULATED.3IONS-SCNC: 12 MMOL/L (ref 7–15)
APTT PPP: 28 SECONDS (ref 22–38)
BASOPHILS # BLD AUTO: 0 10E3/UL (ref 0–0.2)
BASOPHILS NFR BLD AUTO: 0 %
BUN SERPL-MCNC: 18.2 MG/DL (ref 8–23)
CALCIUM SERPL-MCNC: 10 MG/DL (ref 8.8–10.2)
CHLORIDE SERPL-SCNC: 102 MMOL/L (ref 98–107)
CHOLEST SERPL-MCNC: 168 MG/DL
CREAT SERPL-MCNC: 0.93 MG/DL (ref 0.67–1.17)
CRP SERPL-MCNC: 3.25 MG/L
DEPRECATED HCO3 PLAS-SCNC: 27 MMOL/L (ref 22–29)
EOSINOPHIL # BLD AUTO: 0.1 10E3/UL (ref 0–0.7)
EOSINOPHIL NFR BLD AUTO: 1 %
ERYTHROCYTE [DISTWIDTH] IN BLOOD BY AUTOMATED COUNT: 13.1 % (ref 10–15)
ERYTHROCYTE [SEDIMENTATION RATE] IN BLOOD BY WESTERGREN METHOD: 9 MM/HR (ref 0–15)
GFR SERPL CREATININE-BSD FRML MDRD: 87 ML/MIN/1.73M2
GLUCOSE SERPL-MCNC: 94 MG/DL (ref 70–99)
HCT VFR BLD AUTO: 46.5 % (ref 40–53)
HDLC SERPL-MCNC: 55 MG/DL
HGB BLD-MCNC: 15.9 G/DL (ref 13.3–17.7)
IMM GRANULOCYTES # BLD: 0 10E3/UL
IMM GRANULOCYTES NFR BLD: 0 %
INR PPP: 1.01 (ref 0.85–1.15)
LDLC SERPL CALC-MCNC: 100 MG/DL
LYMPHOCYTES # BLD AUTO: 1.9 10E3/UL (ref 0.8–5.3)
LYMPHOCYTES NFR BLD AUTO: 22 %
MAGNESIUM SERPL-MCNC: 2.3 MG/DL (ref 1.7–2.3)
MCH RBC QN AUTO: 32.3 PG (ref 26.5–33)
MCHC RBC AUTO-ENTMCNC: 34.2 G/DL (ref 31.5–36.5)
MCV RBC AUTO: 94 FL (ref 78–100)
MONOCYTES # BLD AUTO: 0.9 10E3/UL (ref 0–1.3)
MONOCYTES NFR BLD AUTO: 11 %
NEUTROPHILS # BLD AUTO: 5.9 10E3/UL (ref 1.6–8.3)
NEUTROPHILS NFR BLD AUTO: 67 %
NONHDLC SERPL-MCNC: 113 MG/DL
PLATELET # BLD AUTO: 307 10E3/UL (ref 150–450)
POTASSIUM SERPL-SCNC: 4.3 MMOL/L (ref 3.4–5.3)
RBC # BLD AUTO: 4.93 10E6/UL (ref 4.4–5.9)
SODIUM SERPL-SCNC: 141 MMOL/L (ref 136–145)
TRIGL SERPL-MCNC: 66 MG/DL
TSH SERPL DL<=0.005 MIU/L-ACNC: 1.92 UIU/ML (ref 0.3–4.2)
WBC # BLD AUTO: 8.9 10E3/UL (ref 4–11)

## 2022-09-08 PROCEDURE — 99215 OFFICE O/P EST HI 40 MIN: CPT | Performed by: INTERNAL MEDICINE

## 2022-09-08 PROCEDURE — 36415 COLL VENOUS BLD VENIPUNCTURE: CPT | Performed by: INTERNAL MEDICINE

## 2022-09-08 PROCEDURE — 86140 C-REACTIVE PROTEIN: CPT | Performed by: INTERNAL MEDICINE

## 2022-09-08 PROCEDURE — 85652 RBC SED RATE AUTOMATED: CPT | Performed by: INTERNAL MEDICINE

## 2022-09-08 PROCEDURE — 83735 ASSAY OF MAGNESIUM: CPT | Performed by: INTERNAL MEDICINE

## 2022-09-08 PROCEDURE — 80048 BASIC METABOLIC PNL TOTAL CA: CPT | Performed by: INTERNAL MEDICINE

## 2022-09-08 PROCEDURE — 84443 ASSAY THYROID STIM HORMONE: CPT | Performed by: INTERNAL MEDICINE

## 2022-09-08 PROCEDURE — 85610 PROTHROMBIN TIME: CPT | Performed by: INTERNAL MEDICINE

## 2022-09-08 PROCEDURE — 85025 COMPLETE CBC W/AUTO DIFF WBC: CPT | Performed by: INTERNAL MEDICINE

## 2022-09-08 PROCEDURE — 80061 LIPID PANEL: CPT | Performed by: INTERNAL MEDICINE

## 2022-09-08 PROCEDURE — 85730 THROMBOPLASTIN TIME PARTIAL: CPT | Performed by: INTERNAL MEDICINE

## 2022-09-08 RX ORDER — FLUTICASONE PROPIONATE 50 MCG
1 SPRAY, SUSPENSION (ML) NASAL DAILY
Qty: 48 ML | Refills: 2 | Status: SHIPPED | OUTPATIENT
Start: 2022-09-08 | End: 2023-02-24

## 2022-09-08 RX ORDER — HYDROXYCHLOROQUINE SULFATE 200 MG/1
200 TABLET, FILM COATED ORAL AT BEDTIME
Qty: 30 TABLET | Refills: 4 | Status: SHIPPED | OUTPATIENT
Start: 2022-09-08 | End: 2023-02-24

## 2022-09-08 RX ORDER — ASPIRIN 81 MG/1
81 TABLET, CHEWABLE ORAL DAILY
COMMUNITY
End: 2023-03-21

## 2022-09-08 NOTE — PROGRESS NOTES
[facility] - Primary [facility] Specialists    Comprehensive and complex medical care - Chronic disease management - Shared decision making - Care coordination - Compassionate care    Patient advocacy - Rational deprescribing - Minimally disruptive medicine - Ethical focus - Customized care         Date of Service: 9/8/2022  Primary Provider: Onur Rod    Patient Care Team:  Onur Rod MD as PCP - General  Onur Rod MD as Assigned PCP  Ambrose Degroot MD as MD (Orthopaedic Surgery)  Stuart Jackson MD as MD (Orthopaedic Surgery)  Liborio Champagne MD as MD  ASSOCIATED EYE CARE  Gregg Curtis as Resident (Orthopaedic Surgery)  Gina Blount PA-C as Assigned Heart and Vascular Provider          Patient's Pharmacy:    [id number] - [place], [place] - [email or web address] [facility]  [address] [facility] [address]  Phone: [phone] Fax: [phone]    [facility] [place] [place] - [address] AT Portal to [facility] Sites  [address]  [facility] [address]  Phone: [phone] Fax: [phone]     Patient's Contacts:  Name Home Phone Work Phone Mobile Phone Relationship Lgl Danield   KYLER CISNEROS [phone] Spouse    MATILDE SILVER   [phone] Other      Patient's Insurance:    Payor: MEDICARE / Plan: MEDICARE / Product Type: Medicare /            Active Problem List:  Problem List as of 9/8/2022 Reviewed: 5/9/2022  9:46 PM by Onur Rod MD       High    Nonsmoker    Full code status    CAD (coronary artery disease), minimal disease on angiogram in 2021    Atrial flutter, chronic (H)       Medium    S/P left atrial appendage closure - WATCHMAN    HTN (hypertension)       Low    GERD (gastroesophageal reflux disease)    Branch retinal vein occlusion of left eye    Normal colonoscopy - 2016 - Average risk    Complete tear of left rotator cuff    Obesity (BMI 35.0-39.9) with comorbidity (H)    Concussion  syndrome       Other    Dilated cardiomyopathy (H)       Other    Chronic neck pain    Chronic systolic heart failure (H)    Chronic left-sided low back pain with left-sided sciatica           Current Outpatient Medications   Medication Instructions     acetaminophen (TYLENOL) 650 mg, Oral, DAILY PRN     aspirin (ASA) 81 mg, Oral, DAILY     co-enzyme Q-10 200 mg, Oral, DAILY     fish oil-omega-3 fatty acids 2 g, DAILY     fluticasone (FLONASE) 50 MCG/ACT nasal spray 1 spray, Both Nostrils, DAILY     gabapentin (NEURONTIN) 300 mg     hydroxychloroquine (PLAQUENIL) 200 mg, Oral, AT BEDTIME     lisinopril (ZESTRIL) 5 mg, Oral, DAILY     metoprolol succinate ER (TOPROL XL) 12.5 mg, Oral, DAILY     MULTIVITAMIN ORAL [MULTIVITAMIN ORAL] Take by mouth.     oxymetazoline HCl (AFRIN, OXYMETAZOLINE, NASL) [OXYMETAZOLINE HCL (AFRIN, OXYMETAZOLINE, NASL)] into each nostril.     vits A,C,E/lutein/zeax/zn/marly (EYE HEALTH FORMULA ORAL) [VITS A,C,E/LUTEIN/ZEAX/ZN/MARLY (EYE HEALTH FORMULA ORAL)] Take by mouth.      Social History     Social History Narrative    Patient of Dr. Rod since 2001.    .  Wife is a former RN.     Wife's cousin is Dr. Jean Pierre Champagne, ID specialist.       Subjective:     Mike Gonzalez is a 71 year old male who comes in today for:    Chief Complaint   Patient presents with     Stomach Issues     Diverticulitis acting up 2-3wks ago. Settling down now.      leg cramps     Related to nerve issues in lower back and hips.      Derm Problem     Reached under boat top and received abrasions. Has some scabs. Happened Friday, 09/02/22. Little bit of blood.       Headache     Has had two migraines in the past month.Would like to discuss this.       Comes in for follow up multiple issues.    First reviewed his mallet finger.  This is finally doing better.  Did see orthopedics for this.    Reviewed his joint pain.  He is doing better after a trochanteric bursitis injection to the left hip.  He also noted that  when he was on prednisone for his bee sting that his joint pain was doing a lot better.  Autoimmune work-up to date has been good.  Joint pain came back about 8-9 days after stopping the prednisone.    Getting a lot of cramps.  Both feet and legs.  Every night.  Has to get up and stand to help get rid of.  Disrupting sleep.  Would like to try something for this.    Reviewed issues with his fatigue.    Follow up on gross hematuria.  Cystoscopy negative.  No further issues.    Having some optical migraines.  2 in the last month.  Only 1 before then.  Last 10-15 minutes and then go.  No other sxs.    Does still have some issues with abdomen discomfort after diverticulitis but not as bad and generally doing better.  Reviewed colonoscopy with the patient in relationship to this but he does not think it is necessary at this time.    Has some bruising on the arms to review.    We reviewed his other issues noted in the assessment but not specifically addressed in the HPI above.     Objective:     Wt Readings from Last 3 Encounters:   09/08/22 108 kg (238 lb)   05/09/22 110.2 kg (243 lb)   03/31/22 110.2 kg (243 lb)     BP Readings from Last 3 Encounters:   09/11/22 134/78   05/09/22 128/78   03/31/22 136/70     /78   Pulse 61   Resp 16   Wt 108 kg (238 lb)   SpO2 97%   BMI 32.28 kg/m     The patient is comfortable, no acute distress.  Mood good.  Insight good.  Eyes are nonicteric.  Neck is supple without mass.  No cervical adenopathy.  No thyromegaly. Heart regular rate and rhythm.  Lungs clear to auscultation bilaterally.  Respiratory effort is good.  Abdomen soft nontender.  Extremities no edema.      Diagnostics:     Lab on 05/11/2022   Component Date Value Ref Range Status     SARS CoV2 PCR 05/11/2022 Negative  Negative, Testing sent to reference lab. Results will be returned via unsolicited result Final    NEGATIVE: SARS-CoV-2 (COVID-19) RNA not detected, presumed negative.       Results for orders placed  or performed in visit on 09/08/22   TSH WITH FREE T4 REFLEX     Status: Normal   Result Value Ref Range    TSH 1.92 0.30 - 4.20 uIU/mL   Lipid panel reflex to direct LDL Non-fasting     Status: Normal   Result Value Ref Range    Cholesterol 168 <200 mg/dL    Triglycerides 66 <150 mg/dL    Direct Measure HDL 55 >=40 mg/dL    LDL Cholesterol Calculated 100 <=100 mg/dL    Non HDL Cholesterol 113 <130 mg/dL    Narrative    Cholesterol  Desirable:  <200 mg/dL    Triglycerides  Normal:  Less than 150 mg/dL  Borderline High:  150-199 mg/dL  High:  200-499 mg/dL  Very High:  Greater than or equal to 500 mg/dL    Direct Measure HDL  Female:  Greater than or equal to 50 mg/dL   Male:  Greater than or equal to 40 mg/dL    LDL Cholesterol  Desirable:  <100mg/dL  Above Desirable:  100-129 mg/dL   Borderline High:  130-159 mg/dL   High:  160-189 mg/dL   Very High:  >= 190 mg/dL    Non HDL Cholesterol  Desirable:  130 mg/dL  Above Desirable:  130-159 mg/dL  Borderline High:  160-189 mg/dL  High:  190-219 mg/dL  Very High:  Greater than or equal to 220 mg/dL   BASIC METABOLIC PANEL     Status: Normal   Result Value Ref Range    Creatinine 0.93 0.67 - 1.17 mg/dL    Sodium 141 136 - 145 mmol/L    Potassium 4.3 3.4 - 5.3 mmol/L    Urea Nitrogen 18.2 8.0 - 23.0 mg/dL    Chloride 102 98 - 107 mmol/L    Carbon Dioxide (CO2) 27 22 - 29 mmol/L    Anion Gap 12 7 - 15 mmol/L    Glucose 94 70 - 99 mg/dL    GFR Estimate 87 >60 mL/min/1.73m2    Calcium 10.0 8.8 - 10.2 mg/dL   Magnesium     Status: Normal   Result Value Ref Range    Magnesium 2.3 1.7 - 2.3 mg/dL   CRP inflammation     Status: Normal   Result Value Ref Range    CRP Inflammation 3.25 <5.00 mg/L   Erythrocyte sedimentation rate auto     Status: Normal   Result Value Ref Range    Erythrocyte Sedimentation Rate 9 0 - 15 mm/hr   Partial thromboplastin time     Status: Normal   Result Value Ref Range    aPTT 28 22 - 38 Seconds   INR     Status: Normal   Result Value Ref Range    INR  1.01 0.85 - 1.15   CBC with platelets and differential     Status: None   Result Value Ref Range    WBC Count 8.9 4.0 - 11.0 10e3/uL    RBC Count 4.93 4.40 - 5.90 10e6/uL    Hemoglobin 15.9 13.3 - 17.7 g/dL    Hematocrit 46.5 40.0 - 53.0 %    MCV 94 78 - 100 fL    MCH 32.3 26.5 - 33.0 pg    MCHC 34.2 31.5 - 36.5 g/dL    RDW 13.1 10.0 - 15.0 %    Platelet Count 307 150 - 450 10e3/uL    % Neutrophils 67 %    % Lymphocytes 22 %    % Monocytes 11 %    % Eosinophils 1 %    % Basophils 0 %    % Immature Granulocytes 0 %    Absolute Neutrophils 5.9 1.6 - 8.3 10e3/uL    Absolute Lymphocytes 1.9 0.8 - 5.3 10e3/uL    Absolute Monocytes 0.9 0.0 - 1.3 10e3/uL    Absolute Eosinophils 0.1 0.0 - 0.7 10e3/uL    Absolute Basophils 0.0 0.0 - 0.2 10e3/uL    Absolute Immature Granulocytes 0.0 <=0.4 10e3/uL   CBC with Platelets & Differential     Status: None    Narrative    The following orders were created for panel order CBC with Platelets & Differential.  Procedure                               Abnormality         Status                     ---------                               -----------         ------                     CBC with platelets and d...[[id number]]                      Final result                 Please view results for these tests on the individual orders.        Assessment:     1. Leg cramps    2. Screening for hyperlipidemia    3. Chronic rhinitis    4. Essential hypertension    5. Multiple joint pain    6. Easy bruising    7. Atrial flutter, chronic (H)    8. Gross hematuria     9. Mallet deformity of left ring finger    10. Ophthalmic migraine    11. Senile purpura (H)        Plan:     1. Check blood work today.     2. Follow up orthopedics as scheduled.  3. Request records from [place] [facility].  4. In relationship to senile purpura, do further work-up today.  5. Use hydroxychloroquine (Plaquenil) for leg cramps and see how it goes.  6. Continue current medications otherwise.  7. Follow up sooner if  issues.    Orders Placed This Encounter   Procedures     TSH WITH FREE T4 REFLEX     Lipid panel reflex to direct LDL Non-fasting     BASIC METABOLIC PANEL     Magnesium     CRP inflammation     Erythrocyte sedimentation rate auto     Partial thromboplastin time     INR     CBC with platelets and differential     CBC with Platelets & Differential        40 minutes or greater was spent today on the patient's care on the day of service.      This includes time for chart preparation, reviewing medical tests done before or during the visit, talking with the patient, review of quality indicators, required documentation, and other elements of care.        Onur Rod MD  General Internal Medicine  [facility]    Return in about 6 months (around 3/8/2023), or if symptoms worsen or fail to improve, for annual wellness visit, visit and blood work.     Future Appointments   Date Time Provider Department Center   9/28/2022  1:20 PM Kiran Tyler MD [facility]

## 2022-09-08 NOTE — PROGRESS NOTES
{PROVIDER CHARTING PREFERENCE:847173}    Lucina Lorenzo is a 71 year old{ACCOMPANIED BY STATEMENT (Optional):997276}, presenting for the following health issues:  No chief complaint on file.      History of Present Illness       Reason for visit:  Skin condition, stomach, leg cramps    He eats 2-3 servings of fruits and vegetables daily.He consumes 0 sweetened beverage(s) daily.He exercises with enough effort to increase his heart rate 9 or less minutes per day.  He exercises with enough effort to increase his heart rate 3 or less days per week.   He is taking medications regularly.       {SUPERLIST (Optional):689182}  {additonal problems for provider to add (Optional):246313}    Review of Systems   {ROS COMP (Optional):137914}      Objective    There were no vitals taken for this visit.  There is no height or weight on file to calculate BMI.  Physical Exam   {Exam List (Optional):049855}    {Diagnostic Test Results (Optional):369007}    {AMBULATORY ATTESTATION (Optional):479690}

## 2022-09-11 VITALS
RESPIRATION RATE: 16 BRPM | HEART RATE: 61 BPM | SYSTOLIC BLOOD PRESSURE: 134 MMHG | OXYGEN SATURATION: 97 % | WEIGHT: 238 LBS | BODY MASS INDEX: 32.28 KG/M2 | DIASTOLIC BLOOD PRESSURE: 78 MMHG

## 2022-09-11 NOTE — PATIENT INSTRUCTIONS
Future Appointments   Date Time Provider Department Center   9/28/2022  1:20 PM Kiran Tyler MD [facility]

## 2022-09-26 ENCOUNTER — MYC MEDICAL ADVICE (OUTPATIENT)
Dept: INTERNAL MEDICINE | Facility: CLINIC | Age: 72
End: 2022-09-26

## 2022-09-28 ENCOUNTER — OFFICE VISIT (OUTPATIENT)
Dept: CARDIOLOGY | Facility: CLINIC | Age: 72
End: 2022-09-28
Attending: GENERAL ACUTE CARE HOSPITAL
Payer: MEDICARE

## 2022-09-28 VITALS
HEART RATE: 64 BPM | RESPIRATION RATE: 14 BRPM | WEIGHT: 239 LBS | DIASTOLIC BLOOD PRESSURE: 78 MMHG | SYSTOLIC BLOOD PRESSURE: 112 MMHG | BODY MASS INDEX: 32.41 KG/M2

## 2022-09-28 DIAGNOSIS — I48.19 PERSISTENT ATRIAL FIBRILLATION (H): ICD-10-CM

## 2022-09-28 DIAGNOSIS — E78.5 HYPERLIPIDEMIA, UNSPECIFIED HYPERLIPIDEMIA TYPE: ICD-10-CM

## 2022-09-28 DIAGNOSIS — I25.10 NONOBSTRUCTIVE ATHEROSCLEROSIS OF CORONARY ARTERY: ICD-10-CM

## 2022-09-28 DIAGNOSIS — I10 ESSENTIAL HYPERTENSION: Primary | ICD-10-CM

## 2022-09-28 DIAGNOSIS — I50.22 CHRONIC SYSTOLIC HEART FAILURE (H): ICD-10-CM

## 2022-09-28 PROCEDURE — 99214 OFFICE O/P EST MOD 30 MIN: CPT | Performed by: GENERAL ACUTE CARE HOSPITAL

## 2022-09-28 NOTE — LETTER
9/28/2022    Onur Rod MD  [address]  [facility] [address]    RE: Mike Gonzalez       Dear Colleague,     I had the pleasure of seeing Mike Gonzalez in the [facility].  HEART CARE ENCOUNTER NOTE        Assessment/Recommendations   Assessment:    1. Persistent atrial fibrillation status post 31 mm Watchman FLX left atrial appendage closure device placement on 7/14/2021. Stable small peridevice leak measuring less than 5 mm.  2. Nonischemic cardiomyopathy with left ventricular ejection fraction of 42%. NYHA class I, appears euvolemic.  3. Mild nonobstructive coronary artery disease see on coronary angiography 1/20/2021.  4. Benign essential hypertension.   5. Hyperlipidemia. Last  mg/dL.  6. BMI 32.41.    Plan:  1. Continue low doses of lisinopril 5 mg and metoprolol succinate 12.5 mg daily.  2. Patient has discontinued aspirin which is probably fine this far out from his left atrial appendage closure.  3. Follow-up with me in 1 year.         History of Present Illness   Mr. Mike Gonzalez is a 71 year old male with a significant past history of persistent AF s/p 31 mm Watchman FLX device placement on 7/14/2021, nonobstructive CAD, and HFrEF LVEF 40% presenting for follow-up.    He underwent spinal surgery earlier this year. He initially had some improvement but then his symptoms returned. He then apparently received prednisone after being stung multiple times by wasps and noticed his back felt much better. Additional anti-inflammatory therapy is now being used. He also gets a lot of leg cramping. He stopped taking aspirin due to concern it could be making his back pain worse.    From a heart standpoint, he seems to be doing well. He denies any chest pain/pressure/tightness, shortness of breath at rest or with exertion, light headedness/dizziness, pre-syncope, syncope, lower extremity swelling, palpitations, paroxysmal nocturnal dyspnea (PND), or orthopnea.     Cardiac  Problems and Cardiac Diagnostics     Most Recent Cardiac testing:  ECG dated 1/11/2022 (personaly reviewed and interpreted): atrial fibrillation, ventricular rate 61 bpm, possible anterior infarct, left anterior fascicular block     Holter monitor 3/4/2021 (report reviewed):  HOLTER MONITOR     TEST INDICATION:  Evaluate for atrial fibrillation.     IMPRESSION:  1.  A 24-hour Holter monitor was applied 03/04/2021 with date of interpretation 03/08/2021.  2.  Baseline tracing showed atrial fibrillation, which is present throughout the recording.  There is an IVCD, incomplete left bundle branch block.  3.  Average heart rate of 65 beats per minute and ranges between 39 and 122 beats per minute.  4.  Modest nocturnal bradycardia is seen.  There is no major bradycardia during waking hours.  5.  Ventricular ectopy is infrequent with 1362 PVCs which is 1.4% of total heartbeats.  No salvos, no nonsustained ventricular tachycardia.  6.  Patient activity diary was reviewed, but no symptoms noted.     DISCUSSION:  Persistent atrial fibrillation with controlled ventricular response.     ECHO 10/18/2021 (report reviewed):  1. The left ventricle is mildly enlarged. Left ventricular systolic performance is moderately reduced. The ejection fraction is estimated to be 40%.  2. There is moderate global reduction in left ventricular systolic performance.  3. There is mild concentric increase in left ventricular wall thickness.  4. No significant valvular heart disease is identified on this study.  5. Borderline right ventricular enlargement with normal right ventricular systolic performance.  6. There is severe left atrial enlargement. There is moderate right atrial enlargement.  7. There is a left atrial appendage occlusion device.  Â  No thrombus is detected upon the surface of the device.  Â  There is a localized region of flow around the device between the device  itself and the left lateral ridge. The gap measures 0.35-0.5 cm and  appears  similar to the findings on the 2 September 2021 transesophageal  echocardiogram.  8. There is mild spontaneous echo contrast in the left atrium though no thrombus detected.  9. Color Doppler interrogation of the atrial septum reveals the presence of a patent foramen ovale (PFO). No right to left shunting, however, was elicited with echo contrast injection either with spontaneous respiration or Valsalva.     Stress test 1/5/2021 (report reviewed):     The nuclear stress test is abnormal. There is no ischemia but there is a medium sized area of a moderate degree of nontransmural infarction in the distal anterior segment(s) of the left ventricle. This appears to be in the left anterior descending artery distribution.     The stress electrocardiogram is negative for inducible ischemic EKG changes. Occasional PVCs were noted during stress and recovery.     The left ventricle is dilated and the left ventricular ejection fraction at stress is mildly decreased at 44%.     Low-to-intermediate risk of future ischemic events.     There is no prior study for comparison.     Cardiac cath 1/20/2021 (report reviewed):     Mild coronary atherosclerosis.    LV EDP 20 mmHg.     Medications  Allergies   Current Outpatient Medications   Medication Sig Dispense Refill     acetaminophen (TYLENOL) 650 MG CR tablet Take 650 mg by mouth daily as needed       co-enzyme Q-10 100 MG CAPS capsule Take 200 mg by mouth daily       fish oil-omega-3 fatty acids 300-1,000 mg capsule [FISH OIL-OMEGA-3 FATTY ACIDS 300-1,000 MG CAPSULE] Take 2 g by mouth daily.       fluticasone (FLONASE) 50 MCG/ACT nasal spray Spray 1 spray into both nostrils daily 48 mL 2     gabapentin (NEURONTIN) 600 MG tablet Take 300 mg by mouth as needed       lisinopril (ZESTRIL) 5 MG tablet Take 1 tablet (5 mg) by mouth daily 90 tablet 3     Magnesium Oxide POWD Take 1 Dose by mouth daily       metoprolol succinate ER (TOPROL XL) 25 MG 24 hr tablet Take 0.5 tablets  (12.5 mg) by mouth daily 45 tablet 3     MULTIVITAMIN ORAL Take 1 tablet by mouth daily       oxymetazoline HCl (AFRIN, OXYMETAZOLINE, NASL) Spray 1 spray in nostril 2 times daily       vits A,C,E/lutein/zeax/zn/marly (EYE HEALTH FORMULA ORAL) [VITS A,C,E/LUTEIN/ZEAX/ZN/MARLY (EYE HEALTH FORMULA ORAL)] Take by mouth.       aspirin (ASA) 81 MG chewable tablet Take 81 mg by mouth daily (Patient not taking: Reported on 9/28/2022)       hydroxychloroquine (PLAQUENIL) 200 MG tablet Take 1 tablet (200 mg) by mouth At Bedtime (Patient not taking: Reported on 9/28/2022) 30 tablet 4      Allergies   Allergen Reactions     Effient [Prasugrel Hcl] Hives     Hydrochlorothiazide Unknown     Hydrocodone Swelling     Sinus     Oxycontin [Oxycodone] Swelling     lip     Peppermint Oil Other (See Comments)     Plavix [Clopidogrel] Hives     Sulfa (Sulfonamide Antibiotics) [Sulfa Drugs] Swelling     Perfume Swelling     Other reaction(s): Runny Nose        Physical Examination Review of Systems   /78 (BP Location: Left arm, Patient Position: Sitting, Cuff Size: Adult Large)   Pulse 64   Resp 14   Wt 108.4 kg (239 lb)   BMI 32.41 kg/m    Body mass index is 32.41 kg/m .  Wt Readings from Last 3 Encounters:   09/28/22 108.4 kg (239 lb)   09/08/22 108 kg (238 lb)   05/09/22 110.2 kg (243 lb)       General Appearance:   Pleasant  male, appears  stated age. no acute distress, normal body habitus   ENT/Mouth: Facemask.      EYES:  no scleral icterus, normal conjunctivae   Neck: no carotid bruits. No anterior cervical lymphadenopaty   Respiratory:   lungs are clear to auscultation, no rales or wheezing, equal chest wall expansion    Cardiovascular:   Regular rhythm, normal rate. Normal first and second heart sounds with no murmurs, rubs, or gallops; the carotid, radial and posterior tibial pulses are intact, Jugular venous pressure normal, no edema bilaterally    Abdomen/GI:  no organomegaly, masses, bruits, or tenderness; bowel  sounds are present   Extremities: no cyanosis or clubbing   Skin: no xanthelasma, warm.    Heme/lymph/ Immunology No apparent bleeding noted.   Neurologic: Alert and oriented. normal gait, no tremors     Psychiatric: Pleasant, calm, appropriate affect.    A complete 10 system review of systems was performed and is negative except as mentioned in the HPI/subjective.         Past History   Past Medical History:   Past Medical History:   Diagnosis Date     Angioedema      Atrial fibrillation (H) 2019     Atrial fibrillation and flutter (H)      Atrial flutter (H) 2019     Basal cell carcinoma      Branch retinal vein occlusion of left eye 7/1/2017     CAD (coronary artery disease)     ANGIOGRAM 2021 Left Main The vessel was visualized by angiography, is moderate in size and is angiographically normal. Left Anterior Descending Mid LAD lesion is 25% stenosed. Ramus Intermedius Ramus lesion is 10% stenosed. Left Circumflex Dist Cx lesion is 30% stenosed. Right Coronary Artery The vessel was visualized by angiography, is moderate in size and is angiographically normal.       Chronic neck pain      Congestive heart failure (H) 2021     GERD (gastroesophageal reflux disease)      HTN (hypertension) 2015     Hx of atrial flutter      Nonsmoker      Radiculopathy     cervical     Retinal hemorrhage     Vessel rupture in left retina     Rhinitis, allergic      S/P colonoscopy 4/15/2019     Urticaria        Past Surgical History:   Past Surgical History:   Procedure Laterality Date     BASAL CELL CARCINOMA EXCISION  01/01/2020    Left nasal reconstruction     CATARACT EXTRACTION, BILATERAL Bilateral 2022     CERVICAL SPINE SURGERY      C8, T1, foraminotomy     CV CORONARY ANGIOGRAM N/A 01/20/2021    Procedure: Coronary Angiogram;  Surgeon: Willow Wellington MD;  Location: [facility] Cardiac Cath Lab;  Service: Cardiology     CV LEFT ATRIAL APPENDAGE CLOSURE N/A 07/14/2021    Procedure: CV LEFT ATRIAL APPENDAGE CLOSURE;  Surgeon:  Noah Gutierres MD;  Location:  HEART CARDIAC CATH LAB     CV LEFT HEART CATHETERIZATION WITHOUT LEFT VENTRICULOGRAM Left 01/20/2021    Procedure: Left Heart Catheterization Without Left Ventriculogram;  Surgeon: Willow Wellington MD;  Location: [facility] Cardiac Cath Lab;  Service: Cardiology     HERNIA REPAIR, UMBILICAL       ×2     REVERSE TOTAL SHOULDER ARTHROPLASTY Left 05/01/2019       Family History:   Family History   Problem Relation Age of Onset     Arthritis Mother      Other - See Comments Mother         psychiatry/pvd     Other - See Comments Father         blood reaction        Social History:   Social History     Socioeconomic History     Marital status:      Spouse name: Not on file     Number of children: Not on file     Years of education: Not on file     Highest education level: Not on file   Occupational History     Not on file   Tobacco Use     Smoking status: Never Smoker     Smokeless tobacco: Never Used   Substance and Sexual Activity     Alcohol use: Never     Comment: Alcoholic Drinks/day: About once a year.     Drug use: No     Sexual activity: Not on file   Other Topics Concern     Parent/sibling w/ CABG, MI or angioplasty before 65F 55M? Not Asked   Social History Narrative    Patient of Dr. Rod since 2001.    .  Wife is a former RN.     Wife's cousin is Dr. Jean Pierre Champagne, ID specialist.     Social Determinants of Health     Financial Resource Strain: Not on file   Food Insecurity: Not on file   Transportation Needs: Not on file   Physical Activity: Not on file   Stress: Not on file   Social Connections: Not on file   Intimate Partner Violence: Not on file   Housing Stability: Not on file              Lab Results    Chemistry/lipid CBC Cardiac Enzymes/BNP/TSH/INR   Lab Results   Component Value Date    CHOL 168 09/08/2022    HDL 55 09/08/2022     09/08/2022    TRIG 66 09/08/2022    CR 0.93 09/08/2022    BUN 18.2 09/08/2022    POTASSIUM 4.3 09/08/2022      09/08/2022    CO2 27 09/08/2022      Lab Results   Component Value Date    WBC 8.9 09/08/2022    HGB 15.9 09/08/2022    HCT 46.5 09/08/2022    MCV 94 09/08/2022     09/08/2022    A1C 5.3 06/29/2017     Lab Results   Component Value Date    A1C 5.3 06/29/2017    Lab Results   Component Value Date    TROPONINI 0.02 06/18/2020     (H) 11/30/2020    TSH 1.92 09/08/2022    INR 1.01 09/08/2022        Kiran Tyler MD [facility]  Non-Invasive Cardiologist  [facility]  Pager [phone]    Thank you for allowing me to participate in the care of your patient.    Sincerely,   Krian Tyler MD   [facility]  cc:   Kiran Tyler MD  [address]  [place] [address]

## 2022-09-28 NOTE — PROGRESS NOTES
HEART CARE ENCOUNTER NOTE        Assessment/Recommendations   Assessment:    1. Persistent atrial fibrillation status post 31 mm Watchman FLX left atrial appendage closure device placement on 7/14/2021. Stable small peridevice leak measuring less than 5 mm.  2. Nonischemic cardiomyopathy with left ventricular ejection fraction of 42%. NYHA class I, appears euvolemic.  3. Mild nonobstructive coronary artery disease see on coronary angiography 1/20/2021.  4. Benign essential hypertension.   5. Hyperlipidemia. Last  mg/dL.  6. BMI 32.41.    Plan:  1. Continue low doses of lisinopril 5 mg and metoprolol succinate 12.5 mg daily.  2. Patient has discontinued aspirin which is probably fine this far out from his left atrial appendage closure.  3. Follow-up with me in 1 year.         History of Present Illness   Mr. Mike Gonzalez is a 71 year old male with a significant past history of persistent AF s/p 31 mm Watchman FLX device placement on 7/14/2021, nonobstructive CAD, and HFrEF LVEF 40% presenting for follow-up.    He underwent spinal surgery earlier this year. He initially had some improvement but then his symptoms returned. He then apparently received prednisone after being stung multiple times by wasps and noticed his back felt much better. Additional anti-inflammatory therapy is now being used. He also gets a lot of leg cramping. He stopped taking aspirin due to concern it could be making his back pain worse.    From a heart standpoint, he seems to be doing well. He denies any chest pain/pressure/tightness, shortness of breath at rest or with exertion, light headedness/dizziness, pre-syncope, syncope, lower extremity swelling, palpitations, paroxysmal nocturnal dyspnea (PND), or orthopnea.     Cardiac Problems and Cardiac Diagnostics     Most Recent Cardiac testing:  ECG dated 1/11/2022 (personaly reviewed and interpreted): atrial fibrillation, ventricular rate 61 bpm, possible anterior infarct, left anterior  fascicular block     Holter monitor 3/4/2021 (report reviewed):  HOLTER MONITOR     TEST INDICATION:  Evaluate for atrial fibrillation.     IMPRESSION:  1.  A 24-hour Holter monitor was applied 03/04/2021 with date of interpretation 03/08/2021.  2.  Baseline tracing showed atrial fibrillation, which is present throughout the recording.  There is an IVCD, incomplete left bundle branch block.  3.  Average heart rate of 65 beats per minute and ranges between 39 and 122 beats per minute.  4.  Modest nocturnal bradycardia is seen.  There is no major bradycardia during waking hours.  5.  Ventricular ectopy is infrequent with 1362 PVCs which is 1.4% of total heartbeats.  No salvos, no nonsustained ventricular tachycardia.  6.  Patient activity diary was reviewed, but no symptoms noted.     DISCUSSION:  Persistent atrial fibrillation with controlled ventricular response.     ECHO 10/18/2021 (report reviewed):  1. The left ventricle is mildly enlarged. Left ventricular systolic performance is moderately reduced. The ejection fraction is estimated to be 40%.  2. There is moderate global reduction in left ventricular systolic performance.  3. There is mild concentric increase in left ventricular wall thickness.  4. No significant valvular heart disease is identified on this study.  5. Borderline right ventricular enlargement with normal right ventricular systolic performance.  6. There is severe left atrial enlargement. There is moderate right atrial enlargement.  7. There is a left atrial appendage occlusion device.  Â  No thrombus is detected upon the surface of the device.  Â  There is a localized region of flow around the device between the device  itself and the left lateral ridge. The gap measures 0.35-0.5 cm and appears  similar to the findings on the 2 September 2021 transesophageal  echocardiogram.  8. There is mild spontaneous echo contrast in the left atrium though no thrombus detected.  9. Color Doppler  interrogation of the atrial septum reveals the presence of a patent foramen ovale (PFO). No right to left shunting, however, was elicited with echo contrast injection either with spontaneous respiration or Valsalva.     Stress test 1/5/2021 (report reviewed):     The nuclear stress test is abnormal. There is no ischemia but there is a medium sized area of a moderate degree of nontransmural infarction in the distal anterior segment(s) of the left ventricle. This appears to be in the left anterior descending artery distribution.     The stress electrocardiogram is negative for inducible ischemic EKG changes. Occasional PVCs were noted during stress and recovery.     The left ventricle is dilated and the left ventricular ejection fraction at stress is mildly decreased at 44%.     Low-to-intermediate risk of future ischemic events.     There is no prior study for comparison.     Cardiac cath 1/20/2021 (report reviewed):     Mild coronary atherosclerosis.    LV EDP 20 mmHg.     Medications  Allergies   Current Outpatient Medications   Medication Sig Dispense Refill     acetaminophen (TYLENOL) 650 MG CR tablet Take 650 mg by mouth daily as needed       co-enzyme Q-10 100 MG CAPS capsule Take 200 mg by mouth daily       fish oil-omega-3 fatty acids 300-1,000 mg capsule [FISH OIL-OMEGA-3 FATTY ACIDS 300-1,000 MG CAPSULE] Take 2 g by mouth daily.       fluticasone (FLONASE) 50 MCG/ACT nasal spray Spray 1 spray into both nostrils daily 48 mL 2     gabapentin (NEURONTIN) 600 MG tablet Take 300 mg by mouth as needed       lisinopril (ZESTRIL) 5 MG tablet Take 1 tablet (5 mg) by mouth daily 90 tablet 3     Magnesium Oxide POWD Take 1 Dose by mouth daily       metoprolol succinate ER (TOPROL XL) 25 MG 24 hr tablet Take 0.5 tablets (12.5 mg) by mouth daily 45 tablet 3     MULTIVITAMIN ORAL Take 1 tablet by mouth daily       oxymetazoline HCl (AFRIN, OXYMETAZOLINE, NASL) Spray 1 spray in nostril 2 times daily       vits  A,C,E/lutein/zeax/zn/marly (EYE HEALTH FORMULA ORAL) [VITS A,C,E/LUTEIN/ZEAX/ZN/MARLY (EYE HEALTH FORMULA ORAL)] Take by mouth.       aspirin (ASA) 81 MG chewable tablet Take 81 mg by mouth daily (Patient not taking: Reported on 9/28/2022)       hydroxychloroquine (PLAQUENIL) 200 MG tablet Take 1 tablet (200 mg) by mouth At Bedtime (Patient not taking: Reported on 9/28/2022) 30 tablet 4      Allergies   Allergen Reactions     Effient [Prasugrel Hcl] Hives     Hydrochlorothiazide Unknown     Hydrocodone Swelling     Sinus     Oxycontin [Oxycodone] Swelling     lip     Peppermint Oil Other (See Comments)     Plavix [Clopidogrel] Hives     Sulfa (Sulfonamide Antibiotics) [Sulfa Drugs] Swelling     Perfume Swelling     Other reaction(s): Runny Nose        Physical Examination Review of Systems   /78 (BP Location: Left arm, Patient Position: Sitting, Cuff Size: Adult Large)   Pulse 64   Resp 14   Wt 108.4 kg (239 lb)   BMI 32.41 kg/m    Body mass index is 32.41 kg/m .  Wt Readings from Last 3 Encounters:   09/28/22 108.4 kg (239 lb)   09/08/22 108 kg (238 lb)   05/09/22 110.2 kg (243 lb)       General Appearance:   Pleasant  male, appears  stated age. no acute distress, normal body habitus   ENT/Mouth: Facemask.      EYES:  no scleral icterus, normal conjunctivae   Neck: no carotid bruits. No anterior cervical lymphadenopaty   Respiratory:   lungs are clear to auscultation, no rales or wheezing, equal chest wall expansion    Cardiovascular:   Regular rhythm, normal rate. Normal first and second heart sounds with no murmurs, rubs, or gallops; the carotid, radial and posterior tibial pulses are intact, Jugular venous pressure normal, no edema bilaterally    Abdomen/GI:  no organomegaly, masses, bruits, or tenderness; bowel sounds are present   Extremities: no cyanosis or clubbing   Skin: no xanthelasma, warm.    Heme/lymph/ Immunology No apparent bleeding noted.   Neurologic: Alert and oriented. normal gait, no  tremors     Psychiatric: Pleasant, calm, appropriate affect.    A complete 10 system review of systems was performed and is negative except as mentioned in the HPI/subjective.         Past History   Past Medical History:   Past Medical History:   Diagnosis Date     Angioedema      Atrial fibrillation (H) 2019     Atrial fibrillation and flutter (H)      Atrial flutter (H) 2019     Basal cell carcinoma      Branch retinal vein occlusion of left eye 7/1/2017     CAD (coronary artery disease)     ANGIOGRAM 2021 Left Main The vessel was visualized by angiography, is moderate in size and is angiographically normal. Left Anterior Descending Mid LAD lesion is 25% stenosed. Ramus Intermedius Ramus lesion is 10% stenosed. Left Circumflex Dist Cx lesion is 30% stenosed. Right Coronary Artery The vessel was visualized by angiography, is moderate in size and is angiographically normal.       Chronic neck pain      Congestive heart failure (H) 2021     GERD (gastroesophageal reflux disease)      HTN (hypertension) 2015     Hx of atrial flutter      Nonsmoker      Radiculopathy     cervical     Retinal hemorrhage     Vessel rupture in left retina     Rhinitis, allergic      S/P colonoscopy 4/15/2019     Urticaria        Past Surgical History:   Past Surgical History:   Procedure Laterality Date     BASAL CELL CARCINOMA EXCISION  01/01/2020    Left nasal reconstruction     CATARACT EXTRACTION, BILATERAL Bilateral 2022     CERVICAL SPINE SURGERY      C8, T1, foraminotomy     CV CORONARY ANGIOGRAM N/A 01/20/2021    Procedure: Coronary Angiogram;  Surgeon: Willow Wellington MD;  Location: [facility] Cardiac Cath Lab;  Service: Cardiology     CV LEFT ATRIAL APPENDAGE CLOSURE N/A 07/14/2021    Procedure: CV LEFT ATRIAL APPENDAGE CLOSURE;  Surgeon: Noah Gutierres MD;  Location:  HEART CARDIAC CATH LAB     CV LEFT HEART CATHETERIZATION WITHOUT LEFT VENTRICULOGRAM Left 01/20/2021    Procedure: Left Heart Catheterization Without Left  Ventriculogram;  Surgeon: Willow Wellington MD;  Location: [facility] Cardiac Cath Lab;  Service: Cardiology     HERNIA REPAIR, UMBILICAL       ×2     REVERSE TOTAL SHOULDER ARTHROPLASTY Left 05/01/2019       Family History:   Family History   Problem Relation Age of Onset     Arthritis Mother      Other - See Comments Mother         psychiatry/pvd     Other - See Comments Father         blood reaction        Social History:   Social History     Socioeconomic History     Marital status:      Spouse name: Not on file     Number of children: Not on file     Years of education: Not on file     Highest education level: Not on file   Occupational History     Not on file   Tobacco Use     Smoking status: Never Smoker     Smokeless tobacco: Never Used   Substance and Sexual Activity     Alcohol use: Never     Comment: Alcoholic Drinks/day: About once a year.     Drug use: No     Sexual activity: Not on file   Other Topics Concern     Parent/sibling w/ CABG, MI or angioplasty before 65F 55M? Not Asked   Social History Narrative    Patient of Dr. Rod since 2001.    .  Wife is a former RN.     Wife's cousin is Dr. Jean Pierre Champagne, ID specialist.     Social Determinants of Health     Financial Resource Strain: Not on file   Food Insecurity: Not on file   Transportation Needs: Not on file   Physical Activity: Not on file   Stress: Not on file   Social Connections: Not on file   Intimate Partner Violence: Not on file   Housing Stability: Not on file              Lab Results    Chemistry/lipid CBC Cardiac Enzymes/BNP/TSH/INR   Lab Results   Component Value Date    CHOL 168 09/08/2022    HDL 55 09/08/2022     09/08/2022    TRIG 66 09/08/2022    CR 0.93 09/08/2022    BUN 18.2 09/08/2022    POTASSIUM 4.3 09/08/2022     09/08/2022    CO2 27 09/08/2022      Lab Results   Component Value Date    WBC 8.9 09/08/2022    HGB 15.9 09/08/2022    HCT 46.5 09/08/2022    MCV 94 09/08/2022     09/08/2022    A1C  5.3 06/29/2017     Lab Results   Component Value Date    A1C 5.3 06/29/2017    Lab Results   Component Value Date    TROPONINI 0.02 06/18/2020     (H) 11/30/2020    TSH 1.92 09/08/2022    INR 1.01 09/08/2022          Kiran Tyler MD [facility]  Non-Invasive Cardiologist  [facility]  Pager [phone]

## 2022-10-30 ENCOUNTER — HEALTH MAINTENANCE LETTER (OUTPATIENT)
Age: 72
End: 2022-10-30

## 2022-11-03 NOTE — PATIENT INSTRUCTIONS
Cetirizine 10 mg ( Zyrtec ) - Start taking one daily and after a couple of days increase to twice daily.    Famotidine 20 mg daily     Stop Benadryl    Make appointment with allergist   [place]

## 2022-12-28 ENCOUNTER — TRANSFERRED RECORDS (OUTPATIENT)
Dept: HEALTH INFORMATION MANAGEMENT | Facility: CLINIC | Age: 72
End: 2022-12-28
Payer: MEDICARE

## 2023-01-02 ENCOUNTER — TRANSFERRED RECORDS (OUTPATIENT)
Dept: HEALTH INFORMATION MANAGEMENT | Facility: CLINIC | Age: 73
End: 2023-01-02

## 2023-01-18 ENCOUNTER — TRANSFERRED RECORDS (OUTPATIENT)
Dept: HEALTH INFORMATION MANAGEMENT | Facility: CLINIC | Age: 73
End: 2023-01-18
Payer: MEDICARE

## 2023-02-12 ENCOUNTER — MYC MEDICAL ADVICE (OUTPATIENT)
Dept: INTERNAL MEDICINE | Facility: CLINIC | Age: 73
End: 2023-02-12
Payer: MEDICARE

## 2023-02-12 DIAGNOSIS — U07.1 COVID-19 VIRUS INFECTION: Primary | ICD-10-CM

## 2023-02-13 NOTE — TELEPHONE ENCOUNTER
RN COVID TREATMENT VISIT  02/13/23    Mike Gonzalez  72 year old  Current weight? 239 lbs    Has the patient been seen by a primary care provider at an [facility] or [facility] within the past two years? Yes.   Have you been in close proximity to/do you have a known exposure to a person with a confirmed case of influenza? No.     Date of positive COVID test (PCR or at home)?  2/13    Current COVID symptoms: fever or chills and congestion or runny nose    Date COVID symptoms began: 2/11    Do you have any of the following conditions that place you at risk of being very sick from COVID-19? 65 years or older    Is patient eligible to continue? Yes, established patient, 12 years or older weighing at least 88.2 lbs, who has COVID symptoms that started in the past 5 days and is at risk for being very sick from COVID-19.       Have you received monoclonal antibodies or oral antiviral medications since testing positive to COVID-19? No    Are you currently hospitalized for COVID-19? No    Do you have a history of hepatitis? No    Are you currently pregnant or nursing? No    Do you have a clinically significant hypersensitivity to nirmatrelvir, ritonavir, or molnupiravir? No    Do you have any history of severe renal impairment (eGFR < 30mL/min)? No    Do you have any history of hepatic impairment or abnormalities (e.g. hepatic panel, ALT, AST, ALK Phos, bilirubin)? No    Have you had a coronary stent placed in the previous 6 months? No    Is patient eligible to continue?   Yes, patient meets all eligibility requirements for the RN COVID treatment (as denoted by all no responses above).     Current Outpatient Medications   Medication Sig Dispense Refill     acetaminophen (TYLENOL) 650 MG CR tablet Take 650 mg by mouth daily as needed       aspirin (ASA) 81 MG chewable tablet Take 81 mg by mouth daily (Patient not taking: Reported on 9/28/2022)       co-enzyme Q-10 100 MG CAPS capsule Take 200 mg  by mouth daily       fish oil-omega-3 fatty acids 300-1,000 mg capsule [FISH OIL-OMEGA-3 FATTY ACIDS 300-1,000 MG CAPSULE] Take 2 g by mouth daily.       fluticasone (FLONASE) 50 MCG/ACT nasal spray Spray 1 spray into both nostrils daily 48 mL 2     gabapentin (NEURONTIN) 600 MG tablet Take 300 mg by mouth as needed       hydroxychloroquine (PLAQUENIL) 200 MG tablet Take 1 tablet (200 mg) by mouth At Bedtime (Patient not taking: Reported on 9/28/2022) 30 tablet 4     lisinopril (ZESTRIL) 5 MG tablet Take 1 tablet (5 mg) by mouth daily 90 tablet 3     Magnesium Oxide POWD Take 1 Dose by mouth daily       metoprolol succinate ER (TOPROL XL) 25 MG 24 hr tablet Take 0.5 tablets (12.5 mg) by mouth daily 45 tablet 3     MULTIVITAMIN ORAL Take 1 tablet by mouth daily       oxymetazoline HCl (AFRIN, OXYMETAZOLINE, NASL) Spray 1 spray in nostril 2 times daily       vits A,C,E/lutein/zeax/zn/marly (EYE HEALTH FORMULA ORAL) [VITS A,C,E/LUTEIN/ZEAX/ZN/MARLY (EYE HEALTH FORMULA ORAL)] Take by mouth.         Medications from List 1 of the standing order (on medications that exclude the use of Paxlovid) that patient is taking: NONE. Is patient taking [place] Wort?   Is patient taking [place] Wort or any meds from List 1? No.   Medications from List 2 of the standing order (on meds that provider needs to adjust) that patient is taking: NONE. Is patient on any of the meds from List 2? No.   Medications from List 3 of standing order (on meds that a RN needs to adjust) that patient is taking: NONE. Is patient on any meds from List 3? No.   In order of efficacy, Paxlovid has an approximate 90% reduction in hospitalization. Paxlovid can possibly cause altered sense of taste, diarrhea (loose, watery stools), high blood pressure, muscle aches.  The other option is molnupiravir which has an approximate 30% reduction in hospitalization. Molnupirarivr can possibly cause diarrhea (loose, watery stools), nausea (feeling sick to your  stomach), dizziness, headaches.

## 2023-02-24 ENCOUNTER — VIRTUAL VISIT (OUTPATIENT)
Dept: INTERNAL MEDICINE | Facility: CLINIC | Age: 73
End: 2023-02-24
Payer: MEDICARE

## 2023-02-24 DIAGNOSIS — I48.19 PERSISTENT ATRIAL FIBRILLATION (H): ICD-10-CM

## 2023-02-24 DIAGNOSIS — U07.1 COVID-19 VIRUS INFECTION: ICD-10-CM

## 2023-02-24 DIAGNOSIS — J31.0 CHRONIC RHINITIS: ICD-10-CM

## 2023-02-24 DIAGNOSIS — I50.22 CHRONIC SYSTOLIC HEART FAILURE (H): ICD-10-CM

## 2023-02-24 DIAGNOSIS — J32.9 RHINOSINUSITIS: Primary | ICD-10-CM

## 2023-02-24 DIAGNOSIS — D69.2 SENILE PURPURA (H): ICD-10-CM

## 2023-02-24 PROBLEM — E66.01 MORBID OBESITY (H): Status: RESOLVED | Noted: 2019-04-15 | Resolved: 2023-02-24

## 2023-02-24 PROCEDURE — 99214 OFFICE O/P EST MOD 30 MIN: CPT | Mod: VID | Performed by: INTERNAL MEDICINE

## 2023-02-24 RX ORDER — FLUTICASONE PROPIONATE 50 MCG
1 SPRAY, SUSPENSION (ML) NASAL DAILY
Qty: 48 ML | Refills: 2 | Status: SHIPPED | OUTPATIENT
Start: 2023-02-24 | End: 2024-01-06

## 2023-02-24 RX ORDER — DOXYCYCLINE 100 MG/1
100 CAPSULE ORAL 2 TIMES DAILY
Qty: 20 CAPSULE | Refills: 0 | Status: SHIPPED | OUTPATIENT
Start: 2023-02-24 | End: 2023-03-06

## 2023-02-24 NOTE — PROGRESS NOTES
[facility] - Primary Care Specialists     VIDEO VISIT     Comprehensive and complex medical care - Chronic disease management - Shared decision making - Care coordination - Compassionate care    Patient advocacy - Rational deprescribing - Minimally disruptive medicine - Ethical focus - Customized care         Mike Gonzalez is a 72 year old male who is being evaluated via a billable video visit.           Active Problem List:  Problem List as of 2/24/2023 Reviewed: 5/9/2022  9:46 PM by Onur Rod MD       High    Nonsmoker    Full code status    CAD (coronary artery disease), minimal disease on angiogram in 2021    Atrial flutter, chronic (H)       Medium    Chronic neck pain    Dilated cardiomyopathy (H)    Chronic systolic heart failure (H)    Chronic left-sided low back pain with left-sided sciatica    S/P left atrial appendage closure - WATCHMAN    HTN (hypertension)       Low    GERD (gastroesophageal reflux disease)    Branch retinal vein occlusion of left eye    Normal colonoscopy - 2016 - Average risk    Complete tear of left rotator cuff    Concussion syndrome        Current Outpatient Medications   Medication Instructions     acetaminophen (TYLENOL) 650 mg, Oral, DAILY PRN     aspirin (ASA) 81 mg, Oral, DAILY     co-enzyme Q-10 200 mg, Oral, DAILY     doxycycline hyclate (VIBRAMYCIN) 100 mg, Oral, 2 TIMES DAILY, May use monohydrate if cheaper.  May use tablet or capsule for cost.     fish oil-omega-3 fatty acids 2 g, DAILY     fluticasone (FLONASE) 50 MCG/ACT nasal spray 1 spray, Both Nostrils, DAILY     gabapentin (NEURONTIN) 300 mg, Oral, PRN     hydroxychloroquine (PLAQUENIL) 200 mg, Oral, AT BEDTIME     lisinopril (ZESTRIL) 5 mg, Oral, DAILY     Magnesium Oxide POWD 1 Dose, Oral, DAILY     metoprolol succinate ER (TOPROL XL) 12.5 mg, Oral, DAILY     MULTIVITAMIN ORAL 1 tablet, Oral, DAILY     oxymetazoline HCl (AFRIN, OXYMETAZOLINE, NASL) 1 spray, Nasal, 2 TIMES DAILY      vits A,C,E/lutein/zeax/zn/marly (EYE HEALTH FORMULA ORAL) [VITS A,C,E/LUTEIN/ZEAX/ZN/MARLY (EYE HEALTH FORMULA ORAL)] Take by mouth.        Subjective:     Mike Gonzalez presents with:      Chief Complaint   Patient presents with     Covid Concern     No energy, PND, congested, productive cough for the last 12 days took a covid test 2/23/23 it was positive         The patient has a video visit today which is done in light of the current coronavirus outbreak.  Patient has given consent to perform a video visit.    Patient comes in today by video link to discuss his recent COVID infection and persistent symptoms.    He was diagnosed with COVID on February 13.  He had symptoms about a day or 2 before this.  He had been placed on Paxlovid (nirmatrelvir/ritonavir).  He was getting a bit better but then the weekend (about 5 days ago) got sicker again.  His energy is poor and he can only do something for about 15 to 20 minutes before he gets tapped.  He retested himself this week after the Paxlovid (nirmatrelvir/ritonavir) and his home test is still positive.  He feels like he has been worse.  He has been quite congested.  He has had a history of sinus infections remotely.  He is only able to sleep at a 45 degree angle at this time.  He has had frontal headaches and maxillary headaches with this.  He has still had a cough.  He denies any shortness of breath.    He is going to be seeing orthopedics next week for consideration of fusion of his back.  He does continue to have back issues and leg issues.    We reviewed his other issues noted in the assessment but not specifically addressed in the HPI above.     Objective:     Exam reveals:  Patient in no distress.  Mood is good.  Insight is good.  His eyes are not injected.  He is not coughing much during the time of the exam.  He is congested.  Breathing seems good.    Diagnostics:     Office Visit on 09/08/2022   Component Date Value Ref Range Status     TSH 09/08/2022 1.92   0.30 - 4.20 uIU/mL Final     Cholesterol 09/08/2022 168  <200 mg/dL Final     Triglycerides 09/08/2022 66  <150 mg/dL Final     Direct Measure HDL 09/08/2022 55  >=40 mg/dL Final     LDL Cholesterol Calculated 09/08/2022 100  <=100 mg/dL Final     Non HDL Cholesterol 09/08/2022 113  <130 mg/dL Final     Creatinine 09/08/2022 0.93  0.67 - 1.17 mg/dL Final     Sodium 09/08/2022 141  136 - 145 mmol/L Final     Potassium 09/08/2022 4.3  3.4 - 5.3 mmol/L Final     Urea Nitrogen 09/08/2022 18.2  8.0 - 23.0 mg/dL Final     Chloride 09/08/2022 102  98 - 107 mmol/L Final     Carbon Dioxide (CO2) 09/08/2022 27  22 - 29 mmol/L Final     Anion Gap 09/08/2022 12  7 - 15 mmol/L Final     Glucose 09/08/2022 94  70 - 99 mg/dL Final     GFR Estimate 09/08/2022 87  >60 mL/min/1.73m2 Final    Effective December 21, 2021 eGFRcr in adults is calculated using the 2021 CKD-EPI creatinine equation which includes age and gender (Yany et al., NE, DOI: 10.1056/BLQUor1677320)     Calcium 09/08/2022 10.0  8.8 - 10.2 mg/dL Final     Magnesium 09/08/2022 2.3  1.7 - 2.3 mg/dL Final     CRP Inflammation 09/08/2022 3.25  <5.00 mg/L Final     Erythrocyte Sedimentation Rate 09/08/2022 9  0 - 15 mm/hr Final     aPTT 09/08/2022 28  22 - 38 Seconds Final     INR 09/08/2022 1.01  0.85 - 1.15 Final     WBC Count 09/08/2022 8.9  4.0 - 11.0 10e3/uL Final     RBC Count 09/08/2022 4.93  4.40 - 5.90 10e6/uL Final     Hemoglobin 09/08/2022 15.9  13.3 - 17.7 g/dL Final     Hematocrit 09/08/2022 46.5  40.0 - 53.0 % Final     MCV 09/08/2022 94  78 - 100 fL Final     MCH 09/08/2022 32.3  26.5 - 33.0 pg Final     MCHC 09/08/2022 34.2  31.5 - 36.5 g/dL Final     RDW 09/08/2022 13.1  10.0 - 15.0 % Final     Platelet Count 09/08/2022 307  150 - 450 10e3/uL Final     % Neutrophils 09/08/2022 67  % Final     % Lymphocytes 09/08/2022 22  % Final     % Monocytes 09/08/2022 11  % Final     % Eosinophils 09/08/2022 1  % Final     % Basophils 09/08/2022 0  % Final     %  Immature Granulocytes 09/08/2022 0  % Final     Absolute Neutrophils 09/08/2022 5.9  1.6 - 8.3 10e3/uL Final     Absolute Lymphocytes 09/08/2022 1.9  0.8 - 5.3 10e3/uL Final     Absolute Monocytes 09/08/2022 0.9  0.0 - 1.3 10e3/uL Final     Absolute Eosinophils 09/08/2022 0.1  0.0 - 0.7 10e3/uL Final     Absolute Basophils 09/08/2022 0.0  0.0 - 0.2 10e3/uL Final     Absolute Immature Granulocytes 09/08/2022 0.0  <=0.4 10e3/uL Final        Assessment and Plan:     1. Chronic rhinitis  We refilled this chronic medication he has been on.    - fluticasone (FLONASE) 50 MCG/ACT nasal spray; Spray 1 spray into both nostrils daily  Dispense: 48 mL; Refill: 2    2. Rhinosinusitis  I think that he may have a superimposed bacterial infection.  Try doxycycline.  Could be due to a secondary viral infection or just COVID as well.  Reassess as needed.    - doxycycline hyclate (VIBRAMYCIN) 100 MG capsule; Take 1 capsule (100 mg) by mouth 2 times daily for 10 days May use monohydrate if cheaper.  May use tablet or capsule for cost.  Dispense: 20 capsule; Refill: 0    3. COVID-19 virus infection  Completed Paxlovid (nirmatrelvir/ritonavir).    4. Chronic systolic heart failure (H)  Follows with cardiology.  Continue to monitor.  Okay to proceed with surgery if needed.    5. Persistent atrial fibrillation (H)  Has watchman in place.  No need for anticoagulation.    6. Senile purpura (H)  No current issues with this.  Continue to monitor.       Video visit duration:  18 minutes    Return in about 2 months (around 4/24/2023) for preoperative evaluation.     No future appointments.         Onur Rod MD  General Internal Medicine  [facility]    HCC issues resolved at this visit.  ______________________________________________________________________     Video visit details    Video invitation sent by: EFFIE    Patient location:  Home    Provider location: [facility]    Mode of  Communication: VIDEO LINK    Started at 10:10 AM and ended at 10:28 AM.

## 2023-02-24 NOTE — PATIENT INSTRUCTIONS
Please follow up if you have any further issues.    You may contact me by phone or MyChart if you are worsening or if things are not improving.    ______________________________________________________________________     You can call [phone] during weekday hours to get a hold of our team coordinators if you need to get a message to me.    There are 3 people in our building taking phone calls for me.  Be sure to listen to the prompts to get a hold of them.    You first press 1 for English (press another number for a different language) and then press 2 to get the .    They can then take your message and forward it onto me.    ______________________________________________________________________     Please remember that you can call [phone] ANYTIME to schedule an appointment.     You can schedule appointments 24 hours a day, 7 days a week.      Sometimes the best time to schedule an appointment is after clinic hours when less people are calling in.      Weekends are another option for calling in to schedule appointments.

## 2023-03-01 ENCOUNTER — TRANSFERRED RECORDS (OUTPATIENT)
Dept: HEALTH INFORMATION MANAGEMENT | Facility: CLINIC | Age: 73
End: 2023-03-01
Payer: MEDICARE

## 2023-03-07 ENCOUNTER — MYC MEDICAL ADVICE (OUTPATIENT)
Dept: INTERNAL MEDICINE | Facility: CLINIC | Age: 73
End: 2023-03-07
Payer: MEDICARE

## 2023-03-16 DIAGNOSIS — I25.5 ISCHEMIC CARDIOMYOPATHY: ICD-10-CM

## 2023-03-16 RX ORDER — LISINOPRIL 5 MG/1
TABLET ORAL
Qty: 90 TABLET | Refills: 3 | Status: SHIPPED | OUTPATIENT
Start: 2023-03-16 | End: 2024-02-20

## 2023-03-21 ENCOUNTER — OFFICE VISIT (OUTPATIENT)
Dept: INTERNAL MEDICINE | Facility: CLINIC | Age: 73
End: 2023-03-21
Payer: MEDICARE

## 2023-03-21 VITALS
BODY MASS INDEX: 32.51 KG/M2 | DIASTOLIC BLOOD PRESSURE: 84 MMHG | OXYGEN SATURATION: 96 % | SYSTOLIC BLOOD PRESSURE: 127 MMHG | TEMPERATURE: 98.4 F | WEIGHT: 240 LBS | HEART RATE: 70 BPM | HEIGHT: 72 IN

## 2023-03-21 DIAGNOSIS — I50.22 CHRONIC SYSTOLIC HEART FAILURE (H): ICD-10-CM

## 2023-03-21 DIAGNOSIS — Z95.818 PRESENCE OF LEFT ATRIAL APPENDAGE CLOSURE DEVICE COMPOSED OF NICKEL-TITANIUM ALLOY WITH POLYETHYLENE TEREPHTHALATE MEMBRANE: Chronic | ICD-10-CM

## 2023-03-21 DIAGNOSIS — G89.29 CHRONIC LEFT-SIDED LOW BACK PAIN WITH LEFT-SIDED SCIATICA: ICD-10-CM

## 2023-03-21 DIAGNOSIS — U07.1 COVID-19 VIRUS INFECTION: ICD-10-CM

## 2023-03-21 DIAGNOSIS — I48.92 ATRIAL FLUTTER, CHRONIC (H): ICD-10-CM

## 2023-03-21 DIAGNOSIS — Z01.818 PREOPERATIVE EXAMINATION: Primary | ICD-10-CM

## 2023-03-21 DIAGNOSIS — I25.10 CORONARY ARTERY DISEASE INVOLVING NATIVE CORONARY ARTERY OF NATIVE HEART WITHOUT ANGINA PECTORIS: Chronic | ICD-10-CM

## 2023-03-21 DIAGNOSIS — I48.19 PERSISTENT ATRIAL FIBRILLATION (H): ICD-10-CM

## 2023-03-21 DIAGNOSIS — I10 PRIMARY HYPERTENSION: Chronic | ICD-10-CM

## 2023-03-21 DIAGNOSIS — M54.10 BACK PAIN WITH LEFT-SIDED RADICULOPATHY: ICD-10-CM

## 2023-03-21 DIAGNOSIS — M54.42 CHRONIC LEFT-SIDED LOW BACK PAIN WITH LEFT-SIDED SCIATICA: ICD-10-CM

## 2023-03-21 LAB
ANION GAP SERPL CALCULATED.3IONS-SCNC: 13 MMOL/L (ref 7–15)
BUN SERPL-MCNC: 12.9 MG/DL (ref 8–23)
CALCIUM SERPL-MCNC: 9.9 MG/DL (ref 8.8–10.2)
CHLORIDE SERPL-SCNC: 102 MMOL/L (ref 98–107)
CREAT SERPL-MCNC: 0.99 MG/DL (ref 0.67–1.17)
DEPRECATED HCO3 PLAS-SCNC: 26 MMOL/L (ref 22–29)
ERYTHROCYTE [DISTWIDTH] IN BLOOD BY AUTOMATED COUNT: 13.4 % (ref 10–15)
GFR SERPL CREATININE-BSD FRML MDRD: 80 ML/MIN/1.73M2
GLUCOSE SERPL-MCNC: 97 MG/DL (ref 70–99)
HCT VFR BLD AUTO: 44.9 % (ref 40–53)
HGB BLD-MCNC: 15.3 G/DL (ref 13.3–17.7)
MCH RBC QN AUTO: 31.9 PG (ref 26.5–33)
MCHC RBC AUTO-ENTMCNC: 34.1 G/DL (ref 31.5–36.5)
MCV RBC AUTO: 94 FL (ref 78–100)
PLATELET # BLD AUTO: 299 10E3/UL (ref 150–450)
POTASSIUM SERPL-SCNC: 4.6 MMOL/L (ref 3.4–5.3)
RBC # BLD AUTO: 4.79 10E6/UL (ref 4.4–5.9)
SODIUM SERPL-SCNC: 141 MMOL/L (ref 136–145)
WBC # BLD AUTO: 7.8 10E3/UL (ref 4–11)

## 2023-03-21 PROCEDURE — 93005 ELECTROCARDIOGRAM TRACING: CPT | Performed by: INTERNAL MEDICINE

## 2023-03-21 PROCEDURE — 80048 BASIC METABOLIC PNL TOTAL CA: CPT | Performed by: INTERNAL MEDICINE

## 2023-03-21 PROCEDURE — 85027 COMPLETE CBC AUTOMATED: CPT | Performed by: INTERNAL MEDICINE

## 2023-03-21 PROCEDURE — 36415 COLL VENOUS BLD VENIPUNCTURE: CPT | Performed by: INTERNAL MEDICINE

## 2023-03-21 PROCEDURE — 93010 ELECTROCARDIOGRAM REPORT: CPT | Mod: OFF | Performed by: INTERNAL MEDICINE

## 2023-03-21 PROCEDURE — 99214 OFFICE O/P EST MOD 30 MIN: CPT | Performed by: INTERNAL MEDICINE

## 2023-03-21 ASSESSMENT — PAIN SCALES - GENERAL: PAINLEVEL: MILD PAIN (2)

## 2023-03-21 NOTE — H&P (VIEW-ONLY)
[facility]  Primary Care Specialists    Care coordination - Customized care -  Comprehensive and complex medical care            Date of Service: 3/21/2023  Primary Provider: Onur Rod    Patient Care Team:  Onur Rod MD as PCP - General  Onur Rod MD as Assigned PCP  Ambrose Degroot MD as MD (Orthopaedic Surgery)  Stuart Jackson MD as MD (Orthopaedic Surgery)  Liborio Champagne MD as MD  [facility]  Gregg Curtis as Resident (Orthopaedic Surgery)  Kiran Tyler MD as Assigned Heart and Vascular Provider           Patient's Pharmacy:    [id number] - [place], [place] - [address]  [address] [facility] [address]  Phone: [phone] Fax: [phone]    [facility] - [place] [facility] [address] [facility] Portal to Registered [facility]  [address] [facility]  [place] [address]  Phone: [phone] Fax: [phone]     Patient's Contacts:  Name Home Phone Work Phone Mobile Phone Relationship Lgl KYLER Wilson [phone] Spouse    MATILDE SILVER   [phone] Other      Patient's Insurance:    Payor: MEDICARE / Plan: MEDICARE / Product Type: Medicare /           Preoperative examination    Mike Gonzalez is a 72 year old male (1950) who I am asked to see by his surgeon regarding his fitness for upcoming surgery.      Chief Complaint   Patient presents with     Pre-Op Exam      LEFT LUMBAR 4-5 POSTERIOR SPINAL FUSION WITH LEFT LUMBAR 4-5 LAMINOFORAMINOTOMY AND WIDE LUMBAR 4-5 LEFT SUBTOTAL FACETECTOMY WITH ALLOGRAFT AND AUTOGRAFT      Subjective:     Patient comes in today for preoperative eval prior to his planned lumbar fusion at L4-L5.    He has had persistent low back pain with radiation down the left leg.  He has been having issues for about 3 years now.    We reviewed his atrial fibrillation and his EKG today the.  This was reviewed.  He has a watchman in place from  an anticoagulation standpoint.  He has decided not to take aspirin.    He has a history of minimal coronary artery disease on angiogram.  He has not taken cholesterol medications for this at this time.    He had COVID on February 13 and was quite sick.  He still has a little bit of a cough and sinus symptoms residual since this time.  It has been getting better week by week.    His blood pressure is doing okay on the current blood pressure medications.  He has underlying cardiomyopathy as well.    His blood pressure is doing well and he denies any stable or unstable cardiac symptoms.    ______________________________________________________________________     Pre-op Questionnaire 3/21/2023   1. Have you ever had a heart attack or stroke? No   2. Have you ever had surgery on your heart or blood vessels, such as a stent placement, a coronary artery bypass, or surgery on an artery in your head, neck, heart, or legs? YES -has had angiogram in the past.  Has had Watchman procedure.   3. Do you have chest pain with activity? No   4. Do you have a history of  heart failure? No   5. Do you currently have a cold, bronchitis or symptoms of other infection? No   6. Do you have a cough, shortness of breath, or wheezing? YES -due to COVID.   7. Do you or anyone in your family have previous history of blood clots? No   8. Do you or does anyone in your family have a serious bleeding problem such as prolonged bleeding following surgeries or cuts? No   9. Have you ever had problems with anemia or been told to take iron pills? No   10. Have you had any abnormal blood loss such as black, tarry or bloody stools? No   11. Have you ever had a blood transfusion? No   12. Are you willing to have a blood transfusion if it is medically needed before, during, or after your surgery? Yes   13. Have you or any of your relatives ever had problems with anesthesia? No   14. Do you have sleep apnea, excessive snoring or daytime drowsiness? No   15.  Do you have any artifical heart valves or other implanted medical devices like a pacemaker, defibrillator, or continuous glucose monitor? No   15a. What type of device do you have? -   15b. Name of the clinic that manages your device:  -   16. Do you have artificial joints? YES -see surgical history.   17. Are you allergic to latex? No     ______________________________________________________________________     We reviewed his other issues noted in the assessment but not specifically addressed in the HPI above.           Patient Active Problem List   Diagnosis     GERD (gastroesophageal reflux disease)     Chronic neck pain     Nonsmoker     Full code status     Branch retinal vein occlusion of left eye     Normal colonoscopy - 2016 - Average risk     Complete tear of left rotator cuff     Atrial flutter, chronic (H)     Dilated cardiomyopathy (H)     CAD (coronary artery disease), minimal disease on angiogram in 2021     Chronic systolic heart failure (H)     S/P left atrial appendage closure - WATCHMAN     Concussion syndrome     Chronic left-sided low back pain with left-sided sciatica     HTN (hypertension)       Past Surgical History:   Procedure Laterality Date     BASAL CELL CARCINOMA EXCISION  01/01/2020    Left nasal reconstruction     CATARACT EXTRACTION, BILATERAL Bilateral 2022     CERVICAL SPINE SURGERY      C8, T1, foraminotomy     CV CORONARY ANGIOGRAM N/A 01/20/2021    Procedure: Coronary Angiogram;  Surgeon: Willow Wellington MD;  Location: [facility] Cardiac Cath Lab;  Service: Cardiology     CV LEFT ATRIAL APPENDAGE CLOSURE N/A 07/14/2021    Procedure: CV LEFT ATRIAL APPENDAGE CLOSURE;  Surgeon: Noah Gutierres MD;  Location: [facility] CARDIAC CATH LAB     CV LEFT HEART CATHETERIZATION WITHOUT LEFT VENTRICULOGRAM Left 01/20/2021    Procedure: Left Heart Catheterization Without Left Ventriculogram;  Surgeon: Willow Wellington MD;  Location: [facility] Cardiac Cath Lab;  Service: Cardiology      HERNIA REPAIR, UMBILICAL       ×2     REVERSE TOTAL SHOULDER ARTHROPLASTY Left 05/01/2019      Social History     Occupational History     Not on file   Tobacco Use     Smoking status: Never     Smokeless tobacco: Never   Substance and Sexual Activity     Alcohol use: Never     Comment: Alcoholic Drinks/day: About once a year.     Drug use: No     Sexual activity: Not on file      Social History     Social History Narrative    Patient of Dr. Rod since 2001.    .  Wife is a former RN.     Wife's cousin is Dr. Jean Pierre Champagne, ID specialist.      Family History   Problem Relation Age of Onset     Arthritis Mother      Other - See Comments Mother         psychiatry/pvd     Other - See Comments Father         blood reaction     Family history is noncontributory otherwise.    Allergies: Effient [prasugrel hcl], Hydrochlorothiazide, Hydrocodone, Oxycontin [oxycodone], Peppermint oil, Plavix [clopidogrel], Sulfa (sulfonamide antibiotics) [sulfa drugs], and Perfume     Current medications:  Current Outpatient Medications   Medication Instructions     acetaminophen (TYLENOL) 650 mg, Oral, DAILY PRN     co-enzyme Q-10 200 mg, Oral, DAILY     fish oil-omega-3 fatty acids 2 g, DAILY     fluticasone (FLONASE) 50 MCG/ACT nasal spray 1 spray, Both Nostrils, DAILY     lisinopril (ZESTRIL) 5 MG tablet TAKE 1 TABLET DAILY     Magnesium Oxide POWD 1 Dose, Oral, DAILY     metoprolol succinate ER (TOPROL XL) 12.5 mg, Oral, DAILY     MULTIVITAMIN ORAL 1 tablet, Oral, DAILY     oxymetazoline HCl (AFRIN, OXYMETAZOLINE, NASL) 1 spray, Nasal, 2 TIMES DAILY     vits A,C,E/lutein/zeax/zn/paris (EYE HEALTH FORMULA ORAL) [VITS A,C,E/LUTEIN/ZEAX/ZN/PARIS (EYE HEALTH FORMULA ORAL)] Take by mouth.        Objective:     Wt Readings from Last 3 Encounters:   03/21/23 108.9 kg (240 lb)   09/28/22 108.4 kg (239 lb)   09/08/22 108 kg (238 lb)     BP Readings from Last 3 Encounters:   03/21/23 127/84   09/28/22 112/78   09/11/22 134/78     /84  (BP Location: Right arm, Patient Position: Sitting, Cuff Size: Adult Regular)   Pulse 70   Temp 98.4  F (36.9  C) (Oral)   Ht 1.829 m (6')   Wt 108.9 kg (240 lb)   SpO2 96%   BMI 32.55 kg/m     Patient is in no acute distress.  Mood good.  Insight good.  Eyes nonicteric.  Pupils equal.  Ears clear.  Nose clear.  Throat clear.  Neck is supple.  No cervical adenopathy.  No thyromegaly.  Heart is regular rate and rhythm.  Lungs clear to auscultation bilaterally.  Respiratory effort is good.  Abdomen is soft, nontender, no hepatosplenomegaly.  Extremities no edema.       Diagnostics:     Office Visit on 09/08/2022   Component Date Value Ref Range Status     TSH 09/08/2022 1.92  0.30 - 4.20 uIU/mL Final     Cholesterol 09/08/2022 168  <200 mg/dL Final     Triglycerides 09/08/2022 66  <150 mg/dL Final     Direct Measure HDL 09/08/2022 55  >=40 mg/dL Final     LDL Cholesterol Calculated 09/08/2022 100  <=100 mg/dL Final     Non HDL Cholesterol 09/08/2022 113  <130 mg/dL Final     Creatinine 09/08/2022 0.93  0.67 - 1.17 mg/dL Final     Sodium 09/08/2022 141  136 - 145 mmol/L Final     Potassium 09/08/2022 4.3  3.4 - 5.3 mmol/L Final     Urea Nitrogen 09/08/2022 18.2  8.0 - 23.0 mg/dL Final     Chloride 09/08/2022 102  98 - 107 mmol/L Final     Carbon Dioxide (CO2) 09/08/2022 27  22 - 29 mmol/L Final     Anion Gap 09/08/2022 12  7 - 15 mmol/L Final     Glucose 09/08/2022 94  70 - 99 mg/dL Final     GFR Estimate 09/08/2022 87  >60 mL/min/1.73m2 Final    Effective December 21, 2021 eGFRcr in adults is calculated using the 2021 CKD-EPI creatinine equation which includes age and gender (Yany aparicio al., NEJM, DOI: 10.1056/KQRVbf4747794)     Calcium 09/08/2022 10.0  8.8 - 10.2 mg/dL Final     Magnesium 09/08/2022 2.3  1.7 - 2.3 mg/dL Final     CRP Inflammation 09/08/2022 3.25  <5.00 mg/L Final     Erythrocyte Sedimentation Rate 09/08/2022 9  0 - 15 mm/hr Final     aPTT 09/08/2022 28  22 - 38 Seconds Final     INR 09/08/2022  1.01  0.85 - 1.15 Final     WBC Count 09/08/2022 8.9  4.0 - 11.0 10e3/uL Final     RBC Count 09/08/2022 4.93  4.40 - 5.90 10e6/uL Final     Hemoglobin 09/08/2022 15.9  13.3 - 17.7 g/dL Final     Hematocrit 09/08/2022 46.5  40.0 - 53.0 % Final     MCV 09/08/2022 94  78 - 100 fL Final     MCH 09/08/2022 32.3  26.5 - 33.0 pg Final     MCHC 09/08/2022 34.2  31.5 - 36.5 g/dL Final     RDW 09/08/2022 13.1  10.0 - 15.0 % Final     Platelet Count 09/08/2022 307  150 - 450 10e3/uL Final     % Neutrophils 09/08/2022 67  % Final     % Lymphocytes 09/08/2022 22  % Final     % Monocytes 09/08/2022 11  % Final     % Eosinophils 09/08/2022 1  % Final     % Basophils 09/08/2022 0  % Final     % Immature Granulocytes 09/08/2022 0  % Final     Absolute Neutrophils 09/08/2022 5.9  1.6 - 8.3 10e3/uL Final     Absolute Lymphocytes 09/08/2022 1.9  0.8 - 5.3 10e3/uL Final     Absolute Monocytes 09/08/2022 0.9  0.0 - 1.3 10e3/uL Final     Absolute Eosinophils 09/08/2022 0.1  0.0 - 0.7 10e3/uL Final     Absolute Basophils 09/08/2022 0.0  0.0 - 0.2 10e3/uL Final     Absolute Immature Granulocytes 09/08/2022 0.0  <=0.4 10e3/uL Final       Results for orders placed or performed in visit on 03/21/23   CBC with platelets     Status: Normal   Result Value Ref Range    WBC Count 7.8 4.0 - 11.0 10e3/uL    RBC Count 4.79 4.40 - 5.90 10e6/uL    Hemoglobin 15.3 13.3 - 17.7 g/dL    Hematocrit 44.9 40.0 - 53.0 %    MCV 94 78 - 100 fL    MCH 31.9 26.5 - 33.0 pg    MCHC 34.1 31.5 - 36.5 g/dL    RDW 13.4 10.0 - 15.0 %    Platelet Count 299 150 - 450 10e3/uL   Basic metabolic panel     Status: Normal   Result Value Ref Range    Sodium 141 136 - 145 mmol/L    Potassium 4.6 3.4 - 5.3 mmol/L    Chloride 102 98 - 107 mmol/L    Carbon Dioxide (CO2) 26 22 - 29 mmol/L    Anion Gap 13 7 - 15 mmol/L    Urea Nitrogen 12.9 8.0 - 23.0 mg/dL    Creatinine 0.99 0.67 - 1.17 mg/dL    Calcium 9.9 8.8 - 10.2 mg/dL    Glucose 97 70 - 99 mg/dL    GFR Estimate 80 >60  mL/min/1.73m2   EKG 12-lead, tracing only     Status: None (Preliminary result)   Result Value Ref Range    Systolic Blood Pressure  mmHg    Diastolic Blood Pressure  mmHg    Ventricular Rate 66 BPM    Atrial Rate 72 BPM    MA Interval  ms    QRS Duration 94 ms     ms    QTc 410 ms    P Axis  degrees    R AXIS -72 degrees    T Axis 51 degrees    Interpretation ECG       Undetermined rhythm  Left axis deviation  Low voltage QRS  Inferior infarct , age undetermined  Possible Anterolateral infarct (cited on or before 15-APR-2019)  Abnormal ECG  When compared with ECG of 2022 13:09,  Current undetermined rhythm precludes rhythm comparison, needs review  QRS duration has decreased         ______________________________________________________________________     [facility]  Echocardiography [facility]  [address] [place] [id number]     Name: ZAIDA CISNEROS  MRN: [id number]  : 1950  Study Date: 2021 08:00 AM  Age: 70 yrs  Gender: Male  Patient Location: [facility]  Reason For Study: Longstanding persistent atrial fibrillation (H)  Ordering Physician: ELYSSA TRACEY  Referring Physician: ELYSSA TRACEY  Performed By: Keira Hernandez     BSA: 2.3 m2  Height: 72 in  Weight: 249 lb  BP: 106/71 mmHg  ______________________________________________________________________________  Interpretation Summary  Structural JEAN CLAUDE for Left Atrial Occlusion Device     Pre-Device:  1. Normal left ventricular size. Moderate-to-severely reduced systolic  function. LVEF:30-35%  2. No left atrial thrombus or spontaneous contrast. Severe left atrial  enlargement.  3. PFO present by color flow.  4. No pericardial effusion.    Impression:     1. Preoperative examination    2. Back pain with left-sided radiculopathy    3. Persistent atrial fibrillation (H)    4. Coronary artery disease involving native coronary artery of native heart without angina pectoris    5. Atrial flutter, chronic  (H)    6. Primary hypertension    7. S/P left atrial appendage closure - WATCHMAN    8. Chronic left-sided low back pain with left-sided sciatica    9. Chronic systolic heart failure (H)    10. COVID-19 virus infection        Plan:     1. Okay to proceed with surgery as planned.  2. Blood work done today and EKG.  3. Take metoprolol alone on the morning of surgery.  4. At low risk for cardiac events with surgery.  5. Continue current medications otherwise after surgery.  6. Follow-up sooner if issues.           Onur Rod MD  General Internal Medicine  [facility]    Return in about 6 months (around 9/21/2023) for annual wellness visit.     No future appointments.    Surgical Information:  Surgery/Procedure: LEFT LUMBAR 4-5 POSTERIOR SPINAL FUSION WITH LEFT LUMBAR 4-5 LAMINOFORAMINOTOMY AND WIDE LUMBAR 4-5 LEFT SUBTOTAL FACETECTOMY WITH ALLOGRAFT AND AUTOGRAFT  Surgery Location: [facility] Main OR  Surgeon: Ambrose Degroot MD  Surgery Date: 3/30/23  Time of Surgery: 10:00 am  Where patient plans to recover: At home with family  Fax number for surgical facility: [phone]

## 2023-03-21 NOTE — PATIENT INSTRUCTIONS
Please follow up if you have any further issues.    You may contact me by phone or MyChart if you are worsening or if things are not improving.    ______________________________________________________________________     You can call [phone] during weekday hours to get a hold of our team coordinators if you need to get a message to me.    There are 3 people in our building taking phone calls for me.  Be sure to listen to the prompts to get a hold of them.    You first press 1 for English (press another number for a different language) and then press 2 to get the .    They can then take your message and forward it onto me.    ______________________________________________________________________     Please remember that you can call [phone] ANYTIME to schedule an appointment.     You can schedule appointments 24 hours a day, 7 days a week.      Sometimes the best time to schedule an appointment is after clinic hours when less people are calling in.      Weekends are another option for calling in to schedule appointments.

## 2023-03-21 NOTE — PROGRESS NOTES
[facility]  [facility]    Care coordination - Customized care -  Comprehensive and complex medical care            Date of Service: 3/21/2023  Primary Provider: Onur Rod    Patient Care Team:  Onur Rod MD as PCP - General  Onur Rod MD as Assigned PCP  Ambrose Degroot MD as MD (Orthopaedic Surgery)  Stuart Jackson MD as MD (Orthopaedic Surgery)  Liborio Champagne MD as MD  [facility]  Gregg Curtis as Resident (Orthopaedic Surgery)  Kiran Tyler MD as Assigned Heart and Vascular Provider           Patient's Pharmacy:    [id number] - [place], [place] - [address]  [address] [facility] [address]  Phone: [phone] Fax: [phone]    [facility] - [place] - [address] [facility] [facility]  [address] [facility]  [place] [address]  Phone: [phone] Fax: [phone]     Patient's Contacts:  Name Home Phone Work Phone Mobile Phone Relationship Lgl KYLER Wilson [phone] Spouse    MATILDE SILVER   [phone] Other      Patient's Insurance:    Payor: MEDICARE / Plan: MEDICARE / Product Type: Medicare /           Preoperative examination    Mike Gonzalez is a 72 year old male (1950) who I am asked to see by his surgeon regarding his fitness for upcoming surgery.      Chief Complaint   Patient presents with     Pre-Op Exam      LEFT LUMBAR 4-5 POSTERIOR SPINAL FUSION WITH LEFT LUMBAR 4-5 LAMINOFORAMINOTOMY AND WIDE LUMBAR 4-5 LEFT SUBTOTAL FACETECTOMY WITH ALLOGRAFT AND AUTOGRAFT      Subjective:     Patient comes in today for preoperative eval prior to his planned lumbar fusion at L4-L5.    He has had persistent low back pain with radiation down the left leg.  He has been having issues for about 3 years now.    We reviewed his atrial fibrillation and his EKG today the.  This was reviewed.  He has a watchman in place from  an anticoagulation standpoint.  He has decided not to take aspirin.    He has a history of minimal coronary artery disease on angiogram.  He has not taken cholesterol medications for this at this time.    He had COVID on February 13 and was quite sick.  He still has a little bit of a cough and sinus symptoms residual since this time.  It has been getting better week by week.    His blood pressure is doing okay on the current blood pressure medications.  He has underlying cardiomyopathy as well.    His blood pressure is doing well and he denies any stable or unstable cardiac symptoms.    ______________________________________________________________________     Pre-op Questionnaire 3/21/2023   1. Have you ever had a heart attack or stroke? No   2. Have you ever had surgery on your heart or blood vessels, such as a stent placement, a coronary artery bypass, or surgery on an artery in your head, neck, heart, or legs? YES -has had angiogram in the past.  Has had Watchman procedure.   3. Do you have chest pain with activity? No   4. Do you have a history of  heart failure? No   5. Do you currently have a cold, bronchitis or symptoms of other infection? No   6. Do you have a cough, shortness of breath, or wheezing? YES -due to COVID.   7. Do you or anyone in your family have previous history of blood clots? No   8. Do you or does anyone in your family have a serious bleeding problem such as prolonged bleeding following surgeries or cuts? No   9. Have you ever had problems with anemia or been told to take iron pills? No   10. Have you had any abnormal blood loss such as black, tarry or bloody stools? No   11. Have you ever had a blood transfusion? No   12. Are you willing to have a blood transfusion if it is medically needed before, during, or after your surgery? Yes   13. Have you or any of your relatives ever had problems with anesthesia? No   14. Do you have sleep apnea, excessive snoring or daytime drowsiness? No   15.  Do you have any artifical heart valves or other implanted medical devices like a pacemaker, defibrillator, or continuous glucose monitor? No   15a. What type of device do you have? -   15b. Name of the clinic that manages your device:  -   16. Do you have artificial joints? YES -see surgical history.   17. Are you allergic to latex? No     ______________________________________________________________________     We reviewed his other issues noted in the assessment but not specifically addressed in the HPI above.           Patient Active Problem List   Diagnosis     GERD (gastroesophageal reflux disease)     Chronic neck pain     Nonsmoker     Full code status     Branch retinal vein occlusion of left eye     Normal colonoscopy - 2016 - Average risk     Complete tear of left rotator cuff     Atrial flutter, chronic (H)     Dilated cardiomyopathy (H)     CAD (coronary artery disease), minimal disease on angiogram in 2021     Chronic systolic heart failure (H)     S/P left atrial appendage closure - WATCHMAN     Concussion syndrome     Chronic left-sided low back pain with left-sided sciatica     HTN (hypertension)       Past Surgical History:   Procedure Laterality Date     BASAL CELL CARCINOMA EXCISION  01/01/2020    Left nasal reconstruction     CATARACT EXTRACTION, BILATERAL Bilateral 2022     CERVICAL SPINE SURGERY      C8, T1, foraminotomy     CV CORONARY ANGIOGRAM N/A 01/20/2021    Procedure: Coronary Angiogram;  Surgeon: Willow Wellington MD;  Location: [facility] Cardiac Cath Lab;  Service: Cardiology     CV LEFT ATRIAL APPENDAGE CLOSURE N/A 07/14/2021    Procedure: CV LEFT ATRIAL APPENDAGE CLOSURE;  Surgeon: Noah Gutierres MD;  Location: [facility] CARDIAC CATH LAB     CV LEFT HEART CATHETERIZATION WITHOUT LEFT VENTRICULOGRAM Left 01/20/2021    Procedure: Left Heart Catheterization Without Left Ventriculogram;  Surgeon: Willow Wellington MD;  Location: [facility] Cardiac Cath Lab;  Service: Cardiology      HERNIA REPAIR, UMBILICAL       ×2     REVERSE TOTAL SHOULDER ARTHROPLASTY Left 05/01/2019      Social History     Occupational History     Not on file   Tobacco Use     Smoking status: Never     Smokeless tobacco: Never   Substance and Sexual Activity     Alcohol use: Never     Comment: Alcoholic Drinks/day: About once a year.     Drug use: No     Sexual activity: Not on file      Social History     Social History Narrative    Patient of Dr. Rod since 2001.    .  Wife is a former RN.     Wife's cousin is Dr. Jean Pierre Champagne, ID specialist.      Family History   Problem Relation Age of Onset     Arthritis Mother      Other - See Comments Mother         psychiatry/pvd     Other - See Comments Father         blood reaction     Family history is noncontributory otherwise.    Allergies: Effient [prasugrel hcl], Hydrochlorothiazide, Hydrocodone, Oxycontin [oxycodone], Peppermint oil, Plavix [clopidogrel], Sulfa (sulfonamide antibiotics) [sulfa drugs], and Perfume     Current medications:  Current Outpatient Medications   Medication Instructions     acetaminophen (TYLENOL) 650 mg, Oral, DAILY PRN     co-enzyme Q-10 200 mg, Oral, DAILY     fish oil-omega-3 fatty acids 2 g, DAILY     fluticasone (FLONASE) 50 MCG/ACT nasal spray 1 spray, Both Nostrils, DAILY     lisinopril (ZESTRIL) 5 MG tablet TAKE 1 TABLET DAILY     Magnesium Oxide POWD 1 Dose, Oral, DAILY     metoprolol succinate ER (TOPROL XL) 12.5 mg, Oral, DAILY     MULTIVITAMIN ORAL 1 tablet, Oral, DAILY     oxymetazoline HCl (AFRIN, OXYMETAZOLINE, NASL) 1 spray, Nasal, 2 TIMES DAILY     vits A,C,E/lutein/zeax/zn/paris (EYE HEALTH FORMULA ORAL) [VITS A,C,E/LUTEIN/ZEAX/ZN/PARIS (EYE HEALTH FORMULA ORAL)] Take by mouth.        Objective:     Wt Readings from Last 3 Encounters:   03/21/23 108.9 kg (240 lb)   09/28/22 108.4 kg (239 lb)   09/08/22 108 kg (238 lb)     BP Readings from Last 3 Encounters:   03/21/23 127/84   09/28/22 112/78   09/11/22 134/78     /84  (BP Location: Right arm, Patient Position: Sitting, Cuff Size: Adult Regular)   Pulse 70   Temp 98.4  F (36.9  C) (Oral)   Ht 1.829 m (6')   Wt 108.9 kg (240 lb)   SpO2 96%   BMI 32.55 kg/m     Patient is in no acute distress.  Mood good.  Insight good.  Eyes nonicteric.  Pupils equal.  Ears clear.  Nose clear.  Throat clear.  Neck is supple.  No cervical adenopathy.  No thyromegaly.  Heart is regular rate and rhythm.  Lungs clear to auscultation bilaterally.  Respiratory effort is good.  Abdomen is soft, nontender, no hepatosplenomegaly.  Extremities no edema.       Diagnostics:     Office Visit on 09/08/2022   Component Date Value Ref Range Status     TSH 09/08/2022 1.92  0.30 - 4.20 uIU/mL Final     Cholesterol 09/08/2022 168  <200 mg/dL Final     Triglycerides 09/08/2022 66  <150 mg/dL Final     Direct Measure HDL 09/08/2022 55  >=40 mg/dL Final     LDL Cholesterol Calculated 09/08/2022 100  <=100 mg/dL Final     Non HDL Cholesterol 09/08/2022 113  <130 mg/dL Final     Creatinine 09/08/2022 0.93  0.67 - 1.17 mg/dL Final     Sodium 09/08/2022 141  136 - 145 mmol/L Final     Potassium 09/08/2022 4.3  3.4 - 5.3 mmol/L Final     Urea Nitrogen 09/08/2022 18.2  8.0 - 23.0 mg/dL Final     Chloride 09/08/2022 102  98 - 107 mmol/L Final     Carbon Dioxide (CO2) 09/08/2022 27  22 - 29 mmol/L Final     Anion Gap 09/08/2022 12  7 - 15 mmol/L Final     Glucose 09/08/2022 94  70 - 99 mg/dL Final     GFR Estimate 09/08/2022 87  >60 mL/min/1.73m2 Final    Effective December 21, 2021 eGFRcr in adults is calculated using the 2021 CKD-EPI creatinine equation which includes age and gender (Yany aparicio al., NEJM, DOI: 10.1056/ZQGHuf2684250)     Calcium 09/08/2022 10.0  8.8 - 10.2 mg/dL Final     Magnesium 09/08/2022 2.3  1.7 - 2.3 mg/dL Final     CRP Inflammation 09/08/2022 3.25  <5.00 mg/L Final     Erythrocyte Sedimentation Rate 09/08/2022 9  0 - 15 mm/hr Final     aPTT 09/08/2022 28  22 - 38 Seconds Final     INR 09/08/2022  1.01  0.85 - 1.15 Final     WBC Count 09/08/2022 8.9  4.0 - 11.0 10e3/uL Final     RBC Count 09/08/2022 4.93  4.40 - 5.90 10e6/uL Final     Hemoglobin 09/08/2022 15.9  13.3 - 17.7 g/dL Final     Hematocrit 09/08/2022 46.5  40.0 - 53.0 % Final     MCV 09/08/2022 94  78 - 100 fL Final     MCH 09/08/2022 32.3  26.5 - 33.0 pg Final     MCHC 09/08/2022 34.2  31.5 - 36.5 g/dL Final     RDW 09/08/2022 13.1  10.0 - 15.0 % Final     Platelet Count 09/08/2022 307  150 - 450 10e3/uL Final     % Neutrophils 09/08/2022 67  % Final     % Lymphocytes 09/08/2022 22  % Final     % Monocytes 09/08/2022 11  % Final     % Eosinophils 09/08/2022 1  % Final     % Basophils 09/08/2022 0  % Final     % Immature Granulocytes 09/08/2022 0  % Final     Absolute Neutrophils 09/08/2022 5.9  1.6 - 8.3 10e3/uL Final     Absolute Lymphocytes 09/08/2022 1.9  0.8 - 5.3 10e3/uL Final     Absolute Monocytes 09/08/2022 0.9  0.0 - 1.3 10e3/uL Final     Absolute Eosinophils 09/08/2022 0.1  0.0 - 0.7 10e3/uL Final     Absolute Basophils 09/08/2022 0.0  0.0 - 0.2 10e3/uL Final     Absolute Immature Granulocytes 09/08/2022 0.0  <=0.4 10e3/uL Final       Results for orders placed or performed in visit on 03/21/23   CBC with platelets     Status: Normal   Result Value Ref Range    WBC Count 7.8 4.0 - 11.0 10e3/uL    RBC Count 4.79 4.40 - 5.90 10e6/uL    Hemoglobin 15.3 13.3 - 17.7 g/dL    Hematocrit 44.9 40.0 - 53.0 %    MCV 94 78 - 100 fL    MCH 31.9 26.5 - 33.0 pg    MCHC 34.1 31.5 - 36.5 g/dL    RDW 13.4 10.0 - 15.0 %    Platelet Count 299 150 - 450 10e3/uL   Basic metabolic panel     Status: Normal   Result Value Ref Range    Sodium 141 136 - 145 mmol/L    Potassium 4.6 3.4 - 5.3 mmol/L    Chloride 102 98 - 107 mmol/L    Carbon Dioxide (CO2) 26 22 - 29 mmol/L    Anion Gap 13 7 - 15 mmol/L    Urea Nitrogen 12.9 8.0 - 23.0 mg/dL    Creatinine 0.99 0.67 - 1.17 mg/dL    Calcium 9.9 8.8 - 10.2 mg/dL    Glucose 97 70 - 99 mg/dL    GFR Estimate 80 >60  mL/min/1.73m2   EKG 12-lead, tracing only     Status: None (Preliminary result)   Result Value Ref Range    Systolic Blood Pressure  mmHg    Diastolic Blood Pressure  mmHg    Ventricular Rate 66 BPM    Atrial Rate 72 BPM    DE Interval  ms    QRS Duration 94 ms     ms    QTc 410 ms    P Axis  degrees    R AXIS -72 degrees    T Axis 51 degrees    Interpretation ECG       Undetermined rhythm  Left axis deviation  Low voltage QRS  Inferior infarct , age undetermined  Possible Anterolateral infarct (cited on or before 15-APR-2019)  Abnormal ECG  When compared with ECG of 2022 13:09,  Current undetermined rhythm precludes rhythm comparison, needs review  QRS duration has decreased         ______________________________________________________________________     [facility]  Echocardiography Laboratory  [address] [place] [address]     Name: ZAIDA CISNEROS  MRN: [id number]  : 1950  Study Date: 2021 08:00 AM  Age: 70 yrs  Gender: Male  Patient Location: [facility]  Reason For Study: Longstanding persistent atrial fibrillation (H)  Ordering Physician: ELYSSA TRACEY  Referring Physician: ELYSSA TRACEY  Performed By: Keira Hernandez     BSA: 2.3 m2  Height: 72 in  Weight: 249 lb  BP: 106/71 mmHg  ______________________________________________________________________________  Interpretation Summary  Structural JEAN CLAUDE for Left Atrial Occlusion Device     Pre-Device:  1. Normal left ventricular size. Moderate-to-severely reduced systolic  function. LVEF:30-35%  2. No left atrial thrombus or spontaneous contrast. Severe left atrial  enlargement.  3. PFO present by color flow.  4. No pericardial effusion.    Impression:     1. Preoperative examination    2. Back pain with left-sided radiculopathy    3. Persistent atrial fibrillation (H)    4. Coronary artery disease involving native coronary artery of native heart without angina pectoris    5. Atrial flutter, chronic  (H)    6. Primary hypertension    7. S/P left atrial appendage closure - WATCHMAN    8. Chronic left-sided low back pain with left-sided sciatica    9. Chronic systolic heart failure (H)    10. COVID-19 virus infection        Plan:     1. Okay to proceed with surgery as planned.  2. Blood work done today and EKG.  3. Take metoprolol alone on the morning of surgery.  4. At low risk for cardiac events with surgery.  5. Continue current medications otherwise after surgery.  6. Follow-up sooner if issues.           Onur Rod MD  General Internal Medicine  [facility]    Return in about 6 months (around 9/21/2023) for annual wellness visit.     No future appointments.    Surgical Information:  Surgery/Procedure: LEFT LUMBAR 4-5 POSTERIOR SPINAL FUSION WITH LEFT LUMBAR 4-5 LAMINOFORAMINOTOMY AND WIDE LUMBAR 4-5 LEFT SUBTOTAL FACETECTOMY WITH ALLOGRAFT AND AUTOGRAFT  Surgery Location: [facility] Main OR  Surgeon: Ambrose Degroot MD  Surgery Date: 3/30/23  Time of Surgery: 10:00 am  Where patient plans to recover: At home with family  Fax number for surgical facility: [phone]

## 2023-03-22 LAB
ATRIAL RATE - MUSE: 72 BPM
DIASTOLIC BLOOD PRESSURE - MUSE: NORMAL MMHG
INTERPRETATION ECG - MUSE: NORMAL
P AXIS - MUSE: NORMAL DEGREES
PR INTERVAL - MUSE: NORMAL MS
QRS DURATION - MUSE: 94 MS
QT - MUSE: 392 MS
QTC - MUSE: 410 MS
R AXIS - MUSE: -72 DEGREES
SYSTOLIC BLOOD PRESSURE - MUSE: NORMAL MMHG
T AXIS - MUSE: 51 DEGREES
VENTRICULAR RATE- MUSE: 66 BPM

## 2023-03-30 ENCOUNTER — ANESTHESIA EVENT (OUTPATIENT)
Dept: SURGERY | Facility: CLINIC | Age: 73
DRG: 460 | End: 2023-03-30
Payer: MEDICARE

## 2023-03-30 ENCOUNTER — HOSPITAL ENCOUNTER (INPATIENT)
Facility: CLINIC | Age: 73
LOS: 3 days | Discharge: HOME | DRG: 460 | End: 2023-04-02
Attending: ORTHOPAEDIC SURGERY | Admitting: INTERNAL MEDICINE
Payer: MEDICARE

## 2023-03-30 ENCOUNTER — ANESTHESIA (OUTPATIENT)
Dept: SURGERY | Facility: CLINIC | Age: 73
DRG: 460 | End: 2023-03-30
Payer: MEDICARE

## 2023-03-30 ENCOUNTER — APPOINTMENT (OUTPATIENT)
Dept: RADIOLOGY | Facility: CLINIC | Age: 73
DRG: 460 | End: 2023-03-30
Attending: ORTHOPAEDIC SURGERY
Payer: MEDICARE

## 2023-03-30 DIAGNOSIS — I10 ESSENTIAL HYPERTENSION: ICD-10-CM

## 2023-03-30 DIAGNOSIS — R93.1 DECREASED CARDIAC EJECTION FRACTION: ICD-10-CM

## 2023-03-30 DIAGNOSIS — I48.92 ATRIAL FLUTTER, CHRONIC (H): ICD-10-CM

## 2023-03-30 DIAGNOSIS — R06.02 SOB (SHORTNESS OF BREATH): ICD-10-CM

## 2023-03-30 DIAGNOSIS — K21.9 GASTROESOPHAGEAL REFLUX DISEASE, UNSPECIFIED WHETHER ESOPHAGITIS PRESENT: ICD-10-CM

## 2023-03-30 DIAGNOSIS — M48.061 SPINAL STENOSIS AT L4-L5 LEVEL: Primary | ICD-10-CM

## 2023-03-30 LAB — HGB BLD-MCNC: 14.2 G/DL (ref 13.3–17.7)

## 2023-03-30 PROCEDURE — 250N000011 HC RX IP 250 OP 636: Performed by: PHYSICIAN ASSISTANT

## 2023-03-30 PROCEDURE — 120N000001 HC R&B MED SURG/OB

## 2023-03-30 PROCEDURE — 370N000017 HC ANESTHESIA TECHNICAL FEE, PER MIN: Performed by: ORTHOPAEDIC SURGERY

## 2023-03-30 PROCEDURE — 250N000009 HC RX 250: Performed by: ANESTHESIOLOGY

## 2023-03-30 PROCEDURE — 250N000005 HC OR RX SURGIFLO HEMOSTATIC MATRIX 10ML 199102S OPNP: Performed by: ORTHOPAEDIC SURGERY

## 2023-03-30 PROCEDURE — 85018 HEMOGLOBIN: CPT | Performed by: ORTHOPAEDIC SURGERY

## 2023-03-30 PROCEDURE — 250N000013 HC RX MED GY IP 250 OP 250 PS 637: Performed by: ORTHOPAEDIC SURGERY

## 2023-03-30 PROCEDURE — 710N000010 HC RECOVERY PHASE 1, LEVEL 2, PER MIN: Performed by: ORTHOPAEDIC SURGERY

## 2023-03-30 PROCEDURE — 258N000003 HC RX IP 258 OP 636: Performed by: FAMILY MEDICINE

## 2023-03-30 PROCEDURE — C1762 CONN TISS, HUMAN(INC FASCIA): HCPCS | Performed by: ORTHOPAEDIC SURGERY

## 2023-03-30 PROCEDURE — 99222 1ST HOSP IP/OBS MODERATE 55: CPT | Performed by: FAMILY MEDICINE

## 2023-03-30 PROCEDURE — 999N000182 XR SURGERY CARM FLUORO GREATER THAN 5 MIN: Mod: TC

## 2023-03-30 PROCEDURE — 0SG0071 FUSION OF LUMBAR VERTEBRAL JOINT WITH AUTOLOGOUS TISSUE SUBSTITUTE, POSTERIOR APPROACH, POSTERIOR COLUMN, OPEN APPROACH: ICD-10-PCS | Performed by: ORTHOPAEDIC SURGERY

## 2023-03-30 PROCEDURE — 250N000025 HC SEVOFLURANE, PER MIN: Performed by: ORTHOPAEDIC SURGERY

## 2023-03-30 PROCEDURE — 258N000003 HC RX IP 258 OP 636: Performed by: ANESTHESIOLOGY

## 2023-03-30 PROCEDURE — 01NB0ZZ RELEASE LUMBAR NERVE, OPEN APPROACH: ICD-10-PCS | Performed by: ORTHOPAEDIC SURGERY

## 2023-03-30 PROCEDURE — 250N000011 HC RX IP 250 OP 636: Performed by: ANESTHESIOLOGY

## 2023-03-30 PROCEDURE — 07DR3ZZ EXTRACTION OF ILIAC BONE MARROW, PERCUTANEOUS APPROACH: ICD-10-PCS | Performed by: ORTHOPAEDIC SURGERY

## 2023-03-30 PROCEDURE — 4A11X4G MONITORING OF PERIPHERAL NERVOUS ELECTRICAL ACTIVITY, INTRAOPERATIVE, EXTERNAL APPROACH: ICD-10-PCS | Performed by: ORTHOPAEDIC SURGERY

## 2023-03-30 PROCEDURE — 360N000077 HC SURGERY LEVEL 4, PER MIN: Performed by: ORTHOPAEDIC SURGERY

## 2023-03-30 PROCEDURE — 36415 COLL VENOUS BLD VENIPUNCTURE: CPT | Performed by: ORTHOPAEDIC SURGERY

## 2023-03-30 PROCEDURE — 250N000009 HC RX 250: Performed by: ORTHOPAEDIC SURGERY

## 2023-03-30 PROCEDURE — 999N000141 HC STATISTIC PRE-PROCEDURE NURSING ASSESSMENT: Performed by: ORTHOPAEDIC SURGERY

## 2023-03-30 PROCEDURE — 272N000001 HC OR GENERAL SUPPLY STERILE: Performed by: ORTHOPAEDIC SURGERY

## 2023-03-30 PROCEDURE — 00UT0KZ SUPPLEMENT SPINAL MENINGES WITH NONAUTOLOGOUS TISSUE SUBSTITUTE, OPEN APPROACH: ICD-10-PCS | Performed by: ORTHOPAEDIC SURGERY

## 2023-03-30 PROCEDURE — 250N000011 HC RX IP 250 OP 636: Performed by: ORTHOPAEDIC SURGERY

## 2023-03-30 PROCEDURE — C1763 CONN TISS, NON-HUMAN: HCPCS | Performed by: ORTHOPAEDIC SURGERY

## 2023-03-30 PROCEDURE — C1713 ANCHOR/SCREW BN/BN,TIS/BN: HCPCS | Performed by: ORTHOPAEDIC SURGERY

## 2023-03-30 DEVICE — ALLOGRAFT DURAGEN PLUS 2 X 2 DP5022: Type: IMPLANTABLE DEVICE | Site: SPINE LUMBAR | Status: FUNCTIONAL

## 2023-03-30 DEVICE — IMP ROD MEDT 3.5CM 1475501035: Type: IMPLANTABLE DEVICE | Site: BACK | Status: FUNCTIONAL

## 2023-03-30 DEVICE — GRAFT BN MAGNIFUSE DBM 5X1.75CM: Type: IMPLANTABLE DEVICE | Site: SPINE LUMBAR | Status: FUNCTIONAL

## 2023-03-30 DEVICE — IMP SCR MEDT BREAK-OFF SET SOLERA 4.75MM TI 5440030: Type: IMPLANTABLE DEVICE | Site: BACK | Status: FUNCTIONAL

## 2023-03-30 DEVICE — IMPLANTABLE DEVICE: Type: IMPLANTABLE DEVICE | Site: BACK | Status: FUNCTIONAL

## 2023-03-30 DEVICE — IMP SCR MEDT 4.75MM SOLERA 7.5X55MM MA 54840007555: Type: IMPLANTABLE DEVICE | Site: BACK | Status: FUNCTIONAL

## 2023-03-30 RX ORDER — BISACODYL 10 MG
10 SUPPOSITORY, RECTAL RECTAL DAILY PRN
Status: DISCONTINUED | OUTPATIENT
Start: 2023-03-30 | End: 2023-04-02 | Stop reason: HOSPADM

## 2023-03-30 RX ORDER — LORATADINE 10 MG/1
10 TABLET ORAL DAILY PRN
Status: DISCONTINUED | OUTPATIENT
Start: 2023-03-30 | End: 2023-04-02 | Stop reason: HOSPADM

## 2023-03-30 RX ORDER — FENTANYL CITRATE-0.9 % NACL/PF 10 MCG/ML
PLASTIC BAG, INJECTION (ML) INTRAVENOUS CONTINUOUS PRN
Status: DISCONTINUED | OUTPATIENT
Start: 2023-03-30 | End: 2023-03-30

## 2023-03-30 RX ORDER — HYDROMORPHONE HCL IN WATER/PF 6 MG/30 ML
0.2 PATIENT CONTROLLED ANALGESIA SYRINGE INTRAVENOUS EVERY 5 MIN PRN
Status: DISCONTINUED | OUTPATIENT
Start: 2023-03-30 | End: 2023-03-30 | Stop reason: HOSPADM

## 2023-03-30 RX ORDER — LISINOPRIL 5 MG/1
5 TABLET ORAL DAILY
Status: DISCONTINUED | OUTPATIENT
Start: 2023-03-30 | End: 2023-03-30

## 2023-03-30 RX ORDER — HYDROMORPHONE HYDROCHLORIDE 2 MG/1
2 TABLET ORAL EVERY 4 HOURS PRN
Status: DISCONTINUED | OUTPATIENT
Start: 2023-03-30 | End: 2023-04-02 | Stop reason: HOSPADM

## 2023-03-30 RX ORDER — FENTANYL CITRATE 50 UG/ML
50 INJECTION, SOLUTION INTRAMUSCULAR; INTRAVENOUS EVERY 5 MIN PRN
Status: DISCONTINUED | OUTPATIENT
Start: 2023-03-30 | End: 2023-03-30 | Stop reason: HOSPADM

## 2023-03-30 RX ORDER — CEFAZOLIN SODIUM/WATER 2 G/20 ML
2 SYRINGE (ML) INTRAVENOUS SEE ADMIN INSTRUCTIONS
Status: DISCONTINUED | OUTPATIENT
Start: 2023-03-30 | End: 2023-03-30 | Stop reason: HOSPADM

## 2023-03-30 RX ORDER — ONDANSETRON 4 MG/1
4 TABLET, ORALLY DISINTEGRATING ORAL EVERY 6 HOURS PRN
Status: DISCONTINUED | OUTPATIENT
Start: 2023-03-30 | End: 2023-04-02 | Stop reason: HOSPADM

## 2023-03-30 RX ORDER — MAGNESIUM SULFATE 4 G/50ML
4 INJECTION INTRAVENOUS ONCE
Status: COMPLETED | OUTPATIENT
Start: 2023-03-30 | End: 2023-03-30

## 2023-03-30 RX ORDER — NALOXONE HYDROCHLORIDE 0.4 MG/ML
0.4 INJECTION, SOLUTION INTRAMUSCULAR; INTRAVENOUS; SUBCUTANEOUS
Status: DISCONTINUED | OUTPATIENT
Start: 2023-03-30 | End: 2023-04-02 | Stop reason: HOSPADM

## 2023-03-30 RX ORDER — SODIUM CHLORIDE, SODIUM LACTATE, POTASSIUM CHLORIDE, CALCIUM CHLORIDE 600; 310; 30; 20 MG/100ML; MG/100ML; MG/100ML; MG/100ML
INJECTION, SOLUTION INTRAVENOUS CONTINUOUS
Status: DISCONTINUED | OUTPATIENT
Start: 2023-03-30 | End: 2023-03-30 | Stop reason: HOSPADM

## 2023-03-30 RX ORDER — LIDOCAINE HYDROCHLORIDE 10 MG/ML
INJECTION, SOLUTION INFILTRATION; PERINEURAL PRN
Status: DISCONTINUED | OUTPATIENT
Start: 2023-03-30 | End: 2023-03-30

## 2023-03-30 RX ORDER — BUPIVACAINE HYDROCHLORIDE AND EPINEPHRINE 2.5; 5 MG/ML; UG/ML
2 INJECTION, SOLUTION EPIDURAL; INFILTRATION; INTRACAUDAL; PERINEURAL ONCE
Status: DISCONTINUED | OUTPATIENT
Start: 2023-03-30 | End: 2023-03-30 | Stop reason: HOSPADM

## 2023-03-30 RX ORDER — CEFAZOLIN SODIUM 2 G/100ML
2 INJECTION, SOLUTION INTRAVENOUS EVERY 8 HOURS
Status: COMPLETED | OUTPATIENT
Start: 2023-03-30 | End: 2023-03-31

## 2023-03-30 RX ORDER — KETAMINE HYDROCHLORIDE 10 MG/ML
INJECTION INTRAMUSCULAR; INTRAVENOUS PRN
Status: DISCONTINUED | OUTPATIENT
Start: 2023-03-30 | End: 2023-03-30

## 2023-03-30 RX ORDER — POLYETHYLENE GLYCOL 3350 17 G/17G
17 POWDER, FOR SOLUTION ORAL DAILY
Status: DISCONTINUED | OUTPATIENT
Start: 2023-03-31 | End: 2023-04-02 | Stop reason: HOSPADM

## 2023-03-30 RX ORDER — FENTANYL CITRATE 50 UG/ML
INJECTION, SOLUTION INTRAMUSCULAR; INTRAVENOUS PRN
Status: DISCONTINUED | OUTPATIENT
Start: 2023-03-30 | End: 2023-03-30

## 2023-03-30 RX ORDER — DIAZEPAM 5 MG
5 TABLET ORAL EVERY 4 HOURS PRN
Status: DISCONTINUED | OUTPATIENT
Start: 2023-03-30 | End: 2023-04-02 | Stop reason: HOSPADM

## 2023-03-30 RX ORDER — GLYCOPYRROLATE 0.2 MG/ML
INJECTION, SOLUTION INTRAMUSCULAR; INTRAVENOUS PRN
Status: DISCONTINUED | OUTPATIENT
Start: 2023-03-30 | End: 2023-03-30

## 2023-03-30 RX ORDER — LIDOCAINE 40 MG/G
CREAM TOPICAL
Status: DISCONTINUED | OUTPATIENT
Start: 2023-03-30 | End: 2023-03-30 | Stop reason: HOSPADM

## 2023-03-30 RX ORDER — ONDANSETRON 2 MG/ML
INJECTION INTRAMUSCULAR; INTRAVENOUS PRN
Status: DISCONTINUED | OUTPATIENT
Start: 2023-03-30 | End: 2023-03-30

## 2023-03-30 RX ORDER — NALOXONE HYDROCHLORIDE 0.4 MG/ML
0.2 INJECTION, SOLUTION INTRAMUSCULAR; INTRAVENOUS; SUBCUTANEOUS
Status: DISCONTINUED | OUTPATIENT
Start: 2023-03-30 | End: 2023-04-02 | Stop reason: HOSPADM

## 2023-03-30 RX ORDER — HYDROMORPHONE HCL IN WATER/PF 6 MG/30 ML
0.4 PATIENT CONTROLLED ANALGESIA SYRINGE INTRAVENOUS EVERY 5 MIN PRN
Status: DISCONTINUED | OUTPATIENT
Start: 2023-03-30 | End: 2023-03-30 | Stop reason: HOSPADM

## 2023-03-30 RX ORDER — BUPIVACAINE HYDROCHLORIDE AND EPINEPHRINE 2.5; 5 MG/ML; UG/ML
INJECTION, SOLUTION EPIDURAL; INFILTRATION; INTRACAUDAL; PERINEURAL PRN
Status: DISCONTINUED | OUTPATIENT
Start: 2023-03-30 | End: 2023-03-30 | Stop reason: HOSPADM

## 2023-03-30 RX ORDER — AMOXICILLIN 250 MG
1 CAPSULE ORAL 2 TIMES DAILY
Status: DISCONTINUED | OUTPATIENT
Start: 2023-03-30 | End: 2023-04-02 | Stop reason: HOSPADM

## 2023-03-30 RX ORDER — ONDANSETRON 2 MG/ML
4 INJECTION INTRAMUSCULAR; INTRAVENOUS EVERY 30 MIN PRN
Status: DISCONTINUED | OUTPATIENT
Start: 2023-03-30 | End: 2023-03-30 | Stop reason: HOSPADM

## 2023-03-30 RX ORDER — ONDANSETRON 4 MG/1
4 TABLET, ORALLY DISINTEGRATING ORAL EVERY 30 MIN PRN
Status: DISCONTINUED | OUTPATIENT
Start: 2023-03-30 | End: 2023-03-30 | Stop reason: HOSPADM

## 2023-03-30 RX ORDER — PROPOFOL 10 MG/ML
INJECTION, EMULSION INTRAVENOUS PRN
Status: DISCONTINUED | OUTPATIENT
Start: 2023-03-30 | End: 2023-03-30

## 2023-03-30 RX ORDER — ACETAMINOPHEN 325 MG/1
650 TABLET ORAL EVERY 4 HOURS PRN
Status: DISCONTINUED | OUTPATIENT
Start: 2023-04-02 | End: 2023-04-02 | Stop reason: HOSPADM

## 2023-03-30 RX ORDER — ACETAMINOPHEN 325 MG/1
975 TABLET ORAL EVERY 8 HOURS
Status: COMPLETED | OUTPATIENT
Start: 2023-03-30 | End: 2023-04-02

## 2023-03-30 RX ORDER — HYDROMORPHONE HCL IN WATER/PF 6 MG/30 ML
0.2 PATIENT CONTROLLED ANALGESIA SYRINGE INTRAVENOUS
Status: DISCONTINUED | OUTPATIENT
Start: 2023-03-30 | End: 2023-04-02 | Stop reason: HOSPADM

## 2023-03-30 RX ORDER — HYDROMORPHONE HCL IN WATER/PF 6 MG/30 ML
0.4 PATIENT CONTROLLED ANALGESIA SYRINGE INTRAVENOUS
Status: DISCONTINUED | OUTPATIENT
Start: 2023-03-30 | End: 2023-04-02 | Stop reason: HOSPADM

## 2023-03-30 RX ORDER — SODIUM CHLORIDE 9 MG/ML
INJECTION, SOLUTION INTRAVENOUS CONTINUOUS
Status: DISCONTINUED | OUTPATIENT
Start: 2023-03-30 | End: 2023-03-31

## 2023-03-30 RX ORDER — DEXAMETHASONE SODIUM PHOSPHATE 10 MG/ML
INJECTION, SOLUTION INTRAMUSCULAR; INTRAVENOUS PRN
Status: DISCONTINUED | OUTPATIENT
Start: 2023-03-30 | End: 2023-03-30

## 2023-03-30 RX ORDER — HYDROXYZINE HYDROCHLORIDE 10 MG/1
10 TABLET, FILM COATED ORAL EVERY 6 HOURS PRN
Status: DISCONTINUED | OUTPATIENT
Start: 2023-03-30 | End: 2023-04-02 | Stop reason: HOSPADM

## 2023-03-30 RX ORDER — HYDROMORPHONE HYDROCHLORIDE 4 MG/1
4 TABLET ORAL EVERY 4 HOURS PRN
Status: DISCONTINUED | OUTPATIENT
Start: 2023-03-30 | End: 2023-04-02 | Stop reason: HOSPADM

## 2023-03-30 RX ORDER — OXYMETAZOLINE HYDROCHLORIDE 0.05 G/100ML
1 SPRAY NASAL 2 TIMES DAILY
Status: DISCONTINUED | OUTPATIENT
Start: 2023-03-30 | End: 2023-04-02 | Stop reason: HOSPADM

## 2023-03-30 RX ORDER — CEFAZOLIN SODIUM/WATER 2 G/20 ML
2 SYRINGE (ML) INTRAVENOUS
Status: COMPLETED | OUTPATIENT
Start: 2023-03-30 | End: 2023-03-30

## 2023-03-30 RX ORDER — ONDANSETRON 2 MG/ML
4 INJECTION INTRAMUSCULAR; INTRAVENOUS EVERY 6 HOURS PRN
Status: DISCONTINUED | OUTPATIENT
Start: 2023-03-30 | End: 2023-04-02 | Stop reason: HOSPADM

## 2023-03-30 RX ORDER — MULTIVITAMIN,THERAPEUTIC
1 TABLET ORAL DAILY
Status: DISCONTINUED | OUTPATIENT
Start: 2023-03-31 | End: 2023-04-02 | Stop reason: HOSPADM

## 2023-03-30 RX ORDER — FENTANYL CITRATE 50 UG/ML
25 INJECTION, SOLUTION INTRAMUSCULAR; INTRAVENOUS EVERY 5 MIN PRN
Status: DISCONTINUED | OUTPATIENT
Start: 2023-03-30 | End: 2023-03-30 | Stop reason: HOSPADM

## 2023-03-30 RX ORDER — LISINOPRIL 5 MG/1
5 TABLET ORAL DAILY
Status: DISCONTINUED | OUTPATIENT
Start: 2023-03-31 | End: 2023-04-02 | Stop reason: HOSPADM

## 2023-03-30 RX ORDER — FLUTICASONE PROPIONATE 50 MCG
1 SPRAY, SUSPENSION (ML) NASAL DAILY
Status: DISCONTINUED | OUTPATIENT
Start: 2023-03-31 | End: 2023-04-02 | Stop reason: HOSPADM

## 2023-03-30 RX ADMIN — ACETAMINOPHEN 975 MG: 325 TABLET ORAL at 23:38

## 2023-03-30 RX ADMIN — HYDROMORPHONE HYDROCHLORIDE 4 MG: 4 TABLET ORAL at 16:57

## 2023-03-30 RX ADMIN — SODIUM CHLORIDE, POTASSIUM CHLORIDE, SODIUM LACTATE AND CALCIUM CHLORIDE: 600; 310; 30; 20 INJECTION, SOLUTION INTRAVENOUS at 12:03

## 2023-03-30 RX ADMIN — HYDROMORPHONE HYDROCHLORIDE 0.4 MG: 0.2 INJECTION, SOLUTION INTRAMUSCULAR; INTRAVENOUS; SUBCUTANEOUS at 18:30

## 2023-03-30 RX ADMIN — PROPOFOL 150 MG: 10 INJECTION, EMULSION INTRAVENOUS at 10:11

## 2023-03-30 RX ADMIN — CEFAZOLIN SODIUM 2 G: 2 INJECTION, SOLUTION INTRAVENOUS at 17:00

## 2023-03-30 RX ADMIN — PHENYLEPHRINE HYDROCHLORIDE 100 MCG: 10 INJECTION INTRAVENOUS at 10:56

## 2023-03-30 RX ADMIN — MAGNESIUM SULFATE HEPTAHYDRATE 4 G: 80 INJECTION, SOLUTION INTRAVENOUS at 08:47

## 2023-03-30 RX ADMIN — OXYMETAZOLINE HYDROCHLORIDE 1 SPRAY: 0.05 SPRAY NASAL at 20:59

## 2023-03-30 RX ADMIN — PHENYLEPHRINE HYDROCHLORIDE 200 MCG: 10 INJECTION INTRAVENOUS at 11:07

## 2023-03-30 RX ADMIN — PHENYLEPHRINE HYDROCHLORIDE 100 MCG: 10 INJECTION INTRAVENOUS at 11:03

## 2023-03-30 RX ADMIN — FENTANYL CITRATE 100 MCG: 50 INJECTION, SOLUTION INTRAMUSCULAR; INTRAVENOUS at 10:11

## 2023-03-30 RX ADMIN — PHENYLEPHRINE HYDROCHLORIDE 200 MCG: 10 INJECTION INTRAVENOUS at 13:02

## 2023-03-30 RX ADMIN — SODIUM CHLORIDE, POTASSIUM CHLORIDE, SODIUM LACTATE AND CALCIUM CHLORIDE: 600; 310; 30; 20 INJECTION, SOLUTION INTRAVENOUS at 08:54

## 2023-03-30 RX ADMIN — SODIUM CHLORIDE: 9 INJECTION, SOLUTION INTRAVENOUS at 16:15

## 2023-03-30 RX ADMIN — HYDROMORPHONE HYDROCHLORIDE 0.5 MG: 1 INJECTION, SOLUTION INTRAMUSCULAR; INTRAVENOUS; SUBCUTANEOUS at 12:45

## 2023-03-30 RX ADMIN — PHENYLEPHRINE HYDROCHLORIDE 100 MCG: 10 INJECTION INTRAVENOUS at 11:16

## 2023-03-30 RX ADMIN — SUGAMMADEX 100 MG: 100 INJECTION, SOLUTION INTRAVENOUS at 12:36

## 2023-03-30 RX ADMIN — ROCURONIUM BROMIDE 50 MG: 10 INJECTION, SOLUTION INTRAVENOUS at 10:43

## 2023-03-30 RX ADMIN — HYDROMORPHONE HYDROCHLORIDE 0.5 MG: 1 INJECTION, SOLUTION INTRAMUSCULAR; INTRAVENOUS; SUBCUTANEOUS at 10:35

## 2023-03-30 RX ADMIN — HYDROMORPHONE HYDROCHLORIDE 4 MG: 4 TABLET ORAL at 20:59

## 2023-03-30 RX ADMIN — PHENYLEPHRINE HYDROCHLORIDE 100 MCG: 10 INJECTION INTRAVENOUS at 10:17

## 2023-03-30 RX ADMIN — PHENYLEPHRINE HYDROCHLORIDE 200 MCG: 10 INJECTION INTRAVENOUS at 10:58

## 2023-03-30 RX ADMIN — GLYCOPYRROLATE 0.2 MG: 0.2 INJECTION INTRAMUSCULAR; INTRAVENOUS at 10:11

## 2023-03-30 RX ADMIN — Medication 20 MCG/MIN: at 11:11

## 2023-03-30 RX ADMIN — MIDAZOLAM 2 MG: 1 INJECTION INTRAMUSCULAR; INTRAVENOUS at 10:05

## 2023-03-30 RX ADMIN — DEXAMETHASONE SODIUM PHOSPHATE 10 MG: 10 INJECTION, SOLUTION INTRAMUSCULAR; INTRAVENOUS at 10:11

## 2023-03-30 RX ADMIN — Medication 2 G: at 10:05

## 2023-03-30 RX ADMIN — PHENYLEPHRINE HYDROCHLORIDE 100 MCG: 10 INJECTION INTRAVENOUS at 10:35

## 2023-03-30 RX ADMIN — PHENYLEPHRINE HYDROCHLORIDE 100 MCG: 10 INJECTION INTRAVENOUS at 11:14

## 2023-03-30 RX ADMIN — KETAMINE HYDROCHLORIDE 50 MG: 10 INJECTION, SOLUTION INTRAMUSCULAR; INTRAVENOUS at 10:16

## 2023-03-30 RX ADMIN — LIDOCAINE HYDROCHLORIDE 20 MG: 10 INJECTION, SOLUTION INFILTRATION; PERINEURAL at 10:11

## 2023-03-30 RX ADMIN — PHENYLEPHRINE HYDROCHLORIDE 200 MCG: 10 INJECTION INTRAVENOUS at 11:11

## 2023-03-30 RX ADMIN — SENNOSIDES AND DOCUSATE SODIUM 1 TABLET: 50; 8.6 TABLET ORAL at 20:33

## 2023-03-30 RX ADMIN — DIAZEPAM 5 MG: 5 TABLET ORAL at 20:33

## 2023-03-30 RX ADMIN — HYDROXYZINE HYDROCHLORIDE 10 MG: 10 TABLET ORAL at 18:30

## 2023-03-30 RX ADMIN — ONDANSETRON 4 MG: 2 INJECTION INTRAMUSCULAR; INTRAVENOUS at 12:38

## 2023-03-30 RX ADMIN — ACETAMINOPHEN 975 MG: 325 TABLET ORAL at 16:15

## 2023-03-30 RX ADMIN — Medication 100 MG: at 10:11

## 2023-03-30 ASSESSMENT — ACTIVITIES OF DAILY LIVING (ADL)
ADLS_ACUITY_SCORE: 26
ADLS_ACUITY_SCORE: 20
ADLS_ACUITY_SCORE: 26
ADLS_ACUITY_SCORE: 20
ADLS_ACUITY_SCORE: 20
ADLS_ACUITY_SCORE: 26

## 2023-03-30 ASSESSMENT — ENCOUNTER SYMPTOMS: DYSRHYTHMIAS: 1

## 2023-03-30 NOTE — ANESTHESIA CARE TRANSFER NOTE
Patient: Mike Gonzalez    Procedure: Procedure(s):  LEFT LUMBAR 4-5 POSTERIOR SPINAL FUSION WITH LEFT LUMBAR 4-5 LAMINOFORAMINOTOMY AND WIDE LUMBAR 4-5 LEFT SUBTOTAL FACETECTOMY WITH ALLOGRAFT AND AUTOGRAFT       Diagnosis: Back pain [M54.9]  Diagnosis Additional Information: No value filed.    Anesthesia Type:   General     Note:    Oropharynx: oropharynx clear of all foreign objects  Level of Consciousness: drowsy  Oxygen Supplementation: face mask  Level of Supplemental Oxygen (L/min / FiO2): 8  Independent Airway: airway patency satisfactory and stable  Dentition: dentition unchanged  Vital Signs Stable: post-procedure vital signs reviewed and stable  Report to RN Given: handoff report given  Patient transferred to: PACU    Handoff Report: Identifed the Patient, Identified the Reponsible Provider, Reviewed the pertinent medical history, Discussed the surgical course, Reviewed Intra-OP anesthesia mangement and issues during anesthesia, Set expectations for post-procedure period and Allowed opportunity for questions and acknowledgement of understanding      Vitals:  Vitals Value Taken Time   /85 03/30/23 1306   Temp 36.3  C (97.4  F) 03/30/23 1305   Pulse 90 03/30/23 1308   Resp 0 03/30/23 1308   SpO2 95 % 03/30/23 1308   Vitals shown include unvalidated device data.    Electronically Signed By: RAHUL Marrero CRNA  March 30, 2023  1:10 PM

## 2023-03-30 NOTE — ANESTHESIA PROCEDURE NOTES
Airway       Patient location during procedure: OR       Procedure Start/Stop Times: 3/30/2023 10:13 AM  Staff -        Anesthesiologist:  Blake Joel MD       CRNA: Deep Vogel APRN CRNA       Performed By: CRNA  Consent for Airway        Urgency: elective  Indications and Patient Condition       Indications for airway management: avinash-procedural       Induction type:RSI       Mask difficulty assessment: 0 - not attempted    Final Airway Details       Final airway type: endotracheal airway       Successful airway: ETT - single  Endotracheal Airway Details        ETT size (mm): 8.0       Cuffed: yes       Successful intubation technique: direct laryngoscopy       DL Blade Type: MAC 3       Grade View of Cords: 1       Adjucts: stylet       Position: Center       Measured from: lips       Secured at (cm): 24       Bite block used: Soft    Post intubation assessment        Placement verified by: capnometry, equal breath sounds and chest rise        Number of attempts at approach: 1       Number of other approaches attempted: 0       Secured with: silk tape       Ease of procedure: easy       Dentition: Intact and Unchanged       Dental guard used and removed. Dental Guard Type: Proguard Red.    Medication(s) Administered   Medication Administration Time: 3/30/2023 10:13 AM

## 2023-03-30 NOTE — ANESTHESIA POSTPROCEDURE EVALUATION
Patient: Mike Gonzalez    Procedure: Procedure(s):  LEFT LUMBAR 4-5 POSTERIOR SPINAL FUSION WITH LEFT LUMBAR 4-5 LAMINOFORAMINOTOMY AND WIDE LUMBAR 4-5 LEFT SUBTOTAL FACETECTOMY WITH ALLOGRAFT AND AUTOGRAFT       Anesthesia Type:  General    Note:     Postop Pain Control: Uneventful            Sign Out: Well controlled pain   PONV: No   Neuro/Psych: Uneventful            Sign Out: Acceptable/Baseline neuro status   Airway/Respiratory: Uneventful            Sign Out: Acceptable/Baseline resp. status   CV/Hemodynamics: Uneventful            Sign Out: Acceptable CV status; No obvious hypovolemia; No obvious fluid overload   Other NRE: NONE   DID A NON-ROUTINE EVENT OCCUR? No           Last vitals:  Vitals Value Taken Time   /65 03/30/23 1400   Temp 36.3  C (97.4  F) 03/30/23 1305   Pulse 80 03/30/23 1400   Resp 11 03/30/23 1400   SpO2 95 % 03/30/23 1400   Vitals shown include unvalidated device data.    Electronically Signed By: Blake Joel MD  March 30, 2023  2:02 PM

## 2023-03-30 NOTE — INTERVAL H&P NOTE
I have reviewed the virtual H&P that is linked to this encounter. The physical exam performed by anesthesia during this surgical encounter serves as the physical portion of that the virtual H&P. There are no significant changes from that history and physical as noted.    Clinical Conditions Present on Arrival:  Clinically Significant Risk Factors Present on Admission                    # Obesity: Estimated body mass index is 32.82 kg/m  as calculated from the following:    Height as of this encounter: 1.829 m (6').    Weight as of this encounter: 109.8 kg (242 lb).

## 2023-03-30 NOTE — OP NOTE
Procedure Date: 03/30/2023    PREOPERATIVE DIAGNOSIS:  Left L4 radiculopathy secondary to severe left L4-5 foraminal stenosis.    POSTOPERATIVE DIAGNOSES:    1.  Left L4 radiculopathy secondary to severe left L4-5 foraminal stenosis.  2.  Significant risk of instability secondary to subtotal facetectomy on the left at L4-5 to adequately decompress the L4-5 foramen and L4 nerve root, necessitating fusion.    PROCEDURE PERFORMED:    1.  L4-5 posterolateral fusion.  2.  L4-5 posterior spinal instrumentation (Medtronic Solera pedicle screw instrumentation system).  3.  L4-5 subtotal facetectomy with laminoforaminotomy and decompression of left L4 nerve root.  4.  Use of nonstructural allograft for spinal surgery (MagniFuse demineralized bone matrix).  5.  Right posterior iliac crest bone marrow aspirate.  6.  Use of operative microscopy.    ATTENDING SURGEON:  Ambrose Degroot MD.    ASSISTANT:  Elli Ledezma PA-C.    Please note this procedure technically required an assistant for the safety of the patient.  Ms. Ledezma is an experienced PA in spinal surgery.  Her assistance was imperative and necessary to safely protect surrounding soft tissue and neural structures as I performed decompressive phase of the procedure.    ANESTHESIA:  General endotracheal anesthesia.    ESTIMATED BLOOD LOSS:  200 mL    PACKS AND DRAINS:  One Medardo-Flood drain.    COMPLICATIONS:  2 mm incidental durotomy noted during decompression.  We repaired primarily with a Duragen patch and DuraSeal as well as a fat graft.    CONDITION:  Stable.    DISPOSITION:  Postanesthesia care unit.    INSTRUMENTATION:  Medtronic Solera pedicle screw instrumentation system.    INDICATIONS FOR PROCEDURE:  Mr. Gonzalez is a pleasant 72-year-old gentleman who had previously undergone a left sided L3-4 subarticular decompression with decompression of the L4 nerve root.  He did well for some time; however, developed recurrent L4 radiculopathy.  CT scan as well as  MRI demonstrated evidence of profound left sided neural foraminal narrowing at L4-5 causing compression on the L4 nerve root.  The patient had partial autofusion at the L4-5 segment and decision was made to proceed with surgical intervention.  Due to the inordinate amount of bone that was likely necessary to be resected, explained to the patient in advance that we may necessitate instrumentation being placed on the left side of the spine to prevent further collapse of the foramen postoperatively.  We will also perform a posterolateral fusion if necessary.  He understood this and elected to proceed.  He was then seen by his primary care physician and scheduled for surgery.    A detailed description of the risks, benefits, and treatment alternatives inherent to the operation was explained in advance to the patient including but not limited to failure to improve, cerebrospinal fluid leakage, hemorrhage, infection, permanent or transient nerve root damage, and need for future surgery.  Despite these risks, he elected to proceed.    DESCRIPTION OF PROCEDURE:  The patient was met in the preoperative holding area by myself.  At the time, he consent was reviewed and signed.  Site was marked on the patient's back.  He was then seen by the anesthesia service and escorted back to the operating room.  Upon arrival in the operating room, patient was intubated by the anesthesia service without complication.  Kruger catheter was inserted.  It was then flipped from supine to prone position on the Taiwo frame.  Special attention paid to padding bony prominences or superficial nerves.   His genitals were found to be hanging free. His back was then prepped and draped in normal sterile fashion.  At this point, a timeout was called to ensure the proper procedure to be performed as well as the administration of prophylactic antibiotics.  Once this was completed, the procedure began.  C-arm fluoroscopic imaging was brought in lateral  plane and with lateral fluoroscopic imaging, we identified the proximal location of the L4-5 disk space.  This was marked on the skin.  We then utilized the previous midline lumbar incision and extended this in a caudad direction.  This was carried down through subcutaneous tissue and underlying fat.  Deep fascia was incised longitudinally.  Subperiosteal dissection was carried out on the left hemilamina of L4 and the previous laminotomy site at L3-4 out to the tips of transverse processes of L4 and L5 on the left side.  We identified the previous laminotomy site as well as the pars intraarticularis, which would need to be removed to adequately perform a foraminotomy and decompress the nerve root.  It became evident that we would need to place instrumentation on the left side to prevent further collapse of the foramen.  As such, pedicle screws were placed in the left at L4-5 utilizing the [employer] Solera pedicle screw instrumentation system.  Both screws were placed in similar fashion as follows:  High-speed bur was used to make a starting hole at the confluence of the lateral edge of the pars intraarticularis and the midpoint of transverse process.  We then sounded the pedicle with a gearshift probe with a ball tip probe, tapped over a K-wire, then probed with a ball-tipped probe to ensure there had been no pedicle breach.  Screws of the appropriate depth and diameter were chosen and placed successfully on the left side.  AP and lateral fluoroscopic images confirmed the correct orientation and position of the hardware.  At this point, the operating microscope was draped and brought in.  Under operative microscopy we identified the pars intraarticularis at L4, used a high-speed bur to thin this out and then removed this with 2 and 3 mm Kerrisons.  We removed a significant portion of the L4-L5 facet complex until we were able to palpate the medial and cephalad aspect of the L5 pedicle.  This allowed us to then  dissect in a cephalad direction, identifying the L4 nerve root.  As we came to the shoulder of the L4 nerve root, we encountered a small 2 mm incidental durotomy, which was packed until we further completed the decompression.  We decompressed dorsally over the L4 nerve root, both anterograde and retrograde along the course of the foramen until it was completely decompressed, then dorsal aspect from within the canal after it had exited under the L4 pedicle.  The nerve root have regained its normal shape and position.  Once we fully completed the decompression and subtotal facetectomy at L4-5 was completed, we assessed small incidental durotomy, which was near the ventral surface of the thecal sac measuring approximately 2 mm and parallel with the nerve root sheath.  We made an attempt to pass a 5-0 Nurolon suture; however, it was in a space, which could not be easily reached without risk of propagation of durotomy and had to self tamponade itself from inside the arachnoid.  In light of this, we elected to forego formal closure to not propagate the durotomy itself and we placed a small Duragen patch over the durotomy site and then sealed this with DuraSeal later in the procedure.  We tested each screw with independent EMG neuromonitoring and they were both found to have thresholds well within safe zone control, was used on L4 nerve root.  After successfully placing Duragen patch as well as DuraSeal, Valsalva maneuver was taken without any evidence of extravasation of fluid.  We then placed a fat graft, which had been harvested from near the dorsal lumbar incision.  This was placed on the thecal sac.  An additional amount of DuraSeal was placed on this.  At this point, we irrigated with normal saline, maintained hemostasis with bipolar electrocautery and thrombin paste.  We placed a deep Medardo-Flood drain, then placed a rosalio between the L4-5 pedicle screws and secured that in place with standard locking caps and  torque wrench.  A high speed bur had been used to decorticate the transverse processes of L4, L5 on the left side and a Jamshidi needle was then placed into the right posterior iliac crest and aspirated 2 mL of bone marrow aspirate, which was then impregnated automatic MagniFuse pouch.  The MagniFuse pouch was then packed in the posterolateral gutter, thus completing the posterolateral fusion.  At this point, we removed the retractor and closed in layers beginning with the deep fascia, which was closed with #1 Vicryl suture.  Subcutaneous tissue was closed with 2-0 interrupted Vicryl suture, and skin was closed with running subcuticular 4-0 stitch.  Wound was then cleaned, dried in normal fashion.  Steri-Strips and gauze applied.  Drapes were removed.  The patient was transferred from the prone to supine position, at which point he was extubated by the anesthesia service without complication.  All needle and sponge counts were correct at the end of the case.    Ambrose Degroot MD        D: 2023   T: 2023   MT: franck    Name:     ZAIDA CISNEROS  MRN:      [id number]        Account:        [id number]   :      1950           Procedure Date: 2023     Document: [id number]

## 2023-03-30 NOTE — PHARMACY-ADMISSION MEDICATION HISTORY
Pharmacy Note - Admission Medication History    Pertinent Provider Information: n/a   ______________________________________________________________________    Prior To Admission (PTA) med list completed and updated in EMR.       PTA Med List   Medication Sig Last Dose     acetaminophen (TYLENOL) 650 MG CR tablet Take 650 mg by mouth every 8 hours as needed Past Week     co-enzyme Q-10 100 MG CAPS capsule Take 200 mg by mouth daily 3/23/2023     fish oil-omega-3 fatty acids 300-1,000 mg capsule [FISH OIL-OMEGA-3 FATTY ACIDS 300-1,000 MG CAPSULE] Take 2 g by mouth daily. 3/23/2023     fluticasone (FLONASE) 50 MCG/ACT nasal spray Spray 1 spray into both nostrils daily 3/30/2023 at has with     lisinopril (ZESTRIL) 5 MG tablet TAKE 1 TABLET DAILY (Patient taking differently: Take 5 mg by mouth daily) 3/29/2023 at am     Magnesium Oxide POWD Take 1 Dose by mouth daily 3/23/2023     metoprolol succinate ER (TOPROL XL) 25 MG 24 hr tablet Take 0.5 tablets (12.5 mg) by mouth daily 3/30/2023 at 0615     MULTIVITAMIN ORAL Take 1 tablet by mouth daily 3/23/2023     oxymetazoline HCl (AFRIN, OXYMETAZOLINE, NASL) Spray 1 spray in nostril 2 times daily 3/30/2023 at has with     vits A,C,E/lutein/zeax/zn/paris (EYE HEALTH FORMULA ORAL) Take 1 tablet by mouth daily 3/23/2023     Information source(s): Patient    Method of interview communication: in-person    Patient was asked about OTC/herbal products specifically.  PTA med list reflects this.    Based on the pharmacist's assessment, the PTA med list information appears reliable    Medication Affordability:  Not including over the counter (OTC) medications, was there a time in the past 12 months when you did not take your medications as prescribed because of cost?: No    Allergies were reviewed, assessed, and updated with the patient.      Medications available for use during hospital stay: Flonase, Afrin nasal sprays.      Thank you for the opportunity to participate in the care  of this patient.    Tracey Juárez, PharmD, BCPS     3/30/2023     8:29 AM

## 2023-03-30 NOTE — BRIEF OP NOTE
[facility]    Brief Operative Note    Pre-operative diagnosis: Left L4/5 foraminal stenosis  Post-operative diagnosis same    Procedure: Procedure(s):  LEFT LUMBAR 4-5 POSTERIOR SPINAL FUSION WITH LEFT LUMBAR 4-5 LAMINOFORAMINOTOMY AND WIDE LUMBAR 4-5 LEFT SUBTOTAL FACETECTOMY WITH ALLOGRAFT AND AUTOGRAFT  Surgeon: Surgeon(s) and Role:     * Ambrose Degroot MD - Primary     * Elli Ledezma PA-C  Anesthesia: General   Estimated Blood Loss: 200 ml    Drains: Medardo-Flood  Specimens: * No specimens in log *  Findings:   None.  Complications: Incidental 2mm durotomy noted during decompression, . Repaired primarily .  Implants:   Implant Name Type Inv. Item Serial No.  Lot No. LRB No. Used Action   GRAFT BN MAGNIFUSE DBM 5X1.75CM - [id number] Bone/Tissue/Biologic GRAFT BN MAGNIFUSE DBM 5X1.75CM [id number] [employer] NA Left 1 Implanted   ALLOGRAFT DURAGEN PLUS 2 X 2 [id number] - [id number]  ALLOGRAFT DURAGEN PLUS 2 X 2 [id number] [id number] Left 1 Implanted   Guide wire    [employer]  Left 1 Used as a Supply   IMP SCR MEDT BREAK-OFF SET SOLERA 4.75MM TI [id number] - [id number] Metallic Hardware/Crivitz IMP SCR MEDT BREAK-OFF SET SOLERA 4.75MM TI [id number]  [employer]-DANEK  Left 2 Implanted   IMP SCR MEDT 4.75MM SOLERA 7.5X55MM MA [id number] - [id number] Metallic Hardware/Crivitz IMP SCR MEDT 4.75MM SOLERA 7.5X55MM MA [id number]  [employer] INC  Left 2 Implanted   IMP DEANDRE MEDT 3.5CM [id number] - [id number] Metallic Hardware/Crivitz IMP DEANDRE MEDT 3.5CM [id number]  [employer]-DANEK  Left 1 Implanted

## 2023-03-30 NOTE — ANESTHESIA PREPROCEDURE EVALUATION
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 Left Anterior Descending Mid LAD lesion is 25% stenosed. Ramus Intermedius Ramus lesion is 10% stenosed. Left Circumflex Dist Cx lesion is 30% stenosed. Right Coronary Artery The vessel was visualized by angiography, is moderate in size and is angiographically normal.       Chronic neck pain      Congestive heart failure (H)      GERD (gastroesophageal reflux disease)      HTN (hypertension)      Hx of atrial flutter      Irregular heart beat      Nonsmoker      Radiculopathy     cervical     Retinal hemorrhage     Vessel rupture in left retina     Rhinitis, allergic      S/P colonoscopy 04/15/2019     Urticaria       Past Surgical History:   Procedure Laterality Date     BASAL CELL CARCINOMA EXCISION  2020    Left nasal reconstruction     CATARACT EXTRACTION, BILATERAL Bilateral      CERVICAL SPINE SURGERY      C8, T1, foraminotomy     CV CORONARY ANGIOGRAM N/A 2021    Procedure: Coronary Angiogram;  Surgeon: Willow Wellington MD;  Location: [facility] Cardiac Cath Lab;  Service: Cardiology     CV LEFT ATRIAL APPENDAGE CLOSURE N/A 2021    Procedure: CV LEFT ATRIAL APPENDAGE CLOSURE;  Surgeon: Noah Gutierres MD;  Location:  HEART CARDIAC CATH LAB     CV LEFT HEART CATHETERIZATION WITHOUT LEFT  VENTRICULOGRAM Left 01/20/2021    Procedure: Left Heart Catheterization Without Left Ventriculogram;  Surgeon: Willow Wellington MD;  Location: [facility] Cardiac Cath Lab;  Service: Cardiology     HERNIA REPAIR, UMBILICAL       ×2     REVERSE TOTAL SHOULDER ARTHROPLASTY Left 05/01/2019      Allergies   Allergen Reactions     Effient [Prasugrel Hcl] Hives     Hydrochlorothiazide Unknown     Hydrocodone Swelling     Sinus     Oxycontin [Oxycodone] Swelling     lip     Peppermint Oil Other (See Comments)     Plavix [Clopidogrel] Hives     Sulfa (Sulfonamide Antibiotics) [Sulfa Drugs] Swelling     Perfume Swelling     Other reaction(s): Runny Nose      Social History     Tobacco Use     Smoking status: Never     Smokeless tobacco: Never   Substance Use Topics     Alcohol use: Never     Comment: Alcoholic Drinks/day: About once a year.      Wt Readings from Last 1 Encounters:   03/30/23 109.8 kg (242 lb)        Anesthesia Evaluation   Pt has had prior anesthetic.     No history of anesthetic complications       ROS/MED HX  ENT/Pulmonary:  - neg pulmonary ROS     Neurologic: Comment: Hx of C8-T1 foraminotomy      Cardiovascular:     (+) hypertension--CAD ---CHF (Last EF 40%) dysrhythmias (s/p watchman), a-fib,     METS/Exercise Tolerance:     Hematologic:  - neg hematologic  ROS     Musculoskeletal:       GI/Hepatic:     (+) GERD (controlled),     Renal/Genitourinary:  - neg Renal ROS     Endo:     (+) Obesity,     Psychiatric/Substance Use:       Infectious Disease:       Malignancy:       Other:            Physical Exam    Airway        Mallampati: II   TM distance: > 3 FB   Neck ROM: full   Mouth opening: > 3 cm    Respiratory Devices and Support         Dental       (+) Minor Abnormalities - some fillings, tiny chips      Cardiovascular          Rhythm and rate: regular and normal     Pulmonary           breath sounds clear to auscultation           OUTSIDE LABS:  CBC:   Lab Results   Component Value Date    WBC 7.8  03/21/2023    WBC 8.9 09/08/2022    HGB 15.3 03/21/2023    HGB 15.9 09/08/2022    HCT 44.9 03/21/2023    HCT 46.5 09/08/2022     03/21/2023     09/08/2022     BMP:   Lab Results   Component Value Date     03/21/2023     09/08/2022    POTASSIUM 4.6 03/21/2023    POTASSIUM 4.3 09/08/2022    CHLORIDE 102 03/21/2023    CHLORIDE 102 09/08/2022    CO2 26 03/21/2023    CO2 27 09/08/2022    BUN 12.9 03/21/2023    BUN 18.2 09/08/2022    CR 0.99 03/21/2023    CR 0.93 09/08/2022    GLC 97 03/21/2023    GLC 94 09/08/2022     COAGS:   Lab Results   Component Value Date    PTT 28 09/08/2022    INR 1.01 09/08/2022     POC: No results found for: BGM, HCG, HCGS  HEPATIC:   Lab Results   Component Value Date    ALBUMIN 3.7 11/08/2021    PROTTOTAL 7.1 11/08/2021    ALT 17 11/08/2021    AST 17 11/08/2021    ALKPHOS 80 11/08/2021    BILITOTAL 0.6 11/08/2021     OTHER:   Lab Results   Component Value Date    A1C 5.3 06/29/2017    JOVAN 9.9 03/21/2023    MAG 2.3 09/08/2022    TSH 1.92 09/08/2022    CRP 0.7 11/08/2021    SED 9 09/08/2022       Anesthesia Plan    ASA Status:  3   NPO Status:  NPO Appropriate    Anesthesia Type: General.     - Airway: ETT   Induction: Intravenous.           Consents    Anesthesia Plan(s) and associated risks, benefits, and realistic alternatives discussed. Questions answered and patient/representative(s) expressed understanding.     - Discussed: Risks, Benefits and Alternatives for the PROCEDURE were discussed     - Discussed with:  Patient         Postoperative Care    Pain management: IV analgesics, Oral pain medications.   PONV prophylaxis: Ondansetron (or other 5HT-3), Dexamethasone or Solumedrol     Comments:                Blake Joel MD

## 2023-03-30 NOTE — CONSULTS
[facility] MEDICINE CONSULT NOTE   Physician requesting consult: Ambrose Degroot MD    Reason for consult: Postoperative medical management of medical co-morbidities as below    Assessment and Plan    Mike Gonzalez is a 72 year old  male with a history of essential hypertension, atrial fibrillation, Watchman device in place, not on anticoagulation, minimal coronary artery disease, COVID in 2/13/2023 was quite sick, underwent left L4-5 spinal fusion with laminal foraminotomy due to left L4-5 foraminal stenosis.  Moderate back pain present.   mL.  Hemodynamically stable.    Procedure(s):  LEFT LUMBAR 4-5 POSTERIOR SPINAL FUSION WITH LEFT LUMBAR 4-5 LAMINOFORAMINOTOMY AND WIDE LUMBAR 4-5 LEFT SUBTOTAL FACETECTOMY WITH ALLOGRAFT AND AUTOGRAFT    Essential hypertension  Lisinopril 5 mg daily  Metoprolol 12.5 mg daily  Hold antihypertensive if systolic blood pressure is less than 110 as risk of postop hypotension    Atrial fibrillation  Watchman device in place  Heart rate is stable  Resume metoprolol 12.5 mg daily    Mild coronary artery disease  Stable and asymptomatic    Patent foramen ovale (PFO).   No right to left shunting    Chronic systolic and diastolic dysfunction  No acute pulmonary congestion    Status post left L4-5 posterior spinal fusion and foraminotomy secondary to foraminal stenosis  Resume routine postoperative care  Physical and Occupational Therapy  Use incentive spirometry frequently  DVT prophylaxis with early ambulation and SCDs  Pain control with Tylenol 975 mg every 8 hours, 650 mg every 4 hours as needed, oxycodone 5 to 10 mg every 4 hours as needed, IV Dilaudid 0.2 to 0.4 mg every 2 hours as needed      -Reviewed the patient's preoperative H and P and updated missing elements.  -Home medication reconciliation has been reviewed.  Medications have been ordered as noted from the home list and changes are documented above     HISTORY     Mike Gonzalez is a 72  year old  male with a history of essential hypertension, atrial fibrillation, Watchman device in place, not on anticoagulation, minimal coronary artery disease, COVID in 2/13/2023 was quite sick, underwent left L4-5 spinal fusion with laminal foraminotomy due to left L4-5 foraminal stenosis.  Moderate back pain present.   mL. Hemodynamically stable.  On metoprolol 12.5 mg daily, lisinopril 5 mg daily for hypertension.  Blood pressure is stable.  Has Watchman device for atrial fibrillation.  Heart rate is stable with metoprolol.  Mild coronary artery disease.  Denies headache, chest pain, breathing difficulty, palpitation, nausea, vomiting, abdominal pain or urinary symptoms.  Denies history of bleeding or clotting disorder, sleep apnea.  Preop physical is reviewed.  History is provided by the patient.      Past Medical History     Patient Active Problem List    Diagnosis Date Noted     Spinal stenosis at L4-L5 level 03/30/2023     Priority: Medium     HTN (hypertension) 11/16/2021     Priority: Medium     Chronic left-sided low back pain with left-sided sciatica 08/30/2021     Priority: Medium     S/P left atrial appendage closure - WATCHMAN 07/14/2021     Priority: Medium     July 14, 2021 - 31 mm Watchman FLX device       Chronic systolic heart failure (H) 07/12/2021     Priority: Medium     CAD (coronary artery disease), minimal disease on angiogram in 2021      Priority: Medium     ANGIOGRAM 2021  Left Main   The vessel was visualized by angiography, is moderate in size and is   angiographically normal.   Left Anterior Descending   Mid LAD lesion is 25% stenosed.   Ramus Intermedius   Ramus lesion is 10% stenosed.   Left Circumflex   Dist Cx lesion is 30% stenosed.   Right Coronary Artery   The vessel was visualized by angiography, is moderate in size and is   angiographically normal.          Dilated cardiomyopathy (H) 02/18/2021     Priority: Medium     Atrial flutter, chronic (H) 04/17/2019      Priority: Medium     Normal colonoscopy - 2016 - Average risk 04/15/2019     Priority: Medium     Complete tear of left rotator cuff 02/12/2019     Priority: Medium     Concussion syndrome 02/12/2019     Priority: Medium     Branch retinal vein occlusion of left eye 07/01/2017     Priority: Medium     Full code status 05/22/2017     Priority: Medium     Chronic neck pain 03/18/2016     Priority: Medium     Nonsmoker      Priority: Medium     GERD (gastroesophageal reflux disease) 04/07/2015     Priority: Medium        Surgical History   He  has a past surgical history that includes Cervical Spine Surgery; hernia repair, umbilical; Reverse Total Shoulder Arthroplasty (Left, 05/01/2019); Basal Cell Carcinoma Excision (01/01/2020); Cv Coronary Angiogram (N/A, 01/20/2021); Cv Left Heart Catheterization Without Left Ventriculogram (Left, 01/20/2021); Left Atrial Appendage Closure (N/A, 07/14/2021); and Cataract Extraction, Bilateral (Bilateral, 2022).     Past Surgical History:   Procedure Laterality Date     BASAL CELL CARCINOMA EXCISION  01/01/2020    Left nasal reconstruction     CATARACT EXTRACTION, BILATERAL Bilateral 2022     CERVICAL SPINE SURGERY      C8, T1, foraminotomy     CV CORONARY ANGIOGRAM N/A 01/20/2021    Procedure: Coronary Angiogram;  Surgeon: Willow Wellington MD;  Location: [facility] Cardiac Cath Lab;  Service: Cardiology     CV LEFT ATRIAL APPENDAGE CLOSURE N/A 07/14/2021    Procedure: CV LEFT ATRIAL APPENDAGE CLOSURE;  Surgeon: Noah Gutierres MD;  Location:  HEART CARDIAC CATH LAB     CV LEFT HEART CATHETERIZATION WITHOUT LEFT VENTRICULOGRAM Left 01/20/2021    Procedure: Left Heart Catheterization Without Left Ventriculogram;  Surgeon: Willow Wellington MD;  Location: [facility] Cardiac Cath Lab;  Service: Cardiology     HERNIA REPAIR, UMBILICAL       ×2     REVERSE TOTAL SHOULDER ARTHROPLASTY Left 05/01/2019       Family History    Reviewed, and family history includes Arthritis in his mother;  Other - See Comments in his father and mother.    Social History    Reviewed, and he  reports that he has never smoked. He has never used smokeless tobacco. He reports that he does not drink alcohol and does not use drugs.  Social History     Tobacco Use     Smoking status: Never     Smokeless tobacco: Never   Substance Use Topics     Alcohol use: Never     Comment: Alcoholic Drinks/day: About once a year.     Allergies     Allergies   Allergen Reactions     Effient [Prasugrel Hcl] Hives     Hydrochlorothiazide Unknown     Hydrocodone Swelling     Sinus     Oxycontin [Oxycodone] Swelling     lip     Peppermint Oil Other (See Comments)     Plavix [Clopidogrel] Hives     Sulfa (Sulfonamide Antibiotics) [Sulfa Drugs] Swelling     Perfume Swelling     Other reaction(s): Runny Nose       Prior to Admission Medications      Medications Prior to Admission   Medication Sig Dispense Refill Last Dose     acetaminophen (TYLENOL) 650 MG CR tablet Take 650 mg by mouth every 8 hours as needed   Past Week     co-enzyme Q-10 100 MG CAPS capsule Take 200 mg by mouth daily   3/23/2023     fish oil-omega-3 fatty acids 300-1,000 mg capsule [FISH OIL-OMEGA-3 FATTY ACIDS 300-1,000 MG CAPSULE] Take 2 g by mouth daily.   3/23/2023     fluticasone (FLONASE) 50 MCG/ACT nasal spray Spray 1 spray into both nostrils daily 48 mL 2 3/30/2023 at has with     lisinopril (ZESTRIL) 5 MG tablet TAKE 1 TABLET DAILY (Patient taking differently: Take 5 mg by mouth daily) 90 tablet 3 3/29/2023 at am     Magnesium Oxide POWD Take 1 Dose by mouth daily   3/23/2023     metoprolol succinate ER (TOPROL XL) 25 MG 24 hr tablet Take 0.5 tablets (12.5 mg) by mouth daily 45 tablet 3 3/30/2023 at 0615     MULTIVITAMIN ORAL Take 1 tablet by mouth daily   3/23/2023     oxymetazoline HCl (AFRIN, OXYMETAZOLINE, NASL) Spray 1 spray in nostril 2 times daily   3/30/2023 at has with     vits A,C,E/lutein/zeax/zn/paris (EYE HEALTH FORMULA ORAL) Take 1 tablet by mouth daily    3/23/2023       Review of Systems   A 12 point comprehensive review of systems was negative except as noted above.    OBJECTIVE         Physical Exam   Temp:  [97.3  F (36.3  C)-97.4  F (36.3  C)] 97.4  F (36.3  C)  Pulse:  [80-97] 80  Resp:  [11-20] 14  BP: (120-154)/(65-85) 141/66  SpO2:  [94 %-98 %] 95 %  Body mass index is 32.82 kg/m .  GENERAL:  Alert, appears comfortable, in no acute distress, appears stated age   HEAD:  Normocephalic, without obvious abnormality, atraumatic   EYES:  PERRL, conjunctiva  clear,  EOM's intact   NOSE: Nares normal,  mucosa normal, no drainage   THROAT: Lips, mucosa,   gums normal, mouth moist   NECK: Supple, symmetrical, trachea midline   BACK:   S/P back surgery   LUNGS:   Clear to auscultation bilaterally, no rales, rhonchi, or wheezing, symmetric chest rise on inhalation, respirations unlabored   CHEST WALL:  No tenderness or deformity   HEART:  Regular rate and rhythm, S1 and S2 normal, no murmur    ABDOMEN:   Soft, non-tender, bowel sounds active , no masses, no organomegaly, no rebound or guarding   EXTREMITIES: Trace BLE  edema    SKIN: No rashes   NEURO: Alert, oriented x 3    PSYCH: Cooperative, behavior is appropriate        Cardiographics Reviewed Personally By Myself     Echocardiogram:2021  1. The left ventricle is mildly enlarged. Left ventricular systolic  performance is moderately reduced. The ejection fraction is estimated to be  40%.  2. There is moderate global reduction in left ventricular systolic  performance.  3. There is mild concentric increase in left ventricular wall thickness.  4. No significant valvular heart disease is identified on this study.  5. Borderline right ventricular enlargement with normal right ventricular  systolic performance.  6. There is severe left atrial enlargement. There is moderate right atrial  enlargement.  7. There is a left atrial appendage occlusion device.  Â  No thrombus is detected upon the surface of the device.  Â   There is a localized region of flow around the device between the device  itself and the left lateral ridge. The gap measures 0.35-0.5 cm and appears  similar to the findings on the 2 September 2021 transesophageal  echocardiogram.  8. There is mild spontaneous echo contrast in the left atrium though no  thrombus detected.  9. Color Doppler interrogation of the atrial septum reveals the presence of a  patent foramen ovale (PFO). No right to left shunting, however, was elicited  with echo contrast injection either with spontaneous respiration or Valsalva.    Labs Reviewed Personally By Myself   Hemoglobin 14.2  Sodium 141, potassium 4.6, creatinine 0.99, glucose 97  WBC 7.8, hemoglobin 15.3, platelet 299  Preoperative Labs Reviewed Personally By Myself     Thank you for this consultation.  Appreciate the opportunity to participate in the care of Mike Gonzalez, please feel free to contact us for any questions or concerns.    Total time spent 70 min by me on the date of service doing chart review, history, exam, documentation & further activities per the note.      Tricia Julio MD  [facility]  Phone: #[phone]

## 2023-03-31 ENCOUNTER — APPOINTMENT (OUTPATIENT)
Dept: OCCUPATIONAL THERAPY | Facility: CLINIC | Age: 73
DRG: 460 | End: 2023-03-31
Attending: ORTHOPAEDIC SURGERY
Payer: MEDICARE

## 2023-03-31 ENCOUNTER — APPOINTMENT (OUTPATIENT)
Dept: PHYSICAL THERAPY | Facility: CLINIC | Age: 73
DRG: 460 | End: 2023-03-31
Attending: ORTHOPAEDIC SURGERY
Payer: MEDICARE

## 2023-03-31 LAB
ERYTHROCYTE [DISTWIDTH] IN BLOOD BY AUTOMATED COUNT: 13.3 % (ref 10–15)
HCT VFR BLD AUTO: 35.5 % (ref 40–53)
HGB BLD-MCNC: 12.1 G/DL (ref 13.3–17.7)
MCH RBC QN AUTO: 32.7 PG (ref 26.5–33)
MCHC RBC AUTO-ENTMCNC: 34.1 G/DL (ref 31.5–36.5)
MCV RBC AUTO: 96 FL (ref 78–100)
PLATELET # BLD AUTO: 251 10E3/UL (ref 150–450)
RBC # BLD AUTO: 3.7 10E6/UL (ref 4.4–5.9)
WBC # BLD AUTO: 16 10E3/UL (ref 4–11)

## 2023-03-31 PROCEDURE — 250N000013 HC RX MED GY IP 250 OP 250 PS 637: Performed by: ORTHOPAEDIC SURGERY

## 2023-03-31 PROCEDURE — 97535 SELF CARE MNGMENT TRAINING: CPT | Mod: GO

## 2023-03-31 PROCEDURE — 36415 COLL VENOUS BLD VENIPUNCTURE: CPT | Performed by: FAMILY MEDICINE

## 2023-03-31 PROCEDURE — 85014 HEMATOCRIT: CPT | Performed by: FAMILY MEDICINE

## 2023-03-31 PROCEDURE — 99232 SBSQ HOSP IP/OBS MODERATE 35: CPT | Performed by: FAMILY MEDICINE

## 2023-03-31 PROCEDURE — 250N000011 HC RX IP 250 OP 636: Performed by: ORTHOPAEDIC SURGERY

## 2023-03-31 PROCEDURE — 120N000001 HC R&B MED SURG/OB

## 2023-03-31 PROCEDURE — 250N000013 HC RX MED GY IP 250 OP 250 PS 637: Performed by: PHYSICIAN ASSISTANT

## 2023-03-31 PROCEDURE — 97166 OT EVAL MOD COMPLEX 45 MIN: CPT | Mod: GO

## 2023-03-31 PROCEDURE — 97110 THERAPEUTIC EXERCISES: CPT | Mod: GP

## 2023-03-31 PROCEDURE — 250N000013 HC RX MED GY IP 250 OP 250 PS 637: Performed by: FAMILY MEDICINE

## 2023-03-31 PROCEDURE — 97161 PT EVAL LOW COMPLEX 20 MIN: CPT | Mod: GP

## 2023-03-31 RX ORDER — LIDOCAINE 4 G/G
1 PATCH TOPICAL
Status: DISCONTINUED | OUTPATIENT
Start: 2023-03-31 | End: 2023-04-02 | Stop reason: HOSPADM

## 2023-03-31 RX ORDER — TRAMADOL HYDROCHLORIDE 50 MG/1
50-100 TABLET ORAL EVERY 6 HOURS PRN
Status: DISCONTINUED | OUTPATIENT
Start: 2023-03-31 | End: 2023-04-02 | Stop reason: HOSPADM

## 2023-03-31 RX ADMIN — HYDROMORPHONE HYDROCHLORIDE 0.4 MG: 0.2 INJECTION, SOLUTION INTRAMUSCULAR; INTRAVENOUS; SUBCUTANEOUS at 18:46

## 2023-03-31 RX ADMIN — HYDROMORPHONE HYDROCHLORIDE 4 MG: 4 TABLET ORAL at 01:41

## 2023-03-31 RX ADMIN — SENNOSIDES AND DOCUSATE SODIUM 1 TABLET: 50; 8.6 TABLET ORAL at 09:31

## 2023-03-31 RX ADMIN — CEFAZOLIN SODIUM 2 G: 2 INJECTION, SOLUTION INTRAVENOUS at 01:34

## 2023-03-31 RX ADMIN — POLYETHYLENE GLYCOL 3350 17 G: 17 POWDER, FOR SOLUTION ORAL at 09:31

## 2023-03-31 RX ADMIN — ACETAMINOPHEN 975 MG: 325 TABLET ORAL at 14:24

## 2023-03-31 RX ADMIN — ACETAMINOPHEN 975 MG: 325 TABLET ORAL at 06:06

## 2023-03-31 RX ADMIN — LIDOCAINE 1 PATCH: 246 PATCH TOPICAL at 09:52

## 2023-03-31 RX ADMIN — DIAZEPAM 5 MG: 5 TABLET ORAL at 17:12

## 2023-03-31 RX ADMIN — DIAZEPAM 5 MG: 5 TABLET ORAL at 10:04

## 2023-03-31 RX ADMIN — THERA TABS 1 TABLET: TAB at 09:31

## 2023-03-31 RX ADMIN — HYDROMORPHONE HYDROCHLORIDE 4 MG: 4 TABLET ORAL at 05:41

## 2023-03-31 RX ADMIN — HYDROMORPHONE HYDROCHLORIDE 4 MG: 4 TABLET ORAL at 17:12

## 2023-03-31 RX ADMIN — OXYMETAZOLINE HYDROCHLORIDE 1 SPRAY: 0.05 SPRAY NASAL at 09:33

## 2023-03-31 RX ADMIN — TRAMADOL HYDROCHLORIDE 50 MG: 50 TABLET, COATED ORAL at 14:24

## 2023-03-31 RX ADMIN — DIAZEPAM 5 MG: 5 TABLET ORAL at 01:42

## 2023-03-31 RX ADMIN — HYDROXYZINE HYDROCHLORIDE 10 MG: 10 TABLET ORAL at 01:42

## 2023-03-31 ASSESSMENT — ACTIVITIES OF DAILY LIVING (ADL)
ADLS_ACUITY_SCORE: 26
ADLS_ACUITY_SCORE: 26
ADLS_ACUITY_SCORE: 21
ADLS_ACUITY_SCORE: 26
ADLS_ACUITY_SCORE: 21
ADLS_ACUITY_SCORE: 26
ADLS_ACUITY_SCORE: 21
ADLS_ACUITY_SCORE: 26
ADLS_ACUITY_SCORE: 21
ADLS_ACUITY_SCORE: 26
ADLS_ACUITY_SCORE: 21
ADLS_ACUITY_SCORE: 26

## 2023-03-31 NOTE — PLAN OF CARE
Problem: Spinal Surgery  Goal: Optimal Functional Ability  Outcome: Progressing  Intervention: Optimize Functional Status  Recent Flowsheet Documentation  Taken 3/31/2023 1229 by Marian Linton, RN  Activity Management:   ambulated to bathroom   up in chair   dorsiflexion/plantar flexion performed  Taken 3/31/2023 0800 by Marian Linton, RN  Activity Management:   bedrest   dorsiflexion/plantar flexion performed  Positioning/Transfer Devices:   pillows   in use   Dura tear seems resolved.  Pt is able to be OOB with minimal headache.  Incisional pain is tolerable.  Pt has posterior neck bone spurs that are really causing him pain today.  Does not think po dilaudid was helpful, trying tramadol and lidocaine patch.  CMS+, no N/T.  JOEL drain still in place.  Tolerating a regular diet, passing flatus.  Kruger can come out this evening now that pt is ambulatory.  Spouse at bedside and supportive. Pt able to make his needs known.

## 2023-03-31 NOTE — PROGRESS NOTES
Care Management Follow Up    Length of Stay (days): 1    Expected Discharge Date: 04/01/2023     Concerns to be Addressed:     Discharge needs.  Additional Information:  Cm reviewed chart, patient with back surgery, post op bedrest due to headaches. Will trial sitting up today. CM will continue to follow along, and watch for any needs. PT,OT recs pending due to current situation. Patient independent at baseline. Lives with wife. Anticipate patient will return home with his family at discharge, with family providing ride. Cm avail as patient progresses.      Diana Nye RN

## 2023-03-31 NOTE — CONSULTS
"ACUPUNCTURIST TREATMENT NOTE    Name: Mike Gonzalez  :  1950  MRN:  [id number]    Acupuncture Treatment  Patient Type: Orthopedic  Intervention Reason: Pain  Pain Location: Neck, back, shoulders  Pre-session Pain Ratin  Post-session Pain Ratin  Patient complaint:: Pain of neck, back and shoulders, hiccups  Initial insertions: Yin mclean, Gb 20, 21, Tb 14, Li 4, 10, 15, Tb 17, Si 3  Number of needles inserted: 17  Number of needles removed: 17  Treatment Observations: Hiccups resolved during treatment.         \"Risks and benefits of acupuncture were discussed with patient. Consent for treatment was given. We thank you for the referral.\"     Michael Joel    Date:  3/31/2023  Time:  3:05 PM    "

## 2023-03-31 NOTE — PROGRESS NOTES
[facility] MEDICINE PROGRESS NOTE      Identification/Summary: Mike Gonzalez is a 72 year old male with a past medical history of  essential hypertension, atrial fibrillation, Watchman device in place,  not on anticoagulation, minimal coronary artery disease, COVID in 2/13/2023 was quite sick, underwent left L4-5 spinal fusion with laminal foraminotomy due to left L4-5 foraminal stenosis.  No new radiculopathy. History of previous cervical spine surgery.  Has moderate neck and left shoulder pain.    Assessment and Plan:  Essential hypertension  Metoprolol 12.5 mg daily  Lisinopril 5 mg daily, on hold  Hold antihypertensive if systolic blood pressure is less than 110 as risk of postop hypotension    Atrial fibrillation  Watchman device in place  Heart rate is stable  Resume metoprolol 12.5 mg daily    Mild coronary artery disease  Stable and asymptomatic    Patent foramen ovale (PFO).   No right to left shunting     Chronic systolic and diastolic dysfunction  No acute pulmonary congestion    Acute blood loss anemia  Hemoglobin 12 from 15     Status post left L4-5 posterior spinal fusion and foraminotomy secondary to foraminal stenosis  Resume routine postoperative care  Physical and Occupational Therapy  Use incentive spirometry frequently  DVT prophylaxis with early ambulation and SCDs  Pain control with Tylenol 975 mg every 8 hours, 650 mg every 4 hours as needed, oxycodone 5 to 10 mg every 4 hours as needed      Tricia Julio MD  [facility]  [facility]  Phone: #[phone]  Securely message with the Vocera Web Console (learn more here)  Text page via [employer] Paging/Directory     Interval History/Subjective:  Laying on bed comfortably.  Back pain is stable.  No new radiculopathy.  Complains of neck pain and right shoulder pain.  History of neck surgery in the past.  Has mild headache.  No other concern.    Physical Exam/Objective:  Temp:  [97   F (36.1  C)-99.4  F (37.4  C)] 99.4  F (37.4  C)  Pulse:  [60-97] 80  Resp:  [11-20] 17  BP: (103-154)/(53-85) 103/53  SpO2:  [87 %-98 %] 94 %  Body mass index is 32.82 kg/m .     GENERAL:  Alert, appears comfortable, in no acute distress, appears stated age   HEAD:  Normocephalic, without obvious abnormality, atraumatic   EYES:  PERRL, conjunctiva  clear,  EOM's intact   NOSE: Nares normal,  mucosa normal, no drainage   THROAT: Lips, mucosa,   gums normal, mouth moist   NECK: Supple, symmetrical, trachea midline   BACK:   S/P back surgery   LUNGS:   Clear to auscultation bilaterally, no rales, rhonchi, or wheezing, symmetric chest rise on inhalation, respirations unlabored   CHEST WALL:  No tenderness or deformity   HEART:  Regular rate and rhythm, S1 and S2 normal, no murmur     ABDOMEN:   Soft, non-tender, bowel sounds active , no masses, no organomegaly, Kruger catheter in place with clear urine   EXTREMITIES: No   edema    SKIN: No rashes   NEURO: Alert, oriented x3, moves all four extremities freely   PSYCH: Cooperative, behavior is appropriate      Data reviewed today: I personally reviewed all new medications, labs, imaging/diagnostics reports over the past 24 hours. Pertinent findings include:    Labs:  Most Recent 3 CBC's:Recent Labs   Lab Test 03/31/23  0647 03/30/23  1326 03/21/23  0953 09/08/22  1050   WBC 16.0*  --  7.8 8.9   HGB 12.1* 14.2 15.3 15.9   MCV 96  --  94 94     --  299 307     Most Recent 3 BMP's:Recent Labs   Lab Test 03/21/23  0953 09/08/22  1050 01/11/22  1324    141 140   POTASSIUM 4.6 4.3 4.3   CHLORIDE 102 102 101   CO2 26 27 27   BUN 12.9 18.2 14   CR 0.99 0.93 0.90   ANIONGAP 13 12 12   JOVAN 9.9 10.0 10.0   GLC 97 94 88       Medications:   Personally Reviewed.  Medications       acetaminophen  975 mg Oral Q8H     fluticasone  1 spray Both Nostrils Daily     lidocaine  1 patch Transdermal Q24H     lidocaine   Transdermal Q8H JASPER     lisinopril  5 mg Oral Daily      metoprolol succinate ER  12.5 mg Oral Daily     multivitamin, therapeutic  1 tablet Oral Daily     oxymetazoline  1 spray Nasal BID     polyethylene glycol  17 g Oral Daily     senna-docusate  1 tablet Oral BID     Total time spent 40 min by me on the date of service doing chart review, history, exam, documentation & further activities per the note.

## 2023-03-31 NOTE — PROGRESS NOTES
Raised Pt's head of bed to 45 degrees at 0600.    At 0630 pt complained of a headache.     Notified Dr. Degroot.    Lowered head of bed to flat at direction of Dr. Degroot.    Valentin Gould RN

## 2023-03-31 NOTE — PLAN OF CARE
Goal Outcome Evaluation:                      BP (!) 142/66 (BP Location: Right arm)   Pulse 64   Temp 97  F (36.1  C) (Axillary)   Resp 16   Ht 1.829 m (6')   Wt 109.8 kg (242 lb)   SpO2 93%   BMI 32.82 kg/m      Patient vital signs are at baseline: Yes  Patient able to ambulate as they were prior to admission or with assist devices provided by therapies during their stay:  No,  Reason:  on bedrest due to dural tear  Patient MUST void prior to discharge:  No,  Reason:  barber still in place  Patient able to tolerate oral intake:  Yes  Pain has adequate pain control using Oral analgesics:  Yes  Does patient have an identified :  Yes  Has goal D/C date and time been discussed with patient:  Yes    Valentin Gould RN

## 2023-03-31 NOTE — PROGRESS NOTES
03/31/23 0930   Appointment Info   Signing Clinician's Name / Credentials (PT) Liza Monroy, PT, DPT   Living Environment   People in Home spouse   Current Living Arrangements house   Home Accessibility stairs to enter home   Number of Stairs, Main Entrance 2   Transportation Anticipated family or friend will provide   Self-Care   Usual Activity Tolerance excellent   Current Activity Tolerance other (see comments)  (Patient currently on bedrest for dural tear)   Equipment Currently Used at Home none  (Has FWW at home)   Fall history within last six months yes   Number of times patient has fallen within last six months 2   Activity/Exercise/Self-Care Comment Patient reports previous independence with mobility and ADLs. Wife does cooking, cleaning, laundry.   General Information   Onset of Illness/Injury or Date of Surgery 03/30/23   Referring Physician Ambrose Degroot MD   Patient/Family Therapy Goals Statement (PT) To go home   Pertinent History of Current Problem (include personal factors and/or comorbidities that impact the POC) Status post L4-5 fusion   Existing Precautions/Restrictions fall;spinal;other (see comments)  (Currently on bedrest for dural tear)   Cognition   Affect/Mental Status (Cognition) WNL   Orientation Status (Cognition) oriented x 4   Follows Commands (Cognition) WNL   Range of Motion (ROM)   Range of Motion ROM is WFL   Strength (Manual Muscle Testing)   Strength (Manual Muscle Testing)   (Unable to formally assess, patient on bedrest due to dural tear)   Bed Mobility   Bed Mobility rolling left;rolling right   Rolling Left [place] (Bed Mobility) independent   Rolling Right [place] (Bed Mobility) independent   Clinical Impression   Criteria for Skilled Therapeutic Intervention Yes, treatment indicated   PT Diagnosis (PT) Impaired functional mobility   Influenced by the following impairments Post op status   Functional limitations due to impairments Bed mobility,  transfers, gait   Clinical Presentation (PT Evaluation Complexity) Evolving/Changing   Clinical Presentation Rationale Patient presents as medically diagnosed   Clinical Decision Making (Complexity) low complexity   Planned Therapy Interventions (PT) bed mobility training;home exercise program;patient/family education;stair training;strengthening;transfer training   Risk & Benefits of therapy have been explained evaluation/treatment results reviewed;care plan/treatment goals reviewed;participants voiced agreement with care plan;participants included;patient;spouse/significant other   PT Total Evaluation Time   PT Eval, Low Complexity Minutes (26605) 8   Physical Therapy Goals   PT Frequency 2x/day   PT Predicted Duration/Target Date for Goal Attainment 04/07/23   PT Goals PT Goal 1   PT: Goal 1 Patient will be independent with supine leg exercises in order to maintain joint integrity and lower extremity strength.   Interventions   Interventions Quick Adds Therapeutic Procedure   Therapeutic Procedure/Exercise   Ther. Procedure: strength, endurance, ROM, flexibillity Minutes (34991) 8   Symptoms Noted During/After Treatment other (see comments)  (Patient reported increased headache with supine hip abduction/adduction)   Treatment Detail/Skilled Intervention Patient performed supine bedrest BLE exercises x 10 with verbal cueing and visual demonstration required for proper technique. Educated patient on parameters for exercises. Patient reported an increased in his headache during supine hip abduction/adduction, RN notified. Instructed patient not to perform hip abduction/adduction at this time.   PT Discharge Planning   PT Plan Bed mobility, transfers, gait, LE ex as allowed by MD   PT Discharge Recommendation (DC Rec) home;home with assist  (Pending medical progress)   PT Rationale for DC Rec Patient currently on bedrest for dural tear. He has 2 stairs to enter his home. He lives with his wife who is able to provide  assistance as needed. He has access to a walker at home but does not use one at baseline. Pending medical progress, anticipate patient will be safe to discharge home with assist from wife.   PT Brief overview of current status On bedrest for dural tear, independent with supine leg exercises.   Total Session Time   Timed Code Treatment Minutes 8   Total Session Time (sum of timed and untimed services) 16     Liza Monroy, PT, DPT

## 2023-03-31 NOTE — PROGRESS NOTES
"   03/31/23 0730   Appointment Info   Signing Clinician's Name / Credentials (OT) Marline Rico, MOTR/L, CLT   Living Environment   People in Home spouse   Current Living Arrangements house   Self-Care   Usual Activity Tolerance excellent   Current Activity Tolerance other (see comments)  (on bed rest currently)   Equipment Currently Used at Home   (standard toilet with sink nearby; walk-in shower with \"towel rack\")   Fall history within last six months no   Activity/Exercise/Self-Care Comment indep with all I/ADL prior   General Information   Onset of Illness/Injury or Date of Surgery 03/30/23   Referring Physician Dr. Degroot   Patient/Family Therapy Goal Statement (OT) home with spouse   Additional Occupational Profile Info/Pertinent History of Current Problem spinal stenosis at L4-5 with fusion and prior shoulder surgery L UE and neck/shoulder pain prior/currently   Existing Precautions/Restrictions spinal   Cognitive Status Examination   Orientation Status orientation to person, place and time   Affect/Mental Status (Cognitive) WNL   Visual Perception   Visual Impairment/Limitations WFL   Sensory   Sensory Quick Adds sensation intact   Pain Assessment   Patient Currently in Pain No   Posture   Posture Comments N/T   Range of Motion Comprehensive   General Range of Motion   (grossly intact)   Strength Comprehensive (MMT)   Comment, General Manual Muscle Testing (MMT) Assessment grossly intact   Coordination   Upper Extremity Coordination No deficits were identified   Bed Mobility   Comment (Bed Mobility) min A   Transfers   Transfer Comments N/T   Activities of Daily Living   BADL Assessment/Intervention grooming   Grooming Assessment/Training   [place] Level (Grooming) moderate assist (50% patient effort)   Clinical Impression   Criteria for Skilled Therapeutic Interventions Met (OT) Yes, treatment indicated   OT Diagnosis decr ADL indep/safety   Influenced by the following impairments spine surgery   OT " Problem List-Impairments impacting ADL activity tolerance impaired;mobility;post-surgical precautions;pain   Assessment of Occupational Performance 3-5 Performance Deficits   Identified Performance Deficits dressing, home mgmt, bathing, func mob   Planned Therapy Interventions (OT) ADL retraining;bed mobility training;transfer training   Intervention Comments when appropriate to initiate upright tasks per MDd   Clinical Decision Making Complexity (OT) moderate complexity   Anticipated Equipment Needs Upon Discharge (OT) raised toilet seat  (bed rail)   Risk & Benefits of therapy have been explained care plan/treatment goals reviewed;patient   OT Total Evaluation Time   OT Eval, Moderate Complexity Minutes (02724) 10   OT Goals   Therapy Frequency (OT) 2 times/day   OT Predicted Duration/Target Date for Goal Attainment 04/03/23   OT Goals Lower Body Dressing;Toilet Transfer/Toileting;Bed Mobility   OT: Lower Body Dressing Supervision/stand-by assist;within precautions;using adaptive equipment   OT: Bed Mobility Modified independent;within precautions   OT: Toilet Transfer/Toileting Modified independent;toilet transfer;within precautions;using adaptive equipment   Interventions   Interventions Quick Adds Self-Care/Home Management   Self-Care/Home Management   Self-Care/Home Mgmt/ADL, Compensatory, Meal Prep Minutes (70160) 23   Symptoms Noted During/After Treatment (Meal Preparation/Planning Training) none   Treatment Detail/Skilled Intervention in supine/side-lying, educ in proper positioning in bed for spine precautions. Pt completed log roll tasks with cues for tech/rail/mod I. Educ in side-lying for ADL and completed g/h in side-lying for brushing teeth/washing face/hands with min A/set-up/cues for tech. Educ in spinal protocol/O2 and directed pt to seek RN for further information.   OT Discharge Planning   OT Plan transfers when able;LE dress   OT Discharge Recommendation (DC Rec) (S)  home with assist  (pending  medical progress--getting off bedrest)   OT Rationale for DC Rec may need assist for I/ADL   OT Brief overview of current status min A for g/h in side-lying   Total Session Time   Timed Code Treatment Minutes 23   Total Session Time (sum of timed and untimed services) 33

## 2023-04-01 ENCOUNTER — APPOINTMENT (OUTPATIENT)
Dept: OCCUPATIONAL THERAPY | Facility: CLINIC | Age: 73
DRG: 460 | End: 2023-04-01
Attending: ORTHOPAEDIC SURGERY
Payer: MEDICARE

## 2023-04-01 ENCOUNTER — APPOINTMENT (OUTPATIENT)
Dept: PHYSICAL THERAPY | Facility: CLINIC | Age: 73
DRG: 460 | End: 2023-04-01
Attending: ORTHOPAEDIC SURGERY
Payer: MEDICARE

## 2023-04-01 PROCEDURE — 97110 THERAPEUTIC EXERCISES: CPT | Mod: GP | Performed by: PHYSICAL THERAPIST

## 2023-04-01 PROCEDURE — 97535 SELF CARE MNGMENT TRAINING: CPT | Mod: GO

## 2023-04-01 PROCEDURE — 250N000013 HC RX MED GY IP 250 OP 250 PS 637: Performed by: FAMILY MEDICINE

## 2023-04-01 PROCEDURE — 97116 GAIT TRAINING THERAPY: CPT | Mod: GP | Performed by: PHYSICAL THERAPIST

## 2023-04-01 PROCEDURE — 97530 THERAPEUTIC ACTIVITIES: CPT | Mod: GP | Performed by: PHYSICAL THERAPIST

## 2023-04-01 PROCEDURE — 250N000009 HC RX 250: Performed by: FAMILY MEDICINE

## 2023-04-01 PROCEDURE — 250N000013 HC RX MED GY IP 250 OP 250 PS 637: Performed by: ORTHOPAEDIC SURGERY

## 2023-04-01 PROCEDURE — 120N000001 HC R&B MED SURG/OB

## 2023-04-01 PROCEDURE — 250N000013 HC RX MED GY IP 250 OP 250 PS 637: Performed by: PHYSICIAN ASSISTANT

## 2023-04-01 PROCEDURE — 99232 SBSQ HOSP IP/OBS MODERATE 35: CPT | Performed by: FAMILY MEDICINE

## 2023-04-01 RX ORDER — AMOXICILLIN 250 MG
1-2 CAPSULE ORAL 2 TIMES DAILY
Qty: 30 TABLET | Refills: 0 | Status: SHIPPED | OUTPATIENT
Start: 2023-04-01 | End: 2024-02-20

## 2023-04-01 RX ORDER — PANTOPRAZOLE SODIUM 40 MG/1
40 TABLET, DELAYED RELEASE ORAL
Status: DISCONTINUED | OUTPATIENT
Start: 2023-04-01 | End: 2023-04-02 | Stop reason: HOSPADM

## 2023-04-01 RX ORDER — LORAZEPAM 0.5 MG/1
0.5 TABLET ORAL ONCE
Status: COMPLETED | OUTPATIENT
Start: 2023-04-01 | End: 2023-04-01

## 2023-04-01 RX ORDER — METOPROLOL TARTRATE 1 MG/ML
5 INJECTION, SOLUTION INTRAVENOUS ONCE
Status: COMPLETED | OUTPATIENT
Start: 2023-04-01 | End: 2023-04-01

## 2023-04-01 RX ORDER — TAMSULOSIN HYDROCHLORIDE 0.4 MG/1
0.4 CAPSULE ORAL DAILY
Status: DISCONTINUED | OUTPATIENT
Start: 2023-04-01 | End: 2023-04-02 | Stop reason: HOSPADM

## 2023-04-01 RX ORDER — HYDROXYZINE HYDROCHLORIDE 10 MG/1
10 TABLET, FILM COATED ORAL EVERY 6 HOURS PRN
Qty: 40 TABLET | Refills: 0 | Status: SHIPPED | OUTPATIENT
Start: 2023-04-01 | End: 2023-11-06

## 2023-04-01 RX ORDER — HYDROMORPHONE HYDROCHLORIDE 2 MG/1
2 TABLET ORAL EVERY 6 HOURS PRN
Qty: 25 TABLET | Refills: 0 | Status: SHIPPED | OUTPATIENT
Start: 2023-04-01 | End: 2023-04-02

## 2023-04-01 RX ORDER — CYCLOBENZAPRINE HYDROCHLORIDE 5 MG/1
2.5 TABLET, FILM COATED ORAL ONCE
Status: COMPLETED | OUTPATIENT
Start: 2023-04-01 | End: 2023-04-01

## 2023-04-01 RX ORDER — CYCLOBENZAPRINE HCL 10 MG
10 TABLET ORAL 3 TIMES DAILY
Status: DISCONTINUED | OUTPATIENT
Start: 2023-04-01 | End: 2023-04-01

## 2023-04-01 RX ADMIN — ACETAMINOPHEN 975 MG: 325 TABLET ORAL at 00:20

## 2023-04-01 RX ADMIN — POLYETHYLENE GLYCOL 3350 17 G: 17 POWDER, FOR SOLUTION ORAL at 08:42

## 2023-04-01 RX ADMIN — SENNOSIDES AND DOCUSATE SODIUM 1 TABLET: 50; 8.6 TABLET ORAL at 08:42

## 2023-04-01 RX ADMIN — DIAZEPAM 5 MG: 5 TABLET ORAL at 03:53

## 2023-04-01 RX ADMIN — QUETIAPINE FUMARATE 12.5 MG: 25 TABLET ORAL at 15:09

## 2023-04-01 RX ADMIN — TAMSULOSIN HYDROCHLORIDE 0.4 MG: 0.4 CAPSULE ORAL at 10:54

## 2023-04-01 RX ADMIN — THERA TABS 1 TABLET: TAB at 08:41

## 2023-04-01 RX ADMIN — TRAMADOL HYDROCHLORIDE 50 MG: 50 TABLET, COATED ORAL at 08:42

## 2023-04-01 RX ADMIN — ACETAMINOPHEN 975 MG: 325 TABLET ORAL at 15:07

## 2023-04-01 RX ADMIN — LORAZEPAM 0.5 MG: 0.5 TABLET ORAL at 17:53

## 2023-04-01 RX ADMIN — METOPROLOL SUCCINATE 12.5 MG: 25 TABLET, FILM COATED, EXTENDED RELEASE ORAL at 08:41

## 2023-04-01 RX ADMIN — OXYMETAZOLINE HYDROCHLORIDE 1 SPRAY: 0.05 SPRAY NASAL at 08:42

## 2023-04-01 RX ADMIN — METOPROLOL TARTRATE 5 MG: 1 INJECTION, SOLUTION INTRAVENOUS at 15:07

## 2023-04-01 RX ADMIN — HYDROMORPHONE HYDROCHLORIDE 4 MG: 4 TABLET ORAL at 12:47

## 2023-04-01 RX ADMIN — TRAMADOL HYDROCHLORIDE 100 MG: 50 TABLET, COATED ORAL at 00:20

## 2023-04-01 RX ADMIN — CYCLOBENZAPRINE HYDROCHLORIDE 2.5 MG: 5 TABLET, FILM COATED ORAL at 10:57

## 2023-04-01 RX ADMIN — PANTOPRAZOLE SODIUM 40 MG: 40 TABLET, DELAYED RELEASE ORAL at 10:54

## 2023-04-01 RX ADMIN — ACETAMINOPHEN 975 MG: 325 TABLET ORAL at 06:47

## 2023-04-01 RX ADMIN — LIDOCAINE 1 PATCH: 246 PATCH TOPICAL at 08:42

## 2023-04-01 ASSESSMENT — ACTIVITIES OF DAILY LIVING (ADL)
ADLS_ACUITY_SCORE: 26
ADLS_ACUITY_SCORE: 25
ADLS_ACUITY_SCORE: 22
ADLS_ACUITY_SCORE: 24
ADLS_ACUITY_SCORE: 26
ADLS_ACUITY_SCORE: 21
ADLS_ACUITY_SCORE: 26
ADLS_ACUITY_SCORE: 26
ADLS_ACUITY_SCORE: 24
ADLS_ACUITY_SCORE: 26
ADLS_ACUITY_SCORE: 25
ADLS_ACUITY_SCORE: 26

## 2023-04-01 NOTE — PLAN OF CARE
Problem: Fall Injury Risk  Goal: Absence of Fall and Fall-Related Injury  Outcome: Progressing   Goal Outcome Evaluation:       VSS on room air.  Patient Continues to be painful with minimal effect of pain medication.  Pt. Was rather belligerent this shift and was releasing and lowering bed rails on his own.  Chair alarm activated while oob.  Bed alarm was activated and use of gait belt and walker was utilized at my insistence.  Pt. Room immediately across from nursing station.

## 2023-04-01 NOTE — PLAN OF CARE
Goal Outcome Evaluation:    Pt voided but did not pass first PVR and refused straight cath. Pt weak shoulder on the left. Pt pain controlled w/ PRN PO meds (see mar). Pt assist of 1-2 w/ walker and gait belt. A&OX4 w/ mild confusion/forgetfulness. JOEL drain intact w/ minimal output. Posterior dressing w/ dried drainage marked. Discharge pending.

## 2023-04-01 NOTE — PROGRESS NOTES
Patient exhibiting restlessness and attempting continually to exit bed.  Pt. Refuses to answer questions regarding orientation and is rather belligerent .  Pt. Has been lowering the side rails on his own and becomes annoyed when asked not to do that.  Pt. Received gaviria warming about not lowering bed rails.   Bed alarm activated to medium setting.

## 2023-04-01 NOTE — PROGRESS NOTES
MD paged. Pt hypertensive and tachycardic. Pt appears fatigue and lethargic. Earlier in the shift pt became fatigue and had an episode of confusion with OT. Vitals were taken. Rested in chair. 15-30 minutes later rechecked and continued to be hypertensive and tachycardic. Orders placed. Order for telemetry. Pt now on tele. Writer will continue to monitor.

## 2023-04-01 NOTE — PROGRESS NOTES
[facility] MEDICINE PROGRESS NOTE      Identification/Summary: Mike Gonzalez is a 72 year old male with a past medical history of  essential hypertension, atrial fibrillation, Watchman device in place,  not on anticoagulation, minimal coronary artery disease, COVID in 2/13/2023 was quite sick, underwent left L4-5 spinal fusion with laminal foraminotomy due to left L4-5 foraminal stenosis.  No new radiculopathy. History of previous cervical spine surgery.  Has moderate neck pain which is not new.    Assessment and Plan:  Essential hypertension  Metoprolol 12.5 mg daily  Lisinopril 5 mg daily  Blood pressure is stable    Atrial fibrillation  Watchman device in place  Heart rate is stable  Resume metoprolol 12.5 mg daily    Mild coronary artery disease  Stable and asymptomatic    Patent foramen ovale (PFO).   No right to left shunting     Chronic systolic and diastolic dysfunction  No acute pulmonary congestion    Hiccups likely from GERD  Started on H2 blocker    Acute blood loss anemia  Hemoglobin 12 from 15     Status post left L4-5 posterior spinal fusion and foraminotomy secondary to foraminal stenosis  Resume routine postoperative care  Physical and Occupational Therapy  Use incentive spirometry frequently  DVT prophylaxis with early ambulation and SCDs  Pain control with Tylenol 975 mg every 8 hours, 650 mg every 4 hours as needed, oxycodone 5 to 10 mg every 4 hours as needed    Chronic neck pain  History of cervical spine surgery    Tricia Julio MD  [facility]  [facility]  Phone: #[phone]  Securely message with the Vocera Web Console (learn more here)  Text page via [employer] Paging/Directory     Interval History/Subjective:  Laying on bed comfortably.  Back pain is stable.  No new radiculopathy.  Complains of neck pain and right shoulder pain.  History of neck surgery in the past.  Has mild headache.  No other concern.    Physical  Exam/Objective:  Temp:  [97.3  F (36.3  C)-99.4  F (37.4  C)] 97.3  F (36.3  C)  Pulse:  [] 96  Resp:  [17-20] 18  BP: (132-183)/(58-95) 147/87  SpO2:  [86 %-95 %] 92 %  Body mass index is 32.82 kg/m .     GENERAL:  Alert, appears comfortable, in no acute distress, appears stated age   HEAD:  Normocephalic, without obvious abnormality, atraumatic   EYES:  PERRL, conjunctiva  clear,  EOM's intact   NOSE: Nares normal,  mucosa normal, no drainage   THROAT: Lips, mucosa,   gums normal, mouth moist   NECK: Supple, symmetrical, trachea midline   BACK:   S/P back surgery   LUNGS:   Clear to auscultation bilaterally, no rales, rhonchi, or wheezing, symmetric chest rise on inhalation, respirations unlabored   CHEST WALL:  No tenderness or deformity   HEART:  Regular rate and rhythm, S1 and S2 normal, no murmur     ABDOMEN:   Soft, non-tender, bowel sounds active , no masses, no organomegaly    EXTREMITIES: No   edema    SKIN: No rashes   NEURO: Alert, oriented x3, moves all four extremities freely   PSYCH: Cooperative, behavior is appropriate      Data reviewed today: I personally reviewed all new medications, labs, imaging/diagnostics reports over the past 24 hours. Pertinent findings include:    Labs:  Most Recent 3 CBC's:  Recent Labs   Lab Test 03/31/23  0647 03/30/23  1326 03/21/23  0953 09/08/22  1050   WBC 16.0*  --  7.8 8.9   HGB 12.1* 14.2 15.3 15.9   MCV 96  --  94 94     --  299 307     Most Recent 3 BMP's:  Recent Labs   Lab Test 03/21/23  0953 09/08/22  1050 01/11/22  1324    141 140   POTASSIUM 4.6 4.3 4.3   CHLORIDE 102 102 101   CO2 26 27 27   BUN 12.9 18.2 14   CR 0.99 0.93 0.90   ANIONGAP 13 12 12   JOVAN 9.9 10.0 10.0   GLC 97 94 88       Medications:   Personally Reviewed.  Medications       acetaminophen  975 mg Oral Q8H     fluticasone  1 spray Both Nostrils Daily     lidocaine  1 patch Transdermal Q24H     lidocaine   Transdermal Q8H JASPER     [Held by provider] lisinopril  5 mg Oral  Daily     metoprolol succinate ER  12.5 mg Oral Daily     multivitamin, therapeutic  1 tablet Oral Daily     oxymetazoline  1 spray Nasal BID     pantoprazole  40 mg Oral QAM AC     polyethylene glycol  17 g Oral Daily     senna-docusate  1 tablet Oral BID     tamsulosin  0.4 mg Oral Daily     Total time spent 40 min by me on the date of service doing chart review, history, exam, documentation & further activities per the note.

## 2023-04-01 NOTE — PROGRESS NOTES
Care Management Follow Up    Length of Stay (days): 2    Expected Discharge Date: 04/01/2023     Concerns to be Addressed:       Patient plan of care discussed at interdisciplinary rounds: Yes    Anticipated Discharge Disposition:       Anticipated Discharge Services:    Anticipated Discharge DME:      Patient/family educated on Medicare website which has current facility and service quality ratings:    Education Provided on the Discharge Plan:    Patient/Family in Agreement with the Plan:      Referrals Placed by CM/SW:    Private pay costs discussed: Not applicable    Additional Information:  1:20 PM  Pt will discharge home with OP PT and assist from family.  Anticipate family transport.      ELSA Bhakta

## 2023-04-01 NOTE — DISCHARGE SUMMARY
[facility] Orthopedics Progress Note      Post-operative Day: 2 Days Post-Op    Procedure(s):  LEFT LUMBAR 4-5 POSTERIOR SPINAL FUSION WITH LEFT LUMBAR 4-5 LAMINOFORAMINOTOMY AND WIDE LUMBAR 4-5 LEFT SUBTOTAL FACETECTOMY WITH ALLOGRAFT AND AUTOGRAFT      Subjective:  4/1/2023  Patient doing well.  No complaints of headache.  Pre-op LLE pain and numbness is improved.  C/O bilateral karen pain    Pain: moderate  Chest pain, SOB:  No      Objective:  Blood pressure (!) 147/87, pulse 96, temperature 97.3  F (36.3  C), temperature source Oral, resp. rate 18, height 1.829 m (6'), weight 109.8 kg (242 lb), SpO2 92 %.    Patient Vitals for the past 24 hrs:   BP Temp Temp src Pulse Resp SpO2   04/01/23 0826 (!) 147/87 97.3  F (36.3  C) Oral 96 18 92 %   04/01/23 0035 -- -- -- -- -- 90 %   04/01/23 0030 (!) 146/95 99.4  F (37.4  C) Oral 112 20 (!) 86 %   03/31/23 1600 132/58 99.3  F (37.4  C) Oral 75 18 92 %   03/31/23 1229 (!) 183/73 99  F (37.2  C) Oral 75 17 95 %       Wt Readings from Last 4 Encounters:   03/30/23 109.8 kg (242 lb)   03/21/23 108.9 kg (240 lb)   09/28/22 108.4 kg (239 lb)   09/08/22 108 kg (238 lb)     EXAM: incision C/D/I no evidence of clear fluid.  Drain in place    Surgical extremity motor function, sensation, and circulation intact: Yes    Post op dressing intact. Yes  Calf tenderness: Bilateral  No    Pertinent Labs   Lab Results: personally reviewed.     Recent Labs   Lab Test 03/31/23  0647 03/30/23  1326 03/21/23  0953 09/08/22  1050 01/11/22  1324 11/08/21  0948 11/30/20  1018 06/18/20  1217 04/15/19  1108 06/04/18  1506   INR  --   --   --  1.01 1.01  --   --   --   --   --    HGB 12.1* 14.2 15.3 15.9 15.2 15.0   < > 15.8   < > 16.2   HCT 35.5*  --  44.9 46.5 45.1 43.6   < > 45.9   < > 48.5   MCV 96  --  94 94 95 93   < > 92   < > 95     --  299 307 282 265   < > 265   < > 298   NA  --   --  141 141 140 140   < > 141   < > 142   CRP  --   --   --   --   --  0.7  --  0.4   --  <0.1    < > = values in this interval not displayed.       Plan: Discharge home if cleared by PT  discontinue drain; straight cath prn   Anticoagulation protocol: Aspirin 81 mg BID  May start 4/3/23            Pain medications:  pain medication: dilaudid            Weight bearing status:  WBAT            Disposition:  home             Continue cares and rehabilitation     Report completed by:  Ambrose Degroot MD  Date: 4/1/2023  Time: 11:05 AM

## 2023-04-01 NOTE — DISCHARGE SUMMARY
[facility] Discharge Summary                                       ZAIDA CISNEROS [id number]   Age: 72 year old  PCP: [place], [phone] 1950     Date of Admission:  3/30/2023  Date of Discharge::  4/1/2023  Discharge Physician:  Ambrose Degroot MD    Code status:  Full Code    Admission Information:  Admission Diagnosis:  Back pain [M54.9]  Spinal stenosis at L4-L5 level [M48.061]    Post-Operative Day: 2 Days Post-Op     Reason for admission:  The patient was admitted for the following:Procedure(s) (LRB):  LEFT LUMBAR 4-5 POSTERIOR SPINAL FUSION WITH LEFT LUMBAR 4-5 LAMINOFORAMINOTOMY AND WIDE LUMBAR 4-5 LEFT SUBTOTAL FACETECTOMY WITH ALLOGRAFT AND AUTOGRAFT (Left)  BRIEF HOSPITAL COURSE   This 72 year old male underwent the aforementioned procedure.  There were no intraoperative complications and the patient was transferred to the recovery room and later the orthopedic unit in stable condition. Once the patient reached the orthopedic floor our orthopedic pain protocol was implemented along with the following:  Principal Problem:    Spinal stenosis at L4-L5 level      Allergies:  Effient [prasugrel hcl], Hydrochlorothiazide, Hydrocodone, Oxycontin [oxycodone], Peppermint oil, Plavix [clopidogrel], Sulfa (sulfonamide antibiotics) [sulfa drugs], and Perfume    Therapy:  physical therapy  Anticoagulation Plan: Aspirin 81 mg BID  Start 4/3/23  Pain Management: pain medication: dilaudid  Weight bearing status: Weight bearing as tolerated     The patient was followed daily during the inpatient treatment course  Complications:  None  Consultations:  None     Pertinent Labs   Lab Results: personally reviewed.     Recent Labs   Lab Test 03/31/23  0647 03/30/23  1326 03/21/23  0953 09/08/22  1050 01/11/22  1324 11/08/21  0948 11/30/20  1018 06/18/20  1217 04/15/19  1108 06/04/18  1506   INR  --   --   --  1.01 1.01  --   --   --   --   --    HGB 12.1* 14.2 15.3 15.9 15.2 15.0    < > 15.8   < > 16.2   HCT 35.5*  --  44.9 46.5 45.1 43.6   < > 45.9   < > 48.5   MCV 96  --  94 94 95 93   < > 92   < > 95     --  299 307 282 265   < > 265   < > 298   NA  --   --  141 141 140 140   < > 141   < > 142   CRP  --   --   --   --   --  0.7  --  0.4  --  <0.1    < > = values in this interval not displayed.          Discharge Information:  Condition at discharge: Stable  Discharge destination:  Discharged to home     Medications at discharge:  Current Discharge Medication List      START taking these medications    Details   HYDROmorphone (DILAUDID) 2 MG tablet Take 1 tablet (2 mg) by mouth every 6 hours as needed for moderate pain (4-6)  Qty: 25 tablet, Refills: 0    Associated Diagnoses: Spinal stenosis at L4-L5 level      hydrOXYzine (ATARAX) 10 MG tablet Take 1 tablet (10 mg) by mouth every 6 hours as needed for other or itching (adjuvant pain)  Qty: 40 tablet, Refills: 0    Associated Diagnoses: Spinal stenosis at L4-L5 level      senna-docusate (SENOKOT-S/PERICOLACE) 8.6-50 MG tablet Take 1-2 tablets by mouth 2 times daily Take while on oral narcotics to prevent or treat constipation.  Qty: 30 tablet, Refills: 0    Comments: While taking narcotics  Associated Diagnoses: Spinal stenosis at L4-L5 level         CONTINUE these medications which have NOT CHANGED    Details   acetaminophen (TYLENOL) 650 MG CR tablet Take 650 mg by mouth every 8 hours as needed      co-enzyme Q-10 100 MG CAPS capsule Take 200 mg by mouth daily      fish oil-omega-3 fatty acids 300-1,000 mg capsule [FISH OIL-OMEGA-3 FATTY ACIDS 300-1,000 MG CAPSULE] Take 2 g by mouth daily.      fluticasone (FLONASE) 50 MCG/ACT nasal spray Spray 1 spray into both nostrils daily  Qty: 48 mL, Refills: 2    Associated Diagnoses: Chronic rhinitis      lisinopril (ZESTRIL) 5 MG tablet TAKE 1 TABLET DAILY  Qty: 90 tablet, Refills: 3    Associated Diagnoses: Ischemic cardiomyopathy      Magnesium Oxide POWD Take 1 Dose by mouth daily       metoprolol succinate ER (TOPROL XL) 25 MG 24 hr tablet Take 0.5 tablets (12.5 mg) by mouth daily  Qty: 45 tablet, Refills: 3    Associated Diagnoses: Atrial flutter, chronic (H); Essential hypertension; SOB (shortness of breath); Decreased cardiac ejection fraction      MULTIVITAMIN ORAL Take 1 tablet by mouth daily      oxymetazoline HCl (AFRIN, OXYMETAZOLINE, NASL) Spray 1 spray in nostril 2 times daily         STOP taking these medications       vits A,C,E/lutein/zeax/zn/paris (EYE HEALTH FORMULA ORAL) Comments:   Reason for Stopping:                          Follow-Up Care:  Patient should be seen in a [facility] office in 2w days by the patient's Orthopedic Surgeon/Physician Assistant.  Call [phone] for appointment or questions.    Follow up with your PCP for management of chronic medical problems and to evaluate for post op medical complications including constipation, nausea/vomiting, DVT/PE, anemia, changes in blood pressure, fevers/chills, urinary retention and atelectasis/pneumonia.     Ambrose House MD

## 2023-04-01 NOTE — PLAN OF CARE
Problem: Spinal Surgery  Goal: Optimal Pain Control and Function  Intervention: Prevent or Manage Pain  Recent Flowsheet Documentation  Taken 4/1/2023 0838 by Haily Garcia RN  Pain Management Interventions:   medication (see MAR)   pillow support provided   repositioned   rest     Problem: Spinal Surgery  Goal: Effective Urinary Elimination  Outcome: Progressing     Patient vital signs are at baseline: Yes pt hypertensive and tachy during afternoon. MD made aware. Orders placed. Managing with one time dose (see MAR) pt placed on telemetry. Reading Afib on monitor.  Patient able to ambulate as they were prior to admission or with assist devices provided by therapies during their stay:  Yes SBA walker and gait belt  Patient MUST void prior to discharge:  Yes pt voiding spontaneously minimal output bladder scanned for >800 straight cath x1 today for 820 (see flowsheet)   Patient able to tolerate oral intake:  Yes needing to be encouraged with intake of oral fluids  Pain has adequate pain control using Oral analgesics:  Yes managing with prn and scheduled meds. Concerns thorough shift regarding pt increase confusion and lethargic. Wife at bedside this afternoon who agrees with writer to avoid use of prn Dilaudid as concerns increasing confusion.  Does patient have an identified :  Yes wife at bedside most of afternoon today  Has goal D/C date and time been discussed with patient:  Yes discharge possibly tomorrow depending on clinical progression over night.

## 2023-04-02 ENCOUNTER — APPOINTMENT (OUTPATIENT)
Dept: OCCUPATIONAL THERAPY | Facility: CLINIC | Age: 73
DRG: 460 | End: 2023-04-02
Attending: ORTHOPAEDIC SURGERY
Payer: MEDICARE

## 2023-04-02 VITALS
DIASTOLIC BLOOD PRESSURE: 72 MMHG | TEMPERATURE: 97.8 F | SYSTOLIC BLOOD PRESSURE: 144 MMHG | RESPIRATION RATE: 16 BRPM | HEART RATE: 115 BPM | OXYGEN SATURATION: 98 % | HEIGHT: 72 IN | BODY MASS INDEX: 33.41 KG/M2 | WEIGHT: 246.7 LBS

## 2023-04-02 LAB
ALBUMIN UR-MCNC: 30 MG/DL
ANION GAP SERPL CALCULATED.3IONS-SCNC: 12 MMOL/L (ref 5–18)
APPEARANCE UR: CLEAR
BILIRUB UR QL STRIP: NEGATIVE
BUN SERPL-MCNC: 11 MG/DL (ref 8–28)
CALCIUM SERPL-MCNC: 9.1 MG/DL (ref 8.5–10.5)
CHLORIDE BLD-SCNC: 101 MMOL/L (ref 98–107)
CO2 SERPL-SCNC: 24 MMOL/L (ref 22–31)
COLOR UR AUTO: YELLOW
CREAT SERPL-MCNC: 0.81 MG/DL (ref 0.7–1.3)
GFR SERPL CREATININE-BSD FRML MDRD: >90 ML/MIN/1.73M2
GLUCOSE BLD-MCNC: 111 MG/DL (ref 70–125)
GLUCOSE UR STRIP-MCNC: NEGATIVE MG/DL
HGB UR QL STRIP: ABNORMAL
HOLD SPECIMEN: NORMAL
KETONES UR STRIP-MCNC: 20 MG/DL
LEUKOCYTE ESTERASE UR QL STRIP: NEGATIVE
MAGNESIUM SERPL-MCNC: 1.9 MG/DL (ref 1.8–2.6)
MUCOUS THREADS #/AREA URNS LPF: PRESENT /LPF
NITRATE UR QL: NEGATIVE
PH UR STRIP: 6 [PH] (ref 5–7)
POTASSIUM BLD-SCNC: 3.5 MMOL/L (ref 3.5–5)
RBC URINE: 11 /HPF
SODIUM SERPL-SCNC: 137 MMOL/L (ref 136–145)
SP GR UR STRIP: 1.01 (ref 1–1.03)
SQUAMOUS EPITHELIAL: <1 /HPF
UROBILINOGEN UR STRIP-MCNC: <2 MG/DL
WBC URINE: 3 /HPF

## 2023-04-02 PROCEDURE — 97535 SELF CARE MNGMENT TRAINING: CPT | Mod: GO

## 2023-04-02 PROCEDURE — 99233 SBSQ HOSP IP/OBS HIGH 50: CPT | Performed by: INTERNAL MEDICINE

## 2023-04-02 PROCEDURE — 81003 URINALYSIS AUTO W/O SCOPE: CPT | Performed by: INTERNAL MEDICINE

## 2023-04-02 PROCEDURE — 250N000013 HC RX MED GY IP 250 OP 250 PS 637: Performed by: INTERNAL MEDICINE

## 2023-04-02 PROCEDURE — 36415 COLL VENOUS BLD VENIPUNCTURE: CPT | Performed by: INTERNAL MEDICINE

## 2023-04-02 PROCEDURE — 250N000013 HC RX MED GY IP 250 OP 250 PS 637: Performed by: PHYSICIAN ASSISTANT

## 2023-04-02 PROCEDURE — 83735 ASSAY OF MAGNESIUM: CPT | Performed by: INTERNAL MEDICINE

## 2023-04-02 PROCEDURE — 80048 BASIC METABOLIC PNL TOTAL CA: CPT | Performed by: INTERNAL MEDICINE

## 2023-04-02 PROCEDURE — 250N000013 HC RX MED GY IP 250 OP 250 PS 637: Performed by: FAMILY MEDICINE

## 2023-04-02 PROCEDURE — 250N000013 HC RX MED GY IP 250 OP 250 PS 637: Performed by: ORTHOPAEDIC SURGERY

## 2023-04-02 RX ORDER — METOPROLOL TARTRATE 25 MG/1
25 TABLET, FILM COATED ORAL ONCE
Status: COMPLETED | OUTPATIENT
Start: 2023-04-02 | End: 2023-04-02

## 2023-04-02 RX ORDER — TRAMADOL HYDROCHLORIDE 50 MG/1
50-100 TABLET ORAL EVERY 6 HOURS PRN
Qty: 20 TABLET | Refills: 0 | Status: SHIPPED | OUTPATIENT
Start: 2023-04-02 | End: 2023-04-05

## 2023-04-02 RX ORDER — METOPROLOL SUCCINATE 25 MG/1
25 TABLET, EXTENDED RELEASE ORAL DAILY
Qty: 45 TABLET | Refills: 3 | Status: SHIPPED | OUTPATIENT
Start: 2023-04-02 | End: 2023-07-22

## 2023-04-02 RX ORDER — METOPROLOL SUCCINATE 25 MG/1
25 TABLET, EXTENDED RELEASE ORAL DAILY
Status: DISCONTINUED | OUTPATIENT
Start: 2023-04-03 | End: 2023-04-02 | Stop reason: HOSPADM

## 2023-04-02 RX ADMIN — ACETAMINOPHEN 975 MG: 325 TABLET ORAL at 06:50

## 2023-04-02 RX ADMIN — TRAMADOL HYDROCHLORIDE 50 MG: 50 TABLET, COATED ORAL at 02:50

## 2023-04-02 RX ADMIN — ACETAMINOPHEN 975 MG: 325 TABLET ORAL at 00:46

## 2023-04-02 RX ADMIN — POLYETHYLENE GLYCOL 3350 17 G: 17 POWDER, FOR SOLUTION ORAL at 08:45

## 2023-04-02 RX ADMIN — LISINOPRIL 5 MG: 5 TABLET ORAL at 08:46

## 2023-04-02 RX ADMIN — LIDOCAINE 1 PATCH: 246 PATCH TOPICAL at 08:45

## 2023-04-02 RX ADMIN — TRAMADOL HYDROCHLORIDE 50 MG: 50 TABLET, COATED ORAL at 13:43

## 2023-04-02 RX ADMIN — TAMSULOSIN HYDROCHLORIDE 0.4 MG: 0.4 CAPSULE ORAL at 08:46

## 2023-04-02 RX ADMIN — METOPROLOL SUCCINATE 12.5 MG: 25 TABLET, FILM COATED, EXTENDED RELEASE ORAL at 08:46

## 2023-04-02 RX ADMIN — SENNOSIDES AND DOCUSATE SODIUM 1 TABLET: 50; 8.6 TABLET ORAL at 08:46

## 2023-04-02 RX ADMIN — PANTOPRAZOLE SODIUM 40 MG: 40 TABLET, DELAYED RELEASE ORAL at 06:50

## 2023-04-02 RX ADMIN — METOPROLOL TARTRATE 25 MG: 25 TABLET, FILM COATED ORAL at 10:33

## 2023-04-02 RX ADMIN — THERA TABS 1 TABLET: TAB at 08:45

## 2023-04-02 ASSESSMENT — ACTIVITIES OF DAILY LIVING (ADL)
ADLS_ACUITY_SCORE: 26
ADLS_ACUITY_SCORE: 25
ADLS_ACUITY_SCORE: 26
ADLS_ACUITY_SCORE: 25
ADLS_ACUITY_SCORE: 25

## 2023-04-02 NOTE — PROVIDER NOTIFICATION
Remote paged regarding pt increased confusion. VSS other than low grade temp 99.9. pt currently resting in bed comfortably. Alarms on for safety. Call light in reach.     2300 : orders placed. Information and report given to on coming night nurse.

## 2023-04-02 NOTE — PLAN OF CARE
Goal Outcome Evaluation:  Patient vital signs are at baseline: Yes  Patient able to ambulate as they were prior to admission or with assist devices provided by therapies during their stay:  Yes  Patient MUST void prior to discharge:  Yes  Patient able to tolerate oral intake:  Yes  Pain has adequate pain control using Oral analgesics:  Yes  Does patient have an identified :  Yes  Has goal D/C date and time been discussed with patient:  Yes    Patient is alert and oriented with confusion. Reorientation provided. Pain controlled with scheduled and PRN Oral pain medications. Utilized ice packs for comfort. Up to the bathroom with A1 and voided. Last bladder scan was less than 300 ml. Telemetry reading Afib. IV saline locked. Call light within reach. Bed alarms activated for safety.

## 2023-04-02 NOTE — PROGRESS NOTES
Occupational Therapy Discharge Summary    Reason for therapy discharge:    Discharged to home.    Progress towards therapy goal(s). See goals on Care Plan in [facility] electronic health record for goal details.  Goals partially met.  Barriers to achieving goals:   Spouse will assist with dressing tasks.Spouse will be present with all ADL/functional mobility at home.     Therapy recommendation(s):    No further therapy is recommended.

## 2023-04-02 NOTE — PROGRESS NOTES
[facility] Progress Note      Post-operative Day: 3 Days Post-Op    Procedure(s):  LEFT LUMBAR 4-5 POSTERIOR SPINAL FUSION WITH LEFT LUMBAR 4-5 LAMINOFORAMINOTOMY AND WIDE LUMBAR 4-5 LEFT SUBTOTAL FACETECTOMY WITH ALLOGRAFT AND AUTOGRAFT      Subjective:  Patient is POD 3 from procedure listed above. He is overall doing well. He notes no changes overnight. There is some concern of some new confusion and tachycoardia which is being followed by medicine. Otherwise no new issues to discuss today.     Pain: minimal  Chest pain, SOB:  No      Objective:  Blood pressure 133/79, pulse 115, temperature 97.9  F (36.6  C), temperature source Oral, resp. rate 16, height 1.829 m (6'), weight 111.9 kg (246 lb 11.2 oz), SpO2 94 %.    Patient Vitals for the past 24 hrs:   BP Temp Temp src Pulse Resp SpO2 Weight   04/02/23 0833 133/79 97.9  F (36.6  C) Oral 115 16 94 % --   04/02/23 0416 118/59 97.8  F (36.6  C) Oral -- 18 91 % 111.9 kg (246 lb 11.2 oz)   04/01/23 2334 (!) 159/70 -- -- 106 -- -- --   04/01/23 2330 (!) 170/73 98.7  F (37.1  C) Oral 104 20 97 % --   04/01/23 2242 (P) 138/66 -- (P) Oral (P) 110 -- (P) 95 % --   04/01/23 1939 134/62 97.9  F (36.6  C) Oral 100 20 96 % --   04/01/23 1607 109/58 99.2  F (37.3  C) Axillary 90 18 93 % --   04/01/23 1432 (!) 182/80 -- -- 118 -- -- --   04/01/23 1340 (!) 163/97 -- -- 101 -- 96 % --       Wt Readings from Last 4 Encounters:   04/02/23 111.9 kg (246 lb 11.2 oz)   03/21/23 108.9 kg (240 lb)   09/28/22 108.4 kg (239 lb)   09/08/22 108 kg (238 lb)         Motor function, sensation, and circulation intact   Yes  Wound status: incisions are clean dry and intact. Yes  Calf tenderness: Bilateral  No    Pertinent Labs   Lab Results: personally reviewed.     Recent Labs   Lab Test 03/31/23  0647 03/30/23  1326 03/21/23  0953 09/08/22  1050 01/11/22  1324 11/08/21  0948 11/30/20  1018 06/18/20  1217 04/15/19  1108 06/04/18  1506   INR  --   --   --  1.01  1.01  --   --   --   --   --    HGB 12.1* 14.2 15.3 15.9 15.2 15.0   < > 15.8   < > 16.2   HCT 35.5*  --  44.9 46.5 45.1 43.6   < > 45.9   < > 48.5   MCV 96  --  94 94 95 93   < > 92   < > 95     --  299 307 282 265   < > 265   < > 298   NA  --   --  141 141 140 140   < > 141   < > 142   CRP  --   --   --   --   --  0.7  --  0.4  --  <0.1    < > = values in this interval not displayed.       Plan: Anticoagulation protocol: Aspirin 81 mg BID  Okay to resume 4/3/23            Pain medications:  scopainmedication: dilaudid            Weight bearing status:  WBAT            Disposition:  Home pending PT and medicine clearance - stable from orthopedic standpoint             Continue cares and rehabilitation     Report completed by:  ALMA DOMINGO PA-C  Date: 4/2/2023  Time: 10:10 AM

## 2023-04-02 NOTE — PROGRESS NOTES
"Pt left facility accompanied by staff to return home. Pt was stable upon dc and stated they were ready for dc. Pt was sent with 1 prescriptions and \"how am I doing today\" education. Medication mgmt, pain mgmt, f/u's and constipation mgmt were discussed with pt who verbalized understanding/agreement.  "

## 2023-04-02 NOTE — PROGRESS NOTES
[employer] Daily Progress Note    Assessment/Plan:  A-fib, mild RVR  Increase metoprolol dose  Encourage oral intake  Follow-up with A-fib clinic.    Has Watchman device.    Episode of confusion  Happened last night  Likely delirium  Received Seroquel    Mild coronary artery disease  Stable and asymptomatic     Patent foramen ovale (PFO).   No right to left shunting     Chronic systolic and diastolic dysfunction  No acute pulmonary congestion     Hiccups post op  resolved     Acute blood loss anemia  Hemoglobin 12 from 15     Status post left L4-5 posterior spinal fusion and foraminotomy secondary to foraminal stenosis  Continue PT OT, pain control  DVT prophylaxis per operative surgery.    Principal Problem:    Spinal stenosis at L4-L5 level     LOS: 3 days     Subjective:  He received Seroquel last night and feels sleepy this morning.  Denies any shortness of breath chest pain, urinary or bowel complaints.  Not been eating much.  Telemetry shows A-fib with a heart rate 110/min    fluticasone  1 spray Both Nostrils Daily     lidocaine  1 patch Transdermal Q24H     lidocaine   Transdermal Q8H JASPER     lisinopril  5 mg Oral Daily     [START ON 4/3/2023] metoprolol succinate ER  25 mg Oral Daily     multivitamin, therapeutic  1 tablet Oral Daily     oxymetazoline  1 spray Nasal BID     pantoprazole  40 mg Oral QAM AC     polyethylene glycol  17 g Oral Daily     senna-docusate  1 tablet Oral BID     tamsulosin  0.4 mg Oral Daily       Objective:  Vital signs in last 24 hours:  Temp:  [97.8  F (36.6  C)-99.2  F (37.3  C)] 97.8  F (36.6  C)  Pulse:  [] 115  Resp:  [16-20] 16  BP: (109-182)/(58-97) 144/72  SpO2:  [91 %-98 %] 98 %  Weight:   [unfilled]    Intake/Output last 3 shifts:  I/O last 3 completed shifts:  In: -   Out: 2015 [Urine:1995; Drains:20]  Intake/Output this shift:  No intake/output data recorded.    Review of Systems:   As per subjective, all others negative.    Physical Exam:    General Appearance:   Alert, cooperative, no distress, appears stated age   Head:  Normocephalic, without obvious abnormality, atraumatic   Back:    Dressing appears clean   Lungs:   Clear to auscultation bilaterally, respirations unlabored   Chest Wall:  No tenderness or deformity   Heart:  irregular rate and rhythm, S1, S2 normal,no murmur, rub or gallop   Abdomen:   Soft, non tender, non distended, bowel sounds present, no guarding or rigidity   Extremities: No edema   Skin: Skin color, texture, turgor normal, no rashes or lesions   Neurologic: Alert and oriented X 3, Moves all 4 extremities       Lab Results:  Personally Reviewed.   Recent Results (from the past 24 hour(s))   UA reflex to Microscopic and Culture    Collection Time: 04/02/23  3:50 AM    Specimen: Urine, Clean Catch   Result Value Ref Range    Color Urine Yellow Colorless, Straw, Light Yellow, Yellow    Appearance Urine Clear Clear    Glucose Urine Negative Negative mg/dL    Bilirubin Urine Negative Negative    Ketones Urine 20 (A) Negative mg/dL    Specific Gravity Urine 1.014 1.001 - 1.030    Blood Urine 0.06 mg/dL (A) Negative    pH Urine 6.0 5.0 - 7.0    Protein Albumin Urine 30 (A) Negative mg/dL    Urobilinogen Urine <2.0 <2.0 mg/dL    Nitrite Urine Negative Negative    Leukocyte Esterase Urine Negative Negative    Mucus Urine Present (A) None Seen /LPF    RBC Urine 11 (H) <=2 /HPF    WBC Urine 3 <=5 /HPF    Squamous Epithelials Urine <1 <=1 /HPF   Basic metabolic panel    Collection Time: 04/02/23 10:37 AM   Result Value Ref Range    Sodium 137 136 - 145 mmol/L    Potassium 3.5 3.5 - 5.0 mmol/L    Chloride 101 98 - 107 mmol/L    Carbon Dioxide (CO2) 24 22 - 31 mmol/L    Anion Gap 12 5 - 18 mmol/L    Urea Nitrogen 11 8 - 28 mg/dL    Creatinine 0.81 0.70 - 1.30 mg/dL    Calcium 9.1 8.5 - 10.5 mg/dL    Glucose 111 70 - 125 mg/dL    GFR Estimate >90 >60 mL/min/1.73m2   Magnesium    Collection Time: 04/02/23 10:37 AM   Result Value Ref Range    Magnesium 1.9 1.8 -  2.6 mg/dL   Extra Purple Top Tube    Collection Time: 04/02/23 10:37 AM   Result Value Ref Range    Hold Specimen JANA Steel M.D  [facility] Service  Internal Medicine

## 2023-04-02 NOTE — PROVIDER NOTIFICATION
Notified MD at 12:35 PM regarding discharge recommendations..      Spoke with: Dr. Steel    Comments: Will discharge pt, per MD instructions.

## 2023-04-03 ENCOUNTER — TELEPHONE (OUTPATIENT)
Dept: INTERNAL MEDICINE | Facility: CLINIC | Age: 73
End: 2023-04-03
Payer: MEDICARE

## 2023-04-03 NOTE — TELEPHONE ENCOUNTER
Can use same day slot on Friday for this.  Please reach out.    Onur Rod MD  General Internal Medicine  [facility]  4/3/2023, 11:32 AM

## 2023-04-03 NOTE — TELEPHONE ENCOUNTER
Reason for call:  Other   Patient called regarding (reason for call):   ED follow up appt request    Additional comments:   Please reach out to patient regarding needing an ED follow up within 1 week.      Phone number to reach patient:  Home number on file [phone] (home)    Best Time:  any    Can we leave a detailed message on this number?  YES    Travel screening: Not Applicable

## 2023-04-03 NOTE — PROGRESS NOTES
Physical Therapy Discharge Summary    Reason for therapy discharge:    Discharged to home.    Progress towards therapy goal(s). See goals on Care Plan in [facility] electronic health record for goal details.  Goals met    Therapy recommendation(s):    No further therapy is recommended.

## 2023-04-07 ENCOUNTER — OFFICE VISIT (OUTPATIENT)
Dept: INTERNAL MEDICINE | Facility: CLINIC | Age: 73
End: 2023-04-07
Payer: MEDICARE

## 2023-04-07 VITALS
HEART RATE: 90 BPM | DIASTOLIC BLOOD PRESSURE: 72 MMHG | RESPIRATION RATE: 14 BRPM | OXYGEN SATURATION: 97 % | TEMPERATURE: 97.6 F | SYSTOLIC BLOOD PRESSURE: 136 MMHG

## 2023-04-07 DIAGNOSIS — M54.2 NECK PAIN: ICD-10-CM

## 2023-04-07 DIAGNOSIS — M25.512 BILATERAL SHOULDER PAIN, UNSPECIFIED CHRONICITY: ICD-10-CM

## 2023-04-07 DIAGNOSIS — Z98.1 S/P LUMBAR SPINAL FUSION: ICD-10-CM

## 2023-04-07 DIAGNOSIS — R44.1 VISUAL HALLUCINATIONS: Primary | ICD-10-CM

## 2023-04-07 DIAGNOSIS — I10 PRIMARY HYPERTENSION: Chronic | ICD-10-CM

## 2023-04-07 DIAGNOSIS — Z09 HOSPITAL DISCHARGE FOLLOW-UP: ICD-10-CM

## 2023-04-07 DIAGNOSIS — I48.92 ATRIAL FLUTTER, CHRONIC (H): ICD-10-CM

## 2023-04-07 DIAGNOSIS — M25.511 BILATERAL SHOULDER PAIN, UNSPECIFIED CHRONICITY: ICD-10-CM

## 2023-04-07 DIAGNOSIS — R41.0 DELIRIUM: ICD-10-CM

## 2023-04-07 DIAGNOSIS — M54.10 BACK PAIN WITH LEFT-SIDED RADICULOPATHY: ICD-10-CM

## 2023-04-07 PROCEDURE — 99213 OFFICE O/P EST LOW 20 MIN: CPT | Performed by: INTERNAL MEDICINE

## 2023-04-07 RX ORDER — TRAMADOL HYDROCHLORIDE 50 MG/1
TABLET ORAL
COMMUNITY
Start: 2023-04-06 | End: 2023-05-08

## 2023-04-07 ASSESSMENT — PAIN SCALES - GENERAL: PAINLEVEL: SEVERE PAIN (6)

## 2023-04-07 NOTE — PROGRESS NOTES
[facility] - [facility] Specialists    Comprehensive and complex medical care - Chronic disease management - Shared decision making - Care coordination - Compassionate care    Patient advocacy - Rational deprescribing - Minimally disruptive medicine - Ethical focus - Customized care         Date of Service: 4/7/2023  Primary Provider: Onur Rod    Patient Care Team:  Onur Rod MD as PCP - General  Onur Rod MD as Assigned PCP  Ambrose Degroot MD as MD (Orthopaedic Surgery)  Stuart Jackson MD as MD (Orthopaedic Surgery)  Liborio Champagne MD as MD  [facility]  Gregg Curtis as Resident (Orthopaedic Surgery)  Kiran Tyler MD as Assigned Heart and Vascular Provider          Patient's Pharmacy:    [id number] - [place], [place] - [address]  [address] [facility] [address]  Phone: [phone] Fax: [phone]    [facility] [place] [facility]  [place] [address]  Phone: [phone] Fax: [phone]     Patient's Contacts:  Name Home Phone Work Phone Mobile Phone Relationship Lgl Grd   KYLER CISNEROS [phone] Spouse    MATILDE SILVER   [phone] Other      Patient's Insurance:    Payor: MEDICARE / Plan: MEDICARE / Product Type: Medicare /           Active Problem List:  Problem List as of 4/7/2023 Reviewed: 2/24/2023  3:03 PM by Onur Rod MD       High    Nonsmoker    Full code status    CAD (coronary artery disease), minimal disease on angiogram in 2021    Atrial flutter, chronic (H)       Medium    S/P left atrial appendage closure - WATCHMAN    HTN (hypertension)       Low    GERD (gastroesophageal reflux disease)    Branch retinal vein occlusion of left eye    Normal colonoscopy - 2016 - Average risk    Complete tear of left rotator cuff    Concussion syndrome       Other    Dilated  cardiomyopathy (H)       Other    Chronic neck pain    Chronic systolic heart failure (H)    Chronic left-sided low back pain with left-sided sciatica    Spinal stenosis at L4-L5 level        Current Outpatient Medications   Medication Instructions     acetaminophen (TYLENOL) 650 mg, Oral, EVERY 8 HOURS PRN     co-enzyme Q-10 200 mg, Oral, DAILY     fish oil-omega-3 fatty acids 2 g, DAILY     fluticasone (FLONASE) 50 MCG/ACT nasal spray 1 spray, Both Nostrils, DAILY     hydrOXYzine (ATARAX) 10 mg, Oral, EVERY 6 HOURS PRN     lisinopril (ZESTRIL) 5 MG tablet TAKE 1 TABLET DAILY     Magnesium Oxide POWD 1 Dose, Oral, DAILY     metoprolol succinate ER (TOPROL XL) 25 mg, Oral, DAILY     MULTIVITAMIN ORAL 1 tablet, Oral, DAILY     oxymetazoline HCl (AFRIN, OXYMETAZOLINE, NASL) 1 spray, 2 TIMES DAILY     ranitidine (ZANTAC) 150 mg, Oral, 2 TIMES DAILY     senna-docusate (SENOKOT-S/PERICOLACE) 8.6-50 MG tablet 1-2 tablets, Oral, 2 TIMES DAILY, Take while on oral narcotics to prevent or treat constipation.     traMADol (ULTRAM) 50 MG tablet No dose, route, or frequency recorded.     Social History     Social History Narrative    Patient of Dr. Rod since 2001.    .  Wife is a former RN.     Wife's cousin is Dr. Jean Pierre Champagne, ID specialist.       Subjective:     Mike Gonzalez is a 72 year old male who comes in today for:    Chief Complaint   Patient presents with     Hospital F/U     Spinal stenosis at L4-L5 level          4/7/2023    10:26 AM   Additional Questions   Roomed by Naveed MCALLISTER MA   Accompanied by Wife     Patient comes in today for follow-up of hospital stay.    We first reviewed his lumbar fusion.  He is still having back issues and pain into the leg.  This is too early to tell but usual course of care and recovery reviewed.  His incision is doing well.  He has follow-up with orthopedics next week.    He is having some issues with his neck and his shoulder is bothering him.  This has been since his  surgery and likely was related to positioning.  He is finally starting to do a little bit better.  This was bothering him quite a bit.    He has had problems with visual hallucinations and delirium after the surgery and on medications.  He was on Dilaudid in the hospital but then switched to tramadol and Atarax.  He still has some issues at this time.    His blood pressure went high in the hospital with a pulse of .  He is on metoprolol.  He does not feel that he needs to follow-up with cardiology at this time since he is not seeing his primary cardiologist.    He has a slight tremor as well which she has noted since.  His heart rhythm was also reviewed.    We reviewed his other issues noted in the assessment but not specifically addressed in the HPI above.     Objective:     Wt Readings from Last 3 Encounters:   04/02/23 111.9 kg (246 lb 11.2 oz)   03/21/23 108.9 kg (240 lb)   09/28/22 108.4 kg (239 lb)     BP Readings from Last 3 Encounters:   04/07/23 136/72   04/02/23 (!) 144/72   03/21/23 127/84     /72 (BP Location: Right arm, Patient Position: Sitting, Cuff Size: Adult Regular)   Pulse 90   Temp 97.6  F (36.4  C) (Oral)   Resp 14   SpO2 97%    The patient is comfortable, no acute distress.  Mood good.  Insight a little confused, delirious.  Eyes are nonicteric.  Neck is supple without mass.  No cervical adenopathy.  No thyromegaly. Heart irregular rate and rhythm.  Lungs clear to auscultation bilaterally.  Respiratory effort is good.  Abdomen soft and nontender.Extremities no edema.  Back incision looks good.  Moves a little slowly.  Negative asterixis.      Diagnostics:     Admission on 03/30/2023, Discharged on 04/02/2023   Component Date Value Ref Range Status     Hemoglobin 03/30/2023 14.2  13.3 - 17.7 g/dL Final     WBC Count 03/31/2023 16.0 (H)  4.0 - 11.0 10e3/uL Final     RBC Count 03/31/2023 3.70 (L)  4.40 - 5.90 10e6/uL Final     Hemoglobin 03/31/2023 12.1 (L)  13.3 - 17.7 g/dL Final      Hematocrit 03/31/2023 35.5 (L)  40.0 - 53.0 % Final     MCV 03/31/2023 96  78 - 100 fL Final     MCH 03/31/2023 32.7  26.5 - 33.0 pg Final     MCHC 03/31/2023 34.1  31.5 - 36.5 g/dL Final     RDW 03/31/2023 13.3  10.0 - 15.0 % Final     Platelet Count 03/31/2023 251  150 - 450 10e3/uL Final     Color Urine 04/02/2023 Yellow  Colorless, Straw, Light Yellow, Yellow Final     Appearance Urine 04/02/2023 Clear  Clear Final     Glucose Urine 04/02/2023 Negative  Negative mg/dL Final     Bilirubin Urine 04/02/2023 Negative  Negative Final     Ketones Urine 04/02/2023 20 (A)  Negative mg/dL Final     Specific Gravity Urine 04/02/2023 1.014  1.001 - 1.030 Final     Blood Urine 04/02/2023 0.06 mg/dL (A)  Negative Final     pH Urine 04/02/2023 6.0  5.0 - 7.0 Final     Protein Albumin Urine 04/02/2023 30 (A)  Negative mg/dL Final     Urobilinogen Urine 04/02/2023 <2.0  <2.0 mg/dL Final     Nitrite Urine 04/02/2023 Negative  Negative Final     Leukocyte Esterase Urine 04/02/2023 Negative  Negative Final     Mucus Urine 04/02/2023 Present (A)  None Seen /LPF Final     RBC Urine 04/02/2023 11 (H)  <=2 /HPF Final     WBC Urine 04/02/2023 3  <=5 /HPF Final     Squamous Epithelials Urine 04/02/2023 <1  <=1 /HPF Final     Sodium 04/02/2023 137  136 - 145 mmol/L Final     Potassium 04/02/2023 3.5  3.5 - 5.0 mmol/L Final     Chloride 04/02/2023 101  98 - 107 mmol/L Final     Carbon Dioxide (CO2) 04/02/2023 24  22 - 31 mmol/L Final     Anion Gap 04/02/2023 12  5 - 18 mmol/L Final     Urea Nitrogen 04/02/2023 11  8 - 28 mg/dL Final     Creatinine 04/02/2023 0.81  0.70 - 1.30 mg/dL Final     Calcium 04/02/2023 9.1  8.5 - 10.5 mg/dL Final     Glucose 04/02/2023 111  70 - 125 mg/dL Final     GFR Estimate 04/02/2023 >90  >60 mL/min/1.73m2 Final    eGFR calculated using 2021 CKD-EPI equation.     Magnesium 04/02/2023 1.9  1.8 - 2.6 mg/dL Final     Hold Specimen 04/02/2023 JI   Final       No results found for any visits on  04/07/23.    Assessment:     1. Visual hallucinations    2. Neck pain    3. Bilateral shoulder pain, unspecified chronicity    4. Delirium    5. Primary hypertension    6. Atrial flutter, chronic (H)    7. Back pain with left-sided radiculopathy    8. S/P lumbar spinal fusion    9. Hospital discharge follow-up        Plan:     1. Continue to monitor closely.  2. Follow-up with orthopedics as scheduled.  3. No change in current medication plan.  4. Continue current medications otherwise.  5. Follow up sooner if issues.    No orders of the defined types were placed in this encounter.          Onur Rod MD  General Internal Medicine  [facility]    Return in about 6 months (around 10/7/2023) for annual wellness visit, visit and blood work.     No future appointments.

## 2023-04-08 ENCOUNTER — HEALTH MAINTENANCE LETTER (OUTPATIENT)
Age: 73
End: 2023-04-08

## 2023-04-12 ENCOUNTER — TRANSFERRED RECORDS (OUTPATIENT)
Dept: HEALTH INFORMATION MANAGEMENT | Facility: CLINIC | Age: 73
End: 2023-04-12
Payer: MEDICARE

## 2023-04-18 ENCOUNTER — TRANSFERRED RECORDS (OUTPATIENT)
Dept: HEALTH INFORMATION MANAGEMENT | Facility: CLINIC | Age: 73
End: 2023-04-18
Payer: MEDICARE

## 2023-05-08 ENCOUNTER — NURSE TRIAGE (OUTPATIENT)
Dept: NURSING | Facility: CLINIC | Age: 73
End: 2023-05-08
Payer: MEDICARE

## 2023-05-08 ENCOUNTER — OFFICE VISIT (OUTPATIENT)
Dept: INTERNAL MEDICINE | Facility: CLINIC | Age: 73
End: 2023-05-08
Payer: MEDICARE

## 2023-05-08 ENCOUNTER — TELEPHONE (OUTPATIENT)
Dept: INTERNAL MEDICINE | Facility: CLINIC | Age: 73
End: 2023-05-08

## 2023-05-08 VITALS
RESPIRATION RATE: 16 BRPM | SYSTOLIC BLOOD PRESSURE: 137 MMHG | DIASTOLIC BLOOD PRESSURE: 84 MMHG | HEART RATE: 71 BPM | WEIGHT: 238 LBS | BODY MASS INDEX: 32.23 KG/M2 | HEIGHT: 72 IN | OXYGEN SATURATION: 96 % | TEMPERATURE: 98.6 F

## 2023-05-08 DIAGNOSIS — R41.840 IMPAIRED CONCENTRATION: ICD-10-CM

## 2023-05-08 DIAGNOSIS — R35.0 URINARY FREQUENCY: Primary | ICD-10-CM

## 2023-05-08 DIAGNOSIS — M54.2 NECK PAIN: ICD-10-CM

## 2023-05-08 DIAGNOSIS — M25.512 BILATERAL SHOULDER PAIN, UNSPECIFIED CHRONICITY: ICD-10-CM

## 2023-05-08 DIAGNOSIS — Z98.1 S/P LUMBAR SPINAL FUSION: ICD-10-CM

## 2023-05-08 DIAGNOSIS — M25.511 BILATERAL SHOULDER PAIN, UNSPECIFIED CHRONICITY: ICD-10-CM

## 2023-05-08 DIAGNOSIS — R51.9 NONINTRACTABLE HEADACHE, UNSPECIFIED CHRONICITY PATTERN, UNSPECIFIED HEADACHE TYPE: ICD-10-CM

## 2023-05-08 LAB
ALBUMIN UR-MCNC: NEGATIVE MG/DL
APPEARANCE UR: CLEAR
BILIRUB UR QL STRIP: NEGATIVE
COLOR UR AUTO: YELLOW
GLUCOSE UR STRIP-MCNC: NEGATIVE MG/DL
HGB UR QL STRIP: NEGATIVE
KETONES UR STRIP-MCNC: NEGATIVE MG/DL
LEUKOCYTE ESTERASE UR QL STRIP: NEGATIVE
NITRATE UR QL: NEGATIVE
PH UR STRIP: 5 [PH] (ref 5–8)
SP GR UR STRIP: 1.02 (ref 1–1.03)
UROBILINOGEN UR STRIP-ACNC: 0.2 E.U./DL

## 2023-05-08 PROCEDURE — 81003 URINALYSIS AUTO W/O SCOPE: CPT | Performed by: INTERNAL MEDICINE

## 2023-05-08 PROCEDURE — 99215 OFFICE O/P EST HI 40 MIN: CPT | Performed by: INTERNAL MEDICINE

## 2023-05-08 RX ORDER — TAMSULOSIN HYDROCHLORIDE 0.4 MG/1
0.4 CAPSULE ORAL DAILY
Qty: 30 CAPSULE | Refills: 3 | Status: SHIPPED | OUTPATIENT
Start: 2023-05-08 | End: 2023-05-20

## 2023-05-08 ASSESSMENT — PAIN SCALES - GENERAL: PAINLEVEL: MILD PAIN (2)

## 2023-05-08 NOTE — TELEPHONE ENCOUNTER
Reason for Call:  Appointment Request    Patient requesting this type of appt:  Frequency of urination     Requested provider: Onur Rod    Reason patient unable to be scheduled: Not within requested timeframe    When does patient want to be seen/preferred time: Same day    Comments: would like Dr. Rod today    Could we [facility] this [facility] you [facility] or would you prefer to receive a phone call?:   Patient would prefer a phone call   Okay to leave a detailed message?: Yes at Home number on file [phone] (home)    Call taken on 5/8/2023 at 8:25 AM by Ana GASCA

## 2023-05-08 NOTE — TELEPHONE ENCOUNTER
Could see at 3:30 pm today if desired.    Could get a lab appointment for urinalysis right before the visit.    Ordered urinalysis.    Onur Rod MD  General Internal Medicine  [facility]  5/8/2023, 9:56 AM

## 2023-05-08 NOTE — PROGRESS NOTES
[facility] - Primary [facility] Specialists    Comprehensive and complex medical care - Chronic disease management - Shared decision making - Care coordination - Compassionate care    Patient advocacy - Rational deprescribing - Minimally disruptive medicine - Ethical focus - Customized care         Date of Service: 5/8/2023  Primary Provider: Onur Rod    Patient Care Team:  Onur Rod MD as PCP - General  Onur Rod MD as Assigned PCP  Ambrose Degroot MD as MD (Orthopaedic Surgery)  Stuart Jackson MD as MD (Orthopaedic Surgery)  Liborio Champagne MD as MD  [facility]  Gregg Curtis as Resident (Orthopaedic Surgery)  Kiran Tyler MD as Assigned Heart and Vascular Provider          Patient's Pharmacy:    [id number] - [place], [place] - [address]  [address] [facility] [address]  Phone: [phone] Fax: [phone]    [facility] [place] [facility]  [place] [address]  Phone: [phone] Fax: [phone]     Patient's Contacts:  Name Home Phone Work Phone Mobile Phone Relationship Lgl Grd   KYLER CISNEROS [phone] Spouse    MATILDE SILVER   [phone] Other      Patient's Insurance:    Payor: MEDICARE / Plan: MEDICARE / Product Type: Medicare /            Active Problem List:  Problem List as of 5/8/2023 Reviewed: 2/24/2023  3:03 PM by Onur Rod MD       High    Nonsmoker    Full code status    CAD (coronary artery disease), minimal disease on angiogram in 2021    Atrial flutter, chronic (H)       Medium    S/P left atrial appendage closure - WATCHMAN    HTN (hypertension)       Low    GERD (gastroesophageal reflux disease)    Branch retinal vein occlusion of left eye    Normal colonoscopy - 2016 - Average risk    Complete tear of left rotator cuff    Concussion syndrome       Other    Dilated  cardiomyopathy (H)       Other    Chronic neck pain    Chronic systolic heart failure (H)    Chronic left-sided low back pain with left-sided sciatica    Spinal stenosis at L4-L5 level        Current Outpatient Medications   Medication Instructions     acetaminophen (TYLENOL) 650 mg, Oral, EVERY 8 HOURS PRN     co-enzyme Q-10 200 mg, Oral, DAILY     fish oil-omega-3 fatty acids 2 g, DAILY     fluticasone (FLONASE) 50 MCG/ACT nasal spray 1 spray, Both Nostrils, DAILY     hydrOXYzine (ATARAX) 10 mg, Oral, EVERY 6 HOURS PRN     lisinopril (ZESTRIL) 5 MG tablet TAKE 1 TABLET DAILY     Magnesium Oxide POWD 1 Dose, Oral, DAILY     metoprolol succinate ER (TOPROL XL) 25 mg, Oral, DAILY     MULTIVITAMIN ORAL 1 tablet, Oral, DAILY     nitroFURantoin macrocrystal-monohydrate (MACROBID) 100 mg, Oral, 2 TIMES DAILY     senna-docusate (SENOKOT-S/PERICOLACE) 8.6-50 MG tablet 1-2 tablets, Oral, 2 TIMES DAILY, Take while on oral narcotics to prevent or treat constipation.     tamsulosin (FLOMAX) 0.4 mg, Oral, DAILY      Social History     Social History Narrative    Patient of Dr. Rod since 2001.    .  Wife is a former RN.     Wife's cousin is Dr. Jean Pierre Champagne, ID specialist.       Subjective:     Mike Gonzalez is a 72 year old male who comes in today for:    Chief Complaint   Patient presents with     UTI     Recheck Medication          5/8/2023     3:18 PM   Additional Questions   Roomed by Deo SHEIKH   Accompanied by N/A     Patient comes in today for a number of medical issues.    He is mainly today for his urinary frequency.  He has been going very often.  He can go more than every 1-1/2 to 2 hours.  This has been since his surgery.  He does note restricted flow of his urine.  He is not going large amounts when he goes.  He does note it particularly at night and goes 6-7 times at night.  He might not go as often during the day, however.    He has noticed a decrease of the size of his penis since the surgery.    He has  had a headache as well since his surgery.  It is 1/2-hour after waking.  It is worse with moving around.  He has some visual migraine symptoms at times as well.  He thinks he has had 4-5 in the last month.    He is still having issues with concentration since his surgery.  He is not having any more hallucinations.    His neck and shoulders remain sore since his surgery.  His toes are sore as well.  He attributes this to dry skin.    He still has back and leg pain but this is improved from the last visit.  He has continued follow-up with orthopedics.    He is trying to walk.    We reviewed his other issues noted in the assessment but not specifically addressed in the HPI above.     Objective:     Wt Readings from Last 3 Encounters:   05/08/23 108 kg (238 lb)   04/02/23 111.9 kg (246 lb 11.2 oz)   03/21/23 108.9 kg (240 lb)     BP Readings from Last 3 Encounters:   05/08/23 137/84   04/07/23 136/72   04/02/23 (!) 144/72     /84 (BP Location: Right arm, Patient Position: Sitting, Cuff Size: Adult Large)   Pulse 71   Temp 98.6  F (37  C) (Oral)   Resp 16   Ht 1.829 m (6')   Wt 108 kg (238 lb)   SpO2 96%   BMI 32.28 kg/m     The patient is comfortable, no acute distress.  Mood good.  Insight good.  Eyes are nonicteric.  Neck is supple without mass.  No cervical adenopathy.  No thyromegaly. Heart regular rate and rhythm.  Lungs clear to auscultation bilaterally.  Respiratory effort is good.  Extremities no edema.      Diagnostics:     Admission on 03/30/2023, Discharged on 04/02/2023   Component Date Value Ref Range Status     Hemoglobin 03/30/2023 14.2  13.3 - 17.7 g/dL Final     WBC Count 03/31/2023 16.0 (H)  4.0 - 11.0 10e3/uL Final     RBC Count 03/31/2023 3.70 (L)  4.40 - 5.90 10e6/uL Final     Hemoglobin 03/31/2023 12.1 (L)  13.3 - 17.7 g/dL Final     Hematocrit 03/31/2023 35.5 (L)  40.0 - 53.0 % Final     MCV 03/31/2023 96  78 - 100 fL Final     MCH 03/31/2023 32.7  26.5 - 33.0 pg Final     MCHC  03/31/2023 34.1  31.5 - 36.5 g/dL Final     RDW 03/31/2023 13.3  10.0 - 15.0 % Final     Platelet Count 03/31/2023 251  150 - 450 10e3/uL Final     Color Urine 04/02/2023 Yellow  Colorless, Straw, Light Yellow, Yellow Final     Appearance Urine 04/02/2023 Clear  Clear Final     Glucose Urine 04/02/2023 Negative  Negative mg/dL Final     Bilirubin Urine 04/02/2023 Negative  Negative Final     Ketones Urine 04/02/2023 20 (A)  Negative mg/dL Final     Specific Gravity Urine 04/02/2023 1.014  1.001 - 1.030 Final     Blood Urine 04/02/2023 0.06 mg/dL (A)  Negative Final     pH Urine 04/02/2023 6.0  5.0 - 7.0 Final     Protein Albumin Urine 04/02/2023 30 (A)  Negative mg/dL Final     Urobilinogen Urine 04/02/2023 <2.0  <2.0 mg/dL Final     Nitrite Urine 04/02/2023 Negative  Negative Final     Leukocyte Esterase Urine 04/02/2023 Negative  Negative Final     Mucus Urine 04/02/2023 Present (A)  None Seen /LPF Final     RBC Urine 04/02/2023 11 (H)  <=2 /HPF Final     WBC Urine 04/02/2023 3  <=5 /HPF Final     Squamous Epithelials Urine 04/02/2023 <1  <=1 /HPF Final     Sodium 04/02/2023 137  136 - 145 mmol/L Final     Potassium 04/02/2023 3.5  3.5 - 5.0 mmol/L Final     Chloride 04/02/2023 101  98 - 107 mmol/L Final     Carbon Dioxide (CO2) 04/02/2023 24  22 - 31 mmol/L Final     Anion Gap 04/02/2023 12  5 - 18 mmol/L Final     Urea Nitrogen 04/02/2023 11  8 - 28 mg/dL Final     Creatinine 04/02/2023 0.81  0.70 - 1.30 mg/dL Final     Calcium 04/02/2023 9.1  8.5 - 10.5 mg/dL Final     Glucose 04/02/2023 111  70 - 125 mg/dL Final     GFR Estimate 04/02/2023 >90  >60 mL/min/1.73m2 Final    eGFR calculated using 2021 CKD-EPI equation.     Magnesium 04/02/2023 1.9  1.8 - 2.6 mg/dL Final     Hold Specimen 04/02/2023 [facility]   Final       Results for orders placed or performed in visit on 05/08/23   UA Macroscopic with reflex to Microscopic and Culture     Status: Normal    Specimen: Urine, Clean Catch   Result Value Ref Range    Color  Urine Yellow Colorless, Straw, Light Yellow, Yellow    Appearance Urine Clear Clear    Glucose Urine Negative Negative mg/dL    Bilirubin Urine Negative Negative    Ketones Urine Negative Negative mg/dL    Specific Gravity Urine 1.025 1.005 - 1.030    Blood Urine Negative Negative    pH Urine 5.0 5.0 - 8.0    Protein Albumin Urine Negative Negative mg/dL    Urobilinogen Urine 0.2 0.2, 1.0 E.U./dL    Nitrite Urine Negative Negative    Leukocyte Esterase Urine Negative Negative    Narrative    Microscopic not indicated        Assessment:     1. Urinary frequency    2. Nonintractable headache, unspecified chronicity pattern, unspecified headache type    3. Impaired concentration    4. Neck pain    5. Bilateral shoulder pain, unspecified chronicity    6. S/P lumbar spinal fusion        Plan:     1. Urine test done before the visit and shows no obvious signs of UTI.  2. Try Flomax for urinary restriction and see if this helps related to BPH.  3. Follow-up if not improving.  4. Follow-up with orthopedics related to the postoperative complications.  5. Continue current medications otherwise.  6. Follow up sooner if issues.    40 minutes or greater was spent today on the patient's care on the day of service.      This includes time for chart preparation, reviewing medical tests done before or during the visit, talking with the patient, review of quality indicators, required documentation, and other elements of care.        Onur Rod MD  General Internal Medicine  [facility]    Return in about 6 months (around 11/8/2023) for annual wellness visit, visit and blood work.     No future appointments.

## 2023-05-08 NOTE — TELEPHONE ENCOUNTER
Pt had back surgery 5 weeks ago and states that he has been having trouble urinating since     Pt states that he is waking every hour during the night to urinate     Pt states that he will feel some slight pain at times when urinating     Pt states that he does not have a good stream when he urinates     No fever     Per disposition: See in Office Today    Transferred to scheduling and if no appointments available, pt will go to [facility] for evaluation     Care advice given per protocol and when to call back. Pt verbalized understanding and agrees to plan of care.    Rochelle Villagomez RN  [facility] Nurse Advisor  8:23 AM 5/8/2023      Reason for Disposition    Urinating more frequently than usual (i.e., frequency)    Additional Information    Negative: Shock suspected (e.g., cold/pale/clammy skin, too weak to stand, low BP, rapid pulse)    Negative: Sounds like a life-threatening emergency to the triager    Negative: Followed a female genital area injury (e.g., vagina, vulva)    Negative: Followed a male genital area injury (penis, scrotum)    Negative: Vaginal discharge    Negative: Pus (white, yellow) or bloody discharge from end of penis    Negative: Pain or burning with passing urine (urination) and pregnant    Negative: Pain or burning with passing urine (urination) and female    Negative: Pain or burning with passing urine (urination) and male    Negative: Pain or itching in the vulvar area    Negative: Pain in scrotum is main symptom    Negative: Blood in the urine is main symptom    Negative: Symptoms arising from use of a urinary catheter (e.g., coude, Kruger)    Negative: Unable to urinate (or only a few drops) > 4 hours and bladder feels very full (e.g., palpable bladder or strong urge to urinate)    Negative: Decreased urination and drinking very little and dehydration suspected (e.g., dark urine, no urine > 12 hours, very dry mouth, very lightheaded)    Negative: Patient sounds very sick or weak to the  triager    Negative: Fever > 100.4 F  (38.0 C)    Negative: Side (flank) or lower back pain present    Negative: Can't control passage of urine (i.e., urinary incontinence) and new-onset (< 2 weeks) or worsening    Protocols used: URINARY SYMPTOMS-A-OH

## 2023-05-10 ENCOUNTER — TRANSFERRED RECORDS (OUTPATIENT)
Dept: HEALTH INFORMATION MANAGEMENT | Facility: CLINIC | Age: 73
End: 2023-05-10
Payer: MEDICARE

## 2023-05-11 ENCOUNTER — TELEPHONE (OUTPATIENT)
Dept: CARDIOLOGY | Facility: CLINIC | Age: 73
End: 2023-05-11
Payer: MEDICARE

## 2023-05-11 NOTE — TELEPHONE ENCOUNTER
Called patient and gave him , size, Model number and Lot number of device. Patient appreciative and had no further questions. -SC

## 2023-05-11 NOTE — TELEPHONE ENCOUNTER
[facility] Call Center    Phone Message    May a detailed message be left on voicemail: yes     Reason for Call: Other: Need Name, Model Number and Serial Number of the Watchman pt had implanted before will do an MRI. Please call pt as soon as possible so he can schedule the MRI    Action Taken: Message routed to:  [facility] cardio    Travel Screening: Not Applicable                                                                 Thank you!  [facility]

## 2023-05-16 ENCOUNTER — LAB (OUTPATIENT)
Dept: LAB | Facility: CLINIC | Age: 73
End: 2023-05-16
Payer: MEDICARE

## 2023-05-16 ENCOUNTER — MYC MEDICAL ADVICE (OUTPATIENT)
Dept: INTERNAL MEDICINE | Facility: CLINIC | Age: 73
End: 2023-05-16
Payer: MEDICARE

## 2023-05-16 DIAGNOSIS — R35.0 URINARY FREQUENCY: ICD-10-CM

## 2023-05-16 DIAGNOSIS — R35.0 URINARY FREQUENCY: Primary | ICD-10-CM

## 2023-05-16 DIAGNOSIS — N30.01 ACUTE CYSTITIS WITH HEMATURIA: Primary | ICD-10-CM

## 2023-05-16 LAB
ALBUMIN UR-MCNC: 100 MG/DL
APPEARANCE UR: ABNORMAL
BACTERIA #/AREA URNS HPF: ABNORMAL /HPF
BILIRUB UR QL STRIP: NEGATIVE
COLOR UR AUTO: ABNORMAL
ERYTHROCYTE [DISTWIDTH] IN BLOOD BY AUTOMATED COUNT: 13.5 % (ref 10–15)
ERYTHROCYTE [SEDIMENTATION RATE] IN BLOOD BY WESTERGREN METHOD: 10 MM/HR (ref 0–20)
GLUCOSE UR STRIP-MCNC: NEGATIVE MG/DL
HCT VFR BLD AUTO: 42.1 % (ref 40–53)
HGB BLD-MCNC: 14.1 G/DL (ref 13.3–17.7)
HGB UR QL STRIP: ABNORMAL
KETONES UR STRIP-MCNC: NEGATIVE MG/DL
LEUKOCYTE ESTERASE UR QL STRIP: ABNORMAL
MCH RBC QN AUTO: 31.8 PG (ref 26.5–33)
MCHC RBC AUTO-ENTMCNC: 33.5 G/DL (ref 31.5–36.5)
MCV RBC AUTO: 95 FL (ref 78–100)
NITRATE UR QL: NEGATIVE
PH UR STRIP: 5.5 [PH] (ref 5–8)
PLATELET # BLD AUTO: 328 10E3/UL (ref 150–450)
RBC # BLD AUTO: 4.44 10E6/UL (ref 4.4–5.9)
RBC #/AREA URNS AUTO: ABNORMAL /HPF
SP GR UR STRIP: >=1.03 (ref 1–1.03)
SQUAMOUS #/AREA URNS AUTO: ABNORMAL /LPF
UROBILINOGEN UR STRIP-ACNC: 0.2 E.U./DL
WBC # BLD AUTO: 13.4 10E3/UL (ref 4–11)
WBC #/AREA URNS AUTO: ABNORMAL /HPF

## 2023-05-16 PROCEDURE — 87186 SC STD MICRODIL/AGAR DIL: CPT

## 2023-05-16 PROCEDURE — 36415 COLL VENOUS BLD VENIPUNCTURE: CPT

## 2023-05-16 PROCEDURE — 86140 C-REACTIVE PROTEIN: CPT

## 2023-05-16 PROCEDURE — 85027 COMPLETE CBC AUTOMATED: CPT

## 2023-05-16 PROCEDURE — 81001 URINALYSIS AUTO W/SCOPE: CPT

## 2023-05-16 PROCEDURE — 87086 URINE CULTURE/COLONY COUNT: CPT

## 2023-05-16 PROCEDURE — 85652 RBC SED RATE AUTOMATED: CPT

## 2023-05-16 PROCEDURE — 80048 BASIC METABOLIC PNL TOTAL CA: CPT

## 2023-05-16 PROCEDURE — 84153 ASSAY OF PSA TOTAL: CPT

## 2023-05-16 RX ORDER — NITROFURANTOIN 25; 75 MG/1; MG/1
100 CAPSULE ORAL 2 TIMES DAILY
Qty: 14 CAPSULE | Refills: 0 | Status: SHIPPED | OUTPATIENT
Start: 2023-05-16 | End: 2023-05-23

## 2023-05-17 LAB
ANION GAP SERPL CALCULATED.3IONS-SCNC: 11 MMOL/L (ref 7–15)
BUN SERPL-MCNC: 14.6 MG/DL (ref 8–23)
CALCIUM SERPL-MCNC: 9.6 MG/DL (ref 8.8–10.2)
CHLORIDE SERPL-SCNC: 103 MMOL/L (ref 98–107)
CREAT SERPL-MCNC: 0.85 MG/DL (ref 0.67–1.17)
CRP SERPL-MCNC: 8.46 MG/L
DEPRECATED HCO3 PLAS-SCNC: 25 MMOL/L (ref 22–29)
GFR SERPL CREATININE-BSD FRML MDRD: >90 ML/MIN/1.73M2
GLUCOSE SERPL-MCNC: 128 MG/DL (ref 70–99)
POTASSIUM SERPL-SCNC: 4.3 MMOL/L (ref 3.4–5.3)
PSA SERPL DL<=0.01 NG/ML-MCNC: 3.42 NG/ML (ref 0–6.5)
SODIUM SERPL-SCNC: 139 MMOL/L (ref 136–145)

## 2023-05-19 LAB — BACTERIA UR CULT: ABNORMAL

## 2023-05-20 ENCOUNTER — OFFICE VISIT (OUTPATIENT)
Dept: FAMILY MEDICINE | Facility: CLINIC | Age: 73
End: 2023-05-20
Payer: MEDICARE

## 2023-05-20 ENCOUNTER — NURSE TRIAGE (OUTPATIENT)
Dept: NURSING | Facility: CLINIC | Age: 73
End: 2023-05-20
Payer: MEDICARE

## 2023-05-20 ENCOUNTER — MYC MEDICAL ADVICE (OUTPATIENT)
Dept: INTERNAL MEDICINE | Facility: CLINIC | Age: 73
End: 2023-05-20

## 2023-05-20 VITALS
HEART RATE: 97 BPM | DIASTOLIC BLOOD PRESSURE: 83 MMHG | SYSTOLIC BLOOD PRESSURE: 129 MMHG | TEMPERATURE: 97.9 F | RESPIRATION RATE: 16 BRPM | OXYGEN SATURATION: 96 %

## 2023-05-20 DIAGNOSIS — N40.1 BENIGN PROSTATIC HYPERPLASIA WITH URINARY FREQUENCY: ICD-10-CM

## 2023-05-20 DIAGNOSIS — N30.01 ACUTE CYSTITIS WITH HEMATURIA: Primary | ICD-10-CM

## 2023-05-20 DIAGNOSIS — R35.0 BENIGN PROSTATIC HYPERPLASIA WITH URINARY FREQUENCY: ICD-10-CM

## 2023-05-20 DIAGNOSIS — R35.0 URINARY FREQUENCY: Primary | ICD-10-CM

## 2023-05-20 LAB
ALBUMIN UR-MCNC: ABNORMAL MG/DL
APPEARANCE UR: CLEAR
BACTERIA #/AREA URNS HPF: ABNORMAL /HPF
BILIRUB UR QL STRIP: NEGATIVE
COLOR UR AUTO: YELLOW
GLUCOSE UR STRIP-MCNC: NEGATIVE MG/DL
GRAN CASTS #/AREA URNS LPF: ABNORMAL /LPF
HGB UR QL STRIP: NEGATIVE
KETONES UR STRIP-MCNC: NEGATIVE MG/DL
LEUKOCYTE ESTERASE UR QL STRIP: NEGATIVE
MUCOUS THREADS #/AREA URNS LPF: PRESENT /LPF
NITRATE UR QL: NEGATIVE
PH UR STRIP: 5 [PH] (ref 5–8)
RBC #/AREA URNS AUTO: ABNORMAL /HPF
SP GR UR STRIP: 1.02 (ref 1–1.03)
SQUAMOUS #/AREA URNS AUTO: ABNORMAL /LPF
UROBILINOGEN UR STRIP-ACNC: 0.2 E.U./DL
WBC #/AREA URNS AUTO: ABNORMAL /HPF

## 2023-05-20 PROCEDURE — 99214 OFFICE O/P EST MOD 30 MIN: CPT | Performed by: STUDENT IN AN ORGANIZED HEALTH CARE EDUCATION/TRAINING PROGRAM

## 2023-05-20 PROCEDURE — 81001 URINALYSIS AUTO W/SCOPE: CPT | Performed by: STUDENT IN AN ORGANIZED HEALTH CARE EDUCATION/TRAINING PROGRAM

## 2023-05-20 RX ORDER — TAMSULOSIN HYDROCHLORIDE 0.4 MG/1
0.8 CAPSULE ORAL DAILY
Qty: 30 CAPSULE | Refills: 3 | Status: SHIPPED | OUTPATIENT
Start: 2023-05-20 | End: 2023-07-14

## 2023-05-20 RX ORDER — PHENAZOPYRIDINE HYDROCHLORIDE 200 MG/1
200 TABLET, FILM COATED ORAL 3 TIMES DAILY PRN
Qty: 21 TABLET | Refills: 0 | Status: SHIPPED | OUTPATIENT
Start: 2023-05-20 | End: 2023-06-03

## 2023-05-20 RX ORDER — CEFDINIR 300 MG/1
300 CAPSULE ORAL 2 TIMES DAILY
Qty: 14 CAPSULE | Refills: 0 | Status: CANCELLED | OUTPATIENT
Start: 2023-05-20 | End: 2023-05-27

## 2023-05-20 NOTE — PROGRESS NOTES
Patient presents with:  Urinary Problem: Started Macrobid on Tuesday, continues to experience symptoms.      Clinical Decision Making:      ICD-10-CM    1. Urinary frequency  R35.0 UA with Microscopic reflex to Culture - Clinic Collect     UA Microscopic with Reflex to Culture     phenazopyridine (PYRIDIUM) 200 MG tablet      2. Benign prostatic hyperplasia with urinary frequency  N40.1 tamsulosin (FLOMAX) 0.4 MG capsule    R35.0         29-year-old gentleman with symptoms of BPH here for increased urinary frequency in the setting of UTI which is currently being treated with Macrobid.  Hemodynamically stable with no suprapubic or CVA tenderness.  UA with trace protein however negative leuk esterase and nitrat, no pyuria.  History more consistent with BPH symptoms so recommended Pyridium for symptoms of UTI and increasing tamsulosin to 0.8 mg.  Discussed possible side effects such as hypotension and dizziness.  Recommended that he follow-up with his PCP to consider finasteride in addition.    There are no Patient Instructions on file for this visit.    At the end of the encounter, I discussed results, diagnosis, medications. Discussed red flags for immediate return to clinic/ER, as well as indications for follow up if no improvement. Patient understood and agreed to plan.     HPI:  Mike Gonzalez is a 72 year old male who presents today complaining of urinary frequency. Here with his wife.   Patient is currently being treated for E coli UTI with Macrobid 5/16-5/23/23. Currently also on Flomax for BPH sx.   Reports intiially improvement in dysuria after starting meds however now the sx have returned.  Reports increased frequency, no hematuria or back pain.  Denies fevers nausea or vomiting.  Frequent urination with low stream causing significant distress and keeping him up at night.  Currently has a MRI scheduled and worried about this urinary tract infection which would prevent imaging.    History obtained from the  patient.    Problem List:  2023-03: Spinal stenosis at L4-L5 level  2021-11: HTN (hypertension)  2021-08: Radiculopathy, cervical  2021-08: Osteoarthritis of left glenohumeral joint  2021-08: Chronic left-sided low back pain with left-sided sciatica  2021-07: S/P left atrial appendage closure - WATCHMAN  2021-07: Chronic systolic heart failure (H)  2021-07: Longstanding persistent atrial fibrillation (H)  2021-06: Permanent atrial fibrillation (H)  2021-02: Dilated cardiomyopathy (H)  2021-01: Ischemic cardiomyopathy  2019-05: Aftercare following surgery of the musculoskeletal system  2019-04: Atrial flutter, chronic (H)  2019-04: Normal colonoscopy - 2016 - Average risk  2019-04: Obesity (BMI 35.0-39.9) with comorbidity (H)  2019-02: Complete tear of left rotator cuff  2019-02: Concussion syndrome  2017-07: Branch retinal vein occlusion of left eye  2017-05: Full code status  2016-03: Chronic neck pain  2015-04: Essential hypertension  2015-04: GERD (gastroesophageal reflux disease)  2015-04: Hypertension  2011-11: Atrial flutter (H)  Nonsmoker  Basal cell carcinoma  CAD (coronary artery disease), minimal disease on angiogram in 2021  Melanoma (H)      Past Medical History:   Diagnosis Date     Angioedema      Atrial fibrillation (H) 2019     Atrial fibrillation and flutter (H)      Atrial flutter (H) 2019     Basal cell carcinoma      Branch retinal vein occlusion of left eye 07/01/2017     CAD (coronary artery disease)     ANGIOGRAM 2021 Left Main The vessel was visualized by angiography, is moderate in size and is angiographically normal. Left Anterior Descending Mid LAD lesion is 25% stenosed. Ramus Intermedius Ramus lesion is 10% stenosed. Left Circumflex Dist Cx lesion is 30% stenosed. Right Coronary Artery The vessel was visualized by angiography, is moderate in size and is angiographically normal.       Chronic neck pain      Congestive heart failure (H) 2021     GERD (gastroesophageal reflux disease)       HTN (hypertension) 2015     Hx of atrial flutter      Irregular heart beat      Nonsmoker      Radiculopathy     cervical     Retinal hemorrhage     Vessel rupture in left retina     Rhinitis, allergic      S/P colonoscopy 04/15/2019     Urticaria        Social History     Tobacco Use     Smoking status: Never     Smokeless tobacco: Never   Vaping Use     Vaping status: Not on file   Substance Use Topics     Alcohol use: Never     Comment: Alcoholic Drinks/day: About once a year.       Review of Systems    Vitals:    05/20/23 0910   BP: 129/83   Pulse: 97   Resp: 16   Temp: 97.9  F (36.6  C)   TempSrc: Oral   SpO2: 96%       Physical Exam  Constitutional:       Appearance: Normal appearance.   Eyes:      General:         Right eye: No discharge.         Left eye: No discharge.      Extraocular Movements: Extraocular movements intact.      Conjunctiva/sclera: Conjunctivae normal.   Cardiovascular:      Rate and Rhythm: Normal rate.   Pulmonary:      Effort: Pulmonary effort is normal.   Abdominal:      Palpations: Abdomen is soft.      Tenderness: There is no abdominal tenderness. There is no right CVA tenderness or left CVA tenderness.   Skin:     Capillary Refill: Capillary refill takes less than 2 seconds.   Neurological:      Mental Status: He is alert.   Psychiatric:         Mood and Affect: Mood normal.         Labs:  Results for orders placed or performed in visit on 05/20/23   UA with Microscopic reflex to Culture - Clinic Collect     Status: Abnormal    Specimen: Urine, Clean Catch   Result Value Ref Range    Color Urine Yellow Colorless, Straw, Light Yellow, Yellow    Appearance Urine Clear Clear    Glucose Urine Negative Negative mg/dL    Bilirubin Urine Negative Negative    Ketones Urine Negative Negative mg/dL    Specific Gravity Urine 1.020 1.005 - 1.030    Blood Urine Negative Negative    pH Urine 5.0 5.0 - 8.0    Protein Albumin Urine Trace (A) Negative mg/dL    Urobilinogen Urine 0.2 0.2, 1.0 E.U./dL     Nitrite Urine Negative Negative    Leukocyte Esterase Urine Negative Negative   UA Microscopic with Reflex to Culture     Status: Abnormal   Result Value Ref Range    Bacteria Urine None Seen None Seen /HPF    RBC Urine 0-2 0-2 /HPF /HPF    WBC Urine 0-5 0-5 /HPF /HPF    Squamous Epithelials Urine Few (A) None Seen /LPF    Mucus Urine Present (A) None Seen /LPF    Granular Casts Urine 0-2 (A) None Seen /LPF    Narrative    Urine Culture not indicated         Teodora Valle MD

## 2023-05-20 NOTE — TELEPHONE ENCOUNTER
Triage Call:     Pt calling to report that he is being treated for UTI since 516/2023 ane he felt like his sx were improving, but now he has the following sx:     Reduced flow 50-100mL  Getting up frequently at night and also urination is now more frequent during the day as well    No fever  No new onset back pain    Disposition: See PCP within 24 hours. Pt was given care advice and reports that he is going to go to the [facility] this morning for evaluation again.   Reason for Disposition    [1] Taking antibiotic > 72 hours (3 days) for UTI AND [2] painful urination or frequency not improved    Additional Information    Negative: Shock suspected (e.g., cold/pale/clammy skin, too weak to stand, low BP, rapid pulse)    Negative: Sounds like a life-threatening emergency to the triager    Negative: Urinary tract infection suspected, but not taking antibiotics    Negative: [1] Unable to urinate (or only a few drops) > 4 hours AND [2] bladder feels very full (e.g., palpable bladder or strong urge to urinate)    Negative: Passing pure blood or large blood clots (i.e., size > a dime)  (Exceptions: flecks, small strands, or pinkish-red color)    Negative: Fever > 103 F (39.4 C)    Negative: Patient sounds very sick or weak to the triager    Negative: [1] SEVERE pain (e.g., excruciating) AND [2] no improvement 2 hours after pain medications    Negative: [1] Fever > 100.0 F (37.8 C) AND [2] new-onset since starting antibiotics    Negative: [1] Side (flank) or lower back pain AND [2] new-onset since starting antibiotics    Negative: [1] Taking antibiotic > 24 hours for UTI AND [2] flank or lower back pain worsening    Negative: [1] Vomiting 2 or more times AND [2] interferes with taking oral antibiotic    Negative: [1] Taking antibiotic > 24 hours for UTI AND [2] fever persists    Protocols used: URINARY TRACT INFECTION ON ANTIBIOTIC FOLLOW-UP CALL - DAYANA Lynn RN  [facility] Nurse Advisor 7:39 AM  5/20/2023

## 2023-05-22 RX ORDER — CIPROFLOXACIN 500 MG/1
500 TABLET, FILM COATED ORAL 2 TIMES DAILY
Qty: 14 TABLET | Refills: 0 | Status: SHIPPED | OUTPATIENT
Start: 2023-05-22 | End: 2023-05-26

## 2023-05-23 ENCOUNTER — TRANSFERRED RECORDS (OUTPATIENT)
Dept: HEALTH INFORMATION MANAGEMENT | Facility: CLINIC | Age: 73
End: 2023-05-23

## 2023-05-25 ENCOUNTER — TRANSFERRED RECORDS (OUTPATIENT)
Dept: HEALTH INFORMATION MANAGEMENT | Facility: CLINIC | Age: 73
End: 2023-05-25
Payer: MEDICARE

## 2023-05-26 ENCOUNTER — OFFICE VISIT (OUTPATIENT)
Dept: INTERNAL MEDICINE | Facility: CLINIC | Age: 73
End: 2023-05-26
Payer: MEDICARE

## 2023-05-26 VITALS
OXYGEN SATURATION: 98 % | BODY MASS INDEX: 32.37 KG/M2 | HEART RATE: 60 BPM | HEIGHT: 72 IN | TEMPERATURE: 97.6 F | SYSTOLIC BLOOD PRESSURE: 134 MMHG | RESPIRATION RATE: 16 BRPM | DIASTOLIC BLOOD PRESSURE: 68 MMHG | WEIGHT: 239 LBS

## 2023-05-26 DIAGNOSIS — R51.9 VERTEX HEADACHE: ICD-10-CM

## 2023-05-26 DIAGNOSIS — R25.1 TREMOR OF LEFT HAND: ICD-10-CM

## 2023-05-26 DIAGNOSIS — R51.9 NONINTRACTABLE HEADACHE, UNSPECIFIED CHRONICITY PATTERN, UNSPECIFIED HEADACHE TYPE: ICD-10-CM

## 2023-05-26 DIAGNOSIS — G43.109 OCULAR MIGRAINE: ICD-10-CM

## 2023-05-26 DIAGNOSIS — M54.2 NECK PAIN: ICD-10-CM

## 2023-05-26 DIAGNOSIS — N30.01 ACUTE CYSTITIS WITH HEMATURIA: Primary | ICD-10-CM

## 2023-05-26 DIAGNOSIS — N40.1 BENIGN PROSTATIC HYPERPLASIA WITH URINARY FREQUENCY: ICD-10-CM

## 2023-05-26 DIAGNOSIS — Z98.1 S/P LUMBAR SPINAL FUSION: ICD-10-CM

## 2023-05-26 DIAGNOSIS — R35.0 BENIGN PROSTATIC HYPERPLASIA WITH URINARY FREQUENCY: ICD-10-CM

## 2023-05-26 PROCEDURE — 99215 OFFICE O/P EST HI 40 MIN: CPT | Performed by: INTERNAL MEDICINE

## 2023-05-26 RX ORDER — CIPROFLOXACIN 500 MG/1
500 TABLET, FILM COATED ORAL 2 TIMES DAILY
Qty: 28 TABLET | Refills: 0 | Status: SHIPPED | OUTPATIENT
Start: 2023-05-26 | End: 2023-06-09

## 2023-05-26 ASSESSMENT — PAIN SCALES - GENERAL: PAINLEVEL: MILD PAIN (2)

## 2023-05-26 NOTE — PROGRESS NOTES
[facility] - Primary Care Specialists    Comprehensive and complex medical care - Chronic disease management - Shared decision making - Care coordination - Compassionate care    Patient advocacy - Rational deprescribing - Minimally disruptive medicine - Ethical focus - Customized care         Date of Service: 5/26/2023  Primary Provider: Onur Rod    Patient Care Team:  Onur Rod MD as PCP - General  Onur Rod MD as Assigned PCP  Ambrose Degroot MD as MD (Orthopaedic Surgery)  Stuart Jackson MD as MD (Orthopaedic Surgery)  Liborio Champagne MD as MD  [facility]  Gregg Curtis as Resident (Orthopaedic Surgery)  Kiran Tyler MD as Assigned Heart and Vascular Provider          Patient's Pharmacy:    [id number] - [place], [place] - [address]  [address] [facility] [address]  Phone: [phone] Fax: [phone]    [facility] [place] [email or web address]  [place] [address]  Phone: [phone] Fax: [phone]     Patient's Contacts:  Name Home Phone Work Phone Mobile Phone Relationship Lgl Grd   KYLER CISNEROS [phone] Spouse    MATILDE SILVER   [phone] Other      Patient's Insurance:    Payor: MEDICARE / Plan: MEDICARE / Product Type: Medicare /            Active Problem List:  Problem List as of 5/26/2023 Reviewed: 2/24/2023  3:03 PM by Onur Rod MD       High    Nonsmoker    Full code status    CAD (coronary artery disease), minimal disease on angiogram in 2021    Atrial flutter, chronic (H)       Medium    S/P left atrial appendage closure - WATCHMAN    HTN (hypertension)       Low    GERD (gastroesophageal reflux disease)    Branch retinal vein occlusion of left eye    Normal colonoscopy - 2016 - Average risk    Complete tear of left rotator cuff    Concussion syndrome       Other    Dilated  cardiomyopathy (H)       Other    Chronic neck pain    Chronic systolic heart failure (H)    Chronic left-sided low back pain with left-sided sciatica    Spinal stenosis at L4-L5 level        Current Outpatient Medications   Medication Instructions     acetaminophen (TYLENOL) 650 mg, Oral, EVERY 8 HOURS PRN     ciprofloxacin (CIPRO) 500 mg, Oral, 2 TIMES DAILY     co-enzyme Q-10 200 mg, Oral, DAILY     fish oil-omega-3 fatty acids 2 g, DAILY     fluticasone (FLONASE) 50 MCG/ACT nasal spray 1 spray, Both Nostrils, DAILY     hydrOXYzine (ATARAX) 10 mg, Oral, EVERY 6 HOURS PRN     lisinopril (ZESTRIL) 5 MG tablet TAKE 1 TABLET DAILY     Magnesium Oxide POWD 1 Dose, Oral, DAILY     metoprolol succinate ER (TOPROL XL) 25 mg, Oral, DAILY     MULTIVITAMIN ORAL 1 tablet, Oral, DAILY     phenazopyridine (PYRIDIUM) 200 mg, Oral, 3 TIMES DAILY PRN     senna-docusate (SENOKOT-S/PERICOLACE) 8.6-50 MG tablet 1-2 tablets, Oral, 2 TIMES DAILY, Take while on oral narcotics to prevent or treat constipation.     tamsulosin (FLOMAX) 0.8 mg, Oral, DAILY      Social History     Social History Narrative    Patient of Dr. Rod since 2001.    .  Wife is a former RN.     Wife's cousin is Dr. Jean Pierre Champagne, ID specialist.       Subjective:     Mike Gonzalez is a 72 year old male who comes in today for:    Chief Complaint   Patient presents with     Follow Up     Patient stated the UTI has gotten better. He is only waking up 2-3 times at night to use the restroom. Headaches hasn't change. Tylenol helps a little with pain.           5/26/2023    10:19 AM   Additional Questions   Roomed by Naveed MCALLISTER MA     In for follow up multiple issues.    First reviewed his urinary tract infection (UTI) and benign prostatic hypertrophy (BPH) symptoms.    Benign prostatic hypertrophy (BPH) symptoms seem better on 0.8 mg of the tamsulosin (Flomax).      We have also treated him for urinary tract infection (UTI) and the symptoms from this have also  improved.  We gave him another round of antibiotics in case this comes back especially when I am on vacation.  No new symptoms.    Had a MRI scan of neck and lumbar spine ad awaiting the results.    Still having an upper headache on the top of his head.  Mainly when first getting up.  Has been going on since his surgery.  No imaging of the brain done to date and we discussed this with him and he does wish to hold on this at this time.  Movement can make the headache worse.  He is being checked out for possible epidural leak.  Has already had a blood patch done previous.    Has history of visual migraines as well and has had more visual symptoms and sensitivity to light since the surgery.    Has had some left hand tremor as well.    We reviewed his other issues noted in the assessment but not specifically addressed in the HPI above.     Objective:     Wt Readings from Last 3 Encounters:   05/26/23 108.4 kg (239 lb)   05/08/23 108 kg (238 lb)   04/02/23 111.9 kg (246 lb 11.2 oz)     BP Readings from Last 3 Encounters:   05/26/23 134/68   05/20/23 129/83   05/08/23 137/84     /68 (BP Location: Right arm, Patient Position: Sitting, Cuff Size: Adult Regular)   Pulse 60   Temp 97.6  F (36.4  C) (Oral)   Resp 16   Ht 1.829 m (6')   Wt 108.4 kg (239 lb)   SpO2 98%   BMI 32.41 kg/m     The patient is comfortable, no acute distress.  Mood good.  Insight good.  Eyes are nonicteric.  Neck is supple without mass.  No cervical adenopathy.  No thyromegaly. Heart regular rate and rhythm.  Lungs clear to auscultation bilaterally.  Respiratory effort is good.   Extremities no edema.  Left hand tremor consistent with essential tremor.    Diagnostics:     Office Visit on 05/20/2023   Component Date Value Ref Range Status     Color Urine 05/20/2023 Yellow  Colorless, Straw, Light Yellow, Yellow Final     Appearance Urine 05/20/2023 Clear  Clear Final     Glucose Urine 05/20/2023 Negative  Negative mg/dL Final     Bilirubin  Urine 05/20/2023 Negative  Negative Final     Ketones Urine 05/20/2023 Negative  Negative mg/dL Final     Specific Gravity Urine 05/20/2023 1.020  1.005 - 1.030 Final     Blood Urine 05/20/2023 Negative  Negative Final     pH Urine 05/20/2023 5.0  5.0 - 8.0 Final     Protein Albumin Urine 05/20/2023 Trace (A)  Negative mg/dL Final     Urobilinogen Urine 05/20/2023 0.2  0.2, 1.0 E.U./dL Final     Nitrite Urine 05/20/2023 Negative  Negative Final     Leukocyte Esterase Urine 05/20/2023 Negative  Negative Final     Bacteria Urine 05/20/2023 None Seen  None Seen /HPF Final     RBC Urine 05/20/2023 0-2  0-2 /HPF /HPF Final     WBC Urine 05/20/2023 0-5  0-5 /HPF /HPF Final     Squamous Epithelials Urine 05/20/2023 Few (A)  None Seen /LPF Final     Mucus Urine 05/20/2023 Present (A)  None Seen /LPF Final     Granular Casts Urine 05/20/2023 0-2 (A)  None Seen /LPF Final       No results found for any visits on 05/26/23.     Assessment:     1. Acute cystitis with hematuria    2. Ocular migraine    3. Vertex headache    4. Neck pain    5. S/P lumbar spinal fusion    6. Nonintractable headache, unspecified chronicity pattern, unspecified headache type    7. Tremor of left hand    8. Benign prostatic hyperplasia with urinary frequency        Plan:     1. Ciprofloxacin to have on hand if issues with recurrent urinary tract infection (UTI).  2. Continue tamsulosin (Flomax) at this time at 0.8 mg.  3. Consider MRI scan of the brain and/or other prophylactic treatments for migraine headache if needed.  4. Follow up spine specialists.  5. Continue current medications otherwise.  6. Follow up sooner if issues.    No orders of the defined types were placed in this encounter.       40 minutes or greater was spent today on the patient's care on the day of service.      This includes time for chart preparation, reviewing medical tests done before or during the visit, talking with the patient, review of quality indicators, required  documentation, and other elements of care.        Onur Rod MD  General Internal Medicine  [facility]    Return in about 27 weeks (around 12/1/2023) for annual wellness visit, visit and blood work.     No future appointments.

## 2023-05-30 ENCOUNTER — MYC MEDICAL ADVICE (OUTPATIENT)
Dept: INTERNAL MEDICINE | Facility: CLINIC | Age: 73
End: 2023-05-30
Payer: MEDICARE

## 2023-05-30 ENCOUNTER — TRANSFERRED RECORDS (OUTPATIENT)
Dept: HEALTH INFORMATION MANAGEMENT | Facility: CLINIC | Age: 73
End: 2023-05-30
Payer: MEDICARE

## 2023-05-30 NOTE — PATIENT INSTRUCTIONS
Please follow up if you have any further issues.    You may contact me by phone or MyChart if you are worsening or if things are not improving.    ______________________________________________________________________     You can call [phone] during weekday hours to get a hold of our team coordinators if you need to get a message to me.    There are 3 people in our building taking phone calls for me.  Be sure to listen to the prompts to get a hold of them.    You first press 1 for English (press another number for a different language) and then press 2 to get the .    They can then take your message and forward it onto me.    ______________________________________________________________________     Please remember that you can call [phone] ANYTIME to schedule an appointment.     You can schedule appointments 24 hours a day, 7 days a week.      Sometimes the best time to schedule an appointment is after clinic hours when less people are calling in.      Weekends are another option for calling in to schedule appointments.     ______________________________________________________________________   Preventative Measures:  Health Maintenance   Topic Date Due    ANNUAL REVIEW OF HM ORDERS  Never done    DTAP/TDAP/TD IMMUNIZATION (5 - Td or Tdap) 11/01/2021    MEDICARE ANNUAL WELLNESS VISIT  11/30/2021    COVID-19 Vaccine (7 - Moderna series) 01/26/2023    LIPID  09/08/2023    TSH W/FREE T4 REFLEX  09/08/2023    BMP  05/16/2024    CBC  05/16/2024    FALL RISK ASSESSMENT  05/26/2024    HF ACTION PLAN  11/16/2024    ADVANCE CARE PLANNING  11/30/2025    COLORECTAL CANCER SCREENING  04/15/2029    PHQ-2 (once per calendar year)  Completed    INFLUENZA VACCINE  Completed    ZOSTER IMMUNIZATION  Completed    AORTIC ANEURYSM SCREENING (SYSTEM ASSIGNED)  Completed    IPV IMMUNIZATION  Aged Out    MENINGITIS IMMUNIZATION  Aged Out    ALT  Discontinued    HEPATITIS C SCREENING  Discontinued    Pneumococcal  Vaccine: 65+ Years  Discontinued

## 2023-06-09 ENCOUNTER — MYC MEDICAL ADVICE (OUTPATIENT)
Dept: INTERNAL MEDICINE | Facility: CLINIC | Age: 73
End: 2023-06-09
Payer: MEDICARE

## 2023-06-12 NOTE — TELEPHONE ENCOUNTER
Please use a reserved slot to schedule the patient for a follow up.    Onur Rod MD  General Internal Medicine  [facility]  6/12/2023, 4:32 PM

## 2023-06-21 ENCOUNTER — TRANSFERRED RECORDS (OUTPATIENT)
Dept: HEALTH INFORMATION MANAGEMENT | Facility: CLINIC | Age: 73
End: 2023-06-21
Payer: MEDICARE

## 2023-06-22 ENCOUNTER — OFFICE VISIT (OUTPATIENT)
Dept: INTERNAL MEDICINE | Facility: CLINIC | Age: 73
End: 2023-06-22
Payer: MEDICARE

## 2023-06-22 VITALS
DIASTOLIC BLOOD PRESSURE: 64 MMHG | TEMPERATURE: 98.2 F | SYSTOLIC BLOOD PRESSURE: 118 MMHG | WEIGHT: 240 LBS | HEIGHT: 72 IN | HEART RATE: 73 BPM | OXYGEN SATURATION: 96 % | RESPIRATION RATE: 20 BRPM | BODY MASS INDEX: 32.51 KG/M2

## 2023-06-22 DIAGNOSIS — M25.552 BILATERAL HIP PAIN: ICD-10-CM

## 2023-06-22 DIAGNOSIS — R51.9 VERTEX HEADACHE: ICD-10-CM

## 2023-06-22 DIAGNOSIS — M54.50 LUMBAR SPINE PAIN: ICD-10-CM

## 2023-06-22 DIAGNOSIS — M54.2 CHRONIC NECK PAIN: Primary | ICD-10-CM

## 2023-06-22 DIAGNOSIS — G89.29 CHRONIC NECK PAIN: Primary | ICD-10-CM

## 2023-06-22 DIAGNOSIS — M25.551 BILATERAL HIP PAIN: ICD-10-CM

## 2023-06-22 LAB
ALBUMIN SERPL BCG-MCNC: 4.6 G/DL (ref 3.5–5.2)
ALP SERPL-CCNC: 86 U/L (ref 40–129)
ALT SERPL W P-5'-P-CCNC: 15 U/L (ref 0–70)
ANION GAP SERPL CALCULATED.3IONS-SCNC: 12 MMOL/L (ref 7–15)
AST SERPL W P-5'-P-CCNC: 18 U/L (ref 0–45)
BASOPHILS # BLD AUTO: 0 10E3/UL (ref 0–0.2)
BASOPHILS NFR BLD AUTO: 1 %
BILIRUB SERPL-MCNC: 0.3 MG/DL
BUN SERPL-MCNC: 18.6 MG/DL (ref 8–23)
CALCIUM SERPL-MCNC: 9.4 MG/DL (ref 8.8–10.2)
CHLORIDE SERPL-SCNC: 101 MMOL/L (ref 98–107)
CREAT SERPL-MCNC: 0.95 MG/DL (ref 0.67–1.17)
CRP SERPL-MCNC: 5.27 MG/L
DEPRECATED HCO3 PLAS-SCNC: 26 MMOL/L (ref 22–29)
EOSINOPHIL # BLD AUTO: 0.1 10E3/UL (ref 0–0.7)
EOSINOPHIL NFR BLD AUTO: 2 %
ERYTHROCYTE [DISTWIDTH] IN BLOOD BY AUTOMATED COUNT: 13.4 % (ref 10–15)
ERYTHROCYTE [SEDIMENTATION RATE] IN BLOOD BY WESTERGREN METHOD: 15 MM/HR (ref 0–20)
GFR SERPL CREATININE-BSD FRML MDRD: 85 ML/MIN/1.73M2
GLUCOSE SERPL-MCNC: 94 MG/DL (ref 70–99)
HCT VFR BLD AUTO: 42.7 % (ref 40–53)
HGB BLD-MCNC: 14.6 G/DL (ref 13.3–17.7)
IMM GRANULOCYTES # BLD: 0 10E3/UL
IMM GRANULOCYTES NFR BLD: 0 %
LYMPHOCYTES # BLD AUTO: 2.1 10E3/UL (ref 0.8–5.3)
LYMPHOCYTES NFR BLD AUTO: 28 %
MCH RBC QN AUTO: 31.6 PG (ref 26.5–33)
MCHC RBC AUTO-ENTMCNC: 34.2 G/DL (ref 31.5–36.5)
MCV RBC AUTO: 92 FL (ref 78–100)
MONOCYTES # BLD AUTO: 0.8 10E3/UL (ref 0–1.3)
MONOCYTES NFR BLD AUTO: 11 %
NEUTROPHILS # BLD AUTO: 4.3 10E3/UL (ref 1.6–8.3)
NEUTROPHILS NFR BLD AUTO: 58 %
PLATELET # BLD AUTO: 270 10E3/UL (ref 150–450)
POTASSIUM SERPL-SCNC: 4.4 MMOL/L (ref 3.4–5.3)
PROT SERPL-MCNC: 7.5 G/DL (ref 6.4–8.3)
RBC # BLD AUTO: 4.62 10E6/UL (ref 4.4–5.9)
SODIUM SERPL-SCNC: 139 MMOL/L (ref 136–145)
WBC # BLD AUTO: 7.4 10E3/UL (ref 4–11)

## 2023-06-22 PROCEDURE — 80053 COMPREHEN METABOLIC PANEL: CPT | Performed by: INTERNAL MEDICINE

## 2023-06-22 PROCEDURE — 86140 C-REACTIVE PROTEIN: CPT | Performed by: INTERNAL MEDICINE

## 2023-06-22 PROCEDURE — 36415 COLL VENOUS BLD VENIPUNCTURE: CPT | Performed by: INTERNAL MEDICINE

## 2023-06-22 PROCEDURE — 85025 COMPLETE CBC W/AUTO DIFF WBC: CPT | Performed by: INTERNAL MEDICINE

## 2023-06-22 PROCEDURE — 85652 RBC SED RATE AUTOMATED: CPT | Performed by: INTERNAL MEDICINE

## 2023-06-22 PROCEDURE — 99215 OFFICE O/P EST HI 40 MIN: CPT | Performed by: INTERNAL MEDICINE

## 2023-06-22 ASSESSMENT — PAIN SCALES - GENERAL: PAINLEVEL: MODERATE PAIN (4)

## 2023-06-22 NOTE — PROGRESS NOTES
[facility] - Primary Care Specialists    Comprehensive and complex medical care - Chronic disease management - Shared decision making - Care coordination - Compassionate care    Patient advocacy - Rational deprescribing - Minimally disruptive medicine - Ethical focus - Customized care         Date of Service: 6/22/2023  Primary Provider: Onur Rod    Patient Care Team:  Onur Rod MD as PCP - General  Onur Rod MD as Assigned PCP  Ambrose Degroot MD as MD (Orthopaedic Surgery)  Stuart Jackson MD as MD (Orthopaedic Surgery)  Liborio Champagne MD as MD  [facility]  Gregg Curtis as Resident (Orthopaedic Surgery)  Kiran Tyler MD as Assigned Heart and Vascular Provider          Patient's Pharmacy:    [id number] - [place], [place] - [address]  [address] [facility] [address]  Phone: [phone] Fax: [phone]    [facility] [place] - [facility] AT Portal to [facility]  [place] [address]  Phone: [phone] Fax: [phone]     Patient's Contacts:  Name Home Phone Work Phone Mobile Phone Relationship Lgl Grd   KYLER CISNEROS [phone] Spouse    MATILDE SILVER   [phone] Other      Patient's Insurance:    Payor: MEDICARE / Plan: MEDICARE / Product Type: Medicare /            Active Problem List:  Problem List as of 6/22/2023 Reviewed: 5/31/2023  7:55 PM by Onur Rod MD       High    Nonsmoker    Full code status    CAD (coronary artery disease), minimal disease on angiogram in 2021    Atrial flutter, chronic (H)       Medium    S/P left atrial appendage closure - WATCHMAN    HTN (hypertension)       Low    GERD (gastroesophageal reflux disease)    Branch retinal vein occlusion of left eye    Normal colonoscopy - 2016 - Average risk    Complete tear of left rotator cuff    Concussion syndrome       Other    Dilated  cardiomyopathy (H)       Other    Chronic neck pain    Chronic systolic heart failure (H)    Chronic left-sided low back pain with left-sided sciatica    Spinal stenosis at L4-L5 level        Current Outpatient Medications   Medication Instructions     acetaminophen (TYLENOL) 650 mg, Oral, EVERY 8 HOURS PRN     co-enzyme Q-10 200 mg, Oral, DAILY     fish oil-omega-3 fatty acids 2 g, DAILY     fluticasone (FLONASE) 50 MCG/ACT nasal spray 1 spray, Both Nostrils, DAILY     hydrOXYzine (ATARAX) 10 mg, Oral, EVERY 6 HOURS PRN     lisinopril (ZESTRIL) 5 MG tablet TAKE 1 TABLET DAILY     Magnesium Oxide POWD 1 Dose, Oral, DAILY     metoprolol succinate ER (TOPROL XL) 25 mg, Oral, DAILY     MULTIVITAMIN ORAL 1 tablet, Oral, DAILY     senna-docusate (SENOKOT-S/PERICOLACE) 8.6-50 MG tablet 1-2 tablets, Oral, 2 TIMES DAILY, Take while on oral narcotics to prevent or treat constipation.     tamsulosin (FLOMAX) 0.8 mg, Oral, DAILY      Social History     Social History Narrative    Patient of Dr. Rod since 2001.    .  Wife is a former RN.     Wife's cousin is Dr. Jean Pierre Champagne, ID specialist.       Subjective:     Mike Gonzalez is a 72 year old male who comes in today for:    Chief Complaint   Patient presents with     Headache     Back Pain          6/22/2023     1:11 PM   Additional Questions   Roomed by KASSANDRA Bills   Accompanied by Wife     Patient comes in today for follow-up of a number of issues.    We reviewed his neck pain and his back pain and his headaches.    In relationship to his back, he has soreness still in the mid back.  It is worse with prolonged standing.  He is 11 weeks after surgery.  Sneezing can make it worse.  There is no leg pain associated with this.  He also has soreness in his hips and has had trochanteric bursitis in the past.  He notes that his toes feel cold.  He recently saw Dr. House and [facility] orthopedics.  He felt that he should go on a course of gabapentin and  methylprednisolone for his symptoms.  He cannot exclude an underlying spinal leak.  He might end up having a MRI with contrast in the spine to evaluate this further.    We reviewed his walking.  He cannot walk for more than 500 feet without stopping.  This is due to the hips, back, and the balance.  His balance is not doing great.    His neck continues to be a problem for him as well.  Is worse since his surgery.  We reviewed his MRI of his neck.  He has to sleep straight as possible with this.  The pain wakes him up at night.    His left shoulder pain which she has had for a while continues to bother him.    He has headaches still only when he is up.  Its not there when he lays down.  He does have the neck pain associated with it.  The headaches are mostly on the top of the head.  He has been affected as far as his concentration goes.  He does not drive his usual amount due to the headaches.    He is not always sleeping so great.  His urinary infection symptoms are better at this point.    We reviewed his other issues noted in the assessment but not specifically addressed in the HPI above.     Objective:     Wt Readings from Last 3 Encounters:   06/22/23 108.9 kg (240 lb)   05/26/23 108.4 kg (239 lb)   05/08/23 108 kg (238 lb)     BP Readings from Last 3 Encounters:   06/22/23 118/64   05/26/23 134/68   05/20/23 129/83     /64 (BP Location: Right arm, Patient Position: Sitting, Cuff Size: Adult Regular)   Pulse 73   Temp 98.2  F (36.8  C) (Oral)   Resp 20   Ht 1.829 m (6')   Wt 108.9 kg (240 lb)   SpO2 96%   BMI 32.55 kg/m     The patient is comfortable, no acute distress.  He is tired.  Mood good.  Insight fair to good.  Eyes are nonicteric.  Neck is supple without mass.  No cervical adenopathy.  No thyromegaly. Heart regular rate and rhythm.  Lungs clear to auscultation bilaterally.  Respiratory effort is good.  Extremities no edema.  No active synovitis.  He moves slowly.      Diagnostics:      Office Visit on 05/20/2023   Component Date Value Ref Range Status     Color Urine 05/20/2023 Yellow  Colorless, Straw, Light Yellow, Yellow Final     Appearance Urine 05/20/2023 Clear  Clear Final     Glucose Urine 05/20/2023 Negative  Negative mg/dL Final     Bilirubin Urine 05/20/2023 Negative  Negative Final     Ketones Urine 05/20/2023 Negative  Negative mg/dL Final     Specific Gravity Urine 05/20/2023 1.020  1.005 - 1.030 Final     Blood Urine 05/20/2023 Negative  Negative Final     pH Urine 05/20/2023 5.0  5.0 - 8.0 Final     Protein Albumin Urine 05/20/2023 Trace (A)  Negative mg/dL Final     Urobilinogen Urine 05/20/2023 0.2  0.2, 1.0 E.U./dL Final     Nitrite Urine 05/20/2023 Negative  Negative Final     Leukocyte Esterase Urine 05/20/2023 Negative  Negative Final     Bacteria Urine 05/20/2023 None Seen  None Seen /HPF Final     RBC Urine 05/20/2023 0-2  0-2 /HPF /HPF Final     WBC Urine 05/20/2023 0-5  0-5 /HPF /HPF Final     Squamous Epithelials Urine 05/20/2023 Few (A)  None Seen /LPF Final     Mucus Urine 05/20/2023 Present (A)  None Seen /LPF Final     Granular Casts Urine 05/20/2023 0-2 (A)  None Seen /LPF Final       Results for orders placed or performed in visit on 06/22/23   CRP inflammation     Status: Abnormal   Result Value Ref Range    CRP Inflammation 5.27 (H) <5.00 mg/L   Comprehensive metabolic panel     Status: Normal   Result Value Ref Range    Sodium 139 136 - 145 mmol/L    Potassium 4.4 3.4 - 5.3 mmol/L    Chloride 101 98 - 107 mmol/L    Carbon Dioxide (CO2) 26 22 - 29 mmol/L    Anion Gap 12 7 - 15 mmol/L    Urea Nitrogen 18.6 8.0 - 23.0 mg/dL    Creatinine 0.95 0.67 - 1.17 mg/dL    Calcium 9.4 8.8 - 10.2 mg/dL    Glucose 94 70 - 99 mg/dL    Alkaline Phosphatase 86 40 - 129 U/L    AST 18 0 - 45 U/L    ALT 15 0 - 70 U/L    Protein Total 7.5 6.4 - 8.3 g/dL    Albumin 4.6 3.5 - 5.2 g/dL    Bilirubin Total 0.3 <=1.2 mg/dL    GFR Estimate 85 >60 mL/min/1.73m2   Erythrocyte  sedimentation rate auto     Status: Normal   Result Value Ref Range    Erythrocyte Sedimentation Rate 15 0 - 20 mm/hr   CBC with platelets and differential     Status: None   Result Value Ref Range    WBC Count 7.4 4.0 - 11.0 10e3/uL    RBC Count 4.62 4.40 - 5.90 10e6/uL    Hemoglobin 14.6 13.3 - 17.7 g/dL    Hematocrit 42.7 40.0 - 53.0 %    MCV 92 78 - 100 fL    MCH 31.6 26.5 - 33.0 pg    MCHC 34.2 31.5 - 36.5 g/dL    RDW 13.4 10.0 - 15.0 %    Platelet Count 270 150 - 450 10e3/uL    % Neutrophils 58 %    % Lymphocytes 28 %    % Monocytes 11 %    % Eosinophils 2 %    % Basophils 1 %    % Immature Granulocytes 0 %    Absolute Neutrophils 4.3 1.6 - 8.3 10e3/uL    Absolute Lymphocytes 2.1 0.8 - 5.3 10e3/uL    Absolute Monocytes 0.8 0.0 - 1.3 10e3/uL    Absolute Eosinophils 0.1 0.0 - 0.7 10e3/uL    Absolute Basophils 0.0 0.0 - 0.2 10e3/uL    Absolute Immature Granulocytes 0.0 <=0.4 10e3/uL   CBC with Platelets & Differential     Status: None    Narrative    The following orders were created for panel order CBC with Platelets & Differential.  Procedure                               Abnormality         Status                     ---------                               -----------         ------                     CBC with platelets and d...[[id number]]                      Final result                 Please view results for these tests on the individual orders.        Assessment:     1. Chronic neck pain    2. Lumbar spine pain    3. Vertex headache    4. Bilateral hip pain        Plan:     1. Blood work was done today.  2. He is going to try the methylprednisolone and the gabapentin to see how this goes for him.  3. He will follow-up with me and with Dr. House related to this.  4. He may need further diagnostic testing for the possible spinal fluid leak.  5. Very slow recovery overall.  6. Continue current medications otherwise.  7. Follow up sooner if issues.    Orders Placed This Encounter   Procedures      Erythrocyte sedimentation rate auto     Comprehensive metabolic panel     CRP inflammation     CBC with platelets and differential     CBC with Platelets & Differential        40 minutes or greater was spent today on the patient's care on the day of service.      This includes time for chart preparation, reviewing medical tests done before or during the visit, talking with the patient, review of quality indicators, required documentation, and other elements of care.        Onur Rod MD  General Internal Medicine  [facility]    Return in about 3 months (around 9/22/2023) for follow up visit.     No future appointments.

## 2023-06-29 NOTE — PATIENT INSTRUCTIONS
Please follow up if you have any further issues.    You may contact me by phone or MyChart if you are worsening or if things are not improving.    ______________________________________________________________________     You can call [phone] during weekday hours to get a hold of our team coordinators if you need to get a message to me.    There are 3 people in our building taking phone calls for me.  Be sure to listen to the prompts to get a hold of them.    You first press 1 for English (press another number for a different language) and then press 2 to get the .    They can then take your message and forward it onto me.    ______________________________________________________________________     Please remember that you can call [phone] ANYTIME to schedule an appointment.     You can schedule appointments 24 hours a day, 7 days a week.      Sometimes the best time to schedule an appointment is after clinic hours when less people are calling in.      Weekends are another option for calling in to schedule appointments.

## 2023-07-14 ENCOUNTER — MYC MEDICAL ADVICE (OUTPATIENT)
Dept: INTERNAL MEDICINE | Facility: CLINIC | Age: 73
End: 2023-07-14
Payer: MEDICARE

## 2023-07-14 DIAGNOSIS — R35.0 BENIGN PROSTATIC HYPERPLASIA WITH URINARY FREQUENCY: ICD-10-CM

## 2023-07-14 DIAGNOSIS — N40.1 BENIGN PROSTATIC HYPERPLASIA WITH URINARY FREQUENCY: ICD-10-CM

## 2023-07-14 RX ORDER — TAMSULOSIN HYDROCHLORIDE 0.4 MG/1
0.8 CAPSULE ORAL DAILY
Qty: 180 CAPSULE | Refills: 3 | Status: SHIPPED | OUTPATIENT
Start: 2023-07-14 | End: 2023-07-20

## 2023-07-20 DIAGNOSIS — N40.1 BENIGN PROSTATIC HYPERPLASIA WITH URINARY FREQUENCY: ICD-10-CM

## 2023-07-20 DIAGNOSIS — R35.0 BENIGN PROSTATIC HYPERPLASIA WITH URINARY FREQUENCY: ICD-10-CM

## 2023-07-20 RX ORDER — TAMSULOSIN HYDROCHLORIDE 0.4 MG/1
CAPSULE ORAL
Qty: 180 CAPSULE | Refills: 3 | Status: SHIPPED | OUTPATIENT
Start: 2023-07-20 | End: 2024-02-20

## 2023-07-20 NOTE — TELEPHONE ENCOUNTER
"Routing refill request to provider for review/approval because:  Please review pharmacy note    Last Written Prescription Date:  7/14/23  Last Fill Quantity: 180,  # refills: 3   Last office visit provider:   6/22/23    Requested Prescriptions   Pending Prescriptions Disp Refills    tamsulosin (FLOMAX) 0.4 MG capsule [Pharmacy Med Name: TAMSULOSIN CAP 0.4MG]  3     Sig: TAKE 2 CAPSULES DAILY.       Alpha Blockers Passed - 7/20/2023 11:48 AM        Passed - Blood pressure under 140/90 in past 12 months     BP Readings from Last 3 Encounters:   06/22/23 118/64   05/26/23 134/68   05/20/23 129/83                 Passed - Recent (12 mo) or future (30 days) visit within the authorizing provider's specialty     Patient has had an office visit with the authorizing provider or a provider within the authorizing providers department within the previous 12 mos or has a future within next 30 days. See \"Patient Info\" tab in inbasket, or \"Choose Columns\" in Meds & Orders section of the refill encounter.              Passed - Patient does not have Tadalafil, Vardenafil, or Sildenafil on their medication list        Passed - Medication is active on med list        Passed - Patient is 18 years of age or older             Bouchra Castrejon RN 07/20/23 3:13 PM  "

## 2023-07-22 DIAGNOSIS — I10 ESSENTIAL HYPERTENSION: ICD-10-CM

## 2023-07-22 DIAGNOSIS — I48.92 ATRIAL FLUTTER, CHRONIC (H): ICD-10-CM

## 2023-07-22 DIAGNOSIS — R93.1 DECREASED CARDIAC EJECTION FRACTION: ICD-10-CM

## 2023-07-22 DIAGNOSIS — R06.02 SOB (SHORTNESS OF BREATH): ICD-10-CM

## 2023-07-22 RX ORDER — METOPROLOL SUCCINATE 25 MG/1
TABLET, EXTENDED RELEASE ORAL
Qty: 45 TABLET | Refills: 3 | Status: SHIPPED | OUTPATIENT
Start: 2023-07-22 | End: 2024-02-20

## 2023-07-22 NOTE — TELEPHONE ENCOUNTER
"Routing refill request to provider for review/approval because:  A break in medication    Last Written Prescription Date:  4/2/23  Last Fill Quantity: 45,  # refills: 3   Last office visit provider:   6/22/23    Requested Prescriptions   Pending Prescriptions Disp Refills    metoprolol succinate ER (TOPROL XL) 25 MG 24 hr tablet [Pharmacy Med Name: METOPROL SUC TAB 25MG ER] 45 tablet 3     Sig: TAKE 1/2 TABLET DAILY       Beta-Blockers Protocol Passed - 7/22/2023  7:15 AM        Passed - Blood pressure under 140/90 in past 12 months     BP Readings from Last 3 Encounters:   06/22/23 118/64   05/26/23 134/68   05/20/23 129/83                 Passed - Patient is age 6 or older        Passed - Recent (12 mo) or future (30 days) visit within the authorizing provider's specialty     Patient has had an office visit with the authorizing provider or a provider within the authorizing providers department within the previous 12 mos or has a future within next 30 days. See \"Patient Info\" tab in inbasket, or \"Choose Columns\" in Meds & Orders section of the refill encounter.              Passed - Medication is active on med list             Bouchra Castrejon RN 07/22/23 5:10 PM  "

## 2023-07-26 ENCOUNTER — TELEPHONE (OUTPATIENT)
Dept: CARDIOLOGY | Facility: CLINIC | Age: 73
End: 2023-07-26
Payer: MEDICARE

## 2023-07-26 DIAGNOSIS — Z95.818 PRESENCE OF LEFT ATRIAL APPENDAGE CLOSURE DEVICE COMPOSED OF NICKEL-TITANIUM ALLOY WITH POLYETHYLENE TEREPHTHALATE MEMBRANE: Primary | ICD-10-CM

## 2023-07-26 NOTE — TELEPHONE ENCOUNTER
Call placed to patient for 2 year post LAAC follow up. Patient reports doing well from LAAC aspect. Completed m-Blossom below. Is due for 2 year in clinic follow up and hard transferred to scheduling to arrange.     MODIFIED ENA SCALE   Timepoint: 2yr Post-LAAC    Previous score: 0    Score Description   0 No symptoms at all   1 No significant disability despite symptoms; able to carry out all usual duties and activities   2 Slight disability; unable to carry out all previous activities, but able to look after own affairs without assistance   3 Moderate disability; requiring some help, but able to walk without assistance   4 Moderately severe disability; unable to walk without assistance and unable to attend to own bodily needs without assistance   5 Severe disability; bedridden, incontinent and requiring constant nursing care and attention   6 Dead    Total score (0 - 6):  0    Change in score if s/p LAAC? No  If yes, notify implanting cardiologist.    Venus Noonan RN BSN  Structural Heart Coordinator   [facility]  [phone]

## 2023-07-28 RX ORDER — ASPIRIN 81 MG/1
81 TABLET ORAL DAILY
COMMUNITY
Start: 2023-07-28

## 2023-07-28 NOTE — TELEPHONE ENCOUNTER
Phone call to patient to inform him he is to remain on once daily 81 mg ASA for life. He verbalized understanding, states he will start again. No further questions at this time. LYLY

## 2023-08-03 ENCOUNTER — MYC MEDICAL ADVICE (OUTPATIENT)
Dept: INTERNAL MEDICINE | Facility: CLINIC | Age: 73
End: 2023-08-03
Payer: MEDICARE

## 2023-08-03 DIAGNOSIS — M48.061 SPINAL STENOSIS AT L4-L5 LEVEL: Primary | ICD-10-CM

## 2023-08-03 DIAGNOSIS — M25.551 BILATERAL HIP PAIN: ICD-10-CM

## 2023-08-03 DIAGNOSIS — M25.552 BILATERAL HIP PAIN: ICD-10-CM

## 2023-08-04 ENCOUNTER — TELEPHONE (OUTPATIENT)
Dept: INTERNAL MEDICINE | Facility: CLINIC | Age: 73
End: 2023-08-04
Payer: MEDICARE

## 2023-08-04 NOTE — TELEPHONE ENCOUNTER
Per Telephone encounter dated 8/4:    Patient Returning Call     Reason for call:  Call back     Patient has additional questions:  Yes     What are your questions/concerns:  Patient would like call back to discuss referral to [facility] for his back/hip pain.  Stating he needs referral for consult and [facility] is looking for some updated records in regards to visit as well.  Please call patient to discuss.     Who does the patient want to speak with:  Provider     Is an  needed?:  No        Could we send this information to you in [employer] or would you prefer to receive a phone call?:   Patient would prefer a phone call   Okay to leave a detailed message?: Yes at Home number on file [phone] (home)

## 2023-08-04 NOTE — TELEPHONE ENCOUNTER
Patient Returning Call    Reason for call:  Call back    Patient has additional questions:  Yes    What are your questions/concerns:  Patient would like call back to discuss referral to [facility] for his back/hip pain.  Stating he needs referral for consult and [facility] is looking for some updated records in regards to visit as well.  Please call patient to discuss.    Who does the patient want to speak with:  Provider    Is an  needed?:  No      Could we send this information to you in [employer] or would you prefer to receive a phone call?:   Patient would prefer a phone call   Okay to leave a detailed message?: Yes at Home number on file [phone] (home)

## 2023-08-07 ENCOUNTER — ANCILLARY PROCEDURE (OUTPATIENT)
Dept: GENERAL RADIOLOGY | Facility: CLINIC | Age: 73
End: 2023-08-07
Attending: INTERNAL MEDICINE
Payer: MEDICARE

## 2023-08-07 DIAGNOSIS — M25.552 BILATERAL HIP PAIN: ICD-10-CM

## 2023-08-07 DIAGNOSIS — M25.551 BILATERAL HIP PAIN: ICD-10-CM

## 2023-08-07 PROCEDURE — 73522 X-RAY EXAM HIPS BI 3-4 VIEWS: CPT | Mod: TC | Performed by: RADIOLOGY

## 2023-08-21 ENCOUNTER — OFFICE VISIT (OUTPATIENT)
Dept: INTERNAL MEDICINE | Facility: CLINIC | Age: 73
End: 2023-08-21
Payer: MEDICARE

## 2023-08-21 VITALS
OXYGEN SATURATION: 97 % | RESPIRATION RATE: 20 BRPM | BODY MASS INDEX: 32.37 KG/M2 | HEIGHT: 72 IN | WEIGHT: 239 LBS | SYSTOLIC BLOOD PRESSURE: 138 MMHG | HEART RATE: 86 BPM | DIASTOLIC BLOOD PRESSURE: 86 MMHG | TEMPERATURE: 98.7 F

## 2023-08-21 DIAGNOSIS — M25.552 HIP PAIN, LEFT: ICD-10-CM

## 2023-08-21 DIAGNOSIS — R53.83 FATIGUE, UNSPECIFIED TYPE: ICD-10-CM

## 2023-08-21 DIAGNOSIS — K59.00 CONSTIPATION, UNSPECIFIED CONSTIPATION TYPE: ICD-10-CM

## 2023-08-21 DIAGNOSIS — R51.9 VERTEX HEADACHE: ICD-10-CM

## 2023-08-21 DIAGNOSIS — M54.42 CHRONIC LEFT-SIDED LOW BACK PAIN WITH LEFT-SIDED SCIATICA: ICD-10-CM

## 2023-08-21 DIAGNOSIS — M48.061 SPINAL STENOSIS AT L4-L5 LEVEL: ICD-10-CM

## 2023-08-21 DIAGNOSIS — M54.2 CHRONIC NECK PAIN: ICD-10-CM

## 2023-08-21 DIAGNOSIS — I10 PRIMARY HYPERTENSION: Chronic | ICD-10-CM

## 2023-08-21 DIAGNOSIS — M54.50 LUMBAR BACK PAIN: Primary | ICD-10-CM

## 2023-08-21 DIAGNOSIS — J31.0 CHRONIC RHINITIS: ICD-10-CM

## 2023-08-21 DIAGNOSIS — G89.29 CHRONIC LEFT-SIDED LOW BACK PAIN WITH LEFT-SIDED SCIATICA: ICD-10-CM

## 2023-08-21 DIAGNOSIS — M54.2 NECK PAIN: ICD-10-CM

## 2023-08-21 DIAGNOSIS — G89.29 CHRONIC NECK PAIN: ICD-10-CM

## 2023-08-21 PROCEDURE — 99215 OFFICE O/P EST HI 40 MIN: CPT | Performed by: INTERNAL MEDICINE

## 2023-08-21 PROCEDURE — 99417 PROLNG OP E/M EACH 15 MIN: CPT | Performed by: INTERNAL MEDICINE

## 2023-08-21 RX ORDER — PSEUDOEPHEDRINE HCL 120 MG/1
120 TABLET, FILM COATED, EXTENDED RELEASE ORAL EVERY 12 HOURS
Qty: 60 TABLET | Refills: 3 | Status: SHIPPED | OUTPATIENT
Start: 2023-08-21 | End: 2024-01-06

## 2023-08-21 ASSESSMENT — PAIN SCALES - GENERAL: PAINLEVEL: SEVERE PAIN (6)

## 2023-08-21 ASSESSMENT — ENCOUNTER SYMPTOMS
HEADACHES: 1
BACK PAIN: 1

## 2023-08-21 NOTE — PROGRESS NOTES
"  {PROVIDER CHARTING PREFERENCE:204109}    Lucina Lorenzo is a 72 year old, presenting for the following health issues:  Back Pain and Headache        8/21/2023     1:59 PM   Additional Questions   Roomed by KASSANDRA Bills   Accompanied by RADHA       History of Present Illness       Back Pain:  He presents for follow up of back pain. Patient's back pain is a recurring problem.  Location of back pain:  Left lower back, left shoulder and left hip  Description of back pain: gnawing  Back pain spreads: left buttocks and left side of neck    Since patient first noticed back pain, pain is: unchanged  Does back pain interfere with his job:  Yes       Headaches:   Since the patient's last clinic visit, headaches are: no change  The patient is getting headaches:  Daily  He is not able to do normal daily activities when he has a migraine.  The patient is taking the following rescue/relief medications:  Ibuprofen (Advil, Motrin) and Tylenol   Patient states \"I get only a small amount of relief\" from the rescue/relief medications.   The patient is taking the following medications to prevent migraines:  No medications to prevent migraines  In the past 4 weeks, the patient has gone to an Urgent Care or Emergency Room 0 times times due to headaches.    Reason for visit:  Follow-up from back surgery    He eats 2-3 servings of fruits and vegetables daily.He consumes 1 sweetened beverage(s) daily.He exercises with enough effort to increase his heart rate 9 or less minutes per day.  He exercises with enough effort to increase his heart rate 3 or less days per week.   He is taking medications regularly.       {SUPERLIST (Optional):749490}  {additonal problems for provider to add (Optional):525162}      Review of Systems   Musculoskeletal:  Positive for back pain.   Neurological:  Positive for headaches.      {ROS COMP (Optional):267742}      Objective    /86 (BP Location: Right arm, Patient Position: Sitting, Cuff Size: Adult " Regular)   Pulse 86   Temp 98.7  F (37.1  C) (Oral)   Resp 20   Ht 1.829 m (6')   Wt 108.4 kg (239 lb)   SpO2 97%   BMI 32.41 kg/m    Body mass index is 32.41 kg/m .  Physical Exam   {Exam List (Optional):107474}    {Diagnostic Test Results (Optional):041001}    {AMBULATORY ATTESTATION (Optional):860835}

## 2023-08-31 PROBLEM — I10 PRIMARY HYPERTENSION: Chronic | Status: ACTIVE | Noted: 2021-11-16

## 2023-08-31 NOTE — PROGRESS NOTES
[facility] Special[facility]    Comprehensive and complex medical care - Chronic disease management - Shared decision making - Care coordination - Compassionate care    Patient advocacy - Rational deprescribing - Minimally disruptive medicine - Ethical focus - Customized care         Date of Service: 8/21/2023  Primary Provider: Onur Rod    Patient Care Team:  Onur Rod MD as PCP - General  Onur Rod MD as Assigned PCP  Ambrose Degroot MD as MD (Orthopaedic Surgery)  Stuart Jackson MD as MD (Orthopaedic Surgery)  Liborio Champagne MD as MD  [facility]  Gregg Curtis as Resident (Orthopaedic Surgery)  Kiran Tyler MD as Assigned Heart and Vascular Provider          Patient's Pharmacy:    [id number] - [place], [place] - [address]  [address] [facility] [address]  Phone: [phone] Fax: [phone]    [facility] [place] - [facility] [facility]  [place] [address]  Phone: [phone] Fax: [phone]     Patient's Contacts:  Name Home Phone Work Phone Mobile Phone Relationship Lgl Danield   BLAYNEKYLER CASON [phone] Spouse    MATILDE SILVER   [phone] Other      Patient's Insurance:    Payor: MEDICARE / Plan: MEDICARE / Product Type: Medicare /            Active Problem List:  Problem List as of 8/21/2023 Reviewed: 6/29/2023  9:36 AM by Onur Rod MD         High    Nonsmoker    Full code status    CAD (coronary artery disease), minimal disease on angiogram in 2021    Atrial flutter, chronic (H)       Medium    S/P left atrial appendage closure - WATCHMAN    HTN (hypertension)    Chronic neck pain    Dilated cardiomyopathy (H)    Chronic systolic heart failure (H)    Chronic left-sided low back pain with left-sided sciatica    Spinal stenosis at L4-L5 level       Low    GERD (gastroesophageal reflux  disease)    Branch retinal vein occlusion of left eye    Normal colonoscopy - 2016 - Average risk    Complete tear of left rotator cuff    Concussion syndrome        Current Outpatient Medications   Medication Instructions    acetaminophen (TYLENOL) 650 mg, Oral, EVERY 8 HOURS PRN    aspirin 81 mg, Oral, DAILY    co-enzyme Q-10 200 mg, Oral, DAILY    fish oil-omega-3 fatty acids 2 g, DAILY    fluticasone (FLONASE) 50 MCG/ACT nasal spray 1 spray, Both Nostrils, DAILY    hydrOXYzine (ATARAX) 10 mg, Oral, EVERY 6 HOURS PRN    lisinopril (ZESTRIL) 5 MG tablet TAKE 1 TABLET DAILY    Magnesium Oxide POWD 1 Dose, Oral, DAILY    metoprolol succinate ER (TOPROL XL) 25 MG 24 hr tablet TAKE 1/2 TABLET DAILY    MULTIVITAMIN ORAL 1 tablet, Oral, DAILY    pseudoePHEDrine (SUDAFED) 120 mg, Oral, EVERY 12 HOURS    senna-docusate (SENOKOT-S/PERICOLACE) 8.6-50 MG tablet 1-2 tablets, Oral, 2 TIMES DAILY, Take while on oral narcotics to prevent or treat constipation.    tamsulosin (FLOMAX) 0.4 MG capsule TAKE 2 CAPSULES DAILY.      Social History     Social History Narrative    Patient of Dr. Rod since 2001.    .  Wife is a former RN.     Wife's cousin is Dr. Jean Pierre Champagne, ID specialist.       Subjective:     Mike Gonzalez is a 72 year old male who comes in today for:    Chief Complaint   Patient presents with    Back Pain    Headache          8/21/2023     1:59 PM   Additional Questions   Roomed by KASSANDRA Bills   Accompanied by RADHA     Follow up multiple issues    Mainly in to follow up on his back pain and left hip pain.    Continues to have issues with this.    Would like to go for second opinion to [facility] and see from there.    Low back and left hip continue to bother.  Did have lumbar surgery.  Still recovering from that and did have significant postop confusion and delay in recovery.    Cannot walk more than 500 feet without pain in the left hip.    Saw Dr. Jackson last week for his shoulder and he still has some  pain here.   Lifting and reaching is worse.  Hard to lay on the left side for more than a half an hour.  Mild right shoulder issues as well.    Feels physically exhausted and tires in the afternoon.  Has to nap 1-2 hours in the afternoon.    Headaches are still bothering him.  This has been regular since his lumbar surgery.  All of his head and left parietal.  Can be sharp in nature and last 15 minutes.    Balance can be an issue as well.    Notes soreness in the toes of his feet and they feel cold.    Would like prescription for Pseudoephedrine for his chronic rhinitis.    Noticing some constipation for which he uses MOM once a week.     We reviewed his other issues noted in the assessment but not specifically addressed in the HPI above.     Objective:     Wt Readings from Last 3 Encounters:   08/21/23 108.4 kg (239 lb)   06/22/23 108.9 kg (240 lb)   05/26/23 108.4 kg (239 lb)     BP Readings from Last 3 Encounters:   08/21/23 138/86   06/22/23 118/64   05/26/23 134/68     /86 (BP Location: Right arm, Patient Position: Sitting, Cuff Size: Adult Regular)   Pulse 86   Temp 98.7  F (37.1  C) (Oral)   Resp 20   Ht 1.829 m (6')   Wt 108.4 kg (239 lb)   SpO2 97%   BMI 32.41 kg/m     The patient is comfortable, no acute distress.  Mood good.  Insight good.  Eyes are nonicteric.  Neck is supple without mass.  No cervical adenopathy.  No thyromegaly. Heart regular rate and rhythm.  Lungs clear to auscultation bilaterally.  Respiratory effort is good.    Extremities no edema.  Midline incision looks good.  Moves slowly due to pain.      Diagnostics:     Office Visit on 06/22/2023   Component Date Value Ref Range Status    CRP Inflammation 06/22/2023 5.27 (H)  <5.00 mg/L Final    Sodium 06/22/2023 139  136 - 145 mmol/L Final    Potassium 06/22/2023 4.4  3.4 - 5.3 mmol/L Final    Chloride 06/22/2023 101  98 - 107 mmol/L Final    Carbon Dioxide (CO2) 06/22/2023 26  22 - 29 mmol/L Final    Anion Gap 06/22/2023 12  7  - 15 mmol/L Final    Urea Nitrogen 06/22/2023 18.6  8.0 - 23.0 mg/dL Final    Creatinine 06/22/2023 0.95  0.67 - 1.17 mg/dL Final    Calcium 06/22/2023 9.4  8.8 - 10.2 mg/dL Final    Glucose 06/22/2023 94  70 - 99 mg/dL Final    Alkaline Phosphatase 06/22/2023 86  40 - 129 U/L Final    AST 06/22/2023 18  0 - 45 U/L Final    Reference intervals for this test were updated on 6/12/2023 to more accurately reflect our healthy population. There may be differences in the flagging of prior results with similar values performed with this method. Interpretation of those prior results can be made in the context of the updated reference intervals.    ALT 06/22/2023 15  0 - 70 U/L Final    Reference intervals for this test were updated on 6/12/2023 to more accurately reflect our healthy population. There may be differences in the flagging of prior results with similar values performed with this method. Interpretation of those prior results can be made in the context of the updated reference intervals.      Protein Total 06/22/2023 7.5  6.4 - 8.3 g/dL Final    Albumin 06/22/2023 4.6  3.5 - 5.2 g/dL Final    Bilirubin Total 06/22/2023 0.3  <=1.2 mg/dL Final    GFR Estimate 06/22/2023 85  >60 mL/min/1.73m2 Final    Erythrocyte Sedimentation Rate 06/22/2023 15  0 - 20 mm/hr Final    WBC Count 06/22/2023 7.4  4.0 - 11.0 10e3/uL Final    RBC Count 06/22/2023 4.62  4.40 - 5.90 10e6/uL Final    Hemoglobin 06/22/2023 14.6  13.3 - 17.7 g/dL Final    Hematocrit 06/22/2023 42.7  40.0 - 53.0 % Final    MCV 06/22/2023 92  78 - 100 fL Final    MCH 06/22/2023 31.6  26.5 - 33.0 pg Final    MCHC 06/22/2023 34.2  31.5 - 36.5 g/dL Final    RDW 06/22/2023 13.4  10.0 - 15.0 % Final    Platelet Count 06/22/2023 270  150 - 450 10e3/uL Final    % Neutrophils 06/22/2023 58  % Final    % Lymphocytes 06/22/2023 28  % Final    % Monocytes 06/22/2023 11  % Final    % Eosinophils 06/22/2023 2  % Final    % Basophils 06/22/2023 1  % Final    % Immature  Granulocytes 06/22/2023 0  % Final    Absolute Neutrophils 06/22/2023 4.3  1.6 - 8.3 10e3/uL Final    Absolute Lymphocytes 06/22/2023 2.1  0.8 - 5.3 10e3/uL Final    Absolute Monocytes 06/22/2023 0.8  0.0 - 1.3 10e3/uL Final    Absolute Eosinophils 06/22/2023 0.1  0.0 - 0.7 10e3/uL Final    Absolute Basophils 06/22/2023 0.0  0.0 - 0.2 10e3/uL Final    Absolute Immature Granulocytes 06/22/2023 0.0  <=0.4 10e3/uL Final       No results found for any visits on 08/21/23.     Assessment:     1. Lumbar back pain    2. Chronic rhinitis    3. Chronic left-sided low back pain with left-sided sciatica    4. Spinal stenosis at L4-L5 level    5. Chronic neck pain    6. Neck pain    7. Vertex headache    8. Hip pain, left    9. Primary hypertension    10. Fatigue, unspecified type    11. Constipation, unspecified constipation type        Plan:     Referral to [facility].  For hip and back.  Paperwork for this to be done.  Needs second opinion for management.  Continue current medications otherwise.  Follow up sooner if issues.    No orders of the defined types were placed in this encounter.       70 minutes or greater was spent today on the patient's care on the day of service.      This includes time for chart preparation, reviewing medical tests done before or during the visit, talking with the patient, review of quality indicators, required documentation, and other elements of care.        Onur Rod MD  General Internal Medicine  [facility]    Return in about 2 months (around 10/21/2023) for follow up visit.     Future Appointments   Date Time Provider Department Center   11/6/2023  1:20 PM Kiran Tyler MD [facility]

## 2023-08-31 NOTE — PATIENT INSTRUCTIONS
Future Appointments   Date Time Provider Department Center   11/6/2023  1:20 PM Kiran Tyler MD [facility]

## 2023-09-05 ENCOUNTER — MYC MEDICAL ADVICE (OUTPATIENT)
Dept: INTERNAL MEDICINE | Facility: CLINIC | Age: 73
End: 2023-09-05
Payer: MEDICARE

## 2023-09-11 NOTE — TELEPHONE ENCOUNTER
Records Requested     September 11, 2023 9:53 AM   [id number]   Facility  [facility]   Outcome Requested MRI from [facility].    MRI received       DIAGNOSIS: bilateral hip pain,self,imaging done @ [facility]   APPOINTMENT DATE: 09/20/23   NOTES STATUS DETAILS   OFFICE NOTE from referring provider N/A Self   OFFICE NOTE from other specialist Internal/Media Tab 08/21/23, 08/03/23, 06/22/23, 05/26/23, 03/21/23 - Onur Rod MD     [facility]:  - 06/21/23, 05/30/23, 05/10/23, 04/12/23   OPERATIVE REPORT Internal 03/30/23 - Ambrose Degroot MD - [facility]   MEDICATION LIST Internal    MRI PACS/Internal [facility]:  - 05/25/23    INTERNAL:  - MR LUMBAR SPINE - 06/27/21  - MR L HIP - 01/19/21  - MR R HIP - 06/08/18   XRAYS  & INJECTIONS (IMAGES & REPORTS) Internal XR [facility] HIP:  - 8/07/23

## 2023-09-20 ENCOUNTER — PRE VISIT (OUTPATIENT)
Dept: ORTHOPEDICS | Facility: CLINIC | Age: 73
End: 2023-09-20

## 2023-09-20 ENCOUNTER — OFFICE VISIT (OUTPATIENT)
Dept: ORTHOPEDICS | Facility: CLINIC | Age: 73
End: 2023-09-20
Payer: MEDICARE

## 2023-09-20 DIAGNOSIS — M51.369 LUMBAR DEGENERATIVE DISC DISEASE: Primary | ICD-10-CM

## 2023-09-20 DIAGNOSIS — M25.552 LEFT HIP PAIN: ICD-10-CM

## 2023-09-20 PROCEDURE — 99204 OFFICE O/P NEW MOD 45 MIN: CPT | Mod: GC | Performed by: FAMILY MEDICINE

## 2023-09-20 RX ORDER — PREDNISONE 20 MG/1
40 TABLET ORAL DAILY
Qty: 10 TABLET | Refills: 0 | Status: SHIPPED | OUTPATIENT
Start: 2023-09-20 | End: 2023-09-25

## 2023-09-20 RX ORDER — DICLOFENAC SODIUM 75 MG/1
75 TABLET, DELAYED RELEASE ORAL 2 TIMES DAILY
Qty: 28 TABLET | Refills: 1 | Status: SHIPPED | OUTPATIENT
Start: 2023-09-20 | End: 2024-01-06

## 2023-09-20 NOTE — LETTER
9/20/2023      RE: Mike Gonzalez  [address] [place] [address]     Dear Colleague,    Thank you for referring your patient, Mike Gonzalez, to the [facility] [place]. Please see a copy of my visit note below.    [facility]  Sports Medicine Clinic  Clinics and Surgery Center           SUBJECTIVE       Mike Gonzalez is a 72 year old male with a PMH of C-spine and L-spine DJD s/p lumbar decompression surgery presenting to clinic today with a chief complaint of L>R lateral hip pain.    Patient reports several years of worsening low back pain and left more than right lateral hip pain.  patient reports that pain started back several years ago and has been seen by [facility] orthopedics in [place].  However since then patient feels isolated lateral hip pain as well as more recently isolated knee and foot pain as well.  Regarding his back he has had multiple decompression surgeries the most recent of which was at L4-L5 decompression (details unclear given no outside records available) around March 2023 with Dr. Degroot at [facility].  After the surgery he reports that lumbar spine has been feeling better though still ongoing and now has more painful left lateral hip pain.  He states that he has tried methylpred which helped with pain and gabapentin which patient did not like due to drowsiness for ongoing pain and new headaches and was recommended to get a CT myelogram in addition to MRI to further work-up possible spinal leak related to the surgery.  Denies numbness or tingling throughout the leg or shooting pains going all way down the leg.  Patient reports having trouble sleeping at night due to pain and pain when pressing and lying on the lateral hip at times but this comes and goes on a day-to-day basis.  He states that he has had corticosteroid and lidocaine injections and believes may have had PRP or stem cells in the past injected into the glutes/greater trochanter  (unclear timeline).  Reports that the CSI injection helped only until the lidocaine wore off    Patient also reports that neck and left shoulder have been more painful since lying facedown for surgery earlier this year.  Due to time constraints of today's visit we did not get to discuss this to more extent.    Given patient's ongoing pains presented today for second opinion    PMH, Medications and Allergies were reviewed and updated as needed.    ROS:  As noted above otherwise negative.    Patient Active Problem List   Diagnosis    GERD (gastroesophageal reflux disease)    Chronic neck pain    Nonsmoker    Full code status    Branch retinal vein occlusion of left eye    Normal colonoscopy - 2016 - Average risk    Complete tear of left rotator cuff    Atrial flutter, chronic (H)    Dilated cardiomyopathy (H)    CAD (coronary artery disease), minimal disease on angiogram in 2021    Chronic systolic heart failure (H)    S/P left atrial appendage closure - WATCHMAN    Concussion syndrome    Chronic left-sided low back pain with left-sided sciatica    Primary hypertension    Spinal stenosis at L4-L5 level       Current Outpatient Medications   Medication Sig Dispense Refill    acetaminophen (TYLENOL) 650 MG CR tablet Take 650 mg by mouth every 8 hours as needed      aspirin 81 MG EC tablet Take 1 tablet (81 mg) by mouth daily      co-enzyme Q-10 100 MG CAPS capsule Take 200 mg by mouth daily      diclofenac (VOLTAREN) 75 MG EC tablet Take 1 tablet (75 mg) by mouth 2 times daily for 14 days 28 tablet 1    fish oil-omega-3 fatty acids 300-1,000 mg capsule [FISH OIL-OMEGA-3 FATTY ACIDS 300-1,000 MG CAPSULE] Take 2 g by mouth daily.      fluticasone (FLONASE) 50 MCG/ACT nasal spray Spray 1 spray into both nostrils daily 48 mL 2    hydrOXYzine (ATARAX) 10 MG tablet Take 1 tablet (10 mg) by mouth every 6 hours as needed for other or itching (adjuvant pain) 40 tablet 0    lisinopril (ZESTRIL) 5 MG tablet TAKE 1 TABLET DAILY  (Patient taking differently: Take 5 mg by mouth daily) 90 tablet 3    Magnesium Oxide POWD Take 1 Dose by mouth daily      metoprolol succinate ER (TOPROL XL) 25 MG 24 hr tablet TAKE 1/2 TABLET DAILY 45 tablet 3    MULTIVITAMIN ORAL Take 1 tablet by mouth daily      predniSONE (DELTASONE) 20 MG tablet Take 2 tablets (40 mg) by mouth daily for 5 days 10 tablet 0    pseudoePHEDrine (SUDAFED) 120 MG 12 hr tablet Take 1 tablet (120 mg) by mouth every 12 hours 60 tablet 3    senna-docusate (SENOKOT-S/PERICOLACE) 8.6-50 MG tablet Take 1-2 tablets by mouth 2 times daily Take while on oral narcotics to prevent or treat constipation. 30 tablet 0    tamsulosin (FLOMAX) 0.4 MG capsule TAKE 2 CAPSULES DAILY. 180 capsule 3            OBJECTIVE:       Vitals: There were no vitals filed for this visit.  BMI: There is no height or weight on file to calculate BMI.    Gen:  Well nourished and in no acute distress  HEENT: Extraocular movement intact  Neck: Supple  Pulm:  Breathing Comfortably. No increased respiratory effort.  Psych: Euthymic. Appropriately answers questions    MSK:   Musculoskeletal - lumbar spine  - stance: gait with mild forward lean, externally rotated L leg  - inspection: lumbar scoliosis  - palpation: TTP along L2-3, L4-5 and upper sacrum. Minimal TTP along L greater trochanter, glute insertion  - ROM: Full ROM with some pain in flexion, no pain in extension, bilateral rotation, bilateral sidebending  - strength: 4/5 strength with hip flexion, leg extension, flexion, abduction, adduction.    - special tests:  (-) straight leg raise bilaterally  (-) THO/ FADIR  (-) Scour    Neuro  - patellar and Achilles DTRs 1+ bilaterally, no sensory or motor deficit, grossly normal coordination, normal muscle tone.   - Toe walk and heel raise normal  - the following are intact:  L2-3: Hip flexion  L3/4: Knee extension  L4/5:  Foot dorsiflexion and EHL dorsiflexion  S1:  Plantarflexion/Peroneal Muscles    Skin  - no  ecchymosis, erythema, warmth, or induration, no obvious rash            Narrative & Impression   EXAM: XR HIP BILATERAL 2 VIEWS EACH  LOCATION: [facility]  DATE: 8/7/2023     INDICATION: Bilateral hip pain.  COMPARISON: None.                                                                      IMPRESSION:   Mild to moderate arthritic changes both hips. No acute fracture or dislocation.             ASSESSMENT and PLAN:     Mike was seen today for pain and pain.    Diagnoses and all orders for this visit:    Lumbar degenerative disc disease  Left hip pain  Patient with symptoms and exam suggestive of L sided L5 radiculopathy stopping at the lateral hip. On MRI L spine from [facility] on 5/28/23, patient has severe multilevel DJD/ spondylosis of the lumbar spine, worst at L4 and L5 s/p L sided decompression surgery with L4-5 post-op collection of nonspecific fluid and  L5-S1 foraminal stenosis and impingement which may likely be the cause of patient's pain (as well as paracentaral L1-2 L paracentral herniation encroaching on L2 that is likely less of a pain  for patient's presenting complaints). Also describes symptoms concerning for possible trochanteric bursitis/ glute tendiopathy, however exam not particularly concerning for this today. Patient has moderate hip arthritis L>R, but no anterior hip/ groin pain on history or exam today, so suspect that this is less of a contributor of patient's pain.   - Will treat various pains with burst of prednisone followed by 2 weeks of diclofenac.  - Recommend patient proceed with [facility] spine surgeon's recommendation of CT myelogram to evaluate for possible leak given ongoing headaches as well.   - Will further investigate potential other causes with L hip MRI  -     MR Hip Left w/o Contrast; Future  -     predniSONE (DELTASONE) 20 MG tablet; Take 2 tablets (40 mg) by mouth daily for 5 days  -     diclofenac (VOLTAREN) 75 MG EC tablet; Take 1 tablet (75 mg)  by mouth 2 times daily for 14 days  -Given patient's myriad of locations of pain and arthritis an mildly elevated CRP could consider a rheumatologic diagnosis if orthopedic rounds are not helping    Return to clinic in 3-4 weeks for follow up of L hip MRI. Return sooner if develops new or worsening symptoms.     Options for treatment and/or follow-up care were reviewed with the patient was actively involved in the decision making process. Patient verbalized understanding and was in agreement with the plan.    The patient was seen by and discussed with Dr.Suzanne REX Eller MD, CAQ, CCD    Ambrose Walls MD  Primary Care Sports Medicine Fellow  [facility]      Attending Note:   I have   examined this patient/iamges and have reviewed the clinical presentation and progress note with the fellow. I agree with the treatment plan as outlined. The plan was formulated with the fellow on the day of the patient's visit. I have reviewed all imaging with the fellow and agree with the findings in the documentation.     Radha Eller MD, CAQ, CCD  [facility]  Sports Medicine and Bone Health    Again, thank you for allowing me to participate in the care of your patient.      Sincerely,    Radha Eller MD

## 2023-09-20 NOTE — PROGRESS NOTES
[facility]  Sports Medicine Clinic  Clinics and Surgery Center           SUBJECTIVE       Mike Gonzalez is a 72 year old male with a PMH of C-spine and L-spine DJD s/p lumbar decompression surgery presenting to clinic today with a chief complaint of L>R lateral hip pain.    Patient reports several years of worsening low back pain and left more than right lateral hip pain.  patient reports that pain started back several years ago and has been seen by [facility] orthopedics in [place].  However since then patient feels isolated lateral hip pain as well as more recently isolated knee and foot pain as well.  Regarding his back he has had multiple decompression surgeries the most recent of which was at L4-L5 decompression (details unclear given no outside records available) around March 2023 with Dr. Degroot at [facility]  After the surgery he reports that lumbar spine has been feeling better though still ongoing and now has more painful left lateral hip pain.  He states that he has tried methylpred which helped with pain and gabapentin which patient did not like due to drowsiness for ongoing pain and new headaches and was recommended to get a CT myelogram in addition to MRI to further work-up possible spinal leak related to the surgery.  Denies numbness or tingling throughout the leg or shooting pains going all way down the leg.  Patient reports having trouble sleeping at night due to pain and pain when pressing and lying on the lateral hip at times but this comes and goes on a day-to-day basis.  He states that he has had corticosteroid and lidocaine injections and believes may have had PRP or stem cells in the past injected into the glutes/greater trochanter (unclear timeline).  Reports that the CSI injection helped only until the lidocaine wore off    Patient also reports that neck and left shoulder have been more painful since lying facedown for surgery earlier this year.  Due to time constraints of today's  visit we did not get to discuss this to more extent.    Given patient's ongoing pains presented today for second opinion    PMH, Medications and Allergies were reviewed and updated as needed.    ROS:  As noted above otherwise negative.    Patient Active Problem List   Diagnosis    GERD (gastroesophageal reflux disease)    Chronic neck pain    Nonsmoker    Full code status    Branch retinal vein occlusion of left eye    Normal colonoscopy - 2016 - Average risk    Complete tear of left rotator cuff    Atrial flutter, chronic (H)    Dilated cardiomyopathy (H)    CAD (coronary artery disease), minimal disease on angiogram in 2021    Chronic systolic heart failure (H)    S/P left atrial appendage closure - WATCHMAN    Concussion syndrome    Chronic left-sided low back pain with left-sided sciatica    Primary hypertension    Spinal stenosis at L4-L5 level       Current Outpatient Medications   Medication Sig Dispense Refill    acetaminophen (TYLENOL) 650 MG CR tablet Take 650 mg by mouth every 8 hours as needed      aspirin 81 MG EC tablet Take 1 tablet (81 mg) by mouth daily      co-enzyme Q-10 100 MG CAPS capsule Take 200 mg by mouth daily      diclofenac (VOLTAREN) 75 MG EC tablet Take 1 tablet (75 mg) by mouth 2 times daily for 14 days 28 tablet 1    fish oil-omega-3 fatty acids 300-1,000 mg capsule [FISH OIL-OMEGA-3 FATTY ACIDS 300-1,000 MG CAPSULE] Take 2 g by mouth daily.      fluticasone (FLONASE) 50 MCG/ACT nasal spray Spray 1 spray into both nostrils daily 48 mL 2    hydrOXYzine (ATARAX) 10 MG tablet Take 1 tablet (10 mg) by mouth every 6 hours as needed for other or itching (adjuvant pain) 40 tablet 0    lisinopril (ZESTRIL) 5 MG tablet TAKE 1 TABLET DAILY (Patient taking differently: Take 5 mg by mouth daily) 90 tablet 3    Magnesium Oxide POWD Take 1 Dose by mouth daily      metoprolol succinate ER (TOPROL XL) 25 MG 24 hr tablet TAKE 1/2 TABLET DAILY 45 tablet 3    MULTIVITAMIN ORAL Take 1 tablet by mouth  daily      predniSONE (DELTASONE) 20 MG tablet Take 2 tablets (40 mg) by mouth daily for 5 days 10 tablet 0    pseudoePHEDrine (SUDAFED) 120 MG 12 hr tablet Take 1 tablet (120 mg) by mouth every 12 hours 60 tablet 3    senna-docusate (SENOKOT-S/PERICOLACE) 8.6-50 MG tablet Take 1-2 tablets by mouth 2 times daily Take while on oral narcotics to prevent or treat constipation. 30 tablet 0    tamsulosin (FLOMAX) 0.4 MG capsule TAKE 2 CAPSULES DAILY. 180 capsule 3            OBJECTIVE:       Vitals: There were no vitals filed for this visit.  BMI: There is no height or weight on file to calculate BMI.    Gen:  Well nourished and in no acute distress  HEENT: Extraocular movement intact  Neck: Supple  Pulm:  Breathing Comfortably. No increased respiratory effort.  Psych: Euthymic. Appropriately answers questions    MSK:   Musculoskeletal - lumbar spine  - stance: gait with mild forward lean, externally rotated L leg  - inspection: lumbar scoliosis  - palpation: TTP along L2-3, L4-5 and upper sacrum. Minimal TTP along L greater trochanter, glute insertion  - ROM: Full ROM with some pain in flexion, no pain in extension, bilateral rotation, bilateral sidebending  - strength: 4/5 strength with hip flexion, leg extension, flexion, abduction, adduction.    - special tests:  (-) straight leg raise bilaterally  (-) THO/ FADIR  (-) Scour    Neuro  - patellar and Achilles DTRs 1+ bilaterally, no sensory or motor deficit, grossly normal coordination, normal muscle tone.   - Toe walk and heel raise normal  - the following are intact:  L2-3: Hip flexion  L3/4: Knee extension  L4/5:  Foot dorsiflexion and EHL dorsiflexion  S1:  Plantarflexion/Peroneal Muscles    Skin  - no ecchymosis, erythema, warmth, or induration, no obvious rash            Narrative & Impression   EXAM: XR HIP BILATERAL 2 VIEWS EACH  LOCATION: [facility]  DATE: 8/7/2023     INDICATION: Bilateral hip pain.  COMPARISON: None.                                                                       IMPRESSION:   Mild to moderate arthritic changes both hips. No acute fracture or dislocation.             ASSESSMENT and PLAN:     Mike was seen today for pain and pain.    Diagnoses and all orders for this visit:    Lumbar degenerative disc disease  Left hip pain  Patient with symptoms and exam suggestive of L sided L5 radiculopathy stopping at the lateral hip. On MRI L spine from [facility] on 5/28/23, patient has severe multilevel DJD/ spondylosis of the lumbar spine, worst at L4 and L5 s/p L sided decompression surgery with L4-5 post-op collection of nonspecific fluid and  L5-S1 foraminal stenosis and impingement which may likely be the cause of patient's pain (as well as paracentaral L1-2 L paracentral herniation encroaching on L2 that is likely less of a pain  for patient's presenting complaints). Also describes symptoms concerning for possible trochanteric bursitis/ glute tendiopathy, however exam not particularly concerning for this today. Patient has moderate hip arthritis L>R, but no anterior hip/ groin pain on history or exam today, so suspect that this is less of a contributor of patient's pain.   - Will treat various pains with burst of prednisone followed by 2 weeks of diclofenac.  - Recommend patient proceed with [facility] spine surgeon's recommendation of CT myelogram to evaluate for possible leak given ongoing headaches as well.   - Will further investigate potential other causes with L hip MRI  -     MR Hip Left w/o Contrast; Future  -     predniSONE (DELTASONE) 20 MG tablet; Take 2 tablets (40 mg) by mouth daily for 5 days  -     diclofenac (VOLTAREN) 75 MG EC tablet; Take 1 tablet (75 mg) by mouth 2 times daily for 14 days  -Given patient's myriad of locations of pain and arthritis an mildly elevated CRP could consider a rheumatologic diagnosis if orthopedic rounds are not helping    Return to clinic in 3-4 weeks for follow up of L hip MRI. Return  sooner if develops new or worsening symptoms.     Options for treatment and/or follow-up care were reviewed with the patient was actively involved in the decision making process. Patient verbalized understanding and was in agreement with the plan.    The patient was seen by and discussed with Dr.Suzanne REX Eller MD, CAQ, CCD    Ambrose Walls MD  Primary Care Sports Medicine Fellow  [facility]

## 2023-09-21 NOTE — PROGRESS NOTES
Attending Note:   I have   examined this patient/iamges and have reviewed the clinical presentation and progress note with the fellow. I agree with the treatment plan as outlined. The plan was formulated with the fellow on the day of the patient's visit. I have reviewed all imaging with the fellow and agree with the findings in the documentation.     Radha Eller MD, CAQ, CCD  [facility]  Sports Medicine and Bone Health

## 2023-09-30 ENCOUNTER — HOSPITAL ENCOUNTER (OUTPATIENT)
Dept: MRI IMAGING | Facility: HOSPITAL | Age: 73
Discharge: HOME | End: 2023-09-30
Attending: FAMILY MEDICINE | Admitting: FAMILY MEDICINE
Payer: MEDICARE

## 2023-09-30 DIAGNOSIS — M25.552 LEFT HIP PAIN: ICD-10-CM

## 2023-09-30 PROCEDURE — 73721 MRI JNT OF LWR EXTRE W/O DYE: CPT | Mod: LT,MF

## 2023-09-30 PROCEDURE — G1010 CDSM STANSON: HCPCS

## 2023-10-04 ENCOUNTER — TRANSFERRED RECORDS (OUTPATIENT)
Dept: HEALTH INFORMATION MANAGEMENT | Facility: CLINIC | Age: 73
End: 2023-10-04
Payer: MEDICARE

## 2023-10-10 ENCOUNTER — MYC MEDICAL ADVICE (OUTPATIENT)
Dept: INTERNAL MEDICINE | Facility: CLINIC | Age: 73
End: 2023-10-10
Payer: MEDICARE

## 2023-10-12 ENCOUNTER — OFFICE VISIT (OUTPATIENT)
Dept: ORTHOPEDICS | Facility: CLINIC | Age: 73
End: 2023-10-12
Payer: MEDICARE

## 2023-10-12 DIAGNOSIS — M51.369 LUMBAR DEGENERATIVE DISC DISEASE: Primary | ICD-10-CM

## 2023-10-12 DIAGNOSIS — M25.552 LEFT HIP PAIN: ICD-10-CM

## 2023-10-12 PROCEDURE — 20610 DRAIN/INJ JOINT/BURSA W/O US: CPT | Mod: LT | Performed by: FAMILY MEDICINE

## 2023-10-12 PROCEDURE — 99214 OFFICE O/P EST MOD 30 MIN: CPT | Mod: 25 | Performed by: FAMILY MEDICINE

## 2023-10-12 RX ADMIN — LIDOCAINE HYDROCHLORIDE 4 ML: 10 INJECTION, SOLUTION EPIDURAL; INFILTRATION; INTRACAUDAL; PERINEURAL at 13:53

## 2023-10-12 RX ADMIN — TRIAMCINOLONE ACETONIDE 40 MG: 40 INJECTION, SUSPENSION INTRA-ARTICULAR; INTRAMUSCULAR at 13:53

## 2023-10-12 NOTE — LETTER
10/12/2023      RE: Mike Gonzalez  [address] [facility] [place] [address]     Dear Colleague,    Thank you for referring your patient, Mike Gonzalez, to the [facility] [place]. Please see a copy of my visit note below.     Subjective:   Mike Gonzalez is a 72 year old male who returns for left hip and low back pain.     Date of injury: No injury, chronic  Date last seen: 9/20/2023  Following Therapeutic Plan: Yes imaging obtained  Pain: Unchanged  Function: Unchanged    Prednisone helped with pain and able to walk distance (1.5 blocks before pain instead of 1 block), but wore off after 2 days of stopping.  Patient is not sure if diclofenac has helped at all but continues to take this.    Per last note 9/20/23:  Patient with symptoms and exam suggestive of L sided L1 radiculopathy stopping at the lateral hip. Also describes symptoms concerning for possible trochanteric bursitis/ glute tendiopathy, however exam not particularly concerning for this today. Patient has moderate hip arthritis L>R, but no anterior hip/ groin pain on history or exam today, so suspect that this is less of a contributor of patient's pain.     PAST MEDICAL, SOCIAL, SURGICAL AND FAMILY HISTORY: Past medical, surgical, family and social history was reviewed and unchanged since last visit.  ALLERGIES: He is allergic to effient [prasugrel hcl], hydrochlorothiazide, hydrocodone, oxycontin [oxycodone], peppermint oil, plavix [clopidogrel], sulfa (sulfonamide antibiotics) [sulfa antibiotics], and perfume.    CURRENT MEDICATIONS: He has a current medication list which includes the following prescription(s): acetaminophen, aspirin, co-enzyme q-10, fish oil-omega-3 fatty acids, fluticasone, hydroxyzine, lisinopril, magnesium oxide, metoprolol succinate er, multiple vitamin, pseudoephedrine, senna-docusate, tamsulosin, diclofenac, and [DISCONTINUED] atorvastatin.     REVIEW OF SYSTEMS: 9 point review of systems is  negative except as noted above.     Exam:   There were no vitals taken for this visit.      CONSTITUTIONAL: healthy, alert, and no distress  HEAD: Normocephalic. No masses, lesions, tenderness or abnormalities  SKIN: no suspicious lesions or rashes  PSYCHIATRIC: affect normal/bright and mentation appears normal.    MUSCULOSKELETAL:      MSK:   Musculoskeletal - lumbar spine  - stance: gait with mild forward lean, externally rotated L leg  - inspection: lumbar scoliosis  - palpation: TTP along L1-2, L4-5 and upper sacrum, L greater trochanter, glute insertion  - ROM: Full ROM with some pain in flexion, no pain in extension, bilateral rotation, bilateral sidebending  - strength: 4/5 strength with hip flexion, leg extension, flexion, abduction, adduction.    - special tests:  (-) straight leg raise bilaterally  (-) THO/ FADIR  (-) Scour     IMAGING:  Narrative & Impression   EXAM: MR HIP LEFT WITHOUT CONTRAST  LOCATION: [facility]  DATE: 09/30/2023     INDICATION: 72-year-old male with left-sided lateral hip pain. Concern for glut tendinopathy, trochanteric bursitis. Also known hip OA and lumbar spine DJD.  COMPARISON: 08/07/2023.  TECHNIQUE: Unenhanced.     FINDINGS:     LEFT HIP:   -Labrum: Degenerative anterior labral tear centered along the chondral labral junction where there is a tiny curvilinear paralabral cyst as seen on sagittal image 21. Intrasubstance degeneration and mineralization within the base of the superior labrum   without evidence of a displaced tear.   -Cartilage: Poorly defined low-grade chondrosis without evidence of a high-grade defect. No subchondral bone marrow edema.  -Joint space: No effusion or synovitis.   -Joint capsule/ligaments: Intact joint capsule.     MUSCLES AND TENDONS:   -Gluteal: There is mild gluteus minimus and medius tendinopathy. No tear. There is mild distal gluteus minimus peritendinitis.  -Proximal hamstring: Mild proximal hamstring  tendinopathy. No tear.   -Iliopsoas: Distal iliopsoas tendinopathy. No tear. No iliopsoas bursitis.  -Rectus femoris origin: No tear or tendinopathy.     BONES:   -No evidence of an acute fracture. No concerning marrow replacing lesions. Moderate arthritic changes in the visualized left SI joint where there is joint space narrowing and subchondral bone marrow edema.     SOFT TISSUES:   -Normal muscle bulk. No acute muscular injury.     INTRA-PELVIC CONTENTS:  -Visualized intrapelvic contents are unremarkable.                                                                      IMPRESSION:  1.  Mild left gluteal tendinopathy and peritendinitis. No tear.  2.  Left proximal hamstring and distal iliopsoas tendinopathy. No tear.   3.  Mild degenerative changes of the left hip with a degenerative anterior labral tear and low-grade chondrosis.  4.  Moderate arthritic changes in the visualized left SI joint with subchondral bone marrow edema.                Assessment/Plan:   Lumbar degenerative disc disease  Left hip pain  Patient still with symptoms and exam suggestive of left-sided L1 radiculopathy of the lateral hip however with more evidence of symptomatic greater trochanter pain syndrome with gluteal insertional tendinopathy on MRI.  After extensive discussion decided to proceed with trial of CSI of left greater troch (see procedure note below).   I suspect if this injection does not help that last majority of symptoms are likely coming from patient's spine.  Patient has follow-up with [facility] spine surgeon next week which would greatly help to have their interpretation of patient's symptoms and if epidural or other intervention may or may not be indicated or helpful for patient especially if greater troches injection is not particularly helpful.  Could consider PRP injection to gluteal insertion however this would likely be expensive out-of-pocket and at this time it is unclear how helpful this may potentially be to the  patient.. Left intra-articular hip injection as well given arthritic changes and labral tear on imaging however exam is not particularly suggestive that this is causing patient an issue at this time.  - Follow-up with [facility] spine surgeon as scheduled  -Patient to notify us if greater troch injection helped relieve symptoms in 2 weeks    Follow-up to be determined based on discussions with spine surgeon and injection results.            Large Joint Injection: L greater trochanteric bursa    Date/Time: 10/12/2023 1:53 PM    Performed by: Ambrose Walls MD  Authorized by: Ambrose Walls MD    Indications:  Pain  Needle Size comment:  23 G  Guidance: landmark guided    Approach:  Lateral  Location:  Hip      Site:  L greater trochanteric bursa  Medications:  40 mg triamcinolone 40 MG/ML; 4 mL lidocaine (PF) 1 %  Outcome:  Tolerated well, no immediate complications  Procedure discussed: discussed risks, benefits, and alternatives    Consent Given by:  Patient  Timeout: timeout called immediately prior to procedure    Prep: patient was prepped and draped in usual sterile fashion      Trochanteric Hip Injection  The patient was informed of the risks and the benefits of the procedure and a written consent was signed.  The patient s Left lateral hip was prepped with chlorhexidine in sterile fashion.   40 mg of triamcinolone suspension was drawn up into a 5 mL syringe with 4 mL of 1% lidocaine.  Injection was performed using sterile technique. Using landmark guidance as well as correlation with patient's region of greatest tenderness on palpation at trochanter, a 2-inch 22-gauge needle was used to enter the avinash-trochanteric tissue.    There were no complications. The patient tolerated the procedure well. There was negligible bleeding.   The patient was instructed to ice the hip upon leaving clinic and refrain from overuse over the next 3 days.   The patient was instructed to call or go to the emergency room with  any unusual pain, swelling, redness, or if otherwise concerned.    Options for treatment and/or follow-up care were reviewed with the patient was actively involved in the decision making process. Patient verbalized understanding and was in agreement with the plan.    The patient was seen by and discussed with Dr. Radha Eller MD, CAQ, CCD    Ambrose Walls MD  Primary Care Sports Medicine Fellow  [facility]

## 2023-10-12 NOTE — PROGRESS NOTES
Subjective:   Mike Gonzalez is a 72 year old male who returns for left hip and low back pain.     Date of injury: No injury, chronic  Date last seen: 9/20/2023  Following Therapeutic Plan: Yes imaging obtained  Pain: Unchanged  Function: Unchanged    Prednisone helped with pain and able to walk distance (1.5 blocks before pain instead of 1 block), but wore off after 2 days of stopping.  Patient is not sure if diclofenac has helped at all but continues to take this.    Per last note 9/20/23:  Patient with symptoms and exam suggestive of L sided L1 radiculopathy stopping at the lateral hip. Also describes symptoms concerning for possible trochanteric bursitis/ glute tendiopathy, however exam not particularly concerning for this today. Patient has moderate hip arthritis L>R, but no anterior hip/ groin pain on history or exam today, so suspect that this is less of a contributor of patient's pain.     PAST MEDICAL, SOCIAL, SURGICAL AND FAMILY HISTORY: Past medical, surgical, family and social history was reviewed and unchanged since last visit.  ALLERGIES: He is allergic to effient [prasugrel hcl], hydrochlorothiazide, hydrocodone, oxycontin [oxycodone], peppermint oil, plavix [clopidogrel], sulfa (sulfonamide antibiotics) [sulfa antibiotics], and perfume.    CURRENT MEDICATIONS: He has a current medication list which includes the following prescription(s): acetaminophen, aspirin, co-enzyme q-10, fish oil-omega-3 fatty acids, fluticasone, hydroxyzine, lisinopril, magnesium oxide, metoprolol succinate er, multiple vitamin, pseudoephedrine, senna-docusate, tamsulosin, diclofenac, and [DISCONTINUED] atorvastatin.     REVIEW OF SYSTEMS: 9 point review of systems is negative except as noted above.     Exam:   There were no vitals taken for this visit.      CONSTITUTIONAL: healthy, alert, and no distress  HEAD: Normocephalic. No masses, lesions, tenderness or abnormalities  SKIN: no suspicious lesions or rashes  PSYCHIATRIC:  affect normal/bright and mentation appears normal.    MUSCULOSKELETAL:      MSK:   Musculoskeletal - lumbar spine  - stance: gait with mild forward lean, externally rotated L leg  - inspection: lumbar scoliosis  - palpation: TTP along L1-2, L4-5 and upper sacrum, L greater trochanter, glute insertion  - ROM: Full ROM with some pain in flexion, no pain in extension, bilateral rotation, bilateral sidebending  - strength: 4/5 strength with hip flexion, leg extension, flexion, abduction, adduction.    - special tests:  (-) straight leg raise bilaterally  (-) THO/ FADIR  (-) Scour     IMAGING:  Narrative & Impression   EXAM: MR HIP LEFT WITHOUT CONTRAST  LOCATION: [facility]  DATE: 09/30/2023     INDICATION: 72-year-old male with left-sided lateral hip pain. Concern for glut tendinopathy, trochanteric bursitis. Also known hip OA and lumbar spine DJD.  COMPARISON: 08/07/2023.  TECHNIQUE: Unenhanced.     FINDINGS:     LEFT HIP:   -Labrum: Degenerative anterior labral tear centered along the chondral labral junction where there is a tiny curvilinear paralabral cyst as seen on sagittal image 21. Intrasubstance degeneration and mineralization within the base of the superior labrum   without evidence of a displaced tear.   -Cartilage: Poorly defined low-grade chondrosis without evidence of a high-grade defect. No subchondral bone marrow edema.  -Joint space: No effusion or synovitis.   -Joint capsule/ligaments: Intact joint capsule.     MUSCLES AND TENDONS:   -Gluteal: There is mild gluteus minimus and medius tendinopathy. No tear. There is mild distal gluteus minimus peritendinitis.  -Proximal hamstring: Mild proximal hamstring tendinopathy. No tear.   -Iliopsoas: Distal iliopsoas tendinopathy. No tear. No iliopsoas bursitis.  -Rectus femoris origin: No tear or tendinopathy.     BONES:   -No evidence of an acute fracture. No concerning marrow replacing lesions. Moderate arthritic changes in the  visualized left SI joint where there is joint space narrowing and subchondral bone marrow edema.     SOFT TISSUES:   -Normal muscle bulk. No acute muscular injury.     INTRA-PELVIC CONTENTS:  -Visualized intrapelvic contents are unremarkable.                                                                      IMPRESSION:  1.  Mild left gluteal tendinopathy and peritendinitis. No tear.  2.  Left proximal hamstring and distal iliopsoas tendinopathy. No tear.   3.  Mild degenerative changes of the left hip with a degenerative anterior labral tear and low-grade chondrosis.  4.  Moderate arthritic changes in the visualized left SI joint with subchondral bone marrow edema.                Assessment/Plan:   Lumbar degenerative disc disease  Left hip pain  Patient still with symptoms and exam suggestive of left-sided L1 radiculopathy of the lateral hip however with more evidence of symptomatic greater trochanter pain syndrome with gluteal insertional tendinopathy on MRI.  After extensive discussion decided to proceed with trial of CSI of left greater troch (see procedure note below).   I suspect if this injection does not help that last majority of symptoms are likely coming from patient's spine.  Patient has follow-up with [facility] spine surgeon next week which would greatly help to have their interpretation of patient's symptoms and if epidural or other intervention may or may not be indicated or helpful for patient especially if greater troches injection is not particularly helpful.  Could consider PRP injection to gluteal insertion however this would likely be expensive out-of-pocket and at this time it is unclear how helpful this may potentially be to the patient.. Left intra-articular hip injection as well given arthritic changes and labral tear on imaging however exam is not particularly suggestive that this is causing patient an issue at this time.  - Follow-up with [facility] spine surgeon as scheduled  -Patient to notify us  if greater troch injection helped relieve symptoms in 2 weeks    Follow-up to be determined based on discussions with spine surgeon and injection results.            Large Joint Injection: L greater trochanteric bursa    Date/Time: 10/12/2023 1:53 PM    Performed by: Ambrose Walls MD  Authorized by: Ambrose Walls MD    Indications:  Pain  Needle Size comment:  23 G  Guidance: landmark guided    Approach:  Lateral  Location:  Hip      Site:  L greater trochanteric bursa  Medications:  40 mg triamcinolone 40 MG/ML; 4 mL lidocaine (PF) 1 %  Outcome:  Tolerated well, no immediate complications  Procedure discussed: discussed risks, benefits, and alternatives    Consent Given by:  Patient  Timeout: timeout called immediately prior to procedure    Prep: patient was prepped and draped in usual sterile fashion      Trochanteric Hip Injection  The patient was informed of the risks and the benefits of the procedure and a written consent was signed.  The patient s Left lateral hip was prepped with chlorhexidine in sterile fashion.   40 mg of triamcinolone suspension was drawn up into a 5 mL syringe with 4 mL of 1% lidocaine.  Injection was performed using sterile technique. Using landmark guidance as well as correlation with patient's region of greatest tenderness on palpation at trochanter, a 2-inch 22-gauge needle was used to enter the avinash-trochanteric tissue.    There were no complications. The patient tolerated the procedure well. There was negligible bleeding.   The patient was instructed to ice the hip upon leaving clinic and refrain from overuse over the next 3 days.   The patient was instructed to call or go to the emergency room with any unusual pain, swelling, redness, or if otherwise concerned.    Options for treatment and/or follow-up care were reviewed with the patient was actively involved in the decision making process. Patient verbalized understanding and was in agreement with the plan.    The  patient was seen by and discussed with Dr. Radha Eller MD, CAQ, CCD    Ambrose Walls MD  Primary Care Sports Medicine Fellow  [facility]

## 2023-10-12 NOTE — NURSING NOTE
[address] [address]  Dept: [phone]  ______________________________________________________________________________    Patient: Mike Gonzalez   : 1950   MRN: [id number]   2023    INVASIVE PROCEDURE SAFETY CHECKLIST    Date: 2023   Procedure:Left hip greater trochanteric bursa cortisone injection  Patient Name: Mike Gonzalez  MRN: [id number]  YOB: 1950    Action: Complete sections as appropriate. Any discrepancy results in a HARD COPY until resolved.     PRE PROCEDURE:  Patient ID verified with 2 identifiers (name and  or MRN): Yes  Procedure and site verified with patient/designee (when able): Yes  Accurate consent documentation in medical record: Yes  H&P (or appropriate assessment) documented in medical record: Yes  H&P must be up to 20 days prior to procedure and updates within 24 hours of procedure as applicable: NA  Relevant diagnostic and radiology test results appropriately labeled and displayed as applicable: Yes  Procedure site(s) marked with provider initials: NA    TIMEOUT:  Time-Out performed immediately prior to starting procedure, including verbal and active participation of all team members addressing the following:Yes  * Correct patient identify  * Confirmed that the correct side and site are marked  * An accurate procedure consent form  * Agreement on the procedure to be done  * Correct patient position  * Relevant images and results are properly labeled and appropriately displayed  * The need to administer antibiotics or fluids for irrigation purposes during the procedure as applicable   * Safety precautions based on patient history or medication use    DURING PROCEDURE: Verification of correct person, site, and procedures any time the responsibility for care of the patient is transferred to another member of the care team.       Prior to injection, verified patient identity  using patient's name and date of birth.  Due to injection administration, patient instructed to remain in clinic for 15 minutes  afterwards, and to report any adverse reaction to me immediately.    Bursa injection was performed.      Drug Amount Wasted:  Yes: 1 mg/ml   Vial/Syringe: Single dose vial  Expiration Date:  04/2027      Natalya Gar, MARIAM  October 12, 2023

## 2023-10-24 ENCOUNTER — MYC MEDICAL ADVICE (OUTPATIENT)
Dept: ORTHOPEDICS | Facility: CLINIC | Age: 73
End: 2023-10-24
Payer: MEDICARE

## 2023-10-24 DIAGNOSIS — M51.369 LUMBAR DEGENERATIVE DISC DISEASE: Primary | ICD-10-CM

## 2023-10-25 ENCOUNTER — TRANSFERRED RECORDS (OUTPATIENT)
Dept: HEALTH INFORMATION MANAGEMENT | Facility: CLINIC | Age: 73
End: 2023-10-25
Payer: MEDICARE

## 2023-11-05 RX ORDER — LIDOCAINE HYDROCHLORIDE 10 MG/ML
4 INJECTION, SOLUTION EPIDURAL; INFILTRATION; INTRACAUDAL; PERINEURAL
Status: SHIPPED | OUTPATIENT
Start: 2023-10-12

## 2023-11-05 RX ORDER — TRIAMCINOLONE ACETONIDE 40 MG/ML
40 INJECTION, SUSPENSION INTRA-ARTICULAR; INTRAMUSCULAR
Status: SHIPPED | OUTPATIENT
Start: 2023-10-12

## 2023-11-06 ENCOUNTER — OFFICE VISIT (OUTPATIENT)
Dept: CARDIOLOGY | Facility: CLINIC | Age: 73
End: 2023-11-06
Payer: MEDICARE

## 2023-11-06 VITALS
HEART RATE: 65 BPM | DIASTOLIC BLOOD PRESSURE: 68 MMHG | OXYGEN SATURATION: 95 % | SYSTOLIC BLOOD PRESSURE: 116 MMHG | RESPIRATION RATE: 18 BRPM

## 2023-11-06 DIAGNOSIS — I42.8 NONISCHEMIC CARDIOMYOPATHY (H): ICD-10-CM

## 2023-11-06 DIAGNOSIS — I48.19 PERSISTENT ATRIAL FIBRILLATION (H): Primary | ICD-10-CM

## 2023-11-06 DIAGNOSIS — E78.5 HYPERLIPIDEMIA, UNSPECIFIED HYPERLIPIDEMIA TYPE: ICD-10-CM

## 2023-11-06 DIAGNOSIS — I25.10 NONOBSTRUCTIVE ATHEROSCLEROSIS OF CORONARY ARTERY: ICD-10-CM

## 2023-11-06 DIAGNOSIS — E66.9 OBESITY, UNSPECIFIED CLASSIFICATION, UNSPECIFIED OBESITY TYPE, UNSPECIFIED WHETHER SERIOUS COMORBIDITY PRESENT: ICD-10-CM

## 2023-11-06 PROCEDURE — 99214 OFFICE O/P EST MOD 30 MIN: CPT | Performed by: GENERAL ACUTE CARE HOSPITAL

## 2023-11-06 RX ORDER — ROSUVASTATIN CALCIUM 5 MG/1
5 TABLET, COATED ORAL DAILY
Qty: 90 TABLET | Refills: 3 | Status: SHIPPED | OUTPATIENT
Start: 2023-11-06 | End: 2024-01-06

## 2023-11-06 NOTE — LETTER
11/6/2023    Onur Rod MD  [address] [facility] [address]  [facility] [address]    RE: Mike Gonzalez       Dear Colleague,     I had the pleasure of seeing Mike Gonzalez in the [facility].  HEART CARE ENCOUNTER NOTE        Assessment/Recommendations   Assessment:    Permanent atrial fibrillation status post 31 mm Watchman FLX left atrial appendage closure device placement on 7/14/2021. Stable small peridevice leak measuring less than 5 mm.  Nonischemic cardiomyopathy with left ventricular ejection fraction of 40%. NYHA class I, appears euvolemic.  Mild nonobstructive coronary artery disease see on coronary angiography 1/20/2021.  Essential hypertension.   Hyperlipidemia. Last  mg/dL.  Obesity.    Plan:  Aspirin 81 mg daily.  Metoprolol succinate 12.5 mg and lisinopril 5 mg daily.  We discussed the risks and benefits of statin therapy and he is agreeable to trying low dose rosuvastatin 5 mg daily, which is effective and generally well-tolerated.  Follow-up with me in 1 year.         History of Present Illness   Mr. Mike Gonzalez is a 73 year old male with a significant past history of persistent AF s/p 31 mm Watchman FLX device placement on 7/14/2021, nonobstructive CAD, and HFrEF LVEF 40% presenting for follow-up.     He feels tired and gets headaches frequently. He denies snoring or apneic episodes. He continues to struggle with back and hip pain despite two surgeries. He previously stopped aspirin but has since resumed it. No chest pain/pressure/tightness, shortness of breath at rest or with exertion, light headedness/dizziness, pre-syncope, syncope, lower extremity swelling, palpitations, paroxysmal nocturnal dyspnea (PND), or orthopnea.    He recalls being on a statin many years ago but does not know if it caused any side effects.     Cardiac Problems and Cardiac Diagnostics     Most Recent Cardiac testing:  ECG dated 1/11/2022 (personaly reviewed and interpreted): atrial fibrillation,  ventricular rate 61 bpm, possible anterior infarct, left anterior fascicular block     Holter monitor 3/4/2021 (report reviewed):  HOLTER MONITOR     TEST INDICATION:  Evaluate for atrial fibrillation.     IMPRESSION:  1.  A 24-hour Holter monitor was applied 03/04/2021 with date of interpretation 03/08/2021.  2.  Baseline tracing showed atrial fibrillation, which is present throughout the recording.  There is an IVCD, incomplete left bundle branch block.  3.  Average heart rate of 65 beats per minute and ranges between 39 and 122 beats per minute.  4.  Modest nocturnal bradycardia is seen.  There is no major bradycardia during waking hours.  5.  Ventricular ectopy is infrequent with 1362 PVCs which is 1.4% of total heartbeats.  No salvos, no nonsustained ventricular tachycardia.  6.  Patient activity diary was reviewed, but no symptoms noted.     DISCUSSION:  Persistent atrial fibrillation with controlled ventricular response.     ECHO 10/18/2021 (report reviewed):  1. The left ventricle is mildly enlarged. Left ventricular systolic performance is moderately reduced. The ejection fraction is estimated to be 40%.  2. There is moderate global reduction in left ventricular systolic performance.  3. There is mild concentric increase in left ventricular wall thickness.  4. No significant valvular heart disease is identified on this study.  5. Borderline right ventricular enlargement with normal right ventricular systolic performance.  6. There is severe left atrial enlargement. There is moderate right atrial enlargement.  7. There is a left atrial appendage occlusion device.  Â  No thrombus is detected upon the surface of the device.  Â  There is a localized region of flow around the device between the device itself and the left lateral ridge. The gap measures 0.35-0.5 cm and appears similar to the findings on the 2 September 2021 transesophageal echocardiogram.  8. There is mild spontaneous echo contrast in the left  atrium though no thrombus detected.  9. Color Doppler interrogation of the atrial septum reveals the presence of a patent foramen ovale (PFO). No right to left shunting, however, was elicited with echo contrast injection either with spontaneous respiration or Valsalva.     Stress test 1/5/2021 (report reviewed):    The nuclear stress test is abnormal. There is no ischemia but there is a medium sized area of a moderate degree of nontransmural infarction in the distal anterior segment(s) of the left ventricle. This appears to be in the left anterior descending artery distribution.    The stress electrocardiogram is negative for inducible ischemic EKG changes. Occasional PVCs were noted during stress and recovery.    The left ventricle is dilated and the left ventricular ejection fraction at stress is mildly decreased at 44%.    Low-to-intermediate risk of future ischemic events.    There is no prior study for comparison.     Cardiac cath 1/20/2021 (report reviewed):     Mild coronary atherosclerosis.    LV EDP 20 mmHg.     Medications  Allergies   Current Outpatient Medications   Medication Sig Dispense Refill    acetaminophen (TYLENOL) 650 MG CR tablet Take 650 mg by mouth every 8 hours as needed      aspirin 81 MG EC tablet Take 1 tablet (81 mg) by mouth daily      co-enzyme Q-10 100 MG CAPS capsule Take 200 mg by mouth daily      fish oil-omega-3 fatty acids 300-1,000 mg capsule [FISH OIL-OMEGA-3 FATTY ACIDS 300-1,000 MG CAPSULE] Take 2 g by mouth daily.      fluticasone (FLONASE) 50 MCG/ACT nasal spray Spray 1 spray into both nostrils daily 48 mL 2    lisinopril (ZESTRIL) 5 MG tablet TAKE 1 TABLET DAILY (Patient taking differently: Take 5 mg by mouth daily) 90 tablet 3    metoprolol succinate ER (TOPROL XL) 25 MG 24 hr tablet TAKE 1/2 TABLET DAILY 45 tablet 3    MULTIVITAMIN ORAL Take 1 tablet by mouth daily      pseudoePHEDrine (SUDAFED) 120 MG 12 hr tablet Take 1 tablet (120 mg) by mouth every 12 hours (Patient  taking differently: Take 120 mg by mouth every 12 hours as needed for congestion) 60 tablet 3    senna-docusate (SENOKOT-S/PERICOLACE) 8.6-50 MG tablet Take 1-2 tablets by mouth 2 times daily Take while on oral narcotics to prevent or treat constipation. 30 tablet 0    tamsulosin (FLOMAX) 0.4 MG capsule TAKE 2 CAPSULES DAILY. 180 capsule 3    diclofenac (VOLTAREN) 75 MG EC tablet Take 1 tablet (75 mg) by mouth 2 times daily for 14 days 28 tablet 1      Allergies   Allergen Reactions    Effient [Prasugrel Hcl] Hives    Hydrochlorothiazide Unknown    Hydrocodone Swelling     Sinus    Oxycontin [Oxycodone] Swelling     lip    Peppermint Oil Other (See Comments)    Plavix [Clopidogrel] Hives    Sulfa (Sulfonamide Antibiotics) [Sulfa Antibiotics] Swelling    Perfume Swelling     Other reaction(s): Runny Nose        Physical Examination Review of Systems   /68 (BP Location: Right arm, Patient Position: Sitting, Cuff Size: Adult Large)   Pulse 65   Resp 18   SpO2 95%   Wt Readings from Last 3 Encounters:   08/21/23 108.4 kg (239 lb)   06/22/23 108.9 kg (240 lb)   05/26/23 108.4 kg (239 lb)       General Appearance:   Pleasant  male, appears  stated age. no acute distress, obese body habitus   ENT/Mouth: membranes moist, no apparent gingival bleeding.      EYES:  no scleral icterus, normal conjunctivae   Neck: no carotid bruits. No anterior cervical lymphadenopaty   Respiratory:   lungs are clear to auscultation, no rales or wheezing, equal chest wall expansion    Cardiovascular:   Regular rhythm, normal rate. Normal first and second heart sounds with no murmurs, rubs, or gallops; the carotid, radial and posterior tibial pulses are intact, Jugular venous pressure normal, no edema bilaterally    Abdomen/GI:  no organomegaly, masses, bruits, or tenderness; bowel sounds are present   Extremities: no cyanosis or clubbing   Skin: no xanthelasma, warm.    Heme/lymph/ Immunology No apparent bleeding noted.   Neurologic:  Alert and oriented. normal gait, no tremors     Psychiatric: Pleasant, calm, appropriate affect.    A complete 10 system review of systems was performed and is negative except as mentioned in the HPI/subjective.         Past History   Past Medical History:   Past Medical History:   Diagnosis Date    Angioedema     Atrial fibrillation (H) 2019    Atrial fibrillation and flutter (H)     Atrial flutter (H) 2019    Basal cell carcinoma     Branch retinal vein occlusion of left eye (H28) 07/01/2017    CAD (coronary artery disease)     ANGIOGRAM 2021 Left Main The vessel was visualized by angiography, is moderate in size and is angiographically normal. Left Anterior Descending Mid LAD lesion is 25% stenosed. Ramus Intermedius Ramus lesion is 10% stenosed. Left Circumflex Dist Cx lesion is 30% stenosed. Right Coronary Artery The vessel was visualized by angiography, is moderate in size and is angiographically normal.      Chronic neck pain     Congestive heart failure (H) 2021    GERD (gastroesophageal reflux disease)     HTN (hypertension) 2015    Hx of atrial flutter     Irregular heart beat     Nonsmoker     Radiculopathy     cervical    Retinal hemorrhage     Vessel rupture in left retina    Rhinitis, allergic     S/P colonoscopy 04/15/2019    Urticaria        Past Surgical History:   Past Surgical History:   Procedure Laterality Date    BASAL CELL CARCINOMA EXCISION  01/01/2020    Left nasal reconstruction    CATARACT EXTRACTION, BILATERAL Bilateral 2022    CERVICAL SPINE SURGERY      C8, T1, foraminotomy    CV CORONARY ANGIOGRAM N/A 01/20/2021    Procedure: Coronary Angiogram;  Surgeon: Willow Wellington MD;  Location: [facility] Cardiac Cath Lab;  Service: Cardiology    CV LEFT ATRIAL APPENDAGE CLOSURE N/A 07/14/2021    Procedure: CV LEFT ATRIAL APPENDAGE CLOSURE;  Surgeon: Noah Gutierres MD;  Location:  HEART CARDIAC CATH LAB    CV LEFT HEART CATHETERIZATION WITHOUT LEFT VENTRICULOGRAM Left 01/20/2021     Procedure: Left Heart Catheterization Without Left Ventriculogram;  Surgeon: Willow Wellington MD;  Location: [facility] Cardiac Cath Lab;  Service: Cardiology    DECOMPRESSION, FUSION LUMBAR POSTERIOR ONE LEVEL, COMBINED Left 3/30/2023    Procedure: LEFT LUMBAR 4-5 POSTERIOR SPINAL FUSION WITH LEFT LUMBAR 4-5 LAMINOFORAMINOTOMY AND WIDE LUMBAR 4-5 LEFT SUBTOTAL FACETECTOMY WITH ALLOGRAFT AND AUTOGRAFT;  Surgeon: Ambrose Degroot MD;  Location: [facility] Main OR    HERNIA REPAIR, UMBILICAL       ×2    REVERSE TOTAL SHOULDER ARTHROPLASTY Left 05/01/2019       Family History:   Family History   Problem Relation Age of Onset    Arthritis Mother     Other - See Comments Mother         psychiatry/pvd    Other - See Comments Father         blood reaction     Social History:   Social History     Socioeconomic History    Marital status:      Spouse name: Not on file    Number of children: Not on file    Years of education: Not on file    Highest education level: Not on file   Occupational History    Not on file   Tobacco Use    Smoking status: Never    Smokeless tobacco: Never   Substance and Sexual Activity    Alcohol use: Never     Comment: Alcoholic Drinks/day: About once a year.    Drug use: No    Sexual activity: Not on file   Other Topics Concern    Parent/sibling w/ CABG, MI or angioplasty before 65F 55M? Not Asked   Social History Narrative    Patient of Dr. Rod since 2001.    .  Wife is a former RN.     Wife's cousin is Dr. Jean Pierre Champagne, ID specialist.     Social Determinants of Health     Financial Resource Strain: Not on file   Food Insecurity: Not on file   Transportation Needs: Not on file   Physical Activity: Not on file   Stress: Not on file   Social Connections: Not on file   Interpersonal Safety: Not on file   Housing Stability: Not on file              Lab Results    Chemistry/lipid CBC Cardiac Enzymes/BNP/TSH/INR   Lab Results   Component Value Date    CHOL 168 09/08/2022    HDL 55  09/08/2022     09/08/2022    TRIG 66 09/08/2022    CR 0.95 06/22/2023    BUN 18.6 06/22/2023    POTASSIUM 4.4 06/22/2023     06/22/2023    CO2 26 06/22/2023      Lab Results   Component Value Date    WBC 7.4 06/22/2023    HGB 14.6 06/22/2023    HCT 42.7 06/22/2023    MCV 92 06/22/2023     06/22/2023    A1C 5.3 06/29/2017     Lab Results   Component Value Date    A1C 5.3 06/29/2017    Lab Results   Component Value Date    TROPONINI 0.02 06/18/2020     (H) 11/30/2020    TSH 1.92 09/08/2022    INR 1.01 09/08/2022          Kiran Tyler MD [facility]  Non-Invasive Cardiologist  [facility]  Pager [phone]      Thank you for allowing me to participate in the care of your patient.      Sincerely,     Kiran Tyler MD     [facility]  cc:   No referring provider defined for this encounter.

## 2023-11-06 NOTE — PROGRESS NOTES
HEART CARE ENCOUNTER NOTE        Assessment/Recommendations   Assessment:    Permanent atrial fibrillation status post 31 mm Watchman FLX left atrial appendage closure device placement on 7/14/2021. Stable small peridevice leak measuring less than 5 mm.  Nonischemic cardiomyopathy with left ventricular ejection fraction of 40%. NYHA class I, appears euvolemic.  Mild nonobstructive coronary artery disease see on coronary angiography 1/20/2021.  Essential hypertension.   Hyperlipidemia. Last  mg/dL.  Obesity.    Plan:  Aspirin 81 mg daily.  Metoprolol succinate 12.5 mg and lisinopril 5 mg daily.  We discussed the risks and benefits of statin therapy and he is agreeable to trying low dose rosuvastatin 5 mg daily, which is effective and generally well-tolerated.  Follow-up with me in 1 year.         History of Present Illness   Mr. Mike Gonzalez is a 73 year old male with a significant past history of persistent AF s/p 31 mm Watchman FLX device placement on 7/14/2021, nonobstructive CAD, and HFrEF LVEF 40% presenting for follow-up.     He feels tired and gets headaches frequently. He denies snoring or apneic episodes. He continues to struggle with back and hip pain despite two surgeries. He previously stopped aspirin but has since resumed it. No chest pain/pressure/tightness, shortness of breath at rest or with exertion, light headedness/dizziness, pre-syncope, syncope, lower extremity swelling, palpitations, paroxysmal nocturnal dyspnea (PND), or orthopnea.    He recalls being on a statin many years ago but does not know if it caused any side effects.     Cardiac Problems and Cardiac Diagnostics     Most Recent Cardiac testing:  ECG dated 1/11/2022 (personaly reviewed and interpreted): atrial fibrillation, ventricular rate 61 bpm, possible anterior infarct, left anterior fascicular block     Holter monitor 3/4/2021 (report reviewed):  HOLTER MONITOR     TEST INDICATION:  Evaluate for atrial fibrillation.      IMPRESSION:  1.  A 24-hour Holter monitor was applied 03/04/2021 with date of interpretation 03/08/2021.  2.  Baseline tracing showed atrial fibrillation, which is present throughout the recording.  There is an IVCD, incomplete left bundle branch block.  3.  Average heart rate of 65 beats per minute and ranges between 39 and 122 beats per minute.  4.  Modest nocturnal bradycardia is seen.  There is no major bradycardia during waking hours.  5.  Ventricular ectopy is infrequent with 1362 PVCs which is 1.4% of total heartbeats.  No salvos, no nonsustained ventricular tachycardia.  6.  Patient activity diary was reviewed, but no symptoms noted.     DISCUSSION:  Persistent atrial fibrillation with controlled ventricular response.     ECHO 10/18/2021 (report reviewed):  1. The left ventricle is mildly enlarged. Left ventricular systolic performance is moderately reduced. The ejection fraction is estimated to be 40%.  2. There is moderate global reduction in left ventricular systolic performance.  3. There is mild concentric increase in left ventricular wall thickness.  4. No significant valvular heart disease is identified on this study.  5. Borderline right ventricular enlargement with normal right ventricular systolic performance.  6. There is severe left atrial enlargement. There is moderate right atrial enlargement.  7. There is a left atrial appendage occlusion device.  Â  No thrombus is detected upon the surface of the device.  Â  There is a localized region of flow around the device between the device itself and the left lateral ridge. The gap measures 0.35-0.5 cm and appears similar to the findings on the 2 September 2021 transesophageal echocardiogram.  8. There is mild spontaneous echo contrast in the left atrium though no thrombus detected.  9. Color Doppler interrogation of the atrial septum reveals the presence of a patent foramen ovale (PFO). No right to left shunting, however, was elicited with echo  contrast injection either with spontaneous respiration or Valsalva.     Stress test 1/5/2021 (report reviewed):    The nuclear stress test is abnormal. There is no ischemia but there is a medium sized area of a moderate degree of nontransmural infarction in the distal anterior segment(s) of the left ventricle. This appears to be in the left anterior descending artery distribution.    The stress electrocardiogram is negative for inducible ischemic EKG changes. Occasional PVCs were noted during stress and recovery.    The left ventricle is dilated and the left ventricular ejection fraction at stress is mildly decreased at 44%.    Low-to-intermediate risk of future ischemic events.    There is no prior study for comparison.     Cardiac cath 1/20/2021 (report reviewed):     Mild coronary atherosclerosis.    LV EDP 20 mmHg.     Medications  Allergies   Current Outpatient Medications   Medication Sig Dispense Refill    acetaminophen (TYLENOL) 650 MG CR tablet Take 650 mg by mouth every 8 hours as needed      aspirin 81 MG EC tablet Take 1 tablet (81 mg) by mouth daily      co-enzyme Q-10 100 MG CAPS capsule Take 200 mg by mouth daily      fish oil-omega-3 fatty acids 300-1,000 mg capsule [FISH OIL-OMEGA-3 FATTY ACIDS 300-1,000 MG CAPSULE] Take 2 g by mouth daily.      fluticasone (FLONASE) 50 MCG/ACT nasal spray Spray 1 spray into both nostrils daily 48 mL 2    lisinopril (ZESTRIL) 5 MG tablet TAKE 1 TABLET DAILY (Patient taking differently: Take 5 mg by mouth daily) 90 tablet 3    metoprolol succinate ER (TOPROL XL) 25 MG 24 hr tablet TAKE 1/2 TABLET DAILY 45 tablet 3    MULTIVITAMIN ORAL Take 1 tablet by mouth daily      pseudoePHEDrine (SUDAFED) 120 MG 12 hr tablet Take 1 tablet (120 mg) by mouth every 12 hours (Patient taking differently: Take 120 mg by mouth every 12 hours as needed for congestion) 60 tablet 3    senna-docusate (SENOKOT-S/PERICOLACE) 8.6-50 MG tablet Take 1-2 tablets by mouth 2 times daily Take while  on oral narcotics to prevent or treat constipation. 30 tablet 0    tamsulosin (FLOMAX) 0.4 MG capsule TAKE 2 CAPSULES DAILY. 180 capsule 3    diclofenac (VOLTAREN) 75 MG EC tablet Take 1 tablet (75 mg) by mouth 2 times daily for 14 days 28 tablet 1      Allergies   Allergen Reactions    Effient [Prasugrel Hcl] Hives    Hydrochlorothiazide Unknown    Hydrocodone Swelling     Sinus    Oxycontin [Oxycodone] Swelling     lip    Peppermint Oil Other (See Comments)    Plavix [Clopidogrel] Hives    Sulfa (Sulfonamide Antibiotics) [Sulfa Antibiotics] Swelling    Perfume Swelling     Other reaction(s): Runny Nose        Physical Examination Review of Systems   /68 (BP Location: Right arm, Patient Position: Sitting, Cuff Size: Adult Large)   Pulse 65   Resp 18   SpO2 95%   Wt Readings from Last 3 Encounters:   08/21/23 108.4 kg (239 lb)   06/22/23 108.9 kg (240 lb)   05/26/23 108.4 kg (239 lb)       General Appearance:   Pleasant  male, appears  stated age. no acute distress, obese body habitus   ENT/Mouth: membranes moist, no apparent gingival bleeding.      EYES:  no scleral icterus, normal conjunctivae   Neck: no carotid bruits. No anterior cervical lymphadenopaty   Respiratory:   lungs are clear to auscultation, no rales or wheezing, equal chest wall expansion    Cardiovascular:   Regular rhythm, normal rate. Normal first and second heart sounds with no murmurs, rubs, or gallops; the carotid, radial and posterior tibial pulses are intact, Jugular venous pressure normal, no edema bilaterally    Abdomen/GI:  no organomegaly, masses, bruits, or tenderness; bowel sounds are present   Extremities: no cyanosis or clubbing   Skin: no xanthelasma, warm.    Heme/lymph/ Immunology No apparent bleeding noted.   Neurologic: Alert and oriented. normal gait, no tremors     Psychiatric: Pleasant, calm, appropriate affect.    A complete 10 system review of systems was performed and is negative except as mentioned in the  HPI/subjective.         Past History   Past Medical History:   Past Medical History:   Diagnosis Date    Angioedema     Atrial fibrillation (H) 2019    Atrial fibrillation and flutter (H)     Atrial flutter (H) 2019    Basal cell carcinoma     Branch retinal vein occlusion of left eye (H28) 07/01/2017    CAD (coronary artery disease)     ANGIOGRAM 2021 Left Main The vessel was visualized by angiography, is moderate in size and is angiographically normal. Left Anterior Descending Mid LAD lesion is 25% stenosed. Ramus Intermedius Ramus lesion is 10% stenosed. Left Circumflex Dist Cx lesion is 30% stenosed. Right Coronary Artery The vessel was visualized by angiography, is moderate in size and is angiographically normal.      Chronic neck pain     Congestive heart failure (H) 2021    GERD (gastroesophageal reflux disease)     HTN (hypertension) 2015    Hx of atrial flutter     Irregular heart beat     Nonsmoker     Radiculopathy     cervical    Retinal hemorrhage     Vessel rupture in left retina    Rhinitis, allergic     S/P colonoscopy 04/15/2019    Urticaria        Past Surgical History:   Past Surgical History:   Procedure Laterality Date    BASAL CELL CARCINOMA EXCISION  01/01/2020    Left nasal reconstruction    CATARACT EXTRACTION, BILATERAL Bilateral 2022    CERVICAL SPINE SURGERY      C8, T1, foraminotomy    CV CORONARY ANGIOGRAM N/A 01/20/2021    Procedure: Coronary Angiogram;  Surgeon: Willow Wellington MD;  Location: [facility] Cardiac Cath Lab;  Service: Cardiology    CV LEFT ATRIAL APPENDAGE CLOSURE N/A 07/14/2021    Procedure: CV LEFT ATRIAL APPENDAGE CLOSURE;  Surgeon: Noah Gutierres MD;  Location:  HEART CARDIAC CATH LAB    CV LEFT HEART CATHETERIZATION WITHOUT LEFT VENTRICULOGRAM Left 01/20/2021    Procedure: Left Heart Catheterization Without Left Ventriculogram;  Surgeon: Willow Wellington MD;  Location: [facility] Cardiac Cath Lab;  Service: Cardiology    DECOMPRESSION, FUSION LUMBAR POSTERIOR  ONE LEVEL, COMBINED Left 3/30/2023    Procedure: LEFT LUMBAR 4-5 POSTERIOR SPINAL FUSION WITH LEFT LUMBAR 4-5 LAMINOFORAMINOTOMY AND WIDE LUMBAR 4-5 LEFT SUBTOTAL FACETECTOMY WITH ALLOGRAFT AND AUTOGRAFT;  Surgeon: Ambrose Degroot MD;  Location: [facility] Main OR    HERNIA REPAIR, UMBILICAL       ×2    REVERSE TOTAL SHOULDER ARTHROPLASTY Left 05/01/2019       Family History:   Family History   Problem Relation Age of Onset    Arthritis Mother     Other - See Comments Mother         psychiatry/pvd    Other - See Comments Father         blood reaction     Social History:   Social History     Socioeconomic History    Marital status:      Spouse name: Not on file    Number of children: Not on file    Years of education: Not on file    Highest education level: Not on file   Occupational History    Not on file   Tobacco Use    Smoking status: Never    Smokeless tobacco: Never   Substance and Sexual Activity    Alcohol use: Never     Comment: Alcoholic Drinks/day: About once a year.    Drug use: No    Sexual activity: Not on file   Other Topics Concern    Parent/sibling w/ CABG, MI or angioplasty before 65F 55M? Not Asked   Social History Narrative    Patient of Dr. Rod since 2001.    .  Wife is a former RN.     Wife's cousin is Dr. Jean Pierre Champagne, ID specialist.     Social Determinants of Health     Financial Resource Strain: Not on file   Food Insecurity: Not on file   Transportation Needs: Not on file   Physical Activity: Not on file   Stress: Not on file   Social Connections: Not on file   Interpersonal Safety: Not on file   Housing Stability: Not on file              Lab Results    Chemistry/lipid CBC Cardiac Enzymes/BNP/TSH/INR   Lab Results   Component Value Date    CHOL 168 09/08/2022    HDL 55 09/08/2022     09/08/2022    TRIG 66 09/08/2022    CR 0.95 06/22/2023    BUN 18.6 06/22/2023    POTASSIUM 4.4 06/22/2023     06/22/2023    CO2 26 06/22/2023      Lab Results   Component Value  Date    WBC 7.4 06/22/2023    HGB 14.6 06/22/2023    HCT 42.7 06/22/2023    MCV 92 06/22/2023     06/22/2023    A1C 5.3 06/29/2017     Lab Results   Component Value Date    A1C 5.3 06/29/2017    Lab Results   Component Value Date    TROPONINI 0.02 06/18/2020     (H) 11/30/2020    TSH 1.92 09/08/2022    INR 1.01 09/08/2022          Kiran Tyler MD [facility]  Non-Invasive Cardiologist  [facility]  Pager [phone]

## 2023-11-06 NOTE — PROGRESS NOTES
Attending Note:   I have examined this patient and have reviewed the clinical presentation and progress note with the fellow. I agree with the treatment plan as outlined. The plan was formulated with the fellow on the day of the patient's visit. I have directly supervised the injection.     Radha Eller MD, CAQ, CCD  [facility]  Sports Medicine and Bone Health

## 2023-11-06 NOTE — PATIENT INSTRUCTIONS
"We can try a low dose of a cholesterol medication called \"rosuvastatin\".  Continue on your other medications.  See me back in 1 year.  "

## 2023-11-14 ENCOUNTER — MYC MEDICAL ADVICE (OUTPATIENT)
Dept: INTERNAL MEDICINE | Facility: CLINIC | Age: 73
End: 2023-11-14
Payer: MEDICARE

## 2023-11-15 NOTE — TELEPHONE ENCOUNTER
Action 11/15/23 MV 4.35pm   Action Taken Imaging request faxed to [facility]     Action 11/16/23 MV 9.35am   Action Taken [facility] images resolved in PACS       SPINE PATIENTS - NEW PROTOCOL PREVISIT    RECORDS RECEIVED FROM: Internal   REASON FOR VISIT: 73yoM PMH of C-spine and L-spine DJD s/p lumbar decompression surgery (most recent L4-5 at [facility] w/ Dr. House) with L sided likely radiculopathy. Would like second opinion through [facility]    Date of Appt: 11/17/23   NOTES (FOR ALL VISITS) STATUS DETAILS   OFFICE NOTE from referring provider Internal Dr Ambrose Walls @ [facility] Sports Med:  10/24/23 mychart encounter  10/12/23  9/20/23   OFFICE NOTE from other specialist Received Dr Amborse House @ [facility]:  10/25/23  6/21/23  5/30/23  5/10/23  (Additional)   OPERATIVE REPORT Internal/CE [facility]:  3/30/23  LEFT LUMBAR 4-5 POSTERIOR SPINAL FUSION WITH LEFT LUMBAR 4-5 LAMINOFORAMINOTOMY AND WIDE LUMBAR 4-5 LEFT SUBTOTAL FACETECTOMY WITH ALLOGRAFT AND AUTOGRAFT     [facility]:  1/24/22  LEFT L3-4 LATERAL RECESS DECOMPRESSION / LEFT L4-5 LAMINOFORAMINOTOMY POSTERIOR APPROACH LUMBAR SPINE / LEFT L4 NERVE ROOT DECOMPRESSION    MEDICATION LIST Internal    IMAGING  (FOR ALL VISITS)     MRI (HEAD, NECK, SPINE) In process [facility]:  MRI Lumbar Spine 5/25/23  MRI Cervical Spine 5/25/23*  MRI Lumbar Spine 8/3/22*    [facility]:  MRI Lumbar Spine 6/28/21   CT (HEAD, NECK, SPINE) In process [facility]:  CT Lumbar Myelogram 10/4/23  CT Lumbar Spine 1/2/23*    [facility]:  CT Lumbar Myelogram 12/14/21  CT Cervical Spine 1/14/19

## 2023-11-16 ENCOUNTER — TELEPHONE (OUTPATIENT)
Dept: NEUROSURGERY | Facility: CLINIC | Age: 73
End: 2023-11-16
Payer: MEDICARE

## 2023-11-16 NOTE — TELEPHONE ENCOUNTER
RECORDS RECEIVED FROM: Care Everywhere   REASON FOR VISIT: Patient interested in injection/ 73yoM PMH of C-spine and L-spine DJD s/p lumbar decompression surgery (most recent L4-5 at [facility] w/ Dr. House) with L sided likely radiculopathy. Would like second opinion through [facility].   Date of Appt: 10/22/23 @ 8:00 am    NOTES (FOR ALL VISITS) STATUS DETAILS   OFFICE NOTE from referring provider Internal 10/24/23 (MyChart Note), 10/12/23 Ambrose Walls MD @[facility]     OFFICE NOTE from other specialist Care Everywhere 9/20/23 Radha Eller MD @[facility]    8/21/23, 6/22/23, 5/26/23 Onur Rod MD @[facility]    6/21/23 (Transferred Recs) Ambrose House MD @[facility]    See Additional Encounters   DISCHARGE SUMMARY from hospital Internal 3/30/23-4/2/23 Ambrose House MD @[facility]     OPERATIVE REPORT Care Everywhere 3/30/23 Ambrose House MD @[facility] LEFT LUMBAR 4-5 POSTERIOR SPINAL FUSION WITH LEFT LUMBAR 4-5 LAMINOFORAMINOTOMY AND WIDE LUMBAR 4-5 LEFT SUBTOTAL FACETECTOMY WITH ALLOGRAFT AND AUTOGRAFT     1/24/22 Ambrose House MD  @[facility] LEFT L3-4 LATERAL RECESS DECOMPRESSION / LEFT L4-5 LAMINOFORAMINOTOMY POSTERIOR APPROACH LUMBAR SPINE / LEFT L4 NERVE ROOT DECOMPRESSION      MEDICATION LIST Internal    IMAGING  (FOR ALL VISITS)     X-RAY Internal [facility]  10/4/23 XR Lumbar Myelograpy     MRI (HEAD, NECK, SPINE) Internal [facility]  5/25/23 MR Lumbar Spine  8/3/22 MR Lumbar Spine    [facility]  6/28/21 MR Lumbar Spine     CT (HEAD, NECK, SPINE) Internal [facility]  10/4/23 CT Lumbar Spine  1/2/23 CT Lumbar Spine    [facility]  12/14/21 CT Lumbar Spine

## 2023-11-17 ENCOUNTER — PRE VISIT (OUTPATIENT)
Dept: NEUROSURGERY | Facility: CLINIC | Age: 73
End: 2023-11-17

## 2023-11-20 DIAGNOSIS — M51.369 LUMBAR DEGENERATIVE DISC DISEASE: Primary | ICD-10-CM

## 2023-11-21 ENCOUNTER — CARE COORDINATION (OUTPATIENT)
Dept: NEUROSURGERY | Facility: CLINIC | Age: 73
End: 2023-11-21
Payer: MEDICARE

## 2023-11-21 NOTE — PROGRESS NOTES
Writer scheduled EOS, called patient to let him know imaging location/time.     Soco Champagne LPN  Neurosurgery

## 2023-11-22 ENCOUNTER — CARE COORDINATION (OUTPATIENT)
Dept: NEUROSURGERY | Facility: CLINIC | Age: 73
End: 2023-11-22

## 2023-11-22 ENCOUNTER — ANCILLARY PROCEDURE (OUTPATIENT)
Dept: GENERAL RADIOLOGY | Facility: CLINIC | Age: 73
End: 2023-11-22
Attending: PHYSICIAN ASSISTANT
Payer: MEDICARE

## 2023-11-22 ENCOUNTER — PRE VISIT (OUTPATIENT)
Dept: NEUROSURGERY | Facility: CLINIC | Age: 73
End: 2023-11-22

## 2023-11-22 ENCOUNTER — OFFICE VISIT (OUTPATIENT)
Dept: NEUROSURGERY | Facility: CLINIC | Age: 73
End: 2023-11-22
Attending: FAMILY MEDICINE
Payer: MEDICARE

## 2023-11-22 VITALS
OXYGEN SATURATION: 97 % | SYSTOLIC BLOOD PRESSURE: 117 MMHG | BODY MASS INDEX: 32.51 KG/M2 | RESPIRATION RATE: 18 BRPM | DIASTOLIC BLOOD PRESSURE: 78 MMHG | HEART RATE: 67 BPM | WEIGHT: 240 LBS | HEIGHT: 72 IN

## 2023-11-22 DIAGNOSIS — M48.061 SPINAL STENOSIS, LUMBAR REGION, WITHOUT NEUROGENIC CLAUDICATION: ICD-10-CM

## 2023-11-22 DIAGNOSIS — M51.369 LUMBAR DEGENERATIVE DISC DISEASE: ICD-10-CM

## 2023-11-22 DIAGNOSIS — M51.369 LUMBAR DEGENERATIVE DISC DISEASE: Primary | ICD-10-CM

## 2023-11-22 DIAGNOSIS — M54.16 LUMBAR RADICULOPATHY: ICD-10-CM

## 2023-11-22 DIAGNOSIS — M48.02 SPINAL STENOSIS IN CERVICAL REGION: ICD-10-CM

## 2023-11-22 DIAGNOSIS — M25.552 LEFT HIP PAIN: ICD-10-CM

## 2023-11-22 PROCEDURE — 72082 X-RAY EXAM ENTIRE SPI 2/3 VW: CPT | Performed by: STUDENT IN AN ORGANIZED HEALTH CARE EDUCATION/TRAINING PROGRAM

## 2023-11-22 PROCEDURE — 77073 BONE LENGTH STUDIES: CPT | Performed by: STUDENT IN AN ORGANIZED HEALTH CARE EDUCATION/TRAINING PROGRAM

## 2023-11-22 PROCEDURE — 99417 PROLNG OP E/M EACH 15 MIN: CPT | Performed by: PHYSICIAN ASSISTANT

## 2023-11-22 PROCEDURE — 99205 OFFICE O/P NEW HI 60 MIN: CPT | Performed by: PHYSICIAN ASSISTANT

## 2023-11-22 ASSESSMENT — PAIN SCALES - GENERAL: PAINLEVEL: MILD PAIN (3)

## 2023-11-22 NOTE — PROGRESS NOTES
Neurosurgery Clinic Note    Chief Complaint: LLE pain    History of Present Illness:  It was a pleasure to evaluate Mike Gonzalez in clinic today at the kind referral of   Radha Eller MD  [address] [place] [id number].    Mike Gonzalez is a 73 year old male with a PMH of atrial flutter, s/p left Watchman implant, CAD, hypertension, GERD, left rotator cuff tear, C6/7 decompression ([facility] 2002), L4/5 decomp ([facility], 1/2022), L4/5 fusion, L3/4 decomp, CSF leak ([facility] 3/30/23) who is presenting for evaluation of LLE pain.    Patient has surgery for cervical decompression at [facility] in 2002.  He had a very difficult time with pain after the surgery and that lasted about 10 years.  He has continues to have poor balance since that surgery.  He also notes bilateral RTC issues and had left shoulder replacement.  He continues to have more weakness in his left shoulder.  His neck ROM is significantly reduced.  This improved for about 1 week after having PRP/stem cell treatment about 3-4 years from Dr. Elbert Marmolejo [facility].      About 3 years ago, started having sore left hip walking up hills.  He saw ortho MD who check out hip and did not find anything concerning.  He continued to have issues with his hip and he went to [facility] who identified pinched nerve in his back.  He had 2 surgeries as a result (see above) resulting in fusion at L4/5.  The surgery reduced the freezing feeling in his feet.  It did not help with the left hip pain and now he has back pain in addition to the hip pain.  The pain starts in the left outer hip.  When it is bad, he gets pain on the right.  He has a sore spot on the left medial left knee which has responded to injections in the past.  The pain is a solid ache, but increases to a sharp pain depending on his activity.  If he does too much, he pays for it the next day.  Steroids helped with energy level and was able to walk further without significant pain. He also  complains that his toes hurt and feel like cardboard.     He does better walking on level surface.  On the level, he can go 700 feet. This is is reduced to about 400-500 feet if not level.  He denies that his legs feel tired, but that the outer hip is painful to him which requires him to stop for a while before he can continue.  The pain comes from lateral hip to the knee level.  He had an injection in his left hip that helped for 1-2 weeks.      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 His goal is to be able to walk around and do his photography work and get back to writing his books, etc.      EMG before surgery in March 2023.  Next one schedule 12/8/23 at [facility].      Conservative Treatment:  Tylenol - mild help  Heating pad to left hip - helps reduce pain  Left GT bursal injection 10/12/23 - helped for 1-2 weeks  Left knee injection [facility] 3/1/22 - helped with pain  PRP and stem cell injection in bottom of foot (2/22/16) with Dr. Elbert Gonzales [facility] - [facility]. Helped about 1 week.  Acupuncture - no lasting benefit  PT - last 4/22  Prednisone 40 mg x5 days 9/20/23 - 2 weeks relief          Review of Systems   See HPI    Past Medical History:   Diagnosis Date    Angioedema     Atrial fibrillation (H) 2019    Atrial fibrillation and flutter (H)     Atrial flutter (H) 2019    Basal cell carcinoma     Branch retinal vein occlusion of left eye (H28) 07/01/2017    CAD (coronary artery disease)     ANGIOGRAM 2021 Left Main The vessel was visualized by angiography, is moderate in size and is angiographically normal. Left Anterior Descending Mid LAD lesion is 25% stenosed. Ramus Intermedius Ramus lesion is 10% stenosed. Left Circumflex Dist Cx lesion is 30% stenosed. Right Coronary Artery The vessel was visualized by angiography, is moderate in size and is angiographically normal.      Chronic neck pain     Congestive  heart failure (H) 2021    GERD (gastroesophageal reflux disease)     HTN (hypertension) 2015    Hx of atrial flutter     Irregular heart beat     Nonsmoker     Radiculopathy     cervical    Retinal hemorrhage     Vessel rupture in left retina    Rhinitis, allergic     S/P colonoscopy 04/15/2019    Urticaria        Past Surgical History:   Procedure Laterality Date    BASAL CELL CARCINOMA EXCISION  01/01/2020    Left nasal reconstruction    CATARACT EXTRACTION, BILATERAL Bilateral 2022    CERVICAL SPINE SURGERY      C8, T1, foraminotomy    CV CORONARY ANGIOGRAM N/A 01/20/2021    Procedure: Coronary Angiogram;  Surgeon: Willow Wellington MD;  Location: [facility] Cardiac Cath Lab;  Service: Cardiology    CV LEFT ATRIAL APPENDAGE CLOSURE N/A 07/14/2021    Procedure: CV LEFT ATRIAL APPENDAGE CLOSURE;  Surgeon: Noah Gutierres MD;  Location: [facility] CARDIAC CATH LAB    CV LEFT HEART CATHETERIZATION WITHOUT LEFT VENTRICULOGRAM Left 01/20/2021    Procedure: Left Heart Catheterization Without Left Ventriculogram;  Surgeon: Willow Wellington MD;  Location: [facility] Cardiac Cath Lab;  Service: Cardiology    DECOMPRESSION, FUSION LUMBAR POSTERIOR ONE LEVEL, COMBINED Left 3/30/2023    Procedure: LEFT LUMBAR 4-5 POSTERIOR SPINAL FUSION WITH LEFT LUMBAR 4-5 LAMINOFORAMINOTOMY AND WIDE LUMBAR 4-5 LEFT SUBTOTAL FACETECTOMY WITH ALLOGRAFT AND AUTOGRAFT;  Surgeon: Ambrose Degroot MD;  Location: [facility] Main OR    HERNIA REPAIR, UMBILICAL       ×2    REVERSE TOTAL SHOULDER ARTHROPLASTY Left 05/01/2019       Social History     Socioeconomic History    Marital status:      Spouse name: None    Number of children: None    Years of education: None    Highest education level: None   Tobacco Use    Smoking status: Never    Smokeless tobacco: Never   Substance and Sexual Activity    Alcohol use: Never     Comment: Alcoholic Drinks/day: About once a year.    Drug use: No   Social History Narrative    Patient of Dr. Rod  since 2001.    .  Wife is a former RN.     Wife's cousin is Dr. Jean Pierre Champagne, ID specialist.       family history includes Arthritis in his mother; Other - See Comments in his father and mother.       IMAGING   Imaging independently reviewed.    EOS XR 11/22/23 - stable L4/5 hardware.  Multiple bridging anterior osteophytes in lumbar spine.  L3/4 disc space greater anterior than posterior. L1/2 DDD.  Significant left knee degeneration in medial compartment. Left hip degeneration and bilateral R>L SI joint arthropathy.  Multi-level cervical DDD.       Findings:   12 rib bearing vertebral bodies and 5 lumbar type vertebral bodies are  identified.  Post surgical changes of posterior left lumbar instrumentation at  L4-5. Hardware appears intact without complication.  Coronal Deformity:  Mild convex right curvature of the thoracolumbar spine.  Positive global coronal imbalance.  Sagittal Vertical Axis (A vertical line drawn from the center of C7  (chas line) to the posterosuperior aspect of the S1 on sagittal  plane):  positive   Weight bearing axis: (Defined as a line drawn from the center of the  femoral head to the mid aspect of the tibial plafond).      Right: Weight bearing axis crosses through the lateral tibial  spine.       Left: Weight bearing axis crosses central 1/3 of medial tibial  plateau.  Leg length:  (Measured from the top of the femoral head to the center  of tibial plafond.  It is assumed joints are in similar degrees of  extension bilaterally.  Significant difference is defined when  discrepancy is greater than 1.5 cm).        No significant  leg length discrepancy.  Additional Findings:  Postsurgical changes of left reverse shoulder arthroplasty. Left  atrial appendage closure device. Borderline enlarged cardiac  silhouette. The lungs are relatively clear. Nonobstructive bowel gas  pattern.  Impression:  1. Postsurgical changes of posterior left instrumentation at L4-5  without evidence of  hardware complication.  2. Mild convex-right curvature of the thoracolumbar spine.  3. Positive global coronal and sagittal imbalance.  4. Weight bearing axis as detailed above.    CT lumbar myelogram 10/4/23 [facility] -   T12/L1 - left osteophyte w/ left ventral cord indenting; L1/2 - vacuum disc phen, CCS and bilat subarticular stenosis; L5/S1 - vacuum disc phen, severe left FS, mod right FS. L4-L5 fused.                  MR Hip Left w/o Contrast  Result Date: 10/1/2023  EXAM: MR HIP LEFT WITHOUT CONTRAST LOCATION: [facility] DATE: 09/30/2023 INDICATION: 72-year-old male with left-sided lateral hip pain. Concern for glut tendinopathy, trochanteric bursitis. Also known hip OA and lumbar spine DJD. COMPARISON: 08/07/2023. TECHNIQUE: Unenhanced. FINDINGS: LEFT HIP: -Labrum: Degenerative anterior labral tear centered along the chondral labral junction where there is a tiny curvilinear paralabral cyst as seen on sagittal image 21. Intrasubstance degeneration and mineralization within the base of the superior labrum without evidence of a displaced tear. -Cartilage: Poorly defined low-grade chondrosis without evidence of a high-grade defect. No subchondral bone marrow edema. -Joint space: No effusion or synovitis. -Joint capsule/ligaments: Intact joint capsule. MUSCLES AND TENDONS: -Gluteal: There is mild gluteus minimus and medius tendinopathy. No tear. There is mild distal gluteus minimus peritendinitis. -Proximal hamstring: Mild proximal hamstring tendinopathy. No tear. -Iliopsoas: Distal iliopsoas tendinopathy. No tear. No iliopsoas bursitis. -Rectus femoris origin: No tear or tendinopathy. BONES: -No evidence of an acute fracture. No concerning marrow replacing lesions. Moderate arthritic changes in the visualized left SI joint where there is joint space narrowing and subchondral bone marrow edema. SOFT TISSUES: -Normal muscle bulk. No acute muscular injury. INTRA-PELVIC CONTENTS: -Visualized  intrapelvic contents are unremarkable.   IMPRESSION: 1.  Mild left gluteal tendinopathy and peritendinitis. No tear. 2.  Left proximal hamstring and distal iliopsoas tendinopathy. No tear. 3.  Mild degenerative changes of the left hip with a degenerative anterior labral tear and low-grade chondrosis. 4.  Moderate arthritic changes in the visualized left SI joint with subchondral bone marrow edema.     XR Hip Bilateral 2 Views Each  Result Date: 8/7/2023  EXAM: XR HIP BILATERAL 2 VIEWS EACH LOCATION: [facility] DATE: 8/7/2023 INDICATION: Bilateral hip pain. COMPARISON: None.   IMPRESSION: Mild to moderate arthritic changes both hips. No acute fracture or dislocation.    CT Lumbar Spine w/o Contrast  Result Date: 12/14/2021  EXAM: CT LUMBAR SPINE WO IV CONT LOCATION: [facility] DATE/TIME: 12/13/2021 1:07 PM INDICATION: Back pain COMPARISON: MRI lumbar spine 06/27/2021 TECHNIQUE: Routine CT Lumbar Spine with intrathecal contrast administered in a separate procedure. Multiplanar reformats. Dose reduction techniques were used. FINDINGS: Nomenclature is based on 5 lumbar type vertebral bodies. Normal vertebral body heights. Unchanged lumbar levocurvature measuring 26 degrees from the superior L2 endplate to the inferior L5 endplate. Partial ankylosis across the L2-L3 and L4-L5 spaces with 5 mm right lateral of L2 on L3 and 5 mm left lateral listhesis of L4 on L5. 1 mm retrolistheses of L1 on L2 and of L2 on L3. No lytic or destructive osseous lesion. Normal myelographic appearance of the distal spinal cord and cauda equina with conus medullaris at L1-L2. No extraspinal soft tissue abnormality. Mild to moderate degenerative change in the sacroiliac joints with partial ankylosis of the right SI joint. T11-T12: Mild loss of disc height with vacuum disc phenomenon. Small circumferential disc osteophyte complex mild facet arthropathy. Mild spinal canal stenosis. No right neural foraminal  stenosis. No left neural foraminal stenosis. T12-L1: Mild loss of disc height. Small circumferential disc osteophyte complex with small focal left subarticular osteophyte No facet arthropathy. No spinal canal stenosis. No right neural foraminal stenosis. No left neural foraminal stenosis. L1-L2: Retrolisthesis with moderate to advanced loss of disc height and vacuum disc phenomenon. Broad-based circumferential disc osteophyte complex. Mild left facet arthropathy. Mild to moderate spinal canal stenosis, left greater than right. Mild right and moderate left lateral recess stenosis with abutment and displacement of the traversing left L2 nerve root. No right neural foraminal stenosis. No left neural foraminal stenosis. L2-L3: Retrolisthesis with advanced loss of disc height with partial ankylosis across the disc space. Small circumferential disc osteophyte complex. Mild to moderate right facet arthropathy. Mild spinal canal stenosis. Moderate right lateral recess stenosis with abutment of the traversing right L3 and L4 nerve roots. No right neural foraminal stenosis. No left neural foraminal stenosis. L3-L4: Advanced loss of disc height. Small circumferential disc osteophyte complex. Ligamentum flavum thickening. Mild facet arthropathy. No central spinal canal stenosis, though there is mild right lateral recess stenosis. Mild to moderate right neural foraminal stenosis. No left neural foraminal stenosis. L4-L5: Advanced loss of disc height with partial ankylosis across the disc space. Small circumferential disc osteophyte complex. Ligamentum flavum thickening. Moderate facet arthropathy. Mild spinal canal stenosis. Mild to moderate right neural foraminal stenosis. Mild left neural foraminal stenosis. L5-S1: Moderate loss of disc height with vacuum disc phenomenon. Partially fused left ventral osteophyte. No posterior disc herniation. Ligamentum flavum thickening. Moderate facet arthropathy. No spinal canal stenosis.  Mild to moderate right neural foraminal stenosis. Severe left neural foraminal stenosis. CONCLUSION: 1.  Satisfactory lumbar myelogram with normal myelographic appearance of the distal spinal cord and cauda equina nerve roots. 2.  Multilevel degenerative changes of the lumbar spine as detailed above. When compared with MRI lumbar spine from 06/27/2021, there has not been significant interval progression of degenerative changes. 3.  At L5-S1, there is mild to moderate right and severe left neuroforaminal stenosis. 4.  At L4-L5, there is mild spinal canal stenosis and mild to moderate right and mild left neuroforaminal stenosis. 5.  Partial ankylosis of the right sacroiliac joint.    MRI lumbar 5/23/23 [facility] -  L1/2 CCS, bilateral L>R subarticular stenosis and facet arthropathy; L5/S1 severe left FS.                  Cervical MRI 5/25/23 [facility] -              Physical Exam   /78 (BP Location: Left arm, Patient Position: Sitting)   Pulse 67   Resp 18   Ht 1.829 m (6')   Wt 108.9 kg (240 lb)   SpO2 97%   BMI 32.55 kg/m      Constitutional: Appears well-developed and well-nourished. Cooperative. No acute distress.   HENT:   Head: Normocephalic and atraumatic.   Eyes: Conjunctivae are normal.  Neck: Neck supple. No tracheal deviation present.  Cardiovascular: Normal rate and regular rhythm.    Pulmonary/Chest: Effort normal and breath sounds normal.  Abdominal: Exhibits no obvious distension.   Musculoskeletal: Able to move all extremities.  No involuntary motor movements. No C/T/L spine tenderness to palpation.  +mild left GT bursal TTP.  Negative SI joint provacative testing.  Very stiff through pelvic girdle with ROM activities.  Negative straight leg and SCOUR.    Cervical flexion-extension range of motion: limited ROM  Skin: Skin is warm, dry and intact.   Psychiatric: Normal mood and affect. Speech is normal and behavior is normal.    Neurological:  Alert, NAD, and oriented to person, place, and time.    No cranial nerve deficit   Gait: steady, symmetrical w/o assistive devices; unable to tandem walk    Strength (L/R)  5/5 Deltoid (unable to fully abduct L>R shoulder 2/2 RTC issues)  5/5 Bicep  5/5 Tricep  5/5 Handgrip    4+/4+ Hip Flexion  5/5 Knee Extension  5/5 Ankle Dorsiflexion  5/5 Extensor Hallucis Longus  5/5 Plantar Flexion    Reflexes (L/R)  3+/3+ Bicep  3+/3+ Brachioradialis  3+/3+ Patellar  2+/2+ Ankle    No Lhermitte's  No Spurling's  No Naseem's   No ankle clonus    Sensation: SILT     ASSESSMENT:  Mike Gonzalez is a 73 year old male with a PMH of atrial flutter, s/p left Watchman implant, CAD, hypertension, GERD, left rotator cuff tear, C6/7 decompression ([facility] 2002), L4/5 decomp ([facility], 1/2022), L4/5 fusion, L3/4 decomp, CSF leak ([facility] 3/30/23) who is presenting for evaluation of LLE pain.    Patient has had left outer hip pain prior to his lumbar surgeries, which was not relieved with either surgery.  He has had recent GT bursal injection 10/2023 which provided temporary relief.  His hip pain is affecting his ability to ambulate longer distances.  He has not had PT since his 2nd surgery.  He is very stiff through his pelvic girdle with his ROM activities.  He altagracia benefit from further PT.    CT myelogram and MRI 2023 reveals severe left L1/2 subarticular stenosis and L5/S1 severe left FS.      L5 dermatome crosses over the left hip and can cause hip pain and may also affect his great toe which he has some pain and altered sensation on exam.      He is also c/o imbalance and has a history of cervical stenosis.  He is unable to tandem walk, but this is not unusual for his age.  He is hyper-reflexic on my exam today.  Cervical imaging was not available for review; the report indicates at least moderate CCS at C3/4 with mild cord impingement.  Also left FS C3/4, C4/5.  He had C6/7 decompression in the past.      PLAN:    Provider will obtain and review cervical MRI from [facility]  Further  recommendations TBD, but may include PT and/or spinal injections.  I have requested my staff to have the images loaded into PACS for review.      If PT is recommended, patient would want that sent to [facility] in [place].  He would agrees that PT would be beneficial for him.     I spent 184 minutes spent in patient care, independent review and interpretation of medical records/imaging, reviewing old records.      Sagrario Good PA-C  Department of Neurosurgery  Office: [phone]    ADDENDUM:  Received cervical imaging from [facility] - as noted above.    Referral to PT placed.  Message sent to patient.    Sagrario Good PA-C on 11/28/2023 at 6:22 PM    ADDENDUM 2:  EMG report by [facility] 12/6/23 Dr. Jason    PLAN:  Patient requested 2nd prednisone pack.  Would like to proceed with spinal injection as well.  Ordered for [facility].    Sagrario Good PA-C on 12/12/2023 at 12:17 PM

## 2023-11-22 NOTE — LETTER
11/22/2023       RE: Mike Gonzalez  [address] [facility] [place] [address]       Dear Colleague,    Thank you for referring your patient, Mike Gonzalez, to the [facility] [place] [facility]. Please see a copy of my visit note below.        Neurosurgery Clinic Note    Chief Complaint: LLE pain    History of Present Illness:  It was a pleasure to evaluate Mike Gonzalez in clinic today at the kind referral of   Radha Eller MD  [facility] [place] [address].    Mike Gonzalez is a 73 year old male with a PMH of atrial flutter, s/p left Watchman implant, CAD, hypertension, GERD, left rotator cuff tear, C6/7 decompression ([facility] 2002), L4/5 decomp ([facility], 1/2022), L4/5 fusion, L3/4 decomp, CSF leak ([facility] 3/30/23) who is presenting for evaluation of LLE pain.    Patient has surgery for cervical decompression at [facility] in 2002.  He had a very difficult time with pain after the surgery and that lasted about 10 years.  He has continues to have poor balance since that surgery.  He also notes bilateral RTC issues and had left shoulder replacement.  He continues to have more weakness in his left shoulder.  His neck ROM is significantly reduced.  This improved for about 1 week after having PRP/stem cell treatment about 3-4 years from Dr. Elbert Marmolejo [facility].      About 3 years ago, started having sore left hip walking up hills.  He saw ortho MD who check out hip and did not find anything concerning.  He continued to have issues with his hip and he went to [facility] who identified pinched nerve in his back.  He had 2 surgeries as a result (see above) resulting in fusion at L4/5.  The surgery reduced the freezing feeling in his feet.  It did not help with the left hip pain and now he has back pain in addition to the hip pain.  The pain starts in the left outer hip.  When it is bad, he gets pain on the right.  He has a sore  spot on the left medial left knee which has responded to injections in the past.  The pain is a solid ache, but increases to a sharp pain depending on his activity.  If he does too much, he pays for it the next day.  Steroids helped with energy level and was able to walk further without significant pain. He also complains that his toes hurt and feel like cardboard.     He does better walking on level surface.  On the level, he can go 700 feet. This is is reduced to about 400-500 feet if not level.  He denies that his legs feel tired, but that the outer hip is painful to him which requires him to stop for a while before he can continue.  The pain comes from lateral hip to the knee level.  He had an injection in his left hip that helped for 1-2 weeks.      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 His goal is to be able to walk around and do his photography work and get back to writing his books, etc.      EMG before surgery in March 2023.  Next one schedule 12/8/23 at [facility].      Conservative Treatment:  Tylenol - mild help  Heating pad to left hip - helps reduce pain  Left GT bursal injection 10/12/23 - helped for 1-2 weeks  Left knee injection [facility] 3/1/22 - helped with pain  PRP and stem cell injection in bottom of foot (2/22/16) with Dr. Elbert Gonzales [facility] - [facility]. Helped about 1 week.  Acupuncture - no lasting benefit  PT - last 4/22          Review of Systems   See HPI    Past Medical History:   Diagnosis Date    Angioedema     Atrial fibrillation (H) 2019    Atrial fibrillation and flutter (H)     Atrial flutter (H) 2019    Basal cell carcinoma     Branch retinal vein occlusion of left eye (H28) 07/01/2017    CAD (coronary artery disease)     ANGIOGRAM 2021 Left Main The vessel was visualized by angiography, is moderate in size and is angiographically normal. Left Anterior Descending Mid LAD lesion is 25%  stenosed. Ramus Intermedius Ramus lesion is 10% stenosed. Left Circumflex Dist Cx lesion is 30% stenosed. Right Coronary Artery The vessel was visualized by angiography, is moderate in size and is angiographically normal.      Chronic neck pain     Congestive heart failure (H) 2021    GERD (gastroesophageal reflux disease)     HTN (hypertension) 2015    Hx of atrial flutter     Irregular heart beat     Nonsmoker     Radiculopathy     cervical    Retinal hemorrhage     Vessel rupture in left retina    Rhinitis, allergic     S/P colonoscopy 04/15/2019    Urticaria        Past Surgical History:   Procedure Laterality Date    BASAL CELL CARCINOMA EXCISION  01/01/2020    Left nasal reconstruction    CATARACT EXTRACTION, BILATERAL Bilateral 2022    CERVICAL SPINE SURGERY      C8, T1, foraminotomy    CV CORONARY ANGIOGRAM N/A 01/20/2021    Procedure: Coronary Angiogram;  Surgeon: Willow Wellington MD;  Location: [facility] Cardiac Cath Lab;  Service: Cardiology    CV LEFT ATRIAL APPENDAGE CLOSURE N/A 07/14/2021    Procedure: CV LEFT ATRIAL APPENDAGE CLOSURE;  Surgeon: Noah Gutierres MD;  Location: [facility] CARDIAC CATH LAB    CV LEFT HEART CATHETERIZATION WITHOUT LEFT VENTRICULOGRAM Left 01/20/2021    Procedure: Left Heart Catheterization Without Left Ventriculogram;  Surgeon: Willow Wellington MD;  Location: [facility] Cardiac Cath Lab;  Service: Cardiology    DECOMPRESSION, FUSION LUMBAR POSTERIOR ONE LEVEL, COMBINED Left 3/30/2023    Procedure: LEFT LUMBAR 4-5 POSTERIOR SPINAL FUSION WITH LEFT LUMBAR 4-5 LAMINOFORAMINOTOMY AND WIDE LUMBAR 4-5 LEFT SUBTOTAL FACETECTOMY WITH ALLOGRAFT AND AUTOGRAFT;  Surgeon: Ambrose Degroot MD;  Location: [facility] Main OR    HERNIA REPAIR, UMBILICAL       ×2    REVERSE TOTAL SHOULDER ARTHROPLASTY Left 05/01/2019       Social History     Socioeconomic History    Marital status:      Spouse name: None    Number of children: None    Years of education: None    Highest education  level: None   Tobacco Use    Smoking status: Never    Smokeless tobacco: Never   Substance and Sexual Activity    Alcohol use: Never     Comment: Alcoholic Drinks/day: About once a year.    Drug use: No   Social History Narrative    Patient of Dr. Rod since 2001.    .  Wife is a former RN.     Wife's cousin is Dr. Jean Pierre Champagne, ID specialist.       family history includes Arthritis in his mother; Other - See Comments in his father and mother.       IMAGING   Imaging independently reviewed.    EOS XR 11/22/23 - stable L4/5 hardware.  Multiple bridging anterior osteophytes in lumbar spine.  L3/4 disc space greater anterior than posterior. L1/2 DDD.  Significant left knee degeneration in medial compartment. Left hip degeneration and bilateral R>L SI joint arthropathy.  Multi-level cervical DDD.       Findings:   12 rib bearing vertebral bodies and 5 lumbar type vertebral bodies are  identified.  Post surgical changes of posterior left lumbar instrumentation at  L4-5. Hardware appears intact without complication.  Coronal Deformity:  Mild convex right curvature of the thoracolumbar spine.  Positive global coronal imbalance.  Sagittal Vertical Axis (A vertical line drawn from the center of C7  (chas line) to the posterosuperior aspect of the S1 on sagittal  plane):  positive   Weight bearing axis: (Defined as a line drawn from the center of the  femoral head to the mid aspect of the tibial plafond).      Right: Weight bearing axis crosses through the lateral tibial  spine.       Left: Weight bearing axis crosses central 1/3 of medial tibial  plateau.  Leg length:  (Measured from the top of the femoral head to the center  of tibial plafond.  It is assumed joints are in similar degrees of  extension bilaterally.  Significant difference is defined when  discrepancy is greater than 1.5 cm).        No significant  leg length discrepancy.  Additional Findings:  Postsurgical changes of left reverse shoulder  arthroplasty. Left  atrial appendage closure device. Borderline enlarged cardiac  silhouette. The lungs are relatively clear. Nonobstructive bowel gas  pattern.  Impression:  1. Postsurgical changes of posterior left instrumentation at L4-5  without evidence of hardware complication.  2. Mild convex-right curvature of the thoracolumbar spine.  3. Positive global coronal and sagittal imbalance.  4. Weight bearing axis as detailed above.    CT lumbar myelogram 10/4/23 [facility] -   T12/L1 - left osteophyte w/ left ventral cord indenting; L1/2 - vacuum disc phen, CCS and bilat subarticular stenosis; L5/S1 - vacuum disc phen, severe left FS, mod right FS. L4-L5 fused.                  MR Hip Left w/o Contrast  Result Date: 10/1/2023  EXAM: MR HIP LEFT WITHOUT CONTRAST LOCATION: [facility] DATE: 09/30/2023 INDICATION: 72-year-old male with left-sided lateral hip pain. Concern for glut tendinopathy, trochanteric bursitis. Also known hip OA and lumbar spine DJD. COMPARISON: 08/07/2023. TECHNIQUE: Unenhanced. FINDINGS: LEFT HIP: -Labrum: Degenerative anterior labral tear centered along the chondral labral junction where there is a tiny curvilinear paralabral cyst as seen on sagittal image 21. Intrasubstance degeneration and mineralization within the base of the superior labrum without evidence of a displaced tear. -Cartilage: Poorly defined low-grade chondrosis without evidence of a high-grade defect. No subchondral bone marrow edema. -Joint space: No effusion or synovitis. -Joint capsule/ligaments: Intact joint capsule. MUSCLES AND TENDONS: -Gluteal: There is mild gluteus minimus and medius tendinopathy. No tear. There is mild distal gluteus minimus peritendinitis. -Proximal hamstring: Mild proximal hamstring tendinopathy. No tear. -Iliopsoas: Distal iliopsoas tendinopathy. No tear. No iliopsoas bursitis. -Rectus femoris origin: No tear or tendinopathy. BONES: -No evidence of an acute fracture. No  concerning marrow replacing lesions. Moderate arthritic changes in the visualized left SI joint where there is joint space narrowing and subchondral bone marrow edema. SOFT TISSUES: -Normal muscle bulk. No acute muscular injury. INTRA-PELVIC CONTENTS: -Visualized intrapelvic contents are unremarkable.   IMPRESSION: 1.  Mild left gluteal tendinopathy and peritendinitis. No tear. 2.  Left proximal hamstring and distal iliopsoas tendinopathy. No tear. 3.  Mild degenerative changes of the left hip with a degenerative anterior labral tear and low-grade chondrosis. 4.  Moderate arthritic changes in the visualized left SI joint with subchondral bone marrow edema.     XR Hip Bilateral 2 Views Each  Result Date: 8/7/2023  EXAM: XR HIP BILATERAL 2 VIEWS EACH LOCATION: [facility] DATE: 8/7/2023 INDICATION: Bilateral hip pain. COMPARISON: None.   IMPRESSION: Mild to moderate arthritic changes both hips. No acute fracture or dislocation.    CT Lumbar Spine w/o Contrast  Result Date: 12/14/2021  EXAM: CT LUMBAR SPINE WO IV CONT LOCATION: [facility] DATE/TIME: 12/13/2021 1:07 PM INDICATION: Back pain COMPARISON: MRI lumbar spine 06/27/2021 TECHNIQUE: Routine CT Lumbar Spine with intrathecal contrast administered in a separate procedure. Multiplanar reformats. Dose reduction techniques were used. FINDINGS: Nomenclature is based on 5 lumbar type vertebral bodies. Normal vertebral body heights. Unchanged lumbar levocurvature measuring 26 degrees from the superior L2 endplate to the inferior L5 endplate. Partial ankylosis across the L2-L3 and L4-L5 spaces with 5 mm right lateral of L2 on L3 and 5 mm left lateral listhesis of L4 on L5. 1 mm retrolistheses of L1 on L2 and of L2 on L3. No lytic or destructive osseous lesion. Normal myelographic appearance of the distal spinal cord and cauda equina with conus medullaris at L1-L2. No extraspinal soft tissue abnormality. Mild to moderate degenerative change  in the sacroiliac joints with partial ankylosis of the right SI joint. T11-T12: Mild loss of disc height with vacuum disc phenomenon. Small circumferential disc osteophyte complex mild facet arthropathy. Mild spinal canal stenosis. No right neural foraminal stenosis. No left neural foraminal stenosis. T12-L1: Mild loss of disc height. Small circumferential disc osteophyte complex with small focal left subarticular osteophyte No facet arthropathy. No spinal canal stenosis. No right neural foraminal stenosis. No left neural foraminal stenosis. L1-L2: Retrolisthesis with moderate to advanced loss of disc height and vacuum disc phenomenon. Broad-based circumferential disc osteophyte complex. Mild left facet arthropathy. Mild to moderate spinal canal stenosis, left greater than right. Mild right and moderate left lateral recess stenosis with abutment and displacement of the traversing left L2 nerve root. No right neural foraminal stenosis. No left neural foraminal stenosis. L2-L3: Retrolisthesis with advanced loss of disc height with partial ankylosis across the disc space. Small circumferential disc osteophyte complex. Mild to moderate right facet arthropathy. Mild spinal canal stenosis. Moderate right lateral recess stenosis with abutment of the traversing right L3 and L4 nerve roots. No right neural foraminal stenosis. No left neural foraminal stenosis. L3-L4: Advanced loss of disc height. Small circumferential disc osteophyte complex. Ligamentum flavum thickening. Mild facet arthropathy. No central spinal canal stenosis, though there is mild right lateral recess stenosis. Mild to moderate right neural foraminal stenosis. No left neural foraminal stenosis. L4-L5: Advanced loss of disc height with partial ankylosis across the disc space. Small circumferential disc osteophyte complex. Ligamentum flavum thickening. Moderate facet arthropathy. Mild spinal canal stenosis. Mild to moderate right neural foraminal stenosis.  Mild left neural foraminal stenosis. L5-S1: Moderate loss of disc height with vacuum disc phenomenon. Partially fused left ventral osteophyte. No posterior disc herniation. Ligamentum flavum thickening. Moderate facet arthropathy. No spinal canal stenosis. Mild to moderate right neural foraminal stenosis. Severe left neural foraminal stenosis. CONCLUSION: 1.  Satisfactory lumbar myelogram with normal myelographic appearance of the distal spinal cord and cauda equina nerve roots. 2.  Multilevel degenerative changes of the lumbar spine as detailed above. When compared with MRI lumbar spine from 06/27/2021, there has not been significant interval progression of degenerative changes. 3.  At L5-S1, there is mild to moderate right and severe left neuroforaminal stenosis. 4.  At L4-L5, there is mild spinal canal stenosis and mild to moderate right and mild left neuroforaminal stenosis. 5.  Partial ankylosis of the right sacroiliac joint.    MRI lumbar 5/23/23 [facility] -  L1/2 CCS, bilateral L>R subarticular stenosis and facet arthropathy; L5/S1 severe left FS.                  Cervical MRI 5/25/23 [facility] -        Physical Exam   /78 (BP Location: Left arm, Patient Position: Sitting)   Pulse 67   Resp 18   Ht 1.829 m (6')   Wt 108.9 kg (240 lb)   SpO2 97%   BMI 32.55 kg/m      Constitutional: Appears well-developed and well-nourished. Cooperative. No acute distress.   HENT:   Head: Normocephalic and atraumatic.   Eyes: Conjunctivae are normal.  Neck: Neck supple. No tracheal deviation present.  Cardiovascular: Normal rate and regular rhythm.    Pulmonary/Chest: Effort normal and breath sounds normal.  Abdominal: Exhibits no obvious distension.   Musculoskeletal: Able to move all extremities.  No involuntary motor movements. No C/T/L spine tenderness to palpation.  +mild left GT bursal TTP.  Negative SI joint provacative testing.  Very stiff through pelvic girdle with ROM activities.  Negative straight leg and SCOUR.     Cervical flexion-extension range of motion: limited ROM  Skin: Skin is warm, dry and intact.   Psychiatric: Normal mood and affect. Speech is normal and behavior is normal.    Neurological:  Alert, NAD, and oriented to person, place, and time.   No cranial nerve deficit   Gait: steady, symmetrical w/o assistive devices; unable to tandem walk    Strength (L/R)  5/5 Deltoid (unable to fully abduct L>R shoulder 2/2 RTC issues)  5/5 Bicep  5/5 Tricep  5/5 Handgrip    4+/4+ Hip Flexion  5/5 Knee Extension  5/5 Ankle Dorsiflexion  5/5 Extensor Hallucis Longus  5/5 Plantar Flexion    Reflexes (L/R)  3+/3+ Bicep  3+/3+ Brachioradialis  3+/3+ Patellar  2+/2+ Ankle    No Lhermitte's  No Spurling's  No Naseem's   No ankle clonus    Sensation: SILT     ASSESSMENT:  Mike Gonzalez is a 73 year old male with a PMH of atrial flutter, s/p left Watchman implant, CAD, hypertension, GERD, left rotator cuff tear, C6/7 decompression ([place] 2002), L4/5 decomp ([facility], 1/2022), L4/5 fusion, L3/4 decomp, CSF leak ([facility] 3/30/23) who is presenting for evaluation of LLE pain.    Patient has had left outer hip pain prior to his lumbar surgeries, which was not relieved with either surgery.  He has had recent GT bursal injection 10/2023 which provided temporary relief.  His hip pain is affecting his ability to ambulate longer distances.  He has not had PT since his 2nd surgery.  He is very stiff through his pelvic girdle with his ROM activities.  He altagracia benefit from further PT.    CT myelogram and MRI 2023 reveals severe left L1/2 subarticular stenosis and L5/S1 severe left FS.      L5 dermatome crosses over the left hip and can cause hip pain and may also affect his great toe which he has some pain and altered sensation on exam.      He is also c/o imbalance and has a history of cervical stenosis.  He is unable to tandem walk, but this is not unusual for his age.  He is hyper-reflexic on my exam today.  Cervical imaging was not available for  review; the report indicates at least moderate CCS at C3/4 with mild cord impingement.  He had C6/7 decompression in the past.      PLAN:    Provider will obtain and review cervical MRI from [facility].  Further recommendations TBD, but may include PT and/or spinal injections.  I have requested my staff to have the images loaded into PACS for review.      If PT is recommended, patient would want that sent to [facility] in [place].  He would agrees that PT would be beneficial for him.     I spent 184 minutes spent in patient care, independent review and interpretation of medical records/imaging, reviewing old records.      Again, thank you for allowing me to participate in the care of your patient.      Sincerely,    Sagrario Good PA-C

## 2023-11-22 NOTE — PROGRESS NOTES
Writer resolved in [facility] imaging, had report faxed, emailed to provider and HIM.     Soco Champagne LPN  Neurosurgery

## 2023-11-22 NOTE — PATIENT INSTRUCTIONS
Provider will obtain and review cervical MRI.  Further recommendations TBD, but may include PT and/or spinal injections.    Please ask that [facility] send the 12/8/23 EMG report to us for review once completed.

## 2023-12-01 ENCOUNTER — TELEPHONE (OUTPATIENT)
Dept: NEUROSURGERY | Facility: CLINIC | Age: 73
End: 2023-12-01
Payer: MEDICARE

## 2023-12-01 NOTE — TELEPHONE ENCOUNTER
[facility] Call Center    Phone Message    May a detailed message be left on voicemail: yes     Reason for Call: Other: Jigna calling from [facility].  They need Sagrario to fax over a referral for Physical Therapy to fax number [phone].  Please call if you have any questions.     Action Taken: Message routed to:  [facility] ([facility]): [facility] Neurosurgery    Travel Screening: Not Applicable

## 2023-12-04 NOTE — TELEPHONE ENCOUNTER
Writer faxed referral to   Fax: [phone]  per patient request.     Soco Champagne LPN  Neurosurgery

## 2023-12-06 ENCOUNTER — MYC MEDICAL ADVICE (OUTPATIENT)
Dept: INTERNAL MEDICINE | Facility: CLINIC | Age: 73
End: 2023-12-06
Payer: MEDICARE

## 2023-12-06 ENCOUNTER — TRANSFERRED RECORDS (OUTPATIENT)
Dept: HEALTH INFORMATION MANAGEMENT | Facility: CLINIC | Age: 73
End: 2023-12-06
Payer: MEDICARE

## 2023-12-12 ENCOUNTER — DOCUMENTATION ONLY (OUTPATIENT)
Dept: NEUROSURGERY | Facility: CLINIC | Age: 73
End: 2023-12-12
Payer: MEDICARE

## 2023-12-12 RX ORDER — PREDNISONE 20 MG/1
TABLET ORAL
Qty: 20 TABLET | Refills: 0 | Status: SHIPPED | OUTPATIENT
Start: 2023-12-12 | End: 2024-02-20

## 2023-12-21 ENCOUNTER — TELEPHONE (OUTPATIENT)
Dept: NEUROSURGERY | Facility: CLINIC | Age: 73
End: 2023-12-21
Payer: MEDICARE

## 2023-12-21 NOTE — TELEPHONE ENCOUNTER
[facility] Call Center    Phone Message    May a detailed message be left on voicemail: yes     Reason for Call: Other: Sudha calling wanting clarification on the injection order. Does Sagrario want him to have a nerve injection or JUANIS?  Please call her back.       Action Taken: Message routed to:  [facility] [place] [facility].    Travel Screening: Not Applicable

## 2023-12-22 NOTE — TELEPHONE ENCOUNTER
Writer left voice mail for Sudha at [facility]. Provided the following information found on the order that was faxed.     Left L5 nerve root for diagnostic and therapeutic purposes via L5/S1 left TFESI if able.  Okay for provider to change if needed     Soco Champagne LPN  Neurosurgery.

## 2024-01-05 ENCOUNTER — OFFICE VISIT (OUTPATIENT)
Dept: INTERNAL MEDICINE | Facility: CLINIC | Age: 74
End: 2024-01-05
Payer: MEDICARE

## 2024-01-05 ENCOUNTER — MYC MEDICAL ADVICE (OUTPATIENT)
Dept: INTERNAL MEDICINE | Facility: CLINIC | Age: 74
End: 2024-01-05

## 2024-01-05 VITALS
WEIGHT: 236 LBS | OXYGEN SATURATION: 99 % | DIASTOLIC BLOOD PRESSURE: 62 MMHG | HEART RATE: 89 BPM | SYSTOLIC BLOOD PRESSURE: 120 MMHG | HEIGHT: 72 IN | RESPIRATION RATE: 17 BRPM | BODY MASS INDEX: 31.97 KG/M2

## 2024-01-05 DIAGNOSIS — I50.22 CHRONIC SYSTOLIC HEART FAILURE (H): ICD-10-CM

## 2024-01-05 DIAGNOSIS — D69.2 SENILE PURPURA (H): ICD-10-CM

## 2024-01-05 DIAGNOSIS — H34.8322 BRANCH RETINAL VEIN OCCLUSION OF LEFT EYE, UNSPECIFIED COMPLICATION STATUS (H): ICD-10-CM

## 2024-01-05 DIAGNOSIS — G89.29 CHRONIC LEFT-SIDED LOW BACK PAIN WITH LEFT-SIDED SCIATICA: ICD-10-CM

## 2024-01-05 DIAGNOSIS — J31.0 CHRONIC RHINITIS: ICD-10-CM

## 2024-01-05 DIAGNOSIS — I48.92 ATRIAL FLUTTER, CHRONIC (H): ICD-10-CM

## 2024-01-05 DIAGNOSIS — M48.061 SPINAL STENOSIS AT L4-L5 LEVEL: ICD-10-CM

## 2024-01-05 DIAGNOSIS — E66.09 CLASS 1 OBESITY DUE TO EXCESS CALORIES WITH SERIOUS COMORBIDITY AND BODY MASS INDEX (BMI) OF 32.0 TO 32.9 IN ADULT: Primary | ICD-10-CM

## 2024-01-05 DIAGNOSIS — M54.42 CHRONIC LEFT-SIDED LOW BACK PAIN WITH LEFT-SIDED SCIATICA: ICD-10-CM

## 2024-01-05 DIAGNOSIS — E66.811 CLASS 1 OBESITY DUE TO EXCESS CALORIES WITH SERIOUS COMORBIDITY AND BODY MASS INDEX (BMI) OF 32.0 TO 32.9 IN ADULT: Primary | ICD-10-CM

## 2024-01-05 DIAGNOSIS — I42.0 DILATED CARDIOMYOPATHY (H): ICD-10-CM

## 2024-01-05 PROCEDURE — 99215 OFFICE O/P EST HI 40 MIN: CPT | Performed by: INTERNAL MEDICINE

## 2024-01-05 RX ORDER — NORTRIPTYLINE HCL 25 MG
25 CAPSULE ORAL AT BEDTIME
Qty: 90 CAPSULE | Refills: 3 | Status: SHIPPED | OUTPATIENT
Start: 2024-01-05 | End: 2024-02-20

## 2024-01-05 ASSESSMENT — PAIN SCALES - GENERAL: PAINLEVEL: NO PAIN (0)

## 2024-01-06 RX ORDER — FLUTICASONE PROPIONATE 50 MCG
1 SPRAY, SUSPENSION (ML) NASAL DAILY
Qty: 48 ML | Refills: 3 | Status: SHIPPED | OUTPATIENT
Start: 2024-01-06 | End: 2024-02-20

## 2024-01-11 NOTE — PROGRESS NOTES
[facility] - Primary Care Specialists    Comprehensive and complex medical care - Chronic disease management - Shared decision making - Care coordination - Compassionate care    Patient advocacy - Rational deprescribing - Minimally disruptive medicine - Ethical focus - Customized care         Date of Service: 1/5/2024  Primary Provider: Onur Rod    Patient Care Team:  Onur Rod MD as PCP - General  Onur Rod MD as Assigned PCP  Ambrose Degroot MD as MD (Orthopaedic Surgery)  Stuart Jackson MD as MD (Orthopaedic Surgery)  Liborio Champagne MD as MD  [facility]  Gregg Curtis as Resident (Orthopaedic Surgery)  Kiran Tyler MD as Assigned Heart and Vascular Provider  Radha Eller MD as Assigned Musculoskeletal Provider          Patient's Pharmacy:    [id number] - [place], [place] - [address] [facility]  [address] [facility] [address]  Phone: [phone] Fax: [phone]    [facility] - [place], [place] - [address] [place] [address]  [address] [facility] [address]  Phone: [phone] Fax: [phone]     Patient's Contacts:  Name Home Phone Work Phone Mobile Phone Relationship Lgl KYLER Wilson [phone] Spouse    MATILDE SILVER   [phone] Other      Patient's Insurance:    Payor: MEDICARE / Plan: MEDICARE / Product Type: Medicare /            Active Problem List:  Problem List as of 1/5/2024 Reviewed: 10/13/2023  8:43 PM by Ambrose Walls MD         High    Nonsmoker    Full code status    CAD (coronary artery disease), minimal disease on angiogram in 2021    Atrial flutter, chronic (H)       Medium    S/P left atrial appendage closure - WATCHMAN    Primary hypertension    Chronic neck pain    Dilated cardiomyopathy (H)    Chronic systolic heart failure (H)    Chronic left-sided low back pain with left-sided sciatica    Spinal stenosis at L4-L5 level    Senile  purpura (H24)       Low    GERD (gastroesophageal reflux disease)    Branch retinal vein occlusion of left eye (H28)    Normal colonoscopy - 2016 - Average risk    Complete tear of left rotator cuff    Concussion syndrome        Current Outpatient Medications   Medication Instructions    acetaminophen (TYLENOL) 650 mg, Oral, EVERY 8 HOURS PRN    aspirin 81 mg, Oral, DAILY    co-enzyme Q-10 200 mg, Oral, DAILY    fish oil-omega-3 fatty acids 2 g, DAILY    fluticasone (FLONASE) 50 MCG/ACT nasal spray 1 spray, Both Nostrils, DAILY    lisinopril (ZESTRIL) 5 MG tablet TAKE 1 TABLET DAILY    metoprolol succinate ER (TOPROL XL) 25 MG 24 hr tablet TAKE 1/2 TABLET DAILY    MULTIVITAMIN ORAL 1 tablet, Oral, DAILY    nortriptyline (PAMELOR) 25 mg, Oral, AT BEDTIME    predniSONE (DELTASONE) 20 MG tablet Take 3 tabs by mouth daily x 3 days, then 2 tabs daily x 3 days, then 1 tab daily x 3 days, then 1/2 tab daily x 3 days.    senna-docusate (SENOKOT-S/PERICOLACE) 8.6-50 MG tablet 1-2 tablets, Oral, 2 TIMES DAILY, Take while on oral narcotics to prevent or treat constipation.    tamsulosin (FLOMAX) 0.4 MG capsule TAKE 2 CAPSULES DAILY.        Social History     Social History Narrative    Patient of Dr. Rod since 2001.    .  Wife is a former RN.     Wife's cousin is Dr. Jean Pierre Champagne, ID specialist.       Subjective:     Mike Gonzalez is a 73 year old male who comes in today for:    Chief Complaint   Patient presents with    Follow Up           8/21/2023     1:59 PM   Additional Questions   Roomed by KASSANDRA Bills   Accompanied by RADHA     In for follow up multiple issues.    Reviewed his chronic back pain and has physical therapy through [facility] and [facility].  Working on this.    Has follow up with [facility] and [facility] related to this and tests reviewed including recent electromyography (EMG).    Does run out of gas easily during the daytime.    Issues with sleeping at night.    Recommended in the past  to take cholesterol medication, but patient does not want to at this time.    Main issue is to work on weight loss.  Reviewed medications in relationship to this.  Reviewed other options.  He wants to try to work on his own related to this.    We reviewed his other issues noted in the assessment but not specifically addressed in the HPI above.     Objective:     Wt Readings from Last 3 Encounters:   01/05/24 107 kg (236 lb)   11/22/23 108.9 kg (240 lb)   08/21/23 108.4 kg (239 lb)     BP Readings from Last 3 Encounters:   01/05/24 120/62   11/22/23 117/78   11/06/23 116/68     /62 (BP Location: Right arm, Patient Position: Sitting, Cuff Size: Adult Large)   Pulse 89   Resp 17   Ht 1.829 m (6')   Wt 107 kg (236 lb)   SpO2 99%   BMI 32.01 kg/m     The patient is comfortable, no acute distress.  Mood good.  Insight good.  Eyes are nonicteric.  Neck is supple without mass.  No cervical adenopathy.  No thyromegaly. Heart regular rate and rhythm.  Lungs clear to auscultation bilaterally.  Respiratory effort is good.  Extremities no edema.      Diagnostics:     Office Visit on 06/22/2023   Component Date Value Ref Range Status    CRP Inflammation 06/22/2023 5.27 (H)  <5.00 mg/L Final    Sodium 06/22/2023 139  136 - 145 mmol/L Final    Potassium 06/22/2023 4.4  3.4 - 5.3 mmol/L Final    Chloride 06/22/2023 101  98 - 107 mmol/L Final    Carbon Dioxide (CO2) 06/22/2023 26  22 - 29 mmol/L Final    Anion Gap 06/22/2023 12  7 - 15 mmol/L Final    Urea Nitrogen 06/22/2023 18.6  8.0 - 23.0 mg/dL Final    Creatinine 06/22/2023 0.95  0.67 - 1.17 mg/dL Final    Calcium 06/22/2023 9.4  8.8 - 10.2 mg/dL Final    Glucose 06/22/2023 94  70 - 99 mg/dL Final    Alkaline Phosphatase 06/22/2023 86  40 - 129 U/L Final    AST 06/22/2023 18  0 - 45 U/L Final    Reference intervals for this test were updated on 6/12/2023 to more accurately reflect our healthy population. There may be differences in the flagging of prior results with  similar values performed with this method. Interpretation of those prior results can be made in the context of the updated reference intervals.    ALT 06/22/2023 15  0 - 70 U/L Final    Reference intervals for this test were updated on 6/12/2023 to more accurately reflect our healthy population. There may be differences in the flagging of prior results with similar values performed with this method. Interpretation of those prior results can be made in the context of the updated reference intervals.      Protein Total 06/22/2023 7.5  6.4 - 8.3 g/dL Final    Albumin 06/22/2023 4.6  3.5 - 5.2 g/dL Final    Bilirubin Total 06/22/2023 0.3  <=1.2 mg/dL Final    GFR Estimate 06/22/2023 85  >60 mL/min/1.73m2 Final    Erythrocyte Sedimentation Rate 06/22/2023 15  0 - 20 mm/hr Final    WBC Count 06/22/2023 7.4  4.0 - 11.0 10e3/uL Final    RBC Count 06/22/2023 4.62  4.40 - 5.90 10e6/uL Final    Hemoglobin 06/22/2023 14.6  13.3 - 17.7 g/dL Final    Hematocrit 06/22/2023 42.7  40.0 - 53.0 % Final    MCV 06/22/2023 92  78 - 100 fL Final    MCH 06/22/2023 31.6  26.5 - 33.0 pg Final    MCHC 06/22/2023 34.2  31.5 - 36.5 g/dL Final    RDW 06/22/2023 13.4  10.0 - 15.0 % Final    Platelet Count 06/22/2023 270  150 - 450 10e3/uL Final    % Neutrophils 06/22/2023 58  % Final    % Lymphocytes 06/22/2023 28  % Final    % Monocytes 06/22/2023 11  % Final    % Eosinophils 06/22/2023 2  % Final    % Basophils 06/22/2023 1  % Final    % Immature Granulocytes 06/22/2023 0  % Final    Absolute Neutrophils 06/22/2023 4.3  1.6 - 8.3 10e3/uL Final    Absolute Lymphocytes 06/22/2023 2.1  0.8 - 5.3 10e3/uL Final    Absolute Monocytes 06/22/2023 0.8  0.0 - 1.3 10e3/uL Final    Absolute Eosinophils 06/22/2023 0.1  0.0 - 0.7 10e3/uL Final    Absolute Basophils 06/22/2023 0.0  0.0 - 0.2 10e3/uL Final    Absolute Immature Granulocytes 06/22/2023 0.0  <=0.4 10e3/uL Final       No results found for any visits on 01/05/24.     Assessment and Plan:     1.  Class 1 obesity due to excess calories with serious comorbidity and body mass index (BMI) of 32.0 to 32.9 in adult  Working on diet and exercise to start with.  Hoping to help with back and joint pain.  Follow up scheduled related to this.    2. Senile purpura (H24)  No signs of concerning issues at this time and continue to monitor.    3. Chronic systolic heart failure (H)  No signs of recurrence.  Continue to monitor.    4. Branch retinal vein occlusion of left eye, unspecified complication status (H28)  No issues at this time.  Continue to monitor.  Follow up ophthalmology as needed.    5. Dilated cardiomyopathy (H)  No changes.  Continue to monitor.    6. Atrial flutter, chronic (H)  No issues at this time.  Continue to monitor.    7. Chronic left-sided low back pain with left-sided sciatica  Will try nortriptyline (Pamelor) for pain and for sleep and see how this does.    - nortriptyline (PAMELOR) 25 MG capsule; Take 1 capsule (25 mg) by mouth at bedtime  Dispense: 90 capsule; Refill: 3    8. Spinal stenosis at L4-L5 level  Follow up specialists as well.    - nortriptyline (PAMELOR) 25 MG capsule; Take 1 capsule (25 mg) by mouth at bedtime  Dispense: 90 capsule; Refill: 3     40 minutes or greater was spent today on the patient's care on the day of service.      This includes time for chart preparation, reviewing medical tests done before or during the visit, talking with the patient, review of quality indicators, required documentation, and other elements of care.     Continue current medications otherwise.  Follow up sooner if issues.          Onur Rod MD  General Internal Medicine  [facility]      Return in about 7 weeks (around 2/20/2024) for follow up visit.     Future Appointments   Date Time Provider Department Center   2/20/2024  1:00 PM [facility] this [facility].

## 2024-01-24 ENCOUNTER — DOCUMENTATION ONLY (OUTPATIENT)
Dept: NEUROSURGERY | Facility: CLINIC | Age: 74
End: 2024-01-24
Payer: MEDICARE

## 2024-02-06 ENCOUNTER — TRANSFERRED RECORDS (OUTPATIENT)
Dept: HEALTH INFORMATION MANAGEMENT | Facility: CLINIC | Age: 74
End: 2024-02-06
Payer: MEDICARE

## 2024-02-20 ENCOUNTER — OFFICE VISIT (OUTPATIENT)
Dept: INTERNAL MEDICINE | Facility: CLINIC | Age: 74
End: 2024-02-20
Payer: MEDICARE

## 2024-02-20 VITALS
RESPIRATION RATE: 18 BRPM | SYSTOLIC BLOOD PRESSURE: 120 MMHG | TEMPERATURE: 97.5 F | HEIGHT: 72 IN | OXYGEN SATURATION: 98 % | HEART RATE: 76 BPM | BODY MASS INDEX: 31.29 KG/M2 | DIASTOLIC BLOOD PRESSURE: 68 MMHG | WEIGHT: 231 LBS

## 2024-02-20 DIAGNOSIS — E78.2 MIXED HYPERLIPIDEMIA: ICD-10-CM

## 2024-02-20 DIAGNOSIS — R35.0 BENIGN PROSTATIC HYPERPLASIA WITH URINARY FREQUENCY: ICD-10-CM

## 2024-02-20 DIAGNOSIS — I25.83 CORONARY ARTERY DISEASE DUE TO LIPID RICH PLAQUE: ICD-10-CM

## 2024-02-20 DIAGNOSIS — M25.512 CHRONIC LEFT SHOULDER PAIN: ICD-10-CM

## 2024-02-20 DIAGNOSIS — I25.5 ISCHEMIC CARDIOMYOPATHY: ICD-10-CM

## 2024-02-20 DIAGNOSIS — I25.10 NONOBSTRUCTIVE ATHEROSCLEROSIS OF CORONARY ARTERY: Primary | ICD-10-CM

## 2024-02-20 DIAGNOSIS — M54.42 CHRONIC LEFT-SIDED LOW BACK PAIN WITH LEFT-SIDED SCIATICA: ICD-10-CM

## 2024-02-20 DIAGNOSIS — R06.02 SOB (SHORTNESS OF BREATH): ICD-10-CM

## 2024-02-20 DIAGNOSIS — M48.061 SPINAL STENOSIS AT L4-L5 LEVEL: ICD-10-CM

## 2024-02-20 DIAGNOSIS — J31.0 CHRONIC RHINITIS: ICD-10-CM

## 2024-02-20 DIAGNOSIS — R41.3 MEMORY DIFFICULTIES: ICD-10-CM

## 2024-02-20 DIAGNOSIS — G89.29 CHRONIC LEFT SHOULDER PAIN: ICD-10-CM

## 2024-02-20 DIAGNOSIS — M25.552 HIP PAIN, LEFT: ICD-10-CM

## 2024-02-20 DIAGNOSIS — I48.92 ATRIAL FLUTTER, CHRONIC (H): ICD-10-CM

## 2024-02-20 DIAGNOSIS — R93.1 DECREASED CARDIAC EJECTION FRACTION: ICD-10-CM

## 2024-02-20 DIAGNOSIS — I10 ESSENTIAL HYPERTENSION: ICD-10-CM

## 2024-02-20 DIAGNOSIS — N40.1 BENIGN PROSTATIC HYPERPLASIA WITH URINARY FREQUENCY: ICD-10-CM

## 2024-02-20 DIAGNOSIS — G89.29 CHRONIC LEFT-SIDED LOW BACK PAIN WITH LEFT-SIDED SCIATICA: ICD-10-CM

## 2024-02-20 DIAGNOSIS — I25.10 CORONARY ARTERY DISEASE DUE TO LIPID RICH PLAQUE: ICD-10-CM

## 2024-02-20 PROCEDURE — 99215 OFFICE O/P EST HI 40 MIN: CPT | Performed by: INTERNAL MEDICINE

## 2024-02-20 RX ORDER — PRAVASTATIN SODIUM 20 MG
20 TABLET ORAL DAILY
Qty: 360 TABLET | Refills: 3 | Status: SHIPPED | OUTPATIENT
Start: 2024-02-20

## 2024-02-20 RX ORDER — FLUTICASONE PROPIONATE 50 MCG
1 SPRAY, SUSPENSION (ML) NASAL DAILY
Qty: 48 ML | Refills: 3 | Status: SHIPPED | OUTPATIENT
Start: 2024-02-20

## 2024-02-20 RX ORDER — TAMSULOSIN HYDROCHLORIDE 0.4 MG/1
0.8 CAPSULE ORAL DAILY
Qty: 180 CAPSULE | Refills: 3 | Status: SHIPPED | OUTPATIENT
Start: 2024-02-20

## 2024-02-20 RX ORDER — METOPROLOL SUCCINATE 25 MG/1
12.5 TABLET, EXTENDED RELEASE ORAL DAILY
Qty: 45 TABLET | Refills: 3 | Status: SHIPPED | OUTPATIENT
Start: 2024-02-20 | End: 2024-05-14

## 2024-02-20 RX ORDER — LISINOPRIL 5 MG/1
5 TABLET ORAL DAILY
Qty: 90 TABLET | Refills: 3 | Status: SHIPPED | OUTPATIENT
Start: 2024-02-20 | End: 2025-02-14

## 2024-02-20 RX ORDER — AMOXICILLIN 250 MG
1-2 CAPSULE ORAL DAILY PRN
Qty: 100 TABLET | Refills: 1 | Status: SHIPPED | OUTPATIENT
Start: 2024-02-20

## 2024-02-20 NOTE — PROGRESS NOTES
[facility] - Primary Care Specialists    Comprehensive and complex medical care - Chronic disease management - Shared decision making - Care coordination - Compassionate care    Patient advocacy - Rational deprescribing - Minimally disruptive medicine - Ethical focus - Customized care         Date of Service: 2/20/2024  Primary Provider: Onur Rod    Patient Care Team:  Onur Rod MD as PCP - General  Onur Rod MD as Assigned PCP  Ambrose Degroot MD as MD (Orthopaedic Surgery)  Stuart Jackson MD as MD (Orthopaedic Surgery)  Liborio Champagne MD as MD  [facility]  Gregg Curtis as Resident (Orthopaedic Surgery)  Kiran Tyler MD as Assigned Heart and Vascular Provider  Radha Eller MD as Assigned Musculoskeletal Provider  Sagrario Good PA-C as Assigned Neuroscience Provider          Patient's Pharmacy:    [id number] - [place], [place] - [address] [facility]  [address] [facility] [address]  Phone: [phone] Fax: [phone]    [facility] - [place], [place] - [address] [place] [address]  [address] [place] [address]  Phone: [phone] Fax: [phone]     Patient's Contacts:  Name Home Phone Work Phone Mobile Phone Relationship Lgl KYLER Wilson [phone] Spouse    MATILDE SILVER   [phone] Other      Patient's Insurance:    Payor: MEDICARE / Plan: MEDICARE / Product Type: Medicare /            Active Problem List:  Problem List as of 2/20/2024 Reviewed: 2/20/2024 12:59 PM by Onur Rod MD         High    Nonsmoker    Full code status    Atrial flutter, chronic (H)       Medium    S/P left atrial appendage closure - WATCHMAN    Primary hypertension    Chronic neck pain    Dilated cardiomyopathy (H)    Chronic systolic heart failure (H)    Chronic left-sided low back pain with left-sided sciatica    Spinal stenosis at L4-L5 level    Senile purpura (H24)        Low    GERD (gastroesophageal reflux disease)    Branch retinal vein occlusion of left eye (H28)    Normal colonoscopy - 2016 - Average risk    Complete tear of left rotator cuff    Concussion syndrome        Current Outpatient Medications   Medication Instructions    acetaminophen (TYLENOL) 650 mg, Oral, EVERY 8 HOURS PRN    aspirin 81 mg, Oral, DAILY    co-enzyme Q-10 200 mg, Oral, DAILY    fish oil-omega-3 fatty acids 2 g, DAILY    fluticasone (FLONASE) 50 MCG/ACT nasal spray 1 spray, Both Nostrils, DAILY    lisinopril (ZESTRIL) 5 mg, Oral, DAILY    metoprolol succinate ER (TOPROL XL) 12.5 mg, Oral, DAILY    MULTIVITAMIN ORAL 1 tablet, Oral, DAILY    pravastatin (PRAVACHOL) 20 mg, Oral, DAILY    senna-docusate (SENOKOT-S/PERICOLACE) 8.6-50 MG tablet 1-2 tablets, Oral, DAILY PRN, Take while on oral narcotics to prevent or treat constipation.    tamsulosin (FLOMAX) 0.8 mg, Oral, DAILY      Social History     Social History Narrative    Patient of Dr. Rod since 2001.    .  Wife is a former RN.     Wife's cousin is Dr. Jean Pierre Champagne, ID specialist.       Subjective:     Mike Gonzalez is a 73 year old male who comes in today for:    Chief Complaint   Patient presents with    Follow Up     6wks 1/05/24 2/20/2024    12:34 PM   Additional Questions   Roomed by Justin Mccrary for follow up multiple issues.    First reviewed his physical therapy at hospitals and [facility] and this is going well.  May transition to self regimen in the near future.    Had continued to work on weight loss and this has been progressing.    Balance still an issue but working on this.    Does note cold feet and this was reviewed.    Some easy bruising at times.    Noting more runny nose.  This has been an ongoing thing but worse.  Some nighttime congestion.  No pain.    Can walk about 700 feet before getting weak.    Notes memory issues still but much improved since his back surgery.    Left shoulder continues to bother  him.    Reviewed coronary artery disease (CAD) and cholesterol medications.    We reviewed his other issues noted in the assessment but not specifically addressed in the HPI above.     Objective:     Wt Readings from Last 3 Encounters:   02/20/24 104.8 kg (231 lb)   01/05/24 107 kg (236 lb)   11/22/23 108.9 kg (240 lb)     BP Readings from Last 3 Encounters:   02/20/24 120/68   01/05/24 120/62   11/22/23 117/78     /68   Pulse 76   Temp 97.5  F (36.4  C) (Oral)   Resp 18   Ht 1.829 m (6')   Wt 104.8 kg (231 lb)   SpO2 98%   BMI 31.33 kg/m     The patient is comfortable, no acute distress.  Mood good.  Insight good.  Eyes are nonicteric.  Nose shows some mild reddish rhinitis.  Neck is supple without mass.  No cervical adenopathy.  No thyromegaly. Heart regular rate and rhythm.  Lungs clear to auscultation bilaterally.  Respiratory effort is good.  Abdomen soft and nontender.  No hepatosplenomegaly.  Extremities no edema.      Diagnostics:     Office Visit on 06/22/2023   Component Date Value Ref Range Status    CRP Inflammation 06/22/2023 5.27 (H)  <5.00 mg/L Final    Sodium 06/22/2023 139  136 - 145 mmol/L Final    Potassium 06/22/2023 4.4  3.4 - 5.3 mmol/L Final    Chloride 06/22/2023 101  98 - 107 mmol/L Final    Carbon Dioxide (CO2) 06/22/2023 26  22 - 29 mmol/L Final    Anion Gap 06/22/2023 12  7 - 15 mmol/L Final    Urea Nitrogen 06/22/2023 18.6  8.0 - 23.0 mg/dL Final    Creatinine 06/22/2023 0.95  0.67 - 1.17 mg/dL Final    Calcium 06/22/2023 9.4  8.8 - 10.2 mg/dL Final    Glucose 06/22/2023 94  70 - 99 mg/dL Final    Alkaline Phosphatase 06/22/2023 86  40 - 129 U/L Final    AST 06/22/2023 18  0 - 45 U/L Final    Reference intervals for this test were updated on 6/12/2023 to more accurately reflect our healthy population. There may be differences in the flagging of prior results with similar values performed with this method. Interpretation of those prior results can be made in the context of  the updated reference intervals.    ALT 06/22/2023 15  0 - 70 U/L Final    Reference intervals for this test were updated on 6/12/2023 to more accurately reflect our healthy population. There may be differences in the flagging of prior results with similar values performed with this method. Interpretation of those prior results can be made in the context of the updated reference intervals.      Protein Total 06/22/2023 7.5  6.4 - 8.3 g/dL Final    Albumin 06/22/2023 4.6  3.5 - 5.2 g/dL Final    Bilirubin Total 06/22/2023 0.3  <=1.2 mg/dL Final    GFR Estimate 06/22/2023 85  >60 mL/min/1.73m2 Final    Erythrocyte Sedimentation Rate 06/22/2023 15  0 - 20 mm/hr Final    WBC Count 06/22/2023 7.4  4.0 - 11.0 10e3/uL Final    RBC Count 06/22/2023 4.62  4.40 - 5.90 10e6/uL Final    Hemoglobin 06/22/2023 14.6  13.3 - 17.7 g/dL Final    Hematocrit 06/22/2023 42.7  40.0 - 53.0 % Final    MCV 06/22/2023 92  78 - 100 fL Final    MCH 06/22/2023 31.6  26.5 - 33.0 pg Final    MCHC 06/22/2023 34.2  31.5 - 36.5 g/dL Final    RDW 06/22/2023 13.4  10.0 - 15.0 % Final    Platelet Count 06/22/2023 270  150 - 450 10e3/uL Final    % Neutrophils 06/22/2023 58  % Final    % Lymphocytes 06/22/2023 28  % Final    % Monocytes 06/22/2023 11  % Final    % Eosinophils 06/22/2023 2  % Final    % Basophils 06/22/2023 1  % Final    % Immature Granulocytes 06/22/2023 0  % Final    Absolute Neutrophils 06/22/2023 4.3  1.6 - 8.3 10e3/uL Final    Absolute Lymphocytes 06/22/2023 2.1  0.8 - 5.3 10e3/uL Final    Absolute Monocytes 06/22/2023 0.8  0.0 - 1.3 10e3/uL Final    Absolute Eosinophils 06/22/2023 0.1  0.0 - 0.7 10e3/uL Final    Absolute Basophils 06/22/2023 0.0  0.0 - 0.2 10e3/uL Final    Absolute Immature Granulocytes 06/22/2023 0.0  <=0.4 10e3/uL Final       No results found for any visits on 02/20/24.     Assessment:     1. Nonobstructive atherosclerosis of coronary artery    2. Chronic rhinitis    3. Ischemic cardiomyopathy    4. Essential  hypertension    5. SOB (shortness of breath)    6. Atrial flutter, chronic (H)    7. Decreased cardiac ejection fraction    8. Spinal stenosis at L4-L5 level    9. Benign prostatic hyperplasia with urinary frequency    10. Mixed hyperlipidemia    11. Coronary artery disease due to lipid rich plaque    12. Chronic left-sided low back pain with left-sided sciatica    13. Hip pain, left    14. Chronic left shoulder pain    15. Memory difficulties        Plan:     Medications refilled.  Continue therapies.  Discussed voice mail rhinitis.  Start pravastatin (Pravachol).  Check cholesterol in the future.  Continue current medications otherwise.  Follow up sooner if issues.    No orders of the defined types were placed in this encounter.       40 minutes or greater was spent today on the patient's care on the day of service.      This includes time for chart preparation, reviewing medical tests done before or during the visit, talking with the patient, review of quality indicators, required documentation, and other elements of care.        Onur Rod MD  General Internal Medicine  [facility]    Return in about 6 months (around 8/20/2024).     No future appointments.

## 2024-02-29 PROBLEM — I25.10 CAD (CORONARY ARTERY DISEASE): Chronic | Status: ACTIVE | Noted: 2024-02-29

## 2024-02-29 PROBLEM — I25.10 CAD (CORONARY ARTERY DISEASE): Status: ACTIVE | Noted: 2024-02-29

## 2024-02-29 NOTE — PATIENT INSTRUCTIONS
Please follow up if you have any further issues.    You may contact me by phone or MyChart if you are worsening or if things are not improving.    ______________________________________________________________________     You can call [phone] during weekday hours to get a hold of our team coordinators if you need to get a message to me.    There are 3 people in our building taking phone calls for me.  Be sure to listen to the prompts to get a hold of them.    You first press 1 for English (press another number for a different language) and then press 2 to get the .    They can then take your message and forward it onto me.    ______________________________________________________________________     Please remember that you can call [phone] ANYTIME to schedule an appointment.     You can schedule appointments 24 hours a day, 7 days a week.      Sometimes the best time to schedule an appointment is after clinic hours when less people are calling in.      Weekends are another option for calling in to schedule appointments.  .

## 2024-03-11 ENCOUNTER — MYC MEDICAL ADVICE (OUTPATIENT)
Dept: INTERNAL MEDICINE | Facility: CLINIC | Age: 74
End: 2024-03-11
Payer: MEDICARE

## 2024-03-11 DIAGNOSIS — J31.0 CHRONIC RHINITIS: ICD-10-CM

## 2024-03-11 RX ORDER — PSEUDOEPHEDRINE HCL 120 MG/1
120 TABLET, FILM COATED, EXTENDED RELEASE ORAL EVERY 12 HOURS PRN
Qty: 50 TABLET | Refills: 0 | Status: SHIPPED | OUTPATIENT
Start: 2024-03-11

## 2024-03-11 NOTE — TELEPHONE ENCOUNTER
Patient recently discontinued, see below-      pseudoePHEDrine (SUDAFED) 120 MG 12 hr tablet (Discontinued) 60 tablet 3 8/21/2023 1/6/2024 No   Sig - Route: Take 1 tablet (120 mg) by mouth every 12 hours - Oral   Patient taking differently: Take 120 mg by mouth every 12 hours as needed for congestion   Sent to pharmacy as: Pseudoephedrine HCl  MG Oral Tablet Extended Release 12 Hour (SUDAFED)   Class: E-Prescribe   Order: [id number]   E-Prescribing Status: Receipt confirmed by pharmacy (8/21/2023  3:25 PM CDT)   E-Cancel Status: Request approved by pharmacy (1/6/2024  8:07 AM CST)       E-Cancel Status Note: Prescription cancelled; filled 2 times

## 2024-04-30 ENCOUNTER — TRANSFERRED RECORDS (OUTPATIENT)
Dept: HEALTH INFORMATION MANAGEMENT | Facility: CLINIC | Age: 74
End: 2024-04-30
Payer: MEDICARE

## 2024-05-02 ENCOUNTER — TRANSFERRED RECORDS (OUTPATIENT)
Dept: HEALTH INFORMATION MANAGEMENT | Facility: CLINIC | Age: 74
End: 2024-05-02
Payer: MEDICARE

## 2024-05-06 ENCOUNTER — TRANSFERRED RECORDS (OUTPATIENT)
Dept: HEALTH INFORMATION MANAGEMENT | Facility: CLINIC | Age: 74
End: 2024-05-06
Payer: MEDICARE

## 2024-05-08 ENCOUNTER — MYC MEDICAL ADVICE (OUTPATIENT)
Dept: INTERNAL MEDICINE | Facility: CLINIC | Age: 74
End: 2024-05-08
Payer: MEDICARE

## 2024-05-08 NOTE — TELEPHONE ENCOUNTER
"Review report from Andre.    Patient states \"She was suggesting switching from Metoprolol to Propranolol to reduce hand tremors. She also suggested a trial of Cymbalta plus a blood flow test on my legs and feet for the neuropathy in my feet. \"  "

## 2024-05-14 ENCOUNTER — OFFICE VISIT (OUTPATIENT)
Dept: INTERNAL MEDICINE | Facility: CLINIC | Age: 74
End: 2024-05-14
Payer: MEDICARE

## 2024-05-14 VITALS
RESPIRATION RATE: 16 BRPM | HEIGHT: 72 IN | BODY MASS INDEX: 32.37 KG/M2 | DIASTOLIC BLOOD PRESSURE: 68 MMHG | HEART RATE: 79 BPM | WEIGHT: 239 LBS | TEMPERATURE: 98.2 F | OXYGEN SATURATION: 97 % | SYSTOLIC BLOOD PRESSURE: 122 MMHG

## 2024-05-14 DIAGNOSIS — R06.02 SOB (SHORTNESS OF BREATH): ICD-10-CM

## 2024-05-14 DIAGNOSIS — G60.9 IDIOPATHIC PERIPHERAL NEUROPATHY: ICD-10-CM

## 2024-05-14 DIAGNOSIS — E78.2 MIXED HYPERLIPIDEMIA: ICD-10-CM

## 2024-05-14 DIAGNOSIS — R09.89 OTHER SPECIFIED SYMPTOMS AND SIGNS INVOLVING THE CIRCULATORY AND RESPIRATORY SYSTEMS: ICD-10-CM

## 2024-05-14 DIAGNOSIS — G25.0 TREMOR, ESSENTIAL: Primary | ICD-10-CM

## 2024-05-14 DIAGNOSIS — R06.02 MILD SHORTNESS OF BREATH: ICD-10-CM

## 2024-05-14 DIAGNOSIS — R73.09 OTHER ABNORMAL GLUCOSE: ICD-10-CM

## 2024-05-14 DIAGNOSIS — I10 ESSENTIAL HYPERTENSION: ICD-10-CM

## 2024-05-14 DIAGNOSIS — R93.1 DECREASED CARDIAC EJECTION FRACTION: ICD-10-CM

## 2024-05-14 DIAGNOSIS — I48.92 ATRIAL FLUTTER, CHRONIC (H): ICD-10-CM

## 2024-05-14 LAB
ANION GAP SERPL CALCULATED.3IONS-SCNC: 9 MMOL/L (ref 7–15)
BUN SERPL-MCNC: 20.9 MG/DL (ref 8–23)
CALCIUM SERPL-MCNC: 10.2 MG/DL (ref 8.8–10.2)
CHLORIDE SERPL-SCNC: 103 MMOL/L (ref 98–107)
CHOLEST SERPL-MCNC: 117 MG/DL
CREAT SERPL-MCNC: 1.31 MG/DL (ref 0.67–1.17)
DEPRECATED HCO3 PLAS-SCNC: 31 MMOL/L (ref 22–29)
EGFRCR SERPLBLD CKD-EPI 2021: 57 ML/MIN/1.73M2
FASTING STATUS PATIENT QL REPORTED: NO
FASTING STATUS PATIENT QL REPORTED: NO
FOLATE SERPL-MCNC: 35.7 NG/ML (ref 4.6–34.8)
GLUCOSE SERPL-MCNC: 94 MG/DL (ref 70–99)
HBA1C MFR BLD: 5.5 % (ref 0–5.6)
HDLC SERPL-MCNC: 46 MG/DL
LDLC SERPL CALC-MCNC: 57 MG/DL
NONHDLC SERPL-MCNC: 71 MG/DL
NT-PROBNP SERPL-MCNC: 1343 PG/ML (ref 0–900)
POTASSIUM SERPL-SCNC: 4.9 MMOL/L (ref 3.4–5.3)
SODIUM SERPL-SCNC: 143 MMOL/L (ref 135–145)
T4 FREE SERPL-MCNC: 1.4 NG/DL (ref 0.9–1.7)
TOTAL PROTEIN SERUM FOR ELP: 6.6 G/DL (ref 6.4–8.3)
TRIGL SERPL-MCNC: 68 MG/DL
TSH SERPL DL<=0.005 MIU/L-ACNC: 2.74 UIU/ML (ref 0.3–4.2)
VIT B12 SERPL-MCNC: 1421 PG/ML (ref 232–1245)

## 2024-05-14 PROCEDURE — 82746 ASSAY OF FOLIC ACID SERUM: CPT | Performed by: INTERNAL MEDICINE

## 2024-05-14 PROCEDURE — 36415 COLL VENOUS BLD VENIPUNCTURE: CPT | Performed by: INTERNAL MEDICINE

## 2024-05-14 PROCEDURE — 82607 VITAMIN B-12: CPT | Performed by: INTERNAL MEDICINE

## 2024-05-14 PROCEDURE — 84439 ASSAY OF FREE THYROXINE: CPT | Performed by: INTERNAL MEDICINE

## 2024-05-14 PROCEDURE — 84166 PROTEIN E-PHORESIS/URINE/CSF: CPT | Performed by: PATHOLOGY

## 2024-05-14 PROCEDURE — 84165 PROTEIN E-PHORESIS SERUM: CPT | Performed by: PATHOLOGY

## 2024-05-14 PROCEDURE — G2211 COMPLEX E/M VISIT ADD ON: HCPCS | Performed by: INTERNAL MEDICINE

## 2024-05-14 PROCEDURE — 84155 ASSAY OF PROTEIN SERUM: CPT | Performed by: INTERNAL MEDICINE

## 2024-05-14 PROCEDURE — 83036 HEMOGLOBIN GLYCOSYLATED A1C: CPT | Performed by: INTERNAL MEDICINE

## 2024-05-14 PROCEDURE — 84443 ASSAY THYROID STIM HORMONE: CPT | Performed by: INTERNAL MEDICINE

## 2024-05-14 PROCEDURE — 99215 OFFICE O/P EST HI 40 MIN: CPT | Performed by: INTERNAL MEDICINE

## 2024-05-14 PROCEDURE — 80048 BASIC METABOLIC PNL TOTAL CA: CPT | Performed by: INTERNAL MEDICINE

## 2024-05-14 PROCEDURE — 80061 LIPID PANEL: CPT | Performed by: INTERNAL MEDICINE

## 2024-05-14 PROCEDURE — 83880 ASSAY OF NATRIURETIC PEPTIDE: CPT | Performed by: INTERNAL MEDICINE

## 2024-05-14 RX ORDER — METOPROLOL SUCCINATE 25 MG/1
25 TABLET, EXTENDED RELEASE ORAL DAILY
Qty: 90 TABLET | Refills: 3 | Status: SHIPPED | OUTPATIENT
Start: 2024-05-14

## 2024-05-14 RX ORDER — DULOXETIN HYDROCHLORIDE 30 MG/1
30 CAPSULE, DELAYED RELEASE ORAL DAILY
Qty: 90 CAPSULE | Refills: 3 | Status: SHIPPED | OUTPATIENT
Start: 2024-05-14 | End: 2024-07-18

## 2024-05-14 NOTE — PATIENT INSTRUCTIONS
Blood work and urine tests today.  Increase the metoprolol to 25 mg (full pill) every day.  Update me in 1 month how it is going for the tremors.  Monitor the pulse.  Too low of a pulse is around 40.  45 or above is okay.  Start the duloxetine (Cymbalta) 30 mg in am.  Ultrasound of the circulation to both legs to see if this contributes to the neuropathy symptoms.  Let me know how you are doing in 4-6 weeks.

## 2024-05-14 NOTE — PROGRESS NOTES
{PROVIDER CHARTING PREFERENCE:484099}    Lucina Lorenzo is a 73 year old, presenting for the following health issues:  RECHECK (Neuropathy)      5/14/2024     9:43 AM   Additional Questions   Roomed by Cherelle     History of Present Illness       Reason for visit:  Neuropathy    He eats 2-3 servings of fruits and vegetables daily.He consumes 1 sweetened beverage(s) daily.He exercises with enough effort to increase his heart rate 9 or less minutes per day.  He exercises with enough effort to increase his heart rate 3 or less days per week.   He is taking medications regularly.       {MA/LPN/RN Pre-Provider Visit Orders- hCG/UA/Strep (Optional):964413}  {SUPERLIST (Optional):891395}  {additonal problems for provider to add (Optional):547596}    {ROS Picklists (Optional):062235}      Objective    /68   Pulse 79   Temp 98.2  F (36.8  C) (Oral)   Resp 16   Ht 1.829 m (6')   Wt 108.4 kg (239 lb)   SpO2 97%   BMI 32.41 kg/m    Body mass index is 32.41 kg/m .  Physical Exam   {Exam List (Optional):999875}    {Diagnostic Test Results (Optional):711976}        Signed Electronically by: Onur Rod MD  {Email feedback regarding this note to [email or web address]   :966060}

## 2024-05-14 NOTE — PROGRESS NOTES
[facility]    Comprehensive and complex medical care - Chronic disease management - Shared decision making - Care coordination - Compassionate care    Patient advocacy - Rational deprescribing - Minimally disruptive medicine - Ethical focus - Customized care         Date of Service: 5/14/2024  Primary Provider: Onur Rod    Patient Care Team:  Onur Rod MD as PCP - General  Onur Rod MD as Assigned PCP  Ambrose Degroot MD as MD (Orthopaedic Surgery)  Stuart Jackson MD as MD (Orthopaedic Surgery)  Liborio Champagne MD as MD  [facility]  Gregg Curtis as Resident (Orthopaedic Surgery)  Kiran Tyler MD as Assigned Heart and Vascular Provider  Radha Eller MD as Assigned Musculoskeletal Provider  Sagrario Good PA-C as Assigned Neuroscience Provider          Patient's Pharmacy:    [id number] - [place], [place] - [address] [facility] [address]  [address] [facility] [address]  Phone: [phone] Fax: [phone]    [facility] - [place], [place] - [address] [place] [address]  [address] [facility] [address]  Phone: [phone] Fax: [phone]     Patient's Contacts:  Name Home Phone Work Phone Mobile Phone Relationship Lgl KYLER Wilson [phone] Spouse    MATILDE SILVER   [phone] Other      Patient's Insurance:    Payor: MEDICARE / Plan: MEDICARE / Product Type: Medicare /            Active Problem List:  Problem List as of 5/14/2024 Reviewed: 2/20/2024 12:59 PM by Onur Rod MD         High    Nonsmoker    Full code status    CAD (coronary artery disease)    Atrial flutter, chronic (H)       Medium    S/P left atrial appendage closure - WATCHMAN    Primary hypertension    Chronic neck pain    Dilated cardiomyopathy (H)    Chronic systolic heart failure (H)    Chronic left-sided low back pain with left-sided sciatica    Spinal stenosis at L4-L5 level     Senile purpura (H24)       Low    GERD (gastroesophageal reflux disease)    Branch retinal vein occlusion of left eye (H28)    Normal colonoscopy - 2016 - Average risk    Complete tear of left rotator cuff    Concussion syndrome        Current Outpatient Medications   Medication Instructions    acetaminophen (TYLENOL) 650 mg, Oral, EVERY 8 HOURS PRN    aspirin 81 mg, Oral, DAILY    co-enzyme Q-10 200 mg, Oral, DAILY    DULoxetine (CYMBALTA) 30 mg, Oral, DAILY    fish oil-omega-3 fatty acids 2 g, DAILY    fluticasone (FLONASE) 50 MCG/ACT nasal spray 1 spray, Both Nostrils, DAILY    lisinopril (ZESTRIL) 5 mg, Oral, DAILY    metoprolol succinate ER (TOPROL XL) 25 mg, Oral, DAILY    MULTIVITAMIN ORAL 1 tablet, Oral, DAILY    pravastatin (PRAVACHOL) 20 mg, Oral, DAILY    pseudoePHEDrine (SUDAFED) 120 mg, Oral, EVERY 12 HOURS PRN    senna-docusate (SENOKOT-S/PERICOLACE) 8.6-50 MG tablet 1-2 tablets, Oral, DAILY PRN, Take while on oral narcotics to prevent or treat constipation.    tamsulosin (FLOMAX) 0.8 mg, Oral, DAILY      Social History     Social History Narrative    Patient of Dr. Rod since 2001.    .  Wife is a former RN.     Wife's cousin is Dr. Jean Pierre Champagne, ID specialist.       Subjective:     Mike Gonzalez is a 73 year old male who comes in today for:    Chief Complaint   Patient presents with    RECHECK     Neuropathy          5/14/2024     9:43 AM   Additional Questions   Roomed by Cherelle     In for follow up multiple issues.    Reviewed neurology notes for this visit and other tests.    First reviewed his essential tremor.  He was recommended to consider propranolol (INDERAL) for this.  His metoprolol is important for his heart function, so I do not think this is a good idea yet in relationship to this.  We reviewed other options for this but we will retry to increase the metoprolol to 25 mg first.  Apparently, he had a rash at this dose in the past.    Next reviewed his peripheral neuropathy  (PN).  He has seen neurology for this and they feel this is the case.  Recommended blood work for this and other tests including a vascular ultrasound of his legs as this could be related to this as well.    Pulse is generally doing well with his medications.    Back and other issues reviewed as well.    We reviewed his other issues noted in the assessment but not specifically addressed in the HPI above.     Objective:     Wt Readings from Last 3 Encounters:   05/14/24 108.4 kg (239 lb)   02/20/24 104.8 kg (231 lb)   01/05/24 107 kg (236 lb)     BP Readings from Last 3 Encounters:   05/14/24 122/68   02/20/24 120/68   01/05/24 120/62     /68   Pulse 79   Temp 98.2  F (36.8  C) (Oral)   Resp 16   Ht 1.829 m (6')   Wt 108.4 kg (239 lb)   SpO2 97%   BMI 32.41 kg/m     The patient is comfortable, no acute distress.  Mood good.  Insight good.  Eyes are nonicteric.  Neck is supple without mass.  No cervical adenopathy.  No thyromegaly. Heart irregular rate and rhythm.  Lungs clear to auscultation bilaterally.  Respiratory effort is good.  Extremities no edema.      Diagnostics:     Office Visit on 06/22/2023   Component Date Value Ref Range Status    CRP Inflammation 06/22/2023 5.27 (H)  <5.00 mg/L Final    Sodium 06/22/2023 139  136 - 145 mmol/L Final    Potassium 06/22/2023 4.4  3.4 - 5.3 mmol/L Final    Chloride 06/22/2023 101  98 - 107 mmol/L Final    Carbon Dioxide (CO2) 06/22/2023 26  22 - 29 mmol/L Final    Anion Gap 06/22/2023 12  7 - 15 mmol/L Final    Urea Nitrogen 06/22/2023 18.6  8.0 - 23.0 mg/dL Final    Creatinine 06/22/2023 0.95  0.67 - 1.17 mg/dL Final    Calcium 06/22/2023 9.4  8.8 - 10.2 mg/dL Final    Glucose 06/22/2023 94  70 - 99 mg/dL Final    Alkaline Phosphatase 06/22/2023 86  40 - 129 U/L Final    AST 06/22/2023 18  0 - 45 U/L Final    Reference intervals for this test were updated on 6/12/2023 to more accurately reflect our healthy population. There may be differences in the flagging  of prior results with similar values performed with this method. Interpretation of those prior results can be made in the context of the updated reference intervals.    ALT 06/22/2023 15  0 - 70 U/L Final    Reference intervals for this test were updated on 6/12/2023 to more accurately reflect our healthy population. There may be differences in the flagging of prior results with similar values performed with this method. Interpretation of those prior results can be made in the context of the updated reference intervals.      Protein Total 06/22/2023 7.5  6.4 - 8.3 g/dL Final    Albumin 06/22/2023 4.6  3.5 - 5.2 g/dL Final    Bilirubin Total 06/22/2023 0.3  <=1.2 mg/dL Final    GFR Estimate 06/22/2023 85  >60 mL/min/1.73m2 Final    Erythrocyte Sedimentation Rate 06/22/2023 15  0 - 20 mm/hr Final    WBC Count 06/22/2023 7.4  4.0 - 11.0 10e3/uL Final    RBC Count 06/22/2023 4.62  4.40 - 5.90 10e6/uL Final    Hemoglobin 06/22/2023 14.6  13.3 - 17.7 g/dL Final    Hematocrit 06/22/2023 42.7  40.0 - 53.0 % Final    MCV 06/22/2023 92  78 - 100 fL Final    MCH 06/22/2023 31.6  26.5 - 33.0 pg Final    MCHC 06/22/2023 34.2  31.5 - 36.5 g/dL Final    RDW 06/22/2023 13.4  10.0 - 15.0 % Final    Platelet Count 06/22/2023 270  150 - 450 10e3/uL Final    % Neutrophils 06/22/2023 58  % Final    % Lymphocytes 06/22/2023 28  % Final    % Monocytes 06/22/2023 11  % Final    % Eosinophils 06/22/2023 2  % Final    % Basophils 06/22/2023 1  % Final    % Immature Granulocytes 06/22/2023 0  % Final    Absolute Neutrophils 06/22/2023 4.3  1.6 - 8.3 10e3/uL Final    Absolute Lymphocytes 06/22/2023 2.1  0.8 - 5.3 10e3/uL Final    Absolute Monocytes 06/22/2023 0.8  0.0 - 1.3 10e3/uL Final    Absolute Eosinophils 06/22/2023 0.1  0.0 - 0.7 10e3/uL Final    Absolute Basophils 06/22/2023 0.0  0.0 - 0.2 10e3/uL Final    Absolute Immature Granulocytes 06/22/2023 0.0  <=0.4 10e3/uL Final       Results for orders placed or performed in visit on  05/14/24   TSH     Status: Normal   Result Value Ref Range    TSH 2.74 0.30 - 4.20 uIU/mL   Vitamin B12     Status: Abnormal   Result Value Ref Range    Vitamin B12 1,421 (H) 232 - 1,245 pg/mL   N terminal pro BNP outpatient     Status: Abnormal   Result Value Ref Range    N Terminal Pro BNP Outpatient 1,343 (H) 0 - 900 pg/mL   T4 free     Status: Normal   Result Value Ref Range    Free T4 1.40 0.90 - 1.70 ng/dL   Folate     Status: Abnormal   Result Value Ref Range    Folic Acid 35.7 (H) 4.6 - 34.8 ng/mL   Basic metabolic panel     Status: Abnormal   Result Value Ref Range    Sodium 143 135 - 145 mmol/L    Potassium 4.9 3.4 - 5.3 mmol/L    Chloride 103 98 - 107 mmol/L    Carbon Dioxide (CO2) 31 (H) 22 - 29 mmol/L    Anion Gap 9 7 - 15 mmol/L    Urea Nitrogen 20.9 8.0 - 23.0 mg/dL    Creatinine 1.31 (H) 0.67 - 1.17 mg/dL    GFR Estimate 57 (L) >60 mL/min/1.73m2    Calcium 10.2 8.8 - 10.2 mg/dL    Glucose 94 70 - 99 mg/dL    Patient Fasting > 8hrs? No    Hemoglobin A1c     Status: Normal   Result Value Ref Range    Hemoglobin A1C 5.5 0.0 - 5.6 %   Protein electrophoresis random urine     Status: None   Result Value Ref Range    ELP Interpretation Urine       Only trace albumin and trace globulins. No obvious monoclonal protein seen. We recommend a first morning voided urine to detect clinically significant proteinuria. A random specimen is not optimal for detecting all proteins. The specific gravity of this specimen was only 1.010. Pathologic significance requires clinical correlation. Onur Angela MD   Lipid panel reflex to direct LDL Non-fasting     Status: None   Result Value Ref Range    Cholesterol 117 <200 mg/dL    Triglycerides 68 <150 mg/dL    Direct Measure HDL 46 >=40 mg/dL    LDL Cholesterol Calculated 57 <=100 mg/dL    Non HDL Cholesterol 71 <130 mg/dL    Patient Fasting > 8hrs? No     Narrative    Cholesterol  Desirable:  <200 mg/dL    Triglycerides  Normal:  Less than 150 mg/dL  Borderline High:   150-199 mg/dL  High:  200-499 mg/dL  Very High:  Greater than or equal to 500 mg/dL    Direct Measure HDL  Female:  Greater than or equal to 50 mg/dL   Male:  Greater than or equal to 40 mg/dL    LDL Cholesterol  Desirable:  <100mg/dL  Above Desirable:  100-129 mg/dL   Borderline High:  130-159 mg/dL   High:  160-189 mg/dL   Very High:  >= 190 mg/dL    Non HDL Cholesterol  Desirable:  130 mg/dL  Above Desirable:  130-159 mg/dL  Borderline High:  160-189 mg/dL  High:  190-219 mg/dL  Very High:  Greater than or equal to 220 mg/dL   Total Protein, Serum for ELP     Status: Normal   Result Value Ref Range    Total Protein Serum for ELP 6.6 6.4 - 8.3 g/dL   Protein Electrophoresis, Serum     Status: None   Result Value Ref Range    Albumin 4.0 3.7 - 5.1 g/dL    Alpha 1 0.3 0.2 - 0.4 g/dL    Alpha 2 0.8 0.5 - 0.9 g/dL    Beta Globulin 0.8 0.6 - 1.0 g/dL    Gamma Globulin 0.7 0.7 - 1.6 g/dL    Monoclonal Peak 0.0 <=0.0 g/dL    ELP Interpretation       Essentially normal electrophoretic pattern. No obvious monoclonal proteins seen. Pathologic significance requires clinical correlation. Onur Angela MD   Protein electrophoresis     Status: None    Narrative    The following orders were created for panel order Protein electrophoresis.  Procedure                               Abnormality         Status                     ---------                               -----------         ------                     Total Protein, Serum for...[[id number]]  Normal              Final result               Protein Electrophoresis,...[[id number]]                      Final result                 Please view results for these tests on the individual orders.        Assessment:     1. Tremor, essential    2. Mild shortness of breath    3. Idiopathic peripheral neuropathy    4. Other abnormal glucose    5. Other specified symptoms and signs involving the circulatory and respiratory systems    6. Essential hypertension    7. SOB (shortness  of breath)    8. Atrial flutter, chronic (H)    9. Decreased cardiac ejection fraction    10. Mixed hyperlipidemia        Plan:     See patient instructions for more information.  Blood work and urine tests done today.  Ultrasound of the leg ordered.  Echocardiogram ordered in relationship to elevated BNP (brain natriuretic peptide).  Continue current medications otherwise.  Follow up sooner if issues.    Patient Instructions   Blood work and urine tests today.  Increase the metoprolol to 25 mg (full pill) every day.  Update me in 1 month how it is going for the tremors.  Monitor the pulse.  Too low of a pulse is around 40.  45 or above is okay.  Start the duloxetine (Cymbalta) 30 mg in am.  Ultrasound of the circulation to both legs to see if this contributes to the neuropathy symptoms.  Let me know how you are doing in 4-6 weeks.      Orders Placed This Encounter   Procedures    US Lower Extremity Arterial Duplex Bilateral    US Lower Extremity Arterial Duplex Bilateral    TSH    Vitamin B12    N terminal pro BNP outpatient    T4 free    Folate    Basic metabolic panel    Hemoglobin A1c    Protein electrophoresis random urine    Lipid panel reflex to direct LDL Non-fasting    Total Protein, Serum for ELP    Protein Electrophoresis, Serum    Protein electrophoresis        40 minutes or greater was spent today on the patient's care on the day of service.      This includes time for chart preparation, reviewing medical tests done before or during the visit, talking with the patient, review of quality indicators, required documentation, and other elements of care.        Onur Rod MD  General Internal Medicine  [facility]    The longitudinal plan of care for the diagnoses and conditions as documented were addressed during this visit. Due to the added complexity in care, I will continue to support Bj in the subsequent management and with ongoing continuity of care.     Return in  about 3 months (around 8/14/2024) for follow up visit.     Future Appointments   Date Time Provider Department Center   6/12/2024 10:00 AM [facility] STA[facility]       Wt Readings from Last 20 Encounters:   05/14/24 108.4 kg (239 lb)   02/20/24 104.8 kg (231 lb)   01/05/24 107 kg (236 lb)   11/22/23 108.9 kg (240 lb)   08/21/23 108.4 kg (239 lb)   06/22/23 108.9 kg (240 lb)   05/26/23 108.4 kg (239 lb)   05/08/23 108 kg (238 lb)   04/02/23 111.9 kg (246 lb 11.2 oz)   03/21/23 108.9 kg (240 lb)   09/28/22 108.4 kg (239 lb)   09/08/22 108 kg (238 lb)   05/09/22 110.2 kg (243 lb)   03/31/22 110.2 kg (243 lb)   01/12/22 110.2 kg (243 lb)   01/11/22 110.7 kg (244 lb)   11/16/21 108.4 kg (239 lb)   10/18/21 108.9 kg (240 lb)   08/30/21 109.3 kg (241 lb)   07/16/21 113.4 kg (250 lb)     BP Readings from Last 20 Encounters:   05/14/24 122/68   02/20/24 120/68   01/05/24 120/62   11/22/23 117/78   11/06/23 116/68   08/21/23 138/86   06/22/23 118/64   05/26/23 134/68   05/20/23 129/83   05/08/23 137/84   04/07/23 136/72   04/02/23 (!) 144/72   03/21/23 127/84   09/28/22 112/78   09/11/22 134/78   05/09/22 128/78   03/31/22 136/70   01/12/22 108/58   01/11/22 126/74   11/16/21 124/66      Pulse Readings from Last 20 Encounters:   05/14/24 79   02/20/24 76   01/05/24 89   11/22/23 67   11/06/23 65   08/21/23 86   06/22/23 73   05/26/23 60   05/20/23 97   05/08/23 71   04/07/23 90   04/02/23 115   03/21/23 70   09/28/22 64   09/08/22 61   05/09/22 99   03/31/22 68   01/12/22 62   01/11/22 73   11/16/21 64     SpO2 Readings from Last 20 Encounters:   05/14/24 97%   02/20/24 98%   01/05/24 99%   11/22/23 97%   11/06/23 95%   08/21/23 97%   06/22/23 96%   05/26/23 98%   05/20/23 96%   05/08/23 96%   04/07/23 97%   04/02/23 98%   03/21/23 96%   09/08/22 97%   05/09/22 97%   03/31/22 98%   01/11/22 96%   11/16/21 99%   11/08/21 98%   10/18/21 98%

## 2024-05-15 LAB
ALBUMIN SERPL ELPH-MCNC: 4 G/DL (ref 3.7–5.1)
ALPHA1 GLOB SERPL ELPH-MCNC: 0.3 G/DL (ref 0.2–0.4)
ALPHA2 GLOB SERPL ELPH-MCNC: 0.8 G/DL (ref 0.5–0.9)
B-GLOBULIN SERPL ELPH-MCNC: 0.8 G/DL (ref 0.6–1)
GAMMA GLOB SERPL ELPH-MCNC: 0.7 G/DL (ref 0.7–1.6)
M PROTEIN SERPL ELPH-MCNC: 0 G/DL
PROT PATTERN SERPL ELPH-IMP: NORMAL
PROT PATTERN UR ELPH-IMP: NORMAL

## 2024-05-20 DIAGNOSIS — I25.83 CORONARY ARTERY DISEASE DUE TO LIPID RICH PLAQUE: Chronic | ICD-10-CM

## 2024-05-20 DIAGNOSIS — R06.02 MILD SHORTNESS OF BREATH: Primary | ICD-10-CM

## 2024-05-20 DIAGNOSIS — R79.89 ELEVATED BRAIN NATRIURETIC PEPTIDE (BNP) LEVEL: ICD-10-CM

## 2024-05-20 DIAGNOSIS — I48.92 ATRIAL FLUTTER, CHRONIC (H): ICD-10-CM

## 2024-05-20 DIAGNOSIS — I25.10 CORONARY ARTERY DISEASE DUE TO LIPID RICH PLAQUE: Chronic | ICD-10-CM

## 2024-05-27 PROBLEM — G60.9 IDIOPATHIC PERIPHERAL NEUROPATHY: Status: ACTIVE | Noted: 2024-05-27

## 2024-06-03 ENCOUNTER — MYC MEDICAL ADVICE (OUTPATIENT)
Dept: INTERNAL MEDICINE | Facility: CLINIC | Age: 74
End: 2024-06-03
Payer: MEDICARE

## 2024-06-11 ENCOUNTER — HOSPITAL ENCOUNTER (OUTPATIENT)
Dept: ULTRASOUND IMAGING | Facility: HOSPITAL | Age: 74
Discharge: HOME | End: 2024-06-11
Attending: INTERNAL MEDICINE | Admitting: INTERNAL MEDICINE
Payer: MEDICARE

## 2024-06-11 DIAGNOSIS — G60.9 IDIOPATHIC PERIPHERAL NEUROPATHY: ICD-10-CM

## 2024-06-11 DIAGNOSIS — R09.89 OTHER SPECIFIED SYMPTOMS AND SIGNS INVOLVING THE CIRCULATORY AND RESPIRATORY SYSTEMS: ICD-10-CM

## 2024-06-11 PROCEDURE — 93925 LOWER EXTREMITY STUDY: CPT

## 2024-06-12 ENCOUNTER — HOSPITAL ENCOUNTER (OUTPATIENT)
Dept: CARDIOLOGY | Facility: HOSPITAL | Age: 74
Discharge: HOME | End: 2024-06-12
Attending: INTERNAL MEDICINE | Admitting: INTERNAL MEDICINE
Payer: MEDICARE

## 2024-06-12 DIAGNOSIS — R06.02 MILD SHORTNESS OF BREATH: ICD-10-CM

## 2024-06-12 DIAGNOSIS — I25.83 CORONARY ARTERY DISEASE DUE TO LIPID RICH PLAQUE: Chronic | ICD-10-CM

## 2024-06-12 DIAGNOSIS — I25.10 CORONARY ARTERY DISEASE DUE TO LIPID RICH PLAQUE: Chronic | ICD-10-CM

## 2024-06-12 DIAGNOSIS — R79.89 ELEVATED BRAIN NATRIURETIC PEPTIDE (BNP) LEVEL: ICD-10-CM

## 2024-06-12 DIAGNOSIS — I48.92 ATRIAL FLUTTER, CHRONIC (H): ICD-10-CM

## 2024-06-12 PROCEDURE — 93306 TTE W/DOPPLER COMPLETE: CPT | Mod: 26 | Performed by: INTERNAL MEDICINE

## 2024-06-12 PROCEDURE — C8929 TTE W OR WO FOL WCON,DOPPLER: HCPCS

## 2024-06-12 PROCEDURE — 255N000002 HC RX 255 OP 636: Performed by: INTERNAL MEDICINE

## 2024-06-12 RX ADMIN — PERFLUTREN 2 ML: 6.52 INJECTION, SUSPENSION INTRAVENOUS at 11:09

## 2024-06-15 ENCOUNTER — HEALTH MAINTENANCE LETTER (OUTPATIENT)
Age: 74
End: 2024-06-15

## 2024-06-17 ENCOUNTER — HOSPITAL ENCOUNTER (OUTPATIENT)
Dept: CARDIOLOGY | Facility: HOSPITAL | Age: 74
Discharge: HOME | End: 2024-06-17
Attending: GENERAL ACUTE CARE HOSPITAL | Admitting: GENERAL ACUTE CARE HOSPITAL
Payer: MEDICARE

## 2024-06-17 DIAGNOSIS — I48.19 PERSISTENT ATRIAL FIBRILLATION (H): ICD-10-CM

## 2024-06-17 PROCEDURE — 93225 XTRNL ECG REC<48 HRS REC: CPT

## 2024-06-19 PROCEDURE — 93227 XTRNL ECG REC<48 HR R&I: CPT | Performed by: INTERNAL MEDICINE

## 2024-06-27 ENCOUNTER — TELEPHONE (OUTPATIENT)
Dept: NEUROSURGERY | Facility: CLINIC | Age: 74
End: 2024-06-27
Payer: MEDICARE

## 2024-07-15 ENCOUNTER — ANCILLARY PROCEDURE (OUTPATIENT)
Dept: GENERAL RADIOLOGY | Facility: CLINIC | Age: 74
End: 2024-07-15
Attending: PHYSICIAN ASSISTANT
Payer: MEDICARE

## 2024-07-15 ENCOUNTER — OFFICE VISIT (OUTPATIENT)
Dept: NEUROSURGERY | Facility: CLINIC | Age: 74
End: 2024-07-15
Payer: MEDICARE

## 2024-07-15 VITALS
OXYGEN SATURATION: 95 % | HEART RATE: 73 BPM | RESPIRATION RATE: 16 BRPM | SYSTOLIC BLOOD PRESSURE: 138 MMHG | DIASTOLIC BLOOD PRESSURE: 77 MMHG

## 2024-07-15 DIAGNOSIS — M51.369 LUMBAR DEGENERATIVE DISC DISEASE: Primary | ICD-10-CM

## 2024-07-15 DIAGNOSIS — M25.562 CHRONIC PAIN OF LEFT KNEE: ICD-10-CM

## 2024-07-15 DIAGNOSIS — Z98.1 HISTORY OF LUMBAR FUSION: ICD-10-CM

## 2024-07-15 DIAGNOSIS — M48.061 SPINAL STENOSIS, LUMBAR REGION, WITHOUT NEUROGENIC CLAUDICATION: ICD-10-CM

## 2024-07-15 DIAGNOSIS — M54.16 LUMBAR RADICULOPATHY: ICD-10-CM

## 2024-07-15 DIAGNOSIS — G89.29 CHRONIC PAIN OF LEFT KNEE: ICD-10-CM

## 2024-07-15 DIAGNOSIS — M51.369 LUMBAR DEGENERATIVE DISC DISEASE: ICD-10-CM

## 2024-07-15 PROCEDURE — 99417 PROLNG OP E/M EACH 15 MIN: CPT | Performed by: PHYSICIAN ASSISTANT

## 2024-07-15 PROCEDURE — 72082 X-RAY EXAM ENTIRE SPI 2/3 VW: CPT | Performed by: RADIOLOGY

## 2024-07-15 PROCEDURE — 77073 BONE LENGTH STUDIES: CPT | Performed by: RADIOLOGY

## 2024-07-15 PROCEDURE — 99215 OFFICE O/P EST HI 40 MIN: CPT | Performed by: PHYSICIAN ASSISTANT

## 2024-07-15 ASSESSMENT — PAIN SCALES - GENERAL: PAINLEVEL: MILD PAIN (3)

## 2024-07-15 NOTE — PROGRESS NOTES
Neurosurgery Clinic Note    Chief Complaint: LLE pain      ASSESSMENT & PLAN:  Mike Gonzalez is a 73 year old male with a PMH of atrial flutter, s/p left Watchman implant, CAD, hypertension, GERD, left rotator cuff tear, C6/7 decompression ([facility] 2002), L4/5 decomp ([facility], 1/2022), L4/5 fusion, L3/4 decomp, CSF leak ([facility] 3/30/23) who is following up for low back and LLE pain.     Patient has had left outer hip pain prior to his lumbar surgeries, which was not relieved with either surgery.  He has not had lasting relief with GT bursal or L5/S1 left TFESI injection.  His hip pain is affecting his ability to ambulate longer distances because of low back and left posterior hip pain.  Recent low back PT has improved his symptoms, but this was exacerbated after balance PT and his ability to ambulate longer distances did not change.  He does have significant left medial knee degeneration which is painful and he ambulates with his left hip/foot pointed laterally.      Lumbar CT myelogram 10/2023 and lumbar MRI 5/2023 reveal severe left L1/2 subarticular stenosis and L5/S1 severe left FS.      EMG 12/2023 [facility] - indicated chronic/slightly active L5 > L4 left radic.    -Patient's back and leg symptoms persist despite conservative interventions.  He will need an updated lumbar MRI and EOS XR to determine if there is progression of central stenosis or increased NFS that warrant surgical intervention and further evaluate his hip and knee.    -follow up with me 2-3 days after imaging completed to review.  -Referral to orthopedics for left knee treatment and evaluation.        I spent 76 minutes spent in patient care, independent review and interpretation of medical records/imaging, reviewing old records.    History of Present Illness:  Mike Gonzalez is a 73 year old male with a PMH of atrial flutter, s/p left Watchman implant, CAD, hypertension, GERD, left rotator cuff tear, C6/7 decompression (Marie 2002), L4/5 decomp ([facility],  1/2022), L4/5 fusion, L3/4 decomp, CSF leak ([facility] 3/30/23) who is presenting for evaluation of LLE pain.    11/22/23 Visit - Patient has surgery for cervical decompression at [place] in 2002.  He had a very difficult time with pain after the surgery and that lasted about 10 years.  He has continues to have poor balance since that surgery.  He also notes bilateral RTC issues and had left shoulder replacement.  He continues to have more weakness in his left shoulder.  His neck ROM is significantly reduced.  This improved for about 1 week after having PRP/stem cell treatment about 3-4 years from Dr. Elbert Marmolejo [facility].      About 3 years ago, started having sore left hip walking up hills.  He saw ortho MD who check out hip and did not find anything concerning.  He continued to have issues with his hip and he went to [facility] who identified pinched nerve in his back.  He had 2 surgeries as a result (see above) resulting in fusion at L4/5.  The surgery reduced the freezing feeling in his feet.  It did not help with the left hip pain and now he has back pain in addition to the hip pain.  The pain starts in the left outer hip.  When it is bad, he gets pain on the right.  He has a sore spot on the left medial left knee which has responded to injections in the past.  The pain is a solid ache, but increases to a sharp pain depending on his activity.  If he does too much, he pays for it the next day.  Steroids helped with energy level and was able to walk further without significant pain. He also complains that his toes hurt and feel like cardboard.     He does better walking on level surface.  On the level, he can go 700 feet. This is is reduced to about 400-500 feet if not level.  He denies that his legs feel tired, but that the outer hip is painful to him which requires him to stop for a while before he can continue.  The pain comes from lateral hip to the knee level.  He had an injection in his left hip that helped for  1-2 weeks.      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 His goal is to be able to walk around and do his photography work and get back to writing his books, etc.      EMG before surgery in March 2023.  Next one schedule 12/8/23 at [facility].      7/15/24 Visit  -patient returns after having significant physical therapy at [facility].  He had 12 sessions of regular PT which he felt was helpful and then transition to pool therapy but unfortunately was unable to continue because of a rash that he developed from the chlorine.  He most recently was engaged in balance therapy which he stated exacerbated his back and leg symptoms.  He continues to endorse low back pain that is worse on the left than the right but does cross his entire back at a level just above the belt line.  He also notes he has left posterior hip is more painful than his right.  He does have left knee pain and is aware that he is bone-on-bone degeneration.  He has undergone evaluation at [facility] and has had injections that have been helpful but nothing recently.  He is open to having a second opinion on whether more injections or surgery would be beneficial.  He denies any radicular pain extending beyond his hips or bowel or bladder issues aside from constipation on occasion.  His neurologist from [facility] on disputes the EMG test by [facility] that he does not have neuropathy, the neurologist notes he does have neuropathy.  The patient notes that he has pain in all of his toes that improves with Voltaren gel.  He also endorses generalized low energy.  He is getting evaluated at in August by cardiology here.  Perhaps more bothersome to the patient is that he is unable to ambulate more than 700 feet at a time due to the pain in his low back and left hip.  He is able to stop for a period of time at that distance and then is able to ambulate a little bit further.   His pain is preventing him from doing ADLs walking.  It is worse with flexion of his back.  His wife is with him today and notes that sometimes his back looks laterally malaligned.  Patient denies any radicular pains in his right LE.      Conservative Treatment:  Tylenol - mild help  Heating pad to left hip - helps reduce pain  Prednisone 40 mg x5 days 9/20/23 - 2 weeks relief  Injections:  10/12/23 - Left GT bursal injection - helped for 1-2 weeks  3/1/22 Left knee injection [facility] - helped with pain  12/27/23 - L5/S1 left TFESI [facility] - 50% relief - no lasting benefit  2/22//16 - PRP and stem cell injection in bottom of foot w/ Dr. Elbert Gonzales [facility] - [facility]. Helped about 1 week.  Acupuncture - no lasting benefit  PT - Rohan Onofre since 11/22/23 - balance, land & pool            Review of Systems   See HPI    Past Medical History:   Diagnosis Date    Angioedema     Atrial fibrillation and flutter (H)     Basal cell carcinoma     Branch retinal vein occlusion of left eye (H28) 07/01/2017    CAD (coronary artery disease) 2/29/2024    ANGIOGRAM 2021 Left Main The vessel was visualized by angiography, is moderate in size and is angiographically normal. Left Anterior Descending Mid LAD lesion is 25% stenosed. Ramus Intermedius Ramus lesion is 10% stenosed. Left Circumflex Dist Cx lesion is 30% stenosed. Right Coronary Artery The vessel was visualized by angiography, is moderate in size and is angiographically normal.      Chronic neck pain     Congestive heart failure (H) 2021    GERD (gastroesophageal reflux disease)     HTN (hypertension) 2015    Nonsmoker     Radiculopathy     cervical    Retinal hemorrhage     Vessel rupture in left retina    Rhinitis, allergic     S/P colonoscopy 04/15/2019    Urticaria        Past Surgical History:   Procedure Laterality Date    BASAL CELL CARCINOMA EXCISION  01/01/2020    Left nasal reconstruction    CATARACT EXTRACTION, BILATERAL Bilateral 2022    CERVICAL SPINE SURGERY       C8, T1, foraminotomy    CV CORONARY ANGIOGRAM N/A 01/20/2021    Procedure: Coronary Angiogram;  Surgeon: Willow Wellington MD;  Location: [facility];  Service: Cardiology    CV LEFT ATRIAL APPENDAGE CLOSURE N/A 07/14/2021    Procedure: CV LEFT ATRIAL APPENDAGE CLOSURE;  Surgeon: Noah Gutierres MD;  Location:  [facility] CARDI[facility] CATH LAB    CV LEFT HEART CATHETERIZATION WITHOUT LEFT VENTRICULOGRAM Left 01/20/2021    Procedure: Left Heart Catheterization Without Left Ventriculogram;  Surgeon: Willow Wellington MD;  Location: [facility];  Service: Cardiology    DECOMPRESSION, FUSION LUMBAR POSTERIOR ONE LEVEL, COMBINED Left 3/30/2023    Procedure: LEFT LUMBAR 4-5 POSTERIOR SPINAL FUSION WITH LEFT LUMBAR 4-5 LAMINOFORAMINOTOMY AND WIDE LUMBAR 4-5 LEFT SUBTOTAL FACETECTOMY WITH ALLOGRAFT AND AUTOGRAFT;  Surgeon: Ambrose Degroot MD;  Location: [facility] Main OR    HERNIA REPAIR, UMBILICAL       ×2    REVERSE TOTAL SHOULDER ARTHROPLASTY Left 05/01/2019       Social History     Socioeconomic History    Marital status:      Spouse name: None    Number of children: None    Years of education: None    Highest education level: None   Tobacco Use    Smoking status: Never    Smokeless tobacco: Never   Substance and Sexual Activity    Alcohol use: Never     Comment: Alcoholic Drinks/day: About once a year.    Drug use: No   Social History Narrative    Patient of Dr. Rod since 2001.    .  Wife is a former RN.     Wife's cousin is Dr. Jean Pierre Champagne, ID specialist.       family history includes Arthritis in his mother; Other - See Comments in his father and mother.       IMAGING   Imaging independently reviewed.    EMG [facility] 12/6/23 -         EOS XR 11/22/23 - stable L4/5 hardware.  Multiple bridging anterior osteophytes in lumbar spine.  L3/4 disc space greater anterior than posterior. L1/2 DDD.  Significant left knee degeneration in medial compartment. Left hip degeneration and  bilateral R>L SI joint arthropathy.  Multi-level cervical DDD.       Findings:   12 rib bearing vertebral bodies and 5 lumbar type vertebral bodies are  identified.  Post surgical changes of posterior left lumbar instrumentation at  L4-5. Hardware appears intact without complication.  Coronal Deformity:  Mild convex right curvature of the thoracolumbar spine.  Positive global coronal imbalance.  Sagittal Vertical Axis (A vertical line drawn from the center of C7  (chas line) to the posterosuperior aspect of the S1 on sagittal  plane):  positive   Weight bearing axis: (Defined as a line drawn from the center of the  femoral head to the mid aspect of the tibial plafond).      Right: Weight bearing axis crosses through the lateral tibial  spine.       Left: Weight bearing axis crosses central 1/3 of medial tibial  plateau.  Leg length:  (Measured from the top of the femoral head to the center  of tibial plafond.  It is assumed joints are in similar degrees of  extension bilaterally.  Significant difference is defined when  discrepancy is greater than 1.5 cm).        No significant  leg length discrepancy.  Additional Findings:  Postsurgical changes of left reverse shoulder arthroplasty. Left  atrial appendage closure device. Borderline enlarged cardiac  silhouette. The lungs are relatively clear. Nonobstructive bowel gas  pattern.  Impression:  1. Postsurgical changes of posterior left instrumentation at L4-5  without evidence of hardware complication.  2. Mild convex-right curvature of the thoracolumbar spine.  3. Positive global coronal and sagittal imbalance.  4. Weight bearing axis as detailed above.    CT lumbar myelogram 10/4/23 [facility] -   T12/L1 - left osteophyte w/ left ventral cord indenting; L1/2 - vacuum disc phen, CCS and bilat subarticular stenosis; L5/S1 - vacuum disc phen, severe left FS, mod right FS. L4-L5 fused.                  MR Hip Left w/o Contrast  Result Date: 10/1/2023  EXAM: MR HIP LEFT  WITHOUT CONTRAST LOCATION: [facility] DATE: 09/30/2023 INDICATION: 72-year-old male with left-sided lateral hip pain. Concern for glut tendinopathy, trochanteric bursitis. Also known hip OA and lumbar spine DJD. COMPARISON: 08/07/2023. TECHNIQUE: Unenhanced. FINDINGS: LEFT HIP: -Labrum: Degenerative anterior labral tear centered along the chondral labral junction where there is a tiny curvilinear paralabral cyst as seen on sagittal image 21. Intrasubstance degeneration and mineralization within the base of the superior labrum without evidence of a displaced tear. -Cartilage: Poorly defined low-grade chondrosis without evidence of a high-grade defect. No subchondral bone marrow edema. -Joint space: No effusion or synovitis. -Joint capsule/ligaments: Intact joint capsule. MUSCLES AND TENDONS: -Gluteal: There is mild gluteus minimus and medius tendinopathy. No tear. There is mild distal gluteus minimus peritendinitis. -Proximal hamstring: Mild proximal hamstring tendinopathy. No tear. -Iliopsoas: Distal iliopsoas tendinopathy. No tear. No iliopsoas bursitis. -Rectus femoris origin: No tear or tendinopathy. BONES: -No evidence of an acute fracture. No concerning marrow replacing lesions. Moderate arthritic changes in the visualized left SI joint where there is joint space narrowing and subchondral bone marrow edema. SOFT TISSUES: -Normal muscle bulk. No acute muscular injury. INTRA-PELVIC CONTENTS: -Visualized intrapelvic contents are unremarkable.   IMPRESSION: 1.  Mild left gluteal tendinopathy and peritendinitis. No tear. 2.  Left proximal hamstring and distal iliopsoas tendinopathy. No tear. 3.  Mild degenerative changes of the left hip with a degenerative anterior labral tear and low-grade chondrosis. 4.  Moderate arthritic changes in the visualized left SI joint with subchondral bone marrow edema.     XR Hip Bilateral 2 Views Each  Result Date: 8/7/2023  EXAM: XR HIP BILATERAL 2 VIEWS  EACH LOCATION: [facility] DATE: 8/7/2023 INDICATION: Bilateral hip pain. COMPARISON: None.   IMPRESSION: Mild to moderate arthritic changes both hips. No acute fracture or dislocation.    CT Lumbar Spine w/o Contrast  Result Date: 12/14/2021  EXAM: CT LUMBAR SPINE WO IV CONT LOCATION: [facility] DATE/TIME: 12/13/2021 1:07 PM INDICATION: Back pain COMPARISON: MRI lumbar spine 06/27/2021 TECHNIQUE: Routine CT Lumbar Spine with intrathecal contrast administered in a separate procedure. Multiplanar reformats. Dose reduction techniques were used. FINDINGS: Nomenclature is based on 5 lumbar type vertebral bodies. Normal vertebral body heights. Unchanged lumbar levocurvature measuring 26 degrees from the superior L2 endplate to the inferior L5 endplate. Partial ankylosis across the L2-L3 and L4-L5 spaces with 5 mm right lateral of L2 on L3 and 5 mm left lateral listhesis of L4 on L5. 1 mm retrolistheses of L1 on L2 and of L2 on L3. No lytic or destructive osseous lesion. Normal myelographic appearance of the distal spinal cord and cauda equina with conus medullaris at L1-L2. No extraspinal soft tissue abnormality. Mild to moderate degenerative change in the sacroiliac joints with partial ankylosis of the right SI joint. T11-T12: Mild loss of disc height with vacuum disc phenomenon. Small circumferential disc osteophyte complex mild facet arthropathy. Mild spinal canal stenosis. No right neural foraminal stenosis. No left neural foraminal stenosis. T12-L1: Mild loss of disc height. Small circumferential disc osteophyte complex with small focal left subarticular osteophyte No facet arthropathy. No spinal canal stenosis. No right neural foraminal stenosis. No left neural foraminal stenosis. L1-L2: Retrolisthesis with moderate to advanced loss of disc height and vacuum disc phenomenon. Broad-based circumferential disc osteophyte complex. Mild left facet arthropathy. Mild to moderate spinal canal  stenosis, left greater than right. Mild right and moderate left lateral recess stenosis with abutment and displacement of the traversing left L2 nerve root. No right neural foraminal stenosis. No left neural foraminal stenosis. L2-L3: Retrolisthesis with advanced loss of disc height with partial ankylosis across the disc space. Small circumferential disc osteophyte complex. Mild to moderate right facet arthropathy. Mild spinal canal stenosis. Moderate right lateral recess stenosis with abutment of the traversing right L3 and L4 nerve roots. No right neural foraminal stenosis. No left neural foraminal stenosis. L3-L4: Advanced loss of disc height. Small circumferential disc osteophyte complex. Ligamentum flavum thickening. Mild facet arthropathy. No central spinal canal stenosis, though there is mild right lateral recess stenosis. Mild to moderate right neural foraminal stenosis. No left neural foraminal stenosis. L4-L5: Advanced loss of disc height with partial ankylosis across the disc space. Small circumferential disc osteophyte complex. Ligamentum flavum thickening. Moderate facet arthropathy. Mild spinal canal stenosis. Mild to moderate right neural foraminal stenosis. Mild left neural foraminal stenosis. L5-S1: Moderate loss of disc height with vacuum disc phenomenon. Partially fused left ventral osteophyte. No posterior disc herniation. Ligamentum flavum thickening. Moderate facet arthropathy. No spinal canal stenosis. Mild to moderate right neural foraminal stenosis. Severe left neural foraminal stenosis. CONCLUSION: 1.  Satisfactory lumbar myelogram with normal myelographic appearance of the distal spinal cord and cauda equina nerve roots. 2.  Multilevel degenerative changes of the lumbar spine as detailed above. When compared with MRI lumbar spine from 06/27/2021, there has not been significant interval progression of degenerative changes. 3.  At L5-S1, there is mild to moderate right and severe left  neuroforaminal stenosis. 4.  At L4-L5, there is mild spinal canal stenosis and mild to moderate right and mild left neuroforaminal stenosis. 5.  Partial ankylosis of the right sacroiliac joint.    MRI lumbar 5/23/23 [facility] -  L1/2 CCS, bilateral L>R subarticular stenosis and facet arthropathy; L5/S1 severe left FS.                  Cervical MRI 5/25/23 [facility] -              Physical Exam   /77 (BP Location: Right arm, Patient Position: Sitting)   Pulse 73   Resp 16   SpO2 95%     Musculoskeletal: Able to move all extremities.  No involuntary motor movements. No C/T/L spine tenderness to palpation.  +mild left > right posterior hip TTP.  Negative SI joint pain.  Negative left SLR, THO, piriformis stretch.  Left leg/foot rotated laterally with standing/walking    Neurological:  Alert, NAD, and oriented to person, place, and time.   No cranial nerve deficit   Gait: steady, symmetrical w/o assistive devices; unable to tandem walk    Strength (L/R)  5/5 Deltoid (unable to fully abduct L>R shoulder 2/2 RTC issues)  5/5 Bicep  5/5 Tricep  5/5 Handgrip    5/5 Hip Flexion  5/5 Knee Extension  5/5 Ankle Dorsiflexion  5/5 Extensor Hallucis Longus  5/5 Plantar Flexion    Reflexes (L/R)  3+/3+ Bicep  3+/3+ Brachioradialis  3+/3+ Patellar  2+/2+ Ankle    No Lhermitte's  No Spurling's  ++ Naseem's   No ankle clonus    Sensation: MITZY Good PA-C  Department of Neurosurgery  Office: [phone]

## 2024-07-15 NOTE — PATIENT INSTRUCTIONS
Update lumbar MRI and EOS XR.    Follow up 2-3 days after imaging completed.      Referral placed for orthopedic evaluation of left knee.

## 2024-07-15 NOTE — LETTER
7/15/2024       RE: Mike Gonzalez  [address] [facility] [place] [address]     Dear Colleague,    Thank you for referring your patient, Mike Gonzalez, to the [facility] [place] at [facility]. Please see a copy of my visit note below.        Neurosurgery Clinic Note    Chief Complaint: LLE pain      ASSESSMENT & PLAN:  Mike Gonzalez is a 73 year old male with a PMH of atrial flutter, s/p left Watchman implant, CAD, hypertension, GERD, left rotator cuff tear, C6/7 decompression ([place] 2002), L4/5 decomp ([facility], 1/2022), L4/5 fusion, L3/4 decomp, CSF leak ([facility] 3/30/23) who is following up for low back and LLE pain.     Patient has had left outer hip pain prior to his lumbar surgeries, which was not relieved with either surgery.  He has not had lasting relief with GT bursal or L5/S1 left TFESI injection.  His hip pain is affecting his ability to ambulate longer distances because of low back and left posterior hip pain.  Recent low back PT has improved his symptoms, but this was exacerbated after balance PT and his ability to ambulate longer distances did not change.  He does have significant left medial knee degeneration which is painful and he ambulates with his left hip/foot pointed laterally.      Lumbar CT myelogram 10/2023 and lumbar MRI 5/2023 reveal severe left L1/2 subarticular stenosis and L5/S1 severe left FS.      EMG 12/2023 [facility] - indicated chronic/slightly active L5 > L4 left radic.    -Patient's back and leg symptoms persist despite conservative interventions.  He will need an updated lumbar MRI and EOS XR to determine if there is progression of central stenosis or increased NFS that warrant surgical intervention and further evaluate his hip and knee.    -follow up with me 2-3 days after imaging completed to review.  -Referral to orthopedics for left knee treatment and evaluation.        I spent 76 minutes spent in patient care,  independent review and interpretation of medical records/imaging, reviewing old records.    History of Present Illness:  Mike Gonzalez is a 73 year old male with a PMH of atrial flutter, s/p left Watchman implant, CAD, hypertension, GERD, left rotator cuff tear, C6/7 decompression ([place] 2002), L4/5 decomp ([facility], 1/2022), L4/5 fusion, L3/4 decomp, CSF leak ([facility] 3/30/23) who is presenting for evaluation of LLE pain.    11/22/23 Visit - Patient has surgery for cervical decompression at [place] in 2002.  He had a very difficult time with pain after the surgery and that lasted about 10 years.  He has continues to have poor balance since that surgery.  He also notes bilateral RTC issues and had left shoulder replacement.  He continues to have more weakness in his left shoulder.  His neck ROM is significantly reduced.  This improved for about 1 week after having PRP/stem cell treatment about 3-4 years from Dr. Elbert Marmolejo at [facility] in [facility].      About 3 years ago, started having sore left hip walking up hills.  He saw ortho MD who check out hip and did not find anything concerning.  He continued to have issues with his hip and he went to [facility] who identified pinched nerve in his back.  He had 2 surgeries as a result (see above) resulting in fusion at L4/5.  The surgery reduced the freezing feeling in his feet.  It did not help with the left hip pain and now he has back pain in addition to the hip pain.  The pain starts in the left outer hip.  When it is bad, he gets pain on the right.  He has a sore spot on the left medial left knee which has responded to injections in the past.  The pain is a solid ache, but increases to a sharp pain depending on his activity.  If he does too much, he pays for it the next day.  Steroids helped with energy level and was able to walk further without significant pain. He also complains that his toes hurt and feel like cardboard.     He does better walking on level surface.  On the level, he can  go 700 feet. This is is reduced to about 400-500 feet if not level.  He denies that his legs feel tired, but that the outer hip is painful to him which requires him to stop for a while before he can continue.  The pain comes from lateral hip to the knee level.  He had an injection in his left hip that helped for 1-2 weeks.      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 His goal is to be able to walk around and do his photography work and get back to writing his books, etc.      EMG before surgery in March 2023.  Next one schedule 12/8/23 at [facility].      7/15/24 Visit  -patient returns after having significant physical therapy at [facility].  He had 12 sessions of regular PT which he felt was helpful and then transition to pool therapy but unfortunately was unable to continue because of a rash that he developed from the chlorine.  He most recently was engaged in balance therapy which he stated exacerbated his back and leg symptoms.  He continues to endorse low back pain that is worse on the left than the right but does cross his entire back at a level just above the belt line.  He also notes he has left posterior hip is more painful than his right.  He does have left knee pain and is aware that he is bone-on-bone degeneration.  He has undergone evaluation at [facility] and has had injections that have been helpful but nothing recently.  He is open to having a second opinion on whether more injections or surgery would be beneficial.  He denies any radicular pain extending beyond his hips or bowel or bladder issues aside from constipation on occasion.  His neurologist from [facility] on disputes the EMG test by [facility] that he does not have neuropathy, the neurologist notes he does have neuropathy.  The patient notes that he has pain in all of his toes that improves with Voltaren gel.  He also endorses generalized low energy.   He is getting evaluated at in August by cardiology here.  Perhaps more bothersome to the patient is that he is unable to ambulate more than 700 feet at a time due to the pain in his low back and left hip.  He is able to stop for a period of time at that distance and then is able to ambulate a little bit further.  His pain is preventing him from doing ADLs walking.  It is worse with flexion of his back.  His wife is with him today and notes that sometimes his back looks laterally malaligned.  Patient denies any radicular pains in his right LE.      Conservative Treatment:  Tylenol - mild help  Heating pad to left hip - helps reduce pain  Prednisone 40 mg x5 days 9/20/23 - 2 weeks relief  Injections:  10/12/23 - Left GT bursal injection - helped for 1-2 weeks  3/1/22 Left knee injection [facility] - helped with pain  12/27/23 - L5/S1 left TFESI [facility] - 50% relief - no lasting benefit  2/22//16 - PRP and stem cell injection in bottom of foot w/ Dr. Elbert Gonzales [facility] - ortho care. Helped about 1 week.  Acupuncture - no lasting benefit  PT - [facility] since 11/22/23 - balance, land & pool            Review of Systems   See HPI    Past Medical History:   Diagnosis Date     Angioedema      Atrial fibrillation and flutter (H)      Basal cell carcinoma      Branch retinal vein occlusion of left eye (H28) 07/01/2017     CAD (coronary artery disease) 2/29/2024    ANGIOGRAM 2021 Left Main The vessel was visualized by angiography, is moderate in size and is angiographically normal. Left Anterior Descending Mid LAD lesion is 25% stenosed. Ramus Intermedius Ramus lesion is 10% stenosed. Left Circumflex Dist Cx lesion is 30% stenosed. Right Coronary Artery The vessel was visualized by angiography, is moderate in size and is angiographically normal.       Chronic neck pain      Congestive heart failure (H) 2021     GERD (gastroesophageal reflux disease)      HTN (hypertension) 2015     Nonsmoker      Radiculopathy     cervical     Retinal  hemorrhage     Vessel rupture in left retina     Rhinitis, allergic      S/P colonoscopy 04/15/2019     Urticaria        Past Surgical History:   Procedure Laterality Date     BASAL CELL CARCINOMA EXCISION  01/01/2020    Left nasal reconstruction     CATARACT EXTRACTION, BILATERAL Bilateral 2022     CERVICAL SPINE SURGERY      C8, T1, foraminotomy     CV CORONARY ANGIOGRAM N/A 01/20/2021    Procedure: Coronary Angiogram;  Surgeon: Willow Wellington MD;  Location: [facility];  Service: Cardiology     CV LEFT ATRIAL APPENDAGE CLOSURE N/A 07/14/2021    Procedure: CV LEFT ATRIAL APPENDAGE CLOSURE;  Surgeon: Noah Gutierres MD;  Location:  HEART CARDIAC CATH LAB     CV LEFT HEART CATHETERIZATION WITHOUT LEFT VENTRICULOGRAM Left 01/20/2021    Procedure: Left Heart Catheterization Without Left Ventriculogram;  Surgeon: Willow Wellington MD;  Location: [facility];  Service: Cardiology     DECOMPRESSION, FUSION LUMBAR POSTERIOR ONE LEVEL, COMBINED Left 3/30/2023    Procedure: LEFT LUMBAR 4-5 POSTERIOR SPINAL FUSION WITH LEFT LUMBAR 4-5 LAMINOFORAMINOTOMY AND WIDE LUMBAR 4-5 LEFT SUBTOTAL FACETECTOMY WITH ALLOGRAFT AND AUTOGRAFT;  Surgeon: Ambrose Degroot MD;  Location: [facility] Main OR     HERNIA REPAIR, UMBILICAL       ×2     REVERSE TOTAL SHOULDER ARTHROPLASTY Left 05/01/2019       Social History     Socioeconomic History     Marital status:      Spouse name: None     Number of children: None     Years of education: None     Highest education level: None   Tobacco Use     Smoking status: Never     Smokeless tobacco: Never   Substance and Sexual Activity     Alcohol use: Never     Comment: Alcoholic Drinks/day: About once a year.     Drug use: No   Social History Narrative    Patient of Dr. Rod since 2001.    .  Wife is a former RN.     Wife's cousin is Dr. Jean Pierre Champagne, ID specialist.       family history includes Arthritis in his mother; Other - See Comments in his  father and mother.       IMAGING   Imaging independently reviewed.    EMG [facility] 12/6/23 -         EOS XR 11/22/23 - stable L4/5 hardware.  Multiple bridging anterior osteophytes in lumbar spine.  L3/4 disc space greater anterior than posterior. L1/2 DDD.  Significant left knee degeneration in medial compartment. Left hip degeneration and bilateral R>L SI joint arthropathy.  Multi-level cervical DDD.       Findings:   12 rib bearing vertebral bodies and 5 lumbar type vertebral bodies are  identified.  Post surgical changes of posterior left lumbar instrumentation at  L4-5. Hardware appears intact without complication.  Coronal Deformity:  Mild convex right curvature of the thoracolumbar spine.  Positive global coronal imbalance.  Sagittal Vertical Axis (A vertical line drawn from the center of C7  (chas line) to the posterosuperior aspect of the S1 on sagittal  plane):  positive   Weight bearing axis: (Defined as a line drawn from the center of the  femoral head to the mid aspect of the tibial plafond).      Right: Weight bearing axis crosses through the lateral tibial  spine.       Left: Weight bearing axis crosses central 1/3 of medial tibial  plateau.  Leg length:  (Measured from the top of the femoral head to the center  of tibial plafond.  It is assumed joints are in similar degrees of  extension bilaterally.  Significant difference is defined when  discrepancy is greater than 1.5 cm).        No significant  leg length discrepancy.  Additional Findings:  Postsurgical changes of left reverse shoulder arthroplasty. Left  atrial appendage closure device. Borderline enlarged cardiac  silhouette. The lungs are relatively clear. Nonobstructive bowel gas  pattern.  Impression:  1. Postsurgical changes of posterior left instrumentation at L4-5  without evidence of hardware complication.  2. Mild convex-right curvature of the thoracolumbar spine.  3. Positive global coronal and sagittal imbalance.  4. Weight bearing axis  as detailed above.    CT lumbar myelogram 10/4/23 [facility] -   T12/L1 - left osteophyte w/ left ventral cord indenting; L1/2 - vacuum disc phen, CCS and bilat subarticular stenosis; L5/S1 - vacuum disc phen, severe left FS, mod right FS. L4-L5 fused.                  MR Hip Left w/o Contrast  Result Date: 10/1/2023  EXAM: MR HIP LEFT WITHOUT CONTRAST LOCATION: [facility] DATE: 09/30/2023 INDICATION: 72-year-old male with left-sided lateral hip pain. Concern for glut tendinopathy, trochanteric bursitis. Also known hip OA and lumbar spine DJD. COMPARISON: 08/07/2023. TECHNIQUE: Unenhanced. FINDINGS: LEFT HIP: -Labrum: Degenerative anterior labral tear centered along the chondral labral junction where there is a tiny curvilinear paralabral cyst as seen on sagittal image 21. Intrasubstance degeneration and mineralization within the base of the superior labrum without evidence of a displaced tear. -Cartilage: Poorly defined low-grade chondrosis without evidence of a high-grade defect. No subchondral bone marrow edema. -Joint space: No effusion or synovitis. -Joint capsule/ligaments: Intact joint capsule. MUSCLES AND TENDONS: -Gluteal: There is mild gluteus minimus and medius tendinopathy. No tear. There is mild distal gluteus minimus peritendinitis. -Proximal hamstring: Mild proximal hamstring tendinopathy. No tear. -Iliopsoas: Distal iliopsoas tendinopathy. No tear. No iliopsoas bursitis. -Rectus femoris origin: No tear or tendinopathy. BONES: -No evidence of an acute fracture. No concerning marrow replacing lesions. Moderate arthritic changes in the visualized left SI joint where there is joint space narrowing and subchondral bone marrow edema. SOFT TISSUES: -Normal muscle bulk. No acute muscular injury. INTRA-PELVIC CONTENTS: -Visualized intrapelvic contents are unremarkable.   IMPRESSION: 1.  Mild left gluteal tendinopathy and peritendinitis. No tear. 2.  Left proximal hamstring and distal  iliopsoas tendinopathy. No tear. 3.  Mild degenerative changes of the left hip with a degenerative anterior labral tear and low-grade chondrosis. 4.  Moderate arthritic changes in the visualized left SI joint with subchondral bone marrow edema.     XR Hip Bilateral 2 Views Each  Result Date: 8/7/2023  EXAM: XR HIP BILATERAL 2 VIEWS EACH LOCATION: [facility] DATE: 8/7/2023 INDICATION: Bilateral hip pain. COMPARISON: None.   IMPRESSION: Mild to moderate arthritic changes both hips. No acute fracture or dislocation.    CT Lumbar Spine w/o Contrast  Result Date: 12/14/2021  EXAM: CT LUMBAR SPINE WO IV CONT LOCATION: [facility] DATE/TIME: 12/13/2021 1:07 PM INDICATION: Back pain COMPARISON: MRI lumbar spine 06/27/2021 TECHNIQUE: Routine CT Lumbar Spine with intrathecal contrast administered in a separate procedure. Multiplanar reformats. Dose reduction techniques were used. FINDINGS: Nomenclature is based on 5 lumbar type vertebral bodies. Normal vertebral body heights. Unchanged lumbar levocurvature measuring 26 degrees from the superior L2 endplate to the inferior L5 endplate. Partial ankylosis across the L2-L3 and L4-L5 spaces with 5 mm right lateral of L2 on L3 and 5 mm left lateral listhesis of L4 on L5. 1 mm retrolistheses of L1 on L2 and of L2 on L3. No lytic or destructive osseous lesion. Normal myelographic appearance of the distal spinal cord and cauda equina with conus medullaris at L1-L2. No extraspinal soft tissue abnormality. Mild to moderate degenerative change in the sacroiliac joints with partial ankylosis of the right SI joint. T11-T12: Mild loss of disc height with vacuum disc phenomenon. Small circumferential disc osteophyte complex mild facet arthropathy. Mild spinal canal stenosis. No right neural foraminal stenosis. No left neural foraminal stenosis. T12-L1: Mild loss of disc height. Small circumferential disc osteophyte complex with small focal left subarticular  osteophyte No facet arthropathy. No spinal canal stenosis. No right neural foraminal stenosis. No left neural foraminal stenosis. L1-L2: Retrolisthesis with moderate to advanced loss of disc height and vacuum disc phenomenon. Broad-based circumferential disc osteophyte complex. Mild left facet arthropathy. Mild to moderate spinal canal stenosis, left greater than right. Mild right and moderate left lateral recess stenosis with abutment and displacement of the traversing left L2 nerve root. No right neural foraminal stenosis. No left neural foraminal stenosis. L2-L3: Retrolisthesis with advanced loss of disc height with partial ankylosis across the disc space. Small circumferential disc osteophyte complex. Mild to moderate right facet arthropathy. Mild spinal canal stenosis. Moderate right lateral recess stenosis with abutment of the traversing right L3 and L4 nerve roots. No right neural foraminal stenosis. No left neural foraminal stenosis. L3-L4: Advanced loss of disc height. Small circumferential disc osteophyte complex. Ligamentum flavum thickening. Mild facet arthropathy. No central spinal canal stenosis, though there is mild right lateral recess stenosis. Mild to moderate right neural foraminal stenosis. No left neural foraminal stenosis. L4-L5: Advanced loss of disc height with partial ankylosis across the disc space. Small circumferential disc osteophyte complex. Ligamentum flavum thickening. Moderate facet arthropathy. Mild spinal canal stenosis. Mild to moderate right neural foraminal stenosis. Mild left neural foraminal stenosis. L5-S1: Moderate loss of disc height with vacuum disc phenomenon. Partially fused left ventral osteophyte. No posterior disc herniation. Ligamentum flavum thickening. Moderate facet arthropathy. No spinal canal stenosis. Mild to moderate right neural foraminal stenosis. Severe left neural foraminal stenosis. CONCLUSION: 1.  Satisfactory lumbar myelogram with normal myelographic  appearance of the distal spinal cord and cauda equina nerve roots. 2.  Multilevel degenerative changes of the lumbar spine as detailed above. When compared with MRI lumbar spine from 06/27/2021, there has not been significant interval progression of degenerative changes. 3.  At L5-S1, there is mild to moderate right and severe left neuroforaminal stenosis. 4.  At L4-L5, there is mild spinal canal stenosis and mild to moderate right and mild left neuroforaminal stenosis. 5.  Partial ankylosis of the right sacroiliac joint.    MRI lumbar 5/23/23 [facility] -  L1/2 CCS, bilateral L>R subarticular stenosis and facet arthropathy; L5/S1 severe left FS.                  Cervical MRI 5/25/23 [facility] -              Physical Exam   /77 (BP Location: Right arm, Patient Position: Sitting)   Pulse 73   Resp 16   SpO2 95%     Musculoskeletal: Able to move all extremities.  No involuntary motor movements. No C/T/L spine tenderness to palpation.  +mild left > right posterior hip TTP.  Negative SI joint pain.  Negative left SLR, THO, piriformis stretch.  Left leg/foot rotated laterally with standing/walking    Neurological:  Alert, NAD, and oriented to person, place, and time.   No cranial nerve deficit   Gait: steady, symmetrical w/o assistive devices; unable to tandem walk    Strength (L/R)  5/5 Deltoid (unable to fully abduct L>R shoulder 2/2 RTC issues)  5/5 Bicep  5/5 Tricep  5/5 Handgrip    5/5 Hip Flexion  5/5 Knee Extension  5/5 Ankle Dorsiflexion  5/5 Extensor Hallucis Longus  5/5 Plantar Flexion    Reflexes (L/R)  3+/3+ Bicep  3+/3+ Brachioradialis  3+/3+ Patellar  2+/2+ Ankle    No Lhermitte's  No Spurling's  ++ Naseem's   No ankle clonus    Sensation: MITZY Good PA-C  Department of Neurosurgery  Office: [phone]

## 2024-07-16 ENCOUNTER — OFFICE VISIT (OUTPATIENT)
Dept: CARDIOLOGY | Facility: CLINIC | Age: 74
End: 2024-07-16
Attending: INTERNAL MEDICINE
Payer: MEDICARE

## 2024-07-16 ENCOUNTER — LAB (OUTPATIENT)
Dept: LAB | Facility: CLINIC | Age: 74
End: 2024-07-16
Payer: MEDICARE

## 2024-07-16 VITALS
WEIGHT: 235 LBS | HEART RATE: 72 BPM | DIASTOLIC BLOOD PRESSURE: 73 MMHG | OXYGEN SATURATION: 96 % | SYSTOLIC BLOOD PRESSURE: 138 MMHG | BODY MASS INDEX: 31.87 KG/M2

## 2024-07-16 DIAGNOSIS — I42.8 NONISCHEMIC CARDIOMYOPATHY (H): ICD-10-CM

## 2024-07-16 DIAGNOSIS — I48.21 PERMANENT ATRIAL FIBRILLATION (H): ICD-10-CM

## 2024-07-16 DIAGNOSIS — I48.21 PERMANENT ATRIAL FIBRILLATION (H): Primary | ICD-10-CM

## 2024-07-16 LAB
ANION GAP SERPL CALCULATED.3IONS-SCNC: 10 MMOL/L (ref 7–15)
BUN SERPL-MCNC: 20.3 MG/DL (ref 8–23)
CALCIUM SERPL-MCNC: 11.1 MG/DL (ref 8.8–10.4)
CHLORIDE SERPL-SCNC: 104 MMOL/L (ref 98–107)
CREAT SERPL-MCNC: 1.37 MG/DL (ref 0.67–1.17)
EGFRCR SERPLBLD CKD-EPI 2021: 54 ML/MIN/1.73M2
GLUCOSE SERPL-MCNC: 98 MG/DL (ref 70–99)
HCO3 SERPL-SCNC: 30 MMOL/L (ref 22–29)
POTASSIUM SERPL-SCNC: 4.8 MMOL/L (ref 3.4–5.3)
SODIUM SERPL-SCNC: 144 MMOL/L (ref 135–145)

## 2024-07-16 PROCEDURE — 36415 COLL VENOUS BLD VENIPUNCTURE: CPT | Performed by: PATHOLOGY

## 2024-07-16 PROCEDURE — 80048 BASIC METABOLIC PNL TOTAL CA: CPT | Performed by: PATHOLOGY

## 2024-07-16 PROCEDURE — 99215 OFFICE O/P EST HI 40 MIN: CPT | Performed by: INTERNAL MEDICINE

## 2024-07-16 PROCEDURE — G0463 HOSPITAL OUTPT CLINIC VISIT: HCPCS | Performed by: INTERNAL MEDICINE

## 2024-07-16 RX ORDER — NORTRIPTYLINE HCL 25 MG
25 CAPSULE ORAL AT BEDTIME
COMMUNITY

## 2024-07-16 ASSESSMENT — PAIN SCALES - GENERAL: PAINLEVEL: MILD PAIN (2)

## 2024-07-16 NOTE — PATIENT INSTRUCTIONS
Complete a Cardiac MRI (imaging of heart)    Lab appointment for blood draw (BMP)    Referral placed to Electrophysiology (EP) for atrial fibrillation    Cardiac MRI/MRA:     You will be given intravenous contrast for this exam. To prepare:   The day before your exam, drink extra fluids--at least six 8-ounce glasses (unless your doctor tells you to restrict your fluids).    The MRI machine uses a strong magnet. Please wear clothes without metal (snaps, zippers). A sweatsuit works well, or we may give you a hospital gown.    Please remove any body piercings and hair extensions before you arrive. You will also remove watches, jewelry, hairpins, wallets, dentures, partial dental plates and hearing aids. You may wear contact lenses, and you may be able to wear your rings. We have a safe place to keep your personal items, but it is safer to leave them at home.

## 2024-07-16 NOTE — LETTER
7/16/2024      RE: Mike Gonzalez  [address] [facility] [id number]       Dear Colleague,    Thank you for the opportunity to participate in the care of your patient, Mike Gonzalez, at the [facility] [place] at [facility]. Please see a copy of my visit note below.       SUBJECTIVE:  Mike Gonzalez is a 73 year old male who presents for his second opinion.  Patient with atrial fibrillation for past 40 years.s/p 31 mm Watchman FLX device placement on 7/14/2021, nonobstructive CAD, and HFrEF LVEF 40%.  Postprocedure JEAN CLAUDE shows significant device leak.  Currently patient is not on any anticoagulation.  Recent echocardiogram revealed an EF of 30% with normal LV RV size.  LA volume index 64.8 mL/m .  Also Holter monitor showed 100% atrial fibrillation with arate varying from 66 bpm to 117 bpm.  Average heart rate was 76 bpm.  Patient is greatly concerned about the decrease LV function.  Patient had prior neck and back surgery.  Having significant problem with his back.  Because of this he is unable to ambulate more than 70 feet.  Denied shortness of breath or other CHF symptoms.  He complains of significant bilateral hip pain while walking.  He has to stop for a minute and his symptoms improved and he can continue to walk.  Also do have some peripheral neuropathy of toes and left knee joint arthritis.  All these symptoms are limiting his functional capacity.  Recently patient had lower extremity arterial Doppler showing no significant obstructive disease.  Current cardiac medications are Toprol-XL 25 mg daily, lisinopril 5 mg daily and Pravachol 20 mg daily.  No diabetes..  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  Patient had no prior cardioversions or antiarrhythmic therapy.  Patient Active Problem List    Diagnosis Date Noted     Idiopathic peripheral neuropathy 05/27/2024      Priority: Medium     CAD (coronary artery disease) 02/29/2024     Priority: Medium     ANGIOGRAM 2021 Left Main The vessel was visualized by angiography, is moderate in size and is angiographically normal. Left Anterior Descending Mid LAD lesion is 25% stenosed. Ramus Intermedius Ramus lesion is 10% stenosed. Left Circumflex Dist Cx lesion is 30% stenosed. Right Coronary Artery The vessel was visualized by angiography, is moderate in size and is angiographically normal.         Senile purpura (H24) 01/05/2024     Priority: Medium     Spinal stenosis at L4-L5 level 03/30/2023     Priority: Medium     Primary hypertension 11/16/2021     Priority: Medium     Chronic left-sided low back pain with left-sided sciatica 08/30/2021     Priority: Medium     S/P left atrial appendage closure - WATCHMAN 07/14/2021     Priority: Medium     July 14, 2021 - 31 mm Watchman FLX device       Chronic systolic heart failure (H) 07/12/2021     Priority: Medium     Dilated cardiomyopathy (H) 02/18/2021     Priority: Medium     Atrial flutter, chronic (H) 04/17/2019     Priority: Medium     Normal colonoscopy - 2016 - Average risk 04/15/2019     Priority: Medium     Complete tear of left rotator cuff 02/12/2019     Priority: Medium     Concussion syndrome 02/12/2019     Priority: Medium     Branch retinal vein occlusion of left eye (H28) 07/01/2017     Priority: Medium     Full code status 05/22/2017     Priority: Medium     Chronic neck pain 03/18/2016     Priority: Medium     Nonsmoker      Priority: Medium     GERD (gastroesophageal reflux disease) 04/07/2015     Priority: Medium    .  Current Outpatient Medications   Medication Sig Dispense Refill     acetaminophen (TYLENOL) 650 MG CR tablet Take 650 mg by mouth every 8 hours as needed       aspirin 81 MG EC tablet Take 1 tablet (81 mg) by mouth daily       co-enzyme Q-10 100 MG CAPS capsule Take 200 mg by mouth daily       DULoxetine (CYMBALTA) 30 MG capsule Take 1 capsule (30 mg) by  mouth daily 90 capsule 3     fish oil-omega-3 fatty acids 300-1,000 mg capsule [FISH OIL-OMEGA-3 FATTY ACIDS 300-1,000 MG CAPSULE] Take 2 g by mouth daily.       fluticasone (FLONASE) 50 MCG/ACT nasal spray Spray 1 spray into both nostrils daily 48 mL 3     lisinopril (ZESTRIL) 5 MG tablet Take 1 tablet (5 mg) by mouth daily for 360 days 90 tablet 3     metoprolol succinate ER (TOPROL XL) 25 MG 24 hr tablet Take 1 tablet (25 mg) by mouth daily 90 tablet 3     MULTIVITAMIN ORAL Take 1 tablet by mouth daily       nortriptyline (PAMELOR) 25 MG capsule Take 25 mg by mouth at bedtime       pravastatin (PRAVACHOL) 20 MG tablet Take 1 tablet (20 mg) by mouth daily 360 tablet 3     pseudoePHEDrine (SUDAFED) 120 MG 12 hr tablet Take 1 tablet (120 mg) by mouth every 12 hours as needed for congestion 50 tablet 0     senna-docusate (SENOKOT-S/PERICOLACE) 8.6-50 MG tablet Take 1-2 tablets by mouth daily as needed for constipation Take while on oral narcotics to prevent or treat constipation. 100 tablet 1     tamsulosin (FLOMAX) 0.4 MG capsule Take 2 capsules (0.8 mg) by mouth daily 180 capsule 3     Current Facility-Administered Medications   Medication Dose Route Frequency Provider Last Rate Last Admin     lidocaine (PF) (XYLOCAINE) 1 % injection 4 mL  4 mL   Ambrose Walls MD   4 mL at 10/12/23 1353     triamcinolone (KENALOG-40) injection 40 mg  40 mg   Ambrose Walls MD   40 mg at 10/12/23 1353     Past Medical History:   Diagnosis Date     Angioedema      Atrial fibrillation and flutter (H)      Basal cell carcinoma      Branch retinal vein occlusion of left eye (H28) 07/01/2017     CAD (coronary artery disease) 2/29/2024    ANGIOGRAM 2021 Left Main The vessel was visualized by angiography, is moderate in size and is angiographically normal. Left Anterior Descending Mid LAD lesion is 25% stenosed. Ramus Intermedius Ramus lesion is 10% stenosed. Left Circumflex Dist Cx lesion is 30% stenosed. Right Coronary  Artery The vessel was visualized by angiography, is moderate in size and is angiographically normal.       Chronic neck pain      Congestive heart failure (H) 2021     GERD (gastroesophageal reflux disease)      HTN (hypertension) 2015     Nonsmoker      Radiculopathy     cervical     Retinal hemorrhage     Vessel rupture in left retina     Rhinitis, allergic      S/P colonoscopy 04/15/2019     Urticaria      Past Surgical History:   Procedure Laterality Date     BASAL CELL CARCINOMA EXCISION  01/01/2020    Left nasal reconstruction     CATARACT EXTRACTION, BILATERAL Bilateral 2022     CERVICAL SPINE SURGERY      C8, T1, foraminotomy     CV CORONARY ANGIOGRAM N/A 01/20/2021    Procedure: Coronary Angiogram;  Surgeon: Willow Wellington MD;  Location: [facility] Cardiac Cath Lab;  Service: Cardiology     CV LEFT ATRIAL APPENDAGE CLOSURE N/A 07/14/2021    Procedure: CV LEFT ATRIAL APPENDAGE CLOSURE;  Surgeon: Noah Gutierres MD;  Location: [facility] CARDIAC CATH LAB     CV LEFT HEART CATHETERIZATION WITHOUT LEFT VENTRICULOGRAM Left 01/20/2021    Procedure: Left Heart Catheterization Without Left Ventriculogram;  Surgeon: Willow Wellington MD;  Location: [facility] Cardiac Cath Lab;  Service: Cardiology     DECOMPRESSION, FUSION LUMBAR POSTERIOR ONE LEVEL, COMBINED Left 3/30/2023    Procedure: LEFT LUMBAR 4-5 POSTERIOR SPINAL FUSION WITH LEFT LUMBAR 4-5 LAMINOFORAMINOTOMY AND WIDE LUMBAR 4-5 LEFT SUBTOTAL FACETECTOMY WITH ALLOGRAFT AND AUTOGRAFT;  Surgeon: Ambrose Degroot MD;  Location: [facility] OR     HERNIA REPAIR, UMBILICAL       ×2     REVERSE TOTAL SHOULDER ARTHROPLASTY Left 05/01/2019     Allergies   Allergen Reactions     Effient [Prasugrel Hcl] Hives     Hydrochlorothiazide Unknown     Hydrocodone Swelling     Sinus     Oxycontin [Oxycodone] Swelling     lip     Peppermint Oil Other (See Comments)     Plavix [Clopidogrel] Hives     Sulfa (Sulfonamide Antibiotics) [Sulfa Antibiotics] Swelling     Perfume  Swelling     Other reaction(s): Runny Nose     Social History     Socioeconomic History     Marital status:      Spouse name: Not on file     Number of children: Not on file     Years of education: Not on file     Highest education level: Not on file   Occupational History     Not on file   Tobacco Use     Smoking status: Never     Smokeless tobacco: Never   Substance and Sexual Activity     Alcohol use: Never     Comment: Alcoholic Drinks/day: About once a year.     Drug use: No     Sexual activity: Not on file   Other Topics Concern     Parent/sibling w/ CABG, MI or angioplasty before 65F 55M? Not Asked   Social History Narrative    Patient of Dr. Rod since 2001.    .  Wife is a former RN.     Wife's cousin is Dr. Jean Pierre Champagne, ID specialist.     Social Determinants of Health     Financial Resource Strain: Low Risk  (1/5/2024)    Financial Resource Strain      Within the past 12 months, have you or your family members you live with been unable to get utilities (heat, electricity) when it was really needed?: No   Food Insecurity: Low Risk  (1/5/2024)    Food Insecurity      Within the past 12 months, did you worry that your food would run out before you got money to buy more?: No      Within the past 12 months, did the food you bought just not last and you didn t have money to get more?: No   Transportation Needs: Low Risk  (1/5/2024)    Transportation Needs      Within the past 12 months, has lack of transportation kept you from medical appointments, getting your medicines, non-medical meetings or appointments, work, or from getting things that you need?: No   Physical Activity: Not on file   Stress: Not on file   Social Connections: Unknown (1/1/2022)    Received from [facility], [facility]    Social Connections      Frequency of Communication with Friends and Family: Not on file   Interpersonal Safety: Low Risk  (1/5/2024)     Interpersonal Safety      Do you feel physically and emotionally safe where you currently live?: Yes      Within the past 12 months, have you been hit, slapped, kicked or otherwise physically hurt by someone?: No      Within the past 12 months, have you been humiliated or emotionally abused in other ways by your partner or ex-partner?: No   Housing Stability: Low Risk  (1/5/2024)    Housing Stability      Do you have housing? : Yes      Are you worried about losing your housing?: No     Family History   Problem Relation Age of Onset     Arthritis Mother      Other - See Comments Mother         psychiatry/pvd     Other - See Comments Father         blood reaction          REVIEW OF SYSTEMS:  General: negative, fever, chills, night sweats  Skin: negative, acne, rash, and scaling  Eyes: negative, double vision, eye pain, and photophobia  Ears/Nose/Throat: negative, nasal congestion, and purulent rhinorrhea  Respiratory: No dyspnea on exertion, No cough, and negative  Cardiovascular: negative, palpitations, tachycardia, irregular heart beat, chest pain, exertional chest pain or pressure, paroxysmal nocturnal dyspnea, dyspnea on exertion, and orthopnea       OBJECTIVE:  Blood pressure 138/73, pulse 72, weight 106.6 kg (235 lb), SpO2 96%.  General Appearance: alert and no distress  Head: Normocephalic. No masses, lesions, tenderness or abnormalities  Eyes: conjuctiva clear, PERRL, EOM intact  Ears: External ears normal. Canals clear. TM's normal.  Nose: Nares normal  Mouth: normal  Neck: Supple, no cervical adenopathy, no thyromegaly  Lungs: clear to auscultation  Cardiac: Afib, normal S1 and S2, no murmur     ASSESSMENT/PLAN:  Patient here for a second opinion.  Permanent atrial fibrillation for past 40 years or so.  As per history no prior history of cardioversion or antiarrhythmic therapy.  In 2022 patient had a Watchman device placement and subsequently his anticoagulation was discontinued.  Intraprocedural as well as  postprocedural JEAN CLAUDE shows significant leak through the device.  Patient had a coronary angiogram prior to the procedure which showed no flow-limiting lesions.  Patient used to have an EF of around 40%.  Recent echocardiogram showed decrease in EF to around 30%.  Also recent 24-hour Holter monitor showed persistent atrial fibrillation with an average rate of 76 bpm.  Rate varied from .  Overall well-controlled atrial fibrillation rate.  His symptoms status is very difficult to assess as due to prior neck and back surgery and significant back pain, left knee joint pain and peripheral neuropathy patient is unable to ambulate more than 70 feet.  He denied dyspnea or PND or other heart failure symptoms.  His main cardiac issues to deal with permanent atrial fibrillation with controlled ventricular response, leaky Watchman device and not on anticoagulation and decreased LV function.  Discussed the fact that permanent atrial fibrillation for 40 years will be difficult to convert to sinus rhythm.  So the ideal choice will be rate control and anticoagulation if needed.  Because of the leaky Watchman device and will refer patient to EP regarding anticoagulation.  Significant reduction in LV function.  Normal LV size.  Will plan for a cardiac MRI to accurately assess LV function and any other abnormality that can cause cardiac dysfunction.  Prior to his Watchman device in 2022 patient had a coronary angiogram which showed no flow-limiting lesions.  So at this time CAD may not be a concern.  If patient's EF is in 30s will refer patient to core clinic for CHF medication titration.  He is lisinopril may be changed to Entresto.  Will wait for the MRI result.  Patient also complains of leg claudication like symptoms involving the hip.  But patient had a lower extremity arterial Doppler showing no significant flow-limiting lesions.  Total visit duration 45 minutes.  This included face-to-face interview, physical exam, chart  review, review of prior echocardiograms, coronary angiogram, TEEs, Holter monitor result and documentation.      Please do not hesitate to contact me if you have any questions/concerns.     Sincerely,     JOANN Francisco MD

## 2024-07-16 NOTE — PROGRESS NOTES
SUBJECTIVE:  Mike Gonzalez is a 73 year old male who presents for his second opinion.  Patient with atrial fibrillation for past 40 years.s/p 31 mm Watchman FLX device placement on 7/14/2021, nonobstructive CAD, and HFrEF LVEF 40%.  Postprocedure JEAN CLAUDE shows significant device leak.  Currently patient is not on any anticoagulation.  Recent echocardiogram revealed an EF of 30% with normal LV RV size.  LA volume index 64.8 mL/m .  Also Holter monitor showed 100% atrial fibrillation with arate varying from 66 bpm to 117 bpm.  Average heart rate was 76 bpm.  Patient is greatly concerned about the decrease LV function.  Patient had prior neck and back surgery.  Having significant problem with his back.  Because of this he is unable to ambulate more than 70 feet.  Denied shortness of breath or other CHF symptoms.  He complains of significant bilateral hip pain while walking.  He has to stop for a minute and his symptoms improved and he can continue to walk.  Also do have some peripheral neuropathy of toes and left knee joint arthritis.  All these symptoms are limiting his functional capacity.  Recently patient had lower extremity arterial Doppler showing no significant obstructive disease.  Current cardiac medications are Toprol-XL 25 mg daily, lisinopril 5 mg daily and Pravachol 20 mg daily.  No diabetes..  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  Patient had no prior cardioversions or antiarrhythmic therapy.  Patient Active Problem List    Diagnosis Date Noted    Idiopathic peripheral neuropathy 05/27/2024     Priority: Medium    CAD (coronary artery disease) 02/29/2024     Priority: Medium     ANGIOGRAM 2021 Left Main The vessel was visualized by angiography, is moderate in size and is angiographically normal. Left Anterior Descending Mid LAD lesion is 25% stenosed. Ramus Intermedius Ramus lesion is 10% stenosed. Left Circumflex Dist Cx lesion is  30% stenosed. Right Coronary Artery The vessel was visualized by angiography, is moderate in size and is angiographically normal.        Senile purpura (H24) 01/05/2024     Priority: Medium    Spinal stenosis at L4-L5 level 03/30/2023     Priority: Medium    Primary hypertension 11/16/2021     Priority: Medium    Chronic left-sided low back pain with left-sided sciatica 08/30/2021     Priority: Medium    S/P left atrial appendage closure - WATCHMAN 07/14/2021     Priority: Medium     July 14, 2021 - 31 mm Watchman FLX device      Chronic systolic heart failure (H) 07/12/2021     Priority: Medium    Dilated cardiomyopathy (H) 02/18/2021     Priority: Medium    Atrial flutter, chronic (H) 04/17/2019     Priority: Medium    Normal colonoscopy - 2016 - Average risk 04/15/2019     Priority: Medium    Complete tear of left rotator cuff 02/12/2019     Priority: Medium    Concussion syndrome 02/12/2019     Priority: Medium    Branch retinal vein occlusion of left eye (H28) 07/01/2017     Priority: Medium    Full code status 05/22/2017     Priority: Medium    Chronic neck pain 03/18/2016     Priority: Medium    Nonsmoker      Priority: Medium    GERD (gastroesophageal reflux disease) 04/07/2015     Priority: Medium    .  Current Outpatient Medications   Medication Sig Dispense Refill    acetaminophen (TYLENOL) 650 MG CR tablet Take 650 mg by mouth every 8 hours as needed      aspirin 81 MG EC tablet Take 1 tablet (81 mg) by mouth daily      co-enzyme Q-10 100 MG CAPS capsule Take 200 mg by mouth daily      DULoxetine (CYMBALTA) 30 MG capsule Take 1 capsule (30 mg) by mouth daily 90 capsule 3    fish oil-omega-3 fatty acids 300-1,000 mg capsule [FISH OIL-OMEGA-3 FATTY ACIDS 300-1,000 MG CAPSULE] Take 2 g by mouth daily.      fluticasone (FLONASE) 50 MCG/ACT nasal spray Spray 1 spray into both nostrils daily 48 mL 3    lisinopril (ZESTRIL) 5 MG tablet Take 1 tablet (5 mg) by mouth daily for 360 days 90 tablet 3    metoprolol  succinate ER (TOPROL XL) 25 MG 24 hr tablet Take 1 tablet (25 mg) by mouth daily 90 tablet 3    MULTIVITAMIN ORAL Take 1 tablet by mouth daily      nortriptyline (PAMELOR) 25 MG capsule Take 25 mg by mouth at bedtime      pravastatin (PRAVACHOL) 20 MG tablet Take 1 tablet (20 mg) by mouth daily 360 tablet 3    pseudoePHEDrine (SUDAFED) 120 MG 12 hr tablet Take 1 tablet (120 mg) by mouth every 12 hours as needed for congestion 50 tablet 0    senna-docusate (SENOKOT-S/PERICOLACE) 8.6-50 MG tablet Take 1-2 tablets by mouth daily as needed for constipation Take while on oral narcotics to prevent or treat constipation. 100 tablet 1    tamsulosin (FLOMAX) 0.4 MG capsule Take 2 capsules (0.8 mg) by mouth daily 180 capsule 3     Current Facility-Administered Medications   Medication Dose Route Frequency Provider Last Rate Last Admin    lidocaine (PF) (XYLOCAINE) 1 % injection 4 mL  4 mL   Ambrose Walls MD   4 mL at 10/12/23 1353    triamcinolone (KENALOG-40) injection 40 mg  40 mg   Ambrose Walls MD   40 mg at 10/12/23 1353     Past Medical History:   Diagnosis Date    Angioedema     Atrial fibrillation and flutter (H)     Basal cell carcinoma     Branch retinal vein occlusion of left eye (H28) 07/01/2017    CAD (coronary artery disease) 2/29/2024    ANGIOGRAM 2021 Left Main The vessel was visualized by angiography, is moderate in size and is angiographically normal. Left Anterior Descending Mid LAD lesion is 25% stenosed. Ramus Intermedius Ramus lesion is 10% stenosed. Left Circumflex Dist Cx lesion is 30% stenosed. Right Coronary Artery The vessel was visualized by angiography, is moderate in size and is angiographically normal.      Chronic neck pain     Congestive heart failure (H) 2021    GERD (gastroesophageal reflux disease)     HTN (hypertension) 2015    Nonsmoker     Radiculopathy     cervical    Retinal hemorrhage     Vessel rupture in left retina    Rhinitis, allergic     S/P colonoscopy 04/15/2019     Urticaria      Past Surgical History:   Procedure Laterality Date    BASAL CELL CARCINOMA EXCISION  01/01/2020    Left nasal reconstruction    CATARACT EXTRACTION, BILATERAL Bilateral 2022    CERVICAL SPINE SURGERY      C8, T1, foraminotomy    CV CORONARY ANGIOGRAM N/A 01/20/2021    Procedure: Coronary Angiogram;  Surgeon: Willow Wellington MD;  Location: [facility];  Service: Cardiology    CV LEFT ATRIAL APPENDAGE CLOSURE N/A 07/14/2021    Procedure: CV LEFT ATRIAL APPENDAGE CLOSURE;  Surgeon: Noah Gutierres MD;  Location:  HEART CARDIAC CATH LAB    CV LEFT HEART CATHETERIZATION WITHOUT LEFT VENTRICULOGRAM Left 01/20/2021    Procedure: Left Heart Catheterization Without Left Ventriculogram;  Surgeon: Willow Wellington MD;  Location: [facility];  Service: Cardiology    DECOMPRESSION, FUSION LUMBAR POSTERIOR ONE LEVEL, COMBINED Left 3/30/2023    Procedure: LEFT LUMBAR 4-5 POSTERIOR SPINAL FUSION WITH LEFT LUMBAR 4-5 LAMINOFORAMINOTOMY AND WIDE LUMBAR 4-5 LEFT SUBTOTAL FACETECTOMY WITH ALLOGRAFT AND AUTOGRAFT;  Surgeon: Ambrose Degroot MD;  Location: [facility] Main OR    HERNIA REPAIR, UMBILICAL       ×2    REVERSE TOTAL SHOULDER ARTHROPLASTY Left 05/01/2019     Allergies   Allergen Reactions    Effient [Prasugrel Hcl] Hives    Hydrochlorothiazide Unknown    Hydrocodone Swelling     Sinus    Oxycontin [Oxycodone] Swelling     lip    Peppermint Oil Other (See Comments)    Plavix [Clopidogrel] Hives    Sulfa (Sulfonamide Antibiotics) [Sulfa Antibiotics] Swelling    Perfume Swelling     Other reaction(s): Runny Nose     Social History     Socioeconomic History    Marital status:      Spouse name: Not on file    Number of children: Not on file    Years of education: Not on file    Highest education level: Not on file   Occupational History    Not on file   Tobacco Use    Smoking status: Never    Smokeless tobacco: Never   Substance and Sexual Activity    Alcohol use:  Never     Comment: Alcoholic Drinks/day: About once a year.    Drug use: No    Sexual activity: Not on file   Other Topics Concern    Parent/sibling w/ CABG, MI or angioplasty before 65F 55M? Not Asked   Social History Narrative    Patient of Dr. Rod since 2001.    .  Wife is a former RN.     Wife's cousin is Dr. Jean Pierre Champagne, ID specialist.     Social Determinants of Health     Financial Resource Strain: Low Risk  (1/5/2024)    Financial Resource Strain     Within the past 12 months, have you or your family members you live with been unable to get utilities (heat, electricity) when it was really needed?: No   Food Insecurity: Low Risk  (1/5/2024)    Food Insecurity     Within the past 12 months, did you worry that your food would run out before you got money to buy more?: No     Within the past 12 months, did the food you bought just not last and you didn t have money to get more?: No   Transportation Needs: Low Risk  (1/5/2024)    Transportation Needs     Within the past 12 months, has lack of transportation kept you from medical appointments, getting your medicines, non-medical meetings or appointments, work, or from getting things that you need?: No   Physical Activity: Not on file   Stress: Not on file   Social Connections: Unknown (1/1/2022)    Received from [facility], [facility]    Social Connections     Frequency of Communication with Friends and Family: Not on file   Interpersonal Safety: Low Risk  (1/5/2024)    Interpersonal Safety     Do you feel physically and emotionally safe where you currently live?: Yes     Within the past 12 months, have you been hit, slapped, kicked or otherwise physically hurt by someone?: No     Within the past 12 months, have you been humiliated or emotionally abused in other ways by your partner or ex-partner?: No   Housing Stability: Low Risk  (1/5/2024)    Housing Stability     Do you have housing?  : Yes     Are you worried about losing your housing?: No     Family History   Problem Relation Age of Onset    Arthritis Mother     Other - See Comments Mother         psychiatry/pvd    Other - See Comments Father         blood reaction          REVIEW OF SYSTEMS:  General: negative, fever, chills, night sweats  Skin: negative, acne, rash, and scaling  Eyes: negative, double vision, eye pain, and photophobia  Ears/Nose/Throat: negative, nasal congestion, and purulent rhinorrhea  Respiratory: No dyspnea on exertion, No cough, and negative  Cardiovascular: negative, palpitations, tachycardia, irregular heart beat, chest pain, exertional chest pain or pressure, paroxysmal nocturnal dyspnea, dyspnea on exertion, and orthopnea       OBJECTIVE:  Blood pressure 138/73, pulse 72, weight 106.6 kg (235 lb), SpO2 96%.  General Appearance: alert and no distress  Head: Normocephalic. No masses, lesions, tenderness or abnormalities  Eyes: conjuctiva clear, PERRL, EOM intact  Ears: External ears normal. Canals clear. TM's normal.  Nose: Nares normal  Mouth: normal  Neck: Supple, no cervical adenopathy, no thyromegaly  Lungs: clear to auscultation  Cardiac: Afib, normal S1 and S2, no murmur     ASSESSMENT/PLAN:  Patient here for a second opinion.  Permanent atrial fibrillation for past 40 years or so.  As per history no prior history of cardioversion or antiarrhythmic therapy.  In 2022 patient had a Watchman device placement and subsequently his anticoagulation was discontinued.  Intraprocedural as well as postprocedural JEAN CLAUDE shows significant leak through the device.  Patient had a coronary angiogram prior to the procedure which showed no flow-limiting lesions.  Patient used to have an EF of around 40%.  Recent echocardiogram showed decrease in EF to around 30%.  Also recent 24-hour Holter monitor showed persistent atrial fibrillation with an average rate of 76 bpm.  Rate varied from .  Overall well-controlled atrial  fibrillation rate.  His symptoms status is very difficult to assess as due to prior neck and back surgery and significant back pain, left knee joint pain and peripheral neuropathy patient is unable to ambulate more than 70 feet.  He denied dyspnea or PND or other heart failure symptoms.  His main cardiac issues to deal with permanent atrial fibrillation with controlled ventricular response, leaky Watchman device and not on anticoagulation and decreased LV function.  Discussed the fact that permanent atrial fibrillation for 40 years will be difficult to convert to sinus rhythm.  So the ideal choice will be rate control and anticoagulation if needed.  Because of the leaky Watchman device and will refer patient to EP regarding anticoagulation.  Significant reduction in LV function.  Normal LV size.  Will plan for a cardiac MRI to accurately assess LV function and any other abnormality that can cause cardiac dysfunction.  Prior to his Watchman device in 2022 patient had a coronary angiogram which showed no flow-limiting lesions.  So at this time CAD may not be a concern.  If patient's EF is in 30s will refer patient to core clinic for CHF medication titration.  He is lisinopril may be changed to Entresto.  Will wait for the MRI result.  Patient also complains of leg claudication like symptoms involving the hip.  But patient had a lower extremity arterial Doppler showing no significant flow-limiting lesions.  Total visit duration 45 minutes.  This included face-to-face interview, physical exam, chart review, review of prior echocardiograms, coronary angiogram, TEEs, Holter monitor result and documentation.

## 2024-07-16 NOTE — NURSING NOTE
Chief Complaint   Patient presents with    New Patient     new - per patient - wants second opinion regarding afib - appointment scheduled on day prior - may need referral to EP   Vitals were taken and medications were reconciled.    Julian Isbell, Visit Facilitator Clinic  10:16 AM

## 2024-07-18 ENCOUNTER — ANCILLARY PROCEDURE (OUTPATIENT)
Dept: GENERAL RADIOLOGY | Facility: CLINIC | Age: 74
End: 2024-07-18
Attending: STUDENT IN AN ORGANIZED HEALTH CARE EDUCATION/TRAINING PROGRAM
Payer: MEDICARE

## 2024-07-18 ENCOUNTER — OFFICE VISIT (OUTPATIENT)
Dept: ORTHOPEDICS | Facility: CLINIC | Age: 74
End: 2024-07-18
Attending: PHYSICIAN ASSISTANT
Payer: MEDICARE

## 2024-07-18 VITALS — HEIGHT: 72 IN | BODY MASS INDEX: 31.83 KG/M2 | WEIGHT: 235 LBS

## 2024-07-18 DIAGNOSIS — M25.562 CHRONIC PAIN OF LEFT KNEE: ICD-10-CM

## 2024-07-18 DIAGNOSIS — G89.29 CHRONIC PAIN OF LEFT KNEE: ICD-10-CM

## 2024-07-18 PROCEDURE — 73564 X-RAY EXAM KNEE 4 OR MORE: CPT | Mod: TC | Performed by: RADIOLOGY

## 2024-07-18 PROCEDURE — 99203 OFFICE O/P NEW LOW 30 MIN: CPT | Performed by: STUDENT IN AN ORGANIZED HEALTH CARE EDUCATION/TRAINING PROGRAM

## 2024-07-18 NOTE — PATIENT INSTRUCTIONS
[facility]   [id number] [place] [address], [facility] [address]  [place] [address] [address] [place] [address]   Appointments: [phone] Appointments: [phone]   Fax: [phone] Fax: [phone]       1. Chronic pain of left knee          Follow up with Dr. Chao as needed .    Call my office with any questions or concerns, [phone].

## 2024-07-18 NOTE — PROGRESS NOTES
CC: Left knee pain    HPI: Patient is a 73-year-old male seen here today with his adult wife in consultation for left medial sided knee pain.  He notes that he has been having left medial sided knee pain for approximately 5 years.  He notes the pain with ambulation activity.  His recent become worse.  He did undergo a hyaluronic acid injection to this knee in March which is still providing him very good symptomatic relief of his left medial sided knee pain.  He is not currently taking any oral anti-inflammatory medication for this.  He has done multiple years of physical therapy for his legs as well as for his lower back.  Is never had any surgery onto his left knee.    Of note, he does have a significant recent lumbar spine history.  He most recently underwent a L4-5 decompression with fusion in March 2023.  He tells me he continues to have lower back and gluteal pain.  He states that his gluteal pain significantly limits his ability to exercise.  He states that after 700 feet he will get crampy gluteal pain and have to sit down and rest.  He states he was also told he may have vascular claudication.  Although, he does not report any calf pain limiting activity.  He also notes some posterior hip pain and is wondering if his hip arthritis may be related to some of this pain.  He also tells me that his left leg feels that its about 10 degrees externally rotated in comparison to his right.  Is wondering if this is affecting his alignment and causing his back pain.  He tells me has been worked with physical therapy and correcting alignment of his leg.    He has a history of a Watchman procedure.  His BMI is 31.  Last hemoglobin A1c 5.5.  He is retired.  Does not smoke.  Enjoys photography as a hobby         Patient Active Problem List   Diagnosis    GERD (gastroesophageal reflux disease)    Chronic neck pain    Nonsmoker    Full code status    Branch retinal vein occlusion of left eye (H28)    Normal colonoscopy - 2016  - Average risk    Complete tear of left rotator cuff    Atrial flutter, chronic (H)    Dilated cardiomyopathy (H)    Chronic systolic heart failure (H)    S/P left atrial appendage closure - WATCHMAN    Concussion syndrome    Chronic left-sided low back pain with left-sided sciatica    Primary hypertension    Spinal stenosis at L4-L5 level    Senile purpura (H24)    CAD (coronary artery disease)    Idiopathic peripheral neuropathy          Past Medical History:   Diagnosis Date    Angioedema     Atrial fibrillation and flutter (H)     Basal cell carcinoma     Branch retinal vein occlusion of left eye (H28) 07/01/2017    CAD (coronary artery disease) 2/29/2024    ANGIOGRAM 2021 Left Main The vessel was visualized by angiography, is moderate in size and is angiographically normal. Left Anterior Descending Mid LAD lesion is 25% stenosed. Ramus Intermedius Ramus lesion is 10% stenosed. Left Circumflex Dist Cx lesion is 30% stenosed. Right Coronary Artery The vessel was visualized by angiography, is moderate in size and is angiographically normal.      Chronic neck pain     Congestive heart failure (H) 2021    GERD (gastroesophageal reflux disease)     HTN (hypertension) 2015    Nonsmoker     Radiculopathy     cervical    Retinal hemorrhage     Vessel rupture in left retina    Rhinitis, allergic     S/P colonoscopy 04/15/2019    Urticaria           Past Surgical History:   Procedure Laterality Date    BASAL CELL CARCINOMA EXCISION  01/01/2020    Left nasal reconstruction    CATARACT EXTRACTION, BILATERAL Bilateral 2022    CERVICAL SPINE SURGERY      C8, T1, foraminotomy    CV CORONARY ANGIOGRAM N/A 01/20/2021    Procedure: Coronary Angiogram;  Surgeon: Willow Wellington MD;  Location: [facility] Cardiac Cath Lab;  Service: Cardiology    CV LEFT ATRIAL APPENDAGE CLOSURE N/A 07/14/2021    Procedure: CV LEFT ATRIAL APPENDAGE CLOSURE;  Surgeon: Noah Gutierres MD;  Location:  HEART CARDIAC CATH LAB    CV LEFT HEART  CATHETERIZATION WITHOUT LEFT VENTRICULOGRAM Left 01/20/2021    Procedure: Left Heart Catheterization Without Left Ventriculogram;  Surgeon: Willow Wellington MD;  Location: [facility] Cardiac Cath Lab;  Service: Cardiology    DECOMPRESSION, FUSION LUMBAR POSTERIOR ONE LEVEL, COMBINED Left 3/30/2023    Procedure: LEFT LUMBAR 4-5 POSTERIOR SPINAL FUSION WITH LEFT LUMBAR 4-5 LAMINOFORAMINOTOMY AND WIDE LUMBAR 4-5 LEFT SUBTOTAL FACETECTOMY WITH ALLOGRAFT AND AUTOGRAFT;  Surgeon: Ambrose Degroot MD;  Location: [facility] Main OR    HERNIA REPAIR, UMBILICAL       ×2    REVERSE TOTAL SHOULDER ARTHROPLASTY Left 05/01/2019          Current Outpatient Medications   Medication Sig Dispense Refill    acetaminophen (TYLENOL) 650 MG CR tablet Take 650 mg by mouth every 8 hours as needed      aspirin 81 MG EC tablet Take 1 tablet (81 mg) by mouth daily      co-enzyme Q-10 100 MG CAPS capsule Take 200 mg by mouth daily      DULoxetine (CYMBALTA) 30 MG capsule Take 1 capsule (30 mg) by mouth daily 90 capsule 3    fish oil-omega-3 fatty acids 300-1,000 mg capsule [FISH OIL-OMEGA-3 FATTY ACIDS 300-1,000 MG CAPSULE] Take 2 g by mouth daily.      fluticasone (FLONASE) 50 MCG/ACT nasal spray Spray 1 spray into both nostrils daily 48 mL 3    lisinopril (ZESTRIL) 5 MG tablet Take 1 tablet (5 mg) by mouth daily for 360 days 90 tablet 3    metoprolol succinate ER (TOPROL XL) 25 MG 24 hr tablet Take 1 tablet (25 mg) by mouth daily 90 tablet 3    MULTIVITAMIN ORAL Take 1 tablet by mouth daily      nortriptyline (PAMELOR) 25 MG capsule Take 25 mg by mouth at bedtime      pravastatin (PRAVACHOL) 20 MG tablet Take 1 tablet (20 mg) by mouth daily 360 tablet 3    pseudoePHEDrine (SUDAFED) 120 MG 12 hr tablet Take 1 tablet (120 mg) by mouth every 12 hours as needed for congestion 50 tablet 0    senna-docusate (SENOKOT-S/PERICOLACE) 8.6-50 MG tablet Take 1-2 tablets by mouth daily as needed for constipation Take while on oral narcotics to prevent  or treat constipation. 100 tablet 1    tamsulosin (FLOMAX) 0.4 MG capsule Take 2 capsules (0.8 mg) by mouth daily 180 capsule 3          Allergies   Allergen Reactions    Effient [Prasugrel Hcl] Hives    Hydrochlorothiazide Unknown    Hydrocodone Swelling     Sinus    Oxycontin [Oxycodone] Swelling     lip    Peppermint Oil Other (See Comments)    Plavix [Clopidogrel] Hives    Sulfa (Sulfonamide Antibiotics) [Sulfa Antibiotics] Swelling    Perfume Swelling     Other reaction(s): Runny Nose          Family History   Problem Relation Age of Onset    Arthritis Mother     Other - See Comments Mother         psychiatry/pvd    Other - See Comments Father         blood reaction          Social History     Tobacco Use    Smoking status: Never    Smokeless tobacco: Never   Substance Use Topics    Alcohol use: Never     Comment: Alcoholic Drinks/day: About once a year.            Objective:  Physical Exam:  LLE: No pain with axial load or logroll of the hip.  With the hip flexed to 90 degrees, he has 60 degrees external rotation and 10 degrees of internal rotation.  This is limited by mechanical stiffness.  No pain with this.  No pain with resisted hip flexion.  No pain to palpation over the greater tuberosity.  He does note pain focally just posterior to the greater tuberosity.  5/5 hip flexion and extension.  The left knee shows no open wounds, lacerations, or prior surgical incisions about the knee.  No significant edema or ecchymosis.  No erythema or drainage.  No effusion of the knee joint.  Pain to palpation focally over the medial joint line. no pain to palpation over the quad tendon patella or patellar tendon.  No pain to palpation over the lateral joint line.  No pain to palpation over the femoral origin or tibial insertion of the MCL.  No pain to palpation over the femoral origin or fibular insertion of the LCL.  Passive range of motion 0 to 150 degrees of knee flexion.  Active range of motion is equivalent to  passive range of motion.  5/5 strength in flexion and extension.  Stable to varus and valgus stress at 0 and 30 degrees of knee flexion with firm endpoint.  Negative Lachman.  Stable anterior posterior drawer.  Grossly neurovascular intact.    Imagin view x-ray left knee from today was reviewed.  No fracture or acute bony pathology noted . this shows complete loss of joint space in the medial compartment on AP weightbearing views.  While maintained lateral joint space.  Patella sunrise view there is narrowing of the medial patellofemoral joint space.      X-ray of bilateral hips from 2023 was reviewed.  This shows mild to moderate arthritic changes of both hips    Assessment and Plan: Patient is a 73-year-old male seen here today for evaluation of his left knee pain. I reviewed his images with them today.  We reviewed the diagnosis, natural history, and prognosis.  We discussed both operative and nonoperative treatment options.  We discussed nonoperative management in the form of low impact aerobic exercise, weight loss, oral anti-inflammatory pain medication, bracing, physical therapy, and intra-articular corticosteroid and hyaluronic acid injections.  We also discussed operative intervention in the form of unicondylar versus total knee arthroplasty.  We reviewed the risks and benefits including but not limited to bleeding, infection requiring revision surgery, loss of range of motion, loss of function, damage surrounding nerves and blood vessels, failure to cure pain, loss of limb and loss of life.  We also discussed the expected postoperative course after a total knee arthroplasty.  I had the opportunity to answer his questions at this time.     In regards to his knee arthritis, I had a nitin conversation today about the expected outcomes after knee arthroplasty.  Discussed with him that I can Channing tell him that his knee arthritis causing his medial sided joint pain.  Knee arthroplasty would very  reliably take away that pain.  It is unlikely that a knee arthroplasty would significantly changed the rotational alignment of his leg additionally, I would not predict significant improvements in his hip and back pain after knee arthroplasty.  Discussed with him that some patients do note improvement in back pain after knee arthroplasty if the arthritis is causing him a significant limp.  However, I would not recommend knee arthroplasty for a primary symptom of lower back pain.  He is still getting good symptomatic relief from his HA injection.  At this time he does not feel like his knee pain is limiting his activity and would like to continue with HA injections as he is getting good symptomatic relief from these    In regards to his hip, he does have known hip arthritis.  Discussed with him that hip arthritis does not typically cause groin pain.  However it can cause posterior hip pain.  If he is interested, I think an ultrasound-guided intra-articular corticosteroid injection would be very reasonable for both diagnostic and therapeutic benefits.    Patient was provided a printed informational packet on total knee arthroplasty.  I am more than happy to see him back at any time point in the future to set up a left hip injection, left knee HA injection, or to discuss knee arthroplasty further    Albino Chao MD    [facility]   Department of Orthopedic Surgery      Disclaimer: This note consists of symbols derived from keyboarding, dictation and/or voice recognition software. As a result, there may be errors in the script that have gone undetected. Please consider this when interpreting information found in this chart.

## 2024-07-18 NOTE — LETTER
7/18/2024      Mike Gonzalez  [address] [place] [address]      Dear Colleague,    Thank you for referring your patient, Mike Gonzalez, to the [facility]. Please see a copy of my visit note below.    CC: Left knee pain    HPI: Patient is a 73-year-old male seen here today with his adult wife in consultation for left medial sided knee pain.  He notes that he has been having left medial sided knee pain for approximately 5 years.  He notes the pain with ambulation activity.  His recent become worse.  He did undergo a hyaluronic acid injection to this knee in March which is still providing him very good symptomatic relief of his left medial sided knee pain.  He is not currently taking any oral anti-inflammatory medication for this.  He has done multiple years of physical therapy for his legs as well as for his lower back.  Is never had any surgery onto his left knee.    Of note, he does have a significant recent lumbar spine history.  He most recently underwent a L4-5 decompression with fusion in March 2023.  He tells me he continues to have lower back and gluteal pain.  He states that his gluteal pain significantly limits his ability to exercise.  He states that after 700 feet he will get crampy gluteal pain and have to sit down and rest.  He states he was also told he may have vascular claudication.  Although, he does not report any calf pain limiting activity.  He also notes some posterior hip pain and is wondering if his hip arthritis may be related to some of this pain.  He also tells me that his left leg feels that its about 10 degrees externally rotated in comparison to his right.  Is wondering if this is affecting his alignment and causing his back pain.  He tells me has been worked with physical therapy and correcting alignment of his leg.    He has a history of a Watchman procedure.  His BMI is 31.  Last hemoglobin A1c 5.5.  He is retired.  Does not smoke.  Enjoys  photography as a hobby         Patient Active Problem List   Diagnosis     GERD (gastroesophageal reflux disease)     Chronic neck pain     Nonsmoker     Full code status     Branch retinal vein occlusion of left eye (H28)     Normal colonoscopy - 2016 - Average risk     Complete tear of left rotator cuff     Atrial flutter, chronic (H)     Dilated cardiomyopathy (H)     Chronic systolic heart failure (H)     S/P left atrial appendage closure - WATCHMAN     Concussion syndrome     Chronic left-sided low back pain with left-sided sciatica     Primary hypertension     Spinal stenosis at L4-L5 level     Senile purpura (H24)     CAD (coronary artery disease)     Idiopathic peripheral neuropathy          Past Medical History:   Diagnosis Date     Angioedema      Atrial fibrillation and flutter (H)      Basal cell carcinoma      Branch retinal vein occlusion of left eye (H28) 07/01/2017     CAD (coronary artery disease) 2/29/2024    ANGIOGRAM 2021 Left Main The vessel was visualized by angiography, is moderate in size and is angiographically normal. Left Anterior Descending Mid LAD lesion is 25% stenosed. Ramus Intermedius Ramus lesion is 10% stenosed. Left Circumflex Dist Cx lesion is 30% stenosed. Right Coronary Artery The vessel was visualized by angiography, is moderate in size and is angiographically normal.       Chronic neck pain      Congestive heart failure (H) 2021     GERD (gastroesophageal reflux disease)      HTN (hypertension) 2015     Nonsmoker      Radiculopathy     cervical     Retinal hemorrhage     Vessel rupture in left retina     Rhinitis, allergic      S/P colonoscopy 04/15/2019     Urticaria           Past Surgical History:   Procedure Laterality Date     BASAL CELL CARCINOMA EXCISION  01/01/2020    Left nasal reconstruction     CATARACT EXTRACTION, BILATERAL Bilateral 2022     CERVICAL SPINE SURGERY      C8, T1, foraminotomy     CV CORONARY ANGIOGRAM N/A 01/20/2021    Procedure: Coronary Angiogram;   Surgeon: Willow Wellington MD;  Location: [facility] Cardiac Cath Lab;  Service: Cardiology     CV LEFT ATRIAL APPENDAGE CLOSURE N/A 07/14/2021    Procedure: CV LEFT ATRIAL APPENDAGE CLOSURE;  Surgeon: Noah Gutierres MD;  Location: [facility] CARDIAC CATH LAB     CV LEFT HEART CATHETERIZATION WITHOUT LEFT VENTRICULOGRAM Left 01/20/2021    Procedure: Left Heart Catheterization Without Left Ventriculogram;  Surgeon: Willow Wellington MD;  Location: [facility] Cardiac Cath Lab;  Service: Cardiology     DECOMPRESSION, FUSION LUMBAR POSTERIOR ONE LEVEL, COMBINED Left 3/30/2023    Procedure: LEFT LUMBAR 4-5 POSTERIOR SPINAL FUSION WITH LEFT LUMBAR 4-5 LAMINOFORAMINOTOMY AND WIDE LUMBAR 4-5 LEFT SUBTOTAL FACETECTOMY WITH ALLOGRAFT AND AUTOGRAFT;  Surgeon: Ambrose Degroot MD;  Location: [facility] Main OR     HERNIA REPAIR, UMBILICAL       ×2     REVERSE TOTAL SHOULDER ARTHROPLASTY Left 05/01/2019          Current Outpatient Medications   Medication Sig Dispense Refill     acetaminophen (TYLENOL) 650 MG CR tablet Take 650 mg by mouth every 8 hours as needed       aspirin 81 MG EC tablet Take 1 tablet (81 mg) by mouth daily       co-enzyme Q-10 100 MG CAPS capsule Take 200 mg by mouth daily       DULoxetine (CYMBALTA) 30 MG capsule Take 1 capsule (30 mg) by mouth daily 90 capsule 3     fish oil-omega-3 fatty acids 300-1,000 mg capsule [FISH OIL-OMEGA-3 FATTY ACIDS 300-1,000 MG CAPSULE] Take 2 g by mouth daily.       fluticasone (FLONASE) 50 MCG/ACT nasal spray Spray 1 spray into both nostrils daily 48 mL 3     lisinopril (ZESTRIL) 5 MG tablet Take 1 tablet (5 mg) by mouth daily for 360 days 90 tablet 3     metoprolol succinate ER (TOPROL XL) 25 MG 24 hr tablet Take 1 tablet (25 mg) by mouth daily 90 tablet 3     MULTIVITAMIN ORAL Take 1 tablet by mouth daily       nortriptyline (PAMELOR) 25 MG capsule Take 25 mg by mouth at bedtime       pravastatin (PRAVACHOL) 20 MG tablet Take 1 tablet (20 mg) by mouth daily 360 tablet  3     pseudoePHEDrine (SUDAFED) 120 MG 12 hr tablet Take 1 tablet (120 mg) by mouth every 12 hours as needed for congestion 50 tablet 0     senna-docusate (SENOKOT-S/PERICOLACE) 8.6-50 MG tablet Take 1-2 tablets by mouth daily as needed for constipation Take while on oral narcotics to prevent or treat constipation. 100 tablet 1     tamsulosin (FLOMAX) 0.4 MG capsule Take 2 capsules (0.8 mg) by mouth daily 180 capsule 3          Allergies   Allergen Reactions     Effient [Prasugrel Hcl] Hives     Hydrochlorothiazide Unknown     Hydrocodone Swelling     Sinus     Oxycontin [Oxycodone] Swelling     lip     Peppermint Oil Other (See Comments)     Plavix [Clopidogrel] Hives     Sulfa (Sulfonamide Antibiotics) [Sulfa Antibiotics] Swelling     Perfume Swelling     Other reaction(s): Runny Nose          Family History   Problem Relation Age of Onset     Arthritis Mother      Other - See Comments Mother         psychiatry/pvd     Other - See Comments Father         blood reaction          Social History     Tobacco Use     Smoking status: Never     Smokeless tobacco: Never   Substance Use Topics     Alcohol use: Never     Comment: Alcoholic Drinks/day: About once a year.            Objective:  Physical Exam:  LLE: No pain with axial load or logroll of the hip.  With the hip flexed to 90 degrees, he has 60 degrees external rotation and 10 degrees of internal rotation.  This is limited by mechanical stiffness.  No pain with this.  No pain with resisted hip flexion.  No pain to palpation over the greater tuberosity.  He does note pain focally just posterior to the greater tuberosity.  5/5 hip flexion and extension.  The left knee shows no open wounds, lacerations, or prior surgical incisions about the knee.  No significant edema or ecchymosis.  No erythema or drainage.  No effusion of the knee joint.  Pain to palpation focally over the medial joint line. no pain to palpation over the quad tendon patella or patellar tendon.  No  pain to palpation over the lateral joint line.  No pain to palpation over the femoral origin or tibial insertion of the MCL.  No pain to palpation over the femoral origin or fibular insertion of the LCL.  Passive range of motion 0 to 150 degrees of knee flexion.  Active range of motion is equivalent to passive range of motion.  5/5 strength in flexion and extension.  Stable to varus and valgus stress at 0 and 30 degrees of knee flexion with firm endpoint.  Negative Lachman.  Stable anterior posterior drawer.  Grossly neurovascular intact.    Imagin view x-ray left knee from today was reviewed.  No fracture or acute bony pathology noted . this shows complete loss of joint space in the medial compartment on AP weightbearing views.  While maintained lateral joint space.  Patella sunrise view there is narrowing of the medial patellofemoral joint space.      X-ray of bilateral hips from 2023 was reviewed.  This shows mild to moderate arthritic changes of both hips    Assessment and Plan: Patient is a 73-year-old male seen here today for evaluation of his left knee pain. I reviewed his images with them today.  We reviewed the diagnosis, natural history, and prognosis.  We discussed both operative and nonoperative treatment options.  We discussed nonoperative management in the form of low impact aerobic exercise, weight loss, oral anti-inflammatory pain medication, bracing, physical therapy, and intra-articular corticosteroid and hyaluronic acid injections.  We also discussed operative intervention in the form of unicondylar versus total knee arthroplasty.  We reviewed the risks and benefits including but not limited to bleeding, infection requiring revision surgery, loss of range of motion, loss of function, damage surrounding nerves and blood vessels, failure to cure pain, loss of limb and loss of life.  We also discussed the expected postoperative course after a total knee arthroplasty.  I had the  opportunity to answer his questions at this time.     In regards to his knee arthritis, I had a nitin conversation today about the expected outcomes after knee arthroplasty.  Discussed with him that I can Channing tell him that his knee arthritis causing his medial sided joint pain.  Knee arthroplasty would very reliably take away that pain.  It is unlikely that a knee arthroplasty would significantly changed the rotational alignment of his leg additionally, I would not predict significant improvements in his hip and back pain after knee arthroplasty.  Discussed with him that some patients do note improvement in back pain after knee arthroplasty if the arthritis is causing him a significant limp.  However, I would not recommend knee arthroplasty for a primary symptom of lower back pain.  He is still getting good symptomatic relief from his HA injection.  At this time he does not feel like his knee pain is limiting his activity and would like to continue with HA injections as he is getting good symptomatic relief from these    In regards to his hip, he does have known hip arthritis.  Discussed with him that hip arthritis does not typically cause groin pain.  However it can cause posterior hip pain.  If he is interested, I think an ultrasound-guided intra-articular corticosteroid injection would be very reasonable for both diagnostic and therapeutic benefits.    Patient was provided a printed informational packet on total knee arthroplasty.  I am more than happy to see him back at any time point in the future to set up a left hip injection, left knee HA injection, or to discuss knee arthroplasty further    Albino Chao MD    [facility]   Department of Orthopedic Surgery      Disclaimer: This note consists of symbols derived from keyboarding, dictation and/or voice recognition software. As a result, there may be errors in the script that have gone undetected. Please consider this when  interpreting information found in this chart.         Again, thank you for allowing me to participate in the care of your patient.        Sincerely,        Albino Chao MD

## 2024-08-02 ENCOUNTER — OFFICE VISIT (OUTPATIENT)
Dept: CARDIOLOGY | Facility: CLINIC | Age: 74
End: 2024-08-02
Attending: GENERAL ACUTE CARE HOSPITAL
Payer: MEDICARE

## 2024-08-02 ENCOUNTER — HOSPITAL ENCOUNTER (OUTPATIENT)
Dept: MRI IMAGING | Facility: CLINIC | Age: 74
Discharge: HOME | End: 2024-08-02
Attending: PHYSICIAN ASSISTANT | Admitting: PHYSICIAN ASSISTANT
Payer: MEDICARE

## 2024-08-02 VITALS
SYSTOLIC BLOOD PRESSURE: 120 MMHG | DIASTOLIC BLOOD PRESSURE: 70 MMHG | WEIGHT: 237 LBS | RESPIRATION RATE: 17 BRPM | HEART RATE: 65 BPM | BODY MASS INDEX: 32.14 KG/M2

## 2024-08-02 DIAGNOSIS — E66.9 OBESITY, UNSPECIFIED CLASSIFICATION, UNSPECIFIED OBESITY TYPE, UNSPECIFIED WHETHER SERIOUS COMORBIDITY PRESENT: ICD-10-CM

## 2024-08-02 DIAGNOSIS — I42.8 NONISCHEMIC CARDIOMYOPATHY (H): ICD-10-CM

## 2024-08-02 DIAGNOSIS — M51.369 LUMBAR DEGENERATIVE DISC DISEASE: ICD-10-CM

## 2024-08-02 DIAGNOSIS — I50.20 HEART FAILURE WITH REDUCED EJECTION FRACTION, NYHA CLASS II (H): Primary | ICD-10-CM

## 2024-08-02 DIAGNOSIS — I25.10 NONOBSTRUCTIVE ATHEROSCLEROSIS OF CORONARY ARTERY: ICD-10-CM

## 2024-08-02 DIAGNOSIS — M54.16 LUMBAR RADICULOPATHY: ICD-10-CM

## 2024-08-02 DIAGNOSIS — E78.5 HYPERLIPIDEMIA, UNSPECIFIED HYPERLIPIDEMIA TYPE: ICD-10-CM

## 2024-08-02 DIAGNOSIS — I48.19 PERSISTENT ATRIAL FIBRILLATION (H): ICD-10-CM

## 2024-08-02 DIAGNOSIS — Z98.1 HISTORY OF LUMBAR FUSION: ICD-10-CM

## 2024-08-02 DIAGNOSIS — M48.061 SPINAL STENOSIS, LUMBAR REGION, WITHOUT NEUROGENIC CLAUDICATION: ICD-10-CM

## 2024-08-02 PROCEDURE — G1010 CDSM STANSON: HCPCS

## 2024-08-02 PROCEDURE — 99215 OFFICE O/P EST HI 40 MIN: CPT | Performed by: INTERNAL MEDICINE

## 2024-08-02 NOTE — PROGRESS NOTES
HEART CARE ENCOUNTER NOTE       [facility]  [phone]    Assessment/Recommendations   Assessment:  Permanent atrial fibrillation -rate controlled by Holter monitor done in June 2024                     - s/p watchman implant in July 2021  Cardiomyopathy, heart failure with reduced ejection fraction, NYHA class II - EF on most recent echo at 30%, which is down from 40% on prior echo.                     -Was recently seen for cardiology second opinion at [facility] and has cardiac MRI scheduled for September.                      - GDMT includes lisinopril and metoprolol  Nonobstructive coronary artery disease as per CT/coronary angiogram from 2021  Ongoing fatigue with progression in the last 6 months to 1 year  Hypertension -blood pressure today is at goal  Obesity with BMI 32 -patient has had no weight gain in the last 3 years per chart records    Plan:  Stress MRI  Continue lisinopril 5 mg daily and metoprolol succinate 12.5 mg daily  Start jardiance 10 mg daily  Follow up with cardiology in 6 months - patient hasn't decided whether he will continue care here or at [facility]    Thank you for the opportunity to participate in the care of Mike Gonzalez. It's been a pleasure working with him.  Please do not hesitate to call with any questions or concerns.       History of Present Illness/Subjective    I had the opportunity to see Mike Gonzalez at the [facility] for follow-up after Holter monitor    Mike is a 73-year-old gentleman with history of permanent atrial fibrillation, watchman implant in July 2021, heart failure with reduced ejection fraction, with recent decrease in LVEF, nonobstructive coronary artery disease, ongoing/progressive fatigue, hypertension and obesity.  Patient had echocardiogram in June showing small decrease in his LV function.  A Holter monitor was ordered to ensure this was not from his atrial fibrillation.  Holter monitor results showed well-controlled atrial  fibrillation    He tells me today that he is quite worried about the drop in his LV function.  Over the last 6 months to a year he has had progressive and ongoing fatigue.  He can walk about half a block, mostly because of his back issues, but also because he gets tired.  He will sit down in his chair in the afternoon and will fall asleep.    He recently saw cardiology at the [place] for a second opinion and an MRI was ordered    Mike Gonzalez denies exertional chest discomfort, palpitations, shortness of breath at rest, PND, orthopnea, lightheadedness, dizziness, pre-syncope or syncope.  Mike Gonzalez also denies any recent weight loss, changes in appetite, nausea or vomiting.   ____________________________________________________________  Echo 6/12/24 (report reviewed):  Interpretation Summary     1. The left ventricle is normal in size. Left ventricular systolic performance  is moderately reduced. The ejection fraction is estimated to be 30%.  2. There is moderate global reduction in left ventricular systolic  performance.  3. There is mild concentric increase in left ventricular wall thickness.  4. No significant valvular heart disease is identified on this study.  5. Normal right ventricular size with low normal right ventricular systolic  performance.  6. There is moderate to severe left atrial enlargement. There is moderate  right atrial enlargement.      Holter Monitor 6/17/24 (reviewed):  Holter monitoring (duration 24hrs).  Continuous atrial fibrillation, 61 to 117bpm, average 76bpm.  There were no pauses of greater than 3 seconds.  Rare premature ventricular contractions (4%).  Symptom triggers correlated with AF at 82 bpm.        Physical Examination Review of Systems   Vitals: /70 (BP Location: Right arm, Patient Position: Sitting, Cuff Size: Adult Large)   Pulse 65   Resp 17   Wt 107.5 kg (237 lb)   BMI 32.14 kg/m    BMI= Body mass index is 32.14 kg/m .  Wt Readings from Last 3 Encounters:    08/02/24 107.5 kg (237 lb)   07/18/24 106.6 kg (235 lb)   07/16/24 106.6 kg (235 lb)       General Appearance:   Alert, cooperative and in no acute distress   ENT/Mouth: membranes moist, no oral lesions or bleeding gums.      EYES:  no scleral icterus, normal conjunctivae   Neck: Supple without lymphadenopathy.  Thyroid not visualized   Chest/Lungs:   lungs are clear to auscultation, no rales or wheezing   Cardiovascular:   Irregularly irregular. Normal first and second heart sounds with no murmurs, rubs, or gallops; the carotid, radial and posterior tibial pulses are intact,  edema bilaterally    Abdomen:  Soft and nontender. Bowel sounds are present in all quadrants   Extremities: no cyanosis or clubbing.     Skin: no xanthelasma, warm.    Neurologic: normal gait, normal  bilateral, no tremors   Psychiatric: Normal mood and affect       Please refer above for cardiac ROS details.      Medical History  Surgical History Family History Social History   Past Medical History:   Diagnosis Date    Angioedema     Atrial fibrillation and flutter (H)     Basal cell carcinoma     Branch retinal vein occlusion of left eye (H28) 07/01/2017    CAD (coronary artery disease) 2/29/2024    ANGIOGRAM 2021 Left Main The vessel was visualized by angiography, is moderate in size and is angiographically normal. Left Anterior Descending Mid LAD lesion is 25% stenosed. Ramus Intermedius Ramus lesion is 10% stenosed. Left Circumflex Dist Cx lesion is 30% stenosed. Right Coronary Artery The vessel was visualized by angiography, is moderate in size and is angiographically normal.      Chronic neck pain     Congestive heart failure (H) 2021    GERD (gastroesophageal reflux disease)     HTN (hypertension) 2015    Nonsmoker     Radiculopathy     cervical    Retinal hemorrhage     Vessel rupture in left retina    Rhinitis, allergic     S/P colonoscopy 04/15/2019    Urticaria      Past Surgical History:   Procedure Laterality Date    BASAL  CELL CARCINOMA EXCISION  01/01/2020    Left nasal reconstruction    CATARACT EXTRACTION, BILATERAL Bilateral 2022    CERVICAL SPINE SURGERY      C8, T1, foraminotomy    CV CORONARY ANGIOGRAM N/A 01/20/2021    Procedure: Coronary Angiogram;  Surgeon: Willow Wellington MD;  Location: [facility] Cardiac Cath Lab;  Service: Cardiology    CV LEFT ATRIAL APPENDAGE CLOSURE N/A 07/14/2021    Procedure: CV LEFT ATRIAL APPENDAGE CLOSURE;  Surgeon: Noah Gutierres MD;  Location:  HEART CARDIAC CATH LAB    CV LEFT HEART CATHETERIZATION WITHOUT LEFT VENTRICULOGRAM Left 01/20/2021    Procedure: Left Heart Catheterization Without Left Ventriculogram;  Surgeon: Willow Wellington MD;  Location: [facility] Cardiac Cath Lab;  Service: Cardiology    DECOMPRESSION, FUSION LUMBAR POSTERIOR ONE LEVEL, COMBINED Left 3/30/2023    Procedure: LEFT LUMBAR 4-5 POSTERIOR SPINAL FUSION WITH LEFT LUMBAR 4-5 LAMINOFORAMINOTOMY AND WIDE LUMBAR 4-5 LEFT SUBTOTAL FACETECTOMY WITH ALLOGRAFT AND AUTOGRAFT;  Surgeon: Ambrose Degroot MD;  Location: [facility] Main OR    HERNIA REPAIR, UMBILICAL       ×2    REVERSE TOTAL SHOULDER ARTHROPLASTY Left 05/01/2019     Family History   Problem Relation Age of Onset    Arthritis Mother     Other - See Comments Mother         psychiatry/pvd    Other - See Comments Father         blood reaction    Social History     Socioeconomic History    Marital status:      Spouse name: Not on file    Number of children: Not on file    Years of education: Not on file    Highest education level: Not on file   Occupational History    Not on file   Tobacco Use    Smoking status: Never    Smokeless tobacco: Never   Substance and Sexual Activity    Alcohol use: Never     Comment: Alcoholic Drinks/day: About once a year.    Drug use: No    Sexual activity: Not on file   Other Topics Concern    Parent/sibling w/ CABG, MI or angioplasty before 65F 55M? Not Asked   Social History Narrative    Patient of Dr. Rod since 2001.     .  Wife is a former RN.     Wife's cousin is Dr. Jean Pierre Champagne, ID specialist.     Social Determinants of Health     Financial Resource Strain: Low Risk  (1/5/2024)    Financial Resource Strain     Within the past 12 months, have you or your family members you live with been unable to get utilities (heat, electricity) when it was really needed?: No   Food Insecurity: Low Risk  (1/5/2024)    Food Insecurity     Within the past 12 months, did you worry that your food would run out before you got money to buy more?: No     Within the past 12 months, did the food you bought just not last and you didn t have money to get more?: No   Transportation Needs: Low Risk  (1/5/2024)    Transportation Needs     Within the past 12 months, has lack of transportation kept you from medical appointments, getting your medicines, non-medical meetings or appointments, work, or from getting things that you need?: No   Physical Activity: Not on file   Stress: Not on file   Social Connections: Unknown (1/1/2022)    Received from [facility], [facility]    Social Connections     Frequency of Communication with Friends and Family: Not on file   Interpersonal Safety: Low Risk  (1/5/2024)    Interpersonal Safety     Do you feel physically and emotionally safe where you currently live?: Yes     Within the past 12 months, have you been hit, slapped, kicked or otherwise physically hurt by someone?: No     Within the past 12 months, have you been humiliated or emotionally abused in other ways by your partner or ex-partner?: No   Housing Stability: Low Risk  (1/5/2024)    Housing Stability     Do you have housing? : Yes     Are you worried about losing your housing?: No          Medications  Allergies   Current Outpatient Medications   Medication Sig Dispense Refill    acetaminophen (TYLENOL) 650 MG CR tablet Take 650 mg by mouth every 8 hours as needed      aspirin 81 MG EC  tablet Take 1 tablet (81 mg) by mouth daily      co-enzyme Q-10 100 MG CAPS capsule Take 200 mg by mouth daily      fish oil-omega-3 fatty acids 300-1,000 mg capsule [FISH OIL-OMEGA-3 FATTY ACIDS 300-1,000 MG CAPSULE] Take 2 g by mouth daily.      fluticasone (FLONASE) 50 MCG/ACT nasal spray Spray 1 spray into both nostrils daily 48 mL 3    lisinopril (ZESTRIL) 5 MG tablet Take 1 tablet (5 mg) by mouth daily for 360 days 90 tablet 3    metoprolol succinate ER (TOPROL XL) 25 MG 24 hr tablet Take 1 tablet (25 mg) by mouth daily (Patient taking differently: Take 25 mg by mouth daily 0.5) 90 tablet 3    MULTIVITAMIN ORAL Take 1 tablet by mouth daily      nortriptyline (PAMELOR) 25 MG capsule Take 25 mg by mouth at bedtime      pravastatin (PRAVACHOL) 20 MG tablet Take 1 tablet (20 mg) by mouth daily 360 tablet 3    pseudoePHEDrine (SUDAFED) 120 MG 12 hr tablet Take 1 tablet (120 mg) by mouth every 12 hours as needed for congestion 50 tablet 0    senna-docusate (SENOKOT-S/PERICOLACE) 8.6-50 MG tablet Take 1-2 tablets by mouth daily as needed for constipation Take while on oral narcotics to prevent or treat constipation. 100 tablet 1    tamsulosin (FLOMAX) 0.4 MG capsule Take 2 capsules (0.8 mg) by mouth daily 180 capsule 3    Allergies   Allergen Reactions    Effient [Prasugrel Hcl] Hives    Hydrochlorothiazide Unknown    Hydrocodone Swelling     Sinus    Oxycontin [Oxycodone] Swelling     lip    Peppermint Oil Other (See Comments)    Plavix [Clopidogrel] Hives    Sulfa (Sulfonamide Antibiotics) [Sulfa Antibiotics] Swelling    Perfume Swelling     Other reaction(s): Runny Nose         Lab Results    Chemistry/lipid CBC Cardiac Enzymes/BNP/TSH/INR   Recent Labs   Lab Test 05/14/24  1102   CHOL 117   HDL 46   LDL 57   TRIG 68     Recent Labs   Lab Test 05/14/24  1102 09/08/22  1050 01/20/21  0700   LDL 57 100 112     Recent Labs   Lab Test 07/16/24  1130      POTASSIUM 4.8   CHLORIDE 104   CO2 30*   GLC 98   BUN 20.3    CR 1.37*   GFRESTIMATED 54*   JOVAN 11.1*     Recent Labs   Lab Test 07/16/24  1130 05/14/24  1102 06/22/23  1430   CR 1.37* 1.31* 0.95     Recent Labs   Lab Test 05/14/24  1102 06/29/17  1035   A1C 5.5 5.3    Recent Labs   Lab Test 06/22/23  1430   WBC 7.4   HGB 14.6   HCT 42.7   MCV 92        Recent Labs   Lab Test 06/22/23  1430 05/16/23  1310 03/31/23  0647   HGB 14.6 14.1 12.1*    Recent Labs   Lab Test 06/18/20  1217   TROPONINI 0.02     Recent Labs   Lab Test 05/14/24  1102 11/30/20  1018 06/18/20  1217   BNP  --  178* 133*   NTBNP 1,343*  --   --      Recent Labs   Lab Test 05/14/24  1102   TSH 2.74     Recent Labs   Lab Test 09/08/22  1050 01/11/22  1324   INR 1.01 1.01        45 minutes spent on the date of encounter doing chart review, review of test results, interpretation with above tests, patient visit, documentation, and discussion with family.      This note has been dictated using voice recognition software. Any grammatical or context distortions are unintentional and inherent to the software.

## 2024-08-02 NOTE — PATIENT INSTRUCTIONS
Mike Gonzalez,    It was a pleasure to see you today in the clinic regarding your follow up for fatigue, Watchman and decrease in heart function.     My recommendations after this visit include:     - no changes at this time  - I'll call over to see if I can change the MRI to a stress MRI.  If I can't, I'll cancel that and reorder the test as a stress.       You should followup with Dr. Tyler in about 6 months      If you have questions or concerns, please call using the numbers below:      After Hours/Scheduling  [phone]    Otherwise you can dial the nurse directly at:                Melvina Ch RN  [phone]

## 2024-08-02 NOTE — LETTER
8/2/2024    Onur Rod MD  [address]  [facility] [address]    RE: Mike Gonzalez       Dear Colleague,     I had the pleasure of seeing Mike Gonzalez in the [facility].  HEART CARE ENCOUNTER NOTE       [facility]  [phone]    Assessment/Recommendations   Assessment:  Permanent atrial fibrillation -rate controlled by Holter monitor done in June 2024                     - s/p watchman implant in July 2021  Cardiomyopathy, heart failure with reduced ejection fraction, NYHA class II - EF on most recent echo at 30%, which is down from 40% on prior echo.                     -Was recently seen for cardiology second opinion at [facility] and has cardiac MRI scheduled for September.                      - GDMT includes lisinopril and metoprolol  Nonobstructive coronary artery disease as per CT/coronary angiogram from 2021  Ongoing fatigue with progression in the last 6 months to 1 year  Hypertension -blood pressure today is at goal  Obesity with BMI 32 -patient has had no weight gain in the last 3 years per chart records    Plan:  Stress MRI  Continue lisinopril 5 mg daily and metoprolol succinate 12.5 mg daily  Start jardiance 10 mg daily  Follow up with cardiology in 6 months - patient hasn't decided whether he will continue care here or at [facility]    Thank you for the opportunity to participate in the care of Mike Gonzalez. It's been a pleasure working with him.  Please do not hesitate to call with any questions or concerns.       History of Present Illness/Subjective    I had the opportunity to see Mike Gonzalez at the [facility] for follow-up after Holter monitor    Mike is a 73-year-old gentleman with history of permanent atrial fibrillation, watchman implant in July 2021, heart failure with reduced ejection fraction, with recent decrease in LVEF, nonobstructive coronary artery disease, ongoing/progressive fatigue, hypertension and obesity.  Patient  had echocardiogram in June showing small decrease in his LV function.  A Holter monitor was ordered to ensure this was not from his atrial fibrillation.  Holter monitor results showed well-controlled atrial fibrillation    He tells me today that he is quite worried about the drop in his LV function.  Over the last 6 months to a year he has had progressive and ongoing fatigue.  He can walk about half a block, mostly because of his back issues, but also because he gets tired.  He will sit down in his chair in the afternoon and will fall asleep.    He recently saw cardiology at the [facility] for a second opinion and an MRI was ordered    Mike Gonzalez denies exertional chest discomfort, palpitations, shortness of breath at rest, PND, orthopnea, lightheadedness, dizziness, pre-syncope or syncope.  Mike Gonzalez also denies any recent weight loss, changes in appetite, nausea or vomiting.   ____________________________________________________________  Echo 6/12/24 (report reviewed):  Interpretation Summary     1. The left ventricle is normal in size. Left ventricular systolic performance  is moderately reduced. The ejection fraction is estimated to be 30%.  2. There is moderate global reduction in left ventricular systolic  performance.  3. There is mild concentric increase in left ventricular wall thickness.  4. No significant valvular heart disease is identified on this study.  5. Normal right ventricular size with low normal right ventricular systolic  performance.  6. There is moderate to severe left atrial enlargement. There is moderate  right atrial enlargement.      Holter Monitor 6/17/24 (reviewed):  Holter monitoring (duration 24hrs).  Continuous atrial fibrillation, 61 to 117bpm, average 76bpm.  There were no pauses of greater than 3 seconds.  Rare premature ventricular contractions (4%).  Symptom triggers correlated with AF at 82 bpm.        Physical Examination Review of Systems   Vitals: /70 (BP  Location: Right arm, Patient Position: Sitting, Cuff Size: Adult Large)   Pulse 65   Resp 17   Wt 107.5 kg (237 lb)   BMI 32.14 kg/m    BMI= Body mass index is 32.14 kg/m .  Wt Readings from Last 3 Encounters:   08/02/24 107.5 kg (237 lb)   07/18/24 106.6 kg (235 lb)   07/16/24 106.6 kg (235 lb)       General Appearance:   Alert, cooperative and in no acute distress   ENT/Mouth: membranes moist, no oral lesions or bleeding gums.      EYES:  no scleral icterus, normal conjunctivae   Neck: Supple without lymphadenopathy.  Thyroid not visualized   Chest/Lungs:   lungs are clear to auscultation, no rales or wheezing   Cardiovascular:   Irregularly irregular. Normal first and second heart sounds with no murmurs, rubs, or gallops; the carotid, radial and posterior tibial pulses are intact,  edema bilaterally    Abdomen:  Soft and nontender. Bowel sounds are present in all quadrants   Extremities: no cyanosis or clubbing.     Skin: no xanthelasma, warm.    Neurologic: normal gait, normal  bilateral, no tremors   Psychiatric: Normal mood and affect       Please refer above for cardiac ROS details.      Medical History  Surgical History Family History Social History   Past Medical History:   Diagnosis Date     Angioedema      Atrial fibrillation and flutter (H)      Basal cell carcinoma      Branch retinal vein occlusion of left eye (H28) 07/01/2017     CAD (coronary artery disease) 2/29/2024    ANGIOGRAM 2021 Left Main The vessel was visualized by angiography, is moderate in size and is angiographically normal. Left Anterior Descending Mid LAD lesion is 25% stenosed. Ramus Intermedius Ramus lesion is 10% stenosed. Left Circumflex Dist Cx lesion is 30% stenosed. Right Coronary Artery The vessel was visualized by angiography, is moderate in size and is angiographically normal.       Chronic neck pain      Congestive heart failure (H) 2021     GERD (gastroesophageal reflux disease)      HTN (hypertension) 2015      Nonsmoker      Radiculopathy     cervical     Retinal hemorrhage     Vessel rupture in left retina     Rhinitis, allergic      S/P colonoscopy 04/15/2019     Urticaria      Past Surgical History:   Procedure Laterality Date     BASAL CELL CARCINOMA EXCISION  01/01/2020    Left nasal reconstruction     CATARACT EXTRACTION, BILATERAL Bilateral 2022     CERVICAL SPINE SURGERY      C8, T1, foraminotomy     CV CORONARY ANGIOGRAM N/A 01/20/2021    Procedure: Coronary Angiogram;  Surgeon: Willow Wellington MD;  Location: [facility] Cardiac Cath Lab;  Service: Cardiology     CV LEFT ATRIAL APPENDAGE CLOSURE N/A 07/14/2021    Procedure: CV LEFT ATRIAL APPENDAGE CLOSURE;  Surgeon: Noah Gutierres MD;  Location:  HEART CARDIAC CATH LAB     CV LEFT HEART CATHETERIZATION WITHOUT LEFT VENTRICULOGRAM Left 01/20/2021    Procedure: Left Heart Catheterization Without Left Ventriculogram;  Surgeon: Willow Wellington MD;  Location: [facility] Cardiac Cath Lab;  Service: Cardiology     DECOMPRESSION, FUSION LUMBAR POSTERIOR ONE LEVEL, COMBINED Left 3/30/2023    Procedure: LEFT LUMBAR 4-5 POSTERIOR SPINAL FUSION WITH LEFT LUMBAR 4-5 LAMINOFORAMINOTOMY AND WIDE LUMBAR 4-5 LEFT SUBTOTAL FACETECTOMY WITH ALLOGRAFT AND AUTOGRAFT;  Surgeon: Ambrose Degroot MD;  Location: [facility] Main OR     HERNIA REPAIR, UMBILICAL       ×2     REVERSE TOTAL SHOULDER ARTHROPLASTY Left 05/01/2019     Family History   Problem Relation Age of Onset     Arthritis Mother      Other - See Comments Mother         psychiatry/pvd     Other - See Comments Father         blood reaction    Social History     Socioeconomic History     Marital status:      Spouse name: Not on file     Number of children: Not on file     Years of education: Not on file     Highest education level: Not on file   Occupational History     Not on file   Tobacco Use     Smoking status: Never     Smokeless tobacco: Never   Substance and Sexual Activity     Alcohol use: Never      Comment: Alcoholic Drinks/day: About once a year.     Drug use: No     Sexual activity: Not on file   Other Topics Concern     Parent/sibling w/ CABG, MI or angioplasty before 65F 55M? Not Asked   Social History Narrative    Patient of Dr. Rod since 2001.    .  Wife is a former RN.     Wife's cousin is Dr. Jean Pierre Champagne, ID specialist.     Social Determinants of Health     Financial Resource Strain: Low Risk  (1/5/2024)    Financial Resource Strain      Within the past 12 months, have you or your family members you live with been unable to get utilities (heat, electricity) when it was really needed?: No   Food Insecurity: Low Risk  (1/5/2024)    Food Insecurity      Within the past 12 months, did you worry that your food would run out before you got money to buy more?: No      Within the past 12 months, did the food you bought just not last and you didn t have money to get more?: No   Transportation Needs: Low Risk  (1/5/2024)    Transportation Needs      Within the past 12 months, has lack of transportation kept you from medical appointments, getting your medicines, non-medical meetings or appointments, work, or from getting things that you need?: No   Physical Activity: Not on file   Stress: Not on file   Social Connections: Unknown (1/1/2022)    Received from [facility], [facility]    Social Connections      Frequency of Communication with Friends and Family: Not on file   Interpersonal Safety: Low Risk  (1/5/2024)    Interpersonal Safety      Do you feel physically and emotionally safe where you currently live?: Yes      Within the past 12 months, have you been hit, slapped, kicked or otherwise physically hurt by someone?: No      Within the past 12 months, have you been humiliated or emotionally abused in other ways by your partner or ex-partner?: No   Housing Stability: Low Risk  (1/5/2024)    Housing Stability      Do you have  housing? : Yes      Are you worried about losing your housing?: No          Medications  Allergies   Current Outpatient Medications   Medication Sig Dispense Refill     acetaminophen (TYLENOL) 650 MG CR tablet Take 650 mg by mouth every 8 hours as needed       aspirin 81 MG EC tablet Take 1 tablet (81 mg) by mouth daily       co-enzyme Q-10 100 MG CAPS capsule Take 200 mg by mouth daily       fish oil-omega-3 fatty acids 300-1,000 mg capsule [FISH OIL-OMEGA-3 FATTY ACIDS 300-1,000 MG CAPSULE] Take 2 g by mouth daily.       fluticasone (FLONASE) 50 MCG/ACT nasal spray Spray 1 spray into both nostrils daily 48 mL 3     lisinopril (ZESTRIL) 5 MG tablet Take 1 tablet (5 mg) by mouth daily for 360 days 90 tablet 3     metoprolol succinate ER (TOPROL XL) 25 MG 24 hr tablet Take 1 tablet (25 mg) by mouth daily (Patient taking differently: Take 25 mg by mouth daily 0.5) 90 tablet 3     MULTIVITAMIN ORAL Take 1 tablet by mouth daily       nortriptyline (PAMELOR) 25 MG capsule Take 25 mg by mouth at bedtime       pravastatin (PRAVACHOL) 20 MG tablet Take 1 tablet (20 mg) by mouth daily 360 tablet 3     pseudoePHEDrine (SUDAFED) 120 MG 12 hr tablet Take 1 tablet (120 mg) by mouth every 12 hours as needed for congestion 50 tablet 0     senna-docusate (SENOKOT-S/PERICOLACE) 8.6-50 MG tablet Take 1-2 tablets by mouth daily as needed for constipation Take while on oral narcotics to prevent or treat constipation. 100 tablet 1     tamsulosin (FLOMAX) 0.4 MG capsule Take 2 capsules (0.8 mg) by mouth daily 180 capsule 3    Allergies   Allergen Reactions     Effient [Prasugrel Hcl] Hives     Hydrochlorothiazide Unknown     Hydrocodone Swelling     Sinus     Oxycontin [Oxycodone] Swelling     lip     Peppermint Oil Other (See Comments)     Plavix [Clopidogrel] Hives     Sulfa (Sulfonamide Antibiotics) [Sulfa Antibiotics] Swelling     Perfume Swelling     Other reaction(s): Runny Nose         Lab Results    Chemistry/lipid CBC Cardiac  Enzymes/BNP/TSH/INR   Recent Labs   Lab Test 05/14/24  1102   CHOL 117   HDL 46   LDL 57   TRIG 68     Recent Labs   Lab Test 05/14/24  1102 09/08/22  1050 01/20/21  0700   LDL 57 100 112     Recent Labs   Lab Test 07/16/24  1130      POTASSIUM 4.8   CHLORIDE 104   CO2 30*   GLC 98   BUN 20.3   CR 1.37*   GFRESTIMATED 54*   JOVAN 11.1*     Recent Labs   Lab Test 07/16/24  1130 05/14/24  1102 06/22/23  1430   CR 1.37* 1.31* 0.95     Recent Labs   Lab Test 05/14/24  1102 06/29/17  1035   A1C 5.5 5.3    Recent Labs   Lab Test 06/22/23  1430   WBC 7.4   HGB 14.6   HCT 42.7   MCV 92        Recent Labs   Lab Test 06/22/23  1430 05/16/23  1310 03/31/23  0647   HGB 14.6 14.1 12.1*    Recent Labs   Lab Test 06/18/20  1217   TROPONINI 0.02     Recent Labs   Lab Test 05/14/24  1102 11/30/20  1018 06/18/20  1217   BNP  --  178* 133*   NTBNP 1,343*  --   --      Recent Labs   Lab Test 05/14/24  1102   TSH 2.74     Recent Labs   Lab Test 09/08/22  1050 01/11/22  1324   INR 1.01 1.01        45 minutes spent on the date of encounter doing chart review, review of test results, interpretation with above tests, patient visit, documentation, and discussion with family.      This note has been dictated using voice recognition software. Any grammatical or context distortions are unintentional and inherent to the software.                  Thank you for allowing me to participate in the care of your patient.      Sincerely,     ROBBY HERNANDEZ PA-C     [facility]  cc:   Kiran Tyler MD  [address]  [place] [address]

## 2024-08-05 ENCOUNTER — OFFICE VISIT (OUTPATIENT)
Dept: NEUROSURGERY | Facility: CLINIC | Age: 74
End: 2024-08-05
Attending: PHYSICIAN ASSISTANT
Payer: MEDICARE

## 2024-08-05 VITALS
DIASTOLIC BLOOD PRESSURE: 76 MMHG | RESPIRATION RATE: 16 BRPM | OXYGEN SATURATION: 98 % | SYSTOLIC BLOOD PRESSURE: 140 MMHG | HEART RATE: 67 BPM

## 2024-08-05 DIAGNOSIS — M54.16 LUMBAR RADICULOPATHY: ICD-10-CM

## 2024-08-05 DIAGNOSIS — R41.89 COGNITIVE DECLINE: ICD-10-CM

## 2024-08-05 DIAGNOSIS — M48.061 SPINAL STENOSIS, LUMBAR REGION, WITHOUT NEUROGENIC CLAUDICATION: ICD-10-CM

## 2024-08-05 DIAGNOSIS — R20.2 PARESTHESIA OF BOTH FEET: ICD-10-CM

## 2024-08-05 DIAGNOSIS — M51.369 LUMBAR DEGENERATIVE DISC DISEASE: ICD-10-CM

## 2024-08-05 DIAGNOSIS — M25.552 LEFT HIP PAIN: Primary | ICD-10-CM

## 2024-08-05 DIAGNOSIS — Z98.1 HISTORY OF LUMBAR FUSION: ICD-10-CM

## 2024-08-05 PROCEDURE — 99215 OFFICE O/P EST HI 40 MIN: CPT | Performed by: PHYSICIAN ASSISTANT

## 2024-08-05 PROCEDURE — 99417 PROLNG OP E/M EACH 15 MIN: CPT | Performed by: PHYSICIAN ASSISTANT

## 2024-08-05 ASSESSMENT — PAIN SCALES - GENERAL: PAINLEVEL: MILD PAIN (3)

## 2024-08-05 NOTE — PATIENT INSTRUCTIONS
Referral to pain management for hip/bursal injection.   F/u 2-3 weeks after injection.      Referral for EMG bilateral LE to check for neuropathy.    Referral to neurology for cognitive issues.

## 2024-08-05 NOTE — PROGRESS NOTES
Neurosurgery Clinic Note      ASSESSMENT & PLAN:  Mike Gonzalez is a 73 year old male with a PMH of atrial flutter, s/p left Watchman implant, CAD, hypertension, GERD, left rotator cuff tear, C6/7 decompression ([place] 2002), L4/5 decomp ([facility], 1/2022), L4/5 fusion, L3/4 decomp, CSF leak ([facility] 3/30/23) who is following up for low back and left buttock/hip pain.     Patient has had left outer hip pain prior to his lumbar surgeries, which was not relieved with either surgery.  He has not had lasting relief with GT bursal or L5/S1 left TFESI injection.  His hip pain is affecting his ability to ambulate longer distances because of low back and left posterior hip pain.  Recent low back PT has improved his symptoms, but this was exacerbated after balance PT and his ability to ambulate longer distances did not change.  He does have significant left medial knee degeneration which is painful and he ambulates with his left hip/foot pointed laterally.      Lumbar CT myelogram 10/2023 and lumbar MRI 5/2023 reveal severe left L1/2 subarticular stenosis and L5/S1 severe left FS.    EMG 12/2023 [facility] - indicated chronic/slightly active L5 > L4 left radic.  Lumbar MRI 8/2/24 - L4/5 Fusion.  L5/S1 mod L > R NFS - disc material contacts L5 raudel w/ elevation of L5; L1/2 left s/a disc extrusion w/ caudal migration, mod L NFS.  No significant changes from prior lumbar imaging.   EOS XR 7/15/24 - positive global coronal and sagittal balance; L4/5 left posterior fusion stable.     -referral to neurology for workup of cognitive issues.   -referral for EMG BLE - ?polyneuropathy  -referral to pain management for consideration of injections--patient wants to address left hip/buttock pain first and then low back.    -follow up with neurosurgery 2-3 weeks after injection.     I spent 71 minutes spent in patient care, independent review and interpretation of medical records/imaging, reviewing old records.    History of Present Illness:  Mike MCALLISTER  Carlos is a 73 year old male with a PMH of atrial flutter, s/p left Watchman implant, CAD, hypertension, GERD, left rotator cuff tear, C6/7 decompression ([place] 2002), L4/5 decomp ([facility], 1/2022), L4/5 fusion, L3/4 decomp, CSF leak ([facility] 3/30/23) who is presenting for evaluation of LLE pain.    11/22/23 Visit - Patient has surgery for cervical decompression at [place] in 2002.  He had a very difficult time with pain after the surgery and that lasted about 10 years.  He has continues to have poor balance since that surgery.  He also notes bilateral RTC issues and had left shoulder replacement.  He continues to have more weakness in his left shoulder.  His neck ROM is significantly reduced.  This improved for about 1 week after having PRP/stem cell treatment about 3-4 years from Dr. Elbert Marmolejo [facility].      About 3 years ago, started having sore left hip walking up hills.  He saw ortho MD who check out hip and did not find anything concerning.  He continued to have issues with his hip and he went to [facility] who identified pinched nerve in his back.  He had 2 surgeries as a result (see above) resulting in fusion at L4/5.  The surgery reduced the freezing feeling in his feet.  It did not help with the left hip pain and now he has back pain in addition to the hip pain.  The pain starts in the left outer hip.  When it is bad, he gets pain on the right.  He has a sore spot on the left medial left knee which has responded to injections in the past.  The pain is a solid ache, but increases to a sharp pain depending on his activity.  If he does too much, he pays for it the next day.  Steroids helped with energy level and was able to walk further without significant pain. He also complains that his toes hurt and feel like cardboard.     He does better walking on level surface.  On the level, he can go 700 feet. This is is reduced to about 400-500 feet if not level.  He denies that his legs feel tired, but that the outer hip is  painful to him which requires him to stop for a while before he can continue.  The pain comes from lateral hip to the knee level.  He had an injection in his left hip that helped for 1-2 weeks.      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 His goal is to be able to walk around and do his photography work and get back to writing his books, etc.      EMG before surgery in March 2023.  Next one schedule 12/8/23 at [facility].      7/15/24 Visit  -patient returns after having significant physical therapy at [facility].  He had 12 sessions of regular PT which he felt was helpful and then transition to pool therapy but unfortunately was unable to continue because of a rash that he developed from the chlorine.  He most recently was engaged in balance therapy which he stated exacerbated his back and leg symptoms.  He continues to endorse low back pain that is worse on the left than the right but does cross his entire back at a level just above the belt line.  He also notes he has left posterior hip is more painful than his right.  He does have left knee pain and is aware that he is bone-on-bone degeneration.  He has undergone evaluation at [facility] and has had injections that have been helpful but nothing recently.  He is open to having a second opinion on whether more injections or surgery would be beneficial.  He denies any radicular pain extending beyond his hips or bowel or bladder issues aside from constipation on occasion.  His neurologist from [facility] on disputes the EMG test by [facility] that he does not have neuropathy, the neurologist notes he does have neuropathy.  The patient notes that he has pain in all of his toes that improves with Voltaren gel.  He also endorses generalized low energy.  He is getting evaluated at in August by cardiology here.  Perhaps more bothersome to the patient is that he is unable to ambulate  more than 700 feet at a time due to the pain in his low back and left hip.  He is able to stop for a period of time at that distance and then is able to ambulate a little bit further.  His pain is preventing him from doing ADLs walking.  It is worse with flexion of his back.  His wife is with him today and notes that sometimes his back looks laterally malaligned.  Patient denies any radicular pains in his right LE.      8/5/24 Visit - patient f/u after having lumbar MRI updated. He notes left buttock/hip pain posterolateral location that affects him when he is walking.  Otherwise has chronic low back pain.  Left medial knee discomfort is actually better after his last injection.  Denies radicular pain in BLE.  Still applying Voltaren gel to his feet for paresthesia.  He notes his feet are very cold.  Had cardiology workup which was negative for any vascular blockages in his legs per US.  He saw orthopedics for his left knee and also mentioned his hip.  He is not pursuing injections or surgery at this time for those areas.      He also c/o cognitive issues right now that are limiting his ability to write and process things fluently.  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  He has noticed more of a change in the last 3-4 months.  He reports a concussion about 5 years ago after a fall off a ladder and then had COVID.  He is concerned about a long COVID issue.      Conservative Treatment:  Tylenol - mild help  Heating pad to left hip - helps reduce pain  Prednisone 40 mg x5 days 9/20/23 - 2 weeks relief  Injections:  10/12/23 - Left GT bursal injection - helped for 1-2 weeks  12/27/23 - L5/S1 left TFESI Rayus - 50% relief - no lasting benefit  2/22//16 - PRP and stem cell injection in bottom of foot w/ Dr. Elbert Gonzales [facility] - [facility]. Helped about 1 week.  Acupuncture - no lasting benefit  PT - Rohan Onofre since 11/22/23 - balance, land & pool            Review of Systems   See HPI    Past  Medical History:   Diagnosis Date    Angioedema     Atrial fibrillation and flutter (H)     Basal cell carcinoma     Branch retinal vein occlusion of left eye (H28) 07/01/2017    CAD (coronary artery disease) 2/29/2024    ANGIOGRAM 2021 Left Main The vessel was visualized by angiography, is moderate in size and is angiographically normal. Left Anterior Descending Mid LAD lesion is 25% stenosed. Ramus Intermedius Ramus lesion is 10% stenosed. Left Circumflex Dist Cx lesion is 30% stenosed. Right Coronary Artery The vessel was visualized by angiography, is moderate in size and is angiographically normal.      Chronic neck pain     Congestive heart failure (H) 2021    GERD (gastroesophageal reflux disease)     HTN (hypertension) 2015    Nonsmoker     Radiculopathy     cervical    Retinal hemorrhage     Vessel rupture in left retina    Rhinitis, allergic     S/P colonoscopy 04/15/2019    Urticaria        Past Surgical History:   Procedure Laterality Date    BASAL CELL CARCINOMA EXCISION  01/01/2020    Left nasal reconstruction    CATARACT EXTRACTION, BILATERAL Bilateral 2022    CERVICAL SPINE SURGERY      C8, T1, foraminotomy    CV CORONARY ANGIOGRAM N/A 01/20/2021    Procedure: Coronary Angiogram;  Surgeon: Willow Wellington MD;  Location: [facility] Cardiac Cath Lab;  Service: Cardiology    CV LEFT ATRIAL APPENDAGE CLOSURE N/A 07/14/2021    Procedure: CV LEFT ATRIAL APPENDAGE CLOSURE;  Surgeon: Noah Gutierres MD;  Location:  HEART CARDIAC CATH LAB    CV LEFT HEART CATHETERIZATION WITHOUT LEFT VENTRICULOGRAM Left 01/20/2021    Procedure: Left Heart Catheterization Without Left Ventriculogram;  Surgeon: Willow Wellington MD;  Location: [facility] Cardiac Cath Lab;  Service: Cardiology    DECOMPRESSION, FUSION LUMBAR POSTERIOR ONE LEVEL, COMBINED Left 3/30/2023    Procedure: LEFT LUMBAR 4-5 POSTERIOR SPINAL FUSION WITH LEFT LUMBAR 4-5 LAMINOFORAMINOTOMY AND WIDE LUMBAR 4-5 LEFT SUBTOTAL FACETECTOMY WITH ALLOGRAFT AND  AUTOGRAFT;  Surgeon: Ambrose Degroot MD;  Location: [facility] Main OR    HERNIA REPAIR, UMBILICAL       ×2    REVERSE TOTAL SHOULDER ARTHROPLASTY Left 05/01/2019       Social History     Socioeconomic History    Marital status:      Spouse name: None    Number of children: None    Years of education: None    Highest education level: None   Tobacco Use    Smoking status: Never    Smokeless tobacco: Never   Substance and Sexual Activity    Alcohol use: Never     Comment: Alcoholic Drinks/day: About once a year.    Drug use: No   Social History Narrative    Patient of Dr. Rod since 2001.    .  Wife is a former RN.     Wife's cousin is Dr. Jean Pierre Champagne, ID specialist.       family history includes Arthritis in his mother; Other - See Comments in his father and mother.       IMAGING   Imaging independently reviewed.    Lumbar MRI 8/2/24 -   FINDINGS:   Nomenclature is based on 5 lumbar type vertebral bodies. Posterior spinal fusion and hemilaminectomy on the left at L4-5. Persistent moderate lumbar levoscoliosis. Straightening the normal AP curvature with grade 1 retrolisthesis of L2 on L3. Vertebral   body heights are maintained. Normal marrow signal. Normal distal spinal cord and cauda equina with conus medullaris at T12-L1. There is benign-appearing right renal cyst, no additional radiographic follow-up is necessary. Unremarkable visualized bony   pelvis.  T12-L1: Disc desiccation and disc height loss. Diffuse disc bulge with left subarticular disc protrusion. Mild facet hypertrophy. No spinal canal stenosis. Mild left foraminal stenosis. No right foraminal stenosis. Mild left subarticular stenosis.   L1-L2: Disc desiccation and disc height loss. Diffuse disc bulge with left subarticular disc extrusion. The extruded disc fragment measures 6 mm AP by 9 mm transverse with 8 mm caudal migration. Bilateral facet hypertrophy. No spinal canal stenosis.   Moderate left and mild right neural foraminal  stenosis. Mild left subarticular stenosis.  L2-L3: Disc desiccation and disc height loss. Diffuse disc bulge with left central disc protrusion and annular fissure. Lateral facet hypertrophy. No spinal canal stenosis. Mild-to-moderate right greater than left neural foraminal stenosis.   L3-L4: Disc desiccation and disc height loss. Diffuse disc bulge. Bilateral facet hypertrophy. No spinal canal stenosis. Minor bilateral neural foraminal stenosis.  L4-L5: Status post left fusion. Disc height loss with mild disc bulge. Posterior osteophytes. No spinal canal stenosis. Mild right greater than left neural foraminal stenosis.  L5-S1: Disc desiccation and disc height loss. Diffuse disc bulge. Moderate bilateral facet hypertrophy. No spinal canal stenosis. Moderate left greater than right neural foraminal stenosis. Disc material contacts the exiting bilateral L5 nerve roots with   elevation of the left.  IMPRESSION:  1.  Multilevel disc degeneration and facet hypertrophy as described above. No significant spinal canal stenosis. Scattered mild to moderate neural foraminal stenoses, greatest at L5-S1.    EOS XR 7/15/24 -   Findings:  12 rib bearing vertebral bodies and 5 lumbar type vertebral bodies are  identified. Degenerative changes are noted throughout the spine most  severe in the lumbar spine. There is a left-sided posterior metallic  effusion of L4-5. No hardware inferior identified.     Coronal Deformity:     There is a mild  convexed right curvature of thoracolumbar/lumbar  spine with apex at L1.      Positive global coronal imbalance.     Sagittal Vertical Axis (A vertical line drawn from the center of C7  (chas line) to the posterosuperior aspect of the S1 on sagittal  plane):  very positive      Weight bearing axis: (Defined as a line drawn from the center of the  femoral head to the mid aspect of the tibial plafond).         Right: Weight bearing axis crosses through the lateral tibial  spine.       Left:  Weight bearing axis crosses through the lateral tibial  spine.     Leg length:  (Measured from the top of the femoral head to the center  of tibial plafond.  It is assumed joints are in similar degrees of  extension bilaterally.  Significant difference is defined when  discrepancy is greater than 1.5 cm).           No significant  leg length discrepancy.     Additional Findings:     Postsurgical changes of reverse left shoulder arthroplasty.  Postsurgical changes of left lumbar rotation at L4-L5. Large  osteophytes noted in the lumbar spine. Left atrial appendage closure  device.     Nonobstructive bowel gas pattern. The lung fields are clear.  Borderline enlarged cardiac silhouette. No acute osseous abnormality.     No acute osseous abnormality.  There is a nonobstructive bowel gas  pattern.  Impression:  1. Mild convex right curvature of the thoracolumbar spine.   2. Hardware intact of the left-sided posterior metallic fusion at  L4-5.  2. Positive global coronal and balanced and very positive global  sagittal balance. .  3. Weight bearing axis as detailed above.    EM[facility] 12/6/23 -         Physical Exam   BP (!) 140/76 (BP Location: Right arm, Patient Position: Sitting)   Pulse 67   Resp 16   SpO2 98%     Musculoskeletal: Able to move all extremities.  No involuntary motor movements. No C/T/L spine tenderness to palpation.  +mild left posterolateral buttock/hip TTP.  Negative SI joint pain.  Negative left SLR, THO, piriformis stretch.  Left leg/foot rotated laterally with standing/walking.   Cold feet, pallor. Unable to get BLE pulses in feet, ankle popliteal areas. No edema.      Neurological:  Alert, NAD, and oriented to person, place, and time.   No cranial nerve deficit.  Speech clear and fluent.  Thought process good.     Gait: steady, symmetrical w/o assistive devices; unable to tandem walk    Strength (L/R)  5/5 Hip Flexion  5/5 Knee Extension  5/5 Ankle Dorsiflexion  5/5 Extensor Hallucis  Longus  5/5 Plantar Flexion    Reflexes (L/R)  2+/2+ Patellar  2+/2+ Ankle    No ankle clonus    Sensation: MITZY Good PA-C  Department of Neurosurgery  Office: [phone]

## 2024-08-05 NOTE — LETTER
8/5/2024       RE: Mike Gonzalez  [address] [facility] [address]       Dear Colleague,    Thank you for referring your patient, Mike Gonzalez, to the [facility] NEUROSURGERY CLINIC [place] at [facility]. Please see a copy of my visit note below.        Neurosurgery Clinic Note      ASSESSMENT & PLAN:  Mike Gonzalez is a 73 year old male with a PMH of atrial flutter, s/p left Watchman implant, CAD, hypertension, GERD, left rotator cuff tear, C6/7 decompression ([place] 2002), L4/5 decomp ([facility], 1/2022), L4/5 fusion, L3/4 decomp, CSF leak ([facility] 3/30/23) who is following up for low back and left buttock/hip pain.     Patient has had left outer hip pain prior to his lumbar surgeries, which was not relieved with either surgery.  He has not had lasting relief with GT bursal or L5/S1 left TFESI injection.  His hip pain is affecting his ability to ambulate longer distances because of low back and left posterior hip pain.  Recent low back PT has improved his symptoms, but this was exacerbated after balance PT and his ability to ambulate longer distances did not change.  He does have significant left medial knee degeneration which is painful and he ambulates with his left hip/foot pointed laterally.      Lumbar CT myelogram 10/2023 and lumbar MRI 5/2023 reveal severe left L1/2 subarticular stenosis and L5/S1 severe left FS.    EMG 12/2023 [facility] - indicated chronic/slightly active L5 > L4 left radic.  Lumbar MRI 8/2/24 - L4/5 Fusion.  L5/S1 mod L > R NFS - disc material contacts L5 raudel w/ elevation of L5; L1/2 left s/a disc extrusion w/ caudal migration, mod L NFS.  No significant changes from prior lumbar imaging.   EOS XR 7/15/24 - positive global coronal and sagittal balance; L4/5 left posterior fusion stable.     -referral to neurology for workup of cognitive issues.   -referral for EMG BLE - ?polyneuropathy  -referral to pain management for consideration of  injections--patient wants to address left hip/buttock pain first and then low back.    -follow up with neurosurgery 2-3 weeks after injection.     I spent 71 minutes spent in patient care, independent review and interpretation of medical records/imaging, reviewing old records.    History of Present Illness:  Mike Gonzalez is a 73 year old male with a PMH of atrial flutter, s/p left Watchman implant, CAD, hypertension, GERD, left rotator cuff tear, C6/7 decompression ([place] 2002), L4/5 decomp ([facility], 1/2022), L4/5 fusion, L3/4 decomp, CSF leak ([facility] 3/30/23) who is presenting for evaluation of LLE pain.    11/22/23 Visit - Patient has surgery for cervical decompression at [place] in 2002.  He had a very difficult time with pain after the surgery and that lasted about 10 years.  He has continues to have poor balance since that surgery.  He also notes bilateral RTC issues and had left shoulder replacement.  He continues to have more weakness in his left shoulder.  His neck ROM is significantly reduced.  This improved for about 1 week after having PRP/stem cell treatment about 3-4 years from Dr. Elbert [facility]armolejo [facility].      About 3 years ago, started having sore left hip walking up hills.  He saw ortho MD who check out hip and did not find anything concerning.  He continued to have issues with his hip and he went to [facility] who identified pinched nerve in his back.  He had 2 surgeries as a result (see above) resulting in fusion at L4/5.  The surgery reduced the freezing feeling in his feet.  It did not help with the left hip pain and now he has back pain in addition to the hip pain.  The pain starts in the left outer hip.  When it is bad, he gets pain on the right.  He has a sore spot on the left medial left knee which has responded to injections in the past.  The pain is a solid ache, but increases to a sharp pain depending on his activity.  If he does too much, he pays for it the next day.  Steroids helped with energy  level and was able to walk further without significant pain. He also complains that his toes hurt and feel like cardboard.     He does better walking on level surface.  On the level, he can go 700 feet. This is is reduced to about 400-500 feet if not level.  He denies that his legs feel tired, but that the outer hip is painful to him which requires him to stop for a while before he can continue.  The pain comes from lateral hip to the knee level.  He had an injection in his left hip that helped for 1-2 weeks.      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 His goal is to be able to walk around and do his photography work and get back to writing his books, etc.      EMG before surgery in March 2023.  Next one schedule 12/8/23 at [facility].      7/15/24 Visit  -patient returns after having significant physical therapy at [facility].  He had 12 sessions of regular PT which he felt was helpful and then transition to pool therapy but unfortunately was unable to continue because of a rash that he developed from the chlorine.  He most recently was engaged in balance therapy which he stated exacerbated his back and leg symptoms.  He continues to endorse low back pain that is worse on the left than the right but does cross his entire back at a level just above the belt line.  He also notes he has left posterior hip is more painful than his right.  He does have left knee pain and is aware that he is bone-on-bone degeneration.  He has undergone evaluation at [facility] and has had injections that have been helpful but nothing recently.  He is open to having a second opinion on whether more injections or surgery would be beneficial.  He denies any radicular pain extending beyond his hips or bowel or bladder issues aside from constipation on occasion.  His neurologist from [facility] on disputes the EMG test by [facility] that he does not have  neuropathy, the neurologist notes he does have neuropathy.  The patient notes that he has pain in all of his toes that improves with Voltaren gel.  He also endorses generalized low energy.  He is getting evaluated at in August by cardiology here.  Perhaps more bothersome to the patient is that he is unable to ambulate more than 700 feet at a time due to the pain in his low back and left hip.  He is able to stop for a period of time at that distance and then is able to ambulate a little bit further.  His pain is preventing him from doing ADLs walking.  It is worse with flexion of his back.  His wife is with him today and notes that sometimes his back looks laterally malaligned.  Patient denies any radicular pains in his right LE.      8/5/24 Visit - patient f/u after having lumbar MRI updated. He notes left buttock/hip pain posterolateral location that affects him when he is walking.  Otherwise has chronic low back pain.  Left medial knee discomfort is actually better after his last injection.  Denies radicular pain in BLE.  Still applying Voltaren gel to his feet for paresthesia.  He notes his feet are very cold.  Had cardiology workup which was negative for any vascular blockages in his legs per US.  He saw orthopedics for his left knee and also mentioned his hip.  He is not pursuing injections or surgery at this time for those areas.      He also c/o cognitive issues right now that are limiting his ability to write and process things fluently.  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  He has noticed more of a change in the last 3-4 months.  He reports a concussion about 5 years ago after a fall off a ladder and then had COVID.  He is concerned about a long COVID issue.      Conservative Treatment:  Tylenol - mild help  Heating pad to left hip - helps reduce pain  Prednisone 40 mg x5 days 9/20/23 - 2 weeks relief  Injections:  10/12/23 - Left GT bursal injection - helped for 1-2  weeks  12/27/23 - L5/S1 left TFESI Rayus - 50% relief - no lasting benefit  2/22//16 - PRP and stem cell injection in bottom of foot w/ Dr. Elbert Gonzales [facility] - [facility]. Helped about 1 week.  Acupuncture - no lasting benefit  PT - Rohan Onofre since 11/22/23 - balance, land & pool            Review of Systems   See HPI    Past Medical History:   Diagnosis Date    Angioedema     Atrial fibrillation and flutter (H)     Basal cell carcinoma     Branch retinal vein occlusion of left eye (H28) 07/01/2017    CAD (coronary artery disease) 2/29/2024    ANGIOGRAM 2021 Left Main The vessel was visualized by angiography, is moderate in size and is angiographically normal. Left Anterior Descending Mid LAD lesion is 25% stenosed. Ramus Intermedius Ramus lesion is 10% stenosed. Left Circumflex Dist Cx lesion is 30% stenosed. Right Coronary Artery The vessel was visualized by angiography, is moderate in size and is angiographically normal.      Chronic neck pain     Congestive heart failure (H) 2021    GERD (gastroesophageal reflux disease)     HTN (hypertension) 2015    Nonsmoker     Radiculopathy     cervical    Retinal hemorrhage     Vessel rupture in left retina    Rhinitis, allergic     S/P colonoscopy 04/15/2019    Urticaria        Past Surgical History:   Procedure Laterality Date    BASAL CELL CARCINOMA EXCISION  01/01/2020    Left nasal reconstruction    CATARACT EXTRACTION, BILATERAL Bilateral 2022    CERVICAL SPINE SURGERY      C8, T1, foraminotomy    CV CORONARY ANGIOGRAM N/A 01/20/2021    Procedure: Coronary Angiogram;  Surgeon: Willow Wellington MD;  Location: [facility] Cardiac Cath Lab;  Service: Cardiology    CV LEFT ATRIAL APPENDAGE CLOSURE N/A 07/14/2021    Procedure: CV LEFT ATRIAL APPENDAGE CLOSURE;  Surgeon: Noah Gutierres MD;  Location:  HEART CARDIAC CATH LAB    CV LEFT HEART CATHETERIZATION WITHOUT LEFT VENTRICULOGRAM Left 01/20/2021    Procedure: Left Heart Catheterization Without Left Ventriculogram;   Surgeon: Willow Wellington MD;  Location: [facility] Cardiac Cath Lab;  Service: Cardiology    DECOMPRESSION, FUSION LUMBAR POSTERIOR ONE LEVEL, COMBINED Left 3/30/2023    Procedure: LEFT LUMBAR 4-5 POSTERIOR SPINAL FUSION WITH LEFT LUMBAR 4-5 LAMINOFORAMINOTOMY AND WIDE LUMBAR 4-5 LEFT SUBTOTAL FACETECTOMY WITH ALLOGRAFT AND AUTOGRAFT;  Surgeon: Ambrose Degroot MD;  Location: [facility] Main OR    HERNIA REPAIR, UMBILICAL       ×2    REVERSE TOTAL SHOULDER ARTHROPLASTY Left 05/01/2019       Social History     Socioeconomic History    Marital status:      Spouse name: None    Number of children: None    Years of education: None    Highest education level: None   Tobacco Use    Smoking status: Never    Smokeless tobacco: Never   Substance and Sexual Activity    Alcohol use: Never     Comment: Alcoholic Drinks/day: About once a year.    Drug use: No   Social History Narrative    Patient of Dr. Rod since 2001.    .  Wife is a former RN.     Wife's cousin is Dr. Jean Pierre Champagne, ID specialist.       family history includes Arthritis in his mother; Other - See Comments in his father and mother.       IMAGING   Imaging independently reviewed.    Lumbar MRI 8/2/24 -   FINDINGS:   Nomenclature is based on 5 lumbar type vertebral bodies. Posterior spinal fusion and hemilaminectomy on the left at L4-5. Persistent moderate lumbar levoscoliosis. Straightening the normal AP curvature with grade 1 retrolisthesis of L2 on L3. Vertebral   body heights are maintained. Normal marrow signal. Normal distal spinal cord and cauda equina with conus medullaris at T12-L1. There is benign-appearing right renal cyst, no additional radiographic follow-up is necessary. Unremarkable visualized bony   pelvis.  T12-L1: Disc desiccation and disc height loss. Diffuse disc bulge with left subarticular disc protrusion. Mild facet hypertrophy. No spinal canal stenosis. Mild left foraminal stenosis. No right foraminal stenosis. Mild  left subarticular stenosis.   L1-L2: Disc desiccation and disc height loss. Diffuse disc bulge with left subarticular disc extrusion. The extruded disc fragment measures 6 mm AP by 9 mm transverse with 8 mm caudal migration. Bilateral facet hypertrophy. No spinal canal stenosis.   Moderate left and mild right neural foraminal stenosis. Mild left subarticular stenosis.  L2-L3: Disc desiccation and disc height loss. Diffuse disc bulge with left central disc protrusion and annular fissure. Lateral facet hypertrophy. No spinal canal stenosis. Mild-to-moderate right greater than left neural foraminal stenosis.   L3-L4: Disc desiccation and disc height loss. Diffuse disc bulge. Bilateral facet hypertrophy. No spinal canal stenosis. Minor bilateral neural foraminal stenosis.  L4-L5: Status post left fusion. Disc height loss with mild disc bulge. Posterior osteophytes. No spinal canal stenosis. Mild right greater than left neural foraminal stenosis.  L5-S1: Disc desiccation and disc height loss. Diffuse disc bulge. Moderate bilateral facet hypertrophy. No spinal canal stenosis. Moderate left greater than right neural foraminal stenosis. Disc material contacts the exiting bilateral L5 nerve roots with   elevation of the left.  IMPRESSION:  1.  Multilevel disc degeneration and facet hypertrophy as described above. No significant spinal canal stenosis. Scattered mild to moderate neural foraminal stenoses, greatest at L5-S1.    EOS XR 7/15/24 -   Findings:  12 rib bearing vertebral bodies and 5 lumbar type vertebral bodies are  identified. Degenerative changes are noted throughout the spine most  severe in the lumbar spine. There is a left-sided posterior metallic  effusion of L4-5. No hardware inferior identified.     Coronal Deformity:     There is a mild  convexed right curvature of thoracolumbar/lumbar  spine with apex at L1.      Positive global coronal imbalance.     Sagittal Vertical Axis (A vertical line drawn from the  center of C7  (chas line) to the posterosuperior aspect of the S1 on sagittal  plane):  very positive      Weight bearing axis: (Defined as a line drawn from the center of the  femoral head to the mid aspect of the tibial plafond).         Right: Weight bearing axis crosses through the lateral tibial  spine.       Left: Weight bearing axis crosses through the lateral tibial  spine.     Leg length:  (Measured from the top of the femoral head to the center  of tibial plafond.  It is assumed joints are in similar degrees of  extension bilaterally.  Significant difference is defined when  discrepancy is greater than 1.5 cm).           No significant  leg length discrepancy.     Additional Findings:     Postsurgical changes of reverse left shoulder arthroplasty.  Postsurgical changes of left lumbar rotation at L4-L5. Large  osteophytes noted in the lumbar spine. Left atrial appendage closure  device.     Nonobstructive bowel gas pattern. The lung fields are clear.  Borderline enlarged cardiac silhouette. No acute osseous abnormality.     No acute osseous abnormality.  There is a nonobstructive bowel gas  pattern.  Impression:  1. Mild convex right curvature of the thoracolumbar spine.   2. Hardware intact of the left-sided posterior metallic fusion at  L4-5.  2. Positive global coronal and balanced and very positive global  sagittal balance. .  3. Weight bearing axis as detailed above.    EMG [facility] 12/6/23 -         Physical Exam   BP (!) 140/76 (BP Location: Right arm, Patient Position: Sitting)   Pulse 67   Resp 16   SpO2 98%     Musculoskeletal: Able to move all extremities.  No involuntary motor movements. No C/T/L spine tenderness to palpation.  +mild left posterolateral buttock/hip TTP.  Negative SI joint pain.  Negative left SLR, THO, piriformis stretch.  Left leg/foot rotated laterally with standing/walking.   Cold feet, pallor. Unable to get BLE pulses in feet, ankle popliteal areas. No edema.       Neurological:  Alert, NAD, and oriented to person, place, and time.   No cranial nerve deficit.  Speech clear and fluent.  Thought process good.     Gait: steady, symmetrical w/o assistive devices; unable to tandem walk    Strength (L/R)  5/5 Hip Flexion  5/5 Knee Extension  5/5 Ankle Dorsiflexion  5/5 Extensor Hallucis Longus  5/5 Plantar Flexion    Reflexes (L/R)  2+/2+ Patellar  2+/2+ Ankle    No ankle clonus    Sensation: MITZY BLE          Again, thank you for allowing me to participate in the care of your patient.      Sincerely,    Sagrario Good PA-C

## 2024-08-21 ENCOUNTER — OFFICE VISIT (OUTPATIENT)
Dept: FAMILY MEDICINE | Facility: CLINIC | Age: 74
End: 2024-08-21
Payer: MEDICARE

## 2024-08-21 ENCOUNTER — NURSE TRIAGE (OUTPATIENT)
Dept: INTERNAL MEDICINE | Facility: CLINIC | Age: 74
End: 2024-08-21
Payer: MEDICARE

## 2024-08-21 VITALS
DIASTOLIC BLOOD PRESSURE: 78 MMHG | SYSTOLIC BLOOD PRESSURE: 163 MMHG | OXYGEN SATURATION: 99 % | HEART RATE: 82 BPM | RESPIRATION RATE: 18 BRPM | TEMPERATURE: 97.7 F

## 2024-08-21 DIAGNOSIS — S90.424A BLISTER OF FIFTH TOE OF RIGHT FOOT, INITIAL ENCOUNTER: Primary | ICD-10-CM

## 2024-08-21 DIAGNOSIS — L03.115 CELLULITIS OF RIGHT LOWER EXTREMITY: ICD-10-CM

## 2024-08-21 PROCEDURE — 99213 OFFICE O/P EST LOW 20 MIN: CPT | Performed by: PHYSICIAN ASSISTANT

## 2024-08-21 RX ORDER — CEPHALEXIN 500 MG/1
500 CAPSULE ORAL 4 TIMES DAILY
Qty: 28 CAPSULE | Refills: 0 | Status: SHIPPED | OUTPATIENT
Start: 2024-08-21 | End: 2024-08-28

## 2024-08-21 NOTE — TELEPHONE ENCOUNTER
Patient calling.    Yesterday he noticed a blister on bottom of right foot behind 4th toe. Today there is another blister behind pinky toe. He thinks it should be looked at and opened so it does not get infected.    Offered appointment in next 3 days. Due to personal schedule, patient prefers to go to [facility] today.    Reason for Disposition   Patient wants to be seen    Additional Information   Negative: Sounds like a life-threatening emergency to the triager   Negative: Followed a burn   Negative: Followed an injury to the skin (such as a cut or scrape)   Negative: Boil (abscess) suspected (painful red lump)   Negative: Widespread rash or redness   Negative: Cold sore suspected (i.e., fever blister sore on the outer lip)   Negative: Insect bite(s) suspected   Negative: Poison ivy, oak, or sumac rash suspected (e.g., itchy rash after contact with poison ivy)   Negative: Sore(s) on female genital area  (e.g., labia, vagina, vulva)   Negative: Sore(s) on male genital area (e.g., penis, scrotum)   Negative: Wound infection suspected (in traumatic or surgical wound)   Negative: Black (necrotic), dark purple, or blisters develop in area of sore   Negative: Patient sounds very sick or weak to the triager   Negative: Looks infected (e.g., spreading redness, pus) and fever   Negative: Looks infected and large red area (> 2 inches or 5 cm) or streak   Negative: Ulcer with black scab that is spreading, painless and cause unknown   Negative: Looks infected (e.g., spreading redness, pus) and no fever   Negative: Unexplained sores and 3 or more   Negative: Large sore (> 1 inch or 2.5 cm across)   Negative: Looks like a boil, deep ulcer or is very painful   Negative: Multiple small blisters grouped together in one area of body (i.e., dermatomal distribution or 'band' or 'stripe') and painful   Negative: New or worsening foot sore (e.g., ulcer, wound, blister, red area) and has diabetes   Negative: Sores and  "crusts are around openings to nose, or anywhere else on face   Negative: Any other family members with similar sores   Negative: New pressure ulcer suspected   Negative: Using antibiotic ointment > 24 hours and new sore occurs   Negative: Using antibiotic ointment > 48 hours and sore increases in size   Negative: Using antibiotic ointment > 1 week and sore not completely healed   Negative: Lower leg sore and venous ulcer suspected (e.g., brownish pigment around sore, leg edema, varicose veins)    Answer Assessment - Initial Assessment Questions  1. APPEARANCE of SORES: \"What do the sores look like?\"      Was pinkish yesterday. Today brownish/whiteish. Red around edges. Soft to touch.  2. NUMBER: \"How many sores are there?\"      2  3. SIZE: \"How big is the largest sore?\"      3/8 inch wide  4. LOCATION: \"Where are the sores located?\"      Both on right foot. Bigger one behind 4th toe. Another one behind pinky toe.  5. ONSET: \"When did the sores begin?\"      Yesterday   6. TENDER: \"Does it hurt when you touch it?\"  (Scale 1-10; or mild, moderate, severe)       Mild  7. CAUSE: \"What do you think is causing the sores?\"      Unsure   8. OTHER SYMPTOMS: \"Do you have any other symptoms?\" (e.g., fever, new weakness)      No    Protocols used: Sores-A-OH    "

## 2024-08-21 NOTE — PROGRESS NOTES
Assessment & Plan     Blister of fifth toe of right foot, initial encounter  Drained with sterile needle and light gauze dressing and post op shoe.   Dressing change daily.    - cephALEXin (KEFLEX) 500 MG capsule  Dispense: 28 capsule; Refill: 0  - Ankle/Foot Bracing Supplies Order Post-op Shoe; Right    Cellulitis of right lower extremity  Keflex as ordered, recheck in 1 week with pcp.  At the end of the encounter, I discussed results, diagnosis, medications. Discussed red flags for immediate return to clinic/ER, as well as indications for follow up if no improvement. Patient understood and agreed to plan. Patient was stable for discharge      - Ankle/Foot Bracing Supplies Order Post-op Shoe; Right      Return for Follow up in 1 week if not improved.    Jania Leroy PA-C  [facility] REEDSUZY Lorenzo is a 73 year old male who presents to clinic today for the following health issues:  Chief Complaint   Patient presents with    Blister     RT foot, noticed yesterday. Blister on the bottom of his foot. Has neuropathy in toes.        HPI    Patient is a 73-year-old male with history of peripheral neuropathy who presents with concerns regarding a large blister noted on the plantar surface of his foot x 2 days.  Today has noted redness surrounding the area.  He has not had any purulent drainage.  He denies any trauma that he is aware of.  He did wear a pair of shoes that did not fit well but states he believes the blister was there first.        Review of Systems  Constitutional, HEENT, cardiovascular, pulmonary, gi and gu systems are negative, except as otherwise noted.      Objective    BP (!) 163/78 (BP Location: Right arm, Patient Position: Sitting, Cuff Size: Adult Regular)   Pulse 82   Temp 97.7  F (36.5  C) (Oral)   Resp 18   SpO2 99%   Physical Exam   Examination of the plantar surface of the right foot reveals a 2.5 cm x 2.5 cm blister on the plantar surface of his foot in  the fourth web space extending to the region overlying the fourth and fifth metatarsal heads.  There is approximately 1 cm of surrounding erythema on the plantar surface of the foot and on the dorsum of the foot just proximal to the fourth webspace.  Sterile needle was used after cleaning the blister to drain the fluid which was completely clear.

## 2024-08-26 ENCOUNTER — OFFICE VISIT (OUTPATIENT)
Dept: INTERNAL MEDICINE | Facility: CLINIC | Age: 74
End: 2024-08-26
Payer: MEDICARE

## 2024-08-26 VITALS
DIASTOLIC BLOOD PRESSURE: 72 MMHG | WEIGHT: 239 LBS | HEIGHT: 72 IN | OXYGEN SATURATION: 92 % | RESPIRATION RATE: 14 BRPM | TEMPERATURE: 97.9 F | BODY MASS INDEX: 32.37 KG/M2 | SYSTOLIC BLOOD PRESSURE: 130 MMHG | HEART RATE: 75 BPM

## 2024-08-26 DIAGNOSIS — L03.119 CELLULITIS OF FOOT: ICD-10-CM

## 2024-08-26 DIAGNOSIS — S90.821A BLISTER OF RIGHT FOOT, INITIAL ENCOUNTER: Primary | ICD-10-CM

## 2024-08-26 PROCEDURE — 99213 OFFICE O/P EST LOW 20 MIN: CPT | Performed by: INTERNAL MEDICINE

## 2024-08-26 PROCEDURE — G2211 COMPLEX E/M VISIT ADD ON: HCPCS | Performed by: INTERNAL MEDICINE

## 2024-08-26 NOTE — PROGRESS NOTES
[facility]    Comprehensive and complex medical care - Chronic disease management - Shared decision making - Care coordination - Compassionate care    Patient advocacy - Rational deprescribing - Minimally disruptive medicine - Ethical focus - Customized care         Date of Service: 8/26/2024  Primary Provider: Onur Rod    Patient Care Team:  Onur Rod MD as PCP - General  Onur Rod MD as Assigned PCP  Ambrose Degroot MD as MD (Orthopaedic Surgery)  Stuart Jackson MD as MD (Orthopaedic Surgery)  Liborio Champagne MD as MD  [facility]  Gregg Curtis as Resident (Orthopaedic Surgery)  Sagrario Good PA-C as Assigned Neuroscience Provider  Albino Chao MD as Assigned Musculoskeletal Provider  Zulma Ya MD as MD (Anesthesiology)  Albino Wei MD as MD (Neurology)  Gina Blount PA-C as Assigned Heart and Vascular Provider          Patient's Pharmacy:    [id number] [place], [place] - [address] [place] [address]  [address] [place] [address]  Phone: [phone] Fax: [phone]    [facility] - [place], [place] - [facility]  [address] [facility] [address]  Phone: [phone] Fax: [phone]     Patient's Contacts:  Name Home Phone Work Phone Mobile Phone Relationship Lgl KYLER Wilson [phone] Spouse    MATILDE SILVER   [phone] Other      Patient's Insurance:    Payor: MEDICARE / Plan: MEDICARE / Product Type: Medicare /           Current Outpatient Medications   Medication Instructions    acetaminophen (TYLENOL) 650 mg, Oral, EVERY 8 HOURS PRN    aspirin 81 mg, Oral, DAILY    cephALEXin (KEFLEX) 500 mg, Oral, 4 TIMES DAILY    co-enzyme Q-10 200 mg, Oral, DAILY    fish oil-omega-3 fatty acids 2 g, DAILY    fluticasone (FLONASE) 50 MCG/ACT nasal spray 1 spray, Both Nostrils, DAILY    lisinopril (ZESTRIL) 5 mg, Oral, DAILY     metoprolol succinate ER (TOPROL XL) 25 mg, Oral, DAILY    MULTIVITAMIN ORAL 1 tablet, Oral, DAILY    nortriptyline (PAMELOR) 25 mg, Oral, AT BEDTIME    pravastatin (PRAVACHOL) 20 mg, Oral, DAILY    pseudoePHEDrine (SUDAFED) 120 mg, Oral, EVERY 12 HOURS PRN    senna-docusate (SENOKOT-S/PERICOLACE) 8.6-50 MG tablet 1-2 tablets, Oral, DAILY PRN, Take while on oral narcotics to prevent or treat constipation.    tamsulosin (FLOMAX) 0.8 mg, Oral, DAILY        Subjective:      Mike Gonzalez is a 73 year old male who comes in today for:    Chief Complaint   Patient presents with    Follow Up     Foot infection follow up           8/26/2024     7:44 AM   Additional Questions   Roomed by Cherelle   Accompanied by wife     Follow up of WALK IN CARE visit.    Did have a blister between 4th and 5th toes on the right foot and onto the plantar surface somewhat as well.  Was seen WALK IN CARE and started on cephalexin (Keflex) for this.      Did get some redness of the dorsum of the foot near this area.  Did get painful 2 days after the visit, but much better since.  No fever or chills.    Has peripheral neuropathy (PN) and electromyography (EMG) pending for this.  Uses diclofenac (Voltaren) gel on the feet.    Note about being prescribed empagliflozin (Jardiance) but did not start or take.    Objective:     Wt Readings from Last 3 Encounters:   08/26/24 108.4 kg (239 lb)   08/02/24 107.5 kg (237 lb)   07/18/24 106.6 kg (235 lb)     BP Readings from Last 3 Encounters:   08/26/24 130/72   08/21/24 (!) 163/78   08/05/24 (!) 140/76      /72   Pulse 75   Temp 97.9  F (36.6  C) (Oral)   Resp 14   Ht 1.829 m (6')   Wt 108.4 kg (239 lb)   SpO2 92%   BMI 32.41 kg/m     In general, the patient is comfortable, no apparent distress.  Mood good.  Insight good.  DP pulse on the right strong and posterior tibialis pulse is weaker.  There is a blister between the 4th and 5 toes.  Postinflammatory changes in the dorsum of the foot      Diagnostics:     No results found for any visits on 08/26/24.     Assessment:     1. Blister of right foot, initial encounter    2. Cellulitis of foot        Plan:     Finish out antibiotics.  Follow up with me in not improving.  Cotton ball between the toes.  Continue current medications.  Follow up sooner if issues.    No orders of the defined types were placed in this encounter.      Onur Rod MD  General Internal Medicine  [facility]    The longitudinal plan of care for the diagnoses and conditions as documented were addressed during this visit. Due to the added complexity in care, I will continue to support Bj in the subsequent management and with ongoing continuity of care.     No follow-ups on file.     Future Appointments   Date Time Provider Department Center   9/5/2024  7:00 AM Zulma Ya MD [facility]   9/10/2024  9:15 AM [facility] MR 2 [facility]   10/3/2024 11:00 AM Ruth Jacques MD [facility]   10/21/2024  3:15 PM Mark Anthony Nunez MD [facility]   1/20/2025  3:30 PM Albino Wei MD [facility]

## 2024-09-05 ENCOUNTER — TELEPHONE (OUTPATIENT)
Dept: ANESTHESIOLOGY | Facility: CLINIC | Age: 74
End: 2024-09-05

## 2024-09-05 ENCOUNTER — OFFICE VISIT (OUTPATIENT)
Dept: ANESTHESIOLOGY | Facility: CLINIC | Age: 74
End: 2024-09-05
Attending: PHYSICIAN ASSISTANT
Payer: MEDICARE

## 2024-09-05 VITALS
HEART RATE: 85 BPM | DIASTOLIC BLOOD PRESSURE: 92 MMHG | SYSTOLIC BLOOD PRESSURE: 143 MMHG | OXYGEN SATURATION: 96 % | RESPIRATION RATE: 16 BRPM

## 2024-09-05 DIAGNOSIS — M25.562 LEFT ANTERIOR KNEE PAIN: ICD-10-CM

## 2024-09-05 DIAGNOSIS — M96.1 FAILED BACK SURGICAL SYNDROME: Primary | ICD-10-CM

## 2024-09-05 DIAGNOSIS — M25.552 LEFT HIP PAIN: ICD-10-CM

## 2024-09-05 DIAGNOSIS — M54.16 LUMBAR RADICULOPATHY: ICD-10-CM

## 2024-09-05 DIAGNOSIS — M17.9 OSTEOARTHRITIS OF KNEE, UNSPECIFIED: ICD-10-CM

## 2024-09-05 DIAGNOSIS — M48.061 SPINAL STENOSIS, LUMBAR REGION, WITHOUT NEUROGENIC CLAUDICATION: ICD-10-CM

## 2024-09-05 PROCEDURE — 99204 OFFICE O/P NEW MOD 45 MIN: CPT | Performed by: ANESTHESIOLOGY

## 2024-09-05 RX ORDER — DIAZEPAM 5 MG
5-10 TABLET ORAL
OUTPATIENT
Start: 2024-09-05

## 2024-09-05 ASSESSMENT — ENCOUNTER SYMPTOMS
CONSTITUTIONAL NEGATIVE: 1
FOCAL WEAKNESS: 1
MYALGIAS: 1
ROS GI COMMENTS: HERNIA
EYES NEGATIVE: 1
NECK PAIN: 1
RESPIRATORY NEGATIVE: 1
GASTROINTESTINAL NEGATIVE: 1
BACK PAIN: 1

## 2024-09-05 ASSESSMENT — PAIN SCALES - GENERAL: PAINLEVEL: MILD PAIN (2)

## 2024-09-05 NOTE — TELEPHONE ENCOUNTER
RN reviewed patient chart. Pre procedure instructions were reviewed with patient.    Ronda Alonso RNCC

## 2024-09-05 NOTE — PATIENT INSTRUCTIONS
Procedures:    Call to schedule your procedure: [phone] option #2    Left Knee Synvisc And Epidural Steroid Injection    Your pre-procedure instructions are below, please call our clinic if you have any questions.        Recommended Follow up:      Follow up as needed after the procedure.        To speak with a nurse, schedule/reschedule/cancel a clinic appointment, or request a medication refill call: [phone]    You can also reach us by [employer]: [email or web address]     Procedure Information:     Please call [phone] option #2 to schedule, reschedule, or cancel your procedure appointment.   Phones are answered Monday - Friday from 08:00 - 4:30pm.  Leave a voicemail with your name, birth date, and phone number if no one is available to take your call.        You no longer need to test for COVID- 19 prior to your procedure/surgery, unless your physician specifically requests that you test. If you experience COVID symptoms or have tested positive for COVID-19 within 14 days of your scheduled surgery or procedure, please update our office right away and your procedure may have to be postponed.       The procedure center staff will call you several days before the procedure to review important information that you will need to know for the day of the procedure.     Please contact the clinic if you have further questions about this information [phone].        Information related to Scheduling and Pre-Procedure Instructions:    If you must reschedule your procedure more than two times, you must follow up in clinic before rescheduling again.    Preparing for your procedure    CAUTION - FAILURE TO FOLLOW THESE PRE-PROCEDURE INSTRUCTIONS WILL RESULT IN YOUR PROCEDURE BEING RESCHEDULED.    Your Procedure: Left Knee Injection and Epidural Steroid Injection            You must have a  take you home after your procedure. Transportation by taxi or para-transit is okay as long as  you have a responsible adult accompany you. You must provide your 's full name and contact number at time of check in.     Fasting Protocol Please have nothing to eat or drink 2 hour prior to arrival.     Medications If you take any medications, DO NOT STOP. Take your medications as usual the day of your procedure with a sip of water AT LEAST 2 HOURS PRIOR TO ARRIVAL.    Antibiotics If you are currently taking antibiotics, you must complete the entire dose 7 days prior to your scheduled procedure. You must be clear of any signs or symptoms of infection. If you begin antibiotics, please contact our clinic for instructions.     Fever, Chills, or Rash If you experience a fever of higher than 100 degrees, chills, rash, or open wounds during the one week before your procedure, please call the clinic to see if you may proceed with your procedure.

## 2024-09-05 NOTE — LETTER
9/5/2024       RE: Mike Gonzalez  [address] [facility] [address] [address]     Dear Colleague,    Thank you for referring your patient, Mike Gonzalez, to the [facility] [place] [facility]. Please see a copy of my visit note below.    [facility] : Consultation Note    Patient: Mike Gonzalez Age: 73 year old   MRN: [id number] Referred by:  Latisha     Date of Visit: September 5, 2024    Reason for consultation:    Mike Gonzalez is a 73 year old male who is seen in consultation today at the request of his provider,Dr. Good for a comprehensive evaluation and management of pain.  Primary Care Provider is Onur Rod.      Chief complaints:    Chief Complaint   Patient presents with     Consult     First time visit with Dr Ya     Chronic left buttock pain and back pain    History of Present illness:     Mike Gonzalez is a 73 year old male with pain history as described below:      What number best describes your pain right now:  0 = No pain  to  10 = Worst pain imaginable 3   How would you describe the pain dull, aching   Which of the following worsen your pain standing    walking    exercise    coughing / sneezing   Which of the following improve or reduce your pain lying down    sitting   What number best describes your average pain for the past week:  0 = No pain  to  10 = Worst pain imaginable 5   What number best describes your LOWEST pain in past 24 hours:  0 = No pain  to  10 = Worst pain imaginable 2   What number best describes your WORST pain in past 24 hours:  0 = No pain  to  10 = Worst pain imaginable 5   What non-medicine treatments have you already had for your pain physical therapy    spine injections (shots)    surgery   Patient is a very pleasant 73 year old male, who is a writer in profession, has past medical history of  atrial  flutter, s/p left Watchman implant, coronary artery disease, hypertension, GERD, left rotator cuff tear, C6/7 decompression ([employer] 2002), L4/5 decomp ([facility], 1/2022), L4/5 fusion, L3/4 decomp, CSF leak ([facility] 3/30/23) presents of left buttock/hip pain.  The patient states that he had this buttock pain before the surgery.  There is no changes in the pain after the surgery.  Now he will cannot walk 500 to 700 feet without stopping.  Pain is very localized on the left gluteal region near the greater trochanteric bursa.  He had left L5-S1 epidural steroid injection and left trochanteric bursa injection without significant benefit.  Pain is affecting the ability to ambulate and decreases quality of life as a  and writer. Lumbar CT myelogram 10/2023 and lumbar MRI 5/2023 reveal severe left L1/2 subarticular stenosis. Lumbar MRI 8/2/24 shows multilevel disc degeneration and facet hypertrophy without significant spinal canal stenosis, there is significant mild to moderate neural stenosis at multiple levels greatest at the L5-S1.  At L1-L2, he has significant disc desiccation and disc height loss; extruded disc fragment at this level causes moderate left neuroforaminal stenosis.         Trials of therapies  including     PT: Yes:   Previous medication treatments included: gabapentin  Previous pain interventions: L5/S1     [place] Prescription Monitoring Program:   Reviewed. No concerns    Review of Systems:  Review of Systems   Constitutional: Negative.    HENT: Negative.     Eyes: Negative.    Respiratory: Negative.     Cardiovascular:         CAD, AF   Gastrointestinal: Negative.         Hernia   Genitourinary: Negative.    Musculoskeletal:  Positive for back pain, joint pain, myalgias and neck pain.        Left shoulder pain    Skin: Negative.    Neurological:  Positive for focal weakness.       Patient Supplied Answers To the [facility] Pain Questionnaire      8/5/2024     9:06 AM   [facility] Pain -  Patient Entered  Questionnaire/Answers   What number best describes your pain right now:  0 = No pain  to  10 = Worst pain imaginable 3   How would you describe the pain dull, aching   Which of the following worsen your pain standing    walking    exercise    coughing / sneezing   Which of the following improve or reduce your pain lying down    sitting   What number best describes your average pain for the past week:  0 = No pain  to  10 = Worst pain imaginable 5   What number best describes your LOWEST pain in past 24 hours:  0 = No pain  to  10 = Worst pain imaginable 2   What number best describes your WORST pain in past 24 hours:  0 = No pain  to  10 = Worst pain imaginable 5   What non-medicine treatments have you already had for your pain physical therapy    spine injections (shots)    surgery                 No data to display                     1/5/2024     3:26 PM 5/26/2023    10:26 AM   PHQ-2 ( 1999 [employer])   Q1: Little interest or pleasure in doing things 1 0   Q2: Feeling down, depressed or hopeless 1 0   PHQ-2 Score 2 0   Q1: Little interest or pleasure in doing things Several days    Q2: Feeling down, depressed or hopeless Several days    PHQ-2 Score 2              No data to display                   Past Medical History:  Past Medical History:   Diagnosis Date     Angioedema      Atrial fibrillation and flutter (H)      Basal cell carcinoma      Branch retinal vein occlusion of left eye (H28) 07/01/2017     CAD (coronary artery disease) 2/29/2024    ANGIOGRAM 2021 Left Main The vessel was visualized by angiography, is moderate in size and is angiographically normal. Left Anterior Descending Mid LAD lesion is 25% stenosed. Ramus Intermedius Ramus lesion is 10% stenosed. Left Circumflex Dist Cx lesion is 30% stenosed. Right Coronary Artery The vessel was visualized by angiography, is moderate in size and is angiographically normal.       Chronic neck pain      Congestive heart failure (H) 2021     GERD  (gastroesophageal reflux disease)      HTN (hypertension) 2015     Nonsmoker      Radiculopathy     cervical     Retinal hemorrhage     Vessel rupture in left retina     Rhinitis, allergic      S/P colonoscopy 04/15/2019     Urticaria        Past Surgical History:  Past Surgical History:   Procedure Laterality Date     BASAL CELL CARCINOMA EXCISION  01/01/2020    Left nasal reconstruction     CATARACT EXTRACTION, BILATERAL Bilateral 2022     CERVICAL SPINE SURGERY      C8, T1, foraminotomy     CV CORONARY ANGIOGRAM N/A 01/20/2021    Procedure: Coronary Angiogram;  Surgeon: Willow Wellington MD;  Location: [facility];  Service: Cardiology     CV LEFT ATRIAL APPENDAGE CLOSURE N/A 07/14/2021    Procedure: CV LEFT ATRIAL APPENDAGE CLOSURE;  Surgeon: Noah Gutierres MD;  Location:  HEART CARDIAC CATH LAB     CV LEFT HEART CATHETERIZATION WITHOUT LEFT VENTRICULOGRAM Left 01/20/2021    Procedure: Left Heart Catheterization Without Left Ventriculogram;  Surgeon: Willow Wellington MD;  Location: [facility];  Service: Cardiology     DECOMPRESSION, FUSION LUMBAR POSTERIOR ONE LEVEL, COMBINED Left 3/30/2023    Procedure: LEFT LUMBAR 4-5 POSTERIOR SPINAL FUSION WITH LEFT LUMBAR 4-5 LAMINOFORAMINOTOMY AND WIDE LUMBAR 4-5 LEFT SUBTOTAL FACETECTOMY WITH ALLOGRAFT AND AUTOGRAFT;  Surgeon: Ambrose Degroot MD;  Location: [facility] Main OR     HERNIA REPAIR, UMBILICAL       ×2     REVERSE TOTAL SHOULDER ARTHROPLASTY Left 05/01/2019       Medications:  Current Outpatient Medications   Medication Sig Dispense Refill     acetaminophen (TYLENOL) 650 MG CR tablet Take 650 mg by mouth every 8 hours as needed       aspirin 81 MG EC tablet Take 1 tablet (81 mg) by mouth daily       co-enzyme Q-10 100 MG CAPS capsule Take 200 mg by mouth daily       fish oil-omega-3 fatty acids 300-1,000 mg capsule [FISH OIL-OMEGA-3 FATTY ACIDS 300-1,000 MG CAPSULE] Take 2 g by mouth daily.       fluticasone (FLONASE) 50  MCG/ACT nasal spray Spray 1 spray into both nostrils daily 48 mL 3     lisinopril (ZESTRIL) 5 MG tablet Take 1 tablet (5 mg) by mouth daily for 360 days 90 tablet 3     metoprolol succinate ER (TOPROL XL) 25 MG 24 hr tablet Take 1 tablet (25 mg) by mouth daily (Patient taking differently: Take 25 mg by mouth daily. 0.5) 90 tablet 3     MULTIVITAMIN ORAL Take 1 tablet by mouth daily       nortriptyline (PAMELOR) 25 MG capsule Take 25 mg by mouth at bedtime       pravastatin (PRAVACHOL) 20 MG tablet Take 1 tablet (20 mg) by mouth daily 360 tablet 3     pseudoePHEDrine (SUDAFED) 120 MG 12 hr tablet Take 1 tablet (120 mg) by mouth every 12 hours as needed for congestion 50 tablet 0     senna-docusate (SENOKOT-S/PERICOLACE) 8.6-50 MG tablet Take 1-2 tablets by mouth daily as needed for constipation Take while on oral narcotics to prevent or treat constipation. 100 tablet 1     tamsulosin (FLOMAX) 0.4 MG capsule Take 2 capsules (0.8 mg) by mouth daily 180 capsule 3         Medications related to Pain Management:   Medications related to Pain Management (From now, onward)      None            Allergies:       Allergies   Allergen Reactions     Effient [Prasugrel Hcl] Hives     Hydrochlorothiazide Unknown     Hydrocodone Swelling     Sinus     Oxycontin [Oxycodone] Swelling     lip     Peppermint Oil Other (See Comments)     Plavix [Clopidogrel] Hives     Sulfa (Sulfonamide Antibiotics) [Sulfa Antibiotics] Swelling     Perfume Swelling     Other reaction(s): Runny Nose       Social History:    Social History     Socioeconomic History     Marital status:      Spouse name: Not on file     Number of children: Not on file     Years of education: Not on file     Highest education level: Not on file   Occupational History     Not on file   Tobacco Use     Smoking status: Never     Smokeless tobacco: Never   Substance and Sexual Activity     Alcohol use: Never     Comment: Alcoholic Drinks/day: About once a year.     Drug  use: No     Sexual activity: Not on file   Other Topics Concern     Parent/sibling w/ CABG, MI or angioplasty before 65F 55M? Not Asked   Social History Narrative    Patient of Dr. Rod since 2001.    .  Wife is a former RN.     Wife's cousin is Dr. Jean Pierre Champagne, ID specialist.     Social Determinants of Health     Financial Resource Strain: Low Risk  (1/5/2024)    Financial Resource Strain      Within the past 12 months, have you or your family members you live with been unable to get utilities (heat, electricity) when it was really needed?: No   Food Insecurity: Low Risk  (1/5/2024)    Food Insecurity      Within the past 12 months, did you worry that your food would run out before you got money to buy more?: No      Within the past 12 months, did the food you bought just not last and you didn t have money to get more?: No   Transportation Needs: Low Risk  (1/5/2024)    Transportation Needs      Within the past 12 months, has lack of transportation kept you from medical appointments, getting your medicines, non-medical meetings or appointments, work, or from getting things that you need?: No   Physical Activity: Not on file   Stress: Not on file   Social Connections: Unknown (1/1/2022)    [facility]    Social Connections      Frequency of Communication with Friends and Family: Not on file   Interpersonal Safety: Low Risk  (1/5/2024)    Interpersonal Safety      Do you feel physically and emotionally safe where you currently live?: Yes      Within the past 12 months, have you been hit, slapped, kicked or otherwise physically hurt by someone?: No      Within the past 12 months, have you been humiliated or emotionally abused in other ways by your partner or ex-partner?: No   Housing Stability: Low Risk  (1/5/2024)    Housing Stability      Do you have housing? : Yes      Are you worried about losing your housing?:  No     Social History     Social History Narrative    Patient of Dr. Rod since 2001.    .  Wife is a former RN.     Wife's cousin is Dr. Jean Pierre Champagne, ID specialist.         Family history:  Family History   Problem Relation Age of Onset     Arthritis Mother      Other - See Comments Mother         psychiatry/pvd     Other - See Comments Father         blood reaction         Physical Exam:  Vitals:    09/05/24 0649   BP: (!) 143/92   Pulse: 85   Resp: 16   SpO2: 96%       General: Awake in no apparent distress.   Eyes: Sclerae are anicteric. PERRLA, EOMI   Neck: supple, no masses.   Lungs: unlabored.   Heart: regular rate and rhythm   Abdomen: soft non tender.  Extremities: Pulses are well palpable, no peripheral edema.   Musculoskeletal: All muscle groups are normal in bulk and tone. The patient changes position without pain behavior. The patient walks with a normal gait. Posture is normal. Muscle strength was rated at 5/5 in all groups in the extremities. Examination of the joints reveals preserved range of motion.  The range of motion of the lumbar spine is normal  in all directions. The spinous processes in the cervical, thoracic, and lumbar spine are midline, with midline spine tenderness at the level of L1-L3.  SLR is negative.  External and internal rotation of the hip joint did not produce pain.  Sam's test is negative.  There is significant tenderness on anteromedial aspect of the left knee  Neurologic exam: Sensation to light touch intact throughout all dermatomes bilateral upper extremities and lower extremities  Psychiatric; Normal affect.   Skin: Warm and Dry.       LABORATORY VALUES:   Recent Labs   Lab Test 07/16/24  1130 05/14/24  1102    143   POTASSIUM 4.8 4.9   CHLORIDE 104 103   CO2 30* 31*   ANIONGAP 10 9   GLC 98 94   BUN 20.3 20.9   CR 1.37* 1.31*   JOVAN 11.1* 10.2       CBC RESULTS:   Recent Labs   Lab Test 06/22/23  1430   WBC 7.4   RBC 4.62   HGB 14.6   HCT 42.7   MCV 92    MCH 31.6   MCHC 34.2   RDW 13.4          Most Recent 3 INR's:  Recent Labs   Lab Test 09/08/22  1050 01/11/22  1324   INR 1.01 1.01       ASSESSMENT/PLAN:                             ASSESSMENT:    Diagnoses         Codes Comments    Failed back surgical syndrome    -  Primary M96.1     Left hip pain     M25.552     Spinal stenosis, lumbar region, without neurogenic claudication     M48.061     Lumbar radiculopathy     M54.16     Left anterior knee pain     M25.562              PLAN:    - Medications.     Tylenol 1 g 3 times daily.  Considered gabapentin but patient reported an intolerance    - Interventional procedures:  Ordered left L1-L2 epidural steroid injection.  Risks of procedure including bleeding, infection, failure procedure discussed with the patient.  Also ordered left knee Synvisc injection.    - Labs and imaging: MRI of the lumbar spine and CT myelogram reviewed    - Rehab: PT completed 2 months ago     - Psychology: No current needs.    - Integrated medicine: None indicated at this time.    - Disposition:   We will see the patient for above mentioned procedure.       Assessment will be ongoing with changes in treatment as indicated.  Benefits/risks/alternatives to treatment have been reviewed and the patient has been instructed to contact this office if they have any questions or concerns.  This plan of care has been discussed with the patient and the patient is in agreement.     Zulma Ya MD, PHD      Again, thank you for allowing me to participate in the care of your patient.      Sincerely,    Zulma Ya MD

## 2024-09-05 NOTE — TELEPHONE ENCOUNTER
Called patient to schedule procedure with Dr. Ya    Date of Procedure: 10/10/24 & 10/17/24    Arrival time given: Yes: Arrival Time 10:30am & 10am       Procedure Location: [facility]     Verified Location with Patient:  Yes  Address provided to the patient     Pre-op H&P Required:  No: Local anesthesia        Post-Op/Follow Up Appt:  Not Indicated in Request      Informed patient they will need a  to drive them home:  Yes    Patients : Spouse     Patient is aware that pre-op RN from the procedure center will call 2-3 days prior to scheduled procedure to confirm arrival time and review any instructions:  Yes       Additional Comments: N/A        Angelica Harper MA on 9/5/2024 at 9:54 AM      P: [phone]*

## 2024-09-05 NOTE — PROGRESS NOTES
[facility] : Consultation Note    Patient: Mike Gonzalez Age: 73 year old   MRN: [id number] Referred by:  Latisha     Date of Visit: September 5, 2024    Reason for consultation:    Mike Gonzalez is a 73 year old male who is seen in consultation today at the request of his provider,Dr. Good for a comprehensive evaluation and management of pain.  Primary Care Provider is Onur Rod.      Chief complaints:    Chief Complaint   Patient presents with    Consult     First time visit with Dr Ya     Chronic left buttock pain and back pain    History of Present illness:     Mike Gonzalez is a 73 year old male with pain history as described below:      What number best describes your pain right now:  0 = No pain  to  10 = Worst pain imaginable 3   How would you describe the pain dull, aching   Which of the following worsen your pain standing    walking    exercise    coughing / sneezing   Which of the following improve or reduce your pain lying down    sitting   What number best describes your average pain for the past week:  0 = No pain  to  10 = Worst pain imaginable 5   What number best describes your LOWEST pain in past 24 hours:  0 = No pain  to  10 = Worst pain imaginable 2   What number best describes your WORST pain in past 24 hours:  0 = No pain  to  10 = Worst pain imaginable 5   What non-medicine treatments have you already had for your pain physical therapy    spine injections (shots)    surgery   Patient is a very pleasant 73 year old male, who is a writer in profession, has past medical history of  atrial flutter, s/p left Watchman implant, coronary artery disease, hypertension, GERD, left rotator cuff tear, C6/7 decompression ([place] 2002), L4/5 decomp ([facility] 1/2022), L4/5 fusion, L3/4 decomp, CSF leak ([facility] 3/30/23) presents of left buttock/hip pain.  The patient states that he had this buttock pain before the surgery.  There is no  changes in the pain after the surgery.  Now he will cannot walk 500 to 700 feet without stopping.  Pain is very localized on the left gluteal region near the greater trochanteric bursa.  He had left L5-S1 epidural steroid injection and left trochanteric bursa injection without significant benefit.  Pain is affecting the ability to ambulate and decreases quality of life as a  and writer. Lumbar CT myelogram 10/2023 and lumbar MRI 5/2023 reveal severe left L1/2 subarticular stenosis. Lumbar MRI 8/2/24 shows multilevel disc degeneration and facet hypertrophy without significant spinal canal stenosis, there is significant mild to moderate neural stenosis at multiple levels greatest at the L5-S1.  At L1-L2, he has significant disc desiccation and disc height loss; extruded disc fragment at this level causes moderate left neuroforaminal stenosis.         Trials of therapies  including     PT: Yes:   Previous medication treatments included: gabapentin  Previous pain interventions: L5/S1     [place] Prescription Monitoring Program:   Reviewed. No concerns    Review of Systems:  Review of Systems   Constitutional: Negative.    HENT: Negative.     Eyes: Negative.    Respiratory: Negative.     Cardiovascular:         CAD, AF   Gastrointestinal: Negative.         Hernia   Genitourinary: Negative.    Musculoskeletal:  Positive for back pain, joint pain, myalgias and neck pain.        Left shoulder pain    Skin: Negative.    Neurological:  Positive for focal weakness.       Patient Supplied Answers To the  Pain Questionnaire      8/5/2024     9:06 AM   [facility] -  Patient Entered Questionnaire/Answers   What number best describes your pain right now:  0 = No pain  to  10 = Worst pain imaginable 3   How would you describe the pain dull, aching   Which of the following worsen your pain standing    walking    exercise    coughing / sneezing   Which of the following improve or reduce your pain lying down    sitting    What number best describes your average pain for the past week:  0 = No pain  to  10 = Worst pain imaginable 5   What number best describes your LOWEST pain in past 24 hours:  0 = No pain  to  10 = Worst pain imaginable 2   What number best describes your WORST pain in past 24 hours:  0 = No pain  to  10 = Worst pain imaginable 5   What non-medicine treatments have you already had for your pain physical therapy    spine injections (shots)    surgery                 No data to display                     1/5/2024     3:26 PM 5/26/2023    10:26 AM   PHQ-2 ( 1999 [employer])   Q1: Little interest or pleasure in doing things 1 0   Q2: Feeling down, depressed or hopeless 1 0   PHQ-2 Score 2 0   Q1: Little interest or pleasure in doing things Several days    Q2: Feeling down, depressed or hopeless Several days    PHQ-2 Score 2              No data to display                   Past Medical History:  Past Medical History:   Diagnosis Date    Angioedema     Atrial fibrillation and flutter (H)     Basal cell carcinoma     Branch retinal vein occlusion of left eye (H28) 07/01/2017    CAD (coronary artery disease) 2/29/2024    ANGIOGRAM 2021 Left Main The vessel was visualized by angiography, is moderate in size and is angiographically normal. Left Anterior Descending Mid LAD lesion is 25% stenosed. Ramus Intermedius Ramus lesion is 10% stenosed. Left Circumflex Dist Cx lesion is 30% stenosed. Right Coronary Artery The vessel was visualized by angiography, is moderate in size and is angiographically normal.      Chronic neck pain     Congestive heart failure (H) 2021    GERD (gastroesophageal reflux disease)     HTN (hypertension) 2015    Nonsmoker     Radiculopathy     cervical    Retinal hemorrhage     Vessel rupture in left retina    Rhinitis, allergic     S/P colonoscopy 04/15/2019    Urticaria        Past Surgical History:  Past Surgical History:   Procedure Laterality Date    BASAL CELL CARCINOMA EXCISION  01/01/2020     Left nasal reconstruction    CATARACT EXTRACTION, BILATERAL Bilateral 2022    CERVICAL SPINE SURGERY      C8, T1, foraminotomy    CV CORONARY ANGIOGRAM N/A 01/20/2021    Procedure: Coronary Angiogram;  Surgeon: Willow Wellington MD;  Location: [facility] Cardiac Cath Lab;  Service: Cardiology    CV LEFT ATRIAL APPENDAGE CLOSURE N/A 07/14/2021    Procedure: CV LEFT ATRIAL APPENDAGE CLOSURE;  Surgeon: Noah Gutierres MD;  Location:  HEART CARDIAC CATH LAB    CV LEFT HEART CATHETERIZATION WITHOUT LEFT VENTRICULOGRAM Left 01/20/2021    Procedure: Left Heart Catheterization Without Left Ventriculogram;  Surgeon: Willow Wellington MD;  Location: [facility] Cardiac Cath Lab;  Service: Cardiology    DECOMPRESSION, FUSION LUMBAR POSTERIOR ONE LEVEL, COMBINED Left 3/30/2023    Procedure: LEFT LUMBAR 4-5 POSTERIOR SPINAL FUSION WITH LEFT LUMBAR 4-5 LAMINOFORAMINOTOMY AND WIDE LUMBAR 4-5 LEFT SUBTOTAL FACETECTOMY WITH ALLOGRAFT AND AUTOGRAFT;  Surgeon: Ambrose Degroot MD;  Location: [facility] Main OR    HERNIA REPAIR, UMBILICAL       ×2    REVERSE TOTAL SHOULDER ARTHROPLASTY Left 05/01/2019       Medications:  Current Outpatient Medications   Medication Sig Dispense Refill    acetaminophen (TYLENOL) 650 MG CR tablet Take 650 mg by mouth every 8 hours as needed      aspirin 81 MG EC tablet Take 1 tablet (81 mg) by mouth daily      co-enzyme Q-10 100 MG CAPS capsule Take 200 mg by mouth daily      fish oil-omega-3 fatty acids 300-1,000 mg capsule [FISH OIL-OMEGA-3 FATTY ACIDS 300-1,000 MG CAPSULE] Take 2 g by mouth daily.      fluticasone (FLONASE) 50 MCG/ACT nasal spray Spray 1 spray into both nostrils daily 48 mL 3    lisinopril (ZESTRIL) 5 MG tablet Take 1 tablet (5 mg) by mouth daily for 360 days 90 tablet 3    metoprolol succinate ER (TOPROL XL) 25 MG 24 hr tablet Take 1 tablet (25 mg) by mouth daily (Patient taking differently: Take 25 mg by mouth daily. 0.5) 90 tablet 3    MULTIVITAMIN ORAL Take 1 tablet by mouth  daily      nortriptyline (PAMELOR) 25 MG capsule Take 25 mg by mouth at bedtime      pravastatin (PRAVACHOL) 20 MG tablet Take 1 tablet (20 mg) by mouth daily 360 tablet 3    pseudoePHEDrine (SUDAFED) 120 MG 12 hr tablet Take 1 tablet (120 mg) by mouth every 12 hours as needed for congestion 50 tablet 0    senna-docusate (SENOKOT-S/PERICOLACE) 8.6-50 MG tablet Take 1-2 tablets by mouth daily as needed for constipation Take while on oral narcotics to prevent or treat constipation. 100 tablet 1    tamsulosin (FLOMAX) 0.4 MG capsule Take 2 capsules (0.8 mg) by mouth daily 180 capsule 3         Medications related to Pain Management:   Medications related to Pain Management (From now, onward)      None            Allergies:       Allergies   Allergen Reactions    Effient [Prasugrel Hcl] Hives    Hydrochlorothiazide Unknown    Hydrocodone Swelling     Sinus    Oxycontin [Oxycodone] Swelling     lip    Peppermint Oil Other (See Comments)    Plavix [Clopidogrel] Hives    Sulfa (Sulfonamide Antibiotics) [Sulfa Antibiotics] Swelling    Perfume Swelling     Other reaction(s): Runny Nose       Social History:    Social History     Socioeconomic History    Marital status:      Spouse name: Not on file    Number of children: Not on file    Years of education: Not on file    Highest education level: Not on file   Occupational History    Not on file   Tobacco Use    Smoking status: Never    Smokeless tobacco: Never   Substance and Sexual Activity    Alcohol use: Never     Comment: Alcoholic Drinks/day: About once a year.    Drug use: No    Sexual activity: Not on file   Other Topics Concern    Parent/sibling w/ CABG, MI or angioplasty before 65F 55M? Not Asked   Social History Narrative    Patient of Dr. Rod since 2001.    .  Wife is a former RN.     Wife's cousin is Dr. Jean Pierre Champagne, ID specialist.     Social Determinants of Health     Financial Resource Strain: Low Risk  (1/5/2024)    Financial Resource Strain      Within the past 12 months, have you or your family members you live with been unable to get utilities (heat, electricity) when it was really needed?: No   Food Insecurity: Low Risk  (1/5/2024)    Food Insecurity     Within the past 12 months, did you worry that your food would run out before you got money to buy more?: No     Within the past 12 months, did the food you bought just not last and you didn t have money to get more?: No   Transportation Needs: Low Risk  (1/5/2024)    Transportation Needs     Within the past 12 months, has lack of transportation kept you from medical appointments, getting your medicines, non-medical meetings or appointments, work, or from getting things that you need?: No   Physical Activity: Not on file   Stress: Not on file   Social Connections: Unknown (1/1/2022)    [facility]    Social Connections     Frequency of Communication with Friends and Family: Not on file   Interpersonal Safety: Low Risk  (1/5/2024)    Interpersonal Safety     Do you feel physically and emotionally safe where you currently live?: Yes     Within the past 12 months, have you been hit, slapped, kicked or otherwise physically hurt by someone?: No     Within the past 12 months, have you been humiliated or emotionally abused in other ways by your partner or ex-partner?: No   Housing Stability: Low Risk  (1/5/2024)    Housing Stability     Do you have housing? : Yes     Are you worried about losing your housing?: No     Social History     Social History Narrative    Patient of Dr. Rod since 2001.    .  Wife is a former RN.     Wife's cousin is Dr. Jean Pierre Champagne, ID specialist.         Family history:  Family History   Problem Relation Age of Onset    Arthritis Mother     Other - See Comments Mother         psychiatry/pvd    Other - See Comments Father         blood reaction         Physical Exam:  Vitals:     09/05/24 0649   BP: (!) 143/92   Pulse: 85   Resp: 16   SpO2: 96%       General: Awake in no apparent distress.   Eyes: Sclerae are anicteric. PERRLA, EOMI   Neck: supple, no masses.   Lungs: unlabored.   Heart: regular rate and rhythm   Abdomen: soft non tender.  Extremities: Pulses are well palpable, no peripheral edema.   Musculoskeletal: All muscle groups are normal in bulk and tone. The patient changes position without pain behavior. The patient walks with a normal gait. Posture is normal. Muscle strength was rated at 5/5 in all groups in the extremities. Examination of the joints reveals preserved range of motion.  The range of motion of the lumbar spine is normal  in all directions. The spinous processes in the cervical, thoracic, and lumbar spine are midline, with midline spine tenderness at the level of L1-L3.  SLR is negative.  External and internal rotation of the hip joint did not produce pain.  Sam's test is negative.  There is significant tenderness on anteromedial aspect of the left knee  Neurologic exam: Sensation to light touch intact throughout all dermatomes bilateral upper extremities and lower extremities  Psychiatric; Normal affect.   Skin: Warm and Dry.       LABORATORY VALUES:   Recent Labs   Lab Test 07/16/24  1130 05/14/24  1102    143   POTASSIUM 4.8 4.9   CHLORIDE 104 103   CO2 30* 31*   ANIONGAP 10 9   GLC 98 94   BUN 20.3 20.9   CR 1.37* 1.31*   JOVAN 11.1* 10.2       CBC RESULTS:   Recent Labs   Lab Test 06/22/23  1430   WBC 7.4   RBC 4.62   HGB 14.6   HCT 42.7   MCV 92   MCH 31.6   MCHC 34.2   RDW 13.4          Most Recent 3 INR's:  Recent Labs   Lab Test 09/08/22  1050 01/11/22  1324   INR 1.01 1.01       ASSESSMENT/PLAN:                             ASSESSMENT:    Diagnoses         Codes Comments    Failed back surgical syndrome    -  Primary M96.1     Left hip pain     M25.552     Spinal stenosis, lumbar region, without neurogenic claudication     M48.061     Lumbar  radiculopathy     M54.16     Left anterior knee pain     M25.562              PLAN:    - Medications.     Tylenol 1 g 3 times daily.  Considered gabapentin but patient reported an intolerance    - Interventional procedures:  Ordered left L1-L2 epidural steroid injection.  Risks of procedure including bleeding, infection, failure procedure discussed with the patient.  Also ordered left knee Synvisc injection.    - Labs and imaging: MRI of the lumbar spine and CT myelogram reviewed    - Rehab: PT completed 2 months ago     - Psychology: No current needs.    - Integrated medicine: None indicated at this time.    - Disposition:   We will see the patient for above mentioned procedure.       Assessment will be ongoing with changes in treatment as indicated.  Benefits/risks/alternatives to treatment have been reviewed and the patient has been instructed to contact this office if they have any questions or concerns.  This plan of care has been discussed with the patient and the patient is in agreement.     Zulma Ya MD, PHD

## 2024-09-05 NOTE — NURSING NOTE
RN read through the instructions with the patient for the recommended procedure: Left Knee Injection and Epidural Steroid Injection  Patient verbalized understanding to holding appropriate medication per protocol and was agreeable to NPO policy and needing a .    Anticoagulant: None reported    Recommended Follow Up:  PRN after procedures    Arabella Jansen RN

## 2024-09-05 NOTE — NURSING NOTE
Patient presents with:  Consult: First time visit with Dr Ya      Mild Pain (2)     Pain Medications       Analgesics Other Refills Start End     acetaminophen (TYLENOL) 650 MG CR tablet --  --    Sig - Route: Take 650 mg by mouth every 8 hours as needed - Oral    Class: Historical       Salicylates Refills Start End     aspirin 81 MG EC tablet -- 7/28/2023 --    Sig - Route: Take 1 tablet (81 mg) by mouth daily - Oral    Class: OTC            What medications are you using for pain? Tylenol, was on antibiotics a couple of weeks ago    Have you been seen by another pain clinic/ provider? no      Expectations: mckayla at my low back and see why I can't go any further than 7-8 feet without pain, check neuropathy    Anyi Hui, RAFIQN

## 2024-09-10 ENCOUNTER — HOSPITAL ENCOUNTER (OUTPATIENT)
Dept: MRI IMAGING | Facility: HOSPITAL | Age: 74
Discharge: HOME | End: 2024-09-10
Attending: INTERNAL MEDICINE
Payer: MEDICARE

## 2024-09-10 VITALS — HEART RATE: 87 BPM | DIASTOLIC BLOOD PRESSURE: 85 MMHG | SYSTOLIC BLOOD PRESSURE: 141 MMHG | OXYGEN SATURATION: 92 %

## 2024-09-10 DIAGNOSIS — I42.8 NONISCHEMIC CARDIOMYOPATHY (H): ICD-10-CM

## 2024-09-10 DIAGNOSIS — I42.0 DILATED CARDIOMYOPATHY (H): Primary | ICD-10-CM

## 2024-09-10 DIAGNOSIS — I50.20 HEART FAILURE WITH REDUCED EJECTION FRACTION, NYHA CLASS II (H): ICD-10-CM

## 2024-09-10 LAB
ATRIAL RATE - MUSE: 66 BPM
ATRIAL RATE - MUSE: 82 BPM
DIASTOLIC BLOOD PRESSURE - MUSE: NORMAL MMHG
DIASTOLIC BLOOD PRESSURE - MUSE: NORMAL MMHG
INTERPRETATION ECG - MUSE: NORMAL
INTERPRETATION ECG - MUSE: NORMAL
P AXIS - MUSE: NORMAL DEGREES
P AXIS - MUSE: NORMAL DEGREES
PR INTERVAL - MUSE: NORMAL MS
PR INTERVAL - MUSE: NORMAL MS
QRS DURATION - MUSE: 112 MS
QRS DURATION - MUSE: 114 MS
QT - MUSE: 376 MS
QT - MUSE: 388 MS
QTC - MUSE: 441 MS
QTC - MUSE: 453 MS
R AXIS - MUSE: -72 DEGREES
R AXIS - MUSE: -75 DEGREES
SYSTOLIC BLOOD PRESSURE - MUSE: NORMAL MMHG
SYSTOLIC BLOOD PRESSURE - MUSE: NORMAL MMHG
T AXIS - MUSE: 58 DEGREES
T AXIS - MUSE: 60 DEGREES
VENTRICULAR RATE- MUSE: 82 BPM
VENTRICULAR RATE- MUSE: 83 BPM

## 2024-09-10 PROCEDURE — G1010 CDSM STANSON: HCPCS | Performed by: GENERAL ACUTE CARE HOSPITAL

## 2024-09-10 PROCEDURE — A9585 GADOBUTROL INJECTION: HCPCS | Performed by: INTERNAL MEDICINE

## 2024-09-10 PROCEDURE — 250N000011 HC RX IP 250 OP 636: Performed by: INTERNAL MEDICINE

## 2024-09-10 PROCEDURE — 93016 CV STRESS TEST SUPVJ ONLY: CPT | Performed by: INTERNAL MEDICINE

## 2024-09-10 PROCEDURE — 93005 ELECTROCARDIOGRAM TRACING: CPT

## 2024-09-10 PROCEDURE — 93010 ELECTROCARDIOGRAM REPORT: CPT | Mod: HOP | Performed by: STUDENT IN AN ORGANIZED HEALTH CARE EDUCATION/TRAINING PROGRAM

## 2024-09-10 PROCEDURE — 75563 CARD MRI W/STRESS IMG & DYE: CPT | Mod: MG

## 2024-09-10 PROCEDURE — 75563 CARD MRI W/STRESS IMG & DYE: CPT | Mod: 26 | Performed by: GENERAL ACUTE CARE HOSPITAL

## 2024-09-10 PROCEDURE — 93018 CV STRESS TEST I&R ONLY: CPT | Performed by: GENERAL ACUTE CARE HOSPITAL

## 2024-09-10 PROCEDURE — 999N000122 MR MYOCARDIUM  OVERREAD

## 2024-09-10 PROCEDURE — 93010 ELECTROCARDIOGRAM REPORT: CPT | Mod: HOP | Performed by: INTERNAL MEDICINE

## 2024-09-10 PROCEDURE — 255N000002 HC RX 255 OP 636: Performed by: INTERNAL MEDICINE

## 2024-09-10 RX ORDER — REGADENOSON 0.08 MG/ML
0.4 INJECTION, SOLUTION INTRAVENOUS ONCE
Status: COMPLETED | OUTPATIENT
Start: 2024-09-10 | End: 2024-09-10

## 2024-09-10 RX ORDER — AMINOPHYLLINE 25 MG/ML
50-100 INJECTION, SOLUTION INTRAVENOUS
Status: DISCONTINUED | OUTPATIENT
Start: 2024-09-10 | End: 2024-09-11 | Stop reason: HOSPADM

## 2024-09-10 RX ORDER — GADOBUTROL 604.72 MG/ML
20 INJECTION INTRAVENOUS ONCE
Status: COMPLETED | OUTPATIENT
Start: 2024-09-10 | End: 2024-09-10

## 2024-09-10 RX ADMIN — GADOBUTROL 20 ML: 604.72 INJECTION INTRAVENOUS at 10:44

## 2024-09-10 RX ADMIN — REGADENOSON 0.4 MG: 0.08 INJECTION, SOLUTION INTRAVENOUS at 10:55

## 2024-09-12 ENCOUNTER — VIRTUAL VISIT (OUTPATIENT)
Dept: CARDIOLOGY | Facility: CLINIC | Age: 74
End: 2024-09-12
Payer: MEDICARE

## 2024-09-12 VITALS — HEIGHT: 72 IN | WEIGHT: 239 LBS | BODY MASS INDEX: 32.37 KG/M2

## 2024-09-12 DIAGNOSIS — M54.16 LUMBAR RADICULOPATHY: ICD-10-CM

## 2024-09-12 DIAGNOSIS — I50.20 HEART FAILURE WITH REDUCED EJECTION FRACTION, NYHA CLASS II (H): ICD-10-CM

## 2024-09-12 DIAGNOSIS — I42.8 NONISCHEMIC CARDIOMYOPATHY (H): Primary | ICD-10-CM

## 2024-09-12 DIAGNOSIS — I42.0 DILATED CARDIOMYOPATHY (H): ICD-10-CM

## 2024-09-12 DIAGNOSIS — M51.369 LUMBAR DEGENERATIVE DISC DISEASE: ICD-10-CM

## 2024-09-12 DIAGNOSIS — M48.061 SPINAL STENOSIS, LUMBAR REGION, WITHOUT NEUROGENIC CLAUDICATION: ICD-10-CM

## 2024-09-12 DIAGNOSIS — I48.21 PERMANENT ATRIAL FIBRILLATION (H): ICD-10-CM

## 2024-09-12 DIAGNOSIS — Z98.1 HISTORY OF LUMBAR FUSION: ICD-10-CM

## 2024-09-12 PROCEDURE — 99213 OFFICE O/P EST LOW 20 MIN: CPT | Mod: 95 | Performed by: INTERNAL MEDICINE

## 2024-09-12 ASSESSMENT — PAIN SCALES - GENERAL: PAINLEVEL: MILD PAIN (2)

## 2024-09-12 NOTE — PROGRESS NOTES
Virtual Visit Details2    Type of service:  Video Visit   Video Start Time:  4.25PM  Video End Time: $>35PM.    Originating Location (pt. Location): Home    Distant Location (provider location):  On-site  Platform used for Video Visit: Chio  This visit was for today discussing recent MRI result.  Patient with persistent atrial fibrillation and after watchman placement.  Recent echocardiogram reviewed a decrease in LV function of 10%.  Patient's symptoms are unchanged.  Because of this he was advised to have a cardiac MRI to rule out infiltrative disorder as well as other conditions that can cause heart failure.  Reviewed cardiac MRI and result discussed with patient.  LV function is unchanged at EF of 39%.  Previously it was 40% to 45%.  No evidence for infiltrative disorder or other abnormality.  Delayed enhancement did not reveal any significant abnormality apart from some delayed enhancement which was nonspecific.  Patient's symptoms are unchanged.  Result was discussed with patient.  It may be time to optimize the medications.  He will be referred to the core clinic for medication titration.  The regarding the send portion of the test was negative for ischemia.  Overall the only management strategy is optimizing medication.  This was discussed with patient.  Patient will return to call clinic and cardiology on a as needed basis.  Total visit duration 20 minutes.  This includes video interview, review of chart as well as review of recent cardiac MRI and documentation.

## 2024-09-12 NOTE — NURSING NOTE
Current patient location: [address] [place] [id number]    Is the patient currently in the state of [place]? YES    Visit mode:VIDEO    If the visit is dropped, the patient can be reconnected by: VIDEO VISIT: Text to cell phone:   Telephone Information:   Mobile [phone]    and VIDEO VISIT: Send to e-mail at: [email or web address]    Will anyone else be joining the visit? Pts spouse  (If patient encounters technical issues they should call [phone])    How would you like to obtain your AVS? MyChart    Are changes needed to the allergy or medication list? No    Patient denies any changes and states that all information remains accurate since last reviewed/verified.     Are refills needed on medications prescribed by this physician? NO    Rooming Documentation:  Questionnaire(s) not pre-assigned    No other vitals to report today    Reason for visit: ELLA German MA VVF

## 2024-09-12 NOTE — LETTER
9/12/2024      RE: Mike Gonzalez  [address] [place] [address]       Dear Colleague,    Thank you for the opportunity to participate in the care of your patient, Mike Gonzalez, at the [facility]. Please see a copy of my visit note below.    Virtual Visit Details2    Type of service:  Video Visit   Video Start Time:  4.25PM  Video End Time: $>35PM.    Originating Location (pt. Location): Home    Distant Location (provider location):  On-site  Platform used for Video Visit: Ultralife  This visit was for today discussing recent MRI result.  Patient with persistent atrial fibrillation and after watchman placement.  Recent echocardiogram reviewed a decrease in LV function of 10%.  Patient's symptoms are unchanged.  Because of this he was advised to have a cardiac MRI to rule out infiltrative disorder as well as other conditions that can cause heart failure.  Reviewed cardiac MRI and result discussed with patient.  LV function is unchanged at EF of 39%.  Previously it was 40% to 45%.  No evidence for infiltrative disorder or other abnormality.  Delayed enhancement did not reveal any significant abnormality apart from some delayed enhancement which was nonspecific.  Patient's symptoms are unchanged.  Result was discussed with patient.  It may be time to optimize the medications.  He will be referred to the core clinic for medication titration.  The regarding the send portion of the test was negative for ischemia.  Overall the only management strategy is optimizing medication.  This was discussed with patient.  Patient will return to call clinic and cardiology on a as needed basis.  Total visit duration 20 minutes.  This includes video interview, review of chart as well as review of recent cardiac MRI and documentation.      Please do not hesitate to contact me if you have any questions/concerns.     Sincerely,     JOANN TORIBIO  MD Maryam

## 2024-10-02 NOTE — PROGRESS NOTES
General Cardiology Clinic-[facility] [place]      Referring provider:JOANN Francisco MD     HPI: Mr. Mike Gonzalez is a 73 year old  male with PMH significant for    Nonischemic cardiomyopathy  Status post left atrial appendage closure Watchman 2021  Hypertension  Permanent atrial fibrillation  Physical inactivity    Patient is new to me.  He has been following with [facility] and most recently by my colleague Dr. Ron on 9/12.  He underwent Watchman procedure in 2021 (high [id number] score).  He underwent 2 JEAN CLAUDE's in 2021 and found to have 0.35-0.5 cm localized leak around the Watchman device by JEAN CLAUDE.  He was taken off of oral anticoagulation that year.  He is being seen today if he would benefit from oral anticoagulation as referred to EP by Tung. Patient has history of permanent atrial fibrillation since 2012.  On rate control.    There has been gradual decline in his LVEF over the years.  LVEF was 50% in 2011 and found to have reduced LVEF at 40% in 2020.  Most recent cardiac MRI 9/10/2024 reported nonischemic cardiomyopathy with stable LVEF 39%.  He is physically inactive due to hip pain.  Otherwise no chest pain, shortness of breath, dizziness, syncope or lower extremity edema.  He does not have a lot of energy.  He does not smoke or drink alcohol.  He has been physically very inactive over the last 2 months.  Current medications aspirin 81 mg, lisinopril 5 mg, metoprolol 25 mg, pravastatin    Medications, personal, family, and social history reviewed with patient and revised.    PAST MEDICAL HISTORY:  Past Medical History:   Diagnosis Date    Angioedema     Atrial fibrillation and flutter (H)     Basal cell carcinoma     Branch retinal vein occlusion of left eye (H) 07/01/2017    CAD (coronary artery disease) 2/29/2024    ANGIOGRAM 2021 Left Main The vessel was visualized by angiography, is moderate in size and is angiographically normal. Left Anterior Descending Mid LAD lesion is 25%  stenosed. Ramus Intermedius Ramus lesion is 10% stenosed. Left Circumflex Dist Cx lesion is 30% stenosed. Right Coronary Artery The vessel was visualized by angiography, is moderate in size and is angiographically normal.      Chronic neck pain     Congestive heart failure (H) 2021    GERD (gastroesophageal reflux disease)     HTN (hypertension) 2015    Nonsmoker     Radiculopathy     cervical    Retinal hemorrhage     Vessel rupture in left retina    Rhinitis, allergic     S/P colonoscopy 04/15/2019    Urticaria        CURRENT MEDICATIONS:  Current Outpatient Medications   Medication Sig Dispense Refill    acetaminophen (TYLENOL) 650 MG CR tablet Take 650 mg by mouth every 8 hours as needed      aspirin 81 MG EC tablet Take 1 tablet (81 mg) by mouth daily      co-enzyme Q-10 100 MG CAPS capsule Take 200 mg by mouth daily      fish oil-omega-3 fatty acids 300-1,000 mg capsule [FISH OIL-OMEGA-3 FATTY ACIDS 300-1,000 MG CAPSULE] Take 2 g by mouth daily.      fluticasone (FLONASE) 50 MCG/ACT nasal spray Spray 1 spray into both nostrils daily 48 mL 3    lisinopril (ZESTRIL) 5 MG tablet Take 1 tablet (5 mg) by mouth daily for 360 days 90 tablet 3    metoprolol succinate ER (TOPROL XL) 25 MG 24 hr tablet Take 1 tablet (25 mg) by mouth daily (Patient taking differently: Take 25 mg by mouth daily. 0.5) 90 tablet 3    MULTIVITAMIN ORAL Take 1 tablet by mouth daily      nortriptyline (PAMELOR) 25 MG capsule Take 25 mg by mouth at bedtime      pravastatin (PRAVACHOL) 20 MG tablet Take 1 tablet (20 mg) by mouth daily 360 tablet 3    pseudoePHEDrine (SUDAFED) 120 MG 12 hr tablet Take 1 tablet (120 mg) by mouth every 12 hours as needed for congestion 50 tablet 0    senna-docusate (SENOKOT-S/PERICOLACE) 8.6-50 MG tablet Take 1-2 tablets by mouth daily as needed for constipation Take while on oral narcotics to prevent or treat constipation. 100 tablet 1    tamsulosin (FLOMAX) 0.4 MG capsule Take 2 capsules (0.8 mg) by mouth daily  180 capsule 3       PAST SURGICAL HISTORY:  Past Surgical History:   Procedure Laterality Date    BASAL CELL CARCINOMA EXCISION  01/01/2020    Left nasal reconstruction    CATARACT EXTRACTION, BILATERAL Bilateral 2022    CERVICAL SPINE SURGERY      C8, T1, foraminotomy    CV CORONARY ANGIOGRAM N/A 01/20/2021    Procedure: Coronary Angiogram;  Surgeon: Willow Wellington MD;  Location: [facility] Cardiac Cath Lab;  Service: Cardiology    CV LEFT ATRIAL APPENDAGE CLOSURE N/A 07/14/2021    Procedure: CV LEFT ATRIAL APPENDAGE CLOSURE;  Surgeon: Noah Gutierres MD;  Location: [facility] CARDIAC CATH LAB    CV LEFT HEART CATHETERIZATION WITHOUT LEFT VENTRICULOGRAM Left 01/20/2021    Procedure: Left Heart Catheterization Without Left Ventriculogram;  Surgeon: Willow Wellington MD;  Location: [facility] Cardiac Cath Lab;  Service: Cardiology    DECOMPRESSION, FUSION LUMBAR POSTERIOR ONE LEVEL, COMBINED Left 3/30/2023    Procedure: LEFT LUMBAR 4-5 POSTERIOR SPINAL FUSION WITH LEFT LUMBAR 4-5 LAMINOFORAMINOTOMY AND WIDE LUMBAR 4-5 LEFT SUBTOTAL FACETECTOMY WITH ALLOGRAFT AND AUTOGRAFT;  Surgeon: Ambrose Degroot MD;  Location: [facility] Main OR    HERNIA REPAIR, UMBILICAL       ×2    REVERSE TOTAL SHOULDER ARTHROPLASTY Left 05/01/2019       ALLERGIES:     Allergies   Allergen Reactions    Effient [Prasugrel Hcl] Hives    Hydrochlorothiazide Unknown    Hydrocodone Swelling     Sinus    Oxycontin [Oxycodone] Swelling     lip    Peppermint Oil Other (See Comments)    Plavix [Clopidogrel] Hives    Sulfa (Sulfonamide Antibiotics) [Sulfa Antibiotics] Swelling    Perfume Swelling     Other reaction(s): Runny Nose       FAMILY HISTORY:  Family History   Problem Relation Age of Onset    Arthritis Mother     Other - See Comments Mother         psychiatry/pvd    Other - See Comments Father         blood reaction         SOCIAL HISTORY:  Social History     Tobacco Use    Smoking status: Never    Smokeless tobacco: Never   Substance Use  Topics    Alcohol use: Never     Comment: Alcoholic Drinks/day: About once a year.    Drug use: No       ROS:   Constitutional: No fever, chills, or sweats. Weight stable.   Cardiovascular: As per HPI.       Exam:  /76 (BP Location: Right arm, Patient Position: Chair, Cuff Size: Adult Large)   Pulse 80   Wt 108 kg (238 lb)   SpO2 97%   BMI 32.28 kg/m    GENERAL APPEARANCE: alert and no distress  HEENT: no icterus, no central cyanosis  LYMPH/NECK: no adenopathy, no asymmetry, JVP not elevated  RESPIRATORY: lungs clear to auscultation - no rales, rhonchi or wheezes, no use of accessory muscles, no retractions, respirations are unlabored, normal respiratory rate  CARDIOVASCULAR: ir-regular rhythm, normal S1, S2, no S3 or S4 and no murmur, click or rub, precordium quiet with normal PMI.  GI: soft, non tender  EXTREMITIES: no edema  NEURO: alert, normal speech,and affect  SKIN: no ecchymoses, no rashes     I have reviewed the labs and personally reviewed the imaging below and made my comment in the assessment and plan.    Labs:  CBC RESULTS:   Lab Results   Component Value Date    WBC 7.4 06/22/2023    RBC 4.62 06/22/2023    HGB 14.6 06/22/2023    HCT 42.7 06/22/2023    MCV 92 06/22/2023    MCH 31.6 06/22/2023    MCHC 34.2 06/22/2023    RDW 13.4 06/22/2023     06/22/2023       BMP RESULTS:  Lab Results   Component Value Date     07/16/2024    POTASSIUM 4.8 07/16/2024    POTASSIUM 3.5 04/02/2023    CHLORIDE 104 07/16/2024    CHLORIDE 101 04/02/2023    CO2 30 (H) 07/16/2024    CO2 24 04/02/2023    ANIONGAP 10 07/16/2024    ANIONGAP 12 04/02/2023    GLC 98 07/16/2024     04/02/2023    BUN 20.3 07/16/2024    BUN 11 04/02/2023    CR 1.37 (H) 07/16/2024    GFRESTIMATED 54 (L) 07/16/2024    GFRESTIMATED >60 06/27/2021    GFRESTBLACK >60 06/27/2021    JOVAN 11.1 (H) 07/16/2024        Cardiac MRI 9/10/2024  1.  Pharmacologic regadenoson stress cardiac MRI is negative for inducible myocardial ischemia.    2.  No evidence of prior myocardial infarction.  3.  There is a midwall stripe of nonvascular myocardial fibrosis involving the basal and mid septal walls  of the left ventricle which is a finding typical of nonischemic cardiomyopathy.   4.  No evidence of infiltrative disease.  5.  Left ventricular chamber size and wall thickness are normal.  Systolic function is moderately reduced  with global hypokinesis. The quantified left ventricular ejection fraction is 39%.     6.  Right ventricular chamber size and systolic function are normal. The quantified right ventricular  ejection fraction is 56%.      7.  Severe left atrial enlargement. Moderate right atrial enlargement.  8.  No significant valvular abnormalities.     Echocardiogram 6/12/2024  1. The left ventricle is normal in size. Left ventricular systolic performance  is moderately reduced. The ejection fraction is estimated to be 30%.  2. There is moderate global reduction in left ventricular systolic  performance.  3. There is mild concentric increase in left ventricular wall thickness.  4. No significant valvular heart disease is identified on this study.  5. Normal right ventricular size with low normal right ventricular systolic  performance.  6. There is moderate to severe left atrial enlargement. There is moderate  right atrial enlargement.     The prior real-time echocardiogram could not be accessed from the digital  archive for direct comparison.    Coronary angiogram 2021: Successful implantation of watchman flex device mild coronary atherosclerosis.    EKG 9/10/2024 shows atrial fibrillation.        Assessment and Plan:     # Permanent atrial fibrillation status post Watchman in 2021  -Patient was found to have 0.35-0.5 cm gap around the device by JEAN CLAUDE 10/18/2021.  He has not had any imaging since.  He is currently on aspirin.  He has not had any stroke events.  He is being seen today to see if he would benefit from oral anticoagulation.  If the leak  is similar I do not think he would need oral anticoagulation.  -Will schedule him for cardiac CT to assess for periprocedural leak.    # Permanent atrial fibrillation  -Continue with rate control and aspirin for now    # Nonischemic cardiomyopathy LVEF~39%  -Functional capacity difficult to assess as he has hip pain and is not physically active.  Euvolemic on exam.  He has been following with [facility] but he tells me that he was having hard time to get into appointments with Dr. Beck there.  Therefore scheduled his follow-up at [facility].  Most recently seen virtually by my colleague on 9/12.  Patient currently on metoprolol and lisinopril.  Will check if his insurance is covering for Jardiance and Entresto.    No medication changes today.    Return to clinic with Dr. Ron or cardiology in [place].  Will check with the patient.    Total time spent today for this visit is 85 minutes including precharting, face-to-face clinic visit, review of labs/imaging and medical documentation.    Ruth PIERRE MD  [facility] Division of Cardiology  Pager [phone]     Addendum note 10/6/2024:  Jardiance 10mg   -$556.00 copay     Entresto 24-26mg BID   -$572.66 copay     Unlikely patient will be able to pay.    Dr CAN

## 2024-10-03 ENCOUNTER — TELEPHONE (OUTPATIENT)
Dept: CARDIOLOGY | Facility: CLINIC | Age: 74
End: 2024-10-03
Payer: MEDICARE

## 2024-10-03 ENCOUNTER — OFFICE VISIT (OUTPATIENT)
Dept: CARDIOLOGY | Facility: CLINIC | Age: 74
End: 2024-10-03
Attending: INTERNAL MEDICINE
Payer: MEDICARE

## 2024-10-03 VITALS
BODY MASS INDEX: 32.28 KG/M2 | WEIGHT: 238 LBS | HEART RATE: 80 BPM | OXYGEN SATURATION: 97 % | DIASTOLIC BLOOD PRESSURE: 76 MMHG | SYSTOLIC BLOOD PRESSURE: 131 MMHG

## 2024-10-03 DIAGNOSIS — I42.8 NONISCHEMIC CARDIOMYOPATHY (H): ICD-10-CM

## 2024-10-03 DIAGNOSIS — I48.20 CHRONIC ATRIAL FIBRILLATION, UNSPECIFIED (H): ICD-10-CM

## 2024-10-03 DIAGNOSIS — T82.539A LEAKAGE OF WATCHMAN LEFT ATRIAL APPENDAGE CLOSURE DEVICE: Primary | ICD-10-CM

## 2024-10-03 DIAGNOSIS — I48.21 PERMANENT ATRIAL FIBRILLATION (H): ICD-10-CM

## 2024-10-03 PROCEDURE — G0463 HOSPITAL OUTPT CLINIC VISIT: HCPCS | Performed by: INTERNAL MEDICINE

## 2024-10-03 PROCEDURE — 99215 OFFICE O/P EST HI 40 MIN: CPT | Performed by: INTERNAL MEDICINE

## 2024-10-03 PROCEDURE — 99417 PROLNG OP E/M EACH 15 MIN: CPT | Performed by: INTERNAL MEDICINE

## 2024-10-03 ASSESSMENT — PAIN SCALES - GENERAL: PAINLEVEL: NO PAIN (0)

## 2024-10-03 NOTE — PATIENT INSTRUCTIONS
October 3, 2024    Cardiology Provider You Saw During Your Visit: Dr. Jacques      Medication Changes: We will price check jardiance and entresto for you.      Follow Up:  -CT angiogram of your heart when able  -We will have you establish care with the [facility] if we start you on either jardiance or entresto.  -Follow up with Dr. Ron in 6 months      Follow the American Heart Association Diet and Lifestyle Recommendations:  -Limit saturated fat, trans fat, sodium, red meat, sweets and sugar-sweetened beverages. If you choose to eat red meat, compare labels and select the leanest cuts available.  -Aim for at least 150 minutes of moderate physical activity or 75 minutes of vigorous physical activity - or an equal combination of both - each week.      To Reach Us:  -During business hours: [phone], press option # 1 to schedule an appointment or to leave a message for your care team.     -After hours, weekends or holidays: [phone], press option #4 and ask to speak to the on-call cardiologist. Inform them you are a patient at the [facility].        **If you have a cardiac device, please make sure you schedule an in-person device check just prior to your cardiology provider appointments**        Nani Puente RN  Cardiology Care Coordinator - General Cardiology  [facility]

## 2024-10-03 NOTE — TELEPHONE ENCOUNTER
30 days supply test claims requested for the drugs below:  Jardiance 10mg daily, Entresto 24-26mg BID    Jardiance 10mg  -$556.00 copay        Entresto 24-26mg BID  -$572.66 copay

## 2024-10-03 NOTE — LETTER
10/3/2024      RE: Mike Gonzalez  [address] [facility] [place] [address]       Dear Colleague,    Thank you for the opportunity to participate in the care of your patient, Mike Gonzalez, at the [facility] [place] at [facility]. Please see a copy of my visit note below.            General Cardiology Clinic-[facility] [place]      Referring provider:JOANN Francisco MD     HPI: Mr. Mike Gonzalez is a 73 year old  male with PMH significant for    Nonischemic cardiomyopathy  Status post left atrial appendage closure Watchman 2021  Hypertension  Permanent atrial fibrillation  Physical inactivity    Patient is new to me.  He has been following with [facility] and most recently by my colleague Dr. Ron on 9/12.  He underwent Watchman procedure in 2021 (high [id number] score).  He underwent 2 JEAN CLAUDE's in 2021 and found to have 0.35-0.5 cm localized leak around the Watchman device by JEAN CLAUDE.  He was taken off of oral anticoagulation that year.  He is being seen today if he would benefit from oral anticoagulation as referred to EP by Tung. Patient has history of permanent atrial fibrillation since 2012.  On rate control.    There has been gradual decline in his LVEF over the years.  LVEF was 50% in 2011 and found to have reduced LVEF at 40% in 2020.  Most recent cardiac MRI 9/10/2024 reported nonischemic cardiomyopathy with stable LVEF 39%.  He is physically inactive due to hip pain.  Otherwise no chest pain, shortness of breath, dizziness, syncope or lower extremity edema.  He does not have a lot of energy.  He does not smoke or drink alcohol.  He has been physically very inactive over the last 2 months.  Current medications aspirin 81 mg, lisinopril 5 mg, metoprolol 25 mg, pravastatin    Medications, personal, family, and social history reviewed with patient and revised.    PAST MEDICAL HISTORY:  Past Medical History:   Diagnosis Date     Angioedema       Atrial fibrillation and flutter (H)      Basal cell carcinoma      Branch retinal vein occlusion of left eye (H) 07/01/2017     CAD (coronary artery disease) 2/29/2024    ANGIOGRAM 2021 Left Main The vessel was visualized by angiography, is moderate in size and is angiographically normal. Left Anterior Descending Mid LAD lesion is 25% stenosed. Ramus Intermedius Ramus lesion is 10% stenosed. Left Circumflex Dist Cx lesion is 30% stenosed. Right Coronary Artery The vessel was visualized by angiography, is moderate in size and is angiographically normal.       Chronic neck pain      Congestive heart failure (H) 2021     GERD (gastroesophageal reflux disease)      HTN (hypertension) 2015     Nonsmoker      Radiculopathy     cervical     Retinal hemorrhage     Vessel rupture in left retina     Rhinitis, allergic      S/P colonoscopy 04/15/2019     Urticaria        CURRENT MEDICATIONS:  Current Outpatient Medications   Medication Sig Dispense Refill     acetaminophen (TYLENOL) 650 MG CR tablet Take 650 mg by mouth every 8 hours as needed       aspirin 81 MG EC tablet Take 1 tablet (81 mg) by mouth daily       co-enzyme Q-10 100 MG CAPS capsule Take 200 mg by mouth daily       fish oil-omega-3 fatty acids 300-1,000 mg capsule [FISH OIL-OMEGA-3 FATTY ACIDS 300-1,000 MG CAPSULE] Take 2 g by mouth daily.       fluticasone (FLONASE) 50 MCG/ACT nasal spray Spray 1 spray into both nostrils daily 48 mL 3     lisinopril (ZESTRIL) 5 MG tablet Take 1 tablet (5 mg) by mouth daily for 360 days 90 tablet 3     metoprolol succinate ER (TOPROL XL) 25 MG 24 hr tablet Take 1 tablet (25 mg) by mouth daily (Patient taking differently: Take 25 mg by mouth daily. 0.5) 90 tablet 3     MULTIVITAMIN ORAL Take 1 tablet by mouth daily       nortriptyline (PAMELOR) 25 MG capsule Take 25 mg by mouth at bedtime       pravastatin (PRAVACHOL) 20 MG tablet Take 1 tablet (20 mg) by mouth daily 360 tablet 3     pseudoePHEDrine (SUDAFED) 120 MG 12 hr  tablet Take 1 tablet (120 mg) by mouth every 12 hours as needed for congestion 50 tablet 0     senna-docusate (SENOKOT-S/PERICOLACE) 8.6-50 MG tablet Take 1-2 tablets by mouth daily as needed for constipation Take while on oral narcotics to prevent or treat constipation. 100 tablet 1     tamsulosin (FLOMAX) 0.4 MG capsule Take 2 capsules (0.8 mg) by mouth daily 180 capsule 3       PAST SURGICAL HISTORY:  Past Surgical History:   Procedure Laterality Date     BASAL CELL CARCINOMA EXCISION  01/01/2020    Left nasal reconstruction     CATARACT EXTRACTION, BILATERAL Bilateral 2022     CERVICAL SPINE SURGERY      C8, T1, foraminotomy     CV CORONARY ANGIOGRAM N/A 01/20/2021    Procedure: Coronary Angiogram;  Surgeon: Willow Wellington MD;  Location: [facility] Cardiac Cath Lab;  Service: Cardiology     CV LEFT ATRIAL APPENDAGE CLOSURE N/A 07/14/2021    Procedure: CV LEFT ATRIAL APPENDAGE CLOSURE;  Surgeon: Noah Gutierres MD;  Location: [facility] CARDIAC CATH LAB     CV LEFT HEART CATHETERIZATION WITHOUT LEFT VENTRICULOGRAM Left 01/20/2021    Procedure: Left Heart Catheterization Without Left Ventriculogram;  Surgeon: Willow Wellington MD;  Location: [facility] Cardiac Cath Lab;  Service: Cardiology     DECOMPRESSION, FUSION LUMBAR POSTERIOR ONE LEVEL, COMBINED Left 3/30/2023    Procedure: LEFT LUMBAR 4-5 POSTERIOR SPINAL FUSION WITH LEFT LUMBAR 4-5 LAMINOFORAMINOTOMY AND WIDE LUMBAR 4-5 LEFT SUBTOTAL FACETECTOMY WITH ALLOGRAFT AND AUTOGRAFT;  Surgeon: Ambrose Degroot MD;  Location: [facility] Main OR     HERNIA REPAIR, UMBILICAL       ×2     REVERSE TOTAL SHOULDER ARTHROPLASTY Left 05/01/2019       ALLERGIES:     Allergies   Allergen Reactions     Effient [Prasugrel Hcl] Hives     Hydrochlorothiazide Unknown     Hydrocodone Swelling     Sinus     Oxycontin [Oxycodone] Swelling     lip     Peppermint Oil Other (See Comments)     Plavix [Clopidogrel] Hives     Sulfa (Sulfonamide Antibiotics) [Sulfa Antibiotics] Swelling      Perfume Swelling     Other reaction(s): Runny Nose       FAMILY HISTORY:  Family History   Problem Relation Age of Onset     Arthritis Mother      Other - See Comments Mother         psychiatry/pvd     Other - See Comments Father         blood reaction         SOCIAL HISTORY:  Social History     Tobacco Use     Smoking status: Never     Smokeless tobacco: Never   Substance Use Topics     Alcohol use: Never     Comment: Alcoholic Drinks/day: About once a year.     Drug use: No       ROS:   Constitutional: No fever, chills, or sweats. Weight stable.   Cardiovascular: As per HPI.       Exam:  /76 (BP Location: Right arm, Patient Position: Chair, Cuff Size: Adult Large)   Pulse 80   Wt 108 kg (238 lb)   SpO2 97%   BMI 32.28 kg/m    GENERAL APPEARANCE: alert and no distress  HEENT: no icterus, no central cyanosis  LYMPH/NECK: no adenopathy, no asymmetry, JVP not elevated  RESPIRATORY: lungs clear to auscultation - no rales, rhonchi or wheezes, no use of accessory muscles, no retractions, respirations are unlabored, normal respiratory rate  CARDIOVASCULAR: ir-regular rhythm, normal S1, S2, no S3 or S4 and no murmur, click or rub, precordium quiet with normal PMI.  GI: soft, non tender  EXTREMITIES: no edema  NEURO: alert, normal speech,and affect  SKIN: no ecchymoses, no rashes     I have reviewed the labs and personally reviewed the imaging below and made my comment in the assessment and plan.    Labs:  CBC RESULTS:   Lab Results   Component Value Date    WBC 7.4 06/22/2023    RBC 4.62 06/22/2023    HGB 14.6 06/22/2023    HCT 42.7 06/22/2023    MCV 92 06/22/2023    MCH 31.6 06/22/2023    MCHC 34.2 06/22/2023    RDW 13.4 06/22/2023     06/22/2023       BMP RESULTS:  Lab Results   Component Value Date     07/16/2024    POTASSIUM 4.8 07/16/2024    POTASSIUM 3.5 04/02/2023    CHLORIDE 104 07/16/2024    CHLORIDE 101 04/02/2023    CO2 30 (H) 07/16/2024    CO2 24 04/02/2023    ANIONGAP 10 07/16/2024     ANIONGAP 12 04/02/2023    GLC 98 07/16/2024     04/02/2023    BUN 20.3 07/16/2024    BUN 11 04/02/2023    CR 1.37 (H) 07/16/2024    GFRESTIMATED 54 (L) 07/16/2024    GFRESTIMATED >60 06/27/2021    GFRESTBLACK >60 06/27/2021    JOVAN 11.1 (H) 07/16/2024        Cardiac MRI 9/10/2024  1.  Pharmacologic regadenoson stress cardiac MRI is negative for inducible myocardial ischemia.   2.  No evidence of prior myocardial infarction.  3.  There is a midwall stripe of nonvascular myocardial fibrosis involving the basal and mid septal walls  of the left ventricle which is a finding typical of nonischemic cardiomyopathy.   4.  No evidence of infiltrative disease.  5.  Left ventricular chamber size and wall thickness are normal.  Systolic function is moderately reduced  with global hypokinesis. The quantified left ventricular ejection fraction is 39%.     6.  Right ventricular chamber size and systolic function are normal. The quantified right ventricular  ejection fraction is 56%.      7.  Severe left atrial enlargement. Moderate right atrial enlargement.  8.  No significant valvular abnormalities.     Echocardiogram 6/12/2024  1. The left ventricle is normal in size. Left ventricular systolic performance  is moderately reduced. The ejection fraction is estimated to be 30%.  2. There is moderate global reduction in left ventricular systolic  performance.  3. There is mild concentric increase in left ventricular wall thickness.  4. No significant valvular heart disease is identified on this study.  5. Normal right ventricular size with low normal right ventricular systolic  performance.  6. There is moderate to severe left atrial enlargement. There is moderate  right atrial enlargement.     The prior real-time echocardiogram could not be accessed from the digital  archive for direct comparison.    Coronary angiogram 2021: Successful implantation of watchman flex device mild coronary atherosclerosis.    EKG 9/10/2024  shows atrial fibrillation.        Assessment and Plan:     # Permanent atrial fibrillation status post Watchman in 2021  -Patient was found to have 0.35-0.5 cm gap around the device by JEAN CLAUDE 10/18/2021.  He has not had any imaging since.  He is currently on aspirin.  He has not had any stroke events.  He is being seen today to see if he would benefit from oral anticoagulation.  If the leak is similar I do not think he would need oral anticoagulation.  -Will schedule him for cardiac CT to assess for periprocedural leak.    # Permanent atrial fibrillation  -Continue with rate control and aspirin for now    # Nonischemic cardiomyopathy LVEF~39%  -Functional capacity difficult to assess as he has hip pain and is not physically active.  Euvolemic on exam.  He has been following with [facility] but he tells me that he was having hard time to get into appointments with Dr. Beck there.  Therefore scheduled his follow-up at [facility].  Most recently seen virtually by my colleague on 9/12.  Patient currently on metoprolol and lisinopril.  Will check if his insurance is covering for Jardiance and Entresto.    No medication changes today.    Return to clinic with Dr. Ron or cardiology in [place].  Will check with the patient.    Total time spent today for this visit is 85 minutes including precharting, face-to-face clinic visit, review of labs/imaging and medical documentation.    Ruth PIERRE MD  [facility] Division of Cardiology  Pager [phone]     Addendum note 10/6/2024:  Jardiance 10mg   -$556.00 copay     Entresto 24-26mg BID   -$572.66 copay     Unlikely patient will be able to pay.    Dr PIERER           Please do not hesitate to contact me if you have any questions/concerns.     Sincerely,     Ruth Pierre MD

## 2024-10-03 NOTE — NURSING NOTE
Chief Complaint   Patient presents with    Follow Up     New EP     Vitals were taken and medications reconciled.    Eddie Toledo, EMT  11:08 AM

## 2024-10-07 ENCOUNTER — MYC MEDICAL ADVICE (OUTPATIENT)
Dept: CARDIOLOGY | Facility: CLINIC | Age: 74
End: 2024-10-07
Payer: MEDICARE

## 2024-10-09 ENCOUNTER — PATIENT OUTREACH (OUTPATIENT)
Dept: CARE COORDINATION | Facility: CLINIC | Age: 74
End: 2024-10-09
Payer: MEDICARE

## 2024-10-10 ENCOUNTER — HOSPITAL ENCOUNTER (OUTPATIENT)
Facility: AMBULATORY SURGERY CENTER | Age: 74
Discharge: HOME | End: 2024-10-10
Attending: ANESTHESIOLOGY | Admitting: ANESTHESIOLOGY
Payer: MEDICARE

## 2024-10-10 ENCOUNTER — ANESTHESIA EVENT (OUTPATIENT)
Dept: SURGERY | Facility: AMBULATORY SURGERY CENTER | Age: 74
End: 2024-10-10
Payer: MEDICARE

## 2024-10-10 ENCOUNTER — ANESTHESIA (OUTPATIENT)
Dept: SURGERY | Facility: AMBULATORY SURGERY CENTER | Age: 74
End: 2024-10-10
Payer: MEDICARE

## 2024-10-10 ENCOUNTER — ANCILLARY PROCEDURE (OUTPATIENT)
Dept: RADIOLOGY | Facility: AMBULATORY SURGERY CENTER | Age: 74
End: 2024-10-10
Attending: ANESTHESIOLOGY
Payer: MEDICARE

## 2024-10-10 VITALS
RESPIRATION RATE: 18 BRPM | OXYGEN SATURATION: 96 % | HEIGHT: 72 IN | SYSTOLIC BLOOD PRESSURE: 139 MMHG | HEART RATE: 88 BPM | BODY MASS INDEX: 32.23 KG/M2 | DIASTOLIC BLOOD PRESSURE: 42 MMHG | WEIGHT: 238 LBS | TEMPERATURE: 96.1 F

## 2024-10-10 DIAGNOSIS — M54.16 LUMBAR RADICULOPATHY: ICD-10-CM

## 2024-10-10 PROCEDURE — 62323 NJX INTERLAMINAR LMBR/SAC: CPT

## 2024-10-10 RX ORDER — METHYLPREDNISOLONE ACETATE 40 MG/ML
INJECTION, SUSPENSION INTRA-ARTICULAR; INTRALESIONAL; INTRAMUSCULAR; SOFT TISSUE PRN
Status: DISCONTINUED | OUTPATIENT
Start: 2024-10-10 | End: 2024-10-10 | Stop reason: HOSPADM

## 2024-10-10 RX ORDER — BUPIVACAINE HYDROCHLORIDE 2.5 MG/ML
INJECTION, SOLUTION EPIDURAL; INFILTRATION; INTRACAUDAL PRN
Status: DISCONTINUED | OUTPATIENT
Start: 2024-10-10 | End: 2024-10-10 | Stop reason: HOSPADM

## 2024-10-10 RX ORDER — LIDOCAINE HYDROCHLORIDE 10 MG/ML
INJECTION, SOLUTION EPIDURAL; INFILTRATION; INTRACAUDAL; PERINEURAL PRN
Status: DISCONTINUED | OUTPATIENT
Start: 2024-10-10 | End: 2024-10-10 | Stop reason: HOSPADM

## 2024-10-10 RX ORDER — IOPAMIDOL 408 MG/ML
INJECTION, SOLUTION INTRATHECAL PRN
Status: DISCONTINUED | OUTPATIENT
Start: 2024-10-10 | End: 2024-10-10 | Stop reason: HOSPADM

## 2024-10-10 RX ORDER — DIAZEPAM 5 MG/1
5-10 TABLET ORAL
Status: DISCONTINUED | OUTPATIENT
Start: 2024-10-10 | End: 2024-10-11 | Stop reason: HOSPADM

## 2024-10-10 NOTE — OP NOTE
Patient: Mike Gonzalez Age: 73 year old   MRN: [id number] Attending: Dr. Ya     Date of Visit: October 10, 2024      PAIN MEDICINE CLINIC PROCEDURE NOTE    ATTENDING CLINICIAN:    Zulma Ya MD    ASSISTANT CLINICIAN:  Aydin Fair MD    PREPROCEDURE DIAGNOSES:  1.  Lumbar radiculopathy  2.  Chronic low back pain       PROCEDURE(S) PERFORMED:  1.  Interlaminar lumbar epidural steroid injection   2.  Fluoroscopic guidance for the above-named procedure(s)      ANESTHESIA:  Local.    INDICATIONS:  Mike Gonzalez is a 73 year old male with a history of  chronic low back pain secondary to bilateral lumbosacral radiculopathy .  The patient stated that the patient was in their usual state of health and denied recent anticoagulant use or recent infections.  Therefore, the plan is to perform above mentioned procedures.     Procedure Details:  The patient was met in the procedure room, where the patient was identified by name, medical record number and date of birth.  All of the patient s last minute questions were answered. Written informed consent was obtained and saved in the electronic medical record, after the risks, benefits, and alternatives were discussed with the patient.      A formal time-out procedure was performed, as per protocol, including patient name, title of procedure, and site of procedure, and all in the room concurred.  Routine monitors were applied.      The patient was placed in the prone position on the procedure room table.  All pressure points were checked and comfortably padded.  Routine monitors were placed.  Vital signs were stable.    A chlorhexidine prep was completed followed by sterile draping per standard procedure.     The AP fluoroscopic view was optimized for approach at lumbar L1-L2 interspace.  The skin over the interspace was infiltrated with 4-5 mL of 1% Lidocaine using a 25 gauge, 1.5 inch needle.  A 20-gauge 3-1/2 inch Tuohy needle was advanced under fluoroscopic guidance with  left paramedial approach until it touched lamina. Mutiple AP and lateral fluoroscopic images at this time  are taken as Tuohy needle was advanced to the epidural space. The epidural space was identified, without evidence of blood, cerebrospinal fluid, or parasthesia throughout. Needle tip placement within the epidural space was further confirmed with 1-2 mL of nonionic contrast agent, with the epidural space visualized in the AP and lateral fluoroscopic view(s) with appropriate spread of the agent with fat vacuolization and no intravascular or intrathecal spread noted. Next, 8 mL of a treatment solution containing 2 mL of preservative free 0.25% bupivacaine, 1 mL of Depo-Medrol 40 mg/mL and 5 mL preservative free normal saline was administered slowly. The needle was withdrawn.      Light pressure was held at the puncture site(s) to prevent ecchymosis and oozing.  The patient's skin was cleansed, and hemostasis was confirmed.  Band-aids were applied to the needle injection site(s).      Condition:    The patient remained awake and alert throughout the procedure.  The patient tolerated the procedure well and was monitored for approximately 15 minutes afterward in the post procedure area.  There were no immediate post procedure complications noted.  The patient was then discharged to home as per protocol.      Pre-procedure pain score: 8/10  Post-procedure pain score: 3/10

## 2024-10-10 NOTE — DISCHARGE INSTRUCTIONS

## 2024-10-17 ENCOUNTER — HOSPITAL ENCOUNTER (OUTPATIENT)
Facility: AMBULATORY SURGERY CENTER | Age: 74
Discharge: HOME | End: 2024-10-17
Attending: ANESTHESIOLOGY | Admitting: ANESTHESIOLOGY
Payer: MEDICARE

## 2024-10-17 ENCOUNTER — ANCILLARY PROCEDURE (OUTPATIENT)
Dept: RADIOLOGY | Facility: AMBULATORY SURGERY CENTER | Age: 74
End: 2024-10-17
Attending: ANESTHESIOLOGY
Payer: MEDICARE

## 2024-10-17 VITALS
HEIGHT: 72 IN | OXYGEN SATURATION: 98 % | DIASTOLIC BLOOD PRESSURE: 77 MMHG | SYSTOLIC BLOOD PRESSURE: 136 MMHG | TEMPERATURE: 96.8 F | BODY MASS INDEX: 32.23 KG/M2 | HEART RATE: 78 BPM | WEIGHT: 238 LBS | RESPIRATION RATE: 18 BRPM

## 2024-10-17 DIAGNOSIS — R52 PAIN: ICD-10-CM

## 2024-10-17 DIAGNOSIS — M25.562 LEFT ANTERIOR KNEE PAIN: ICD-10-CM

## 2024-10-17 PROCEDURE — 20610 DRAIN/INJ JOINT/BURSA W/O US: CPT | Mod: LT

## 2024-10-17 RX ORDER — IOPAMIDOL 408 MG/ML
INJECTION, SOLUTION INTRATHECAL DAILY PRN
Status: DISCONTINUED | OUTPATIENT
Start: 2024-10-17 | End: 2024-10-17 | Stop reason: HOSPADM

## 2024-10-17 RX ORDER — BUPIVACAINE HYDROCHLORIDE 2.5 MG/ML
INJECTION, SOLUTION EPIDURAL; INFILTRATION; INTRACAUDAL DAILY PRN
Status: DISCONTINUED | OUTPATIENT
Start: 2024-10-17 | End: 2024-10-17 | Stop reason: HOSPADM

## 2024-10-17 RX ORDER — DIAZEPAM 5 MG/1
5-10 TABLET ORAL
Status: DISCONTINUED | OUTPATIENT
Start: 2024-10-17 | End: 2024-10-18 | Stop reason: HOSPADM

## 2024-10-17 RX ORDER — LIDOCAINE HYDROCHLORIDE 10 MG/ML
INJECTION, SOLUTION EPIDURAL; INFILTRATION; INTRACAUDAL; PERINEURAL DAILY PRN
Status: DISCONTINUED | OUTPATIENT
Start: 2024-10-17 | End: 2024-10-17 | Stop reason: HOSPADM

## 2024-10-17 NOTE — OP NOTE
Patient: Mike Gonzalez Age: 73 year old   MRN: [id number] Attending: Dr. Ya     Date of Visit: October 17, 2024      PAIN MEDICINE CLINIC PROCEDURE NOTE    ATTENDING CLINICIAN:    Zulma Ya MD    PREPROCEDURE DIAGNOSES:  1.  Left knee pain   2.  Knee joint osteoartthritis      PROCEDURE(S) PERFORMED:  1.  Procedure(s):  INJECTION, MAJOR JOINT OR BURSA OF MAJOR JOINT (left knee) Left knee synevisc injection   2.  Fluoroscopic guidance for the above-named procedure(s)      ANESTHESIA:  Local.    BLOOD LOSS:  Minimal.    DRAINS AND SPECIMENS:  None.    COMPLICATIONS:  None.    INDICATIONS:  Mike Gonzalez is a 73 year old male with a history of  chronic knee pain secondary to knee joint osteoarthritis .  The patient stated that the patient was in their usual state of health and denied recent anticoagulant use or recent infections.  Therefore, the plan is for Procedure(s):  INJECTION, MAJOR JOINT OR BURSA OF MAJOR JOINT (left knee)    Procedure Details:    The patient was met in the procedure room, where the patient was identified by name, medical record number and date of birth.  All of the patient s last minute questions were answered. Written informed consent was obtained and saved in the electronic medical record, after the risks, benefits, and alternatives were discussed with the patient.      A formal time-out procedure was performed, as per protocol, including patient name, title of procedure, and site of procedure, and all in the room concurred.  Routine monitors were applied.      The patient was placed in the supine position on the procedure room table.  All pressure points were checked and comfortably padded.  Routine monitors were placed.  Vital signs were stable.    A chlorhexidine prep was completed followed by sterile draping per standard procedure.     The puncture point is over the medial aspect of the knee at the mid point of the patella.  A small wheal of 1% Lidocaine was injected locally in to  the skin and 2 cc. of 1% plain Lidocaine was injected in to the subcutaneous fat.  Using a anteromedial approach and using a 20 gauge needle directed upwards towards the retropatellar surface. Then 48 mg. of Synevisc was injected easily in to the joint space.  A sterile Band-Aid was then applied and the knee was wrapped in a six inch Ace wrap bandage.     Light pressure was held at the puncture site(s) to prevent ecchymosis and oozing.  The patient's skin was cleansed, and hemostasis was confirmed.  Band-aids were applied to the needle injection site(s).      Condition:    The patient remained awake and alert throughout the procedure.  The patient tolerated the procedure well and was monitored for approximately 15 minutes afterward in the post procedure area.  There were no immediate post procedure complications noted.  The patient was then discharged to home as per protocol.      Pre-procedure pain score: 8/10  Post-procedure pain score: 2/10

## 2024-10-17 NOTE — DISCHARGE INSTRUCTIONS
Home Care Instructions after a Major Joint Injection      In a Major Joint Injection a local anesthetic (numbing medicine) is injected in or near the joint space.  Steroids are often used to help with the anti-inflammatory process.  A joint lubricant is sometimes injected to help with mobility.       Activity  -You may resume most normal activity levels with the exception of strenuous activity. It is important for us to know if your pain with normal activity is relieved after this injection.  -DO NOT shower for 24 hours  -DO NOT remove bandaid for 24 hours    Pain  -You may experience soreness at the injection site for one or two days  -You may use an ice pack for 20 minutes every 2 hours for the first 24 hours  -You may use a heating pad after the first 24 hours  -You may use Tylenol (acetaminophen) every 4 hours or other pain medicines as     directed by your physician    You may experience numbness radiating into your legs or arms (depending on the procedure location). This numbness may last several hours. Until sensation returns to normal; please use caution in walking, climbing stairs, and stepping out of your vehicle, etc.      DID YOU RECEIVE STEROIDS TODAY?      Common side effects of steroids:  Not everyone will experience corticosteroid side effects. If side effects are experienced, they will gradually subside in the 7-10 day period following an injection. Most common side effects include:  -Flushed face and/or chest  -Feeling of warmth, particularly in the face but could be an overall feeling of warmth  -Increased blood sugar in diabetic patients  -Menstrual irregularities my occur. If taking hormone-based birth control an alternate method of birth control is recommended  -Sleep disturbances and/or mood swings are possible  -Leg cramps      PLEASE KEEP TRACK OF YOUR SYMPTOMS AND NOTE YOUR IMPROVEMENT FOR YOUR DOCTOR.     Please contact us if you have:  -Severe pain  -Fever more than 101.5 degrees  Fahrenheit  -Signs of infection at the injection site (redness, swelling, or drainage)    FOR PAIN CENTER PATIENTS:  If you have questions, please contact the Pain Clinic at [phone] Option #1 between the hours of 7:00 am and 3:00 pm Monday through Friday. After office hours you can contact the on call provider by dialing [phone]. If you need immediate attention, we recommend that you go to a hospital emergency room or dial [phone].      FOR PM&R PATIENTS:  For patients seen by the PM and R service, please call [phone]. If you need to fax a pain diary to PM&R the fax number is [phone]. If you are unable to fax, uploading to [employer] is encouraged, then send to provider. If you have procedure scheduling questions please call [phone] Option #2.

## 2024-10-21 ENCOUNTER — OFFICE VISIT (OUTPATIENT)
Dept: NEUROLOGY | Facility: CLINIC | Age: 74
End: 2024-10-21
Attending: PHYSICIAN ASSISTANT
Payer: MEDICARE

## 2024-10-21 DIAGNOSIS — M54.16 LUMBAR RADICULOPATHY: ICD-10-CM

## 2024-10-21 DIAGNOSIS — M25.552 LEFT HIP PAIN: ICD-10-CM

## 2024-10-21 DIAGNOSIS — R20.2 PARESTHESIA OF BOTH FEET: ICD-10-CM

## 2024-10-21 PROCEDURE — 95911 NRV CNDJ TEST 9-10 STUDIES: CPT | Performed by: PSYCHIATRY & NEUROLOGY

## 2024-10-21 PROCEDURE — 95885 MUSC TST DONE W/NERV TST LIM: CPT | Mod: 59 | Performed by: PSYCHIATRY & NEUROLOGY

## 2024-10-21 PROCEDURE — 95886 MUSC TEST DONE W/N TEST COMP: CPT | Performed by: PSYCHIATRY & NEUROLOGY

## 2024-10-21 NOTE — LETTER
10/21/2024       RE: Mike Gonzalez  [id number] [facility] [place] [facility] [address]     Dear Colleague,    Thank you for referring your patient, Mike Gonzalez, to the [facility] [place] at [facility]. Please see a copy of my visit note below.                        [facility]  Electrodiagnostic Laboratory                 Department of Neurology                                                                                                         Test Date:  10/21/2024    Patient: Bj Gonzalez : 1950 Physician: Mark Anthony Nunez MD   Sex: Male AGE: 73 year Ref Phys: Sagrario Good PA-C   ID#: [id number]   Technician: Malvin Roche     History and Examination:  73 year old with a history of 2 low back surgeries who reports ongoing pain and paresthesias in his feet. This study is being performed to investigate for radiculopathy vs polyneuropathy.     Techniques:  Motor and sensory conduction studies were done with surface recording electrodes. EMG was done with a concentric needle electrode.     Results:  Nerve conduction studies:   1. Bilateral sural sensory responses show mildly reduced amplitudes and normal CV.  2. Right radial sensory response is normal.  3. Bilateral peroneal-EDB and tibial-AH motor responses show reduced amplitude. CV are normal where checked.   4. Right median-APB motor response is normal.     Needle EMG:  No abnormal spontaneous activity was seen in the sampled muscles.   Large amplitude and/or long duration motor unit potentials (MUP) were seen in the bilateral TA, right PL, bilateral gastrocnemius and right glut med muscles.   Recruitment patterns were normal.     Interpretation:  This is an abnormal study. 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 The absence of active denervation potentials in the sampled muscles argues against a recent nerve or nerve root injury. Clinical correlation is recommended.       Mark Anthony Nunez MD  Department of Neurology        Nerve Conduction Studies  Motor Sites      Latency Neg. Amp Neg. Amp Diff Segment Distance Velocity Neg. Dur Neg Area Diff Temperature Comment   Site (ms) Norm (mV) Norm (%)  cm m/s Norm (ms) (%) ( C)    Left Dp Branch Fibular (TA) Motor   Fibular Head 3.7  < 6.0 3.8 -      11.3  32.2    Right Dp Branch Fibular (TA) Motor   Fibular Head 3.5  < 6.0 4.3 -      9.3  32.4    Pop Fossa 5.5  < 5.7 4.3 - 0 Pop Fossa-Fibular Head 10.5 53 - 8.9 -4 32.3    Left Fibular (EDB) Motor   Ankle 3.8  < 6.0 1.20 -  Ankle-EDB 8   4.7  32.2    Right Fibular (EDB) Motor   Ankle 4.7  < 6.0 1.58 -  Ankle-EDB 5   4.3  32.7    Bel Fibular Head 13.4 - 1.06 - -33 Bel Fibular Head-Ankle 33.5 39  > 38 5.2 -27 32.6    Pop Fossa 16.1 - 1.11 - 5 Pop Fossa-Bel Fibular Head 11 41  > 38 5.8 15 32.5    Right Median (APB) Motor   Wrist 3.5  < 4.4 8.9  > 5.0  Wrist-APB 8   4.2  31.9    Elbow 8.1 - 8.7  > 5.0 -2 Elbow-Wrist 23.5 51  > 48 4.4 -4 32    Left Tibial (AHB) Motor   Ankle 4.0  < 6.5 1.15  > 5.0  Ankle-AH 8   6.0  32.4    Right Tibial (AHB) Motor   Ankle 4.0  < 6.5 1.23  > 5.0  Ankle-AH 8   7.8  32.2    Knee 13.6 - 0.28 - -77 Knee-Ankle 45 47  > 38 24.6 -46 32      F-Wave Sites      Min F-Lat Max-Min F-Lat Mean F-Lat   Site (ms) (ms) (ms)   Left Tibial F-Wave   Ankle 63.5 9.5 -   Right Tibial F-Wave   Ankle 72.3 7.5 -     Sensory Sites      Onset Lat Peak Lat Amp (O-P) Amp (P-P) Segment Distance Velocity Temperature Comment   Site ms (ms)  V Norm ( V)  cm m/s Norm ( C)    Right Radial Sensory   Forearm-Wrist 1.70 2.3 17  > 15 16 Forearm-Wrist 10 59 - 31.8    Left Sural Sensory   Calf-Lat Malleolus 3.3 3.9 3  > 5 4 Calf-Lat Malleolus 14 42  > 38     Right Sural Sensory   Calf-Lat Malleolus 3.3 4.0 4  > 5 4 Calf-Lat  Malleolus 14 42  > 38         Electromyography     Side Muscle Ins Act Fibs/PSW Fasc HF Amp Dur Poly Recrt Int Pat   Right Vastus lat Nml None Nml 0 Nml Nml 0 Nml Nml   Right Tib ant Nml None Nml 0 1+ 1+ 2+ Nml Nml   Right Gastroc Nml None Nml 0 Nml 1+ 1+ Nml Nml   Right Gluteus med Nml None Nml 0 Nml 1+ 1+ Nml Nml   Right Fib longus Nml None Nml 0 1+ Nml 1+ Nml Nml   Left Vastus lat Nml None Nml 0 Nml Nml 0 Nml Nml   Left Tib ant Nml None Nml 0 Nml 1+ 1+ Nml Nml   Left Gastroc Nml None Nml 0 2+ Nml 1+ Nml Nml         NCS Waveforms:    Motor                         Sensory           F-Wave           Ultrasound Images:            Again, thank you for allowing me to participate in the care of your patient.      Sincerely,    Mark Anthony Nunez MD

## 2024-10-21 NOTE — PROGRESS NOTES
[facility]  Electrodiagnostic Laboratory                 Department of Neurology                                                                                                         Test Date:  10/21/2024    Patient: Bj Gonzalez : 1950 Physician: Mark Anthony Nunez MD   Sex: Male AGE: 73 year Ref Phys: Sagrario Good PA-C   ID#: [id number]   Technician: Malvin Roche     History and Examination:  73 year old with a history of 2 low back surgeries who reports ongoing pain and paresthesias in his feet. This study is being performed to investigate for radiculopathy vs polyneuropathy.     Techniques:  Motor and sensory conduction studies were done with surface recording electrodes. EMG was done with a concentric needle electrode.     Results:  Nerve conduction studies:   1. Bilateral sural sensory responses show mildly reduced amplitudes and normal CV.  2. Right radial sensory response is normal.  3. Bilateral peroneal-EDB and tibial-AH motor responses show reduced amplitude. CV are normal where checked.   4. Right median-APB motor response is normal.     Needle EMG:  No abnormal spontaneous activity was seen in the sampled muscles.   Large amplitude and/or long duration motor unit potentials (MUP) were seen in the bilateral TA, right PL, bilateral gastrocnemius and right glut med muscles.   Recruitment patterns were normal.     Interpretation:  This is an abnormal study. There is electrophysiologic evidence of (1) chronic bilateral lumbosacral radiculopathies affecting the L5 and S1 nerve roots and (2) a superimposed mild length-dependant axonal sensory or sensorimotor polyneuropathy. The absence of active denervation potentials in the sampled muscles argues against a recent nerve or nerve root injury. Clinical correlation is recommended.       Mark Anthony Nunez MD  Department of Neurology        Nerve Conduction Studies  Motor Sites      Latency Neg. Amp Neg. Amp Diff Segment  Distance Velocity Neg. Dur Neg Area Diff Temperature Comment   Site (ms) Norm (mV) Norm (%)  cm m/s Norm (ms) (%) ( C)    Left Dp Branch Fibular (TA) Motor   Fibular Head 3.7  < 6.0 3.8 -      11.3  32.2    Right Dp Branch Fibular (TA) Motor   Fibular Head 3.5  < 6.0 4.3 -      9.3  32.4    Pop Fossa 5.5  < 5.7 4.3 - 0 Pop Fossa-Fibular Head 10.5 53 - 8.9 -4 32.3    Left Fibular (EDB) Motor   Ankle 3.8  < 6.0 1.20 -  Ankle-EDB 8   4.7  32.2    Right Fibular (EDB) Motor   Ankle 4.7  < 6.0 1.58 -  Ankle-EDB 5   4.3  32.7    Bel Fibular Head 13.4 - 1.06 - -33 Bel Fibular Head-Ankle 33.5 39  > 38 5.2 -27 32.6    Pop Fossa 16.1 - 1.11 - 5 Pop Fossa-Bel Fibular Head 11 41  > 38 5.8 15 32.5    Right Median (APB) Motor   Wrist 3.5  < 4.4 8.9  > 5.0  Wrist-APB 8   4.2  31.9    Elbow 8.1 - 8.7  > 5.0 -2 Elbow-Wrist 23.5 51  > 48 4.4 -4 32    Left Tibial (AHB) Motor   Ankle 4.0  < 6.5 1.15  > 5.0  Ankle-AH 8   6.0  32.4    Right Tibial (AHB) Motor   Ankle 4.0  < 6.5 1.23  > 5.0  Ankle-AH 8   7.8  32.2    Knee 13.6 - 0.28 - -77 Knee-Ankle 45 47  > 38 24.6 -46 32      F-Wave Sites      Min F-Lat Max-Min F-Lat Mean F-Lat   Site (ms) (ms) (ms)   Left Tibial F-Wave   Ankle 63.5 9.5 -   Right Tibial F-Wave   Ankle 72.3 7.5 -     Sensory Sites      Onset Lat Peak Lat Amp (O-P) Amp (P-P) Segment Distance Velocity Temperature Comment   Site ms (ms)  V Norm ( V)  cm m/s Norm ( C)    Right Radial Sensory   Forearm-Wrist 1.70 2.3 17  > 15 16 Forearm-Wrist 10 59 - 31.8    Left Sural Sensory   Calf-Lat Malleolus 3.3 3.9 3  > 5 4 Calf-Lat Malleolus 14 42  > 38     Right Sural Sensory   Calf-Lat Malleolus 3.3 4.0 4  > 5 4 Calf-Lat Malleolus 14 42  > 38         Electromyography     Side Muscle Ins Act Fibs/PSW Fasc HF Amp Dur Poly Recrt Int Pat   Right Vastus lat Nml None Nml 0 Nml Nml 0 Nml Nml   Right Tib ant Nml None Nml 0 1+ 1+ 2+ Nml Nml   Right Gastroc Nml None Nml 0 Nml 1+ 1+ Nml Nml   Right Gluteus med Nml None Nml 0 Nml 1+ 1+ Nml Nml    Right Fib longus Nml None Nml 0 1+ Nml 1+ Nml Nml   Left Vastus lat Nml None Nml 0 Nml Nml 0 Nml Nml   Left Tib ant Nml None Nml 0 Nml 1+ 1+ Nml Nml   Left Gastroc Nml None Nml 0 2+ Nml 1+ Nml Nml         NCS Waveforms:    Motor                         Sensory           F-Wave           Ultrasound Images:

## 2024-10-28 ENCOUNTER — TELEPHONE (OUTPATIENT)
Dept: NEUROSURGERY | Facility: CLINIC | Age: 74
End: 2024-10-28
Payer: MEDICARE

## 2024-10-30 ENCOUNTER — VIRTUAL VISIT (OUTPATIENT)
Dept: NEUROSURGERY | Facility: CLINIC | Age: 74
End: 2024-10-30
Payer: MEDICARE

## 2024-10-30 DIAGNOSIS — M51.369 DEGENERATION OF INTERVERTEBRAL DISC OF LUMBAR REGION, UNSPECIFIED WHETHER PAIN PRESENT: ICD-10-CM

## 2024-10-30 DIAGNOSIS — R20.2 PARESTHESIA OF BOTH FEET: Primary | ICD-10-CM

## 2024-10-30 DIAGNOSIS — G89.29 CHRONIC PAIN OF LEFT KNEE: ICD-10-CM

## 2024-10-30 DIAGNOSIS — Z98.1 HISTORY OF LUMBAR FUSION: ICD-10-CM

## 2024-10-30 DIAGNOSIS — M54.16 LUMBAR RADICULOPATHY: ICD-10-CM

## 2024-10-30 DIAGNOSIS — M25.562 CHRONIC PAIN OF LEFT KNEE: ICD-10-CM

## 2024-10-30 PROCEDURE — 99417 PROLNG OP E/M EACH 15 MIN: CPT | Mod: 95 | Performed by: PHYSICIAN ASSISTANT

## 2024-10-30 PROCEDURE — 99215 OFFICE O/P EST HI 40 MIN: CPT | Mod: 95 | Performed by: PHYSICIAN ASSISTANT

## 2024-10-30 NOTE — PROGRESS NOTES
Virtual Visit Details    Type of service:  Video Visit   Video Start Time:  8:04  Video End Time: 8:41    Originating Location (pt. Location): Home    Distant Location (provider location):  Off-site  Platform used for Video Visit: Chio

## 2024-10-30 NOTE — LETTER
10/30/2024       RE: Mike Gonzalez  [address] [facility] [place] [address]     Dear Colleague,    Thank you for referring your patient, Mike Gonzalez, to the [facility] NEUROSURGERY [facility] [place] at [facility]. Please see a copy of my visit note below.        Neurosurgery Clinic Note      ASSESSMENT & PLAN:  Mike Gonzalez is a 73 year old male with a PMH of atrial flutter, s/p left Watchman implant, CAD, hypertension, GERD, left rotator cuff tear, C6/7 decompression ([place] 2002), L4/5 decomp ([facility], 1/2022), L4/5 fusion, L3/4 decomp, CSF leak ([facility] 3/30/23) who is following up for low back and left buttock/hip pain.     Patient has had left outer hip pain prior to his lumbar surgeries, which was not relieved with either surgery.  He has not had lasting relief with GT bursal or L5/S1 left TFESI injection.  His hip pain is affecting his ability to ambulate longer distances because of low back and left posterior hip pain.  Recent low back PT has improved his symptoms, but this was exacerbated after balance PT and his ability to ambulate longer distances did not change.  He does have significant left medial knee degeneration which is painful and he ambulates with his left hip/foot pointed laterally.      He f/u today s/p left synevisc knee injection, L1/L2 ILESI and EMG BLE.  Patient noted complete resolution of hip pain for for 2 weeks w/ gradual return.  He even notes his overall body pain and the toe soreness was gone for a while after the injection.  He also reports improvement of pain in his feet with antibiotics.  He has consistently reported improved pain levels after getting other injections and oral steroids as well (which begs the question of a systemic anti-inflammatory effect).  He still endorses left knee pain, but he was told it could take upwards of 2 weeks for the Synevisc injection to work.  He is more achy today, but relates that to the incoming  weather.  Patient would like not to do surgery and is open to trying medication if that would be helpful. He has cardiac issues and is sensitive to medications so would need to be careful.  He has tried gabapentin in the past after a shoulder surgery and did not like how he felt on that--felt fatigued.  He has not tried Duloxetine or Lyrica.      Lumbar CT myelogram 10/2023 and lumbar MRI 5/2023 reveal severe left L1/2 subarticular stenosis and L5/S1 severe left FS.    EMG 12/2023 [facility] - indicated chronic/slightly active L5 > L4 left radic.  EMG BLE 10/21/24 - chronic raudel L5 & S1 radic, mild length-dep axonal s/m polyneuropathy  Lumbar MRI 8/2/24 - L4/5 Fusion.  L5/S1 mod L > R NFS - disc material contacts L5 raudel w/ elevation of L5; L1/2 left s/a disc extrusion w/ caudal migration, mod L NFS.  No significant changes from prior lumbar imaging.   EOS XR 7/15/24 - positive global coronal and sagittal balance; L4/5 left posterior fusion stable.     We discussed that he does have issues in his back that can be addressed with surgery, but with the EMG results showing chronic (not active radic) and neuropathy, his surgical outcome for resolution of the pains he is c/o is guarded.  I think he has had a good response to Voltaren gel, antibiotics and steroids (injections and oral) in large part because of their anti-inflammatory properties.  It may be beneficial to have him get a recommendation on medication management by the pain team given his complexities.      -Neurology cognitive consult - 1/20/25  -Referral to pain management for comprehensive pain evaluation.  -Follow up with nsgy if medication management is not effective and patient wants to consider surgery.         I spent 83 minutes spent in patient care, independent review and interpretation of medical records/imaging, reviewing old records.    History of Present Illness:  Mike Gonzalez is a 73 year old male with a PMH of atrial flutter, s/p left Watchman implant,  CAD, hypertension, GERD, left rotator cuff tear, C6/7 decompression ([place] 2002), L4/5 decomp ([facility], 1/2022), L4/5 fusion, L3/4 decomp, CSF leak ([facility] 3/30/23) who is presenting for evaluation of LLE pain.    11/22/23 Visit - Patient has surgery for cervical decompression at [place] in 2002.  He had a very difficult time with pain after the surgery and that lasted about 10 years.  He has continues to have poor balance since that surgery.  He also notes bilateral RTC issues and had left shoulder replacement.  He continues to have more weakness in his left shoulder.  His neck ROM is significantly reduced.  This improved for about 1 week after having PRP/stem cell treatment about 3-4 years from Dr. Elbert Marmolejo [facility].    About 3 years ago, started having sore left hip walking up hills.  He saw ortho MD who check out hip and did not find anything concerning.  He continued to have issues with his hip and he went to [facility] who identified pinched nerve in his back.  He had 2 surgeries as a result (see above) resulting in fusion at L4/5.  The surgery reduced the freezing feeling in his feet.  It did not help with the left hip pain and now he has back pain in addition to the hip pain.  The pain starts in the left outer hip.  When it is bad, he gets pain on the right.  He has a sore spot on the left medial left knee which has responded to injections in the past.  The pain is a solid ache, but increases to a sharp pain depending on his activity.  If he does too much, he pays for it the next day.  Steroids helped with energy level and was able to walk further without significant pain. He also complains that his toes hurt and feel like cardboard.   He does better walking on level surface.  On the level, he can go 700 feet. This is is reduced to about 400-500 feet if not level.  He denies that his legs feel tired, but that the outer hip is painful to him which requires him to stop for a while before he can continue.  The pain  comes from lateral hip to the knee level.  He had an injection in his left hip that helped for 1-2 weeks.    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 His goal is to be able to walk around and do his photography work and get back to writing his books, etc.    EMG before surgery in March 2023.  Next one schedule 12/8/23 at [facility].      7/15/24 Visit  -patient returns after having significant physical therapy at [facility].  He had 12 sessions of regular PT which he felt was helpful and then transition to pool therapy but unfortunately was unable to continue because of a rash that he developed from the chlorine.  He most recently was engaged in balance therapy which he stated exacerbated his back and leg symptoms.  He continues to endorse low back pain that is worse on the left than the right but does cross his entire back at a level just above the belt line.  He also notes he has left posterior hip is more painful than his right.  He does have left knee pain and is aware that he is bone-on-bone degeneration.  He has undergone evaluation at [facility] and has had injections that have been helpful but nothing recently.  He is open to having a second opinion on whether more injections or surgery would be beneficial.  He denies any radicular pain extending beyond his hips or bowel or bladder issues aside from constipation on occasion.  His neurologist from [facility] on disputes the EMG test by [facility] that he does not have neuropathy, the neurologist notes he does have neuropathy.  The patient notes that he has pain in all of his toes that improves with Voltaren gel.  He also endorses generalized low energy.  He is getting evaluated at in August by cardiology here.  Perhaps more bothersome to the patient is that he is unable to ambulate more than 700 feet at a time due to the pain in his low back and left hip.  He is able to  stop for a period of time at that distance and then is able to ambulate a little bit further.  His pain is preventing him from doing ADLs walking.  It is worse with flexion of his back.  His wife is with him today and notes that sometimes his back looks laterally malaligned.  Patient denies any radicular pains in his right LE.      8/5/24 Visit - patient f/u after having lumbar MRI updated. He notes left buttock/hip pain posterolateral location that affects him when he is walking.  Otherwise has chronic low back pain.  Left medial knee discomfort is actually better after his last injection.  Denies radicular pain in BLE.  Still applying Voltaren gel to his feet for paresthesia.  He notes his feet are very cold.  Had cardiology workup which was negative for any vascular blockages in his legs per US.  He saw orthopedics for his left knee and also mentioned his hip.  He is not pursuing injections or surgery at this time for those areas.    He also c/o cognitive issues right now that are limiting his ability to write and process things fluently.  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  He has noticed more of a change in the last 3-4 months.  He reports a concussion about 5 years ago after a fall off a ladder and then had COVID.  He is concerned about a long COVID issue.      10/30/24 Visit (VIDEO) - follow-up after L1/L2 ILESI injection and EMG BLE (chronic L5 & S1 raudel, mild s/m polyneuropathy raudel).    Patient noted complete resolution of hip pain for for 2 weeks w/ gradual return.  He even reports being able to get out and take pictures of the northern lights which was a treat for him.  He notes his overall body pain and the toe soreness was gone for a while after the injection.  He also reports improvement of pain in his feet with antibiotics.  He has consistently reported improved pain levels after getting other injections and oral steroids as well (which begs the question of a systemic  anti-inflammatory effect).  He still endorses left knee pain, but he was told it could take upwards of 2 weeks for the Synevisc injection to work.  He is more achy today, but relates that to the incoming weather.  Patient would like not to do surgery and is open to trying medication if that would be helpful. He has cardiac issues and is sensitive to medications so would need to be careful.  He has tried gabapentin in the past after a shoulder surgery and did not like how he felt on that--felt fatigued.  He has not tried Duloxetine or Lyrica.      Conservative Treatment:  Tylenol - mild help  Heating pad to left hip - helps reduce pain  Prednisone 40 mg x5 days 9/20/23 - 2 weeks relief  Injections:  10/12/23 - Left GT bursal injection - helped for 1-2 weeks  12/27/23 - L5/S1 left TFESI Rayus - 50% relief - no lasting benefit  2/22//16 - PRP and stem cell injection in bottom of foot w/ Dr. Elbert Gonzales [facility] - [facility]. Helped about 1 week.  10/10/24 L1/2 ILESI Dr. Ya (8-3) -   10/17/24 left knee Synevisc [place] (8-2)  Acupuncture - no lasting benefit  PT - [facility] since 11/22/23 - balance, land & pool  Gabapentin 200 mg tid - SE - lack of energy  Lyrica - not tried   Nortriptyline - 25 mg HS  - by PCP  Duloxetine - not filled              Review of Systems   See HPI    Past Medical History:   Diagnosis Date     Angioedema      Atrial fibrillation and flutter (H)      Basal cell carcinoma      Branch retinal vein occlusion of left eye (H) 07/01/2017     CAD (coronary artery disease) 2/29/2024    ANGIOGRAM 2021 Left Main The vessel was visualized by angiography, is moderate in size and is angiographically normal. Left Anterior Descending Mid LAD lesion is 25% stenosed. Ramus Intermedius Ramus lesion is 10% stenosed. Left Circumflex Dist Cx lesion is 30% stenosed. Right Coronary Artery The vessel was visualized by angiography, is moderate in size and is angiographically normal.       Chronic neck pain       Congestive heart failure (H) 2021     GERD (gastroesophageal reflux disease)      HTN (hypertension) 2015     Nonsmoker      Radiculopathy     cervical     Retinal hemorrhage     Vessel rupture in left retina     Rhinitis, allergic      S/P colonoscopy 04/15/2019     Urticaria        Past Surgical History:   Procedure Laterality Date     BASAL CELL CARCINOMA EXCISION  01/01/2020    Left nasal reconstruction     CATARACT EXTRACTION, BILATERAL Bilateral 2022     CERVICAL SPINE SURGERY      C8, T1, foraminotomy     CV CORONARY ANGIOGRAM N/A 01/20/2021    Procedure: Coronary Angiogram;  Surgeon: Willow Wellington MD;  Location: [facility] Cardiac Cath Lab;  Service: Cardiology     CV LEFT ATRIAL APPENDAGE CLOSURE N/A 07/14/2021    Procedure: CV LEFT ATRIAL APPENDAGE CLOSURE;  Surgeon: Noah Gutierres MD;  Location: [facility] CARDIAC CATH LAB     CV LEFT HEART CATHETERIZATION WITHOUT LEFT VENTRICULOGRAM Left 01/20/2021    Procedure: Left Heart Catheterization Without Left Ventriculogram;  Surgeon: Willow Wellington MD;  Location: [facility] Cardiac Cath Lab;  Service: Cardiology     DECOMPRESSION, FUSION LUMBAR POSTERIOR ONE LEVEL, COMBINED Left 3/30/2023    Procedure: LEFT LUMBAR 4-5 POSTERIOR SPINAL FUSION WITH LEFT LUMBAR 4-5 LAMINOFORAMINOTOMY AND WIDE LUMBAR 4-5 LEFT SUBTOTAL FACETECTOMY WITH ALLOGRAFT AND AUTOGRAFT;  Surgeon: Ambrose Degroot MD;  Location: [facility] Main OR     HERNIA REPAIR, UMBILICAL       ×2     INJECT EPIDURAL LUMBAR Left 10/10/2024    Procedure: INJECTION, SPINE, LUMBAR, EPIDURAL (left L1-L2);  Surgeon: Zulma Ya MD;  Location: [facility] OR     INJECT MAJOR JOINT / BURSA Left 10/17/2024    Procedure: INJECTION, MAJOR JOINT OR BURSA OF MAJOR JOINT (left knee);  Surgeon: Zulma Ya MD;  Location: [facility] OR     REVERSE TOTAL SHOULDER ARTHROPLASTY Left 05/01/2019       Social History     Socioeconomic History     Marital status:      Spouse name: None     Number of children: None      Years of education: None     Highest education level: None   Tobacco Use     Smoking status: Never     Smokeless tobacco: Never   Substance and Sexual Activity     Alcohol use: Never     Comment: Alcoholic Drinks/day: About once a year.     Drug use: No   Social History Narrative    Patient of Dr. Rod since 2001.    .  Wife is a former RN.     Wife's cousin is Dr. Jean Pierre Champagne, ID specialist.       family history includes Arthritis in his mother; Other - See Comments in his father and mother.       IMAGING   Imaging independently reviewed.    EMG 10/21/24 Dr. Nunez -   Interpretation:  This is an abnormal study. There is electrophysiologic evidence of (1) chronic bilateral lumbosacral radiculopathies affecting the L5 and S1 nerve roots and (2) a superimposed mild length-dependant axonal sensory or sensorimotor polyneuropathy. The absence of active denervation potentials in the sampled muscles argues against a recent nerve or nerve root injury. Clinical correlation is recommended.    Lumbar MRI 8/2/24 -   FINDINGS:   Nomenclature is based on 5 lumbar type vertebral bodies. Posterior spinal fusion and hemilaminectomy on the left at L4-5. Persistent moderate lumbar levoscoliosis. Straightening the normal AP curvature with grade 1 retrolisthesis of L2 on L3. Vertebral   body heights are maintained. Normal marrow signal. Normal distal spinal cord and cauda equina with conus medullaris at T12-L1. There is benign-appearing right renal cyst, no additional radiographic follow-up is necessary. Unremarkable visualized bony   pelvis.  T12-L1: Disc desiccation and disc height loss. Diffuse disc bulge with left subarticular disc protrusion. Mild facet hypertrophy. No spinal canal stenosis. Mild left foraminal stenosis. No right foraminal stenosis. Mild left subarticular stenosis.   L1-L2: Disc desiccation and disc height loss. Diffuse disc bulge with left subarticular disc extrusion. The extruded disc fragment measures  6 mm AP by 9 mm transverse with 8 mm caudal migration. Bilateral facet hypertrophy. No spinal canal stenosis.   Moderate left and mild right neural foraminal stenosis. Mild left subarticular stenosis.  L2-L3: Disc desiccation and disc height loss. Diffuse disc bulge with left central disc protrusion and annular fissure. Lateral facet hypertrophy. No spinal canal stenosis. Mild-to-moderate right greater than left neural foraminal stenosis.   L3-L4: Disc desiccation and disc height loss. Diffuse disc bulge. Bilateral facet hypertrophy. No spinal canal stenosis. Minor bilateral neural foraminal stenosis.  L4-L5: Status post left fusion. Disc height loss with mild disc bulge. Posterior osteophytes. No spinal canal stenosis. Mild right greater than left neural foraminal stenosis.  L5-S1: Disc desiccation and disc height loss. Diffuse disc bulge. Moderate bilateral facet hypertrophy. No spinal canal stenosis. Moderate left greater than right neural foraminal stenosis. Disc material contacts the exiting bilateral L5 nerve roots with   elevation of the left.  IMPRESSION:  1.  Multilevel disc degeneration and facet hypertrophy as described above. No significant spinal canal stenosis. Scattered mild to moderate neural foraminal stenoses, greatest at L5-S1.    EOS XR 7/15/24 -   Findings:  12 rib bearing vertebral bodies and 5 lumbar type vertebral bodies are  identified. Degenerative changes are noted throughout the spine most  severe in the lumbar spine. There is a left-sided posterior metallic  effusion of L4-5. No hardware inferior identified.  Coronal Deformity:  There is a mild  convexed right curvature of thoracolumbar/lumbar  spine with apex at L1.   Positive global coronal imbalance.  Sagittal Vertical Axis (A vertical line drawn from the center of C7  (chas line) to the posterosuperior aspect of the S1 on sagittal  plane):  very positive   Weight bearing axis: (Defined as a line drawn from the center of the  femoral  head to the mid aspect of the tibial plafond).      Right: Weight bearing axis crosses through the lateral tibial  spine.       Left: Weight bearing axis crosses through the lateral tibial  spine.  Leg length:  (Measured from the top of the femoral head to the center  of tibial plafond.  It is assumed joints are in similar degrees of  extension bilaterally.  Significant difference is defined when  discrepancy is greater than 1.5 cm).        No significant  leg length discrepancy.  Additional Findings:  Postsurgical changes of reverse left shoulder arthroplasty.  Postsurgical changes of left lumbar rotation at L4-L5. Large  osteophytes noted in the lumbar spine. Left atrial appendage closure  device.  Nonobstructive bowel gas pattern. The lung fields are clear.  Borderline enlarged cardiac silhouette. No acute osseous abnormality.  No acute osseous abnormality.  There is a nonobstructive bowel gas  pattern.  Impression:  1. Mild convex right curvature of the thoracolumbar spine.   2. Hardware intact of the left-sided posterior metallic fusion at  L4-5.  2. Positive global coronal and balanced and very positive global  sagittal balance. .  3. Weight bearing axis as detailed above.    EMG [facility] 12/6/23 -         Historical Physical Exam - from 8/5/24 visit  There were no vitals taken for this visit.    Musculoskeletal: Able to move all extremities.  No involuntary motor movements. No C/T/L spine tenderness to palpation.  +mild left posterolateral buttock/hip TTP.  Negative SI joint pain.  Negative left SLR, THO, piriformis stretch.  Left leg/foot rotated laterally with standing/walking.   Cold feet, pallor. Unable to get BLE pulses in feet, ankle popliteal areas. No edema.      Neurological:  Alert, NAD, and oriented to person, place, and time.   No cranial nerve deficit.  Speech clear and fluent.  Thought process good.     Gait: steady, symmetrical w/o assistive devices; unable to tandem walk    Strength (L/R)  5/5  Hip Flexion  5/5 Knee Extension  5/5 Ankle Dorsiflexion  5/5 Extensor Hallucis Longus  5/5 Plantar Flexion    Reflexes (L/R)  2+/2+ Patellar  2+/2+ Ankle    No ankle clonus    Sensation: LAURIET CONCHITA Good PA-C  Department of Neurosurgery  Office: [phone]            Virtual Visit Details    Type of service:  Video Visit   Video Start Time:  8:04  Video End Time: 8:41    Originating Location (pt. Location): Home    Distant Location (provider location):  Off-site  Platform used for Video Visit: Chio      Again, thank you for allowing me to participate in the care of your patient.      Sincerely,    Sagrario Good PA-C

## 2024-10-30 NOTE — NURSING NOTE
Current patient location: [address] [place] [id number]    Is the patient currently in the state of [place]? YES    Visit mode:VIDEO    If the visit is dropped, the patient can be reconnected by: TELEPHONE VISIT: Phone number:   Telephone Information:   Mobile [phone]       Will anyone else be joining the visit? NO  (If patient encounters technical issues they should call [phone]    Are changes needed to the allergy or medication list? Pt stated no med changes    Are refills needed on medications prescribed by this physician? NO    Rooming Documentation:  Questionnaire(s) completed    Reason for visit: ELLA MENSAHF

## 2024-10-30 NOTE — PATIENT INSTRUCTIONS
Referral placed to pain management for comprehensive pain evaluation.      Follow up with neurosurgery if needed in the future.

## 2024-10-30 NOTE — PROGRESS NOTES
Neurosurgery Clinic Note      ASSESSMENT & PLAN:  iMke Gonzalez is a 73 year old male with a PMH of atrial flutter, s/p left Watchman implant, CAD, hypertension, GERD, left rotator cuff tear, C6/7 decompression ([facility] 2002), L4/5 decomp ([facility], 1/2022), L4/5 fusion, L3/4 decomp, CSF leak ([facility] 3/30/23) who is following up for low back and left buttock/hip pain.     Patient has had left outer hip pain prior to his lumbar surgeries, which was not relieved with either surgery.  He has not had lasting relief with GT bursal or L5/S1 left TFESI injection.  His hip pain is affecting his ability to ambulate longer distances because of low back and left posterior hip pain.  Recent low back PT has improved his symptoms, but this was exacerbated after balance PT and his ability to ambulate longer distances did not change.  He does have significant left medial knee degeneration which is painful and he ambulates with his left hip/foot pointed laterally.      He f/u today s/p left synevisc knee injection, L1/L2 ILESI and EMG BLE.  Patient noted complete resolution of hip pain for for 2 weeks w/ gradual return.  He even notes his overall body pain and the toe soreness was gone for a while after the injection.  He also reports improvement of pain in his feet with antibiotics.  He has consistently reported improved pain levels after getting other injections and oral steroids as well (which begs the question of a systemic anti-inflammatory effect).  He still endorses left knee pain, but he was told it could take upwards of 2 weeks for the Synevisc injection to work.  He is more achy today, but relates that to the incoming weather.  Patient would like not to do surgery and is open to trying medication if that would be helpful. He has cardiac issues and is sensitive to medications so would need to be careful.  He has tried gabapentin in the past after a shoulder surgery and did not like how he felt on that--felt fatigued.  He has not  tried Duloxetine or Lyrica.      Lumbar CT myelogram 10/2023 and lumbar MRI 5/2023 reveal severe left L1/2 subarticular stenosis and L5/S1 severe left FS.    EMG 12/2023 [facility] - indicated chronic/slightly active L5 > L4 left radic.  EMG BLE 10/21/24 - chronic raudel L5 & S1 radic, mild length-dep axonal s/m polyneuropathy  Lumbar MRI 8/2/24 - L4/5 Fusion.  L5/S1 mod L > R NFS - disc material contacts L5 raudel w/ elevation of L5; L1/2 left s/a disc extrusion w/ caudal migration, mod L NFS.  No significant changes from prior lumbar imaging.   EOS XR 7/15/24 - positive global coronal and sagittal balance; L4/5 left posterior fusion stable.     We discussed that he does have issues in his back that can be addressed with surgery, but with the EMG results showing chronic (not active radic) and neuropathy, his surgical outcome for resolution of the pains he is c/o is guarded.  I think he has had a good response to Voltaren gel, antibiotics and steroids (injections and oral) in large part because of their anti-inflammatory properties.  It may be beneficial to have him get a recommendation on medication management by the pain team given his complexities.      -Neurology cognitive consult - 1/20/25  -Referral to pain management for comprehensive pain evaluation.  -Follow up with nsgy if medication management is not effective and patient wants to consider surgery.         I spent 83 minutes spent in patient care, independent review and interpretation of medical records/imaging, reviewing old records.    History of Present Illness:  Mike Gonzalez is a 73 year old male with a PMH of atrial flutter, s/p left Watchman implant, CAD, hypertension, GERD, left rotator cuff tear, C6/7 decompression ([facility] 2002), L4/5 decomp ([facility], 1/2022), L4/5 fusion, L3/4 decomp, CSF leak ([facility] 3/30/23) who is presenting for evaluation of LLE pain.    11/22/23 Visit - Patient has surgery for cervical decompression at [facility] in 2002.  He had a very difficult time  with pain after the surgery and that lasted about 10 years.  He has continues to have poor balance since that surgery.  He also notes bilateral RTC issues and had left shoulder replacement.  He continues to have more weakness in his left shoulder.  His neck ROM is significantly reduced.  This improved for about 1 week after having PRP/stem cell treatment about 3-4 years from Dr. El[facility]arm[facility]jo [facility].    About 3 years ago, started having sore left hip walking up hills.  He saw ortho MD who check out hip and did not find anything concerning.  He continued to have issues with his hip and he went to [facility] who identified pinched nerve in his back.  He had 2 surgeries as a result (see above) resulting in fusion at L4/5.  The surgery reduced the freezing feeling in his feet.  It did not help with the left hip pain and now he has back pain in addition to the hip pain.  The pain starts in the left outer hip.  When it is bad, he gets pain on the right.  He has a sore spot on the left medial left knee which has responded to injections in the past.  The pain is a solid ache, but increases to a sharp pain depending on his activity.  If he does too much, he pays for it the next day.  Steroids helped with energy level and was able to walk further without significant pain. He also complains that his toes hurt and feel like cardboard.   He does better walking on level surface.  On the level, he can go 700 feet. This is is reduced to about 400-500 feet if not level.  He denies that his legs feel tired, but that the outer hip is painful to him which requires him to stop for a while before he can continue.  The pain comes from lateral hip to the knee level.  He had an injection in his left hip that helped for 1-2 weeks.    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 His goal is to be able to walk around and do his  photography work and get back to writing his books, etc.    EMG before surgery in March 2023.  Next one schedule 12/8/23 at [facility].      7/15/24 Visit  -patient returns after having significant physical therapy at [facility].  He had 12 sessions of regular PT which he felt was helpful and then transition to pool therapy but unfortunately was unable to continue because of a rash that he developed from the chlorine.  He most recently was engaged in balance therapy which he stated exacerbated his back and leg symptoms.  He continues to endorse low back pain that is worse on the left than the right but does cross his entire back at a level just above the belt line.  He also notes he has left posterior hip is more painful than his right.  He does have left knee pain and is aware that he is bone-on-bone degeneration.  He has undergone evaluation at [facility] and has had injections that have been helpful but nothing recently.  He is open to having a second opinion on whether more injections or surgery would be beneficial.  He denies any radicular pain extending beyond his hips or bowel or bladder issues aside from constipation on occasion.  His neurologist from [facility] on disputes the EMG test by [facility] that he does not have neuropathy, the neurologist notes he does have neuropathy.  The patient notes that he has pain in all of his toes that improves with Voltaren gel.  He also endorses generalized low energy.  He is getting evaluated at in August by cardiology here.  Perhaps more bothersome to the patient is that he is unable to ambulate more than 700 feet at a time due to the pain in his low back and left hip.  He is able to stop for a period of time at that distance and then is able to ambulate a little bit further.  His pain is preventing him from doing ADLs walking.  It is worse with flexion of his back.  His wife is with him today and notes that sometimes his back looks laterally malaligned.  Patient denies any radicular pains  in his right LE.      8/5/24 Visit - patient f/u after having lumbar MRI updated. He notes left buttock/hip pain posterolateral location that affects him when he is walking.  Otherwise has chronic low back pain.  Left medial knee discomfort is actually better after his last injection.  Denies radicular pain in BLE.  Still applying Voltaren gel to his feet for paresthesia.  He notes his feet are very cold.  Had cardiology workup which was negative for any vascular blockages in his legs per US.  He saw orthopedics for his left knee and also mentioned his hip.  He is not pursuing injections or surgery at this time for those areas.    He also c/o cognitive issues right now that are limiting his ability to write and process things fluently.  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  He has noticed more of a change in the last 3-4 months.  He reports a concussion about 5 years ago after a fall off a ladder and then had COVID.  He is concerned about a long COVID issue.      10/30/24 Visit (VIDEO) - follow-up after L1/L2 ILESI injection and EMG BLE (chronic L5 & S1 raudel, mild s/m polyneuropathy raudel).    Patient noted complete resolution of hip pain for for 2 weeks w/ gradual return.  He even reports being able to get out and take pictures of the northern lights which was a treat for him.  He notes his overall body pain and the toe soreness was gone for a while after the injection.  He also reports improvement of pain in his feet with antibiotics.  He has consistently reported improved pain levels after getting other injections and oral steroids as well (which begs the question of a systemic anti-inflammatory effect).  He still endorses left knee pain, but he was told it could take upwards of 2 weeks for the Synevisc injection to work.  He is more achy today, but relates that to the incoming weather.  Patient would like not to do surgery and is open to trying medication if that would be helpful. He  has cardiac issues and is sensitive to medications so would need to be careful.  He has tried gabapentin in the past after a shoulder surgery and did not like how he felt on that--felt fatigued.  He has not tried Duloxetine or Lyrica.      Conservative Treatment:  Tylenol - mild help  Heating pad to left hip - helps reduce pain  Prednisone 40 mg x5 days 9/20/23 - 2 weeks relief  Injections:  10/12/23 - Left GT bursal injection - helped for 1-2 weeks  12/27/23 - L5/S1 left TFESI [facility] - 50% relief - no lasting benefit  2/22//16 - PRP and stem cell injection in bottom of foot w/ Dr. Elbert Gonzales [facility] - [facility]. Helped about 1 week.  10/10/24 L1/2 ILESI Dr. Ya (8-3) -   10/17/24 left knee Synevisc [place] (8-2)  Acupuncture - no lasting benefit  PT - Dayrage Kingston since 11/22/23 - balance, land & pool  Gabapentin 200 mg tid - SE - lack of energy  Lyrica - not tried   Nortriptyline - 25 mg HS  - by PCP  Duloxetine - not filled              Review of Systems   See HPI    Past Medical History:   Diagnosis Date    Angioedema     Atrial fibrillation and flutter (H)     Basal cell carcinoma     Branch retinal vein occlusion of left eye (H) 07/01/2017    CAD (coronary artery disease) 2/29/2024    ANGIOGRAM 2021 Left Main The vessel was visualized by angiography, is moderate in size and is angiographically normal. Left Anterior Descending Mid LAD lesion is 25% stenosed. Ramus Intermedius Ramus lesion is 10% stenosed. Left Circumflex Dist Cx lesion is 30% stenosed. Right Coronary Artery The vessel was visualized by angiography, is moderate in size and is angiographically normal.      Chronic neck pain     Congestive heart failure (H) 2021    GERD (gastroesophageal reflux disease)     HTN (hypertension) 2015    Nonsmoker     Radiculopathy     cervical    Retinal hemorrhage     Vessel rupture in left retina    Rhinitis, allergic     S/P colonoscopy 04/15/2019    Urticaria        Past Surgical History:   Procedure Laterality Date     BASAL CELL CARCINOMA EXCISION  01/01/2020    Left nasal reconstruction    CATARACT EXTRACTION, BILATERAL Bilateral 2022    CERVICAL SPINE SURGERY      C8, T1, foraminotomy    CV CORONARY ANGIOGRAM N/A 01/20/2021    Procedure: Coronary Angiogram;  Surgeon: Willow Wellington MD;  Location: [facility] Cardiac Cath Lab;  Service: Cardiology    CV LEFT ATRIAL APPENDAGE CLOSURE N/A 07/14/2021    Procedure: CV LEFT ATRIAL APPENDAGE CLOSURE;  Surgeon: Noah Gutierres MD;  Location:  HEART CARDIAC CATH LAB    CV LEFT HEART CATHETERIZATION WITHOUT LEFT VENTRICULOGRAM Left 01/20/2021    Procedure: Left Heart Catheterization Without Left Ventriculogram;  Surgeon: Willow Wellington MD;  Location: [facility] Cardiac Cath Lab;  Service: Cardiology    DECOMPRESSION, FUSION LUMBAR POSTERIOR ONE LEVEL, COMBINED Left 3/30/2023    Procedure: LEFT LUMBAR 4-5 POSTERIOR SPINAL FUSION WITH LEFT LUMBAR 4-5 LAMINOFORAMINOTOMY AND WIDE LUMBAR 4-5 LEFT SUBTOTAL FACETECTOMY WITH ALLOGRAFT AND AUTOGRAFT;  Surgeon: Ambrose Degroot MD;  Location: [facility] OR    HERNIA REPAIR, UMBILICAL       ×2    INJECT EPIDURAL LUMBAR Left 10/10/2024    Procedure: INJECTION, SPINE, LUMBAR, EPIDURAL (left L1-L2);  Surgeon: Zulma Ya MD;  Location: [facility] OR    INJECT MAJOR JOINT / BURSA Left 10/17/2024    Procedure: INJECTION, MAJOR JOINT OR BURSA OF MAJOR JOINT (left knee);  Surgeon: Zulma Ya MD;  Location: [facility] OR    REVERSE TOTAL SHOULDER ARTHROPLASTY Left 05/01/2019       Social History     Socioeconomic History    Marital status:      Spouse name: None    Number of children: None    Years of education: None    Highest education level: None   Tobacco Use    Smoking status: Never    Smokeless tobacco: Never   Substance and Sexual Activity    Alcohol use: Never     Comment: Alcoholic Drinks/day: About once a year.    Drug use: No   Social History Narrative    Patient of Dr. Rod since 2001.    .  Wife is a former RN.      Wife's cousin is Dr. Jean Pierre Champagne, ID specialist.       family history includes Arthritis in his mother; Other - See Comments in his father and mother.       IMAGING   Imaging independently reviewed.    EMG 10/21/24 Dr. Nunez -   Interpretation:  This is an abnormal study. There is electrophysiologic evidence of (1) chronic bilateral lumbosacral radiculopathies affecting the L5 and S1 nerve roots and (2) a superimposed mild length-dependant axonal sensory or sensorimotor polyneuropathy. The absence of active denervation potentials in the sampled muscles argues against a recent nerve or nerve root injury. Clinical correlation is recommended.    Lumbar MRI 8/2/24 -   FINDINGS:   Nomenclature is based on 5 lumbar type vertebral bodies. Posterior spinal fusion and hemilaminectomy on the left at L4-5. Persistent moderate lumbar levoscoliosis. Straightening the normal AP curvature with grade 1 retrolisthesis of L2 on L3. Vertebral   body heights are maintained. Normal marrow signal. Normal distal spinal cord and cauda equina with conus medullaris at T12-L1. There is benign-appearing right renal cyst, no additional radiographic follow-up is necessary. Unremarkable visualized bony   pelvis.  T12-L1: Disc desiccation and disc height loss. Diffuse disc bulge with left subarticular disc protrusion. Mild facet hypertrophy. No spinal canal stenosis. Mild left foraminal stenosis. No right foraminal stenosis. Mild left subarticular stenosis.   L1-L2: Disc desiccation and disc height loss. Diffuse disc bulge with left subarticular disc extrusion. The extruded disc fragment measures 6 mm AP by 9 mm transverse with 8 mm caudal migration. Bilateral facet hypertrophy. No spinal canal stenosis.   Moderate left and mild right neural foraminal stenosis. Mild left subarticular stenosis.  L2-L3: Disc desiccation and disc height loss. Diffuse disc bulge with left central disc protrusion and annular fissure. Lateral facet hypertrophy. No  spinal canal stenosis. Mild-to-moderate right greater than left neural foraminal stenosis.   L3-L4: Disc desiccation and disc height loss. Diffuse disc bulge. Bilateral facet hypertrophy. No spinal canal stenosis. Minor bilateral neural foraminal stenosis.  L4-L5: Status post left fusion. Disc height loss with mild disc bulge. Posterior osteophytes. No spinal canal stenosis. Mild right greater than left neural foraminal stenosis.  L5-S1: Disc desiccation and disc height loss. Diffuse disc bulge. Moderate bilateral facet hypertrophy. No spinal canal stenosis. Moderate left greater than right neural foraminal stenosis. Disc material contacts the exiting bilateral L5 nerve roots with   elevation of the left.  IMPRESSION:  1.  Multilevel disc degeneration and facet hypertrophy as described above. No significant spinal canal stenosis. Scattered mild to moderate neural foraminal stenoses, greatest at L5-S1.    EOS XR 7/15/24 -   Findings:  12 rib bearing vertebral bodies and 5 lumbar type vertebral bodies are  identified. Degenerative changes are noted throughout the spine most  severe in the lumbar spine. There is a left-sided posterior metallic  effusion of L4-5. No hardware inferior identified.  Coronal Deformity:  There is a mild  convexed right curvature of thoracolumbar/lumbar  spine with apex at L1.   Positive global coronal imbalance.  Sagittal Vertical Axis (A vertical line drawn from the center of C7  (chas line) to the posterosuperior aspect of the S1 on sagittal  plane):  very positive   Weight bearing axis: (Defined as a line drawn from the center of the  femoral head to the mid aspect of the tibial plafond).      Right: Weight bearing axis crosses through the lateral tibial  spine.       Left: Weight bearing axis crosses through the lateral tibial  spine.  Leg length:  (Measured from the top of the femoral head to the center  of tibial plafond.  It is assumed joints are in similar degrees of  extension  bilaterally.  Significant difference is defined when  discrepancy is greater than 1.5 cm).        No significant  leg length discrepancy.  Additional Findings:  Postsurgical changes of reverse left shoulder arthroplasty.  Postsurgical changes of left lumbar rotation at L4-L5. Large  osteophytes noted in the lumbar spine. Left atrial appendage closure  device.  Nonobstructive bowel gas pattern. The lung fields are clear.  Borderline enlarged cardiac silhouette. No acute osseous abnormality.  No acute osseous abnormality.  There is a nonobstructive bowel gas  pattern.  Impression:  1. Mild convex right curvature of the thoracolumbar spine.   2. Hardware intact of the left-sided posterior metallic fusion at  L4-5.  2. Positive global coronal and balanced and very positive global  sagittal balance. .  3. Weight bearing axis as detailed above.    EMG [facility] 12/6/23 -         Historical Physical Exam - from 8/5/24 visit  There were no vitals taken for this visit.    Musculoskeletal: Able to move all extremities.  No involuntary motor movements. No C/T/L spine tenderness to palpation.  +mild left posterolateral buttock/hip TTP.  Negative SI joint pain.  Negative left SLR, THO, piriformis stretch.  Left leg/foot rotated laterally with standing/walking.   Cold feet, pallor. Unable to get BLE pulses in feet, ankle popliteal areas. No edema.      Neurological:  Alert, NAD, and oriented to person, place, and time.   No cranial nerve deficit.  Speech clear and fluent.  Thought process good.     Gait: steady, symmetrical w/o assistive devices; unable to tandem walk    Strength (L/R)  5/5 Hip Flexion  5/5 Knee Extension  5/5 Ankle Dorsiflexion  5/5 Extensor Hallucis Longus  5/5 Plantar Flexion    Reflexes (L/R)  2+/2+ Patellar  2+/2+ Ankle    No ankle clonus    Sensation: MITZY Good PA-C  Department of Neurosurgery  Office: [phone]

## 2024-11-14 ENCOUNTER — HOSPITAL ENCOUNTER (OUTPATIENT)
Dept: CARDIOLOGY | Facility: CLINIC | Age: 74
Discharge: HOME | End: 2024-11-14
Attending: INTERNAL MEDICINE
Payer: MEDICARE

## 2024-11-14 DIAGNOSIS — T82.539A LEAKAGE OF WATCHMAN LEFT ATRIAL APPENDAGE CLOSURE DEVICE: ICD-10-CM

## 2024-11-14 DIAGNOSIS — I48.20 CHRONIC ATRIAL FIBRILLATION, UNSPECIFIED (H): ICD-10-CM

## 2024-11-14 LAB
CREAT BLD-MCNC: 1.4 MG/DL (ref 0.7–1.3)
EGFRCR SERPLBLD CKD-EPI 2021: 52 ML/MIN/1.73M2

## 2024-11-14 PROCEDURE — G1010 CDSM STANSON: HCPCS

## 2024-11-14 PROCEDURE — 250N000011 HC RX IP 250 OP 636: Performed by: INTERNAL MEDICINE

## 2024-11-14 PROCEDURE — 82565 ASSAY OF CREATININE: CPT

## 2024-11-14 RX ORDER — LIDOCAINE 40 MG/G
CREAM TOPICAL
Status: DISCONTINUED | OUTPATIENT
Start: 2024-11-14 | End: 2024-11-15 | Stop reason: HOSPADM

## 2024-11-14 RX ORDER — IOPAMIDOL 755 MG/ML
500 INJECTION, SOLUTION INTRAVASCULAR ONCE
Status: COMPLETED | OUTPATIENT
Start: 2024-11-14 | End: 2024-11-14

## 2024-11-14 RX ORDER — ONDANSETRON 2 MG/ML
4 INJECTION INTRAMUSCULAR; INTRAVENOUS
Status: DISCONTINUED | OUTPATIENT
Start: 2024-11-14 | End: 2024-11-15 | Stop reason: HOSPADM

## 2024-11-14 RX ADMIN — IOPAMIDOL 100 ML: 755 INJECTION, SOLUTION INTRAVENOUS at 14:28

## 2024-11-17 ENCOUNTER — MYC MEDICAL ADVICE (OUTPATIENT)
Dept: INTERNAL MEDICINE | Facility: CLINIC | Age: 74
End: 2024-11-17
Payer: MEDICARE

## 2024-12-03 ENCOUNTER — OFFICE VISIT (OUTPATIENT)
Dept: INTERNAL MEDICINE | Facility: CLINIC | Age: 74
End: 2024-12-03
Payer: MEDICARE

## 2024-12-03 ENCOUNTER — MYC MEDICAL ADVICE (OUTPATIENT)
Dept: INTERNAL MEDICINE | Facility: CLINIC | Age: 74
End: 2024-12-03

## 2024-12-03 ENCOUNTER — TELEPHONE (OUTPATIENT)
Dept: PALLIATIVE MEDICINE | Facility: CLINIC | Age: 74
End: 2024-12-03

## 2024-12-03 ENCOUNTER — TELEPHONE (OUTPATIENT)
Dept: CARDIOLOGY | Facility: CLINIC | Age: 74
End: 2024-12-03

## 2024-12-03 VITALS
OXYGEN SATURATION: 97 % | TEMPERATURE: 98.5 F | SYSTOLIC BLOOD PRESSURE: 134 MMHG | HEIGHT: 72 IN | HEART RATE: 76 BPM | DIASTOLIC BLOOD PRESSURE: 60 MMHG | WEIGHT: 239 LBS | BODY MASS INDEX: 32.37 KG/M2

## 2024-12-03 DIAGNOSIS — R06.02 SOB (SHORTNESS OF BREATH): ICD-10-CM

## 2024-12-03 DIAGNOSIS — I10 ESSENTIAL HYPERTENSION: ICD-10-CM

## 2024-12-03 DIAGNOSIS — I25.83 CORONARY ARTERY DISEASE DUE TO LIPID RICH PLAQUE: Chronic | ICD-10-CM

## 2024-12-03 DIAGNOSIS — R93.1 DECREASED CARDIAC EJECTION FRACTION: ICD-10-CM

## 2024-12-03 DIAGNOSIS — R53.83 TIREDNESS: ICD-10-CM

## 2024-12-03 DIAGNOSIS — E78.2 MIXED HYPERLIPIDEMIA: ICD-10-CM

## 2024-12-03 DIAGNOSIS — T82.539A LEAKAGE OF WATCHMAN LEFT ATRIAL APPENDAGE CLOSURE DEVICE: Primary | ICD-10-CM

## 2024-12-03 DIAGNOSIS — I25.10 CORONARY ARTERY DISEASE DUE TO LIPID RICH PLAQUE: Chronic | ICD-10-CM

## 2024-12-03 DIAGNOSIS — R29.898 WEAKNESS OF BOTH LOWER EXTREMITIES: ICD-10-CM

## 2024-12-03 DIAGNOSIS — R73.09 OTHER ABNORMAL GLUCOSE: ICD-10-CM

## 2024-12-03 DIAGNOSIS — I25.5 ISCHEMIC CARDIOMYOPATHY: ICD-10-CM

## 2024-12-03 DIAGNOSIS — Z12.5 SCREENING FOR PROSTATE CANCER: ICD-10-CM

## 2024-12-03 DIAGNOSIS — N40.1 BENIGN PROSTATIC HYPERPLASIA WITH URINARY FREQUENCY: ICD-10-CM

## 2024-12-03 DIAGNOSIS — E83.52 HYPERCALCEMIA: Primary | ICD-10-CM

## 2024-12-03 DIAGNOSIS — I48.92 ATRIAL FLUTTER, CHRONIC (H): ICD-10-CM

## 2024-12-03 DIAGNOSIS — E83.52 HYPERCALCEMIA: ICD-10-CM

## 2024-12-03 DIAGNOSIS — R23.3 EASY BRUISING: ICD-10-CM

## 2024-12-03 DIAGNOSIS — R35.0 BENIGN PROSTATIC HYPERPLASIA WITH URINARY FREQUENCY: ICD-10-CM

## 2024-12-03 LAB
ALBUMIN SERPL BCG-MCNC: 4.5 G/DL (ref 3.5–5.2)
ALP SERPL-CCNC: 71 U/L (ref 40–150)
ALT SERPL W P-5'-P-CCNC: 16 U/L (ref 0–70)
ANION GAP SERPL CALCULATED.3IONS-SCNC: 12 MMOL/L (ref 7–15)
APTT PPP: 25 SECONDS (ref 22–38)
AST SERPL W P-5'-P-CCNC: 21 U/L (ref 0–45)
BILIRUB SERPL-MCNC: 0.4 MG/DL
BUN SERPL-MCNC: 28.8 MG/DL (ref 8–23)
CALCIUM SERPL-MCNC: 12.7 MG/DL (ref 8.8–10.4)
CHLORIDE SERPL-SCNC: 105 MMOL/L (ref 98–107)
CHOLEST SERPL-MCNC: 137 MG/DL
CK SERPL-CCNC: 52 U/L (ref 39–308)
CREAT SERPL-MCNC: 1.5 MG/DL (ref 0.67–1.17)
CRP SERPL-MCNC: <3 MG/L
EGFRCR SERPLBLD CKD-EPI 2021: 48 ML/MIN/1.73M2
ERYTHROCYTE [DISTWIDTH] IN BLOOD BY AUTOMATED COUNT: 12.6 % (ref 10–15)
EST. AVERAGE GLUCOSE BLD GHB EST-MCNC: 108 MG/DL
FASTING STATUS PATIENT QL REPORTED: ABNORMAL
FASTING STATUS PATIENT QL REPORTED: NORMAL
GLUCOSE SERPL-MCNC: 109 MG/DL (ref 70–99)
HBA1C MFR BLD: 5.4 % (ref 0–5.6)
HCO3 SERPL-SCNC: 30 MMOL/L (ref 22–29)
HCT VFR BLD AUTO: 43.9 % (ref 40–53)
HDLC SERPL-MCNC: 53 MG/DL
HGB BLD-MCNC: 15 G/DL (ref 13.3–17.7)
INR PPP: 1 (ref 0.85–1.15)
LDLC SERPL CALC-MCNC: 72 MG/DL
MCH RBC QN AUTO: 32.1 PG (ref 26.5–33)
MCHC RBC AUTO-ENTMCNC: 34.2 G/DL (ref 31.5–36.5)
MCV RBC AUTO: 94 FL (ref 78–100)
NONHDLC SERPL-MCNC: 84 MG/DL
PLATELET # BLD AUTO: 230 10E3/UL (ref 150–450)
POTASSIUM SERPL-SCNC: 4.6 MMOL/L (ref 3.4–5.3)
PROT SERPL-MCNC: 7.5 G/DL (ref 6.4–8.3)
PSA SERPL DL<=0.01 NG/ML-MCNC: 1.87 NG/ML (ref 0–6.5)
RBC # BLD AUTO: 4.68 10E6/UL (ref 4.4–5.9)
SODIUM SERPL-SCNC: 147 MMOL/L (ref 135–145)
TRIGL SERPL-MCNC: 62 MG/DL
WBC # BLD AUTO: 7.9 10E3/UL (ref 4–11)

## 2024-12-03 PROCEDURE — G2211 COMPLEX E/M VISIT ADD ON: HCPCS | Performed by: INTERNAL MEDICINE

## 2024-12-03 PROCEDURE — 36415 COLL VENOUS BLD VENIPUNCTURE: CPT | Performed by: INTERNAL MEDICINE

## 2024-12-03 PROCEDURE — 85730 THROMBOPLASTIN TIME PARTIAL: CPT | Performed by: INTERNAL MEDICINE

## 2024-12-03 PROCEDURE — 85610 PROTHROMBIN TIME: CPT | Performed by: INTERNAL MEDICINE

## 2024-12-03 PROCEDURE — 83036 HEMOGLOBIN GLYCOSYLATED A1C: CPT | Performed by: INTERNAL MEDICINE

## 2024-12-03 PROCEDURE — 80061 LIPID PANEL: CPT | Performed by: INTERNAL MEDICINE

## 2024-12-03 PROCEDURE — 85027 COMPLETE CBC AUTOMATED: CPT | Performed by: INTERNAL MEDICINE

## 2024-12-03 PROCEDURE — 99215 OFFICE O/P EST HI 40 MIN: CPT | Performed by: INTERNAL MEDICINE

## 2024-12-03 PROCEDURE — 82550 ASSAY OF CK (CPK): CPT | Performed by: INTERNAL MEDICINE

## 2024-12-03 PROCEDURE — 86140 C-REACTIVE PROTEIN: CPT | Performed by: INTERNAL MEDICINE

## 2024-12-03 PROCEDURE — 80053 COMPREHEN METABOLIC PANEL: CPT | Performed by: INTERNAL MEDICINE

## 2024-12-03 PROCEDURE — G0103 PSA SCREENING: HCPCS | Performed by: INTERNAL MEDICINE

## 2024-12-03 ASSESSMENT — PAIN SCALES - GENERAL: PAINLEVEL_OUTOF10: MILD PAIN (2)

## 2024-12-03 NOTE — TELEPHONE ENCOUNTER
"Pt s/p 31 mm Watchman FLX implant 7/14/21.    JEAN CLAUDE day of procedure shows \"Well seated 31 mm Watchman FLX Device in left atrial appendage by 2D and 3D  imaging. No color doppler evidence of flow around device at ostium insertion before or after device release. No change in mitral valve function. No thrombus noted on device, LA or SPARKLE.\"    45 day JEAN CLAUDE 9/02/21 shows \"Watchman device noted in left atrial appendage. The device is well seated with moderate (4-5 mm) leakage by color flow Doppler imaging at 135 degree angle images extending deep into device. Thrombus absent on left atrial appendage occlusion device.\"  Pt continued on Eliquis and aspirin, with rec to repeat JEAN CLAUDE in 4 wks.    10/18/21 JEAN CLAUDE shows \"There is a left atrial appendage occlusion device.  No thrombus is detected upon the surface of the device.  There is a localized region of flow around the device between the device itself and the left lateral ridge. The gap measures 0.35-0.5 cm and appears similar to the findings on the 2 September 2021 transesophageal echocardiogram.\"  Pt stopped Eliquis and started Plavix with daily baby aspirin.    Pt has followed Dr Tyler in [place], but went for 2nd opinion with Dr Francisco on 7/16/24 for findings on echo and holter.  Pt had decreased LVEF and stress cMRI was done 9/10/24.  Pt was referred to EP Dr Jacques for a-fib and PDL who ordered CTA Heart - done 11/14/24, showing 5.6 mm PDL.  Dr Jacques said \"there is minimal leak around Watchman, not worrisome at this time, I would recommend no changes.\"  Pt continues on only 81 mg daily aspirin.    PCP put in referral to Dr Gutierres to discuss stroke risk.  Pt having a-fib, not on oral anticoagulant.    Routing to provider for recommendation.  Pt scheduled next with Dr Tyler 2/19/25, and Dr Francisco 4/22/25.  "

## 2024-12-03 NOTE — TELEPHONE ENCOUNTER
Health Call Center    Phone Message    May a detailed message be left on voicemail: yes     Reason for Call: Appointment Intake    Referring Provider Name:     Onur Rod MD in [facility] INTERNAL MEDICINE     Diagnosis and/or Symptoms:     Leakage of Watchman left atrial appendage closure device   WATCHMAN leak over 5 mm. would like to discuss stroke risk and possibly discuss wth [employer].  Priority 1-2 weeks     Action Taken: Other: Cardiology    Travel Screening: Not Applicable    Thank you!  [facility]       Date of Service:

## 2024-12-03 NOTE — TELEPHONE ENCOUNTER
Appt request sent to .  -ral    ----- Message -----  From: Noah Gutierres MD  Sent: 12/3/2024   4:29 PM CST  To: Kiran Tyler MD; *    Yes, I think we can see him in clinic to discuss.    I will also ask  to review the imaging and see what his thoughts are on measurement.

## 2024-12-03 NOTE — PATIENT INSTRUCTIONS
To schedule your appointment with a cardiologist, please call  [facility] at [phone].

## 2024-12-03 NOTE — TELEPHONE ENCOUNTER
Records Requested     December 3, 2024 9:10 AM   [id number]   Facility  [facility]   Outcome 9:11 am Sent request for imaging to be pushed to PACS. -     RECORDS RECEIVED FROM: [facility]where   REASON FOR VISIT: Cognitive decline   PROVIDER: Albino Wei MD   DATE OF APPT: 1/20/25 @ 3:30 pm    NOTES (FOR ALL VISITS) STATUS DETAILS   OFFICE NOTE from referring provider Internal 8/5/24 Sagrario Good PA-C @[facility]     OFFICE NOTE from other specialist [facility] 5/6/24 (Transferred Recs) Sharon Peres DO @[facility]    2/20/24, 5/8/23, 4/7/23 Onur Rod MD @[facility]     MEDICATION LIST Internal    IMAGING  (FOR ALL VISITS)     MRI (HEAD, NECK, SPINE) In process [facility]  5/25/23 MR Cervical Spine  3/30/19 MR Brain*

## 2024-12-05 NOTE — TELEPHONE ENCOUNTER
Requested images be pushed to [facility].  -ral    ----- Message -----  From: Noah Gutierres MD  Sent: 12/5/2024   9:03 AM CST  To: Kiran Tyler MD; *    Thank you Kiran!    Melvina/Candi Chung - let's show the CTA to [employer] folks.  I'm curious if proximal Amulet may be an option?

## 2024-12-05 NOTE — TELEPHONE ENCOUNTER
----- Message -----  From: Kiran Tyler MD  Sent: 12/4/2024   4:32 PM CST  To: Noah Gutierres MD; *    Very interesting. The device looks very deeply seated and a lot of appendage is exposed proximal to the face of the device. There is also a gap of probably 5 mm around where the device is supposed to make contact distally. The angulation of the appendage is odd which perhaps why this was hard to appreciate on intraprocedure JEAN CLAUDE. I wonder if an Amulet/Amplatzer could fill any of the proximal space?

## 2024-12-06 ENCOUNTER — OFFICE VISIT (OUTPATIENT)
Dept: ANESTHESIOLOGY | Facility: CLINIC | Age: 74
End: 2024-12-06
Attending: PHYSICIAN ASSISTANT
Payer: MEDICARE

## 2024-12-06 VITALS
DIASTOLIC BLOOD PRESSURE: 64 MMHG | HEART RATE: 72 BPM | SYSTOLIC BLOOD PRESSURE: 118 MMHG | OXYGEN SATURATION: 97 % | RESPIRATION RATE: 16 BRPM

## 2024-12-06 DIAGNOSIS — R20.2 PARESTHESIA OF BOTH FEET: ICD-10-CM

## 2024-12-06 DIAGNOSIS — G89.29 CHRONIC PAIN OF LEFT KNEE: ICD-10-CM

## 2024-12-06 DIAGNOSIS — M54.16 LUMBAR RADICULOPATHY: ICD-10-CM

## 2024-12-06 DIAGNOSIS — Z98.1 HISTORY OF LUMBAR FUSION: ICD-10-CM

## 2024-12-06 DIAGNOSIS — M51.369 DEGENERATION OF INTERVERTEBRAL DISC OF LUMBAR REGION, UNSPECIFIED WHETHER PAIN PRESENT: ICD-10-CM

## 2024-12-06 DIAGNOSIS — M79.18 MYOFASCIAL PAIN: ICD-10-CM

## 2024-12-06 DIAGNOSIS — M25.562 CHRONIC PAIN OF LEFT KNEE: ICD-10-CM

## 2024-12-06 DIAGNOSIS — G89.4 CHRONIC PAIN SYNDROME: Primary | ICD-10-CM

## 2024-12-06 PROCEDURE — 99205 OFFICE O/P NEW HI 60 MIN: CPT

## 2024-12-06 ASSESSMENT — PAIN SCALES - GENERAL: PAINLEVEL_OUTOF10: MILD PAIN (3)

## 2024-12-06 NOTE — PROGRESS NOTES
[facility]     Date of visit: 12/6/2024    Assessment:  Mike Gonzalez is a 74 year old male with a past medical history significant for a fib, CHF, GERD, HTN, cervical radiculopathy, s/p lumbar fusion, s/p cervical spine surgery, s/p left reverse total shoulder, s/p left watchman implant (2021), who presents with complaints of widespread pain, chronic pain syndrome.     Widespread pain - affected areas include low back, left hip, bilateral foot, right shoulder, left sided neck and shoulder. Hx of lumbar fusion, C8-T1 foraminotomy, and left reverse total shoulder. Etiology likely associated with multiple factors, including but not limited to, degenerative changes of joint/spine, postsurgical changes, neuropathic component, overlying myofascial component likely contributes to some extent.     Assigned to [facility] nursing team.     Visit diagnoses:   1. Chronic pain syndrome    2. Paresthesia of both feet    3. Chronic pain of left knee    4. Lumbar radiculopathy    5. Degeneration of intervertebral disc of lumbar region, unspecified whether pain present    6. History of lumbar fusion    7. Myofascial pain        Plan:  The following recommendations were given to the patient. Diagnosis, treatment options, risks, benefits, and alternatives were discussed, and all questions were answered. The patient expressed understanding of the plan for management.     I am recommending a multidisciplinary treatment plan to help this patient better manage his pain.  This includes:     Physical Therapy:  NO     Completed pool PT through [facility] in the past. May consider pain focused PT, once cardiac concerns have been addressed.     Pain Psychology: N/A    Diagnostic Studies:    No new orders today.     Medication Management:   Pregabalin - start 25 mg daily x 1 week, then titrate to 25 mg BID to target radicular and neuropathic symptoms. May consider further dose increase to 50 mg BID at next visit or in  between visits if needed. Cautioned about potential CNS side effects. Prescription for 25 mg caps sent to pharmacy.     Procedures:   Left L1-2 JUANIS on 10/10/24 with Dr. Ya. Reports 80% improvement in pain intensity in feet. He notes hip pain has also improved.     Other Orders/Referrals:   N/A    Follow up with RAHUL Bui CNP around 6-8 weeks      Review of Electronic Chart: Today I have also reviewed available medical information in the patient's medical record at [facility] ([facility]) and Care Everywhere (if available), including relevant provider notes, laboratory work, and imaging.     Marian Huang, ROSS, RAHUL, AGNP-C  [facility]         -------------------------------------------------------------------    Subjective     Reason for consultation:    Mike Gonzalez is a 74 year old male who is seen in consultation today at the request of [facility] for evaluation of his pain issues and recommendations for management, with specific emphasis on  R20.2 (ICD-10-CM) - Paresthesia of both feet   M25.562, G89.29 (ICD-10-CM) - Chronic pain of left knee   M54.16 (ICD-10-CM) - Lumbar radiculopathy   M51.369 (ICD-10-CM) - Degeneration of intervertebral disc of lumbar region, unspecified whether pain present   Z98.1 (ICD-10-CM) - History of lumbar fusion     Reason for Referral: Comprehensive Pain Evaluation   Are there any red flags that may impact the assessment or management of the patient? No Red Flags   Provider, please review opioid agreement in the process instructions above. Do you agree to these terms? Yes   Scheduling Instructions: [facility] will call you to coordinate care as prescribed your provider. If you don t hear from a representative within 2 business days, please call [phone].   Additional Information: Patient hoping to avoid additional surgery for his back, has had good temporary response to injections, looking to try medical management for improved QOL  "      Please see the [facility] health questionnaire which the patient completed and reviewed with me in detail (if available).     Review of [place] Prescription Monitoring Program (): YES    Review of Electronic Chart: Today I have also reviewed available medical information in the patient's medical record at [facility] ([facility]), including relevant provider notes, laboratory work, and imaging.       Chief Complaint:    Chief Complaint   Patient presents with    Consult    Pain     \"Aches and pains from head to toe\"         HPI:     Mike Gonzalez is a 74 year old male presents with a chief complaint of widespread pain.     Onset/Location(s) of pain - Multiple areas (see below).   Pain qualities/characteristics - Midline low back pain above belt line that extends into left hip, and bilateral foot paraesthesia.    Alleviating factors - L1-2 JUANIS (left) hip pain improved 50%, avoiding overhead reaching, limiting activity, Tylenol, Voltaren, heat/cold.   Exacerbating factors - walking, certain activities/overhead reaching, weather changes.   Past treatments tried (H=helpful, NH=not helpful) - lumbar surgery x 2 (made pain worse), pool PT ([facility]), gabapentin (CNS side effects), land based PT (improvement with strength), left bursa injection, diclofenac gel to left knee, duloxetine.   Treatments never tried/potential options - Lyrica, pain focused PT.  Red flag symptom concerns present (extremity numbness/paraesthesia or weakness, saddle anesthesia, loss of bowel or bladder control, signs/symptoms of infection) - NO  Past pain clinic(s) - YES - injections with Dr. Ya.     Painful areas:  Low back/left hip  Left knee  Left shoulder/neck.   Right shoulder (at night)  Weakness (generalized) a few weeks ago after doing home repairs    -Wife Melita is present for visit today.   -He had left L1-2 JUANIS on 10/10, reports 80% improvement in pain intensity in feet. He notes hip pain has " improved.   -He had left knee injection on 10/17.   -Minimal sleep disruption due to pain.   -He fell off a ladder around 2019, hit head and left shoulder. He had TBI.       Patient Supplied Answers To the  Pain Questionnaire      10/30/2024     7:52 AM    Pain -  Patient Entered Questionnaire/Answers   How would you describe the pain burning    sharp    numbness    dull, aching    throbbing   Which of the following worsen your pain standing    walking    exercise    bowel movements   Which of the following improve or reduce your pain lying down    sitting    relaxation   What number best describes your average pain for the past week:  0 = No pain  to  10 = Worst pain imaginable 2   What number best describes your LOWEST pain in past 24 hours:  0 = No pain  to  10 = Worst pain imaginable 2   What number best describes your WORST pain in past 24 hours:  0 = No pain  to  10 = Worst pain imaginable 7   When is your pain worst PM    Night   What non-medicine treatments have you already had for your pain pain clinic    physical therapy    spine injections (shots)    surgery         Current Pain Treatments:    Tylenol 650-1300 mg BID  Pregabalin 25 mg BID        Current Outpatient Medications   Medication Sig Dispense Refill    acetaminophen (TYLENOL) 650 MG CR tablet Take 650 mg by mouth every 8 hours as needed      aspirin 81 MG EC tablet Take 1 tablet (81 mg) by mouth daily      co-enzyme Q-10 100 MG CAPS capsule Take 200 mg by mouth daily      fish oil-omega-3 fatty acids 300-1,000 mg capsule [FISH OIL-OMEGA-3 FATTY ACIDS 300-1,000 MG CAPSULE] Take 2 g by mouth daily.      fluticasone (FLONASE) 50 MCG/ACT nasal spray Spray 1 spray into both nostrils daily 48 mL 3    MULTIVITAMIN ORAL Take 1 tablet by mouth daily      nortriptyline (PAMELOR) 25 MG capsule Take 25 mg by mouth at bedtime      pregabalin (LYRICA) 25 MG capsule Start 25 mg at bedtime x 1-2 weeks, then increase to 25 mg BID. 30 capsule 1     pseudoePHEDrine (SUDAFED) 120 MG 12 hr tablet Take 1 tablet (120 mg) by mouth every 12 hours as needed for congestion 50 tablet 0    senna-docusate (SENOKOT-S/PERICOLACE) 8.6-50 MG tablet Take 1-2 tablets by mouth daily as needed for constipation Take while on oral narcotics to prevent or treat constipation. 100 tablet 1    lisinopril (ZESTRIL) 5 MG tablet Take 1 tablet (5 mg) by mouth daily. 90 tablet 3    metoprolol succinate ER (TOPROL XL) 25 MG 24 hr tablet Take 1 tablet (25 mg) by mouth daily. 90 tablet 3    pravastatin (PRAVACHOL) 20 MG tablet Take 1 tablet (20 mg) by mouth daily. 360 tablet 3    tamsulosin (FLOMAX) 0.4 MG capsule Take 2 capsules (0.8 mg) by mouth daily. 180 capsule 3     Current Facility-Administered Medications   Medication Dose Route Frequency Provider Last Rate Last Admin    hylan G-F 20 (SYNVISC) injection 48 mg  48 mg INTRA-ARTICULAR Once Zulma Ya MD        lidocaine (PF) (XYLOCAINE) 1 % injection 4 mL  4 mL   Ambrose Walls MD   4 mL at 10/12/23 1353    triamcinolone (KENALOG-40) injection 40 mg  40 mg   Ambrose Walls MD   40 mg at 10/12/23 1353     Allergies   Allergen Reactions    Effient [Prasugrel Hcl] Hives    Hydrochlorothiazide Unknown    Hydrocodone Swelling     Sinus    Oxycontin [Oxycodone] Swelling     lip    Peppermint Oil Other (See Comments)    Plavix [Clopidogrel] Hives    Sulfa (Sulfonamide Antibiotics) [Sulfa Antibiotics] Swelling    Perfume Swelling     Other reaction(s): Runny Nose      Past Medical History:   Diagnosis Date    Angioedema     Atrial fibrillation and flutter (H)     Basal cell carcinoma     Branch retinal vein occlusion of left eye (H) 07/01/2017    CAD (coronary artery disease) 2/29/2024    ANGIOGRAM 2021 Left Main The vessel was visualized by angiography, is moderate in size and is angiographically normal. Left Anterior Descending Mid LAD lesion is 25% stenosed. Ramus Intermedius Ramus lesion is 10% stenosed. Left Circumflex Dist  Cx lesion is 30% stenosed. Right Coronary Artery The vessel was visualized by angiography, is moderate in size and is angiographically normal.      Chronic neck pain     Congestive heart failure (H) 2021    GERD (gastroesophageal reflux disease)     HTN (hypertension) 2015    Nonsmoker     Radiculopathy     cervical    Retinal hemorrhage     Vessel rupture in left retina    Rhinitis, allergic     S/P colonoscopy 04/15/2019    Urticaria      Past Surgical History:   Procedure Laterality Date    BASAL CELL CARCINOMA EXCISION  01/01/2020    Left nasal reconstruction    CATARACT EXTRACTION, BILATERAL Bilateral 2022    CERVICAL SPINE SURGERY      C8, T1, foraminotomy    CV CORONARY ANGIOGRAM N/A 01/20/2021    Procedure: Coronary Angiogram;  Surgeon: Willow Wellington MD;  Location: [facility] Cardiac Cath Lab;  Service: Cardiology    CV LEFT ATRIAL APPENDAGE CLOSURE N/A 07/14/2021    Procedure: CV LEFT ATRIAL APPENDAGE CLOSURE;  Surgeon: Noah Gutierres MD;  Location:  HEART CARDIAC CATH LAB    CV LEFT HEART CATHETERIZATION WITHOUT LEFT VENTRICULOGRAM Left 01/20/2021    Procedure: Left Heart Catheterization Without Left Ventriculogram;  Surgeon: Willow Wellington MD;  Location: [facility] Cardiac Cath Lab;  Service: Cardiology    DECOMPRESSION, FUSION LUMBAR POSTERIOR ONE LEVEL, COMBINED Left 03/30/2023    Procedure: LEFT LUMBAR 4-5 POSTERIOR SPINAL FUSION WITH LEFT LUMBAR 4-5 LAMINOFORAMINOTOMY AND WIDE LUMBAR 4-5 LEFT SUBTOTAL FACETECTOMY WITH ALLOGRAFT AND AUTOGRAFT;  Surgeon: Ambrose Degroot MD;  Location: [facility] Main OR    HERNIA REPAIR, UMBILICAL       ×2    INJECT EPIDURAL LUMBAR Left 10/10/2024    Procedure: INJECTION, SPINE, LUMBAR, EPIDURAL (left L1-L2);  Surgeon: Zulma Ya MD;  Location: [facility] OR    INJECT MAJOR JOINT / BURSA Left 10/17/2024    Procedure: INJECTION, MAJOR JOINT OR BURSA OF MAJOR JOINT (left knee);  Surgeon: Zulma Ya MD;  Location: [facility] OR    REVERSE TOTAL SHOULDER  ARTHROPLASTY Left 05/01/2019     Family History   Problem Relation Age of Onset    Arthritis Mother     Other - See Comments Mother         psychiatry/pvd    Other - See Comments Father         blood reaction     Social History     Socioeconomic History    Marital status:    Tobacco Use    Smoking status: Never    Smokeless tobacco: Never   Substance and Sexual Activity    Alcohol use: Never     Comment: Alcoholic Drinks/day: About once a year.    Drug use: No   Social History Narrative    Patient of Dr. Rod since 2001.    .  Wife is a former RN.             Wife's cousin is Dr. Jean Pierre Champagne, ID specialist.     Social Drivers of Health     Financial Resource Strain: Low Risk  (1/5/2024)    Financial Resource Strain     Within the past 12 months, have you or your family members you live with been unable to get utilities (heat, electricity) when it was really needed?: No   Food Insecurity: Low Risk  (1/5/2024)    Food Insecurity     Within the past 12 months, did you worry that your food would run out before you got money to buy more?: No     Within the past 12 months, did the food you bought just not last and you didn t have money to get more?: No   Transportation Needs: Low Risk  (1/5/2024)    Transportation Needs     Within the past 12 months, has lack of transportation kept you from medical appointments, getting your medicines, non-medical meetings or appointments, work, or from getting things that you need?: No    Received from [facility], [facility]    Social Connections   Interpersonal Safety: Low Risk  (10/17/2024)    Interpersonal Safety     Do you feel physically and emotionally safe where you currently live?: Yes     Within the past 12 months, have you been hit, slapped, kicked or otherwise physically hurt by someone?: No     Within the past 12 months, have you been humiliated or emotionally abused in other ways by your  partner or ex-partner?: No   Housing Stability: Low Risk  (1/5/2024)    Housing Stability     Do you have housing? : Yes     Are you worried about losing your housing?: No      ROS: 10 point ROS neg other than the symptoms noted above in the HPI.      Objective      Diagnostic Testing - Imaging/Labs Reviewed:    Labs:    Reviewed last CMP from 12/3/24 - hepatic function WNL, GFR 48.     Imaging:   Reviewed last lumbar MRI from 8/2/24 - multilevel degenerative changes, postsurgical changes at L4-5.       Physical Exam  HENT:      Head: Normocephalic.   Pulmonary:      Effort: Pulmonary effort is normal.   Musculoskeletal:      Comments: No gross limitations with ROM.    Neurological:      Mental Status: He is alert.      Comments: Gait grossly intact.    Psychiatric:         Mood and Affect: Mood normal.         BILLING TIME DOCUMENTATION:   The total TIME spent on this patient on the date of the encounter/appointment was 70 minutes.      TOTAL TIME includes:   Time spent preparing to see the patient (reviewing records and tests)   Time spent face to face (or over the phone) with the patient   Time spent ordering tests, medications, procedures and referrals   Time spent Referring and communicating with other healthcare professionals   Time spent documenting clinical information in Epic

## 2024-12-06 NOTE — NURSING NOTE
RN reviewed AVS with patient. Patient to contact clinic if any questions/concerns. Patient verbalized understanding.    Ronda Alonso RNCC

## 2024-12-06 NOTE — NURSING NOTE
"Patient presents with:  Consult  Pain: \"Aches and pains from head to toe\"      Mild Pain (3)     Pain Medications       Analgesics Other Refills Start End     acetaminophen (TYLENOL) 650 MG CR tablet --  --    Sig - Route: Take 650 mg by mouth every 8 hours as needed - Oral    Class: Historical       Salicylates Refills Start End     aspirin 81 MG EC tablet -- 7/28/2023 --    Sig - Route: Take 1 tablet (81 mg) by mouth daily - Oral    Class: OTC            What medications are you using for pain? Tylenol, ASA    Have you been seen by another pain clinic/ provider? Not really-had TPI's    Expectations: none    "

## 2024-12-06 NOTE — LETTER
12/6/2024       RE: Mike Gonzalez  [address] [facility] [place] [address]     Dear Colleague,    Thank you for referring your patient, Mike Gonzalez, to the [facility] [place] at [facility]. Please see a copy of my visit note below.      [facility]     Date of visit: 12/6/2024    Assessment:  Mike Gonzalez is a 74 year old male with a past medical history significant for a fib, CHF, GERD, HTN, cervical radiculopathy, s/p lumbar fusion, s/p cervical spine surgery, s/p left reverse total shoulder, s/p left watchman implant (2021), who presents with complaints of widespread pain, chronic pain syndrome.     Widespread pain - affected areas include low back, left hip, bilateral foot, right shoulder, left sided neck and shoulder. Hx of lumbar fusion, C8-T1 foraminotomy, and left reverse total shoulder. Etiology likely associated with multiple factors, including but not limited to, degenerative changes of joint/spine, postsurgical changes, neuropathic component, overlying myofascial component likely contributes to some extent.     Assigned to [facility] nursing team.     Visit diagnoses:   1. Chronic pain syndrome    2. Paresthesia of both feet    3. Chronic pain of left knee    4. Lumbar radiculopathy    5. Degeneration of intervertebral disc of lumbar region, unspecified whether pain present    6. History of lumbar fusion    7. Myofascial pain        Plan:  The following recommendations were given to the patient. Diagnosis, treatment options, risks, benefits, and alternatives were discussed, and all questions were answered. The patient expressed understanding of the plan for management.     I am recommending a multidisciplinary treatment plan to help this patient better manage his pain.  This includes:     Physical Therapy:  NO     Completed pool PT through [facility] in the past. May consider pain focused  PT, once cardiac concerns have been addressed.     Pain Psychology: N/A    Diagnostic Studies:    No new orders today.     Medication Management:   Pregabalin - start 25 mg daily x 1 week, then titrate to 25 mg BID to target radicular and neuropathic symptoms. May consider further dose increase to 50 mg BID at next visit or in between visits if needed. Cautioned about potential CNS side effects. Prescription for 25 mg caps sent to pharmacy.     Procedures:   Left L1-2 JUANIS on 10/10/24 with Dr. Ya. Reports 80% improvement in pain intensity in feet. He notes hip pain has also improved.     Other Orders/Referrals:   N/A    Follow up with RAHUL Bui CNP around 6-8 weeks      Review of Electronic Chart: Today I have also reviewed available medical information in the patient's medical record at [facility] ([facility]) and Care Everywhere (if available), including relevant provider notes, laboratory work, and imaging.     Marian Huang DNP, RAHUL, AGNP-C  [facility]         -------------------------------------------------------------------    Subjective     Reason for consultation:    Mike Gonzalez is a 74 year old male who is seen in consultation today at the request of [facility] for evaluation of his pain issues and recommendations for management, with specific emphasis on  R20.2 (ICD-10-CM) - Paresthesia of both feet   M25.562, G89.29 (ICD-10-CM) - Chronic pain of left knee   M54.16 (ICD-10-CM) - Lumbar radiculopathy   M51.369 (ICD-10-CM) - Degeneration of intervertebral disc of lumbar region, unspecified whether pain present   Z98.1 (ICD-10-CM) - History of lumbar fusion     Reason for Referral: Comprehensive Pain Evaluation   Are there any red flags that may impact the assessment or management of the patient? No Red Flags   Provider, please review opioid agreement in the process instructions above. Do you agree to these terms? Yes   Scheduling Instructions: [facility] will call you  "to coordinate care as prescribed your provider. If you don t hear from a representative within 2 business days, please call [phone].   Additional Information: Patient hoping to avoid additional surgery for his back, has had good temporary response to injections, looking to try medical management for improved QOL       Please see the [facility] health questionnaire which the patient completed and reviewed with me in detail (if available).     Review of [place] Prescription Monitoring Program (): YES    Review of Electronic Chart: Today I have also reviewed available medical information in the patient's medical record at [facility] ([facility]), including relevant provider notes, laboratory work, and imaging.       Chief Complaint:    Chief Complaint   Patient presents with     Consult     Pain     \"Aches and pains from head to toe\"         HPI:     Mike Gonzalez is a 74 year old male presents with a chief complaint of widespread pain.     Onset/Location(s) of pain - Multiple areas (see below).   Pain qualities/characteristics - Midline low back pain above belt line that extends into left hip, and bilateral foot paraesthesia.    Alleviating factors - L1-2 JUANIS (left) hip pain improved 50%, avoiding overhead reaching, limiting activity, Tylenol, Voltaren, heat/cold.   Exacerbating factors - walking, certain activities/overhead reaching, weather changes.   Past treatments tried (H=helpful, NH=not helpful) - lumbar surgery x 2 (made pain worse), pool PT ([facility]), gabapentin (CNS side effects), land based PT (improvement with strength), left bursa injection, diclofenac gel to left knee, duloxetine.   Treatments never tried/potential options - Lyrica, pain focused PT.  Red flag symptom concerns present (extremity numbness/paraesthesia or weakness, saddle anesthesia, loss of bowel or bladder control, signs/symptoms of infection) - NO  Past pain clinic(s) - YES - injections with Dr." Felton.     Painful areas:  Low back/left hip  Left knee  Left shoulder/neck.   Right shoulder (at night)  Weakness (generalized) a few weeks ago after doing home repairs    -Wife Meliat is present for visit today.   -He had left L1-2 JUANIS on 10/10, reports 80% improvement in pain intensity in feet. He notes hip pain has improved.   -He had left knee injection on 10/17.   -Minimal sleep disruption due to pain.   -He fell off a ladder around 2019, hit head and left shoulder. He had TBI.       Patient Supplied Answers To the  Pain Questionnaire      10/30/2024     7:52 AM    Pain -  Patient Entered Questionnaire/Answers   How would you describe the pain burning    sharp    numbness    dull, aching    throbbing   Which of the following worsen your pain standing    walking    exercise    bowel movements   Which of the following improve or reduce your pain lying down    sitting    relaxation   What number best describes your average pain for the past week:  0 = No pain  to  10 = Worst pain imaginable 2   What number best describes your LOWEST pain in past 24 hours:  0 = No pain  to  10 = Worst pain imaginable 2   What number best describes your WORST pain in past 24 hours:  0 = No pain  to  10 = Worst pain imaginable 7   When is your pain worst PM    Night   What non-medicine treatments have you already had for your pain pain clinic    physical therapy    spine injections (shots)    surgery         Current Pain Treatments:    Tylenol 650-1300 mg BID  Pregabalin 25 mg BID        Current Outpatient Medications   Medication Sig Dispense Refill     acetaminophen (TYLENOL) 650 MG CR tablet Take 650 mg by mouth every 8 hours as needed       aspirin 81 MG EC tablet Take 1 tablet (81 mg) by mouth daily       co-enzyme Q-10 100 MG CAPS capsule Take 200 mg by mouth daily       fish oil-omega-3 fatty acids 300-1,000 mg capsule [FISH OIL-OMEGA-3 FATTY ACIDS 300-1,000 MG CAPSULE] Take 2 g by mouth daily.       fluticasone (FLONASE)  50 MCG/ACT nasal spray Spray 1 spray into both nostrils daily 48 mL 3     MULTIVITAMIN ORAL Take 1 tablet by mouth daily       nortriptyline (PAMELOR) 25 MG capsule Take 25 mg by mouth at bedtime       pregabalin (LYRICA) 25 MG capsule Start 25 mg at bedtime x 1-2 weeks, then increase to 25 mg BID. 30 capsule 1     pseudoePHEDrine (SUDAFED) 120 MG 12 hr tablet Take 1 tablet (120 mg) by mouth every 12 hours as needed for congestion 50 tablet 0     senna-docusate (SENOKOT-S/PERICOLACE) 8.6-50 MG tablet Take 1-2 tablets by mouth daily as needed for constipation Take while on oral narcotics to prevent or treat constipation. 100 tablet 1     lisinopril (ZESTRIL) 5 MG tablet Take 1 tablet (5 mg) by mouth daily. 90 tablet 3     metoprolol succinate ER (TOPROL XL) 25 MG 24 hr tablet Take 1 tablet (25 mg) by mouth daily. 90 tablet 3     pravastatin (PRAVACHOL) 20 MG tablet Take 1 tablet (20 mg) by mouth daily. 360 tablet 3     tamsulosin (FLOMAX) 0.4 MG capsule Take 2 capsules (0.8 mg) by mouth daily. 180 capsule 3     Current Facility-Administered Medications   Medication Dose Route Frequency Provider Last Rate Last Admin     hylan G-F 20 (SYNVISC) injection 48 mg  48 mg INTRA-ARTICULAR Once Zulma Ya MD         lidocaine (PF) (XYLOCAINE) 1 % injection 4 mL  4 mL   Ambrose Walls MD   4 mL at 10/12/23 1353     triamcinolone (KENALOG-40) injection 40 mg  40 mg   Ambrose Walls MD   40 mg at 10/12/23 1353     Allergies   Allergen Reactions     Effient [Prasugrel Hcl] Hives     Hydrochlorothiazide Unknown     Hydrocodone Swelling     Sinus     Oxycontin [Oxycodone] Swelling     lip     Peppermint Oil Other (See Comments)     Plavix [Clopidogrel] Hives     Sulfa (Sulfonamide Antibiotics) [Sulfa Antibiotics] Swelling     Perfume Swelling     Other reaction(s): Runny Nose      Past Medical History:   Diagnosis Date     Angioedema      Atrial fibrillation and flutter (H)      Basal cell carcinoma      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 Left Anterior Descending Mid LAD lesion is 25% stenosed. Ramus Intermedius Ramus lesion is 10% stenosed. Left Circumflex Dist Cx lesion is 30% stenosed. Right Coronary Artery The vessel was visualized by angiography, is moderate in size and is angiographically normal.       Chronic neck pain      Congestive heart failure (H) 2021     GERD (gastroesophageal reflux disease)      HTN (hypertension) 2015     Nonsmoker      Radiculopathy     cervical     Retinal hemorrhage     Vessel rupture in left retina     Rhinitis, allergic      S/P colonoscopy 04/15/2019     Urticaria      Past Surgical History:   Procedure Laterality Date     BASAL CELL CARCINOMA EXCISION  01/01/2020    Left nasal reconstruction     CATARACT EXTRACTION, BILATERAL Bilateral 2022     CERVICAL SPINE SURGERY      C8, T1, foraminotomy     CV CORONARY ANGIOGRAM N/A 01/20/2021    Procedure: Coronary Angiogram;  Surgeon: Willow Wellington MD;  Location: [facility] Cardiac Cath Lab;  Service: Cardiology     CV LEFT ATRIAL APPENDAGE CLOSURE N/A 07/14/2021    Procedure: CV LEFT ATRIAL APPENDAGE CLOSURE;  Surgeon: Noah Gutierres MD;  Location:  HEART CARDIAC CATH LAB     CV LEFT HEART CATHETERIZATION WITHOUT LEFT VENTRICULOGRAM Left 01/20/2021    Procedure: Left Heart Catheterization Without Left Ventriculogram;  Surgeon: Willow Wellington MD;  Location: [facility] Cardiac Cath Lab;  Service: Cardiology     DECOMPRESSION, FUSION LUMBAR POSTERIOR ONE LEVEL, COMBINED Left 03/30/2023    Procedure: LEFT LUMBAR 4-5 POSTERIOR SPINAL FUSION WITH LEFT LUMBAR 4-5 LAMINOFORAMINOTOMY AND WIDE LUMBAR 4-5 LEFT SUBTOTAL FACETECTOMY WITH ALLOGRAFT AND AUTOGRAFT;  Surgeon: Ambrose Degroot MD;  Location: [facility] OR     HERNIA REPAIR, UMBILICAL        ×2     INJECT EPIDURAL LUMBAR Left 10/10/2024    Procedure: INJECTION, SPINE, LUMBAR, EPIDURAL (left L1-L2);  Surgeon: Zulma Ya MD;  Location: [facility] OR     INJECT MAJOR JOINT / BURSA Left 10/17/2024    Procedure: INJECTION, MAJOR JOINT OR BURSA OF MAJOR JOINT (left knee);  Surgeon: Zulma Ya MD;  Location: [facility] OR     REVERSE TOTAL SHOULDER ARTHROPLASTY Left 05/01/2019     Family History   Problem Relation Age of Onset     Arthritis Mother      Other - See Comments Mother         psychiatry/pvd     Other - See Comments Father         blood reaction     Social History     Socioeconomic History     Marital status:    Tobacco Use     Smoking status: Never     Smokeless tobacco: Never   Substance and Sexual Activity     Alcohol use: Never     Comment: Alcoholic Drinks/day: About once a year.     Drug use: No   Social History Narrative    Patient of Dr. Rod since 2001.    .  Wife is a former RN.             Wife's cousin is Dr. Jean Pierre Champagne, ID specialist.     Social Drivers of Health     Financial Resource Strain: Low Risk  (1/5/2024)    Financial Resource Strain      Within the past 12 months, have you or your family members you live with been unable to get utilities (heat, electricity) when it was really needed?: No   Food Insecurity: Low Risk  (1/5/2024)    Food Insecurity      Within the past 12 months, did you worry that your food would run out before you got money to buy more?: No      Within the past 12 months, did the food you bought just not last and you didn t have money to get more?: No   Transportation Needs: Low Risk  (1/5/2024)    Transportation Needs      Within the past 12 months, has lack of transportation kept you from medical appointments, getting your medicines, non-medical meetings or appointments, work, or from getting things that you need?: No    Received from [facility], [facility]    Social  Connections   Interpersonal Safety: Low Risk  (10/17/2024)    Interpersonal Safety      Do you feel physically and emotionally safe where you currently live?: Yes      Within the past 12 months, have you been hit, slapped, kicked or otherwise physically hurt by someone?: No      Within the past 12 months, have you been humiliated or emotionally abused in other ways by your partner or ex-partner?: No   Housing Stability: Low Risk  (1/5/2024)    Housing Stability      Do you have housing? : Yes      Are you worried about losing your housing?: No      ROS: 10 point ROS neg other than the symptoms noted above in the HPI.      Objective      Diagnostic Testing - Imaging/Labs Reviewed:    Labs:    Reviewed last CMP from 12/3/24 - hepatic function WNL, GFR 48.     Imaging:   Reviewed last lumbar MRI from 8/2/24 - multilevel degenerative changes, postsurgical changes at L4-5.       Physical Exam  HENT:      Head: Normocephalic.   Pulmonary:      Effort: Pulmonary effort is normal.   Musculoskeletal:      Comments: No gross limitations with ROM.    Neurological:      Mental Status: He is alert.      Comments: Gait grossly intact.    Psychiatric:         Mood and Affect: Mood normal.         BILLING TIME DOCUMENTATION:   The total TIME spent on this patient on the date of the encounter/appointment was 70 minutes.      TOTAL TIME includes:   Time spent preparing to see the patient (reviewing records and tests)   Time spent face to face (or over the phone) with the patient   Time spent ordering tests, medications, procedures and referrals   Time spent Referring and communicating with other healthcare professionals   Time spent documenting clinical information in Epic       Again, thank you for allowing me to participate in the care of your patient.      Sincerely,    RAHUL Bui CNP

## 2024-12-06 NOTE — PATIENT INSTRUCTIONS
Medications:    Pregabalin (LYRICA) - Start 25 mg at bedtime x 1-2 weeks, then increase to 25 mg twice daily.     Please provide the clinic with a minium of 1 week notice, on all prescription refills.       Treatment planning:    Continue to monitor for benefits from the Lumbar injection.      Recommended Follow up:      Follow up in about 6-8 weeks.       To speak with a nurse, schedule/reschedule/cancel a clinic appointment, or request a medication refill call: [phone].    You can also reach us by [employer]: [email or web address]

## 2024-12-08 RX ORDER — TAMSULOSIN HYDROCHLORIDE 0.4 MG/1
0.8 CAPSULE ORAL DAILY
Qty: 180 CAPSULE | Refills: 3 | Status: SHIPPED | OUTPATIENT
Start: 2024-12-08

## 2024-12-08 RX ORDER — METOPROLOL SUCCINATE 25 MG/1
25 TABLET, EXTENDED RELEASE ORAL DAILY
Qty: 90 TABLET | Refills: 3 | Status: SHIPPED | OUTPATIENT
Start: 2024-12-08

## 2024-12-08 RX ORDER — LISINOPRIL 5 MG/1
5 TABLET ORAL DAILY
Qty: 90 TABLET | Refills: 3 | Status: SHIPPED | OUTPATIENT
Start: 2024-12-08

## 2024-12-08 RX ORDER — PRAVASTATIN SODIUM 20 MG
20 TABLET ORAL DAILY
Qty: 360 TABLET | Refills: 3 | Status: SHIPPED | OUTPATIENT
Start: 2024-12-08

## 2024-12-09 RX ORDER — PREGABALIN 25 MG/1
CAPSULE ORAL
Qty: 30 CAPSULE | Refills: 1 | Status: SHIPPED | OUTPATIENT
Start: 2024-12-09

## 2024-12-09 NOTE — PROGRESS NOTES
[facility]    Comprehensive and complex medical care - Chronic disease management - Shared decision making - Care coordination - Compassionate care    Patient advocacy - Rational deprescribing - Minimally disruptive medicine - Ethical focus - Customized care         Date of Service: 12/3/2024  Primary Provider: Onur Rod    Patient Care Team:  Onur Rod MD as PCP - General  Onur Rod MD as Assigned PCP  Ambrose Degroot MD as MD (Orthopaedic Surgery)  Stuart Jackson MD as MD (Orthopaedic Surgery)  Liborio Champagne MD as MD  [facility]  Gregg Curtis as Resident (Orthopaedic Surgery)  Sagrario Good PA-C as Assigned Neuroscience Provider  Albino Chao MD as Assigned Musculoskeletal Provider  Zulma Ya MD as MD (Anesthesiology)  Albino Wei MD as MD (Neurology)  JOANN Francisco MD as Assigned Heart and Vascular Provider  Kiran Tyler MD as MD (Cardiovascular Disease)          Patient's Pharmacy:    [id number] - [place], [place] - [address] [place] [address]  [address] [place] [id number]  Phone: [phone] Fax: [phone]    [facility] - [place], [place] - [address] [place] [address]  Phone: [phone] Fax: [phone]     Patient's Contacts:  Name Home Phone Work Phone Mobile Phone Relationship Lgl Danield   BLAYNEKYLER CASON [phone] Spouse    MATILDE SILVER   [phone] Other      Patient's Insurance:    Payor: MEDICARE / Plan: MEDICARE / Product Type: Medicare /            Active Problem List:  Problem List as of 12/3/2024 Reviewed: 8/21/2024  6:32 PM by Jania Leroy PA-C         High    Nonsmoker    Full code status    CAD (coronary artery disease)    Atrial flutter, chronic (H)       Medium    S/P left atrial appendage closure - WATCHMAN    Primary hypertension    Chronic neck pain    Dilated cardiomyopathy (H)     Chronic systolic heart failure (H)    Chronic left-sided low back pain with left-sided sciatica    Spinal stenosis at L4-L5 level    Senile purpura (H)    Lumbar radiculopathy       Low    GERD (gastroesophageal reflux disease)    Branch retinal vein occlusion of left eye (H)    Normal colonoscopy - 2016 - Average risk    Complete tear of left rotator cuff    Concussion syndrome    Idiopathic peripheral neuropathy        Current Outpatient Medications   Medication Instructions    acetaminophen (TYLENOL) 650 mg, EVERY 8 HOURS PRN    aspirin 81 mg, Oral, DAILY    co-enzyme Q-10 200 mg, DAILY    fish oil-omega-3 fatty acids 2 g, DAILY    fluticasone (FLONASE) 50 MCG/ACT nasal spray 1 spray, Both Nostrils, DAILY    lisinopril (ZESTRIL) 5 mg, Oral, DAILY    metoprolol succinate ER (TOPROL XL) 25 mg, Oral, DAILY    MULTIVITAMIN ORAL 1 tablet, DAILY    nortriptyline (PAMELOR) 25 mg, AT BEDTIME    pravastatin (PRAVACHOL) 20 mg, Oral, DAILY    pseudoePHEDrine (SUDAFED) 120 mg, Oral, EVERY 12 HOURS PRN    senna-docusate (SENOKOT-S/PERICOLACE) 8.6-50 MG tablet 1-2 tablets, Oral, DAILY PRN, Take while on oral narcotics to prevent or treat constipation.    tamsulosin (FLOMAX) 0.8 mg, Oral, DAILY      Social History     Social History Narrative    Patient of Dr. Rod since 2001.    .  Wife is a former RN.             Wife's cousin is Dr. Jean Pierre Champagne, ID specialist.       Subjective:     Mike Gonzalez is a 74 year old male who comes in today for:    Chief Complaint   Patient presents with    Results     Of heart CT          12/3/2024     9:46 AM   Additional Questions   Roomed by [facility]     Patient comes in today to discuss a number of issues.    He mainly wants to review his leak around his Watchman device.  Is over 5 mm but under 6 mm.  This was recently found at the [facility].  He has been trying to talk to [facility] about it and to help him decide what he should do.  1 doctor said to him  about not being happy with the degree of leakage.    The patient wants to know whether he needs to be on anticoagulation again, needs to have anything done with the Watchman, or otherwise.  He is concerned about his risk for stroke.    He said that in order for [facility] to release any data on leakage he may need to talk to them placing cardiologist and we looked at this today.    He does still note irregular heartbeat here and there.    Another issue he is in for today is for a lack of strength in his legs.  This is new more recently.  He notes in both legs.  It is hard for him to get off the floor.  He has been followed by neurosurgery and neurology.  He does not have any central spinal stenosis at this time.  Recent EMG shows some degree of sensorimotor neuropathy as well as some chronic radiculopathy.    He has been noticing feeling more tired in the recent past as well.    He does note a frontal headache as well.    We reviewed his other issues noted in the assessment but not specifically addressed in the HPI above.     Objective:     Wt Readings from Last 3 Encounters:   12/03/24 108.4 kg (239 lb)   10/17/24 108 kg (238 lb)   10/10/24 108 kg (238 lb)     BP Readings from Last 3 Encounters:   12/06/24 118/64   12/03/24 134/60   10/17/24 136/77     /60   Pulse 76   Temp 98.5  F (36.9  C) (Oral)   Ht 1.829 m (6')   Wt 108.4 kg (239 lb)   SpO2 97%   BMI 32.41 kg/m     The patient is comfortable, no acute distress.  Mood good.  Insight good.  Eyes are nonicteric.  Neck is supple without mass.  No cervical adenopathy.  No thyromegaly. Heart regular rate and rhythm.  Lungs clear to auscultation bilaterally.  Respiratory effort is good.  Abdomen soft and nontender.  No hepatosplenomegaly.  Extremities no edema.      Diagnostics:     Hospital Outpatient Visit on 11/14/2024   Component Date Value Ref Range Status    Creatinine POCT 11/14/2024 1.4 (H)  0.7 - 1.3 mg/dL Final    GFR, ESTIMATED POCT  11/14/2024 52 (L)  >60 mL/min/1.73m2 Final       Results for orders placed or performed in visit on 12/03/24   Prostate Specific Antigen Screen     Status: Normal   Result Value Ref Range    Prostate Specific Antigen Screen 1.87 0.00 - 6.50 ng/mL    Narrative    This result is obtained using the Roche Elecsys total PSA method on the jayla [id number] immunoassay analyzer, which is an ultrasensitive method. Results obtained with different assay methods or kits cannot be used interchangeably.  This test is intended for initial prostate cancer screening. PSA values exceeding the age-specific limits are suspicious for prostate disease, but additional testing, such as prostate biopsy, is needed to diagnose prostate pathology. The American Cancer Society recommends annual examination with digital rectal examination and serum PSA beginning at age 50 and for men with a life expectancy of at least 10 years after detection of prostate cancer. For men in high-risk groups, such as  Americans or men with a first-degree relative diagnosed at a younger age, testing should begin at a younger age. It is generally recommended that information be provided to patients about the benefits and limitations of testing and treatment so they can make informed decisions.   CRP inflammation     Status: Normal   Result Value Ref Range    CRP Inflammation <3.00 <5.00 mg/L   CK total     Status: Normal   Result Value Ref Range    CK 52 39 - 308 U/L   Comprehensive metabolic panel     Status: Abnormal   Result Value Ref Range    Sodium 147 (H) 135 - 145 mmol/L    Potassium 4.6 3.4 - 5.3 mmol/L    Carbon Dioxide (CO2) 30 (H) 22 - 29 mmol/L    Anion Gap 12 7 - 15 mmol/L    Urea Nitrogen 28.8 (H) 8.0 - 23.0 mg/dL    Creatinine 1.50 (H) 0.67 - 1.17 mg/dL    GFR Estimate 48 (L) >60 mL/min/1.73m2    Calcium 12.7 (H) 8.8 - 10.4 mg/dL    Chloride 105 98 - 107 mmol/L    Glucose 109 (H) 70 - 99 mg/dL    Alkaline Phosphatase 71 40 - 150 U/L    AST 21 0 - 45  U/L    ALT 16 0 - 70 U/L    Protein Total 7.5 6.4 - 8.3 g/dL    Albumin 4.5 3.5 - 5.2 g/dL    Bilirubin Total 0.4 <=1.2 mg/dL    Patient Fasting > 8hrs? Unknown    Hemoglobin A1c     Status: Normal   Result Value Ref Range    Estimated Average Glucose 108 <117 mg/dL    Hemoglobin A1C 5.4 0.0 - 5.6 %   INR     Status: Normal   Result Value Ref Range    INR 1.00 0.85 - 1.15   Partial thromboplastin time     Status: Normal   Result Value Ref Range    aPTT 25 22 - 38 Seconds   Lipid panel reflex to direct LDL Fasting     Status: None   Result Value Ref Range    Cholesterol 137 <200 mg/dL    Triglycerides 62 <150 mg/dL    Direct Measure HDL 53 >=40 mg/dL    LDL Cholesterol Calculated 72 <100 mg/dL    Non HDL Cholesterol 84 <130 mg/dL    Patient Fasting > 8hrs? Unknown     Narrative    Cholesterol  Desirable: < 200 mg/dL  Borderline High: 200 - 239 mg/dL  High: >= 240 mg/dL    Triglycerides  Normal: < 150 mg/dL  Borderline High: 150 - 199 mg/dL  High: 200-499 mg/dL  Very High: >= 500 mg/dL    Direct Measure HDL  Female: >= 50 mg/dL   Male: >= 40 mg/dL    LDL Cholesterol  Desirable: < 100 mg/dL  Above Desirable: 100 - 129 mg/dL   Borderline High: 130 - 159 mg/dL   High:  160 - 189 mg/dL   Very High: >= 190 mg/dL    Non HDL Cholesterol  Desirable: < 130 mg/dL  Above Desirable: 130 - 159 mg/dL  Borderline High: 160 - 189 mg/dL  High: 190 - 219 mg/dL  Very High: >= 220 mg/dL   CBC with platelets     Status: Normal   Result Value Ref Range    WBC Count 7.9 4.0 - 11.0 10e3/uL    RBC Count 4.68 4.40 - 5.90 10e6/uL    Hemoglobin 15.0 13.3 - 17.7 g/dL    Hematocrit 43.9 40.0 - 53.0 %    MCV 94 78 - 100 fL    MCH 32.1 26.5 - 33.0 pg    MCHC 34.2 31.5 - 36.5 g/dL    RDW 12.6 10.0 - 15.0 %    Platelet Count 230 150 - 450 10e3/uL        Assessment:     1. Leakage of Watchman left atrial appendage closure device    2. Atrial flutter, chronic (H)    3. Screening for prostate cancer    4. Other abnormal glucose    5. Easy bruising    6.  Coronary artery disease due to lipid rich plaque    7. Weakness of both lower extremities    8. Hypercalcemia    9. Ischemic cardiomyopathy    10. Mixed hyperlipidemia    11. Benign prostatic hyperplasia with urinary frequency    12. Essential hypertension    13. SOB (shortness of breath)    14. Decreased cardiac ejection fraction    15. Tiredness        Plan:     Referral to Dr. Gutierres to review leak of Watchman device and possible recommendations or studies related to this as far as management.  Blood work done today.  His calcium was found to be high today.  This could be causing some issues with weakness and tiredness given the level.  After the visit, we found out he is taking increased vitamin D and calcium and he will need to cut back on this.  He should come back and have the blood work redrawn with PTH done.  Continue current medications otherwise.  Follow up sooner if issues.    Orders Placed This Encounter   Procedures    Prostate Specific Antigen Screen    CRP inflammation    CK total    Comprehensive metabolic panel    Hemoglobin A1c    INR    Partial thromboplastin time    Lipid panel reflex to direct LDL Fasting    CBC with platelets    Parathyroid Hormone Intact    Adult Cardiology Eval  Referral        40 minutes or greater was spent today on the patient's care on the day of service.      This includes time for chart preparation, reviewing medical tests done before or during the visit, talking with the patient, review of quality indicators, required documentation, and other elements of care.        Onur Rod MD  General Internal Medicine  [facility]    The longitudinal plan of care for the diagnoses and conditions as documented were addressed during this visit. Due to the added complexity in care, I will continue to support Bj in the subsequent management and with ongoing continuity of care.     No follow-ups on file.     Future Appointments   Date  Time Provider Department Center   1/2/2025  2:20 PM [facility]   1/20/2025  3:30 PM Albino Wei MD [facility]   2/7/2025  1:30 PM Marian Huang APRN [facility]   2/19/2025  2:20 PM [facility]   4/22/2025 11:15 AM JOANN Francisco MD [facility]

## 2025-01-02 ENCOUNTER — LAB (OUTPATIENT)
Dept: LAB | Facility: CLINIC | Age: 75
End: 2025-01-02
Payer: MEDICARE

## 2025-01-02 ENCOUNTER — DOCUMENTATION ONLY (OUTPATIENT)
Dept: CARDIOLOGY | Facility: CLINIC | Age: 75
End: 2025-01-02

## 2025-01-02 ENCOUNTER — OFFICE VISIT (OUTPATIENT)
Dept: CARDIOLOGY | Facility: CLINIC | Age: 75
End: 2025-01-02
Payer: MEDICARE

## 2025-01-02 VITALS
HEIGHT: 72 IN | DIASTOLIC BLOOD PRESSURE: 80 MMHG | SYSTOLIC BLOOD PRESSURE: 118 MMHG | WEIGHT: 240 LBS | HEART RATE: 84 BPM | OXYGEN SATURATION: 98 % | RESPIRATION RATE: 16 BRPM | BODY MASS INDEX: 32.51 KG/M2

## 2025-01-02 DIAGNOSIS — T82.539A LEAKAGE OF WATCHMAN LEFT ATRIAL APPENDAGE CLOSURE DEVICE: ICD-10-CM

## 2025-01-02 DIAGNOSIS — E83.52 HYPERCALCEMIA: ICD-10-CM

## 2025-01-02 LAB
ANION GAP SERPL CALCULATED.3IONS-SCNC: 12 MMOL/L (ref 7–15)
BUN SERPL-MCNC: 21 MG/DL (ref 8–23)
CA-I BLD-MCNC: 4.5 MG/DL (ref 4.4–5.2)
CALCIUM SERPL-MCNC: 9.2 MG/DL (ref 8.8–10.4)
CHLORIDE SERPL-SCNC: 104 MMOL/L (ref 98–107)
CREAT SERPL-MCNC: 1.42 MG/DL (ref 0.67–1.17)
EGFRCR SERPLBLD CKD-EPI 2021: 52 ML/MIN/1.73M2
GLUCOSE SERPL-MCNC: 100 MG/DL (ref 70–99)
HCO3 SERPL-SCNC: 29 MMOL/L (ref 22–29)
POTASSIUM SERPL-SCNC: 4.4 MMOL/L (ref 3.4–5.3)
PTH-INTACT SERPL-MCNC: 63 PG/ML (ref 15–65)
SODIUM SERPL-SCNC: 145 MMOL/L (ref 135–145)
VIT D+METAB SERPL-MCNC: 65 NG/ML (ref 20–50)

## 2025-01-02 NOTE — PROGRESS NOTES
Writer has requested patient images from 11/14/2024 CTA be pushed to [employer] for review of potential off label PDL closure solutions. Writer has contacted [employer] team to make them aware of need for case review. LYLY

## 2025-01-02 NOTE — PROGRESS NOTES
HEART CARE VALVE CLINIC ENCOUNTER CONSULTATON NOTE      [facility]  [phone]      Assessment/Recommendations   Assessment/Plan: Mike Gonzalez is a 74M w/ AF s/p LAAO '21, HTN, high BMI who is here for evaluation of management options for avinash-device leak.    -  I personally reviewed cardiac imaging including: JEAN CLAUDE and CTA. They both show 3x5.6mm peridevice leak w/ no flow behind the device but some residual 'rough tissue' proximal long w/ heavy pectinates  -  after a long discussion of natural history, risk/benefit, and management options w/ the patient and family, they understand that there is no straightforward way to close the proximal lobe. The device itself if stable and mostly thrombosed, covering the vast majority of the SPARKLE and fully endothelialized by now  - in my mind, the options now include re-starting anticoagulation v. Continuing ASA. Leak closure is anatomically difficult in his case, but I will also show the CTA to [employer] to consider other off label options. Lastly, if he were to ever have cardiac surgery, surgical ligation is another option  - he knows that from embolic protection perspective, anticoagulation would be the safest and hence, my preference, but he would like to think about it and so far is leaning to just staying on ASA, potentially going to 325 mg dose. Should he change his mind, he will reach out and we will call in the prescription for apixaban      The longitudinal plan of care for the diagnosis(es)/condition(s) as documented were addressed during this visit. Due to the added complexity in care, I will continue to support Bj in the subsequent management and with ongoing continuity of care.      A total of >60 mins was spent reviewing prior records, including documentation, lab studies, cardiac testing/imaging, interview with patient, physical exam, and documentation     History of Present Illness/Subjective    HPI: Mike Gonzalez is a 74 year old male w/  AF s/p LAAO '21, HTN, high BMI who is here for evaluation of management options for per-device leak    He has had no issues after LAAO, residual 3-5mm avinash-device leak on JEAN CLAUDE. Recently, a CTA was done which demonstrated some residual leak and prompting this referral.  He has had no bleeding issues or embolic events, he has been having a lot of back issues s/p surgeries.    Recent Echocardiogram Results:  The left ventricle is normal in size. Left ventricular systolic performance  is moderately reduced. The ejection fraction is estimated to be 30%.  2. There is moderate global reduction in left ventricular systolic  performance.  3. There is mild concentric increase in left ventricular wall thickness.  4. No significant valvular heart disease is identified on this study.  5. Normal right ventricular size with low normal right ventricular systolic  performance.  6. There is moderate to severe left atrial enlargement. There is moderate  right atrial enlargement.     The prior real-time echocardiogram could not be accessed from the digital  archive for direct comparison.    Recent Coronary Angiogram Results:  None       Physical Examination  Review of Systems   Vitals: /80 (BP Location: Right arm, Patient Position: Sitting, Cuff Size: Adult Large)   Pulse 84   Resp 16   Ht 1.829 m (6')   Wt 108.9 kg (240 lb)   SpO2 98%   BMI 32.55 kg/m    BMI= There is no height or weight on file to calculate BMI.  Wt Readings from Last 3 Encounters:   12/03/24 108.4 kg (239 lb)   10/17/24 108 kg (238 lb)   10/10/24 108 kg (238 lb)       General Appearance:   no distress, normal body habitus   ENT/Mouth: membranes moist, no oral lesions or bleeding gums.      EYES:  no scleral icterus, normal conjunctivae   Neck: no carotid bruits or thyromegaly   Chest/Lungs:   lungs are clear to auscultation, no rales or wheezing, no sternal scar, equal chest wall expansion    Cardiovascular:   Regular. Normal first and second heart sounds  with 2/6 systolic murmur, no rubs, or gallops; the carotid, radial and posterior tibial pulses are intact, Jugular venous pressure 6, no edema bilaterally    Abdomen:  no organomegaly, masses, bruits, or tenderness; bowel sounds are present   Extremities: no cyanosis or clubbing   Skin: no xanthelasma, warm.    Neurologic: normal  bilateral, no tremors     Psychiatric: alert and oriented x3, calm        Please refer above for cardiac ROS details.        Medical History  Surgical History Family History Social History   Past Medical History:   Diagnosis Date    Angioedema     Atrial fibrillation and flutter (H)     Basal cell carcinoma     Branch retinal vein occlusion of left eye (H) 07/01/2017    CAD (coronary artery disease) 2/29/2024    ANGIOGRAM 2021 Left Main The vessel was visualized by angiography, is moderate in size and is angiographically normal. Left Anterior Descending Mid LAD lesion is 25% stenosed. Ramus Intermedius Ramus lesion is 10% stenosed. Left Circumflex Dist Cx lesion is 30% stenosed. Right Coronary Artery The vessel was visualized by angiography, is moderate in size and is angiographically normal.      Chronic neck pain     Congestive heart failure (H) 2021    GERD (gastroesophageal reflux disease)     HTN (hypertension) 2015    Nonsmoker     Radiculopathy     cervical    Retinal hemorrhage     Vessel rupture in left retina    Rhinitis, allergic     S/P colonoscopy 04/15/2019    Urticaria      Past Surgical History:   Procedure Laterality Date    BASAL CELL CARCINOMA EXCISION  01/01/2020    Left nasal reconstruction    CATARACT EXTRACTION, BILATERAL Bilateral 2022    CERVICAL SPINE SURGERY      C8, T1, foraminotomy    CV CORONARY ANGIOGRAM N/A 01/20/2021    Procedure: Coronary Angiogram;  Surgeon: Willow Wellington MD;  Location: [facility] Cardiac Cath Lab;  Service: Cardiology    CV LEFT ATRIAL APPENDAGE CLOSURE N/A 07/14/2021    Procedure: CV LEFT ATRIAL APPENDAGE CLOSURE;  Surgeon:  Noah Gutierres MD;  Location:  HEART CARDIAC CATH LAB    CV LEFT HEART CATHETERIZATION WITHOUT LEFT VENTRICULOGRAM Left 01/20/2021    Procedure: Left Heart Catheterization Without Left Ventriculogram;  Surgeon: Willow Wellington MD;  Location: [facility] Cardiac Cath Lab;  Service: Cardiology    DECOMPRESSION, FUSION LUMBAR POSTERIOR ONE LEVEL, COMBINED Left 03/30/2023    Procedure: LEFT LUMBAR 4-5 POSTERIOR SPINAL FUSION WITH LEFT LUMBAR 4-5 LAMINOFORAMINOTOMY AND WIDE LUMBAR 4-5 LEFT SUBTOTAL FACETECTOMY WITH ALLOGRAFT AND AUTOGRAFT;  Surgeon: Ambrose Degroot MD;  Location: [facility] OR    HERNIA REPAIR, UMBILICAL       ×2    INJECT EPIDURAL LUMBAR Left 10/10/2024    Procedure: INJECTION, SPINE, LUMBAR, EPIDURAL (left L1-L2);  Surgeon: Zulma Ya MD;  Location: [facility] OR    INJECT MAJOR JOINT / BURSA Left 10/17/2024    Procedure: INJECTION, MAJOR JOINT OR BURSA OF MAJOR JOINT (left knee);  Surgeon: Zulma Ya MD;  Location: [facility] OR    REVERSE TOTAL SHOULDER ARTHROPLASTY Left 05/01/2019     Family History   Problem Relation Age of Onset    Arthritis Mother     Other - See Comments Mother         psychiatry/pvd    Other - See Comments Father         blood reaction        Social History     Socioeconomic History    Marital status:      Spouse name: Not on file    Number of children: Not on file    Years of education: Not on file    Highest education level: Not on file   Occupational History    Not on file   Tobacco Use    Smoking status: Never    Smokeless tobacco: Never   Substance and Sexual Activity    Alcohol use: Never     Comment: Alcoholic Drinks/day: About once a year.    Drug use: No    Sexual activity: Not on file   Other Topics Concern    Parent/sibling w/ CABG, MI or angioplasty before 65F 55M? Not Asked   Social History Narrative    Patient of Dr. Rod since 2001.    .  Wife is a former RN.             Wife's cousin is Dr. Jean Pierre Champagne, ID specialist.     Social  Drivers of Health     Financial Resource Strain: Low Risk  (1/5/2024)    Financial Resource Strain     Within the past 12 months, have you or your family members you live with been unable to get utilities (heat, electricity) when it was really needed?: No   Food Insecurity: Low Risk  (1/5/2024)    Food Insecurity     Within the past 12 months, did you worry that your food would run out before you got money to buy more?: No     Within the past 12 months, did the food you bought just not last and you didn t have money to get more?: No   Transportation Needs: Low Risk  (1/5/2024)    Transportation Needs     Within the past 12 months, has lack of transportation kept you from medical appointments, getting your medicines, non-medical meetings or appointments, work, or from getting things that you need?: No   Physical Activity: Not on file   Stress: Not on file   Social Connections: Unknown (1/1/2022)    Received from [facility], [facility]    Social Connections     Frequency of Communication with Friends and Family: Not on file   Interpersonal Safety: Low Risk  (10/17/2024)    Interpersonal Safety     Do you feel physically and emotionally safe where you currently live?: Yes     Within the past 12 months, have you been hit, slapped, kicked or otherwise physically hurt by someone?: No     Within the past 12 months, have you been humiliated or emotionally abused in other ways by your partner or ex-partner?: No   Housing Stability: Low Risk  (1/5/2024)    Housing Stability     Do you have housing? : Yes     Are you worried about losing your housing?: No           Medications  Allergies   Current Outpatient Medications   Medication Sig Dispense Refill    acetaminophen (TYLENOL) 650 MG CR tablet Take 650 mg by mouth every 8 hours as needed      aspirin 81 MG EC tablet Take 1 tablet (81 mg) by mouth daily      co-enzyme Q-10 100 MG CAPS capsule Take 200 mg by  mouth daily      fish oil-omega-3 fatty acids 300-1,000 mg capsule [FISH OIL-OMEGA-3 FATTY ACIDS 300-1,000 MG CAPSULE] Take 2 g by mouth daily.      fluticasone (FLONASE) 50 MCG/ACT nasal spray Spray 1 spray into both nostrils daily 48 mL 3    lisinopril (ZESTRIL) 5 MG tablet Take 1 tablet (5 mg) by mouth daily. 90 tablet 3    metoprolol succinate ER (TOPROL XL) 25 MG 24 hr tablet Take 1 tablet (25 mg) by mouth daily. 90 tablet 3    MULTIVITAMIN ORAL Take 1 tablet by mouth daily      nortriptyline (PAMELOR) 25 MG capsule TAKE 1 CAPSULE AT BEDTIME 90 capsule 3    pravastatin (PRAVACHOL) 20 MG tablet Take 1 tablet (20 mg) by mouth daily. 360 tablet 3    pseudoePHEDrine (SUDAFED) 120 MG 12 hr tablet Take 1 tablet (120 mg) by mouth every 12 hours as needed for congestion 50 tablet 0    senna-docusate (SENOKOT-S/PERICOLACE) 8.6-50 MG tablet Take 1-2 tablets by mouth daily as needed for constipation Take while on oral narcotics to prevent or treat constipation. 100 tablet 1    tamsulosin (FLOMAX) 0.4 MG capsule Take 2 capsules (0.8 mg) by mouth daily. 180 capsule 3    pregabalin (LYRICA) 25 MG capsule Start 25 mg at bedtime x 1-2 weeks, then increase to 25 mg BID. (Patient not taking: Reported on 1/2/2025) 30 capsule 1       Allergies   Allergen Reactions    Effient [Prasugrel Hcl] Hives    Hydrochlorothiazide Unknown    Hydrocodone Swelling     Sinus    Oxycontin [Oxycodone] Swelling     lip    Peppermint Oil Other (See Comments)    Plavix [Clopidogrel] Hives    Sulfa (Sulfonamide Antibiotics) [Sulfa Antibiotics] Swelling    Perfume Swelling     Other reaction(s): Runny Nose          Lab Results    Chemistry/lipid CBC Cardiac Enzymes/BNP/TSH/INR   Recent Labs   Lab Test 12/03/24  1112   CHOL 137   HDL 53   LDL 72   TRIG 62     Recent Labs   Lab Test 12/03/24  1112 05/14/24  1102 09/08/22  1050   LDL 72 57 100     Recent Labs   Lab Test 12/03/24  1112   *   POTASSIUM 4.6   CHLORIDE 105   CO2 30*   *   BUN  28.8*   CR 1.50*   GFRESTIMATED 48*   JOVAN 12.7*     Recent Labs   Lab Test 12/03/24  1112 11/14/24  1346 07/16/24  1130   CR 1.50* 1.4* 1.37*     Recent Labs   Lab Test 12/03/24  1112 05/14/24  1102 06/29/17  1035   A1C 5.4 5.5 5.3          Recent Labs   Lab Test 12/03/24  1112   WBC 7.9   HGB 15.0   HCT 43.9   MCV 94        Recent Labs   Lab Test 12/03/24  1112 06/22/23  1430 05/16/23  1310   HGB 15.0 14.6 14.1    Recent Labs   Lab Test 06/18/20  1217   TROPONINI 0.02     Recent Labs   Lab Test 05/14/24  1102 11/30/20  1018 06/18/20  1217   BNP  --  178* 133*   NTBNP 1,343*  --   --      Recent Labs   Lab Test 05/14/24  1102   TSH 2.74     Recent Labs   Lab Test 12/03/24  1112 09/08/22  1050 01/11/22  1324   INR 1.00 1.01 1.01        Noah Gutierres MD

## 2025-01-02 NOTE — LETTER
1/2/2025    Onur Rod MD  [address]  [facility] [address]    RE: Mike Lanzaio       Dear Colleague,     I had the pleasure of seeing Mike Gonzalez in the [facility].    HEART CARE VALVE CLINIC ENCOUNTER CONSULTATON NOTE      SYLVIA [facility]  [phone]      Assessment/Recommendations   Assessment/Plan: Mike Gonzalez is a 74M w/ AF s/p LAAO '21, HTN, high BMI who is here for evaluation of management options for avinash-device leak.    -  I personally reviewed cardiac imaging including: JEAN CLAUDE and CTA. They both show 3x5.6mm peridevice leak w/ no flow behind the device but some residual 'rough tissue' proximal long w/ heavy pectinates  -  after a long discussion of natural history, risk/benefit, and management options w/ the patient and family, they understand that there is no straightforward way to close the proximal lobe. The device itself if stable and mostly thrombosed, covering the vast majority of the SPARKLE and fully endothelialized by now  - in my mind, the options now include re-starting anticoagulation v. Continuing ASA. Leak closure is anatomically difficult in his case, but I will also show the CTA to [employer] to consider other off label options. Lastly, if he were to ever have cardiac surgery, surgical ligation is another option  - he knows that from embolic protection perspective, anticoagulation would be the safest and hence, my preference, but he would like to think about it and so far is leaning to just staying on ASA, potentially going to 325 mg dose. Should he change his mind, he will reach out and we will call in the prescription for apixaban      The longitudinal plan of care for the diagnosis(es)/condition(s) as documented were addressed during this visit. Due to the added complexity in care, I will continue to support Bj in the subsequent management and with ongoing continuity of care.      A total of >60 mins was spent reviewing prior records,  including documentation, lab studies, cardiac testing/imaging, interview with patient, physical exam, and documentation     History of Present Illness/Subjective    HPI: Mike Gonzalez is a 74 year old male w/ AF s/p LAAO '21, HTN, high BMI who is here for evaluation of management options for per-device leak    He has had no issues after LAAO, residual 3-5mm avinash-device leak on JEAN CLAUDE. Recently, a CTA was done which demonstrated some residual leak and prompting this referral.  He has had no bleeding issues or embolic events, he has been having a lot of back issues s/p surgeries.    Recent Echocardiogram Results:  The left ventricle is normal in size. Left ventricular systolic performance  is moderately reduced. The ejection fraction is estimated to be 30%.  2. There is moderate global reduction in left ventricular systolic  performance.  3. There is mild concentric increase in left ventricular wall thickness.  4. No significant valvular heart disease is identified on this study.  5. Normal right ventricular size with low normal right ventricular systolic  performance.  6. There is moderate to severe left atrial enlargement. There is moderate  right atrial enlargement.     The prior real-time echocardiogram could not be accessed from the digital  archive for direct comparison.    Recent Coronary Angiogram Results:  None       Physical Examination  Review of Systems   Vitals: /80 (BP Location: Right arm, Patient Position: Sitting, Cuff Size: Adult Large)   Pulse 84   Resp 16   Ht 1.829 m (6')   Wt 108.9 kg (240 lb)   SpO2 98%   BMI 32.55 kg/m    BMI= There is no height or weight on file to calculate BMI.  Wt Readings from Last 3 Encounters:   12/03/24 108.4 kg (239 lb)   10/17/24 108 kg (238 lb)   10/10/24 108 kg (238 lb)       General Appearance:   no distress, normal body habitus   ENT/Mouth: membranes moist, no oral lesions or bleeding gums.      EYES:  no scleral icterus, normal conjunctivae   Neck: no  carotid bruits or thyromegaly   Chest/Lungs:   lungs are clear to auscultation, no rales or wheezing, no sternal scar, equal chest wall expansion    Cardiovascular:   Regular. Normal first and second heart sounds with 2/6 systolic murmur, no rubs, or gallops; the carotid, radial and posterior tibial pulses are intact, Jugular venous pressure 6, no edema bilaterally    Abdomen:  no organomegaly, masses, bruits, or tenderness; bowel sounds are present   Extremities: no cyanosis or clubbing   Skin: no xanthelasma, warm.    Neurologic: normal  bilateral, no tremors     Psychiatric: alert and oriented x3, calm        Please refer above for cardiac ROS details.        Medical History  Surgical History Family History Social History   Past Medical History:   Diagnosis Date     Angioedema      Atrial fibrillation and flutter (H)      Basal cell carcinoma      Branch retinal vein occlusion of left eye (H) 07/01/2017     CAD (coronary artery disease) 2/29/2024    ANGIOGRAM 2021 Left Main The vessel was visualized by angiography, is moderate in size and is angiographically normal. Left Anterior Descending Mid LAD lesion is 25% stenosed. Ramus Intermedius Ramus lesion is 10% stenosed. Left Circumflex Dist Cx lesion is 30% stenosed. Right Coronary Artery The vessel was visualized by angiography, is moderate in size and is angiographically normal.       Chronic neck pain      Congestive heart failure (H) 2021     GERD (gastroesophageal reflux disease)      HTN (hypertension) 2015     Nonsmoker      Radiculopathy     cervical     Retinal hemorrhage     Vessel rupture in left retina     Rhinitis, allergic      S/P colonoscopy 04/15/2019     Urticaria      Past Surgical History:   Procedure Laterality Date     BASAL CELL CARCINOMA EXCISION  01/01/2020    Left nasal reconstruction     CATARACT EXTRACTION, BILATERAL Bilateral 2022     CERVICAL SPINE SURGERY      C8, T1, foraminotomy     CV CORONARY ANGIOGRAM N/A 01/20/2021     Procedure: Coronary Angiogram;  Surgeon: Willow Wellington MD;  Location: [facility] Cardiac Cath Lab;  Service: Cardiology     CV LEFT ATRIAL APPENDAGE CLOSURE N/A 07/14/2021    Procedure: CV LEFT ATRIAL APPENDAGE CLOSURE;  Surgeon: Noah Gutierres MD;  Location: [facility] CARDIAC CATH LAB     CV LEFT HEART CATHETERIZATION WITHOUT LEFT VENTRICULOGRAM Left 01/20/2021    Procedure: Left Heart Catheterization Without Left Ventriculogram;  Surgeon: Willow Wellington MD;  Location: [facility] Cardiac Cath Lab;  Service: Cardiology     DECOMPRESSION, FUSION LUMBAR POSTERIOR ONE LEVEL, COMBINED Left 03/30/2023    Procedure: LEFT LUMBAR 4-5 POSTERIOR SPINAL FUSION WITH LEFT LUMBAR 4-5 LAMINOFORAMINOTOMY AND WIDE LUMBAR 4-5 LEFT SUBTOTAL FACETECTOMY WITH ALLOGRAFT AND AUTOGRAFT;  Surgeon: Ambrose Degroot MD;  Location: [facility] Main OR     HERNIA REPAIR, UMBILICAL       ×2     INJECT EPIDURAL LUMBAR Left 10/10/2024    Procedure: INJECTION, SPINE, LUMBAR, EPIDURAL (left L1-L2);  Surgeon: Zulma Ya MD;  Location: [facility] OR     INJECT MAJOR JOINT / BURSA Left 10/17/2024    Procedure: INJECTION, MAJOR JOINT OR BURSA OF MAJOR JOINT (left knee);  Surgeon: Zulma Ya MD;  Location: [facility] OR     REVERSE TOTAL SHOULDER ARTHROPLASTY Left 05/01/2019     Family History   Problem Relation Age of Onset     Arthritis Mother      Other - See Comments Mother         psychiatry/pvd     Other - See Comments Father         blood reaction        Social History     Socioeconomic History     Marital status:      Spouse name: Not on file     Number of children: Not on file     Years of education: Not on file     Highest education level: Not on file   Occupational History     Not on file   Tobacco Use     Smoking status: Never     Smokeless tobacco: Never   Substance and Sexual Activity     Alcohol use: Never     Comment: Alcoholic Drinks/day: About once a year.     Drug use: No     Sexual activity: Not on file   Other Topics  Concern     Parent/sibling w/ CABG, MI or angioplasty before 65F 55M? Not Asked   Social History Narrative    Patient of Dr. Rod since 2001.    .  Wife is a former RN.             Wife's cousin is Dr. Jean Pierre Champagne, ID specialist.     Social Drivers of Health     Financial Resource Strain: Low Risk  (1/5/2024)    Financial Resource Strain      Within the past 12 months, have you or your family members you live with been unable to get utilities (heat, electricity) when it was really needed?: No   Food Insecurity: Low Risk  (1/5/2024)    Food Insecurity      Within the past 12 months, did you worry that your food would run out before you got money to buy more?: No      Within the past 12 months, did the food you bought just not last and you didn t have money to get more?: No   Transportation Needs: Low Risk  (1/5/2024)    Transportation Needs      Within the past 12 months, has lack of transportation kept you from medical appointments, getting your medicines, non-medical meetings or appointments, work, or from getting things that you need?: No   Physical Activity: Not on file   Stress: Not on file   Social Connections: Unknown (1/1/2022)    Received from [facility] [employer] [facility], [facility] [employer] [facility]    Social Connections      Frequency of Communication with Friends and Family: Not on file   Interpersonal Safety: Low Risk  (10/17/2024)    Interpersonal Safety      Do you feel physically and emotionally safe where you currently live?: Yes      Within the past 12 months, have you been hit, slapped, kicked or otherwise physically hurt by someone?: No      Within the past 12 months, have you been humiliated or emotionally abused in other ways by your partner or ex-partner?: No   Housing Stability: Low Risk  (1/5/2024)    Housing Stability      Do you have housing? : Yes      Are you worried about losing your housing?: No           Medications  Allergies   Current  Outpatient Medications   Medication Sig Dispense Refill     acetaminophen (TYLENOL) 650 MG CR tablet Take 650 mg by mouth every 8 hours as needed       aspirin 81 MG EC tablet Take 1 tablet (81 mg) by mouth daily       co-enzyme Q-10 100 MG CAPS capsule Take 200 mg by mouth daily       fish oil-omega-3 fatty acids 300-1,000 mg capsule [FISH OIL-OMEGA-3 FATTY ACIDS 300-1,000 MG CAPSULE] Take 2 g by mouth daily.       fluticasone (FLONASE) 50 MCG/ACT nasal spray Spray 1 spray into both nostrils daily 48 mL 3     lisinopril (ZESTRIL) 5 MG tablet Take 1 tablet (5 mg) by mouth daily. 90 tablet 3     metoprolol succinate ER (TOPROL XL) 25 MG 24 hr tablet Take 1 tablet (25 mg) by mouth daily. 90 tablet 3     MULTIVITAMIN ORAL Take 1 tablet by mouth daily       nortriptyline (PAMELOR) 25 MG capsule TAKE 1 CAPSULE AT BEDTIME 90 capsule 3     pravastatin (PRAVACHOL) 20 MG tablet Take 1 tablet (20 mg) by mouth daily. 360 tablet 3     pseudoePHEDrine (SUDAFED) 120 MG 12 hr tablet Take 1 tablet (120 mg) by mouth every 12 hours as needed for congestion 50 tablet 0     senna-docusate (SENOKOT-S/PERICOLACE) 8.6-50 MG tablet Take 1-2 tablets by mouth daily as needed for constipation Take while on oral narcotics to prevent or treat constipation. 100 tablet 1     tamsulosin (FLOMAX) 0.4 MG capsule Take 2 capsules (0.8 mg) by mouth daily. 180 capsule 3     pregabalin (LYRICA) 25 MG capsule Start 25 mg at bedtime x 1-2 weeks, then increase to 25 mg BID. (Patient not taking: Reported on 1/2/2025) 30 capsule 1       Allergies   Allergen Reactions     Effient [Prasugrel Hcl] Hives     Hydrochlorothiazide Unknown     Hydrocodone Swelling     Sinus     Oxycontin [Oxycodone] Swelling     lip     Peppermint Oil Other (See Comments)     Plavix [Clopidogrel] Hives     Sulfa (Sulfonamide Antibiotics) [Sulfa Antibiotics] Swelling     Perfume Swelling     Other reaction(s): Runny Nose          Lab Results    Chemistry/lipid CBC Cardiac  Enzymes/BNP/TSH/INR   Recent Labs   Lab Test 12/03/24  1112   CHOL 137   HDL 53   LDL 72   TRIG 62     Recent Labs   Lab Test 12/03/24  1112 05/14/24  1102 09/08/22  1050   LDL 72 57 100     Recent Labs   Lab Test 12/03/24  1112   *   POTASSIUM 4.6   CHLORIDE 105   CO2 30*   *   BUN 28.8*   CR 1.50*   GFRESTIMATED 48*   JOVAN 12.7*     Recent Labs   Lab Test 12/03/24  1112 11/14/24  1346 07/16/24  1130   CR 1.50* 1.4* 1.37*     Recent Labs   Lab Test 12/03/24  1112 05/14/24  1102 06/29/17  1035   A1C 5.4 5.5 5.3          Recent Labs   Lab Test 12/03/24  1112   WBC 7.9   HGB 15.0   HCT 43.9   MCV 94        Recent Labs   Lab Test 12/03/24  1112 06/22/23  1430 05/16/23  1310   HGB 15.0 14.6 14.1    Recent Labs   Lab Test 06/18/20  1217   TROPONINI 0.02     Recent Labs   Lab Test 05/14/24  1102 11/30/20  1018 06/18/20  1217   BNP  --  178* 133*   NTBNP 1,343*  --   --      Recent Labs   Lab Test 05/14/24  1102   TSH 2.74     Recent Labs   Lab Test 12/03/24  1112 09/08/22  1050 01/11/22  1324   INR 1.00 1.01 1.01        Noah Gutierres MD                                        Thank you for allowing me to participate in the care of your patient.      Sincerely,     Noah Gutierres MD     [facility]  cc:   Noah Gutierres MD  [address]  [place] [address]

## 2025-01-07 LAB — PTH RELATED PROT SERPL-SCNC: 0.6 PMOL/L

## 2025-01-08 LAB — 1,25(OH)2D SERPL-MCNC: 13 PG/ML (ref 19.9–79.3)

## 2025-01-09 NOTE — PROGRESS NOTES
Writer has forwarded [employer] review of images to Dr. Gutierres to determine if possible closure of residual SPARKLE can be offered. [facility]

## 2025-01-09 NOTE — PROGRESS NOTES
Dr. Gutierres would like to review images with Dr. Valdez before committing to any plans. To be discussed tomorrow in person. LYLY

## 2025-01-15 NOTE — PROGRESS NOTES
----- Message -----  From: Noah Gutierres MD  Sent: 1/15/2025  10:58 AM CST  To: Melvina Ch RN; Abraham Valdez MD    I have and still not convinced Amulet is a good option. Abraham, what do you think?      Thank you!  Noah  ----- Message -----  From: Melvina Ch RN  Sent: 1/15/2025  10:43 AM CST  To: Noah Gutierres MD; Abraham Valdez MD    ----- Message from Melvina Ch RN sent at 1/15/2025 10:43 AM CST -----  Did the two of you have a chance to review images?

## 2025-01-20 ENCOUNTER — PRE VISIT (OUTPATIENT)
Dept: NEUROLOGY | Facility: CLINIC | Age: 75
End: 2025-01-20

## 2025-01-20 NOTE — PROGRESS NOTES
Writer has requested images be made available for provider review. [facility]    ----- Message -----  From: Abraham Valdez MD  Sent: 1/17/2025   7:27 PM CST  To: Melvina Ch RN; MD Noah Price and Melvina,  I reviewed the follow-up JEAN CLAUDE images but I am unable to open the CT from [facility].  Is at possible to get that study reloaded so I can review those images as well.  Just based on the JEAN CLAUDE I agree that Amulet does not look to be a reasonable solution, but I would like to see the CT images.  Melvina can you please text me once those are loaded and I can review.  Thanks  Abraham

## 2025-02-05 NOTE — PROGRESS NOTES
[employer] case planning images forwarded on to Dr Valdez.  -ral    ----- Message -----  From: Abraham Valdez MD  Sent: 1/29/2025   5:12 PM CST  To: Noah Gutierres MD; *    Siddhartha Zamora,  I was able to review the CT images on DF/JEAN CLAUDE.  I cannot locate the [employer] case planning images.  Happy to discuss the case tomorrow.  I am in the lab that looks like you are in the clinic.  Shoot me a text in the afternoon and we can chat.  Thanks  Abraham

## 2025-02-06 NOTE — PROGRESS NOTES
[facility]     Date of visit: 2/7/2025      Assessment:   Mike Gonzalez is a 74 year old male with a past medical history significant for a fib, CHF, GERD, HTN, cervical radiculopathy, s/p lumbar fusion, s/p cervical spine surgery, s/p left reverse total shoulder, s/p left watchman implant (2021), who presents in follow up for widespread pain.      Widespread pain - affected areas include low back, left hip, bilateral foot, right shoulder, left sided neck and shoulder. Hx of lumbar fusion, C8-T1 foraminotomy, and left reverse total shoulder. Etiology likely associated with multiple factors, including but not limited to, degenerative changes of joint/spine, postsurgical changes, neuropathic component, overlying myofascial component likely contributes to some extent.        Assigned to [facility] nursing team.     Visit Diagnoses:  1. Lumbar radiculopathy    2. Paresthesia of both feet    3. History of lumbar fusion    4. Chronic pain of left knee        Plan:  Diagnosis reviewed, treatment option addressed, and risk/benifits discussed.  Self-care instructions given.  I am recommending a multidisciplinary treatment plan to help this patient better manage their pain.      Physical Therapy:  NO     Completed pool PT through [facility] in the past. May consider pain focused PT in future.      Pain Psychology: N/A     Diagnostic Studies:    No new orders today.      Medication Management:   Pregabalin trial recommended at last visit. Reported side effects in between visits (CNS and GI) and was advised to discontinue. Has been off medication and symptoms improved.   Celebrex - discussed low dose trial today. Will check with cardiology first for clearance.      Procedures:   Left L1-2 JUANIS on 10/10/24 with Dr. Ya. >50% pain relief (low back and legs/feet) for 3+ months. Pain has gradually returned and he is ready to repeat injection. Case request placed today.   Pending outcome with repeat LESI, may  consider other injections if needed.   Left knee Synvisc injection last on 10/17/24 with Dr. Ya. Had prior injection through [facility]. Marginal benefit with this and notes different approach compared to prior injection through [facility] that was substantially beneficial.      Other Orders/Referrals:   PIOTR signed for [facility] records.      Follow up with RAHUL Bui CNP around 6-8 weeks      Review of Electronic Chart: Today I have also reviewed available medical information in the patient's medical record at [facility] ([facility]) and Care Everywhere (if available), including relevant provider notes, laboratory work, and imaging.     Marian Huang, ROSS, RAHUL, AGNP-C  [facility]     -------------------------------------------------    Subjective:    Chief complaint:   Chief Complaint   Patient presents with    RECHECK    Pain    Pain Management     Follow up-removed a medication       Interval history:  Mike Gonzalez is a 74 year old male last seen on 12/6/24.      Recommendations/plan at the last visit included:  Physical Therapy:  NO     Completed pool PT through [facility] in the past. May consider pain focused PT, once cardiac concerns have been addressed.      Pain Psychology: N/A     Diagnostic Studies:    No new orders today.      Medication Management:   Pregabalin - start 25 mg daily x 1 week, then titrate to 25 mg BID to target radicular and neuropathic symptoms. May consider further dose increase to 50 mg BID at next visit or in between visits if needed. Cautioned about potential CNS side effects. Prescription for 25 mg caps sent to pharmacy.      Procedures:   Left L1-2 JUANIS on 10/10/24 with Dr. Ya. Reports 80% improvement in pain intensity in feet. He notes hip pain has also improved.      Other Orders/Referrals:   N/A     Follow up with RAHUL Bui CNP around 6-8 weeks    Since his last visit, Mike Gonzalez reports:  -He was started on pregabalin at last visit, reported side  "effects. He was having loose stools and other side effects.   -He notes that neuropathy pain has been worse the past couple of days, also knee OA and hips. He is sore from \"head to toe.\"  -He identifies diclofenac gel helps with feet for about 4-5 hours.   -He got substantial benefit from L1-2 JUANIS on 10/10 with Dr. Ya that was substantially helpful.   -He had knee injection through [facility] 2/6/24 (does not recall the specifics about injection) that was substantially helpful. He then had another injection on 10/17 that was not as effective.   -He would like to repeat LESI.     Pain Information:   Pain rating: Moderate Pain (4)      HPI/Interval Hx from last visit:  Mike Gonzalez is a 74 year old male presents with a chief complaint of widespread pain.      Onset/Location(s) of pain - Multiple areas (see below).   Pain qualities/characteristics - Midline low back pain above belt line that extends into left hip, and bilateral foot paraesthesia.    Alleviating factors - L1-2 JUANIS (left) hip pain improved 50%, avoiding overhead reaching, limiting activity, Tylenol, Voltaren, heat/cold.   Exacerbating factors - walking, certain activities/overhead reaching, weather changes.   Past treatments tried (H=helpful, NH=not helpful) - lumbar surgery x 2 (made pain worse), pool PT ([facility] Onofre), gabapentin (CNS side effects), land based PT (improvement with strength), left bursa injection, diclofenac gel to left knee, duloxetine.   Treatments never tried/potential options - Lyrica, pain focused PT.  Red flag symptom concerns present (extremity numbness/paraesthesia or weakness, saddle anesthesia, loss of bowel or bladder control, signs/symptoms of infection) - NO  Past pain clinic(s) - YES - injections with Dr. Ya.      Painful areas:  Low back/left hip  Left knee  Left shoulder/neck.   Right shoulder (at night)  Weakness (generalized) a few weeks ago after doing home repairs     -Wife Melita is present for visit today.   -He " had left L1-2 JUANIS on 10/10, reports 80% improvement in pain intensity in feet. He notes hip pain has improved.   -He had left knee injection on 10/17.   -Minimal sleep disruption due to pain.   -He fell off a ladder around 2019, hit head and left shoulder. He had TBI.       Current Pain Treatments:      Tylenol 650-1300 mg BID      Current MME: N/A    Review of [place] Prescription Monitoring Program (): YES    Past pain treatments:  Pregabalin - side effects (GI and CNS)    Medications:  Current Outpatient Medications   Medication Sig Dispense Refill    acetaminophen (TYLENOL) 650 MG CR tablet Take 650 mg by mouth every 8 hours as needed      aspirin 81 MG EC tablet Take 1 tablet (81 mg) by mouth daily      co-enzyme Q-10 100 MG CAPS capsule Take 200 mg by mouth daily      fish oil-omega-3 fatty acids 300-1,000 mg capsule [FISH OIL-OMEGA-3 FATTY ACIDS 300-1,000 MG CAPSULE] Take 2 g by mouth daily.      fluticasone (FLONASE) 50 MCG/ACT nasal spray Spray 1 spray into both nostrils daily 48 mL 3    lisinopril (ZESTRIL) 5 MG tablet Take 1 tablet (5 mg) by mouth daily. 90 tablet 3    metoprolol succinate ER (TOPROL XL) 25 MG 24 hr tablet Take 1 tablet (25 mg) by mouth daily. 90 tablet 3    MULTIVITAMIN ORAL Take 1 tablet by mouth daily      nortriptyline (PAMELOR) 25 MG capsule TAKE 1 CAPSULE AT BEDTIME 90 capsule 3    pravastatin (PRAVACHOL) 20 MG tablet Take 1 tablet (20 mg) by mouth daily. 360 tablet 3    pseudoePHEDrine (SUDAFED) 120 MG 12 hr tablet Take 1 tablet (120 mg) by mouth every 12 hours as needed for congestion 50 tablet 0    senna-docusate (SENOKOT-S/PERICOLACE) 8.6-50 MG tablet Take 1-2 tablets by mouth daily as needed for constipation Take while on oral narcotics to prevent or treat constipation. 100 tablet 1    tamsulosin (FLOMAX) 0.4 MG capsule Take 2 capsules (0.8 mg) by mouth daily. 180 capsule 3    pregabalin (LYRICA) 25 MG capsule Start 25 mg at bedtime x 1-2 weeks, then increase to 25 mg BID.  30 capsule 1       Medical History: any changes in medical history since they were last seen? No      Objective:    Physical Exam:  Blood pressure 114/74, pulse 76, resp. rate 16, SpO2 98%.  Constitutional: Well developed, well nourished, appears stated age.  Gait: Intact   HEENT: Head atraumatic, normocephalic. Eyes without conjunctival injection or jaundice. Oropharynx clear. Neck supple. No obvious neck masses.  Skin: No rash, lesions, or petechiae of exposed skin.   Psychiatric/mental status: Alert, without lethargy or stupor. Speech fluent. Appropriate affect. Mood normal. Able to follow commands without difficulty.     Diagnostic Tests/Imaging/Labs:  Reviewed lumbar MRI from 8/2/24    Reviewed last BMP and CBC    BILLING TIME DOCUMENTATION:   The total TIME spent on this patient on the date of the encounter/appointment was 43 minutes.      TOTAL TIME includes:   Time spent preparing to see the patient (reviewing records and tests)   Time spent face to face (or over the phone) with the patient   Time spent ordering tests, medications, procedures and referrals   Time spent Referring and communicating with other healthcare professionals   Time spent documenting clinical information in Epic

## 2025-02-07 ENCOUNTER — OFFICE VISIT (OUTPATIENT)
Dept: ANESTHESIOLOGY | Facility: CLINIC | Age: 75
End: 2025-02-07
Payer: MEDICARE

## 2025-02-07 ENCOUNTER — TELEPHONE (OUTPATIENT)
Dept: INTERNAL MEDICINE | Facility: CLINIC | Age: 75
End: 2025-02-07

## 2025-02-07 VITALS
HEART RATE: 76 BPM | OXYGEN SATURATION: 98 % | SYSTOLIC BLOOD PRESSURE: 114 MMHG | DIASTOLIC BLOOD PRESSURE: 74 MMHG | RESPIRATION RATE: 16 BRPM

## 2025-02-07 DIAGNOSIS — R30.0 DYSURIA: Primary | ICD-10-CM

## 2025-02-07 DIAGNOSIS — Z98.1 HISTORY OF LUMBAR FUSION: ICD-10-CM

## 2025-02-07 DIAGNOSIS — G89.29 CHRONIC PAIN OF LEFT KNEE: ICD-10-CM

## 2025-02-07 DIAGNOSIS — M54.16 LUMBAR RADICULOPATHY: Primary | ICD-10-CM

## 2025-02-07 DIAGNOSIS — R20.2 PARESTHESIA OF BOTH FEET: ICD-10-CM

## 2025-02-07 DIAGNOSIS — M25.562 CHRONIC PAIN OF LEFT KNEE: ICD-10-CM

## 2025-02-07 PROCEDURE — 99215 OFFICE O/P EST HI 40 MIN: CPT

## 2025-02-07 ASSESSMENT — PAIN SCALES - GENERAL: PAINLEVEL_OUTOF10: MODERATE PAIN (4)

## 2025-02-07 NOTE — PATIENT INSTRUCTIONS
Medications:    Discuss with Cardiology about taking Celebrex as needed.      Procedures:    Call to schedule your procedure: [phone] option #2    L1/2 epidural steroid injection      Your pre-procedure instructions are below, please call our clinic if you have any questions.      Treatment planning:    Release of Information form signed for records from [facility].         Recommended Follow up:      Follow up in 6-8 weeks after the epidural injection.     Please call [phone] to schedule your clinic appointment if you don't already have an appointment scheduled.      Procedure Information:     Please call [phone] option #2 to schedule, reschedule, or cancel your procedure appointment.   Phones are answered Monday - Friday from 08:00 - 4:30pm.  Leave a voicemail with your name, birth date, and phone number if no one is available to take your call.     The procedure center staff will call or send a MyChart several days before the procedure to review important information that you will need to know for the day of the procedure.     Please contact the clinic if you have further questions about this information [phone].        Information related to Scheduling and Pre-Procedure Instructions:  If you must reschedule your procedure more than two times, you must follow up in clinic before rescheduling again.    Preparing for your procedure    CAUTION - FAILURE TO FOLLOW THESE PRE-PROCEDURE INSTRUCTIONS WILL RESULT IN YOUR PROCEDURE BEING RESCHEDULED.    Your Procedure: Lumbar Epidural Steroid Injection            You must have a  take you home after your procedure. Transportation by taxi or para-transit is okay as long as you have a responsible adult accompany you. You must provide your 's full name and contact number at time of check in.   Fasting Protocol  Please have nothing to eat or drink 2 hour prior to arrival.     Medications If you take any medications, DO NOT STOP. Take your  medications as usual the day of your procedure with a sip of water AT LEAST 2 HOURS PRIOR TO ARRIVAL.    Vaccines You must complete all vaccines more than 2 weeks prior to your scheduled procedure. No vaccines until 2 weeks after your procedure.   Antibiotics If you are currently taking antibiotics, you must complete the entire dose 7 days prior to your scheduled procedure. You must be clear of any signs or symptoms of infection. If you begin antibiotics, please contact our clinic for instructions.   Fever, Chills, Rash, or COVID If you experience a fever of higher than 100 degrees, chills, rash, open wounds, develop COVID symptoms or test positive during the one week before your procedure, please call the clinic to see if you may proceed with your procedure.           Medication Hold List   **Patients under Cardiology/Neurology care should consult their provider prior to the pain procedure to verify pre-procedure medication instructions. The information below contains general guidelines.**    Blood Thinners  If you are taking daily ASPIRIN, PLAVIX, OR OTHER BLOOD THINNERS SUCH AS COUMADIN/WARFARIN, we will need your prescribing doctor to sign a release permitting you to stop these medications. Once approved by your prescribing doctor - STOP ALL BLOOD THINNERS BASED ON THE TIME TABLE BELOW PRIOR TO YOUR PROCEDURE. If you have been instructed to stop WARFARIN(COUMADIN), you must have an INR lab drawn the day before your procedure. Your INR must be within normal limits before we can perform your injection. MEDICATIONS CAN BE RESTARTED AFTER YOUR PROCEDURE.    24 HOUR HOLD  Lovenox (enoxaparin)  Agrylin (Anagrelide)    3 DAY HOLD  Xarelto (rivaroxaban)    5 DAY HOLD  Coumadin (Warfarin)  Brilinta (ticagrelor)    6 DAY HOLD  Aspirin 7 DAY HOLD  Anacin, Bufferin, Ecotrin,   Pradexa (Dabigatran)  Elmiron (Pentosan)  Plavix (Clopidogrel Bisulfate)  Pletal (Cilostazol)    10 DAY HOLD  Effient (Prasugel)    14 DAY  HOLD  Ticlid (ticlopidine)        Non-steroidal Anti-inflammatories (NSAIDs) DO NOT TAKE any non-steroidal anti-inflammatory medications (NSAIDs) listed on the table below. MEDICATIONS CAN BE RESTARTED AFTER YOUR PROCEDURE. Celebrex is OK to take and does not need to be discontinued.     Medications to stop:  1 DAY HOLD  Advil, Motrin (Ibuprofen)  Voltaren (Diclofenac)  Toradol (Ketorolac)    3 DAY HOLD  Arthrotec (diclofenac sodium/misoprostol)  Clinoril (Sulindac)  Indocin (Indomethacin)  Lodine (Etodolac)  Vicoprofen (Hydrocodone and Ibuprofen)  Apixaban (Eliquis)    4 DAY HOLD  Mobic (Meloxicam)  Naprosyn (Naproxen)   7 DAY HOLD  Darvon compound (contains aspirin)  Norgesic Forte (contains aspirin)  Oruvall (Ketoprofen)  Percodan (contains aspirin)  Relafen (Nabumetone)  Salsalate  Trilisate  Vitamin E (more than 400 mg per day)  Any medication containing aspirin    14 DAY HOLD  Daypro (Oxaprozin)  Feldene (Piroxicam)

## 2025-02-07 NOTE — NURSING NOTE
Patient presents with:  RECHECK  Pain  Pain Management: Follow up-removed a medication      Moderate Pain (4)     Pain Medications       Analgesics Other Refills Start End     acetaminophen (TYLENOL) 650 MG CR tablet --  --    Sig - Route: Take 650 mg by mouth every 8 hours as needed - Oral    Class: Historical       Salicylates Refills Start End     aspirin 81 MG EC tablet -- 7/28/2023 --    Sig - Route: Take 1 tablet (81 mg) by mouth daily - Oral    Class: OTC            What medications are you using for pain? Tylenol, not taking the pregabalin    What refills are you needing today? none    Expectations: I would like a pill that won't put me in a fog.    Anyi Hui, RAFIQN

## 2025-02-07 NOTE — NURSING NOTE
RN read through the instructions with the patient for the recommended procedure: LESI  Patient verbalized understanding to holding appropriate medication per protocol and was agreeable to NPO policy and needing a .    Anticoagulant: ASA 81 mg- 6 day hold    Recommended Follow Up: 6-8 weeks    Arabella Jansen RN

## 2025-02-07 NOTE — TELEPHONE ENCOUNTER
Symptoms    Describe your symptoms:A little blood in the urine and a little stinging when he goes. Urinating more frequently. The symptoms started 2/7/2025    Any pain: Yes: A little    How long have you been having symptoms: 1  days    Have you been seen for this:  No    Appointment offered?: No    Triage offered?: No    Home remedies tried: No    Preferred Pharmacy:   [id number] - [place], [place] - [address] [facility]  [address] [facility] [address]  Phone: [phone] Fax: [phone]    [facility] - [place], [place] - [address] [place] [address]  Phone: [phone] Fax: [phone]      Could we send this information to you in [facility] or would you prefer to receive a phone call?:   Patient would prefer a phone call   Okay to leave a detailed message?: Yes at Cell number on file:    Telephone Information:   Mobile [phone]

## 2025-02-10 ENCOUNTER — LAB (OUTPATIENT)
Dept: LAB | Facility: CLINIC | Age: 75
End: 2025-02-10
Payer: MEDICARE

## 2025-02-10 ENCOUNTER — TELEPHONE (OUTPATIENT)
Dept: ANESTHESIOLOGY | Facility: CLINIC | Age: 75
End: 2025-02-10

## 2025-02-10 DIAGNOSIS — R30.0 DYSURIA: ICD-10-CM

## 2025-02-10 LAB
ALBUMIN UR-MCNC: NEGATIVE MG/DL
APPEARANCE UR: ABNORMAL
BACTERIA #/AREA URNS HPF: ABNORMAL /HPF
BILIRUB UR QL STRIP: NEGATIVE
COLOR UR AUTO: YELLOW
GLUCOSE UR STRIP-MCNC: NEGATIVE MG/DL
HGB UR QL STRIP: ABNORMAL
KETONES UR STRIP-MCNC: NEGATIVE MG/DL
LEUKOCYTE ESTERASE UR QL STRIP: NEGATIVE
NITRATE UR QL: NEGATIVE
PH UR STRIP: 6 [PH] (ref 5–8)
RBC #/AREA URNS AUTO: ABNORMAL /HPF
SP GR UR STRIP: 1.01 (ref 1–1.03)
SQUAMOUS #/AREA URNS AUTO: ABNORMAL /LPF
UROBILINOGEN UR STRIP-ACNC: 0.2 E.U./DL
WBC #/AREA URNS AUTO: ABNORMAL /HPF

## 2025-02-10 PROCEDURE — 81001 URINALYSIS AUTO W/SCOPE: CPT

## 2025-02-10 NOTE — TELEPHONE ENCOUNTER
Phoned the patient to schedule injection procedure with dr. Ya. Patient stated he is not ready to schedule now and will call when ready. Gave patient direct number at [phone].to call and schedule.

## 2025-02-11 ENCOUNTER — OFFICE VISIT (OUTPATIENT)
Dept: INTERNAL MEDICINE | Facility: CLINIC | Age: 75
End: 2025-02-11
Payer: MEDICARE

## 2025-02-11 VITALS
DIASTOLIC BLOOD PRESSURE: 72 MMHG | OXYGEN SATURATION: 98 % | WEIGHT: 240 LBS | SYSTOLIC BLOOD PRESSURE: 130 MMHG | RESPIRATION RATE: 16 BRPM | HEIGHT: 72 IN | HEART RATE: 79 BPM | BODY MASS INDEX: 32.51 KG/M2 | TEMPERATURE: 98 F

## 2025-02-11 DIAGNOSIS — N28.9 RENAL INSUFFICIENCY: ICD-10-CM

## 2025-02-11 DIAGNOSIS — R31.0 GROSS HEMATURIA: Primary | ICD-10-CM

## 2025-02-11 DIAGNOSIS — N18.31 CHRONIC KIDNEY DISEASE, STAGE 3A (H): ICD-10-CM

## 2025-02-11 DIAGNOSIS — I48.92 ATRIAL FLUTTER, CHRONIC (H): ICD-10-CM

## 2025-02-11 DIAGNOSIS — I50.22 CHRONIC SYSTOLIC HEART FAILURE (H): ICD-10-CM

## 2025-02-11 DIAGNOSIS — H34.8322 BRANCH RETINAL VEIN OCCLUSION OF LEFT EYE, UNSPECIFIED COMPLICATION STATUS (H): ICD-10-CM

## 2025-02-11 PROCEDURE — G2211 COMPLEX E/M VISIT ADD ON: HCPCS | Performed by: INTERNAL MEDICINE

## 2025-02-11 PROCEDURE — 99214 OFFICE O/P EST MOD 30 MIN: CPT | Performed by: INTERNAL MEDICINE

## 2025-02-11 ASSESSMENT — PAIN SCALES - GENERAL: PAINLEVEL_OUTOF10: NO PAIN (0)

## 2025-02-11 NOTE — PROGRESS NOTES
[facility]    Comprehensive and complex medical care - Chronic disease management - Shared decision making - Care coordination - Compassionate care    Patient advocacy - Rational deprescribing - Minimally disruptive medicine - Ethical focus - Customized care         Date of Service: 2/11/2025  Primary Provider: Onur Rod    Patient Care Team:  Onur Rod MD as PCP - General  nOur Rod MD as Assigned PCP  Ambrose Degroot MD as MD (Orthopaedic Surgery)  Stuart Jackson MD as MD (Orthopaedic Surgery)  Liborio Champagne MD as MD  [facility]  Gregg Curtis as Resident (Orthopaedic Surgery)  Sagrario Good PA-C as Assigned Neuroscience Provider  Albino Chao MD as Assigned Musculoskeletal Provider  Zulma Ya MD as MD (Anesthesiology)  Albino Wei MD as MD (Neurology)  JOANN Francisco MD as Assigned Heart and Vascular Provider  Kiran Tyler MD as MD (Cardiovascular Disease)          Patient's Pharmacy:    [id number] - [place], [place] - [address] [place] [address]  [address] [place] [address]  Phone: [phone] Fax: [phone]    [facility] - [place], [place] - [address] [place] [address]  Phone: [phone] Fax: [phone]     Patient's Contacts:  Name Home Phone Work Phone Mobile Phone Relationship Lgl Danield   BLAYNEKYLER [phone] Spouse    MATILDE SILVER   [phone] Other      Patient's Insurance:    Payor: MEDICARE / Plan: MEDICARE / Product Type: Medicare /            Active Problem List:  Problem List as of 2/11/2025 Reviewed: 12/8/2024 10:27 PM by Onur Rod MD         High    Nonsmoker    Full code status    CAD (coronary artery disease)    Atrial flutter, chronic (H)       Medium    S/P left atrial appendage closure - WATCHMAN    Primary hypertension    Chronic neck pain    Dilated cardiomyopathy (H)    Chronic  systolic heart failure (H)    Chronic left-sided low back pain with left-sided sciatica    Spinal stenosis at L4-L5 level    Senile purpura    Lumbar radiculopathy    Chronic kidney disease, stage 3a (H)       Low    GERD (gastroesophageal reflux disease)    Branch retinal vein occlusion of left eye (H)    Normal colonoscopy - 2016 - Average risk    Complete tear of left rotator cuff    Concussion syndrome    Idiopathic peripheral neuropathy        Current Outpatient Medications   Medication Instructions    acetaminophen (TYLENOL) 650 mg, EVERY 8 HOURS PRN    aspirin 81 mg, Oral, DAILY    co-enzyme Q-10 200 mg, DAILY    fish oil-omega-3 fatty acids 2 g, DAILY    fluticasone (FLONASE) 50 MCG/ACT nasal spray 1 spray, Both Nostrils, DAILY    lisinopril (ZESTRIL) 5 mg, Oral, DAILY    metoprolol succinate ER (TOPROL XL) 25 mg, Oral, DAILY    MULTIVITAMIN ORAL 1 tablet, DAILY    nortriptyline (PAMELOR) 25 mg, Oral, AT BEDTIME    pravastatin (PRAVACHOL) 20 mg, Oral, DAILY    pregabalin (LYRICA) 25 MG capsule Start 25 mg at bedtime x 1-2 weeks, then increase to 25 mg BID.    pseudoePHEDrine (SUDAFED) 120 mg, Oral, EVERY 12 HOURS PRN    senna-docusate (SENOKOT-S/PERICOLACE) 8.6-50 MG tablet 1-2 tablets, Oral, DAILY PRN, Take while on oral narcotics to prevent or treat constipation.    tamsulosin (FLOMAX) 0.8 mg, Oral, DAILY        Social History     Social History Narrative    Patient of Dr. Rod since 2001.    .  Wife is a former RN.             Wife's cousin is Dr. Jean Pierre Champagne, ID specialist.       Subjective:     Mike Gonzalez is a 74 year old male who comes in today for:    Chief Complaint   Patient presents with    Urinary Problem     Patient states last Friday, he noticed there was blood in his urine. Patient states there are still blood when he urinates.            2/11/2025     7:42 AM   Additional Questions   Roomed by Donovan WARD MA     In for issues with blood in the urine since Friday.  Has not gone away but  not always there.  More in the evening.  Can be dark red.    Had issues with this in 2022.  Did have CT scan which was normal and did see Dr. Monreal who did a cystoscopy which was normal.  Thought to be prostatic possibly in nature.    In 2023 did have hematuria associated with a urinary tract infection (UTI).    Maybe some dysuria with this but no other urinary symptoms.    Discussed the fact that things can change since last check and he understands this.    No back pain or fever or chills.    On aspirin.    Reviewed his renal insufficiency and we discussed possibly a renal ultrasound but he would rather hold on this at this time.  Has taken a lot of non-steroidal anti-inflammatories (NSAIDs) remotely.    We reviewed his other issues noted in the assessment but not specifically addressed in the HPI above.     Objective:     Wt Readings from Last 3 Encounters:   02/11/25 108.9 kg (240 lb)   01/02/25 108.9 kg (240 lb)   12/03/24 108.4 kg (239 lb)     BP Readings from Last 3 Encounters:   02/11/25 130/72   02/07/25 114/74   01/02/25 118/80     /72   Pulse 79   Temp 98  F (36.7  C) (Oral)   Resp 16   Ht 1.829 m (6')   Wt 108.9 kg (240 lb)   SpO2 98%   BMI 32.55 kg/m     The patient is comfortable, no acute distress.  Mood good.  Insight good.  Eyes are nonicteric.  Neck is supple without mass.  No cervical adenopathy.  No thyromegaly. Heart irregular rate and rhythm.  Lungs clear to auscultation bilaterally.  Respiratory effort is good.  tremities no edema.      Diagnostics:     Lab on 02/10/2025   Component Date Value Ref Range Status    Color Urine 02/10/2025 Yellow  Colorless, Straw, Light Yellow, Yellow Final    Appearance Urine 02/10/2025 Slightly Cloudy (A)  Clear Final    Glucose Urine 02/10/2025 Negative  Negative mg/dL Final    Bilirubin Urine 02/10/2025 Negative  Negative Final    Ketones Urine 02/10/2025 Negative  Negative mg/dL Final    Specific Gravity Urine 02/10/2025 1.015  1.005 - 1.030  Final    Blood Urine 02/10/2025 Large (A)  Negative Final    pH Urine 02/10/2025 6.0  5.0 - 8.0 Final    Protein Albumin Urine 02/10/2025 Negative  Negative mg/dL Final    Urobilinogen Urine 02/10/2025 0.2  0.2, 1.0 E.U./dL Final    Nitrite Urine 02/10/2025 Negative  Negative Final    Leukocyte Esterase Urine 02/10/2025 Negative  Negative Final    Bacteria Urine 02/10/2025 Few (A)  None Seen /HPF Final    RBC Urine 02/10/2025 25-50 (A)  0-2 /HPF /HPF Final    WBC Urine 02/10/2025 0-5  0-5 /HPF /HPF Final    Squamous Epithelials Urine 02/10/2025 None Seen  None Seen /LPF Final     ECG results from 09/10/24   ECG 12-LEAD WITH MUSE ([facility])     Value    Systolic Blood Pressure     Diastolic Blood Pressure     Ventricular Rate 83    Atrial Rate 82    CO Interval     QRS Duration 114        QTc 441    P Axis     R AXIS -75    T Axis 58    Interpretation ECG      Atrial fibrillation  Left anterior fascicular block  Possible Anterolateral infarct (cited on or before 15-Apr-2019)  Abnormal ECG  When compared with ECG of 21-Mar-2023 09:40,  Previous ECG has undetermined rhythm, needs review  Left anterior fascicular block is now Present  Confirmed by JUAN NDIAYE MD LOC:[facility] ([id number]) on 9/10/2024 10:29:41 AM     ECG 12-LEAD WITH MUSE ([facility])     Value    Systolic Blood Pressure     Diastolic Blood Pressure     Ventricular Rate 82    Atrial Rate 66    CO Interval     QRS Duration 112        QTc 453    P Axis     R AXIS -72    T Axis 60    Interpretation ECG      Atrial fibrillation  Non-specific intra-ventricular conduction delay    Abnormal ECG  When compared with ECG of 10-Sep-2024 09:46,  No significant change was found  Confirmed by DAYTON QUINONEZ MD LOC:[facility] ([id number]) on 9/10/2024 11:40:34 AM          Assessment and Plan:     1. Gross hematuria (Primary)  Patient wishes to follow at this time.  Stop aspirin until issue improves for at least 1 week.  Can do ultrasound of the kidneys and urology referral if needed.    2.  Chronic kidney disease, stage 3a (H)  Reviewed.  Further follow up in the future.  Continue to monitor.    3. Chronic systolic heart failure (H)  On medications for this and following with cardiology.  Continue to monitor.    4. Branch retinal vein occlusion of left eye, unspecified complication status (H)  No issues at this time.  Follow up with ophthalmology and continue to monitor.    5. Atrial flutter, chronic (H)  Has Watchman in place.  Continue to monitor.    6. Renal insufficiency  As above.     30 minutes or greater was spent today on the patient's care on the day of service.      This includes time for chart preparation, reviewing medical tests done before or during the visit, talking with the patient, review of quality indicators, required documentation, and other elements of care.     Continue current medications otherwise.  Follow up sooner if issues.    Onur Rod MD  General Internal Medicine  [facility]    The longitudinal plan of care for the diagnoses and conditions as documented were addressed during this visit. Due to the added complexity in care, I will continue to support Bj in the subsequent management and with ongoing continuity of care.     Return in about 6 months (around 8/11/2025) for annual wellness visit.     Future Appointments   Date Time Provider Department Center   2/19/2025  2:20 PM Kiran Tyler MD [facility]   4/22/2025 11:15 AM JOANN Francisco MD [facility]         HCC issues resolved at this visit.

## 2025-02-12 ENCOUNTER — TELEPHONE (OUTPATIENT)
Dept: ANESTHESIOLOGY | Facility: CLINIC | Age: 75
End: 2025-02-12
Payer: MEDICARE

## 2025-02-12 NOTE — PATIENT INSTRUCTIONS
Future Appointments   Date Time Provider Department Center   2/19/2025  2:20 PM Kiran Tyler MD [facility]   4/22/2025 11:15 AM JOANN Francisco MD [facility]

## 2025-02-12 NOTE — TELEPHONE ENCOUNTER
Patient Contacted for the patient to call back and schedule the following:    Appointment type: Return Pain  Provider: Marian Huang  Visit mode: In Person or Virtual Visit  Return date: Approx. 3/17/25  Specialty phone number: [phone]     Additional Notes: Called patient back in the afternoon per his request. He will call back to schedule once he has followed up with his GP and scheduled an his injection procedure. angelique

## 2025-02-12 NOTE — TELEPHONE ENCOUNTER
[employer] (1st Attempt) and Patient Contacted for the patient to call back and schedule the following:    Appointment type: Return Pain  Provider: Marian Huang  Visit mode: In Person or Virtual Visit  Return date: Approx. 3/17/25  Specialty phone number: [phone]    Additional Notes: Patient requested call back today around 2 PM

## 2025-02-14 PROBLEM — M54.16 LUMBAR RADICULOPATHY: Status: ACTIVE | Noted: 2024-09-05

## 2025-02-19 ENCOUNTER — OFFICE VISIT (OUTPATIENT)
Dept: CARDIOLOGY | Facility: CLINIC | Age: 75
End: 2025-02-19
Attending: INTERNAL MEDICINE
Payer: MEDICARE

## 2025-02-19 VITALS
HEIGHT: 72 IN | DIASTOLIC BLOOD PRESSURE: 64 MMHG | SYSTOLIC BLOOD PRESSURE: 130 MMHG | RESPIRATION RATE: 12 BRPM | BODY MASS INDEX: 32.51 KG/M2 | HEART RATE: 60 BPM | WEIGHT: 240 LBS

## 2025-02-19 DIAGNOSIS — I10 ESSENTIAL HYPERTENSION: ICD-10-CM

## 2025-02-19 DIAGNOSIS — E78.5 HYPERLIPIDEMIA, UNSPECIFIED HYPERLIPIDEMIA TYPE: ICD-10-CM

## 2025-02-19 DIAGNOSIS — I48.19 PERSISTENT ATRIAL FIBRILLATION (H): Primary | ICD-10-CM

## 2025-02-19 DIAGNOSIS — I25.10 NONOBSTRUCTIVE ATHEROSCLEROSIS OF CORONARY ARTERY: ICD-10-CM

## 2025-02-19 DIAGNOSIS — I42.8 NONISCHEMIC CARDIOMYOPATHY (H): ICD-10-CM

## 2025-02-19 PROCEDURE — G2211 COMPLEX E/M VISIT ADD ON: HCPCS | Performed by: GENERAL ACUTE CARE HOSPITAL

## 2025-02-19 PROCEDURE — 99215 OFFICE O/P EST HI 40 MIN: CPT | Performed by: GENERAL ACUTE CARE HOSPITAL

## 2025-02-19 NOTE — LETTER
2/19/2025    Onur Rod MD  [address]  [place] [address]    RE: Mike Gonzalez       Dear Colleague,     I had the pleasure of seeing Mike Gonzalez in the [facility].  HEART CARE ENCOUNTER NOTE        Assessment/Recommendations   Assessment:    Permanent atrial fibrillation which appears to have been first documented on resting 12-lead electrocardiogram 11/1/2011. Ventricular rates are controlled.  Percutaneous left atrial appendage closure with a 31 mm [employer] Watchman FLX device placement on 7/14/2021. Cardiac CT 11/14/2024 indicated a peridevice gap measuring less than 5.6 mm.  Nonischemic cardiomyopathy with left ventricular ejection fraction of 39%. NYHA class I, appears euvolemic.  Mild nonobstructive coronary artery disease see on coronary angiography 1/20/2021.  Essential hypertension.   Hyperlipidemia. Last LDL 72 mg/dL.  Obesity with body mass index 32.55 kg/m .    Plan:  In regards to his percutaneous left atrial appendage closure device leak, as there are currently no good interventional options to address this and he has been stable with no thromboembolic events for the past 4 years, it is reasonable to continue on aspirin 81 mg daily therapy. As he has had hematuria, we will not increase the dose at this time.  Metoprolol succinate 25 mg and lisinopril 5 mg daily.  Pravastatin 20 mg daily.  Follow-up with me in 1 year.       A total time of 40 minutes was spent on the date of this encounter.    The longitudinal plan of care for the diagnosis(es)/condition(s) as documented were addressed during this visit. Due to the added complexity in care, I will continue to support Bj in the subsequent management and with ongoing continuity of care.    History of Present Illness   Mr. Mike Gonzalez is a 74 year old male with a significant past history of persistent AF s/p 31 mm Watchman FLX device placement on 7/14/2021, nonobstructive CAD, and HFrEF LVEF 39%  presenting for follow-up.     After our last visit 11/6/2023, he sought a second opinion at [facility] cardiology for management of his cardiomyopathy and atrial fibrillation. He was told that given the long duration of his atrial fibrillation that rhythm control was unlikely. He had his cardiomyopathy reassessed with stress cardiac MRI 9/10/2024 which showed a stable LVEF of 39% with no ischemia and only a small degree of nonischemic scar.    He then had his LAAC device reassessed with a cardiac CT 11/14/2024 which suggested a slightly larger avinash-device gap of 5.6 mm compared to JEAN CLAUDE. He met with my interventional colleague Dr. Gutierres to discuss therapeutic options including another device or resuming anticoagulation. Placement of another device was felt to be technically very difficult and likely not feasible with current technology. He opted to remain on aspirin therapy.    He did develop hematuria recently and has been off his aspirin for 1 week. His hematuria has mostly resolved. He says this happened 4 years ago as well.    He continues to have low energy. He thinks this may be a result of not recovering well from two lower back surgeries and having persistent pain. This is what mostly limits has activity. No chest pain/pressure/tightness, shortness of breath at rest or with exertion, light headedness/dizziness, pre-syncope, syncope, lower extremity swelling, palpitations, paroxysmal nocturnal dyspnea (PND), or orthopnea.     Cardiac Problems and Cardiac Diagnostics     Most Recent Cardiac testing:  ECG dated 9/10/2024 (personaly reviewed and interpreted): atrial fibrillation, ventricular rate 82 bpm, possible anterior infarct, left anterior fascicular block     Holter monitor 6/17/2024 (report reviewed):  Holter monitoring (duration 24hrs).  Continuous atrial fibrillation, 61 to 117bpm, average 76bpm.  There were no pauses of greater than 3 seconds.  Rare premature ventricular contractions (4%).  Symptom  triggers correlated with AF at 82 bpm.     CTA heart 11/14/2024 (results reviewed):  1. Status post watchman.  It appears well-seated in the left atrial appendage.  There is 5.6 mm peridevice leak noted.  There is contrast opacification of the distal left atrial appendage. No device related thrombus noted  2. The visualized aortic root, ascending aorta and descending thoracic aorta are normal.  3.  Please review Radiology report for incidental noncardiac findings that will follow separately.    Cardiac stress MRI 9/10/2024 (results reviewed):  1.  Pharmacologic regadenoson stress cardiac MRI is negative for inducible myocardial ischemia.   2.  No evidence of prior myocardial infarction.  3.  There is a midwall stripe of nonvascular myocardial fibrosis involving the basal and mid septal walls of the left ventricle which is a finding typical of nonischemic cardiomyopathy.   4.  No evidence of infiltrative disease.  5.  Left ventricular chamber size and wall thickness are normal.  Systolic function is moderately reduced with global hypokinesis. The quantified left ventricular ejection fraction is 39%.     6.  Right ventricular chamber size and systolic function are normal. The quantified right ventricular ejection fraction is 56%.      7.  Severe left atrial enlargement. Moderate right atrial enlargement.  8.  No significant valvular abnormalities.    ECHO 10/18/2021 (report reviewed):  1. The left ventricle is mildly enlarged. Left ventricular systolic performance is moderately reduced. The ejection fraction is estimated to be 40%.  2. There is moderate global reduction in left ventricular systolic performance.  3. There is mild concentric increase in left ventricular wall thickness.  4. No significant valvular heart disease is identified on this study.  5. Borderline right ventricular enlargement with normal right ventricular systolic performance.  6. There is severe left atrial enlargement. There is moderate right  atrial enlargement.  7. There is a left atrial appendage occlusion device.  Â  No thrombus is detected upon the surface of the device.  Â  There is a localized region of flow around the device between the device itself and the left lateral ridge. The gap measures 0.35-0.5 cm and appears similar to the findings on the 2 September 2021 transesophageal echocardiogram.  8. There is mild spontaneous echo contrast in the left atrium though no thrombus detected.  9. Color Doppler interrogation of the atrial septum reveals the presence of a patent foramen ovale (PFO). No right to left shunting, however, was elicited with echo contrast injection either with spontaneous respiration or Valsalva.     Stress test 1/5/2021 (report reviewed):     The nuclear stress test is abnormal. There is no ischemia but there is a medium sized area of a moderate degree of nontransmural infarction in the distal anterior segment(s) of the left ventricle. This appears to be in the left anterior descending artery distribution.     The stress electrocardiogram is negative for inducible ischemic EKG changes. Occasional PVCs were noted during stress and recovery.     The left ventricle is dilated and the left ventricular ejection fraction at stress is mildly decreased at 44%.     Low-to-intermediate risk of future ischemic events.     There is no prior study for comparison.     Cardiac cath 1/20/2021 (report reviewed):     Mild coronary atherosclerosis.    LV EDP 20 mmHg.     Medications  Allergies   Current Outpatient Medications   Medication Sig Dispense Refill     acetaminophen (TYLENOL) 650 MG CR tablet Take 650 mg by mouth every 8 hours as needed       aspirin 81 MG EC tablet Take 1 tablet (81 mg) by mouth daily       co-enzyme Q-10 100 MG CAPS capsule Take 200 mg by mouth daily       fish oil-omega-3 fatty acids 300-1,000 mg capsule [FISH OIL-OMEGA-3 FATTY ACIDS 300-1,000 MG CAPSULE] Take 2 g by mouth daily.       fluticasone (FLONASE) 50  MCG/ACT nasal spray Spray 1 spray into both nostrils daily 48 mL 3     lisinopril (ZESTRIL) 5 MG tablet Take 1 tablet (5 mg) by mouth daily. 90 tablet 3     metoprolol succinate ER (TOPROL XL) 25 MG 24 hr tablet Take 1 tablet (25 mg) by mouth daily. 90 tablet 3     MULTIVITAMIN ORAL Take 1 tablet by mouth daily       nortriptyline (PAMELOR) 25 MG capsule TAKE 1 CAPSULE AT BEDTIME 90 capsule 3     pravastatin (PRAVACHOL) 20 MG tablet Take 1 tablet (20 mg) by mouth daily. 360 tablet 3     pregabalin (LYRICA) 25 MG capsule Start 25 mg at bedtime x 1-2 weeks, then increase to 25 mg BID. 30 capsule 1     pseudoePHEDrine (SUDAFED) 120 MG 12 hr tablet Take 1 tablet (120 mg) by mouth every 12 hours as needed for congestion 50 tablet 0     senna-docusate (SENOKOT-S/PERICOLACE) 8.6-50 MG tablet Take 1-2 tablets by mouth daily as needed for constipation Take while on oral narcotics to prevent or treat constipation. 100 tablet 1     tamsulosin (FLOMAX) 0.4 MG capsule Take 2 capsules (0.8 mg) by mouth daily. 180 capsule 3      Allergies   Allergen Reactions     Effient [Prasugrel Hcl] Hives     Hydrochlorothiazide Unknown     Hydrocodone Swelling     Sinus     Oxycontin [Oxycodone] Swelling     lip     Peppermint Oil Other (See Comments)     Plavix [Clopidogrel] Hives     Sulfa (Sulfonamide Antibiotics) [Sulfa Antibiotics] Swelling     Perfume Swelling     Other reaction(s): Runny Nose        Physical Examination Review of Systems   /64 (BP Location: Left arm, Patient Position: Sitting, Cuff Size: Adult Large)   Pulse 60   Resp 12   Ht 1.829 m (6')   Wt 108.9 kg (240 lb)   BMI 32.55 kg/m    Body mass index is 32.55 kg/m .  Wt Readings from Last 3 Encounters:   02/19/25 108.9 kg (240 lb)   02/11/25 108.9 kg (240 lb)   01/02/25 108.9 kg (240 lb)       General Appearance:   Pleasant  male, appears  stated age. no acute distress, obese body habitus   ENT/Mouth: membranes moist, no apparent gingival bleeding.      EYES:   no scleral icterus, normal conjunctivae   Neck: no carotid bruits. No anterior cervical lymphadenopaty   Respiratory:   lungs are clear to auscultation, no rales or wheezing, equal chest wall expansion    Cardiovascular:   Irregularly irregular. Normal first and second heart sounds with no murmurs, rubs, or gallops; the carotid, radial and posterior tibial pulses are intact, Jugular venous pressure not elevated, no lower extremity edema bilaterally    Abdomen/GI:  no organomegaly, masses, bruits, or tenderness; bowel sounds are present   Extremities: no cyanosis or clubbing   Skin: no xanthelasma, warm.    Heme/lymph/ Immunology No apparent bleeding noted.   Neurologic: Alert and oriented. normal gait, no tremors     Psychiatric: Pleasant, calm, appropriate affect.    A complete 10 system review of systems was performed and is negative except as mentioned in the HPI/subjective.         Past History   Past Medical History:   Past Medical History:   Diagnosis Date     Angioedema      Atrial fibrillation and flutter (H)      Basal cell carcinoma      Branch retinal vein occlusion of left eye (H) 07/01/2017     CAD (coronary artery disease) 2/29/2024    ANGIOGRAM 2021 Left Main The vessel was visualized by angiography, is moderate in size and is angiographically normal. Left Anterior Descending Mid LAD lesion is 25% stenosed. Ramus Intermedius Ramus lesion is 10% stenosed. Left Circumflex Dist Cx lesion is 30% stenosed. Right Coronary Artery The vessel was visualized by angiography, is moderate in size and is angiographically normal.       Chronic neck pain      Congestive heart failure (H) 2021     GERD (gastroesophageal reflux disease)      HTN (hypertension) 2015     Nonsmoker      Radiculopathy     cervical     Retinal hemorrhage     Vessel rupture in left retina     Rhinitis, allergic      S/P colonoscopy 04/15/2019     Urticaria        Past Surgical History:   Past Surgical History:   Procedure Laterality Date      BASAL CELL CARCINOMA EXCISION  01/01/2020    Left nasal reconstruction     CATARACT EXTRACTION, BILATERAL Bilateral 2022     CERVICAL SPINE SURGERY      C8, T1, foraminotomy     CV CORONARY ANGIOGRAM N/A 01/20/2021    Procedure: Coronary Angiogram;  Surgeon: Willow Wellington MD;  Location: [facility] Cardiac Cath Lab;  Service: Cardiology     CV LEFT ATRIAL APPENDAGE CLOSURE N/A 07/14/2021    Procedure: CV LEFT ATRIAL APPENDAGE CLOSURE;  Surgeon: Noah Gutierres MD;  Location:  HEART CARDIAC CATH LAB     CV LEFT HEART CATHETERIZATION WITHOUT LEFT VENTRICULOGRAM Left 01/20/2021    Procedure: Left Heart Catheterization Without Left Ventriculogram;  Surgeon: Willow Wellington MD;  Location: [facility] Cardiac Cath Lab;  Service: Cardiology     DECOMPRESSION, FUSION LUMBAR POSTERIOR ONE LEVEL, COMBINED Left 03/30/2023    Procedure: LEFT LUMBAR 4-5 POSTERIOR SPINAL FUSION WITH LEFT LUMBAR 4-5 LAMINOFORAMINOTOMY AND WIDE LUMBAR 4-5 LEFT SUBTOTAL FACETECTOMY WITH ALLOGRAFT AND AUTOGRAFT;  Surgeon: Ambrose Degroot MD;  [facility]     HERNIA REPAIR, UMBILICAL       ×2     INJECT EPIDURAL LUMBAR Left 10/10/2024    Procedure: INJECTION, SPINE, LUMBAR, EPIDURAL (left L1-L2);  Surgeon: Zulma Ya MD;  Location: [facility] OR     INJECT MAJOR JOINT / BURSA Left 10/17/2024    Procedure: INJECTION, MAJOR JOINT OR BURSA OF MAJOR JOINT (left knee);  Surgeon: Zulma Ya MD;  Location: [facility] OR     REVERSE TOTAL SHOULDER ARTHROPLASTY Left 05/01/2019       Family History:   Family History   Problem Relation Age of Onset     Arthritis Mother      Other - See Comments Mother         psychiatry/pvd     Other - See Comments Father         blood reaction        Social History:   Social History     Socioeconomic History     Marital status:      Spouse name: Not on file     Number of children: Not on file     Years of education: Not on file     Highest education level: Not on file   Occupational History      Not on file   Tobacco Use     Smoking status: Never     Smokeless tobacco: Never   Substance and Sexual Activity     Alcohol use: Never     Comment: Alcoholic Drinks/day: About once a year.     Drug use: No     Sexual activity: Not on file   Other Topics Concern     Parent/sibling w/ CABG, MI or angioplasty before 65F 55M? Not Asked   Social History Narrative    Patient of Dr. Rod since 2001.    .  Wife is a former RN.             Wife's cousin is Dr. Jean Pierre Champagne, ID specialist.     Social Drivers of Health     Financial Resource Strain: Low Risk  (1/5/2024)    Financial Resource Strain      Within the past 12 months, have you or your family members you live with been unable to get utilities (heat, electricity) when it was really needed?: No   Food Insecurity: Low Risk  (1/5/2024)    Food Insecurity      Within the past 12 months, did you worry that your food would run out before you got money to buy more?: No      Within the past 12 months, did the food you bought just not last and you didn t have money to get more?: No   Transportation Needs: Low Risk  (1/5/2024)    Transportation Needs      Within the past 12 months, has lack of transportation kept you from medical appointments, getting your medicines, non-medical meetings or appointments, work, or from getting things that you need?: No   Physical Activity: Not on file   Stress: Not on file   Social Connections: Unknown (1/1/2022)    [facility]    Social Connections      Frequency of Communication with Friends and Family: Not on file   Interpersonal Safety: Low Risk  (10/17/2024)    Interpersonal Safety      Do you feel physically and emotionally safe where you currently live?: Yes      Within the past 12 months, have you been hit, slapped, kicked or otherwise physically hurt by someone?: No      Within the past 12 months, have you been humiliated or  emotionally abused in other ways by your partner or ex-partner?: No   Housing Stability: Low Risk  (1/5/2024)    Housing Stability      Do you have housing? : Yes      Are you worried about losing your housing?: No              Lab Results    Chemistry/lipid CBC Cardiac Enzymes/BNP/TSH/INR   Lab Results   Component Value Date    CHOL 137 12/03/2024    HDL 53 12/03/2024    LDL 72 12/03/2024    TRIG 62 12/03/2024    CR 1.42 (H) 01/02/2025    BUN 21.0 01/02/2025    POTASSIUM 4.4 01/02/2025     01/02/2025    CO2 29 01/02/2025      Lab Results   Component Value Date    WBC 7.9 12/03/2024    HGB 15.0 12/03/2024    HCT 43.9 12/03/2024    MCV 94 12/03/2024     12/03/2024    A1C 5.4 12/03/2024     Lab Results   Component Value Date    A1C 5.4 12/03/2024    Lab Results   Component Value Date    TROPONINI 0.02 06/18/2020     (H) 11/30/2020    NTBNP 1,343 (H) 05/14/2024    TSH 2.74 05/14/2024    INR 1.00 12/03/2024          Kiran Tyler MD [facility]  Non-Invasive Cardiologist  [facility]  Pager [phone]      Thank you for allowing me to participate in the care of your patient.      Sincerely,     Kiran Tyler MD     [facility]  cc:   Gina Blount PA-C  [address]  [place] [address]

## 2025-02-19 NOTE — PROGRESS NOTES
HEART CARE ENCOUNTER NOTE        Assessment/Recommendations   Assessment:    Permanent atrial fibrillation which appears to have been first documented on resting 12-lead electrocardiogram 11/1/2011. Ventricular rates are controlled.  Percutaneous left atrial appendage closure with a 31 mm [employer] Watchman FLX device placement on 7/14/2021. Cardiac CT 11/14/2024 indicated a peridevice gap measuring less than 5.6 mm.  Nonischemic cardiomyopathy with left ventricular ejection fraction of 39%. NYHA class I, appears euvolemic.  Mild nonobstructive coronary artery disease see on coronary angiography 1/20/2021.  Essential hypertension.   Hyperlipidemia. Last LDL 72 mg/dL.  Obesity with body mass index 32.55 kg/m .    Plan:  In regards to his percutaneous left atrial appendage closure device leak, as there are currently no good interventional options to address this and he has been stable with no thromboembolic events for the past 4 years, it is reasonable to continue on aspirin 81 mg daily therapy. As he has had hematuria, we will not increase the dose at this time.  Metoprolol succinate 25 mg and lisinopril 5 mg daily.  Pravastatin 20 mg daily.  Follow-up with me in 1 year.       A total time of 40 minutes was spent on the date of this encounter.    The longitudinal plan of care for the diagnosis(es)/condition(s) as documented were addressed during this visit. Due to the added complexity in care, I will continue to support Bj in the subsequent management and with ongoing continuity of care.    History of Present Illness   Mr. Mike Gonzalez is a 74 year old male with a significant past history of persistent AF s/p 31 mm Watchman FLX device placement on 7/14/2021, nonobstructive CAD, and HFrEF LVEF 39% presenting for follow-up.     After our last visit 11/6/2023, he sought a second opinion at [facility] cardiology for management of his cardiomyopathy and atrial fibrillation. He was told that given the long duration of  his atrial fibrillation that rhythm control was unlikely. He had his cardiomyopathy reassessed with stress cardiac MRI 9/10/2024 which showed a stable LVEF of 39% with no ischemia and only a small degree of nonischemic scar.    He then had his LAAC device reassessed with a cardiac CT 11/14/2024 which suggested a slightly larger avinash-device gap of 5.6 mm compared to JEAN CLAUDE. He met with my interventional colleague Dr. Gutierres to discuss therapeutic options including another device or resuming anticoagulation. Placement of another device was felt to be technically very difficult and likely not feasible with current technology. He opted to remain on aspirin therapy.    He did develop hematuria recently and has been off his aspirin for 1 week. His hematuria has mostly resolved. He says this happened 4 years ago as well.    He continues to have low energy. He thinks this may be a result of not recovering well from two lower back surgeries and having persistent pain. This is what mostly limits has activity. No chest pain/pressure/tightness, shortness of breath at rest or with exertion, light headedness/dizziness, pre-syncope, syncope, lower extremity swelling, palpitations, paroxysmal nocturnal dyspnea (PND), or orthopnea.     Cardiac Problems and Cardiac Diagnostics     Most Recent Cardiac testing:  ECG dated 9/10/2024 (personaly reviewed and interpreted): atrial fibrillation, ventricular rate 82 bpm, possible anterior infarct, left anterior fascicular block     Holter monitor 6/17/2024 (report reviewed):  Holter monitoring (duration 24hrs).  Continuous atrial fibrillation, 61 to 117bpm, average 76bpm.  There were no pauses of greater than 3 seconds.  Rare premature ventricular contractions (4%).  Symptom triggers correlated with AF at 82 bpm.     CTA heart 11/14/2024 (results reviewed):  1. Status post watchman.  It appears well-seated in the left atrial appendage.  There is 5.6 mm peridevice leak noted.  There is  contrast opacification of the distal left atrial appendage. No device related thrombus noted  2. The visualized aortic root, ascending aorta and descending thoracic aorta are normal.  3.  Please review Radiology report for incidental noncardiac findings that will follow separately.    Cardiac stress MRI 9/10/2024 (results reviewed):  1.  Pharmacologic regadenoson stress cardiac MRI is negative for inducible myocardial ischemia.   2.  No evidence of prior myocardial infarction.  3.  There is a midwall stripe of nonvascular myocardial fibrosis involving the basal and mid septal walls of the left ventricle which is a finding typical of nonischemic cardiomyopathy.   4.  No evidence of infiltrative disease.  5.  Left ventricular chamber size and wall thickness are normal.  Systolic function is moderately reduced with global hypokinesis. The quantified left ventricular ejection fraction is 39%.     6.  Right ventricular chamber size and systolic function are normal. The quantified right ventricular ejection fraction is 56%.      7.  Severe left atrial enlargement. Moderate right atrial enlargement.  8.  No significant valvular abnormalities.    ECHO 10/18/2021 (report reviewed):  1. The left ventricle is mildly enlarged. Left ventricular systolic performance is moderately reduced. The ejection fraction is estimated to be 40%.  2. There is moderate global reduction in left ventricular systolic performance.  3. There is mild concentric increase in left ventricular wall thickness.  4. No significant valvular heart disease is identified on this study.  5. Borderline right ventricular enlargement with normal right ventricular systolic performance.  6. There is severe left atrial enlargement. There is moderate right atrial enlargement.  7. There is a left atrial appendage occlusion device.  Â  No thrombus is detected upon the surface of the device.  Â  There is a localized region of flow around the device between the device  itself and the left lateral ridge. The gap measures 0.35-0.5 cm and appears similar to the findings on the 2 September 2021 transesophageal echocardiogram.  8. There is mild spontaneous echo contrast in the left atrium though no thrombus detected.  9. Color Doppler interrogation of the atrial septum reveals the presence of a patent foramen ovale (PFO). No right to left shunting, however, was elicited with echo contrast injection either with spontaneous respiration or Valsalva.     Stress test 1/5/2021 (report reviewed):    The nuclear stress test is abnormal. There is no ischemia but there is a medium sized area of a moderate degree of nontransmural infarction in the distal anterior segment(s) of the left ventricle. This appears to be in the left anterior descending artery distribution.    The stress electrocardiogram is negative for inducible ischemic EKG changes. Occasional PVCs were noted during stress and recovery.    The left ventricle is dilated and the left ventricular ejection fraction at stress is mildly decreased at 44%.    Low-to-intermediate risk of future ischemic events.    There is no prior study for comparison.     Cardiac cath 1/20/2021 (report reviewed):     Mild coronary atherosclerosis.    LV EDP 20 mmHg.     Medications  Allergies   Current Outpatient Medications   Medication Sig Dispense Refill    acetaminophen (TYLENOL) 650 MG CR tablet Take 650 mg by mouth every 8 hours as needed      aspirin 81 MG EC tablet Take 1 tablet (81 mg) by mouth daily      co-enzyme Q-10 100 MG CAPS capsule Take 200 mg by mouth daily      fish oil-omega-3 fatty acids 300-1,000 mg capsule [FISH OIL-OMEGA-3 FATTY ACIDS 300-1,000 MG CAPSULE] Take 2 g by mouth daily.      fluticasone (FLONASE) 50 MCG/ACT nasal spray Spray 1 spray into both nostrils daily 48 mL 3    lisinopril (ZESTRIL) 5 MG tablet Take 1 tablet (5 mg) by mouth daily. 90 tablet 3    metoprolol succinate ER (TOPROL XL) 25 MG 24 hr tablet Take 1 tablet (25  mg) by mouth daily. 90 tablet 3    MULTIVITAMIN ORAL Take 1 tablet by mouth daily      nortriptyline (PAMELOR) 25 MG capsule TAKE 1 CAPSULE AT BEDTIME 90 capsule 3    pravastatin (PRAVACHOL) 20 MG tablet Take 1 tablet (20 mg) by mouth daily. 360 tablet 3    pregabalin (LYRICA) 25 MG capsule Start 25 mg at bedtime x 1-2 weeks, then increase to 25 mg BID. 30 capsule 1    pseudoePHEDrine (SUDAFED) 120 MG 12 hr tablet Take 1 tablet (120 mg) by mouth every 12 hours as needed for congestion 50 tablet 0    senna-docusate (SENOKOT-S/PERICOLACE) 8.6-50 MG tablet Take 1-2 tablets by mouth daily as needed for constipation Take while on oral narcotics to prevent or treat constipation. 100 tablet 1    tamsulosin (FLOMAX) 0.4 MG capsule Take 2 capsules (0.8 mg) by mouth daily. 180 capsule 3      Allergies   Allergen Reactions    Effient [Prasugrel Hcl] Hives    Hydrochlorothiazide Unknown    Hydrocodone Swelling     Sinus    Oxycontin [Oxycodone] Swelling     lip    Peppermint Oil Other (See Comments)    Plavix [Clopidogrel] Hives    Sulfa (Sulfonamide Antibiotics) [Sulfa Antibiotics] Swelling    Perfume Swelling     Other reaction(s): Runny Nose        Physical Examination Review of Systems   /64 (BP Location: Left arm, Patient Position: Sitting, Cuff Size: Adult Large)   Pulse 60   Resp 12   Ht 1.829 m (6')   Wt 108.9 kg (240 lb)   BMI 32.55 kg/m    Body mass index is 32.55 kg/m .  Wt Readings from Last 3 Encounters:   02/19/25 108.9 kg (240 lb)   02/11/25 108.9 kg (240 lb)   01/02/25 108.9 kg (240 lb)       General Appearance:   Pleasant  male, appears  stated age. no acute distress, obese body habitus   ENT/Mouth: membranes moist, no apparent gingival bleeding.      EYES:  no scleral icterus, normal conjunctivae   Neck: no carotid bruits. No anterior cervical lymphadenopaty   Respiratory:   lungs are clear to auscultation, no rales or wheezing, equal chest wall expansion    Cardiovascular:   Irregularly irregular.  Normal first and second heart sounds with no murmurs, rubs, or gallops; the carotid, radial and posterior tibial pulses are intact, Jugular venous pressure not elevated, no lower extremity edema bilaterally    Abdomen/GI:  no organomegaly, masses, bruits, or tenderness; bowel sounds are present   Extremities: no cyanosis or clubbing   Skin: no xanthelasma, warm.    Heme/lymph/ Immunology No apparent bleeding noted.   Neurologic: Alert and oriented. normal gait, no tremors     Psychiatric: Pleasant, calm, appropriate affect.    A complete 10 system review of systems was performed and is negative except as mentioned in the HPI/subjective.         Past History   Past Medical History:   Past Medical History:   Diagnosis Date    Angioedema     Atrial fibrillation and flutter (H)     Basal cell carcinoma     Branch retinal vein occlusion of left eye (H) 07/01/2017    CAD (coronary artery disease) 2/29/2024    ANGIOGRAM 2021 Left Main The vessel was visualized by angiography, is moderate in size and is angiographically normal. Left Anterior Descending Mid LAD lesion is 25% stenosed. Ramus Intermedius Ramus lesion is 10% stenosed. Left Circumflex Dist Cx lesion is 30% stenosed. Right Coronary Artery The vessel was visualized by angiography, is moderate in size and is angiographically normal.      Chronic neck pain     Congestive heart failure (H) 2021    GERD (gastroesophageal reflux disease)     HTN (hypertension) 2015    Nonsmoker     Radiculopathy     cervical    Retinal hemorrhage     Vessel rupture in left retina    Rhinitis, allergic     S/P colonoscopy 04/15/2019    Urticaria        Past Surgical History:   Past Surgical History:   Procedure Laterality Date    BASAL CELL CARCINOMA EXCISION  01/01/2020    Left nasal reconstruction    CATARACT EXTRACTION, BILATERAL Bilateral 2022    CERVICAL SPINE SURGERY      C8, T1, foraminotomy    CV CORONARY ANGIOGRAM N/A 01/20/2021    Procedure: Coronary Angiogram;  Surgeon:  Willow Wellington MD;  Location: [facility] Cardiac Cath Lab;  Service: Cardiology    CV LEFT ATRIAL APPENDAGE CLOSURE N/A 07/14/2021    Procedure: CV LEFT ATRIAL APPENDAGE CLOSURE;  Surgeon: Noah Gutierres MD;  Location: [facility] CARDIAC CATH LAB    CV LEFT HEART CATHETERIZATION WITHOUT LEFT VENTRICULOGRAM Left 01/20/2021    Procedure: Left Heart Catheterization Without Left Ventriculogram;  Surgeon: Willow Wellington MD;  Location: [facility] Cardiac Cath Lab;  Service: Cardiology    DECOMPRESSION, FUSION LUMBAR POSTERIOR ONE LEVEL, COMBINED Left 03/30/2023    Procedure: LEFT LUMBAR 4-5 POSTERIOR SPINAL FUSION WITH LEFT LUMBAR 4-5 LAMINOFORAMINOTOMY AND WIDE LUMBAR 4-5 LEFT SUBTOTAL FACETECTOMY WITH ALLOGRAFT AND AUTOGRAFT;  Surgeon: Ambrose Degroot MD;  Location: [facility] Main OR    HERNIA REPAIR, UMBILICAL       ×2    INJECT EPIDURAL LUMBAR Left 10/10/2024    Procedure: INJECTION, SPINE, LUMBAR, EPIDURAL (left L1-L2);  Surgeon: Zulma Ya MD;  Location: [facility] OR    INJECT MAJOR JOINT / BURSA Left 10/17/2024    Procedure: INJECTION, MAJOR JOINT OR BURSA OF MAJOR JOINT (left knee);  Surgeon: Zulma Ya MD;  Location: [facility] OR    REVERSE TOTAL SHOULDER ARTHROPLASTY Left 05/01/2019       Family History:   Family History   Problem Relation Age of Onset    Arthritis Mother     Other - See Comments Mother         psychiatry/pvd    Other - See Comments Father         blood reaction        Social History:   Social History     Socioeconomic History    Marital status:      Spouse name: Not on file    Number of children: Not on file    Years of education: Not on file    Highest education level: Not on file   Occupational History    Not on file   Tobacco Use    Smoking status: Never    Smokeless tobacco: Never   Substance and Sexual Activity    Alcohol use: Never     Comment: Alcoholic Drinks/day: About once a year.    Drug use: No    Sexual activity: Not on file   Other Topics Concern    Parent/sibling  w/ CABG, MI or angioplasty before 65F 55M? Not Asked   Social History Narrative    Patient of Dr. Rod since 2001.    .  Wife is a former RN.             Wife's cousin is Dr. Jean Pierre Champagne, ID specialist.     Social Drivers of Health     Financial Resource Strain: Low Risk  (1/5/2024)    Financial Resource Strain     Within the past 12 months, have you or your family members you live with been unable to get utilities (heat, electricity) when it was really needed?: No   Food Insecurity: Low Risk  (1/5/2024)    Food Insecurity     Within the past 12 months, did you worry that your food would run out before you got money to buy more?: No     Within the past 12 months, did the food you bought just not last and you didn t have money to get more?: No   Transportation Needs: Low Risk  (1/5/2024)    Transportation Needs     Within the past 12 months, has lack of transportation kept you from medical appointments, getting your medicines, non-medical meetings or appointments, work, or from getting things that you need?: No   Physical Activity: Not on file   Stress: Not on file   Social Connections: Unknown (1/1/2022)    Received [facility] [employer] [facility]    Social Connections     Frequency of Communication with Friends and Family: Not on file   Interpersonal Safety: Low Risk  (10/17/2024)    Interpersonal Safety     Do you feel physically and emotionally safe where you currently live?: Yes     Within the past 12 months, have you been hit, slapped, kicked or otherwise physically hurt by someone?: No     Within the past 12 months, have you been humiliated or emotionally abused in other ways by your partner or ex-partner?: No   Housing Stability: Low Risk  (1/5/2024)    Housing Stability     Do you have housing? : Yes     Are you worried about losing your housing?: No              Lab Results    Chemistry/lipid CBC Cardiac Enzymes/BNP/TSH/INR   Lab  Results   Component Value Date    CHOL 137 12/03/2024    HDL 53 12/03/2024    LDL 72 12/03/2024    TRIG 62 12/03/2024    CR 1.42 (H) 01/02/2025    BUN 21.0 01/02/2025    POTASSIUM 4.4 01/02/2025     01/02/2025    CO2 29 01/02/2025      Lab Results   Component Value Date    WBC 7.9 12/03/2024    HGB 15.0 12/03/2024    HCT 43.9 12/03/2024    MCV 94 12/03/2024     12/03/2024    A1C 5.4 12/03/2024     Lab Results   Component Value Date    A1C 5.4 12/03/2024    Lab Results   Component Value Date    TROPONINI 0.02 06/18/2020     (H) 11/30/2020    NTBNP 1,343 (H) 05/14/2024    TSH 2.74 05/14/2024    INR 1.00 12/03/2024          Kiran Tyler MD [facility]  Non-Invasive Cardiologist  [facility]  Pager [phone]

## 2025-02-19 NOTE — PROGRESS NOTES
Per 2/19/25 Dr Tyler office visit:  Plan:  In regards to his percutaneous left atrial appendage closure device leak, as there are currently no good interventional options to address this and he has been stable with no thromboembolic events for the past 4 years, it is reasonable to continue on aspirin 81 mg daily therapy. As he has had hematuria, we will not increase the dose at this time.

## 2025-02-26 ENCOUNTER — ANCILLARY PROCEDURE (OUTPATIENT)
Dept: RADIOLOGY | Facility: AMBULATORY SURGERY CENTER | Age: 75
End: 2025-02-26
Attending: ANESTHESIOLOGY
Payer: MEDICARE

## 2025-02-26 ENCOUNTER — HOSPITAL ENCOUNTER (OUTPATIENT)
Facility: AMBULATORY SURGERY CENTER | Age: 75
Discharge: HOME | End: 2025-02-26
Attending: ANESTHESIOLOGY | Admitting: ANESTHESIOLOGY
Payer: MEDICARE

## 2025-02-26 VITALS
WEIGHT: 240 LBS | OXYGEN SATURATION: 97 % | RESPIRATION RATE: 14 BRPM | BODY MASS INDEX: 32.51 KG/M2 | HEART RATE: 118 BPM | HEIGHT: 72 IN | TEMPERATURE: 96.8 F | SYSTOLIC BLOOD PRESSURE: 121 MMHG | DIASTOLIC BLOOD PRESSURE: 79 MMHG

## 2025-02-26 DIAGNOSIS — M54.16 LUMBAR RADICULOPATHY: ICD-10-CM

## 2025-02-26 PROCEDURE — 62323 NJX INTERLAMINAR LMBR/SAC: CPT

## 2025-02-26 RX ORDER — LIDOCAINE HYDROCHLORIDE 10 MG/ML
INJECTION, SOLUTION EPIDURAL; INFILTRATION; INTRACAUDAL; PERINEURAL PRN
Status: DISCONTINUED | OUTPATIENT
Start: 2025-02-26 | End: 2025-02-26 | Stop reason: HOSPADM

## 2025-02-26 RX ORDER — METHYLPREDNISOLONE ACETATE 40 MG/ML
INJECTION, SUSPENSION INTRA-ARTICULAR; INTRALESIONAL; INTRAMUSCULAR; SOFT TISSUE PRN
Status: DISCONTINUED | OUTPATIENT
Start: 2025-02-26 | End: 2025-02-26 | Stop reason: HOSPADM

## 2025-02-26 RX ORDER — BUPIVACAINE HYDROCHLORIDE 2.5 MG/ML
INJECTION, SOLUTION EPIDURAL; INFILTRATION; INTRACAUDAL PRN
Status: DISCONTINUED | OUTPATIENT
Start: 2025-02-26 | End: 2025-02-26 | Stop reason: HOSPADM

## 2025-02-26 RX ORDER — IOPAMIDOL 408 MG/ML
INJECTION, SOLUTION INTRATHECAL PRN
Status: DISCONTINUED | OUTPATIENT
Start: 2025-02-26 | End: 2025-02-26 | Stop reason: HOSPADM

## 2025-02-26 NOTE — DISCHARGE INSTRUCTIONS
Home Care Instructions after an Epidural Steroid Pain Injection    A lumbar epidural steroid injection delivers steroid medication directly into the area that may be causing your lower back pain and/or leg pain. A cervical or thoracic epidural steroid injection delivers steroids into the epidural space surrounding spinal nerve roots to help relieve pain in the upper spine/neck.    Activity  -Rest today  -Do not work today  -Resume normal activity tomorrow  -DO NOT shower for 24 hours  -DO NOT remove bandaid for 24 hours    Pain  -You may experience soreness at the injection site for one or two days  -You may use an ice pack for 20 minutes every 2 hours for the first 24 hours  -You may use a heating pad after the first 24 hours  -You may use Tylenol (acetaminophen) every 4 hours or other pain medicines as     directed by your physician    You may experience numbness radiating into your legs or arms (depending on the procedure location). This numbness may last several hours. Until sensation returns to normal; please use caution in walking, climbing stairs, and stepping out of your vehicle, etc.      Common side effects of steroids:  Not everyone will experience corticosteroid side effects. If side effects are experienced, they will gradually subside in the 7-10 day period following an injection. Most common side effects include:  -Flushed face and/or chest  -Feeling of warmth, particularly in the face but could be an overall feeling of warmth  -Increased blood sugar in diabetic patients  -Menstrual irregularities my occur. If taking hormone-based birth control an alternate method of birth control is recommended  -Sleep disturbances and/or mood swings are possible  -Leg cramps    Please contact us if you have:  -Severe pain  -Fever more than 101.5 degrees Fahrenheit  -Signs of infection at the injection site (redness, swelling, or drainage)    FOR PAIN CENTER PATIENTS:  If you have questions, please contact the Pain  Clinic at [phone] Option #1 between the hours of 7:00 am and 3:00 pm Monday through Friday. After office hours you can contact the on call provider by dialing [phone]. If you need immediate attention, we recommend that you go to a hospital emergency room or dial [phone].

## 2025-02-26 NOTE — OP NOTE
Patient: Mike Gonzalez Age: 74 year old   MRN: [id number] Attending: Dr. Ya     Date of Visit: February 26, 2025      PAIN MEDICINE CLINIC PROCEDURE NOTE    ATTENDING CLINICIAN:    Zulma Ya MD    PREPROCEDURE DIAGNOSES:  1.  Lumbar radiculopathy  2.  Chronic low back pain       PROCEDURE(S) PERFORMED:  1.  Interlaminar lumbar epidural steroid injection (left L1-L2)  2.  Fluoroscopic guidance for the above-named procedure(s)      ANESTHESIA:  Local.    INDICATIONS:  Mike Gonzalez is a 74 year old male with a history of  chronic low back pain secondary to L1-L2 bilateral lumbosacral radiculopathy .  The patient stated that the patient was in their usual state of health and denied recent anticoagulant use or recent infections.  Therefore, the plan is to perform above mentioned procedures.     Procedure Details:  The patient was met in the procedure room, where the patient was identified by name, medical record number and date of birth.  All of the patient s last minute questions were answered. Written informed consent was obtained and saved in the electronic medical record, after the risks, benefits, and alternatives were discussed with the patient.      A formal time-out procedure was performed, as per protocol, including patient name, title of procedure, and site of procedure, and all in the room concurred.  Routine monitors were applied.      The patient was placed in the prone position on the procedure room table.  All pressure points were checked and comfortably padded.  Routine monitors were placed.  Vital signs were stable.    A chlorhexidine prep was completed followed by sterile draping per standard procedure.     The AP fluoroscopic view was optimized for approach at left L1-L2 interspace.  The skin over the interspace was infiltrated with 4-5 mL of 1% Lidocaine using a 25 gauge, 1.5 inch needle.  A 20-gauge 3-1/2 inch Tuohy needle was advanced under fluoroscopic guidance with left paramedial approach  until it touched lamina. Mutiple AP and lateral fluoroscopic images at this time  are taken as Tuohy needle was advanced to the epidural space. The epidural space was identified, without evidence of blood, cerebrospinal fluid, or parasthesia throughout. Needle tip placement within the epidural space was further confirmed with 1-2 mL of nonionic contrast agent, with the epidural space visualized in the AP and lateral fluoroscopic view(s) with appropriate spread of the agent with fat vacuolization and no intravascular or intrathecal spread noted. Next, 8 mL of a treatment solution containing 2 mL of preservative free 0.25% bupivacaine, 1 mL of Depo-Medrol 40 mg/mL and 5 mL preservative free normal saline was administered slowly. The needle was withdrawn.      Light pressure was held at the puncture site(s) to prevent ecchymosis and oozing.  The patient's skin was cleansed, and hemostasis was confirmed.  Band-aids were applied to the needle injection site(s).      Condition:    The patient remained awake and alert throughout the procedure.  The patient tolerated the procedure well and was monitored for approximately 15 minutes afterward in the post procedure area.  There were no immediate post procedure complications noted.  The patient was then discharged to home as per protocol.      Pre-procedure pain score: 5/10  Post-procedure pain score: 4/10

## 2025-03-01 ENCOUNTER — MYC MEDICAL ADVICE (OUTPATIENT)
Dept: INTERNAL MEDICINE | Facility: CLINIC | Age: 75
End: 2025-03-01
Payer: MEDICARE

## 2025-03-01 DIAGNOSIS — E78.2 MIXED HYPERLIPIDEMIA: ICD-10-CM

## 2025-03-02 RX ORDER — PRAVASTATIN SODIUM 20 MG
20 TABLET ORAL DAILY
Qty: 90 TABLET | Refills: 3 | Status: SHIPPED | OUTPATIENT
Start: 2025-03-02 | End: 2026-02-25

## 2025-03-03 ENCOUNTER — TELEPHONE (OUTPATIENT)
Dept: ANESTHESIOLOGY | Facility: CLINIC | Age: 75
End: 2025-03-03
Payer: MEDICARE

## 2025-03-03 NOTE — TELEPHONE ENCOUNTER
Patient confirmed scheduled appointment:     Date: 3/18/25   Time: 12:00 PM  Visit type: Return Pain  Visit mode: Virtual Visit  Provider: Dr. Zulma Ya  Location: [facility]    Additional Notes:

## 2025-03-18 ENCOUNTER — VIRTUAL VISIT (OUTPATIENT)
Dept: ANESTHESIOLOGY | Facility: CLINIC | Age: 75
End: 2025-03-18
Payer: MEDICARE

## 2025-03-18 DIAGNOSIS — M25.562 CHRONIC PAIN OF LEFT KNEE: ICD-10-CM

## 2025-03-18 DIAGNOSIS — M96.1 FAILED BACK SURGICAL SYNDROME: ICD-10-CM

## 2025-03-18 DIAGNOSIS — G89.29 CHRONIC PAIN OF LEFT KNEE: ICD-10-CM

## 2025-03-18 DIAGNOSIS — Z98.890 HISTORY OF LUMBOSACRAL SPINE SURGERY: ICD-10-CM

## 2025-03-18 DIAGNOSIS — M54.16 LUMBAR RADICULOPATHY, CHRONIC: Primary | ICD-10-CM

## 2025-03-18 ASSESSMENT — PAIN SCALES - GENERAL: PAINLEVEL_OUTOF10: MODERATE PAIN (4)

## 2025-03-18 ASSESSMENT — PAIN SCALES - PAIN ENJOYMENT GENERAL ACTIVITY SCALE (PEG)
PEG_TOTALSCORE: 4
INTERFERED_GENERAL_ACTIVITY: 4
AVG_PAIN_PASTWEEK: 4
INTERFERED_ENJOYMENT_LIFE: 4

## 2025-03-18 NOTE — NURSING NOTE
How will you be connecting to the visit? MyChart  How would you like to obtain your AVS? MyChart  If the video visit is dropped, the invitation should be resent by: Text to cell phone: [phone]  Will anyone else be joining your video visit? Yes, wife is present  Are you currently in the [facility] yes        Patient presents with:  RECHECK  Pain  Pain Management: Follow up-leg muscles stiff, back sore, feet sore      Moderate Pain (4)     Pain Medications       Analgesics Other Refills Start End     acetaminophen (TYLENOL) 650 MG CR tablet --  --    Sig - Route: Take 650 mg by mouth every 8 hours as needed - Oral    Class: Historical       Salicylates Refills Start End     aspirin 81 MG EC tablet -- 7/28/2023 --    Sig - Route: Take 1 tablet (81 mg) by mouth daily - Oral    Class: OTC            What medications are you using for pain? Tylenol    What refills are you needing today? none    Expectations: check in and review past injections. Next hossein Hui LPN

## 2025-03-18 NOTE — PROGRESS NOTES
[facility] for Comprehensive Chronic Pain Management : Progress Note    Date of visit: 3/18/2025    Video call duration: 20 minutes  The patient requested video appointment due to transportation issue    History of present illness          Mike Gonzalez is a 74-year-old male, well known to me for chronic lower back pain. He is a professional writer with a past medical history that includes atrial flutter, status post left Watchman implant, coronary artery disease, hypertension, GERD, a left rotator cuff tear, C6-C7 decompression ([place], 2002), L4-L5 decompression ([facility], January 2022), L4-L5 fusion, L3-L4 decompression, and a cerebrospinal fluid (CSF) leak ([facility], March 30, 2023). He presents with persistent left buttock and hip pain, which he states began prior to his lumbar surgery and has remained unchanged postoperatively.    He reports significant limitations in mobility, stating that he is unable to walk more than 500 to 700 feet without stopping due to pain. The pain is highly localized to the left gluteal region, near the greater trochanteric bursa. Despite undergoing a left L5-S1 epidural steroid injection and a left trochanteric bursa injection, he has experienced little to no relief. 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    Recent imaging studies have provided insight into his condition. A lumbar CT myelogram from October 2023 and a lumbar MRI from May 2023 revealed severe left-sided L1-L2 subarticular stenosis. A subsequent lumbar MRI on August 2, 2024, showed multilevel disc degeneration and facet hypertrophy without significant spinal canal stenosis. However, there is mild to moderate neural foraminal stenosis at multiple levels, with the most pronounced narrowing at L5-S1. At L1-L2, he has significant disc desiccation and height loss, with an extruded  disc fragment contributing to moderate left neuroforaminal stenosis.    Since his last visit, he has undergone multiple procedures. On October 10, he received an L1-L2 epidural steroid injection, followed by a left knee injection two weeks later. He underwent a repeat injection on February 26, but the results of this second procedure were notably different from the first. During the second injection, he experienced pain in both hips, identical to his usual pain distribution.  It took 3 to 4 days to get some pain relief.  But the amount of relief was noted to be lower compared with  the first injection.  Given this unexpected difference in outcomes, he is seeking clarification regarding whether there were any technical or imaging differences between the two injections that could explain the variance in pain relief.        [place] Prescription Monitoring Program:   Reviewed. No concerns     Review of Systems:  The 14 system ROS was reviewed and was negative except what is documented above and as follows.  Any bowel or bladder problems: none  Mood: okay    Physical Exam:  There were no vitals filed for this visit.  Since it is a video visit.    General: Awake in no apparent distress.   Lungs: unlabored.   Extremities: Pulses are well palpable, no peripheral edema.   Musculoskeletal: Able to move  all extremities  Neurologic exam: Alert awake and oriented  Psychiatric; Normal affect.       Medications:  Current Outpatient Medications   Medication Sig Dispense Refill    acetaminophen (TYLENOL) 650 MG CR tablet Take 650 mg by mouth every 8 hours as needed      aspirin 81 MG EC tablet Take 1 tablet (81 mg) by mouth daily      co-enzyme Q-10 100 MG CAPS capsule Take 200 mg by mouth daily      fish oil-omega-3 fatty acids 300-1,000 mg capsule [FISH OIL-OMEGA-3 FATTY ACIDS 300-1,000 MG CAPSULE] Take 2 g by mouth daily.      fluticasone (FLONASE) 50 MCG/ACT nasal spray Spray 1 spray into both nostrils daily 48 mL 3     lisinopril (ZESTRIL) 5 MG tablet Take 1 tablet (5 mg) by mouth daily. 90 tablet 3    metoprolol succinate ER (TOPROL XL) 25 MG 24 hr tablet Take 1 tablet (25 mg) by mouth daily. 90 tablet 3    MULTIVITAMIN ORAL Take 1 tablet by mouth daily      nortriptyline (PAMELOR) 25 MG capsule TAKE 1 CAPSULE AT BEDTIME 90 capsule 3    pravastatin (PRAVACHOL) 20 MG tablet Take 1 tablet (20 mg) by mouth daily. 90 tablet 3    pregabalin (LYRICA) 25 MG capsule Start 25 mg at bedtime x 1-2 weeks, then increase to 25 mg BID. 30 capsule 1    pseudoePHEDrine (SUDAFED) 120 MG 12 hr tablet Take 1 tablet (120 mg) by mouth every 12 hours as needed for congestion 50 tablet 0    senna-docusate (SENOKOT-S/PERICOLACE) 8.6-50 MG tablet Take 1-2 tablets by mouth daily as needed for constipation Take while on oral narcotics to prevent or treat constipation. 100 tablet 1    tamsulosin (FLOMAX) 0.4 MG capsule Take 2 capsules (0.8 mg) by mouth daily. 180 capsule 3       Analgesic Medications:   Medications related to Pain Management (From now, onward)      None               LABORATORY VALUES:   Recent Labs   Lab Test 01/02/25  0954 12/03/24  1112    147*   POTASSIUM 4.4 4.6   CHLORIDE 104 105   CO2 29 30*   ANIONGAP 12 12   * 109*   BUN 21.0 28.8*   CR 1.42* 1.50*   JOVAN 9.2 12.7*       CBC RESULTS:   Recent Labs   Lab Test 12/03/24  1112   WBC 7.9   RBC 4.68   HGB 15.0   HCT 43.9   MCV 94   MCH 32.1   MCHC 34.2   RDW 12.6          Most Recent 3 INR's:  Recent Labs   Lab Test 12/03/24  1112 09/08/22  1050 01/11/22  1324   INR 1.00 1.01 1.01           ASSESSMENT:    Diagnoses         Codes Comments    Lumbar radiculopathy, chronic    -  Primary M54.16     History of lumbosacral spine surgery     Z98.890     Chronic pain of left knee     M25.562, G89.29     Failed back surgical syndrome     M96.1               PLAN:    1. Medications.     Pregabalin 25 mg nightly.  Dose may be increased to 25 mg 3 times daily if patient can  tolerate    2. Interventional procedures:    Lumbar L1-L2 epidural steroid injection every 3 months as needed    3. Labs and imaging: None needed for pain management.     4. Rehab: None needed     5. Psychology: No current needs.     6. Integrated medicine: None indicated    7. Disposition: Follow-up as needed      Assessment will be ongoing with changes in treatment as indicated.  Benefits/risks/alternatives to treatment have been reviewed and the patient has been instructed to contact this office if they have any questions or concerns.  This plan of care has been discussed with the patient and the patient is in agreement.     Zulma Ya MD, PHD

## 2025-03-18 NOTE — LETTER
3/18/2025       RE: Mike Gonzalez  [address] [address]     Dear Colleague,    Thank you for referring your patient, Mike Gonzalez, to the [facility] [place] [facility]. Please see a copy of my visit note below.    [facility] for Comprehensive Chronic Pain Management : Progress Note    Date of visit: 3/18/2025    Video call duration: 20 minutes  The patient requested video appointment due to transportation issue    History of present illness          Mike Gonzalez is a 74-year-old male, well known to me for chronic lower back pain. 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 He presents with persistent left buttock and hip pain, which he states began prior to his lumbar surgery and has remained unchanged postoperatively.    He reports significant limitations in mobility, stating that he is unable to walk more than 500 to 700 feet without stopping due to pain. The pain is highly localized to the left gluteal region, near the greater trochanteric bursa. Despite undergoing a left L5-S1 epidural steroid injection and a left trochanteric bursa injection, he has experienced little to no relief. 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    Recent imaging studies have provided insight into his condition. A lumbar CT myelogram from October 2023 and a lumbar MRI from May 2023 revealed severe left-sided L1-L2 subarticular stenosis. A subsequent  lumbar MRI on August 2, 2024, showed multilevel disc degeneration and facet hypertrophy without significant spinal canal stenosis. However, there is mild to moderate neural foraminal stenosis at multiple levels, with the most pronounced narrowing at L5-S1. At L1-L2, he has significant disc desiccation and height loss, with an extruded disc fragment contributing to moderate left neuroforaminal stenosis.    Since his last visit, he has undergone multiple procedures. On October 10, he received an L1-L2 epidural steroid injection, followed by a left knee injection two weeks later. He underwent a repeat injection on February 26, but the results of this second procedure were notably different from the first. During the second injection, he experienced pain in both hips, identical to his usual pain distribution.  It took 3 to 4 days to get some pain relief.  But the amount of relief was noted to be lower compared with  the first injection.  Given this unexpected difference in outcomes, he is seeking clarification regarding whether there were any technical or imaging differences between the two injections that could explain the variance in pain relief.        [place] Prescription Monitoring Program:   Reviewed. No concerns     Review of Systems:  The 14 system ROS was reviewed and was negative except what is documented above and as follows.  Any bowel or bladder problems: none  Mood: okay    Physical Exam:  There were no vitals filed for this visit.  Since it is a video visit.    General: Awake in no apparent distress.   Lungs: unlabored.   Extremities: Pulses are well palpable, no peripheral edema.   Musculoskeletal: Able to move  all extremities  Neurologic exam: Alert awake and oriented  Psychiatric; Normal affect.       Medications:  Current Outpatient Medications   Medication Sig Dispense Refill     acetaminophen (TYLENOL) 650 MG CR tablet Take 650 mg by mouth every 8 hours as needed       aspirin 81 MG EC tablet  Take 1 tablet (81 mg) by mouth daily       co-enzyme Q-10 100 MG CAPS capsule Take 200 mg by mouth daily       fish oil-omega-3 fatty acids 300-1,000 mg capsule [FISH OIL-OMEGA-3 FATTY ACIDS 300-1,000 MG CAPSULE] Take 2 g by mouth daily.       fluticasone (FLONASE) 50 MCG/ACT nasal spray Spray 1 spray into both nostrils daily 48 mL 3     lisinopril (ZESTRIL) 5 MG tablet Take 1 tablet (5 mg) by mouth daily. 90 tablet 3     metoprolol succinate ER (TOPROL XL) 25 MG 24 hr tablet Take 1 tablet (25 mg) by mouth daily. 90 tablet 3     MULTIVITAMIN ORAL Take 1 tablet by mouth daily       nortriptyline (PAMELOR) 25 MG capsule TAKE 1 CAPSULE AT BEDTIME 90 capsule 3     pravastatin (PRAVACHOL) 20 MG tablet Take 1 tablet (20 mg) by mouth daily. 90 tablet 3     pregabalin (LYRICA) 25 MG capsule Start 25 mg at bedtime x 1-2 weeks, then increase to 25 mg BID. 30 capsule 1     pseudoePHEDrine (SUDAFED) 120 MG 12 hr tablet Take 1 tablet (120 mg) by mouth every 12 hours as needed for congestion 50 tablet 0     senna-docusate (SENOKOT-S/PERICOLACE) 8.6-50 MG tablet Take 1-2 tablets by mouth daily as needed for constipation Take while on oral narcotics to prevent or treat constipation. 100 tablet 1     tamsulosin (FLOMAX) 0.4 MG capsule Take 2 capsules (0.8 mg) by mouth daily. 180 capsule 3       Analgesic Medications:   Medications related to Pain Management (From now, onward)      None               LABORATORY VALUES:   Recent Labs   Lab Test 01/02/25  0954 12/03/24  1112    147*   POTASSIUM 4.4 4.6   CHLORIDE 104 105   CO2 29 30*   ANIONGAP 12 12   * 109*   BUN 21.0 28.8*   CR 1.42* 1.50*   JOVAN 9.2 12.7*       CBC RESULTS:   Recent Labs   Lab Test 12/03/24  1112   WBC 7.9   RBC 4.68   HGB 15.0   HCT 43.9   MCV 94   MCH 32.1   MCHC 34.2   RDW 12.6          Most Recent 3 INR's:  Recent Labs   Lab Test 12/03/24  1112 09/08/22  1050 01/11/22  1324   INR 1.00 1.01 1.01           ASSESSMENT:    Diagnoses         Codes  Comments    Lumbar radiculopathy, chronic    -  Primary M54.16     History of lumbosacral spine surgery     Z98.890     Chronic pain of left knee     M25.562, G89.29     Failed back surgical syndrome     M96.1               PLAN:    1. Medications.     Pregabalin 25 mg nightly.  Dose may be increased to 25 mg 3 times daily if patient can tolerate    2. Interventional procedures:    Lumbar L1-L2 epidural steroid injection every 3 months as needed    3. Labs and imaging: None needed for pain management.     4. Rehab: None needed     5. Psychology: No current needs.     6. Integrated medicine: None indicated    7. Disposition: Follow-up as needed      Assessment will be ongoing with changes in treatment as indicated.  Benefits/risks/alternatives to treatment have been reviewed and the patient has been instructed to contact this office if they have any questions or concerns.  This plan of care has been discussed with the patient and the patient is in agreement.     Zulma Ya MD, PHD      Again, thank you for allowing me to participate in the care of your patient.      Sincerely,    Zulma Ya MD

## 2025-03-24 ENCOUNTER — MYC MEDICAL ADVICE (OUTPATIENT)
Dept: NEUROSURGERY | Facility: CLINIC | Age: 75
End: 2025-03-24
Payer: MEDICARE

## 2025-03-24 DIAGNOSIS — R20.2 PARESTHESIA OF BOTH FEET: ICD-10-CM

## 2025-03-24 DIAGNOSIS — M48.061 SPINAL STENOSIS, LUMBAR REGION, WITHOUT NEUROGENIC CLAUDICATION: ICD-10-CM

## 2025-03-24 DIAGNOSIS — M54.16 LUMBAR RADICULOPATHY: Primary | ICD-10-CM

## 2025-03-24 DIAGNOSIS — Z98.1 HISTORY OF LUMBAR FUSION: ICD-10-CM

## 2025-03-24 DIAGNOSIS — M25.562 CHRONIC PAIN OF LEFT KNEE: ICD-10-CM

## 2025-03-24 DIAGNOSIS — M51.369 DEGENERATION OF INTERVERTEBRAL DISC OF LUMBAR REGION, UNSPECIFIED WHETHER PAIN PRESENT: ICD-10-CM

## 2025-03-24 DIAGNOSIS — G89.29 CHRONIC PAIN OF LEFT KNEE: ICD-10-CM

## 2025-03-26 ENCOUNTER — TELEPHONE (OUTPATIENT)
Dept: NEUROSURGERY | Facility: CLINIC | Age: 75
End: 2025-03-26
Payer: MEDICARE

## 2025-03-26 NOTE — TELEPHONE ENCOUNTER
L/m for patient to schedule the MRI, then schedule the appointment w/ Sagrario Good after. Via workqueue. -KB

## 2025-04-01 ENCOUNTER — MYC REFILL (OUTPATIENT)
Dept: INTERNAL MEDICINE | Facility: CLINIC | Age: 75
End: 2025-04-01
Payer: MEDICARE

## 2025-04-01 DIAGNOSIS — J31.0 CHRONIC RHINITIS: ICD-10-CM

## 2025-04-01 RX ORDER — FLUTICASONE PROPIONATE 50 MCG
1 SPRAY, SUSPENSION (ML) NASAL DAILY
Qty: 48 ML | Refills: 3 | Status: SHIPPED | OUTPATIENT
Start: 2025-04-01

## 2025-04-12 ENCOUNTER — MYC REFILL (OUTPATIENT)
Dept: INTERNAL MEDICINE | Facility: CLINIC | Age: 75
End: 2025-04-12

## 2025-04-12 ENCOUNTER — HOSPITAL ENCOUNTER (OUTPATIENT)
Dept: MRI IMAGING | Facility: HOSPITAL | Age: 75
Discharge: HOME | End: 2025-04-12
Attending: PHYSICIAN ASSISTANT | Admitting: PHYSICIAN ASSISTANT
Payer: MEDICARE

## 2025-04-12 DIAGNOSIS — M54.16 LUMBAR RADICULOPATHY: ICD-10-CM

## 2025-04-12 DIAGNOSIS — M48.061 SPINAL STENOSIS, LUMBAR REGION, WITHOUT NEUROGENIC CLAUDICATION: ICD-10-CM

## 2025-04-12 DIAGNOSIS — M51.369 DEGENERATION OF INTERVERTEBRAL DISC OF LUMBAR REGION, UNSPECIFIED WHETHER PAIN PRESENT: ICD-10-CM

## 2025-04-12 DIAGNOSIS — R20.2 PARESTHESIA OF BOTH FEET: ICD-10-CM

## 2025-04-12 DIAGNOSIS — Z98.1 HISTORY OF LUMBAR FUSION: ICD-10-CM

## 2025-04-12 DIAGNOSIS — I25.5 ISCHEMIC CARDIOMYOPATHY: ICD-10-CM

## 2025-04-12 PROCEDURE — 72158 MRI LUMBAR SPINE W/O & W/DYE: CPT

## 2025-04-12 PROCEDURE — 72157 MRI CHEST SPINE W/O & W/DYE: CPT

## 2025-04-12 PROCEDURE — 255N000002 HC RX 255 OP 636: Performed by: PHYSICIAN ASSISTANT

## 2025-04-12 PROCEDURE — A9585 GADOBUTROL INJECTION: HCPCS | Performed by: PHYSICIAN ASSISTANT

## 2025-04-12 RX ORDER — GADOBUTROL 604.72 MG/ML
0.1 INJECTION INTRAVENOUS ONCE
Status: COMPLETED | OUTPATIENT
Start: 2025-04-12 | End: 2025-04-12

## 2025-04-12 RX ADMIN — GADOBUTROL 10 ML: 604.72 INJECTION INTRAVENOUS at 12:21

## 2025-04-14 NOTE — PROGRESS NOTES
Neurosurgery Clinic Note      ASSESSMENT & PLAN:  Mike Gonzalez is a 73 year old male with a PMH of atrial flutter, s/p left Watchman implant, CAD, hypertension, GERD, left rotator cuff tear, C6/7 decompression ([facility] 2002), L4/5 decomp ([facility], 1/2022), L4/5 fusion, L3/4 decomp, CSF leak ([facility] 3/30/23) who is following up for low back and left buttock/hip pain.     Patient has symptoms and imaging consistent with lumbar stenosis with NGC.  He has failed conservative management consisting of PT, injections and medication management.      Lumbar CT myelogram 10/2023 and lumbar MRI 5/2023 reveal severe left L1/2 subarticular stenosis and L5/S1 severe left FS.    EMG 12/2023 [facility] - indicated chronic/slightly active L5 > L4 left radic.  EMG BLE 10/21/24 - chronic raudel L5 & S1 radic, mild length-dep axonal s/m polyneuropathy  Lumbar MRI 8/2/24 - L4/5 Fusion.  L5/S1 mod L > R NFS - disc material contacts L5 raudel w/ elevation of L5; L1/2 left s/a disc extrusion w/ caudal migration, mod L NFS.  No significant changes from prior lumbar imaging.   EOS XR 7/15/24 - positive global coronal and sagittal balance; L4/5 left posterior fusion stable.   Lumbar MRI 4/12/15 - L1/2 mod CCS w/ Modic I changes, severe NFS left T12-L2, raudel L2-3, right L3-S1.  L4/5 left sided fixation/solid fusion.  Similar to 8/2024 imaging.      -referral placed to Dr. Gallo for lumbar decompression surgical consideration.  -CT lumbar prior to that visit for surgical planning         I spent 49 minutes spent in patient care, independent review and interpretation of medical records/imaging, reviewing old records.    History of Present Illness:  Mike Gonzalez is a 73 year old male with a PMH of atrial flutter, s/p left Watchman implant, CAD, hypertension, GERD, left rotator cuff tear, C6/7 decompression ([facility] 2002), L4/5 decomp ([facility], 1/2022), L4/5 fusion, L3/4 decomp, CSF leak ([facility] 3/30/23) who is presenting for evaluation of LLE pain.    11/22/23 Visit -  Patient has surgery for cervical decompression at [place] in 2002.  He had a very difficult time with pain after the surgery and that lasted about 10 years.  He has continues to have poor balance since that surgery.  He also notes bilateral RTC issues and had left shoulder replacement.  He continues to have more weakness in his left shoulder.  His neck ROM is significantly reduced.  This improved for about 1 week after having PRP/stem cell treatment about 3-4 years from Dr. Elbert Marmolejo at [facility] in [facility].    About 3 years ago, started having sore left hip walking up hills.  He saw ortho MD who check out hip and did not find anything concerning.  He continued to have issues with his hip and he went to [facility] who identified pinched nerve in his back.  He had 2 surgeries as a result (see above) resulting in fusion at L4/5.  The surgery reduced the freezing feeling in his feet.  It did not help with the left hip pain and now he has back pain in addition to the hip pain.  The pain starts in the left outer hip.  When it is bad, he gets pain on the right.  He has a sore spot on the left medial left knee which has responded to injections in the past.  The pain is a solid ache, but increases to a sharp pain depending on his activity.  If he does too much, he pays for it the next day.  Steroids helped with energy level and was able to walk further without significant pain. He also complains that his toes hurt and feel like cardboard.   He does better walking on level surface.  On the level, he can go 700 feet. This is is reduced to about 400-500 feet if not level.  He denies that his legs feel tired, but that the outer hip is painful to him which requires him to stop for a while before he can continue.  The pain comes from lateral hip to the knee level.  He had an injection in his left hip that helped for 1-2 weeks.    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 His goal is to be able to walk around and do his photography work and get back to writing his books, etc.    EMG before surgery in March 2023.  Next one schedule 12/8/23 at [facility].      7/15/24 Visit  -patient returns after having significant physical therapy at [facility].  He had 12 sessions of regular PT which he felt was helpful and then transition to pool therapy but unfortunately was unable to continue because of a rash that he developed from the chlorine.  He most recently was engaged in balance therapy which he stated exacerbated his back and leg symptoms.  He continues to endorse low back pain that is worse on the left than the right but does cross his entire back at a level just above the belt line.  He also notes he has left posterior hip is more painful than his right.  He does have left knee pain and is aware that he is bone-on-bone degeneration.  He has undergone evaluation at [facility] and has had injections that have been helpful but nothing recently.  He is open to having a second opinion on whether more injections or surgery would be beneficial.  He denies any radicular pain extending beyond his hips or bowel or bladder issues aside from constipation on occasion.  His neurologist from [facility] on disputes the EMG test by [facility] that he does not have neuropathy, the neurologist notes he does have neuropathy.  The patient notes that he has pain in all of his toes that improves with Voltaren gel.  He also endorses generalized low energy.  He is getting evaluated at in August by cardiology here.  Perhaps more bothersome to the patient is that he is unable to ambulate more than 700 feet at a time due to the pain in his low back and left hip.  He is able to stop for a period of time at that distance and then is able to ambulate a little bit further.  His pain is preventing him from doing ADLs walking.  It is worse with flexion of his back.  His wife is with him today and  notes that sometimes his back looks laterally malaligned.  Patient denies any radicular pains in his right LE.      8/5/24 Visit - patient f/u after having lumbar MRI updated. He notes left buttock/hip pain posterolateral location that affects him when he is walking.  Otherwise has chronic low back pain.  Left medial knee discomfort is actually better after his last injection.  Denies radicular pain in BLE.  Still applying Voltaren gel to his feet for paresthesia.  He notes his feet are very cold.  Had cardiology workup which was negative for any vascular blockages in his legs per US.  He saw orthopedics for his left knee and also mentioned his hip.  He is not pursuing injections or surgery at this time for those areas.    He also c/o cognitive issues right now that are limiting his ability to write and process things fluently.  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  He has noticed more of a change in the last 3-4 months.  He reports a concussion about 5 years ago after a fall off a ladder and then had COVID.  He is concerned about a long COVID issue.      10/30/24 Visit (VIDEO) - follow-up after L1/L2 ILESI injection and EMG BLE (chronic L5 & S1 raudel, mild s/m polyneuropathy raudel).    Patient noted complete resolution of hip pain for for 2 weeks w/ gradual return.  He even reports being able to get out and take pictures of the northern lights which was a treat for him.  He notes his overall body pain and the toe soreness was gone for a while after the injection.  He also reports improvement of pain in his feet with antibiotics.  He has consistently reported improved pain levels after getting other injections and oral steroids as well (which begs the question of a systemic anti-inflammatory effect).  He still endorses left knee pain, but he was told it could take upwards of 2 weeks for the Synevisc injection to work.  He is more achy today, but relates that to the incoming weather.   "Patient would like not to do surgery and is open to trying medication if that would be helpful. He has cardiac issues and is sensitive to medications so would need to be careful.  He has tried gabapentin in the past after a shoulder surgery and did not like how he felt on that--felt fatigued.  He has not tried Duloxetine or Lyrica.    4/15/25 visit - f/u after thoracic and updated lumbar MRIs.  In the interim he has been treating with pain management.  Underwent L1/2 ILESI 2/26/25 with Dr. Ya (5/4 px red).     Patient has noted worsening ability to ambulate since 1 year ago.  He is now only walking about 1/2 block.  He notes pain down both of his legs.  He has worsening \"neuropathy\" in the tops of all of his toes.  He continues to get some mild relief from this with Voltaren gel. He has not had any significant improvement of his pain with injections, PT or medical management.  He is currently weaning off Lyrica because of the brain fog that comes with it.  He is finding it very difficult to sleep in his bed and his sleep is disrupted by pain.  He has unrelated right plantar fasciitis and left shoulder pain.      Conservative Treatment:  Tylenol - mild help  Heating pad to left hip - helps reduce pain  Prednisone 40 mg x5 days 9/20/23 - 2 weeks relief  Gabapentin 200 mg tid - SE - lack of energy  Lyrica - not helpful   Nortriptyline - 25 mg HS  - by PCP  Duloxetine - not filled          Acupuncture - no lasting benefit  2/22//16 - PRP and stem cell injection in bottom of foot w/ Dr. Elbert Gonzales [facility] - ortho care. Helped about 1 week.  PT - Rohan Onofre since 11/22/23 - balance, land & pool    Injections:  10/12/23 - Left GT bursal injection - helped for 1-2 weeks  12/27/23 - L5/S1 left TFESI Rayus - 50% relief - no lasting benefit  10/10/24 L1/2 ILESI Dr. Ya (8-3) -   10/17/24 left knee Synevisc Felton (8-2) - not helpful  2/26/25 L1/2 ILESI  - Dr. Ya (5/4 px red) - no significant pain reduction      Review of " Systems   See HPI    Past Medical History:   Diagnosis Date    Angioedema     Atrial fibrillation and flutter (H)     Basal cell carcinoma     Branch retinal vein occlusion of left eye (H) 07/01/2017    CAD (coronary artery disease) 2/29/2024    ANGIOGRAM 2021 Left Main The vessel was visualized by angiography, is moderate in size and is angiographically normal. Left Anterior Descending Mid LAD lesion is 25% stenosed. Ramus Intermedius Ramus lesion is 10% stenosed. Left Circumflex Dist Cx lesion is 30% stenosed. Right Coronary Artery The vessel was visualized by angiography, is moderate in size and is angiographically normal.      Chronic neck pain     Congestive heart failure (H) 2021    GERD (gastroesophageal reflux disease)     HTN (hypertension) 2015    Nonsmoker     Radiculopathy     cervical    Retinal hemorrhage     Vessel rupture in left retina    Rhinitis, allergic     S/P colonoscopy 04/15/2019    Urticaria        Past Surgical History:   Procedure Laterality Date    BASAL CELL CARCINOMA EXCISION  01/01/2020    Left nasal reconstruction    CATARACT EXTRACTION, BILATERAL Bilateral 2022    CERVICAL SPINE SURGERY      C8, T1, foraminotomy    CV CORONARY ANGIOGRAM N/A 01/20/2021    Procedure: Coronary Angiogram;  Surgeon: Willow Wellington MD;  Location: [facility] Cardiac Cath Lab;  Service: Cardiology    CV LEFT ATRIAL APPENDAGE CLOSURE N/A 07/14/2021    Procedure: CV LEFT ATRIAL APPENDAGE CLOSURE;  Surgeon: Noah Gutierres MD;  Location:  HEART CARDIAC CATH LAB    CV LEFT HEART CATHETERIZATION WITHOUT LEFT VENTRICULOGRAM Left 01/20/2021    Procedure: Left Heart Catheterization Without Left Ventriculogram;  Surgeon: Willow Wellington MD;  Location: [facility] Cardiac Cath Lab;  Service: Cardiology    DECOMPRESSION, FUSION LUMBAR POSTERIOR ONE LEVEL, COMBINED Left 03/30/2023    Procedure: LEFT LUMBAR 4-5 POSTERIOR SPINAL FUSION WITH LEFT LUMBAR 4-5 LAMINOFORAMINOTOMY AND WIDE LUMBAR 4-5 LEFT SUBTOTAL  FACETECTOMY WITH ALLOGRAFT AND AUTOGRAFT;  Surgeon: Ambrose Degroot MD;  Location: [facility]    HERNIA REPAIR, UMBILICAL       ×2    INJECT EPIDURAL LUMBAR Left 10/10/2024    Procedure: INJECTION, SPINE, LUMBAR, EPIDURAL (left L1-L2);  Surgeon: Zulma Ya MD;  Location: [facility] OR    INJECT EPIDURAL LUMBAR N/A 2/26/2025    Procedure: Lumbar 1-2 epidural steroid injection;  Surgeon: Zulma Ya MD;  Location: [facility] OR    INJECT MAJOR JOINT / BURSA Left 10/17/2024    Procedure: INJECTION, MAJOR JOINT OR BURSA OF MAJOR JOINT (left knee);  Surgeon: Zulma Ya MD;  Location: [facility] OR    REVERSE TOTAL SHOULDER ARTHROPLASTY Left 05/01/2019       Social History     Socioeconomic History    Marital status:      Spouse name: None    Number of children: None    Years of education: None    Highest education level: None   Tobacco Use    Smoking status: Never    Smokeless tobacco: Never   Substance and Sexual Activity    Alcohol use: Never     Comment: Alcoholic Drinks/day: About once a year.    Drug use: No   Social History Narrative    Patient of Dr. Rod since 2001.    .  Wife is a former RN.     Wife's cousin is Dr. Jean Pierre Champagne, ID specialist.       family history includes Arthritis in his mother; Other - See Comments in his father and mother.       IMAGING   Imaging independently reviewed.    MRI thoracic 4/12/25 -   FINDINGS:   Thoracic spine alignment is grossly within normal limits. No significant loss of vertebral body height. No focal destructive bony lesions. Several small Schmorl's node deformities. Marked STIR hyperintense endplate edema (Modic type I changes) at T10-T11 asymmetric towards the left.  Mild to moderate degenerative endplate changes and loss of disc height throughout the thoracic spine, greatest in the lower thoracic spine. Circumferential disc bulges at T10-T11 and T11-T12. Mild facet hypertrophy in the lower thoracic spine. Facet hypertrophy also in the right  upper and midthoracic spine. No significant spinal canal narrowing at any level. Right-sided neural foraminal narrowings in the upper and midthoracic spine. Moderate neural foraminal narrowings at T10-T11 and T11-T12.  No abnormal signal or enhancement appreciated along the thoracic spinal cord.   No extraspinal abnormality.  IMPRESSION:  1.  Degenerative changes in the thoracic spine as detailed above.  2.  No spinal canal narrowing at any level.  3.  Right-sided neural foraminal narrowings in the upper and midthoracic spine from facet hypertrophy. Moderate neural foraminal narrowings at T10-T11 and T11-T12.  4.  Endplate edema asymmetric towards the left at T10-T11. This finding has been associated with a source of pain in some patients.         MRI Lumbar 4/12/25 -   FINDINGS:   Nomenclature is based on 5 lumbar type vertebral bodies. Normal distal spinal cord and cauda equina with conus medullaris at L1-L2.  Convex left curvature of the lumbar spine. Rightward listhesis of L1 on L2 and L2 on L3. Postoperative changes with left-sided fixation hardware at L4-L5. Solid fusion of the L4 and L5 vertebral bodies. No definite destructive bony lesion. Moderate STIR hyperintense endplate edema (Modic type I changes) asymmetric towards the right at L1-L2. Mild endplate edema also posteriorly at T12-L1.  Nonenhancing T2 hyperintense cystic lesion from the inferior pole of the right kidney.  T12-L1: Moderate loss of disc height and signal. Circumferential disc bulge more pronounced towards the left subarticular region. Normal facets. Mild spinal canal narrowing particularly on the left. No right neural foraminal narrowing. Moderate left neural foraminal narrowing.   L1-L2: Marked loss of disc height and signal with degenerative endplate changes. Circumferential disc bulge with endplate osteophytic spurring. Superimposed central disc extrusion which extends slightly below the disc level. Bilateral facet hypertrophy.    Thickening of the ligamentum flavum. Moderate spinal canal narrowing. Mild right neural foraminal narrowing. Moderate left neural foraminal narrowing.  L2-L3: Marked loss of disc height and signal. Circumferential disc bulge with endplate osteophytic spurring. Facet tropism. Mild spinal canal narrowing. Moderate bilateral neural foraminal narrowing.   L3-L4: Marked loss of disc height and signal with degenerative endplate changes. Circumferential disc bulge with endplate osteophytic spurring more pronounced towards the right foraminal and far lateral region. Bilateral facet hypertrophy with facet   tropism. No spinal canal narrowing. Moderate right neural foraminal narrowing. Mild left neural foraminal narrowing.  L4-L5: Posterior fusion hardware on the left which causes artifact and limits evaluation. Fusion across the disc space. Endplate osteophytic spurring. Probable left-sided facetectomy. No spinal canal narrowing. Moderate right neural foraminal narrowing.   No significant left neural foraminal narrowing.  L5-S1: Marked loss of disc height and signal with degenerative endplate changes. Circumferential disc bulge with endplate osteophytic spurring which is more pronounced towards the left foraminal and far lateral region. Bilateral facet hypertrophy. No   spinal canal narrowing. Mild right neural foraminal narrowing. Moderate to severe left neural foraminal narrowing.  IMPRESSION:  1.  Multilevel degenerative changes in the lumbar spine as detailed above with convex left curvature of the lumbar spine.  2.  Moderate spinal canal narrowing at L1-L2. Mild spinal canal narrowing at T12-L1 and L2-L3.  3.  Several levels of neural foraminal narrowing, most pronounced on the left at T12-L1 and L1-L2, bilaterally at L2-L3, and on the right at L3-L4, L4-L5, and L5-S1.  4.  Overall, no significant change since prior MR 8/2/2024.    EMG 10/21/24 Dr. Nunez -   Interpretation:  This is an abnormal study. There is  electrophysiologic evidence of (1) chronic bilateral lumbosacral radiculopathies affecting the L5 and S1 nerve roots and (2) a superimposed mild length-dependant axonal sensory or sensorimotor polyneuropathy. The absence of active denervation potentials in the sampled muscles argues against a recent nerve or nerve root injury. Clinical correlation is recommended.    Lumbar MRI 8/2/24 -   FINDINGS:   Nomenclature is based on 5 lumbar type vertebral bodies. Posterior spinal fusion and hemilaminectomy on the left at L4-5. Persistent moderate lumbar levoscoliosis. Straightening the normal AP curvature with grade 1 retrolisthesis of L2 on L3. Vertebral   body heights are maintained. Normal marrow signal. Normal distal spinal cord and cauda equina with conus medullaris at T12-L1. There is benign-appearing right renal cyst, no additional radiographic follow-up is necessary. Unremarkable visualized bony   pelvis.  T12-L1: Disc desiccation and disc height loss. Diffuse disc bulge with left subarticular disc protrusion. Mild facet hypertrophy. No spinal canal stenosis. Mild left foraminal stenosis. No right foraminal stenosis. Mild left subarticular stenosis.   L1-L2: Disc desiccation and disc height loss. Diffuse disc bulge with left subarticular disc extrusion. The extruded disc fragment measures 6 mm AP by 9 mm transverse with 8 mm caudal migration. Bilateral facet hypertrophy. No spinal canal stenosis.   Moderate left and mild right neural foraminal stenosis. Mild left subarticular stenosis.  L2-L3: Disc desiccation and disc height loss. Diffuse disc bulge with left central disc protrusion and annular fissure. Lateral facet hypertrophy. No spinal canal stenosis. Mild-to-moderate right greater than left neural foraminal stenosis.   L3-L4: Disc desiccation and disc height loss. Diffuse disc bulge. Bilateral facet hypertrophy. No spinal canal stenosis. Minor bilateral neural foraminal stenosis.  L4-L5: Status post left  fusion. Disc height loss with mild disc bulge. Posterior osteophytes. No spinal canal stenosis. Mild right greater than left neural foraminal stenosis.  L5-S1: Disc desiccation and disc height loss. Diffuse disc bulge. Moderate bilateral facet hypertrophy. No spinal canal stenosis. Moderate left greater than right neural foraminal stenosis. Disc material contacts the exiting bilateral L5 nerve roots with   elevation of the left.  IMPRESSION:  1.  Multilevel disc degeneration and facet hypertrophy as described above. No significant spinal canal stenosis. Scattered mild to moderate neural foraminal stenoses, greatest at L5-S1.    EOS XR 7/15/24 -   Findings:  12 rib bearing vertebral bodies and 5 lumbar type vertebral bodies are  identified. Degenerative changes are noted throughout the spine most  severe in the lumbar spine. There is a left-sided posterior metallic  effusion of L4-5. No hardware inferior identified.  Coronal Deformity:  There is a mild  convexed right curvature of thoracolumbar/lumbar  spine with apex at L1.   Positive global coronal imbalance.  Sagittal Vertical Axis (A vertical line drawn from the center of C7  (chas line) to the posterosuperior aspect of the S1 on sagittal  plane):  very positive   Weight bearing axis: (Defined as a line drawn from the center of the  femoral head to the mid aspect of the tibial plafond).      Right: Weight bearing axis crosses through the lateral tibial  spine.       Left: Weight bearing axis crosses through the lateral tibial  spine.  Leg length:  (Measured from the top of the femoral head to the center  of tibial plafond.  It is assumed joints are in similar degrees of  extension bilaterally.  Significant difference is defined when  discrepancy is greater than 1.5 cm).        No significant  leg length discrepancy.  Additional Findings:  Postsurgical changes of reverse left shoulder arthroplasty.  Postsurgical changes of left lumbar rotation at L4-L5.  Large  osteophytes noted in the lumbar spine. Left atrial appendage closure  device.  Nonobstructive bowel gas pattern. The lung fields are clear.  Borderline enlarged cardiac silhouette. No acute osseous abnormality.  No acute osseous abnormality.  There is a nonobstructive bowel gas  pattern.  Impression:  1. Mild convex right curvature of the thoracolumbar spine.   2. Hardware intact of the left-sided posterior metallic fusion at  L4-5.  2. Positive global coronal and balanced and very positive global  sagittal balance. .  3. Weight bearing axis as detailed above.    EMG [facility] 12/6/23 -         Physical Exam   /65 (BP Location: Right arm, Patient Position: Sitting)   Pulse 50   Resp 16   SpO2 99%     Musculoskeletal: Able to move all extremities.  No involuntary motor movements. Moderate TTP over the mid-upper lumbar region.  Surgical scar present.  No SI joint or piriformis TTP.       Neurological:  Alert, NAD, and oriented to person, place, and time.   No cranial nerve deficit.  Speech clear and fluent.  Thought process good.     Gait: steady, symmetrical w/o assistive devices  Walks slightly bent forward    Strength (L/R)  5/5 Hip Flexion  5/5 Knee Extension  5/5 Ankle Dorsiflexion  5/5 Extensor Hallucis Longus  5/5 Plantar Flexion    Reflexes (L/R)  2+/2+ Patellar  2+/2+ Ankle    No ankle clonus    Sensation: SILT CONCHITA Good PA-C  Department of Neurosurgery  Office: [phone]

## 2025-04-15 ENCOUNTER — OFFICE VISIT (OUTPATIENT)
Dept: NEUROSURGERY | Facility: CLINIC | Age: 75
End: 2025-04-15
Attending: PHYSICIAN ASSISTANT
Payer: MEDICARE

## 2025-04-15 VITALS
HEART RATE: 50 BPM | OXYGEN SATURATION: 99 % | SYSTOLIC BLOOD PRESSURE: 132 MMHG | DIASTOLIC BLOOD PRESSURE: 65 MMHG | RESPIRATION RATE: 16 BRPM

## 2025-04-15 DIAGNOSIS — M48.062 LUMBAR STENOSIS WITH NEUROGENIC CLAUDICATION: ICD-10-CM

## 2025-04-15 DIAGNOSIS — M51.362 DEGENERATION OF INTERVERTEBRAL DISC OF LUMBAR REGION WITH DISCOGENIC BACK PAIN AND LOWER EXTREMITY PAIN: Primary | ICD-10-CM

## 2025-04-15 DIAGNOSIS — Z98.1 HISTORY OF LUMBAR FUSION: ICD-10-CM

## 2025-04-15 PROCEDURE — 3075F SYST BP GE 130 - 139MM HG: CPT | Performed by: PHYSICIAN ASSISTANT

## 2025-04-15 PROCEDURE — 99215 OFFICE O/P EST HI 40 MIN: CPT | Performed by: PHYSICIAN ASSISTANT

## 2025-04-15 PROCEDURE — 3078F DIAST BP <80 MM HG: CPT | Performed by: PHYSICIAN ASSISTANT

## 2025-04-15 PROCEDURE — 1125F AMNT PAIN NOTED PAIN PRSNT: CPT | Performed by: PHYSICIAN ASSISTANT

## 2025-04-15 RX ORDER — LISINOPRIL 5 MG/1
5 TABLET ORAL DAILY
Qty: 90 TABLET | Refills: 3 | OUTPATIENT
Start: 2025-04-15

## 2025-04-15 ASSESSMENT — PAIN SCALES - GENERAL: PAINLEVEL_OUTOF10: MODERATE PAIN (4)

## 2025-04-15 NOTE — LETTER
4/15/2025       RE: Mike Gonzalez  [address] [facility] [place] [address]     Dear Colleague,    Thank you for referring your patient, Mike Gonzalez, to the [facility] NEUROSURGERY CLINIC [place] at [facility]. Please see a copy of my visit note below.        Neurosurgery Clinic Note      ASSESSMENT & PLAN:  Mike Gonzalez is a 73 year old male with a PMH of atrial flutter, s/p left Watchman implant, CAD, hypertension, GERD, left rotator cuff tear, C6/7 decompression ([facility] 2002), L4/5 decomp ([facility], 1/2022), L4/5 fusion, L3/4 decomp, CSF leak ([facility] 3/30/23) who is following up for low back and left buttock/hip pain.     Patient has symptoms and imaging consistent with lumbar stenosis with NGC.  He has failed conservative management consisting of PT, injections and medication management.      Lumbar CT myelogram 10/2023 and lumbar MRI 5/2023 reveal severe left L1/2 subarticular stenosis and L5/S1 severe left FS.    EMG 12/2023 [facility] - indicated chronic/slightly active L5 > L4 left radic.  EMG BLE 10/21/24 - chronic raudel L5 & S1 radic, mild length-dep axonal s/m polyneuropathy  Lumbar MRI 8/2/24 - L4/5 Fusion.  L5/S1 mod L > R NFS - disc material contacts L5 raudel w/ elevation of L5; L1/2 left s/a disc extrusion w/ caudal migration, mod L NFS.  No significant changes from prior lumbar imaging.   EOS XR 7/15/24 - positive global coronal and sagittal balance; L4/5 left posterior fusion stable.   Lumbar MRI 4/12/15 - L1/2 mod CCS w/ Modic I changes, severe NFS left T12-L2, raudel L2-3, right L3-S1.  L4/5 left sided fixation/solid fusion.  Similar to 8/2024 imaging.      -referral placed to Dr. Gallo for lumbar decompression surgical consideration.  -CT lumbar prior to that visit for surgical planning         I spent 49 minutes spent in patient care, independent review and interpretation of medical records/imaging, reviewing old records.    History of Present Illness:  Mike MCALLISTER  Carlos is a 73 year old male with a PMH of atrial flutter, s/p left Watchman implant, CAD, hypertension, GERD, left rotator cuff tear, C6/7 decompression ([facility] 2002), L4/5 decomp ([facility], 1/2022), L4/5 fusion, L3/4 decomp, CSF leak ([facility] 3/30/23) who is presenting for evaluation of LLE pain.    11/22/23 Visit - Patient has surgery for cervical decompression at [facility] in 2002.  He had a very difficult time with pain after the surgery and that lasted about 10 years.  He has continues to have poor balance since that surgery.  He also notes bilateral RTC issues and had left shoulder replacement.  He continues to have more weakness in his left shoulder.  His neck ROM is significantly reduced.  This improved for about 1 week after having PRP/stem cell treatment about 3-4 years from Dr. Elbert Marmolejo [facility].    About 3 years ago, started having sore left hip walking up hills.  He saw ortho MD who check out hip and did not find anything concerning.  He continued to have issues with his hip and he went to [facility] who identified pinched nerve in his back.  He had 2 surgeries as a result (see above) resulting in fusion at L4/5.  The surgery reduced the freezing feeling in his feet.  It did not help with the left hip pain and now he has back pain in addition to the hip pain.  The pain starts in the left outer hip.  When it is bad, he gets pain on the right.  He has a sore spot on the left medial left knee which has responded to injections in the past.  The pain is a solid ache, but increases to a sharp pain depending on his activity.  If he does too much, he pays for it the next day.  Steroids helped with energy level and was able to walk further without significant pain. He also complains that his toes hurt and feel like cardboard.   He does better walking on level surface.  On the level, he can go 700 feet. This is is reduced to about 400-500 feet if not level.  He denies that his legs feel tired, but that the outer hip is  painful to him which requires him to stop for a while before he can continue.  The pain comes from lateral hip to the knee level.  He had an injection in his left hip that helped for 1-2 weeks.    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 His goal is to be able to walk around and do his photography work and get back to writing his books, etc.    EMG before surgery in March 2023.  Next one schedule 12/8/23 at [facility].      7/15/24 Visit  -patient returns after having significant physical therapy at [facility].  He had 12 sessions of regular PT which he felt was helpful and then transition to pool therapy but unfortunately was unable to continue because of a rash that he developed from the chlorine.  He most recently was engaged in balance therapy which he stated exacerbated his back and leg symptoms.  He continues to endorse low back pain that is worse on the left than the right but does cross his entire back at a level just above the belt line.  He also notes he has left posterior hip is more painful than his right.  He does have left knee pain and is aware that he is bone-on-bone degeneration.  He has undergone evaluation at [facility] and has had injections that have been helpful but nothing recently.  He is open to having a second opinion on whether more injections or surgery would be beneficial.  He denies any radicular pain extending beyond his hips or bowel or bladder issues aside from constipation on occasion.  His neurologist from [facility] on disputes the EMG test by [facility] that he does not have neuropathy, the neurologist notes he does have neuropathy.  The patient notes that he has pain in all of his toes that improves with Voltaren gel.  He also endorses generalized low energy.  He is getting evaluated at in August by cardiology here.  Perhaps more bothersome to the patient is that he is unable to ambulate more  than 700 feet at a time due to the pain in his low back and left hip.  He is able to stop for a period of time at that distance and then is able to ambulate a little bit further.  His pain is preventing him from doing ADLs walking.  It is worse with flexion of his back.  His wife is with him today and notes that sometimes his back looks laterally malaligned.  Patient denies any radicular pains in his right LE.      8/5/24 Visit - patient f/u after having lumbar MRI updated. He notes left buttock/hip pain posterolateral location that affects him when he is walking.  Otherwise has chronic low back pain.  Left medial knee discomfort is actually better after his last injection.  Denies radicular pain in BLE.  Still applying Voltaren gel to his feet for paresthesia.  He notes his feet are very cold.  Had cardiology workup which was negative for any vascular blockages in his legs per US.  He saw orthopedics for his left knee and also mentioned his hip.  He is not pursuing injections or surgery at this time for those areas.    He also c/o cognitive issues right now that are limiting his ability to write and process things fluently.  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  He has noticed more of a change in the last 3-4 months.  He reports a concussion about 5 years ago after a fall off a ladder and then had COVID.  He is concerned about a long COVID issue.      10/30/24 Visit (VIDEO) - follow-up after L1/L2 ILESI injection and EMG BLE (chronic L5 & S1 raudel, mild s/m polyneuropathy raudel).    Patient noted complete resolution of hip pain for for 2 weeks w/ gradual return.  He even reports being able to get out and take pictures of the northern lights which was a treat for him.  He notes his overall body pain and the toe soreness was gone for a while after the injection.  He also reports improvement of pain in his feet with antibiotics.  He has consistently reported improved pain levels after getting  "other injections and oral steroids as well (which begs the question of a systemic anti-inflammatory effect).  He still endorses left knee pain, but he was told it could take upwards of 2 weeks for the Synevisc injection to work.  He is more achy today, but relates that to the incoming weather.  Patient would like not to do surgery and is open to trying medication if that would be helpful. He has cardiac issues and is sensitive to medications so would need to be careful.  He has tried gabapentin in the past after a shoulder surgery and did not like how he felt on that--felt fatigued.  He has not tried Duloxetine or Lyrica.    4/15/25 visit - f/u after thoracic and updated lumbar MRIs.  In the interim he has been treating with pain management.  Underwent L1/2 ILESI 2/26/25 with Dr. Ya (5/4 px red).     Patient has noted worsening ability to ambulate since 1 year ago.  He is now only walking about 1/2 block.  He notes pain down both of his legs.  He has worsening \"neuropathy\" in the tops of all of his toes.  He continues to get some mild relief from this with Voltaren gel. He has not had any significant improvement of his pain with injections, PT or medical management.  He is currently weaning off Lyrica because of the brain fog that comes with it.  He is finding it very difficult to sleep in his bed and his sleep is disrupted by pain.  He has unrelated right plantar fasciitis and left shoulder pain.      Conservative Treatment:  Tylenol - mild help  Heating pad to left hip - helps reduce pain  Prednisone 40 mg x5 days 9/20/23 - 2 weeks relief  Gabapentin 200 mg tid - SE - lack of energy  Lyrica - not helpful   Nortriptyline - 25 mg HS  - by PCP  Duloxetine - not filled          Acupuncture - no lasting benefit  2/22//16 - PRP and stem cell injection in bottom of foot w/ Dr. Elbert Gonzales [facility] - [facility]. Helped about 1 week.  PT - Rohan Onofre since 11/22/23 - balance, land & pool    Injections:  10/12/23 - Left GT " bursal injection - helped for 1-2 weeks  12/27/23 - L5/S1 left TFESI Rayus - 50% relief - no lasting benefit  10/10/24 L1/2 ILESI Dr. Ya (8-3) -   10/17/24 left knee Synevisc [place] (8-2) - not helpful  2/26/25 L1/2 ILESI  - Dr. Ya (5/4 px red) - no significant pain reduction      Review of Systems   See HPI    Past Medical History:   Diagnosis Date     Angioedema      Atrial fibrillation and flutter (H)      Basal cell carcinoma      Branch retinal vein occlusion of left eye (H) 07/01/2017     CAD (coronary artery disease) 2/29/2024    ANGIOGRAM 2021 Left Main The vessel was visualized by angiography, is moderate in size and is angiographically normal. Left Anterior Descending Mid LAD lesion is 25% stenosed. Ramus Intermedius Ramus lesion is 10% stenosed. Left Circumflex Dist Cx lesion is 30% stenosed. Right Coronary Artery The vessel was visualized by angiography, is moderate in size and is angiographically normal.       Chronic neck pain      Congestive heart failure (H) 2021     GERD (gastroesophageal reflux disease)      HTN (hypertension) 2015     Nonsmoker      Radiculopathy     cervical     Retinal hemorrhage     Vessel rupture in left retina     Rhinitis, allergic      S/P colonoscopy 04/15/2019     Urticaria        Past Surgical History:   Procedure Laterality Date     BASAL CELL CARCINOMA EXCISION  01/01/2020    Left nasal reconstruction     CATARACT EXTRACTION, BILATERAL Bilateral 2022     CERVICAL SPINE SURGERY      C8, T1, foraminotomy     CV CORONARY ANGIOGRAM N/A 01/20/2021    Procedure: Coronary Angiogram;  Surgeon: Willow Wellington MD;  Location: [facility] Cardiac Cath Lab;  Service: Cardiology     CV LEFT ATRIAL APPENDAGE CLOSURE N/A 07/14/2021    Procedure: CV LEFT ATRIAL APPENDAGE CLOSURE;  Surgeon: Noah Gutierres MD;  Location:  HEART CARDIAC CATH LAB     CV LEFT HEART CATHETERIZATION WITHOUT LEFT VENTRICULOGRAM Left 01/20/2021    Procedure: Left Heart Catheterization Without Left  Ventriculogram;  Surgeon: Willow Wellington MD;  Location: [facility] Cardiac Cath Lab;  Service: Cardiology     DECOMPRESSION, FUSION LUMBAR POSTERIOR ONE LEVEL, COMBINED Left 03/30/2023    Procedure: LEFT LUMBAR 4-5 POSTERIOR SPINAL FUSION WITH LEFT LUMBAR 4-5 LAMINOFORAMINOTOMY AND WIDE LUMBAR 4-5 LEFT SUBTOTAL FACETECTOMY WITH ALLOGRAFT AND AUTOGRAFT;  Surgeon: Ambrose Degroot MD;  Location: [facility] Main OR     HERNIA REPAIR, UMBILICAL       ×2     INJECT EPIDURAL LUMBAR Left 10/10/2024    Procedure: INJECTION, SPINE, LUMBAR, EPIDURAL (left L1-L2);  Surgeon: Zulma Ya MD;  Location: [facility] OR     INJECT EPIDURAL LUMBAR N/A 2/26/2025    Procedure: Lumbar 1-2 epidural steroid injection;  Surgeon: Zulma Ya MD;  Location: [facility] OR     INJECT MAJOR JOINT / BURSA Left 10/17/2024    Procedure: INJECTION, MAJOR JOINT OR BURSA OF MAJOR JOINT (left knee);  Surgeon: Zulma Ya MD;  Location: [facility] OR     REVERSE TOTAL SHOULDER ARTHROPLASTY Left 05/01/2019       Social History     Socioeconomic History     Marital status:      Spouse name: None     Number of children: None     Years of education: None     Highest education level: None   Tobacco Use     Smoking status: Never     Smokeless tobacco: Never   Substance and Sexual Activity     Alcohol use: Never     Comment: Alcoholic Drinks/day: About once a year.     Drug use: No   Social History Narrative    Patient of Dr. Rod since 2001.    .  Wife is a former RN.     Wife's cousin is Dr. Jean Pierre Champagne, ID specialist.       family history includes Arthritis in his mother; Other - See Comments in his father and mother.       IMAGING   Imaging independently reviewed.    MRI thoracic 4/12/25 -   FINDINGS:   Thoracic spine alignment is grossly within normal limits. No significant loss of vertebral body height. No focal destructive bony lesions. Several small Schmorl's node deformities. Marked STIR hyperintense endplate edema (Modic type I  changes) at T10-T11 asymmetric towards the left.  Mild to moderate degenerative endplate changes and loss of disc height throughout the thoracic spine, greatest in the lower thoracic spine. Circumferential disc bulges at T10-T11 and T11-T12. Mild facet hypertrophy in the lower thoracic spine. Facet hypertrophy also in the right upper and midthoracic spine. No significant spinal canal narrowing at any level. Right-sided neural foraminal narrowings in the upper and midthoracic spine. Moderate neural foraminal narrowings at T10-T11 and T11-T12.  No abnormal signal or enhancement appreciated along the thoracic spinal cord.   No extraspinal abnormality.  IMPRESSION:  1.  Degenerative changes in the thoracic spine as detailed above.  2.  No spinal canal narrowing at any level.  3.  Right-sided neural foraminal narrowings in the upper and midthoracic spine from facet hypertrophy. Moderate neural foraminal narrowings at T10-T11 and T11-T12.  4.  Endplate edema asymmetric towards the left at T10-T11. This finding has been associated with a source of pain in some patients.         MRI Lumbar 4/12/25 -   FINDINGS:   Nomenclature is based on 5 lumbar type vertebral bodies. Normal distal spinal cord and cauda equina with conus medullaris at L1-L2.  Convex left curvature of the lumbar spine. Rightward listhesis of L1 on L2 and L2 on L3. Postoperative changes with left-sided fixation hardware at L4-L5. Solid fusion of the L4 and L5 vertebral bodies. No definite destructive bony lesion. Moderate STIR hyperintense endplate edema (Modic type I changes) asymmetric towards the right at L1-L2. Mild endplate edema also posteriorly at T12-L1.  Nonenhancing T2 hyperintense cystic lesion from the inferior pole of the right kidney.  T12-L1: Moderate loss of disc height and signal. Circumferential disc bulge more pronounced towards the left subarticular region. Normal facets. Mild spinal canal narrowing particularly on the left. No right  neural foraminal narrowing. Moderate left neural foraminal narrowing.   L1-L2: Marked loss of disc height and signal with degenerative endplate changes. Circumferential disc bulge with endplate osteophytic spurring. Superimposed central disc extrusion which extends slightly below the disc level. Bilateral facet hypertrophy.   Thickening of the ligamentum flavum. Moderate spinal canal narrowing. Mild right neural foraminal narrowing. Moderate left neural foraminal narrowing.  L2-L3: Marked loss of disc height and signal. Circumferential disc bulge with endplate osteophytic spurring. Facet tropism. Mild spinal canal narrowing. Moderate bilateral neural foraminal narrowing.   L3-L4: Marked loss of disc height and signal with degenerative endplate changes. Circumferential disc bulge with endplate osteophytic spurring more pronounced towards the right foraminal and far lateral region. Bilateral facet hypertrophy with facet   tropism. No spinal canal narrowing. Moderate right neural foraminal narrowing. Mild left neural foraminal narrowing.  L4-L5: Posterior fusion hardware on the left which causes artifact and limits evaluation. Fusion across the disc space. Endplate osteophytic spurring. Probable left-sided facetectomy. No spinal canal narrowing. Moderate right neural foraminal narrowing.   No significant left neural foraminal narrowing.  L5-S1: Marked loss of disc height and signal with degenerative endplate changes. Circumferential disc bulge with endplate osteophytic spurring which is more pronounced towards the left foraminal and far lateral region. Bilateral facet hypertrophy. No   spinal canal narrowing. Mild right neural foraminal narrowing. Moderate to severe left neural foraminal narrowing.  IMPRESSION:  1.  Multilevel degenerative changes in the lumbar spine as detailed above with convex left curvature of the lumbar spine.  2.  Moderate spinal canal narrowing at L1-L2. Mild spinal canal narrowing at T12-L1 and  L2-L3.  3.  Several levels of neural foraminal narrowing, most pronounced on the left at T12-L1 and L1-L2, bilaterally at L2-L3, and on the right at L3-L4, L4-L5, and L5-S1.  4.  Overall, no significant change since prior MR 8/2/2024.    EMG 10/21/24 Dr. Nunez -   Interpretation:  This is an abnormal study. There is electrophysiologic evidence of (1) chronic bilateral lumbosacral radiculopathies affecting the L5 and S1 nerve roots and (2) a superimposed mild length-dependant axonal sensory or sensorimotor polyneuropathy. The absence of active denervation potentials in the sampled muscles argues against a recent nerve or nerve root injury. Clinical correlation is recommended.    Lumbar MRI 8/2/24 -   FINDINGS:   Nomenclature is based on 5 lumbar type vertebral bodies. Posterior spinal fusion and hemilaminectomy on the left at L4-5. Persistent moderate lumbar levoscoliosis. Straightening the normal AP curvature with grade 1 retrolisthesis of L2 on L3. Vertebral   body heights are maintained. Normal marrow signal. Normal distal spinal cord and cauda equina with conus medullaris at T12-L1. There is benign-appearing right renal cyst, no additional radiographic follow-up is necessary. Unremarkable visualized bony   pelvis.  T12-L1: Disc desiccation and disc height loss. Diffuse disc bulge with left subarticular disc protrusion. Mild facet hypertrophy. No spinal canal stenosis. Mild left foraminal stenosis. No right foraminal stenosis. Mild left subarticular stenosis.   L1-L2: Disc desiccation and disc height loss. Diffuse disc bulge with left subarticular disc extrusion. The extruded disc fragment measures 6 mm AP by 9 mm transverse with 8 mm caudal migration. Bilateral facet hypertrophy. No spinal canal stenosis.   Moderate left and mild right neural foraminal stenosis. Mild left subarticular stenosis.  L2-L3: Disc desiccation and disc height loss. Diffuse disc bulge with left central disc protrusion and annular fissure.  Lateral facet hypertrophy. No spinal canal stenosis. Mild-to-moderate right greater than left neural foraminal stenosis.   L3-L4: Disc desiccation and disc height loss. Diffuse disc bulge. Bilateral facet hypertrophy. No spinal canal stenosis. Minor bilateral neural foraminal stenosis.  L4-L5: Status post left fusion. Disc height loss with mild disc bulge. Posterior osteophytes. No spinal canal stenosis. Mild right greater than left neural foraminal stenosis.  L5-S1: Disc desiccation and disc height loss. Diffuse disc bulge. Moderate bilateral facet hypertrophy. No spinal canal stenosis. Moderate left greater than right neural foraminal stenosis. Disc material contacts the exiting bilateral L5 nerve roots with   elevation of the left.  IMPRESSION:  1.  Multilevel disc degeneration and facet hypertrophy as described above. No significant spinal canal stenosis. Scattered mild to moderate neural foraminal stenoses, greatest at L5-S1.    EOS XR 7/15/24 -   Findings:  12 rib bearing vertebral bodies and 5 lumbar type vertebral bodies are  identified. Degenerative changes are noted throughout the spine most  severe in the lumbar spine. There is a left-sided posterior metallic  effusion of L4-5. No hardware inferior identified.  Coronal Deformity:  There is a mild  convexed right curvature of thoracolumbar/lumbar  spine with apex at L1.   Positive global coronal imbalance.  Sagittal Vertical Axis (A vertical line drawn from the center of C7  (chas line) to the posterosuperior aspect of the S1 on sagittal  plane):  very positive   Weight bearing axis: (Defined as a line drawn from the center of the  femoral head to the mid aspect of the tibial plafond).      Right: Weight bearing axis crosses through the lateral tibial  spine.       Left: Weight bearing axis crosses through the lateral tibial  spine.  Leg length:  (Measured from the top of the femoral head to the center  of tibial plafond.  It is assumed joints are in  similar degrees of  extension bilaterally.  Significant difference is defined when  discrepancy is greater than 1.5 cm).        No significant  leg length discrepancy.  Additional Findings:  Postsurgical changes of reverse left shoulder arthroplasty.  Postsurgical changes of left lumbar rotation at L4-L5. Large  osteophytes noted in the lumbar spine. Left atrial appendage closure  device.  Nonobstructive bowel gas pattern. The lung fields are clear.  Borderline enlarged cardiac silhouette. No acute osseous abnormality.  No acute osseous abnormality.  There is a nonobstructive bowel gas  pattern.  Impression:  1. Mild convex right curvature of the thoracolumbar spine.   2. Hardware intact of the left-sided posterior metallic fusion at  L4-5.  2. Positive global coronal and balanced and very positive global  sagittal balance. .  3. Weight bearing axis as detailed above.    EMG [facility] 12/6/23 -         Physical Exam   /65 (BP Location: Right arm, Patient Position: Sitting)   Pulse 50   Resp 16   SpO2 99%     Musculoskeletal: Able to move all extremities.  No involuntary motor movements. Moderate TTP over the mid-upper lumbar region.  Surgical scar present.  No SI joint or piriformis TTP.       Neurological:  Alert, NAD, and oriented to person, place, and time.   No cranial nerve deficit.  Speech clear and fluent.  Thought process good.     Gait: steady, symmetrical w/o assistive devices  Walks slightly bent forward    Strength (L/R)  5/5 Hip Flexion  5/5 Knee Extension  5/5 Ankle Dorsiflexion  5/5 Extensor Hallucis Longus  5/5 Plantar Flexion    Reflexes (L/R)  2+/2+ Patellar  2+/2+ Ankle    No ankle clonus    Sensation: SILT BLE       Sagrario Good PA-C  Department of Neurosurgery  Office: [phone]            Again, thank you for allowing me to participate in the care of your patient.      Sincerely,    Sagrario Good PA-C

## 2025-04-15 NOTE — PATIENT INSTRUCTIONS
[phone] - Call GrandmotherAnita (pt father is not available)                 Referral placed for Dr. Gallo for lumbar surgery decompression consult.  CT lumbar prior to visit.

## 2025-04-16 ENCOUNTER — TELEPHONE (OUTPATIENT)
Dept: NEUROSURGERY | Facility: CLINIC | Age: 75
End: 2025-04-16
Payer: MEDICARE

## 2025-04-16 NOTE — TELEPHONE ENCOUNTER
SPINE PATIENTS - NEW PROTOCOL PREVISIT    RECORDS RECEIVED FROM: Care Everywhere   REASON FOR VISIT: New lumbar surgery consult  CT lumbar prior    PROVIDER: Juliet Gallo MD   DATE OF APPT: 5/20/25 @ 10:45 am    NOTES (FOR ALL VISITS) STATUS DETAILS   OFFICE NOTE from referring provider Internal 4/15/25, 10/30/24, 8/5/24 [facility]    See Additional Encounters   OFFICE NOTE from other specialist Care Everywhere 3/18/25, 9/5/24 Zulma Ya MD @[facility]    2/7/25, 12/6/24 Marian Huang APRN CNP @[facility]    10/21/24 [facility]    10/24/23 (MyChart Note), 10/12/23 [facility]     9/20/23 Rad[facility]     8/21/23, 6/22/23, 5/26/23 [facility]     6/21/23 (Transferred Recs) Ambrose House MD @[facility]     See Additional Encounters   DISCHARGE SUMMARY from hospital In process 3/30/23-4/2/23 Ambrose House MD @[facility]      OPERATIVE REPORT Internal 2/26/25 Zulma Ya MD @[facility] Lumbar 1-2 epidural steroid injection     10/10/24 Zulma Ya MD @[facility] INJECTION, SPINE, LUMBAR, EPIDURAL (left L1-L2)     3/30/23 Ambrose House MD @[facility] LEFT LUMBAR 4-5 POSTERIOR SPINAL FUSION WITH LEFT LUMBAR 4-5 LAMINOFORAMINOTOMY AND WIDE LUMBAR 4-5 LEFT SUBTOTAL FACETECTOMY WITH ALLOGRAFT AND AUTOGRAFT      1/24/22 Ambrose House MD  @[facility] LEFT L3-4 LATERAL RECESS DECOMPRESSION / LEFT L4-5 LAMINOFORAMINOTOMY POSTERIOR APPROACH LUMBAR SPINE / LEFT L4 NERVE ROOT DECOMPRESSION      EMG REPORT Care Everywhere [facility]  10/21/24 EMG    [facility]  12/6/23 EMG     MEDICATION LIST Internal    IMAGING  (FOR ALL VISITS)     MRI (HEAD, NECK, SPINE) Internal [facility]  5/25/23 MR Lumbar Spine  8/3/22 MR Lumbar Spine     [facility]  4/12/25 MR Thoracic Spine  4/12/25 MR Lumbar Spine  8/2/24 MR Lumbar Spine  6/28/21  MR Lumbar Spine     XRAY (SPINE) *NEUROSURGERY* Internal [facility]  7/15/24 XR EOS Total Body  11/22/23 XR EOS Total Body    [facility]  10/4/23 XR Lumbar Myelography     CT (HEAD, NECK, SPINE) Internal [facility]  ([place]) 5/15/25 CT Lumbar Spine    [facility]  10/4/23 CT Lumbar Spine  1/2/23 CT Lumbar Spine     [facility]  12/14/21 CT Lumbar Spine

## 2025-04-16 NOTE — TELEPHONE ENCOUNTER
Patients spouse (c2c on file) confirmed scheduled appointment:  Date: 5/20/25  Time: 10:45am  Visit type: New Adult Neurosurgery  Provider: Cleo  Location: [facility]  Testing/imaging: NA  Additional notes: rescheduled to earlier time for 30 min-new patient slot,.     Sagrario Matos on 4/16/2025 at 12:45 PM

## 2025-05-20 ENCOUNTER — PREP FOR PROCEDURE (OUTPATIENT)
Dept: NEUROLOGY | Facility: CLINIC | Age: 75
End: 2025-05-20

## 2025-05-20 ENCOUNTER — OFFICE VISIT (OUTPATIENT)
Dept: NEUROSURGERY | Facility: CLINIC | Age: 75
End: 2025-05-20
Payer: MEDICARE

## 2025-05-20 ENCOUNTER — PRE VISIT (OUTPATIENT)
Dept: NEUROSURGERY | Facility: CLINIC | Age: 75
End: 2025-05-20

## 2025-05-20 VITALS
HEART RATE: 38 BPM | DIASTOLIC BLOOD PRESSURE: 74 MMHG | OXYGEN SATURATION: 96 % | SYSTOLIC BLOOD PRESSURE: 112 MMHG | RESPIRATION RATE: 16 BRPM

## 2025-05-20 DIAGNOSIS — M51.362 DEGENERATION OF INTERVERTEBRAL DISC OF LUMBAR REGION WITH DISCOGENIC BACK PAIN AND LOWER EXTREMITY PAIN: Primary | ICD-10-CM

## 2025-05-20 DIAGNOSIS — M48.062 SPINAL STENOSIS OF LUMBAR REGION WITH NEUROGENIC CLAUDICATION: Primary | ICD-10-CM

## 2025-05-20 PROCEDURE — 99214 OFFICE O/P EST MOD 30 MIN: CPT | Mod: GC | Performed by: NEUROLOGICAL SURGERY

## 2025-05-20 ASSESSMENT — PAIN SCALES - GENERAL: PAINLEVEL_OUTOF10: MODERATE PAIN (4)

## 2025-05-20 NOTE — PATIENT INSTRUCTIONS
You were seen today with Dr. Juliet Gallo.    Next steps:  Schedule surgery  Schedule PAC appointment  Schedule 2 week follow up appointment      How to Contact Us  Send a Acumentricshart message to your provider.   Call the clinic - your call will be routed appropriately.   Neurosurgery Clinic: [phone]  To speak directly to an RN Care Coordinator:  LAW RN: [phone]      Note: We do our best to check voicemail frequently throughout the day and make every effort to return calls within 1-2 business days. For urgent matters, please use the general clinic phone numbers listed above.    Patient Instructions      Pre-Op Prep    You will need a preoperative assessment within 30 days of your surgery.   We will schedule this for you once we know your surgery date.   This appointment will be with our Preoperative Assessment Center on the 5th floor or virtually.   Before surgery, call the clinic if you have any changes in your health or you are having more frequent or severe symptoms. Please let us know if you develop a respiratory illness (cold, flu, or COVID-19) or other illness or infection in the weeks leading up to your surgery. We are not currently requiring a COVID-19 test prior to your surgery, unless you are having symptoms.  In the 1-2 weeks prior to your surgery, follow these recommendations to protect yourself:  Wear a face covering outside your home.  Use social distancing and stay 6 feet away from others whenever you can.  Wash your hands often.  Having diabetes or smoking can cause delayed wound healing and increase your risk of a surgical site infection. Quitting smoking and lowering your blood sugars can make a big difference.  If you would like support with this, please let us know or work with your primary care provider.    EATING AND DRINKING BEFORE SURGERY  Eat and drink as usual until 8 hours before your surgery arrival time. After that, no food or milk.   Drink clear liquids until 2 hours before your surgery  arrival time. Clear liquids are liquids you can see through, like water, Gatorade, and Propel. You may also have black coffee and tea (no cream or milk).   Nothing by mouth within 2 hours of your surgery arrival time. This includes gum, candy, and breath mints.   If your care team tells you take medicine on the morning of surgery, it is okay to take medicine with a sip of water.   Do not drink alcohol for at least 24 hours before surgery. Do not use marijuana for 7 days prior to surgery.    SHOWERING INSTRUCTIONS  Shower or bathe the night before and morning of your surgery following the instructions on your handout,  Showering Before Surgery.  Only use the antiseptic soap from your neck to your toes (do not use on your hair or head). Your care team will clean the skin at your surgery site.   Don t shave or clip hair near your surgery site. We ll remove the hair if needed.     SMOKING AND NICOTINE  Don t smoke or vape the morning of surgery. You may chew nicotine gum up to 2 hours before surgery. A nicotine patch is okay.   We strongly recommend quitting smoking and other tobacco products. Nicotine can cause delayed healing and wound complications after surgery.     PARKING: Self-park and  parking (24 hours, 7 days a week) are available at the hospital. Self-park and  rates are the same. If the Patient Visitors Ramp is full or if a patient or visitor has limited mobility, please follow the signs to the  located at the main entrance. For more information, please visit: [email or web address]        Medications    You will receive specific instructions about how to take your medications at your preoperative assessment visit. Inform your care team of every medication that you take, including over-the-counter medications, vitamins, and supplements. Some medications can make you bleed too much during surgery, and some change how well anesthesia drugs work. Follow these general  instructions for common medications, unless otherwise directed.     INSTRUCTIONS MEDICATION   Hold for 7 days before surgery  Supplements   Multivitamins   Naproxen (Aleve)  Ibuprofen (Advil, Motrin)  Aspirin (note: some medications can contain aspirin, like [place])     Talk with the Preoperative Assessment Center (PAC) about how to take these medications before surgery    Insulin or oral medications for diabetes   Blood pressure medications   Anticoagulant medications, including:    warfarin (Coumadin)   enoxaparin (Lovenox)   dabigatran (Pradaxa)   apixaban (Eliquis)   rivaroxaban (Xarelto)   Antiplatelet medications, including:   clopidogrel bisulfate (Plavix)   cilostazol (Pletal)      Okay to keep taking for pain, as needed    Acetaminophen (Tylenol)        Pain Management    You will have post-operative incisional pain which may require pain medications and muscle relaxants. You will receive medication upon discharge.  You may resume taking NSAIDs (ex. Ibuprofen, aleve, naproxen) immediately post-op   Do NOT drive while taking narcotic pain medication  Do NOT drink alcohol while using any pain medication  You can utilize ice as needed (no longer than 20 minutes at one time)  Avoid putting heat on the incision    Incision Care    Wash your incision areas daily with warm, mild soapy water, and gently pat them dry.   Don't use hydrogen peroxide or alcohol, which can slow healing.   Keep the areas clean and dry.   No submerging incision in water such as pools, hot tubs, or baths until incision is healed.   Showers are fine.   Your incisions may feel numb, this is normal.   Small nerves are cut during surgery and will take time to heal.   If needed, sutures or staples will need to be removed 10-14 days post op.    Activity Restrictions    For the first 6-8 weeks, no lifting > 10 pounds, limited bending, twisting, or overhead reaching.  Take stairs in moderation   Ok to walk as tolerated, take short frequent  walks. You may gradually increase the distance as tolerated.   Avoid bed rest and prolonged sitting for longer than 30 minutes (change positions frequently while awake)  No contact sports until after follow up visit  No high impact activities such as; running/jogging, snowmobile or 4 tracy riding or any other recreational vehicles  Please call the clinic if you develop any of the following symptoms:  Swelling and/or warmth in one or both legs  Pain or tenderness in your leg, ankle, foot, or arm   Red or discolored skin     Home Recovery    Everyone's recovery is different based on their health condition, age, and overall health status. Plan to have an adult stay with you for at least 24 hours after you get home from the hospital. Arrange for help at home. It is common to feel tired for the first few days (maybe weeks) and you will need to avoid some activities. Many people find that having someone help with household tasks, such as cleaning, cooking, and running errands is helpful. Make your home safe by removing tripping hazards and moving items you need to a place where you can easily reach them.     Warning Signs    Watch for signs of infection:   Redness  Swelling  Warmth  Drainage  Fever of 101 degrees or higher  Notify clinic [phone]    Go to the nearest emergency room for evaluation if you develop:    You have a fever with a stiff neck or a severe headache.  Swollen lymph nodes in your neck, armpits, or groin.  You have any sudden vision changes.  You have new or worse headaches.  You are sleeping more than you are awake.  You fall and hit your head.  You have pain that does not get better after you take pain medicine.  Vomiting    Post-Op Follow Up Appointments    2 week incision check with PA/NP  6 week post op follow up visit with PA/NP   3 months post op with Dr. Gallo   Please call to schedule follow up appointment at [phone]    Resources    If you are currently employed, you will need to be  off work for 2-4 weeks for post op recovery and healing.  Please fax any FMLA/short term disability paperwork to [phone]  You may call our clinic when you'd like to return to work and we can provide a work letter  To allow staff adequate time to complete paperwork, please send as soon as possible     Frequent Asked Questions:    COVID-19 and Influenza vaccines: Some people experience temporary side effects from vaccines, and we don't want to confuse these symptoms with side effects or complications from surgery (example: mild fever). Avoid getting your flu or COVID vaccine within 1 week prior to your surgery date, and no earlier than 4 weeks after your surgery.  Dental work: Avoid dental cleanings and dental work for at least 6 weeks after your surgery.  Driving: You should not drive if you are taking narcotic pain medication, you have had vision changes, or you have had a seizure and have been told not to drive. If you are dizzy after surgery, you should avoid driving until you feel better. Turning your head quickly, such as to check your blind spot in a car, can cause dizziness. When you begin driving again, start with short trips around your neighborhood, and gradually increase the distance as you feel comfortable and as your symptoms allow.     Contact Us  If you have a serious emergency, call 911 or come to the emergency room. If you can, come to the hospital where you had your surgery.     After Hours, Weekends, & Holidays    Phone  Notes    Hospital  (available 24/7)    [phone]      Ask to speak with the on-call     Neurosurgery resident    During Clinic Hours    Phone  Fax    Neurosurgery    [phone]

## 2025-05-20 NOTE — LETTER
5/20/2025       RE: Mike Gonzalez  [address] [place] [address]     Dear Colleague,    Thank you for referring your patient, Mike Gonzalez, to the [facility] [place] at [facility]. Please see a copy of my visit note below.    5/20/2025  Neurosurgery Clinic Visit    Chief Complaint: Back pain radiating to the legs     History of present illness:  Mike Gonzalez is a 74 year old male with a PMH of atrial flutter, s/p left Watchman implant, CAD on ASA 81 mg, hypertension, GERD, left rotator cuff tear, C6/7 decompression ([place] 2002), L3/4 laminectomy for decompression ([facility], 1/2022), L4/5 decompression and fusion c/b CSF leak which was fixed intra-operatively ([facility] 3/30/23) who is following up for low back and left buttock/hip pain. He has tried JUANIS, PT and PRP injections which did not provide significant relief. On imaging, he was found to have degenerative disease of L1-2 level causing central stenosis. Presenting to discuss surgical options.     Physical exam:   /74   Pulse (!) 38   Resp 16   SpO2 96%   General: Awake and alert and in no acute distress.  Pulm: Breathing comfortably on room air  Motor: 5 out of 5 strength in bilateral lower extremities   Sensation: Sensation grossly intact to light touch in all extremities.  Reflexes: 2+ reflexes bilateral patellar tendons. Bilateral clonus negative.     Imaging:  CT Lumbar Spine w/o Contrast  Result Date: 5/16/2025  EXAM: CT LUMBAR SPINE W/O CONTRAST LOCATION: [facility] DATE: 5/15/2025 INDICATION: surgical planning, hx of lumbar decompression and fusion COMPARISON: 4/12/2025 lumbar spine MRI. 10/4/2023 lumbar spine CT. TECHNIQUE: Routine CT Lumbar Spine without IV contrast. Multiplanar reformats. Dose reduction techniques were used. FINDINGS: VERTEBRA: 5 nonrib-bearing lumbar type vertebrae. Left L3-L4 and L4-L5 hemilaminectomy. Posterior spinal  instrumented fusion on the left at L5-S1 with suggestion of prior fusion hardware tracks in the lumbar spine. Left-sided L5 screw traverses the anterior cortical margin of the L5 vertebral body. No finding for hardware loosening. Levoscoliotic curvature to the lumbar spine. Otherwise unremarkable alignment. Diffuse osteopenia. Maintained vertebral body heights. Moderate to severe T12-L1 degenerative disc disease with severe L1-L2 to L4-L5 degenerative disc disease. Moderate L5-S1 degenerative disc disease. Multilevel bridging anterior marginal osteophytes at L2-L3 to L5-S1. Moderate to severe bilateral L5-S1 facet arthropathy. CANAL/FORAMINA: Apparent moderate L1-L2 and L3-L4 spinal canal stenosis with multilevel mild spinal canal stenosis elsewhere. Moderate right L4-L5 and L5-S1 neural foraminal stenosis with mild to moderate right L3-L4 neural foraminal stenosis. Moderate to severe left L5-S1 neural foraminal stenosis. PARASPINAL: Sacroiliac joint degenerative change bilaterally with partial fusion on the right.     IMPRESSION: 1.  Left L3-L4 and L4-L5 hemilaminectomy with posterior spinal instrumented fusion on the left at L5-S1. No evident hardware complication. 2.  Osteopenia with degenerative change as above. Spinal canal and neural foraminal stenosis are described above.      Assessment/Plan:  #Lumbar spinal stenosis    73 year old male with significant cardiac history with prior decompression and fusion procedures of the lumbar spine (L3-4 and L4-5 laminectomy and L4-5 fusion), presenting with neurogenic claudication like symptoms, more pronounced pain radiating down to the left hip. He remains full strength on exam. There is no SI joint tenderness. His previous evaluation for SI joint/hip pathologies were negative. On imaging, L2-5 level is autofused. There is central canal stenosis at L1-2 level and vacuum effect on L5-S1 disc, suggestive of adjacent segment disease.     We discussed L1-2 minimally  laminectomy for decompression and its risks including but not limited to bleeding, infection, new neurological deficits and CSF leak. Patient agrees with the current plan and would like to proceed.      He also reported having significant shoulder problems with positioning with his previous surgeries due to his left reverse shoulder replacement history. His arms are planned to be positioned lower with less caudal extension than normal to address this.     Patient seen and discussed with Dr. Juliet Gallo MD.  Approximately 30 minutes were spent in chart and image review, evaluation of the patient and assessment of next steps.    Jeffrey Phillips MD   PGY-1, Department of Neurosurgery  [facility]    REVIEWED ASSOCIATED CLINICAL DATA AND HISTORY FOUND IN THE MEDICAL RECORD:  Past Medical History:   Past Medical History:   Diagnosis Date     Angioedema      Atrial fibrillation and flutter (H)      Basal cell carcinoma      Branch retinal vein occlusion of left eye (H) 07/01/2017     CAD (coronary artery disease) 2/29/2024    ANGIOGRAM 2021 Left Main The vessel was visualized by angiography, is moderate in size and is angiographically normal. Left Anterior Descending Mid LAD lesion is 25% stenosed. Ramus Intermedius Ramus lesion is 10% stenosed. Left Circumflex Dist Cx lesion is 30% stenosed. Right Coronary Artery The vessel was visualized by angiography, is moderate in size and is angiographically normal.       Chronic neck pain      Congestive heart failure (H) 2021     GERD (gastroesophageal reflux disease)      HTN (hypertension) 2015     Nonsmoker      Radiculopathy     cervical     Retinal hemorrhage     Vessel rupture in left retina     Rhinitis, allergic      S/P colonoscopy 04/15/2019     Urticaria        Surgical History:   Past Surgical History:   Procedure Laterality Date     BASAL CELL CARCINOMA EXCISION  01/01/2020    Left nasal reconstruction     CATARACT EXTRACTION, BILATERAL  Bilateral 2022     CERVICAL SPINE SURGERY      C8, T1, foraminotomy     CV CORONARY ANGIOGRAM N/A 01/20/2021    Procedure: Coronary Angiogram;  Surgeon: Willow Wellington MD;  Location: [facility] Cardiac Cath Lab;  Service: Cardiology     CV LEFT ATRIAL APPENDAGE CLOSURE N/A 07/14/2021    Procedure: CV LEFT ATRIAL APPENDAGE CLOSURE;  Surgeon: Noah Gutierres MD;  Location:  HEART CARDIAC CATH LAB     CV LEFT HEART CATHETERIZATION WITHOUT LEFT VENTRICULOGRAM Left 01/20/2021    Procedure: Left Heart Catheterization Without Left Ventriculogram;  Surgeon: Willow Wellington MD;  Location: [facility] Cardiac Cath Lab;  Service: Cardiology     DECOMPRESSION, FUSION LUMBAR POSTERIOR ONE LEVEL, COMBINED Left 03/30/2023    Procedure: LEFT LUMBAR 4-5 POSTERIOR SPINAL FUSION WITH LEFT LUMBAR 4-5 LAMINOFORAMINOTOMY AND WIDE LUMBAR 4-5 LEFT SUBTOTAL FACETECTOMY WITH ALLOGRAFT AND AUTOGRAFT;  Surgeon: Ambrose Degroot MD;  Location: [facility] Main OR     HERNIA REPAIR, UMBILICAL       ×2     INJECT EPIDURAL LUMBAR Left 10/10/2024    Procedure: INJECTION, SPINE, LUMBAR, EPIDURAL (left L1-L2);  Surgeon: Zulma Ya MD;  Location: [facility] OR     INJECT EPIDURAL LUMBAR N/A 2/26/2025    Procedure: Lumbar 1-2 epidural steroid injection;  Surgeon: Zulma Ya MD;  Location: [facility] OR     INJECT MAJOR JOINT / BURSA Left 10/17/2024    Procedure: INJECTION, MAJOR JOINT OR BURSA OF MAJOR JOINT (left knee);  Surgeon: Zulma Ya MD;  Location: [facility] OR     REVERSE TOTAL SHOULDER ARTHROPLASTY Left 05/01/2019       Social history:   Social History     Tobacco Use     Smoking status: Never     Smokeless tobacco: Never   Substance Use Topics     Alcohol use: Never     Comment: Alcoholic Drinks/day: About once a year.     Drug use: No       Family history:   Family History   Problem Relation Age of Onset     Arthritis Mother      Other - See Comments Mother         psychiatry/pvd     Other - See Comments Father         blood reaction        Medications:  Current Outpatient Medications   Medication Sig Dispense Refill     acetaminophen (TYLENOL) 650 MG CR tablet Take 650 mg by mouth every 8 hours as needed       aspirin 81 MG EC tablet Take 1 tablet (81 mg) by mouth daily       co-enzyme Q-10 100 MG CAPS capsule Take 200 mg by mouth daily       fish oil-omega-3 fatty acids 300-1,000 mg capsule [FISH OIL-OMEGA-3 FATTY ACIDS 300-1,000 MG CAPSULE] Take 2 g by mouth daily.       fluticasone (FLONASE) 50 MCG/ACT nasal spray Spray 1 spray into both nostrils daily. 48 mL 3     lisinopril (ZESTRIL) 5 MG tablet Take 1 tablet (5 mg) by mouth daily. 90 tablet 3     metoprolol succinate ER (TOPROL XL) 25 MG 24 hr tablet Take 1 tablet (25 mg) by mouth daily. 90 tablet 3     MULTIVITAMIN ORAL Take 1 tablet by mouth daily       nortriptyline (PAMELOR) 25 MG capsule TAKE 1 CAPSULE AT BEDTIME 90 capsule 3     pravastatin (PRAVACHOL) 20 MG tablet Take 1 tablet (20 mg) by mouth daily. 90 tablet 3     pseudoePHEDrine (SUDAFED) 120 MG 12 hr tablet Take 1 tablet (120 mg) by mouth every 12 hours as needed for congestion 50 tablet 0     senna-docusate (SENOKOT-S/PERICOLACE) 8.6-50 MG tablet Take 1-2 tablets by mouth daily as needed for constipation Take while on oral narcotics to prevent or treat constipation. 100 tablet 1     tamsulosin (FLOMAX) 0.4 MG capsule Take 2 capsules (0.8 mg) by mouth daily. 180 capsule 3     pregabalin (LYRICA) 25 MG capsule Start 25 mg at bedtime x 1-2 weeks, then increase to 25 mg BID. (Patient not taking: Reported on 5/20/2025) 30 capsule 1     Current Facility-Administered Medications   Medication Dose Route Frequency Provider Last Rate Last Admin     hylan G-F 20 (SYNVISC) injection 48 mg  48 mg INTRA-ARTICULAR Once Zulma Ya MD           Allergies:     Allergies   Allergen Reactions     Effient [Prasugrel Hcl] Hives     Hydrochlorothiazide Unknown     Hydrocodone Swelling     Sinus     Oxycontin [Oxycodone] Swelling     lip      Peppermint Oil Other (See Comments)     Plavix [Clopidogrel] Hives     Sulfa (Sulfonamide Antibiotics) [Sulfa Antibiotics] Swelling     Perfume Swelling     Other reaction(s): Runny Nose   Again, thank you for allowing me to participate in the care of your patient.      Sincerely,    Juliet Gallo MD

## 2025-05-20 NOTE — PROGRESS NOTES
5/20/2025  Neurosurgery Clinic Visit    Chief Complaint: Back pain radiating to the legs     History of present illness:  Mike Gonzalez is a 74 year old male with a PMH of atrial flutter, s/p left Watchman implant, CAD on ASA 81 mg, hypertension, GERD, left rotator cuff tear, C6/7 decompression ([place] 2002), L3/4 laminectomy for decompression ([facility], 1/2022), L4/5 decompression and fusion c/b CSF leak which was fixed intra-operatively ([facility] 3/30/23) who is following up for low back and left buttock/hip pain. He has tried JUANIS, PT and PRP injections which did not provide significant relief. On imaging, he was found to have degenerative disease of L1-2 level causing central stenosis. Presenting to discuss surgical options.     Physical exam:   /74   Pulse (!) 38   Resp 16   SpO2 96%   General: Awake and alert and in no acute distress.  Pulm: Breathing comfortably on room air  Motor: 5 out of 5 strength in bilateral lower extremities   Sensation: Sensation grossly intact to light touch in all extremities.  Reflexes: 2+ reflexes bilateral patellar tendons. Bilateral clonus negative.     Imaging:  CT Lumbar Spine w/o Contrast  Result Date: 5/16/2025  EXAM: CT LUMBAR SPINE W/O CONTRAST LOCATION: [facility] DATE: 5/15/2025 INDICATION: surgical planning, hx of lumbar decompression and fusion COMPARISON: 4/12/2025 lumbar spine MRI. 10/4/2023 lumbar spine CT. TECHNIQUE: Routine CT Lumbar Spine without IV contrast. Multiplanar reformats. Dose reduction techniques were used. FINDINGS: VERTEBRA: 5 nonrib-bearing lumbar type vertebrae. Left L3-L4 and L4-L5 hemilaminectomy. Posterior spinal instrumented fusion on the left at L5-S1 with suggestion of prior fusion hardware tracks in the lumbar spine. Left-sided L5 screw traverses the anterior cortical margin of the L5 vertebral body. No finding for hardware loosening. Levoscoliotic curvature to the lumbar spine. Otherwise unremarkable alignment. Diffuse  osteopenia. Maintained vertebral body heights. Moderate to severe T12-L1 degenerative disc disease with severe L1-L2 to L4-L5 degenerative disc disease. Moderate L5-S1 degenerative disc disease. Multilevel bridging anterior marginal osteophytes at L2-L3 to L5-S1. Moderate to severe bilateral L5-S1 facet arthropathy. CANAL/FORAMINA: Apparent moderate L1-L2 and L3-L4 spinal canal stenosis with multilevel mild spinal canal stenosis elsewhere. Moderate right L4-L5 and L5-S1 neural foraminal stenosis with mild to moderate right L3-L4 neural foraminal stenosis. Moderate to severe left L5-S1 neural foraminal stenosis. PARASPINAL: Sacroiliac joint degenerative change bilaterally with partial fusion on the right.     IMPRESSION: 1.  Left L3-L4 and L4-L5 hemilaminectomy with posterior spinal instrumented fusion on the left at L5-S1. No evident hardware complication. 2.  Osteopenia with degenerative change as above. Spinal canal and neural foraminal stenosis are described above.      Assessment/Plan:  #Lumbar spinal stenosis    73 year old male with significant cardiac history with prior decompression and fusion procedures of the lumbar spine (L3-4 and L4-5 laminectomy and L4-5 fusion), presenting with neurogenic claudication like symptoms, more pronounced pain radiating down to the left hip. He remains full strength on exam. There is no SI joint tenderness. His previous evaluation for SI joint/hip pathologies were negative. On imaging, L2-5 level is autofused. There is central canal stenosis at L1-2 level and vacuum effect on L5-S1 disc, suggestive of adjacent segment disease.     We discussed L1-2 minimally laminectomy for decompression and its risks including but not limited to bleeding, infection, new neurological deficits and CSF leak. Patient agrees with the current plan and would like to proceed.      He also reported having significant shoulder problems with positioning with his previous surgeries due to his left  reverse shoulder replacement history. His arms are planned to be positioned lower with less caudal extension than normal to address this.     Patient seen and discussed with Dr. Juliet Gallo MD.  Approximately 30 minutes were spent in chart and image review, evaluation of the patient and assessment of next steps.    Jeffrey Phillips MD   PGY-1, Department of Neurosurgery  [facility]    REVIEWED ASSOCIATED CLINICAL DATA AND HISTORY FOUND IN THE MEDICAL RECORD:  Past Medical History:   Past Medical History:   Diagnosis Date    Angioedema     Atrial fibrillation and flutter (H)     Basal cell carcinoma     Branch retinal vein occlusion of left eye (H) 07/01/2017    CAD (coronary artery disease) 2/29/2024    ANGIOGRAM 2021 Left Main The vessel was visualized by angiography, is moderate in size and is angiographically normal. Left Anterior Descending Mid LAD lesion is 25% stenosed. Ramus Intermedius Ramus lesion is 10% stenosed. Left Circumflex Dist Cx lesion is 30% stenosed. Right Coronary Artery The vessel was visualized by angiography, is moderate in size and is angiographically normal.      Chronic neck pain     Congestive heart failure (H) 2021    GERD (gastroesophageal reflux disease)     HTN (hypertension) 2015    Nonsmoker     Radiculopathy     cervical    Retinal hemorrhage     Vessel rupture in left retina    Rhinitis, allergic     S/P colonoscopy 04/15/2019    Urticaria        Surgical History:   Past Surgical History:   Procedure Laterality Date    BASAL CELL CARCINOMA EXCISION  01/01/2020    Left nasal reconstruction    CATARACT EXTRACTION, BILATERAL Bilateral 2022    CERVICAL SPINE SURGERY      C8, T1, foraminotomy    CV CORONARY ANGIOGRAM N/A 01/20/2021    Procedure: Coronary Angiogram;  Surgeon: Willow Wellington MD;  Location: [facility] Cardiac Cath Lab;  Service: Cardiology    CV LEFT ATRIAL APPENDAGE CLOSURE N/A 07/14/2021    Procedure: CV LEFT ATRIAL APPENDAGE CLOSURE;   Surgeon: Noah Gutierres MD;  Location:  HEART CARDIAC CATH LAB    CV LEFT HEART CATHETERIZATION WITHOUT LEFT VENTRICULOGRAM Left 01/20/2021    Procedure: Left Heart Catheterization Without Left Ventriculogram;  Surgeon: Willow Wellington MD;  Location: [facility] Cardiac Cath Lab;  Service: Cardiology    DECOMPRESSION, FUSION LUMBAR POSTERIOR ONE LEVEL, COMBINED Left 03/30/2023    Procedure: LEFT LUMBAR 4-5 POSTERIOR SPINAL FUSION WITH LEFT LUMBAR 4-5 LAMINOFORAMINOTOMY AND WIDE LUMBAR 4-5 LEFT SUBTOTAL FACETECTOMY WITH ALLOGRAFT AND AUTOGRAFT;  Surgeon: Ambrose Degroot MD;  Location: [facility] Main OR    HERNIA REPAIR, UMBILICAL       ×2    INJECT EPIDURAL LUMBAR Left 10/10/2024    Procedure: INJECTION, SPINE, LUMBAR, EPIDURAL (left L1-L2);  Surgeon: Zulma Ya MD;  Location: [facility] OR    INJECT EPIDURAL LUMBAR N/A 2/26/2025    Procedure: Lumbar 1-2 epidural steroid injection;  Surgeon: Zulma Ya MD;  Location: [facility] OR    INJECT MAJOR JOINT / BURSA Left 10/17/2024    Procedure: INJECTION, MAJOR JOINT OR BURSA OF MAJOR JOINT (left knee);  Surgeon: Zulma Ya MD;  Location: [facility] OR    REVERSE TOTAL SHOULDER ARTHROPLASTY Left 05/01/2019       Social history:   Social History     Tobacco Use    Smoking status: Never    Smokeless tobacco: Never   Substance Use Topics    Alcohol use: Never     Comment: Alcoholic Drinks/day: About once a year.    Drug use: No       Family history:   Family History   Problem Relation Age of Onset    Arthritis Mother     Other - See Comments Mother         psychiatry/pvd    Other - See Comments Father         blood reaction       Medications:  Current Outpatient Medications   Medication Sig Dispense Refill    acetaminophen (TYLENOL) 650 MG CR tablet Take 650 mg by mouth every 8 hours as needed      aspirin 81 MG EC tablet Take 1 tablet (81 mg) by mouth daily      co-enzyme Q-10 100 MG CAPS capsule Take 200 mg by mouth daily      fish oil-omega-3 fatty acids 300-1,000  mg capsule [FISH OIL-OMEGA-3 FATTY ACIDS 300-1,000 MG CAPSULE] Take 2 g by mouth daily.      fluticasone (FLONASE) 50 MCG/ACT nasal spray Spray 1 spray into both nostrils daily. 48 mL 3    lisinopril (ZESTRIL) 5 MG tablet Take 1 tablet (5 mg) by mouth daily. 90 tablet 3    metoprolol succinate ER (TOPROL XL) 25 MG 24 hr tablet Take 1 tablet (25 mg) by mouth daily. 90 tablet 3    MULTIVITAMIN ORAL Take 1 tablet by mouth daily      nortriptyline (PAMELOR) 25 MG capsule TAKE 1 CAPSULE AT BEDTIME 90 capsule 3    pravastatin (PRAVACHOL) 20 MG tablet Take 1 tablet (20 mg) by mouth daily. 90 tablet 3    pseudoePHEDrine (SUDAFED) 120 MG 12 hr tablet Take 1 tablet (120 mg) by mouth every 12 hours as needed for congestion 50 tablet 0    senna-docusate (SENOKOT-S/PERICOLACE) 8.6-50 MG tablet Take 1-2 tablets by mouth daily as needed for constipation Take while on oral narcotics to prevent or treat constipation. 100 tablet 1    tamsulosin (FLOMAX) 0.4 MG capsule Take 2 capsules (0.8 mg) by mouth daily. 180 capsule 3    pregabalin (LYRICA) 25 MG capsule Start 25 mg at bedtime x 1-2 weeks, then increase to 25 mg BID. (Patient not taking: Reported on 5/20/2025) 30 capsule 1     Current Facility-Administered Medications   Medication Dose Route Frequency Provider Last Rate Last Admin    hylan G-F 20 (SYNVISC) injection 48 mg  48 mg INTRA-ARTICULAR Once Zulma Ya MD           Allergies:     Allergies   Allergen Reactions    Effient [Prasugrel Hcl] Hives    Hydrochlorothiazide Unknown    Hydrocodone Swelling     Sinus    Oxycontin [Oxycodone] Swelling     lip    Peppermint Oil Other (See Comments)    Plavix [Clopidogrel] Hives    Sulfa (Sulfonamide Antibiotics) [Sulfa Antibiotics] Swelling    Perfume Swelling     Other reaction(s): Runny Nose   I have seen this patient with the resident and formulated a plan and agree with this note.  AMP

## 2025-05-29 NOTE — TELEPHONE ENCOUNTER
FUTURE VISIT INFORMATION      SURGERY INFORMATION:  Date: 10/1/25  Location: [facility] OR  Surgeon:  Juliet Gallo  Anesthesia Type:  General  Procedure: LAMINECTOMY, SPINE, LUMBAR, MINIMALLY INVASIVE, 1 LEVEL  Lumbar 1/2 laminectomy, minimally invasive, Left  Consult: 25    RECORDS REQUESTED FROM:       Primary Care Provider:  Onur Rod MD - [facility]     Pertinent Medical History: CAD; Congestive heart failure; Hypertension; Angioedema; GERD; a/p left atrial appendage closure; CKD3a; Dilated cardiomyopathy; Atrial flutter    Most recent EKG+ Tracin24    Most recent ECHO: 24    Most recent Cardiac Stress Test: 9/10/24    Most recent Coronary Angiogram: 21

## 2025-05-31 ENCOUNTER — MYC MEDICAL ADVICE (OUTPATIENT)
Dept: INTERNAL MEDICINE | Facility: CLINIC | Age: 75
End: 2025-05-31
Payer: MEDICARE

## 2025-06-01 NOTE — TELEPHONE ENCOUNTER
Can offer the patient an appointment in a reserved slot the week of June 23rd or after to discuss this if the patient would like to.    Should be a FACE TO FACE visit.    Onur Rod MD  General Internal Medicine  [facility]  5/31/2025, 10:07 PM

## 2025-06-01 NOTE — TELEPHONE ENCOUNTER
Contacts       Contact Date/Time Type Contact Phone/Fax    06/01/2025 10:55 AM CDT Phone (Outgoing) Bj Gonzalez (Self) [phone] (H)    Talked with Patient           Contacted patient to: Relay a message and schedule appointment.    Relayed Dr. Rod's message.  Patient accepted offer to schedule Thursday 06/26/25

## 2025-06-27 ENCOUNTER — TELEPHONE (OUTPATIENT)
Dept: INTERNAL MEDICINE | Facility: CLINIC | Age: 75
End: 2025-06-27

## 2025-07-06 ENCOUNTER — HEALTH MAINTENANCE LETTER (OUTPATIENT)
Age: 75
End: 2025-07-06

## 2025-08-28 ENCOUNTER — MYC MEDICAL ADVICE (OUTPATIENT)
Dept: INTERNAL MEDICINE | Facility: CLINIC | Age: 75
End: 2025-08-28
Payer: MEDICARE

## 2025-08-28 ENCOUNTER — OFFICE VISIT (OUTPATIENT)
Dept: URGENT CARE | Facility: URGENT CARE | Age: 75
End: 2025-08-28
Payer: MEDICARE

## 2025-08-28 ENCOUNTER — NURSE TRIAGE (OUTPATIENT)
Dept: INTERNAL MEDICINE | Facility: CLINIC | Age: 75
End: 2025-08-28
Payer: MEDICARE

## 2025-08-28 VITALS
SYSTOLIC BLOOD PRESSURE: 99 MMHG | OXYGEN SATURATION: 96 % | BODY MASS INDEX: 32.51 KG/M2 | WEIGHT: 240 LBS | HEART RATE: 76 BPM | RESPIRATION RATE: 19 BRPM | TEMPERATURE: 97.5 F | HEIGHT: 72 IN | DIASTOLIC BLOOD PRESSURE: 61 MMHG

## 2025-08-28 DIAGNOSIS — T78.40XA HYPERSENSITIVITY, INITIAL ENCOUNTER: Primary | ICD-10-CM

## 2025-08-28 RX ORDER — PREDNISONE 20 MG/1
40 TABLET ORAL DAILY
Qty: 10 TABLET | Refills: 0 | Status: SHIPPED | OUTPATIENT
Start: 2025-08-28 | End: 2025-09-02

## 2025-09-15 ENCOUNTER — PRE VISIT (OUTPATIENT)
Dept: SURGERY | Facility: CLINIC | Age: 75
End: 2025-09-15

## (undated) DEVICE — KIT SEALER DURASEAL EXACT SP HYDROGEL 5ML SGL USE 20-6520

## (undated) DEVICE — Device

## (undated) DEVICE — PACK COLD 6 X 10 1-HOUR 0814-0610

## (undated) DEVICE — CLOSURE DEVICE 6FR VASC PROGLIDE MEDICATED SUTURE 12673-03

## (undated) DEVICE — CATH ANGIO IMPULSE 6FR 110CML  PIGTAIL

## (undated) DEVICE — INTRODUCER CHECK FLO 16FRX30CM .038

## (undated) DEVICE — SOL NACL 0.9% IRRIG 1000ML BOTTLE 2F7124

## (undated) DEVICE — IOM CASE FLAT FEE

## (undated) DEVICE — LEFT HEART PK FAIRVIEW UNIV MEDICAL CENTER

## (undated) DEVICE — SUTURE VICRYL+ 1 18 CT/CR  VLT VCP753D

## (undated) DEVICE — PLATE GROUNDING ADULT W/CORD 9165L

## (undated) DEVICE — SU STRATAFIX MONOCRYL 3-0 SPIRAL PS-2 30CM SXMP1B106

## (undated) DEVICE — PEDICLE SCREW BALL TIP PROBE, 2.3MM

## (undated) DEVICE — ESU BIPOLAR SEALER AQUAMANTYS 6MM 23-112-1

## (undated) DEVICE — SUTURE VICRYL+ 2-0 27IN CT-1 UND VCP259H

## (undated) DEVICE — SPONGE RAY-TEC 4X4" 7317

## (undated) DEVICE — DRAPE C-ARMOR 5 SIDED 5523

## (undated) DEVICE — GLOVE BIOGEL PI ULTRATOUCH G SZ 7.0 42170

## (undated) DEVICE — SOL NACL 0.9% INJ 500ML BAG 2B1323Q

## (undated) DEVICE — TRAY PAIN INJECTION 97A 640

## (undated) DEVICE — IOM STANDARD SUPPLY FEE

## (undated) DEVICE — DRAIN RESERVOIR 100ML JP 0070740

## (undated) DEVICE — GUIDEWIRE VASC SAFARI2 0.035X275CM H74939406XS1

## (undated) DEVICE — ESU ELEC BLADE 2.75" COATED/INSULATED E1455

## (undated) DEVICE — DRAIN RND 1/8 10FR SIL 0070210

## (undated) DEVICE — SUTURE MONOCRYL+ 4-0 PS-2 27IN MCP426H

## (undated) DEVICE — GLOVE SURG PI ULTRA TOUCH M SZ 7-1/2 LF

## (undated) DEVICE — POSITIONER ARM CRADLE LAMINECTOMY DISP

## (undated) DEVICE — DRSG TELFA 3X4" 1050

## (undated) DEVICE — NDL 18G X 3.5IN TUOHY NDL 322166

## (undated) DEVICE — PACK MINOR SINGLE BASIN SSK3001

## (undated) DEVICE — WRAP LUMBAR COMPRESS COLD THERAPY 4632P

## (undated) DEVICE — DRSG PRIMAPORE 03 1/8X6" 66000318

## (undated) DEVICE — ADH LIQUID MASTISOL TOPICAL VIAL 2-3ML 0523-48

## (undated) DEVICE — TRAP SPECIMAN 5CC-40CC DYND44140

## (undated) DEVICE — SU SILK 2-0 SH 30" K833H

## (undated) DEVICE — MANIFOLD KIT ANGIO AUTOMATED 014613

## (undated) DEVICE — TIP SUCTION FRAIZER 10FR DISP 163

## (undated) DEVICE — GLOVE BIOGEL INDICATOR 7.5 LF 41675

## (undated) DEVICE — DRSG DRAIN 4X4" 7086

## (undated) DEVICE — DRAPE C-ARM 60X42" 1013

## (undated) DEVICE — CATH TRAY FOLEY SURESTEP 16FR DRAIN BAG STATOCK A899916

## (undated) DEVICE — GLOVE UNDER INDICATOR PI SZ 7.0 LF 41670

## (undated) DEVICE — NDL SPINAL 22GA 3.5" QUINCKE 405181

## (undated) DEVICE — CUSHION INSERT LG PRONE VIEW JACKSON TABLE

## (undated) DEVICE — SYR 07ML EPIDURAL LOSS OF RESISTANCE PULSATOR 4905

## (undated) DEVICE — SHEATH WITH DILATOR ACCESS SYSTEM DOUBLE CURVE

## (undated) DEVICE — ESU PENCIL SMOKE EVAC W/ROCKER SWITCH 0703-047-000

## (undated) DEVICE — CUSTOM PACK LUMBAR FUSION SNE5BLFHEA

## (undated) DEVICE — INTRO SHEATH 8FRX10CM PINNACLE RSS802

## (undated) DEVICE — RX SURGIFLO HEMOSTATIC MATRIX 8ML 2991

## (undated) DEVICE — CATH TRANSSEPTAL VERSACROSS FIXED CURVE RF

## (undated) DEVICE — PREP CHLORAPREP W/ORANGE TINT 10.5ML 260715

## (undated) DEVICE — CATH IV ANGIO INTRO 14GA X 1 3/4" 381467

## (undated) DEVICE — DRSG TEGADERM 4X4 3/4" 1626W

## (undated) DEVICE — DRSG STERI STRIP 1/2X4" R1547

## (undated) DEVICE — SU NUROLON 4-0 TF CR 8X18" C584D

## (undated) DEVICE — INTRO MICRO MINI STICK 4FR STIFF NITINOL 45-753

## (undated) DEVICE — SUCTION MANIFOLD NEPTUNE 2 SYS 1 PORT 702-025-000

## (undated) DEVICE — PAD DEFIBRILLATOR ELECTRODE 4.25INX6IN ADULT 2001M-C

## (undated) DEVICE — KIT HAND CONTROL ACIST 014644 AR-P54

## (undated) DEVICE — SUCTION TIP POOLE STERILE 35040

## (undated) DEVICE — SOL WATER IRRIG 1000ML BOTTLE 2F7114

## (undated) DEVICE — TOOL DISSECT MIDAS MR8 14CM MATCH HEAD 3MM MR8-14MH30

## (undated) RX ORDER — ONDANSETRON 2 MG/ML
INJECTION INTRAMUSCULAR; INTRAVENOUS
Status: DISPENSED
Start: 2023-03-30

## (undated) RX ORDER — FENTANYL CITRATE 50 UG/ML
INJECTION, SOLUTION INTRAMUSCULAR; INTRAVENOUS
Status: DISPENSED
Start: 2021-07-14

## (undated) RX ORDER — HEPARIN SODIUM 1000 [USP'U]/ML
INJECTION, SOLUTION INTRAVENOUS; SUBCUTANEOUS
Status: DISPENSED
Start: 2021-07-14

## (undated) RX ORDER — ONDANSETRON 2 MG/ML
INJECTION INTRAMUSCULAR; INTRAVENOUS
Status: DISPENSED
Start: 2021-07-14

## (undated) RX ORDER — EPHEDRINE SULFATE 50 MG/ML
INJECTION, SOLUTION INTRAMUSCULAR; INTRAVENOUS; SUBCUTANEOUS
Status: DISPENSED
Start: 2021-07-14

## (undated) RX ORDER — DEXAMETHASONE SODIUM PHOSPHATE 10 MG/ML
INJECTION, EMULSION INTRAMUSCULAR; INTRAVENOUS
Status: DISPENSED
Start: 2023-03-30

## (undated) RX ORDER — FENTANYL CITRATE-0.9 % NACL/PF 10 MCG/ML
PLASTIC BAG, INJECTION (ML) INTRAVENOUS
Status: DISPENSED
Start: 2023-03-30

## (undated) RX ORDER — CEFAZOLIN SODIUM 2 G/100ML
INJECTION, SOLUTION INTRAVENOUS
Status: DISPENSED
Start: 2021-07-14

## (undated) RX ORDER — FENTANYL CITRATE-0.9 % NACL/PF 10 MCG/ML
PLASTIC BAG, INJECTION (ML) INTRAVENOUS
Status: DISPENSED
Start: 2021-07-14

## (undated) RX ORDER — LIDOCAINE HYDROCHLORIDE 10 MG/ML
INJECTION, SOLUTION EPIDURAL; INFILTRATION; INTRACAUDAL; PERINEURAL
Status: DISPENSED
Start: 2023-03-30

## (undated) RX ORDER — PROPOFOL 10 MG/ML
INJECTION, EMULSION INTRAVENOUS
Status: DISPENSED
Start: 2021-07-14

## (undated) RX ORDER — FENTANYL CITRATE 50 UG/ML
INJECTION, SOLUTION INTRAMUSCULAR; INTRAVENOUS
Status: DISPENSED
Start: 2023-03-30

## (undated) RX ORDER — TRIAMCINOLONE ACETONIDE 40 MG/ML
INJECTION, SUSPENSION INTRA-ARTICULAR; INTRAMUSCULAR
Status: DISPENSED
Start: 2023-10-12

## (undated) RX ORDER — GLYCOPYRROLATE 0.2 MG/ML
INJECTION, SOLUTION INTRAMUSCULAR; INTRAVENOUS
Status: DISPENSED
Start: 2023-03-30

## (undated) RX ORDER — ASPIRIN 81 MG/1
TABLET, CHEWABLE ORAL
Status: DISPENSED
Start: 2021-07-14

## (undated) RX ORDER — LIDOCAINE HYDROCHLORIDE 10 MG/ML
INJECTION, SOLUTION EPIDURAL; INFILTRATION; INTRACAUDAL; PERINEURAL
Status: DISPENSED
Start: 2023-10-12

## (undated) RX ORDER — LIDOCAINE HYDROCHLORIDE 20 MG/ML
INJECTION, SOLUTION EPIDURAL; INFILTRATION; INTRACAUDAL; PERINEURAL
Status: DISPENSED
Start: 2021-07-14

## (undated) RX ORDER — PROPOFOL 10 MG/ML
INJECTION, EMULSION INTRAVENOUS
Status: DISPENSED
Start: 2023-03-30